# Patient Record
Sex: FEMALE | Race: WHITE | NOT HISPANIC OR LATINO | Employment: OTHER | URBAN - NONMETROPOLITAN AREA
[De-identification: names, ages, dates, MRNs, and addresses within clinical notes are randomized per-mention and may not be internally consistent; named-entity substitution may affect disease eponyms.]

---

## 2017-01-03 ENCOUNTER — HOSPITAL ENCOUNTER (OUTPATIENT)
Dept: BONE DENSITY | Facility: HOSPITAL | Age: 67
Discharge: HOME | End: 2017-01-03
Attending: INTERNAL MEDICINE
Payer: MEDICARE

## 2017-01-03 ENCOUNTER — HOSPITAL ENCOUNTER (OUTPATIENT)
Dept: MAMMOGRAPHY | Facility: HOSPITAL | Age: 67
Discharge: HOME | End: 2017-01-03
Attending: INTERNAL MEDICINE
Payer: MEDICARE

## 2017-01-03 DIAGNOSIS — Z13.820 ENCOUNTER FOR IMAGING TO ASSESS OSTEOPOROSIS: ICD-10-CM

## 2017-01-03 DIAGNOSIS — Z12.31 ENCOUNTER FOR SCREENING MAMMOGRAM FOR MALIGNANT NEOPLASM OF BREAST: ICD-10-CM

## 2017-01-03 PROCEDURE — 77080 DXA BONE DENSITY AXIAL: CPT

## 2017-01-03 PROCEDURE — G0202 SCR MAMMO BI INCL CAD: HCPCS

## 2017-01-03 PROCEDURE — 77063 BREAST TOMOSYNTHESIS BI: CPT

## 2017-01-06 ENCOUNTER — GENERIC CONVERSION - ENCOUNTER (OUTPATIENT)
Dept: OTHER | Facility: OTHER | Age: 67
End: 2017-01-06

## 2017-01-11 ENCOUNTER — ALLSCRIPTS OFFICE VISIT (OUTPATIENT)
Dept: OTHER | Facility: OTHER | Age: 67
End: 2017-01-11

## 2017-01-11 DIAGNOSIS — R10.9 ABDOMINAL PAIN: ICD-10-CM

## 2017-01-11 DIAGNOSIS — R53.83 OTHER FATIGUE: ICD-10-CM

## 2017-01-11 DIAGNOSIS — Z79.891 LONG TERM CURRENT USE OF OPIATE ANALGESIC: ICD-10-CM

## 2017-01-11 DIAGNOSIS — K21.9 GASTRO-ESOPHAGEAL REFLUX DISEASE WITHOUT ESOPHAGITIS: ICD-10-CM

## 2017-01-11 DIAGNOSIS — G89.4 CHRONIC PAIN SYNDROME: ICD-10-CM

## 2017-01-11 DIAGNOSIS — F11.20 UNCOMPLICATED OPIOID DEPENDENCE (HCC): ICD-10-CM

## 2017-01-19 ENCOUNTER — GENERIC CONVERSION - ENCOUNTER (OUTPATIENT)
Dept: OTHER | Facility: OTHER | Age: 67
End: 2017-01-19

## 2017-01-30 ENCOUNTER — ALLSCRIPTS OFFICE VISIT (OUTPATIENT)
Dept: OTHER | Facility: OTHER | Age: 67
End: 2017-01-30

## 2017-01-30 ENCOUNTER — TRANSCRIBE ORDERS (OUTPATIENT)
Dept: LAB | Facility: MEDICAL CENTER | Age: 67
End: 2017-01-30

## 2017-01-30 ENCOUNTER — APPOINTMENT (OUTPATIENT)
Dept: LAB | Facility: MEDICAL CENTER | Age: 67
End: 2017-01-30
Payer: MEDICARE

## 2017-01-30 DIAGNOSIS — K21.9 GASTRO-ESOPHAGEAL REFLUX DISEASE WITHOUT ESOPHAGITIS: ICD-10-CM

## 2017-01-30 DIAGNOSIS — R10.9 ABDOMINAL PAIN: ICD-10-CM

## 2017-01-30 DIAGNOSIS — R53.83 OTHER FATIGUE: ICD-10-CM

## 2017-01-30 LAB
BACTERIA UR QL AUTO: ABNORMAL /HPF
BILIRUB UR QL STRIP: NEGATIVE
CLARITY UR: CLEAR
COLOR UR: YELLOW
ERYTHROCYTE [DISTWIDTH] IN BLOOD BY AUTOMATED COUNT: 13.1 % (ref 11.6–15.1)
GLUCOSE UR STRIP-MCNC: NEGATIVE MG/DL
HCT VFR BLD AUTO: 44.7 % (ref 34.8–46.1)
HGB BLD-MCNC: 15.4 G/DL (ref 11.5–15.4)
HGB UR QL STRIP.AUTO: NEGATIVE
HYALINE CASTS #/AREA URNS LPF: ABNORMAL /LPF
IRON SERPL-MCNC: 73 UG/DL (ref 50–170)
KETONES UR STRIP-MCNC: NEGATIVE MG/DL
LEUKOCYTE ESTERASE UR QL STRIP: ABNORMAL
MCH RBC QN AUTO: 30.4 PG (ref 26.8–34.3)
MCHC RBC AUTO-ENTMCNC: 34.5 G/DL (ref 31.4–37.4)
MCV RBC AUTO: 88 FL (ref 82–98)
NITRITE UR QL STRIP: NEGATIVE
NON-SQ EPI CELLS URNS QL MICRO: ABNORMAL /HPF
PH UR STRIP.AUTO: 5.5 [PH] (ref 4.5–8)
PLATELET # BLD AUTO: 269 THOUSANDS/UL (ref 149–390)
PMV BLD AUTO: 9.7 FL (ref 8.9–12.7)
PROT UR STRIP-MCNC: NEGATIVE MG/DL
RBC # BLD AUTO: 5.06 MILLION/UL (ref 3.81–5.12)
RBC #/AREA URNS AUTO: ABNORMAL /HPF
SP GR UR STRIP.AUTO: 1.02 (ref 1–1.03)
TSH SERPL DL<=0.05 MIU/L-ACNC: 0.98 UIU/ML (ref 0.36–3.74)
UROBILINOGEN UR QL STRIP.AUTO: 0.2 E.U./DL
WBC # BLD AUTO: 12.49 THOUSAND/UL (ref 4.31–10.16)
WBC #/AREA URNS AUTO: ABNORMAL /HPF

## 2017-01-30 PROCEDURE — 83540 ASSAY OF IRON: CPT

## 2017-01-30 PROCEDURE — 36415 COLL VENOUS BLD VENIPUNCTURE: CPT

## 2017-01-30 PROCEDURE — 81001 URINALYSIS AUTO W/SCOPE: CPT

## 2017-01-30 PROCEDURE — 85027 COMPLETE CBC AUTOMATED: CPT

## 2017-01-30 PROCEDURE — 84443 ASSAY THYROID STIM HORMONE: CPT

## 2017-02-19 ENCOUNTER — HOSPITAL ENCOUNTER (EMERGENCY)
Facility: HOSPITAL | Age: 67
Discharge: HOME | End: 2017-02-19
Attending: EMERGENCY MEDICINE | Admitting: EMERGENCY MEDICINE
Payer: MEDICARE

## 2017-02-19 ENCOUNTER — APPOINTMENT (EMERGENCY)
Dept: RADIOLOGY | Facility: HOSPITAL | Age: 67
End: 2017-02-19
Payer: MEDICARE

## 2017-02-19 VITALS
HEART RATE: 85 BPM | SYSTOLIC BLOOD PRESSURE: 170 MMHG | RESPIRATION RATE: 18 BRPM | OXYGEN SATURATION: 96 % | DIASTOLIC BLOOD PRESSURE: 79 MMHG | WEIGHT: 200 LBS | TEMPERATURE: 97.9 F

## 2017-02-19 DIAGNOSIS — R42 VERTIGO: Primary | ICD-10-CM

## 2017-02-19 DIAGNOSIS — J06.9 UPPER RESPIRATORY TRACT INFECTION: ICD-10-CM

## 2017-02-19 DIAGNOSIS — M79.602 LEFT ARM PAIN: ICD-10-CM

## 2017-02-19 LAB
ALBUMIN SERPL BCP-MCNC: 3.7 G/DL (ref 3.5–5)
ALP SERPL-CCNC: 91 U/L (ref 46–116)
ALT SERPL W P-5'-P-CCNC: 51 U/L (ref 12–78)
ANION GAP SERPL CALCULATED.3IONS-SCNC: 8 MMOL/L (ref 4–13)
AST SERPL W P-5'-P-CCNC: 30 U/L (ref 5–45)
BASOPHILS # BLD AUTO: 0.09 THOUSANDS/ΜL (ref 0–0.1)
BASOPHILS NFR BLD AUTO: 1 % (ref 0–1)
BILIRUB SERPL-MCNC: 0.4 MG/DL (ref 0.2–1)
BUN SERPL-MCNC: 12 MG/DL (ref 5–25)
CALCIUM SERPL-MCNC: 9 MG/DL (ref 8.3–10.1)
CHLORIDE SERPL-SCNC: 106 MMOL/L (ref 100–108)
CO2 SERPL-SCNC: 28 MMOL/L (ref 21–32)
CREAT SERPL-MCNC: 0.7 MG/DL (ref 0.6–1.3)
EOSINOPHIL # BLD AUTO: 0.41 THOUSAND/ΜL (ref 0–0.61)
EOSINOPHIL NFR BLD AUTO: 4 % (ref 0–6)
ERYTHROCYTE [DISTWIDTH] IN BLOOD BY AUTOMATED COUNT: 13.2 % (ref 11.6–15.1)
GFR SERPL CREATININE-BSD FRML MDRD: >60 ML/MIN/1.73SQ M
GLUCOSE SERPL-MCNC: 106 MG/DL (ref 65–140)
HCT VFR BLD AUTO: 42.8 % (ref 34.8–46.1)
HGB BLD-MCNC: 14.5 G/DL (ref 11.5–15.4)
LACTATE SERPL-SCNC: 1 MMOL/L (ref 0.5–2)
LYMPHOCYTES # BLD AUTO: 1.87 THOUSANDS/ΜL (ref 0.6–4.47)
LYMPHOCYTES NFR BLD AUTO: 19 % (ref 14–44)
MCH RBC QN AUTO: 30 PG (ref 26.8–34.3)
MCHC RBC AUTO-ENTMCNC: 33.9 G/DL (ref 31.4–37.4)
MCV RBC AUTO: 88 FL (ref 82–98)
MONOCYTES # BLD AUTO: 0.78 THOUSAND/ΜL (ref 0.17–1.22)
MONOCYTES NFR BLD AUTO: 8 % (ref 4–12)
NEUTROPHILS # BLD AUTO: 6.78 THOUSANDS/ΜL (ref 1.85–7.62)
NEUTS SEG NFR BLD AUTO: 68 % (ref 43–75)
PLATELET # BLD AUTO: 196 THOUSANDS/UL (ref 149–390)
PMV BLD AUTO: 9.4 FL (ref 8.9–12.7)
POTASSIUM SERPL-SCNC: 4.2 MMOL/L (ref 3.5–5.3)
PROT SERPL-MCNC: 7.1 G/DL (ref 6.4–8.2)
RBC # BLD AUTO: 4.84 MILLION/UL (ref 3.81–5.12)
SODIUM SERPL-SCNC: 142 MMOL/L (ref 136–145)
WBC # BLD AUTO: 9.93 THOUSAND/UL (ref 4.31–10.16)

## 2017-02-19 PROCEDURE — 83605 ASSAY OF LACTIC ACID: CPT | Performed by: EMERGENCY MEDICINE

## 2017-02-19 PROCEDURE — 99284 EMERGENCY DEPT VISIT MOD MDM: CPT

## 2017-02-19 PROCEDURE — 93005 ELECTROCARDIOGRAM TRACING: CPT | Performed by: EMERGENCY MEDICINE

## 2017-02-19 PROCEDURE — 85025 COMPLETE CBC W/AUTO DIFF WBC: CPT | Performed by: EMERGENCY MEDICINE

## 2017-02-19 PROCEDURE — 36415 COLL VENOUS BLD VENIPUNCTURE: CPT | Performed by: EMERGENCY MEDICINE

## 2017-02-19 PROCEDURE — 80053 COMPREHEN METABOLIC PANEL: CPT | Performed by: EMERGENCY MEDICINE

## 2017-02-19 PROCEDURE — 71020 HB CHEST X-RAY 2VW FRONTAL&LATL: CPT

## 2017-02-19 RX ORDER — DIAZEPAM 2 MG/1
5 TABLET ORAL EVERY 8 HOURS PRN
Qty: 9 TABLET | Refills: 0 | Status: SHIPPED | OUTPATIENT
Start: 2017-02-19 | End: 2017-08-09 | Stop reason: ALTCHOICE

## 2017-02-19 RX ORDER — DIAZEPAM 2 MG/1
2 TABLET ORAL ONCE
Status: COMPLETED | OUTPATIENT
Start: 2017-02-19 | End: 2017-02-19

## 2017-02-19 RX ORDER — ACETAMINOPHEN 325 MG/1
650 TABLET ORAL EVERY 4 HOURS PRN
Status: DISCONTINUED | OUTPATIENT
Start: 2017-02-19 | End: 2017-02-19 | Stop reason: HOSPADM

## 2017-02-19 RX ADMIN — ACETAMINOPHEN 650 MG: 325 TABLET, FILM COATED ORAL at 19:05

## 2017-02-19 RX ADMIN — DIAZEPAM 2 MG: 2 TABLET ORAL at 17:40

## 2017-02-20 ENCOUNTER — GENERIC CONVERSION - ENCOUNTER (OUTPATIENT)
Dept: OTHER | Facility: OTHER | Age: 67
End: 2017-02-20

## 2017-02-22 ENCOUNTER — ALLSCRIPTS OFFICE VISIT (OUTPATIENT)
Dept: OTHER | Facility: OTHER | Age: 67
End: 2017-02-22

## 2017-02-22 LAB
ATRIAL RATE: 90 BPM
P AXIS: 54 DEGREES
PR INTERVAL: 138 MS
QRS AXIS: 10 DEGREES
QRSD INTERVAL: 86 MS
QT INTERVAL: 372 MS
QTC INTERVAL: 455 MS
T WAVE AXIS: 24 DEGREES
VENTRICULAR RATE: 90 BPM

## 2017-02-24 ENCOUNTER — ALLSCRIPTS OFFICE VISIT (OUTPATIENT)
Dept: OTHER | Facility: OTHER | Age: 67
End: 2017-02-24

## 2017-02-27 ENCOUNTER — GENERIC CONVERSION - ENCOUNTER (OUTPATIENT)
Dept: OTHER | Facility: OTHER | Age: 67
End: 2017-02-27

## 2017-03-21 ENCOUNTER — ALLSCRIPTS OFFICE VISIT (OUTPATIENT)
Dept: OTHER | Facility: OTHER | Age: 67
End: 2017-03-21

## 2017-03-24 ENCOUNTER — ALLSCRIPTS OFFICE VISIT (OUTPATIENT)
Dept: OTHER | Facility: OTHER | Age: 67
End: 2017-03-24

## 2017-04-27 ENCOUNTER — ALLSCRIPTS OFFICE VISIT (OUTPATIENT)
Dept: OTHER | Facility: OTHER | Age: 67
End: 2017-04-27

## 2017-04-28 ENCOUNTER — APPOINTMENT (OUTPATIENT)
Dept: LAB | Facility: HOSPITAL | Age: 67
End: 2017-04-28
Payer: MEDICARE

## 2017-04-28 ENCOUNTER — TRANSCRIBE ORDERS (OUTPATIENT)
Dept: ADMINISTRATIVE | Facility: HOSPITAL | Age: 67
End: 2017-04-28

## 2017-04-28 ENCOUNTER — HOSPITAL ENCOUNTER (OUTPATIENT)
Dept: NON INVASIVE DIAGNOSTICS | Facility: HOSPITAL | Age: 67
Discharge: HOME | End: 2017-04-28
Payer: MEDICARE

## 2017-04-28 ENCOUNTER — LAB REQUISITION (OUTPATIENT)
Dept: LAB | Facility: HOSPITAL | Age: 67
End: 2017-04-28
Payer: MEDICARE

## 2017-04-28 DIAGNOSIS — G89.4 CHRONIC PAIN SYNDROME: ICD-10-CM

## 2017-04-28 DIAGNOSIS — G89.4 CHRONIC PAIN SYNDROME: Primary | ICD-10-CM

## 2017-04-28 DIAGNOSIS — Z79.01 LONG TERM (CURRENT) USE OF ANTICOAGULANTS: ICD-10-CM

## 2017-04-28 DIAGNOSIS — Z01.812 PRE-OPERATIVE LABORATORY EXAMINATION: ICD-10-CM

## 2017-04-28 DIAGNOSIS — Z01.812 ENCOUNTER FOR PREPROCEDURAL LABORATORY EXAMINATION: ICD-10-CM

## 2017-04-28 DIAGNOSIS — Z79.01 LONG TERM CURRENT USE OF ANTICOAGULANT: ICD-10-CM

## 2017-04-28 LAB
ABO GROUP BLD: NORMAL
ANION GAP SERPL CALCULATED.3IONS-SCNC: 9 MMOL/L (ref 4–13)
APTT PPP: 29 SECONDS (ref 23–35)
ATRIAL RATE: 97 BPM
BACTERIA UR QL AUTO: ABNORMAL /HPF
BASOPHILS # BLD AUTO: 0.07 THOUSANDS/ΜL (ref 0–0.1)
BASOPHILS NFR BLD AUTO: 1 % (ref 0–1)
BILIRUB UR QL STRIP: NEGATIVE
BLD GP AB SCN SERPL QL: NEGATIVE
BUN SERPL-MCNC: 13 MG/DL (ref 5–25)
CALCIUM SERPL-MCNC: 9 MG/DL (ref 8.3–10.1)
CHLORIDE SERPL-SCNC: 106 MMOL/L (ref 100–108)
CLARITY UR: ABNORMAL
CO2 SERPL-SCNC: 27 MMOL/L (ref 21–32)
COLOR UR: YELLOW
CREAT SERPL-MCNC: 0.73 MG/DL (ref 0.6–1.3)
EOSINOPHIL # BLD AUTO: 0.25 THOUSAND/ΜL (ref 0–0.61)
EOSINOPHIL NFR BLD AUTO: 4 % (ref 0–6)
ERYTHROCYTE [DISTWIDTH] IN BLOOD BY AUTOMATED COUNT: 12.4 % (ref 11.6–15.1)
EST. AVERAGE GLUCOSE BLD GHB EST-MCNC: 114 MG/DL
GFR SERPL CREATININE-BSD FRML MDRD: >60 ML/MIN/1.73SQ M
GLUCOSE SERPL-MCNC: 108 MG/DL (ref 65–140)
GLUCOSE UR STRIP-MCNC: NEGATIVE MG/DL
HBA1C MFR BLD: 5.6 % (ref 4.2–6.3)
HCT VFR BLD AUTO: 43.7 % (ref 34.8–46.1)
HGB BLD-MCNC: 15 G/DL (ref 11.5–15.4)
HGB UR QL STRIP.AUTO: ABNORMAL
INR PPP: 0.98 (ref 0.86–1.16)
KETONES UR STRIP-MCNC: NEGATIVE MG/DL
LEUKOCYTE ESTERASE UR QL STRIP: ABNORMAL
LYMPHOCYTES # BLD AUTO: 2.16 THOUSANDS/ΜL (ref 0.6–4.47)
LYMPHOCYTES NFR BLD AUTO: 36 % (ref 14–44)
MCH RBC QN AUTO: 29.8 PG (ref 26.8–34.3)
MCHC RBC AUTO-ENTMCNC: 34.3 G/DL (ref 31.4–37.4)
MCV RBC AUTO: 87 FL (ref 82–98)
MONOCYTES # BLD AUTO: 0.39 THOUSAND/ΜL (ref 0.17–1.22)
MONOCYTES NFR BLD AUTO: 7 % (ref 4–12)
NEUTROPHILS # BLD AUTO: 3.07 THOUSANDS/ΜL (ref 1.85–7.62)
NEUTS SEG NFR BLD AUTO: 52 % (ref 43–75)
NITRITE UR QL STRIP: NEGATIVE
NON-SQ EPI CELLS URNS QL MICRO: ABNORMAL /HPF
P AXIS: 50 DEGREES
PH UR STRIP.AUTO: 5.5 [PH] (ref 4.5–8)
PLATELET # BLD AUTO: 198 THOUSANDS/UL (ref 149–390)
PMV BLD AUTO: 9.8 FL (ref 8.9–12.7)
POTASSIUM SERPL-SCNC: 4.1 MMOL/L (ref 3.5–5.3)
PR INTERVAL: 146 MS
PROT UR STRIP-MCNC: NEGATIVE MG/DL
PROTHROMBIN TIME: 12.9 SECONDS (ref 12.1–14.4)
QRS AXIS: 19 DEGREES
QRSD INTERVAL: 90 MS
QT INTERVAL: 364 MS
QTC INTERVAL: 462 MS
RBC # BLD AUTO: 5.04 MILLION/UL (ref 3.81–5.12)
RBC #/AREA URNS AUTO: ABNORMAL /HPF
RH BLD: NEGATIVE
SODIUM SERPL-SCNC: 142 MMOL/L (ref 136–145)
SP GR UR STRIP.AUTO: 1.02 (ref 1–1.03)
SPECIMEN EXPIRATION DATE: NORMAL
T WAVE AXIS: 19 DEGREES
UROBILINOGEN UR QL STRIP.AUTO: 0.2 E.U./DL
VENTRICULAR RATE: 97 BPM
WBC # BLD AUTO: 5.94 THOUSAND/UL (ref 4.31–10.16)
WBC #/AREA URNS AUTO: ABNORMAL /HPF

## 2017-04-28 PROCEDURE — 85730 THROMBOPLASTIN TIME PARTIAL: CPT

## 2017-04-28 PROCEDURE — 86850 RBC ANTIBODY SCREEN: CPT | Performed by: NEUROLOGICAL SURGERY

## 2017-04-28 PROCEDURE — 36415 COLL VENOUS BLD VENIPUNCTURE: CPT

## 2017-04-28 PROCEDURE — 83036 HEMOGLOBIN GLYCOSYLATED A1C: CPT

## 2017-04-28 PROCEDURE — 87086 URINE CULTURE/COLONY COUNT: CPT | Performed by: NEUROLOGICAL SURGERY

## 2017-04-28 PROCEDURE — 85610 PROTHROMBIN TIME: CPT

## 2017-04-28 PROCEDURE — 81001 URINALYSIS AUTO W/SCOPE: CPT | Performed by: NEUROLOGICAL SURGERY

## 2017-04-28 PROCEDURE — 86900 BLOOD TYPING SEROLOGIC ABO: CPT | Performed by: NEUROLOGICAL SURGERY

## 2017-04-28 PROCEDURE — 86901 BLOOD TYPING SEROLOGIC RH(D): CPT | Performed by: NEUROLOGICAL SURGERY

## 2017-04-28 PROCEDURE — 85025 COMPLETE CBC W/AUTO DIFF WBC: CPT

## 2017-04-28 PROCEDURE — 80048 BASIC METABOLIC PNL TOTAL CA: CPT

## 2017-04-28 PROCEDURE — 93005 ELECTROCARDIOGRAM TRACING: CPT

## 2017-04-29 LAB — BACTERIA UR CULT: NORMAL

## 2017-05-04 ENCOUNTER — ALLSCRIPTS OFFICE VISIT (OUTPATIENT)
Dept: OTHER | Facility: OTHER | Age: 67
End: 2017-05-04

## 2017-05-04 DIAGNOSIS — R82.71 BACTERIURIA: ICD-10-CM

## 2017-05-08 ENCOUNTER — TRANSCRIBE ORDERS (OUTPATIENT)
Dept: LAB | Facility: MEDICAL CENTER | Age: 67
End: 2017-05-08

## 2017-05-08 ENCOUNTER — APPOINTMENT (OUTPATIENT)
Dept: LAB | Facility: MEDICAL CENTER | Age: 67
End: 2017-05-08
Payer: MEDICARE

## 2017-05-08 DIAGNOSIS — R82.71 BACTERIURIA: ICD-10-CM

## 2017-05-08 LAB
BACTERIA UR QL AUTO: ABNORMAL /HPF
BILIRUB UR QL STRIP: NEGATIVE
CLARITY UR: ABNORMAL
COLOR UR: YELLOW
GLUCOSE UR STRIP-MCNC: NEGATIVE MG/DL
HGB UR QL STRIP.AUTO: NEGATIVE
HYALINE CASTS #/AREA URNS LPF: ABNORMAL /LPF
KETONES UR STRIP-MCNC: NEGATIVE MG/DL
LEUKOCYTE ESTERASE UR QL STRIP: ABNORMAL
NITRITE UR QL STRIP: NEGATIVE
NON-SQ EPI CELLS URNS QL MICRO: ABNORMAL /HPF
PH UR STRIP.AUTO: 7 [PH] (ref 4.5–8)
PROT UR STRIP-MCNC: ABNORMAL MG/DL
RBC #/AREA URNS AUTO: ABNORMAL /HPF
SP GR UR STRIP.AUTO: 1.02 (ref 1–1.03)
UROBILINOGEN UR QL STRIP.AUTO: 0.2 E.U./DL
WBC #/AREA URNS AUTO: ABNORMAL /HPF

## 2017-05-08 PROCEDURE — 81001 URINALYSIS AUTO W/SCOPE: CPT

## 2017-05-12 ENCOUNTER — GENERIC CONVERSION - ENCOUNTER (OUTPATIENT)
Dept: OTHER | Facility: OTHER | Age: 67
End: 2017-05-12

## 2017-05-18 ENCOUNTER — ANESTHESIA EVENT (OUTPATIENT)
Dept: PERIOP | Facility: HOSPITAL | Age: 67
End: 2017-05-18
Payer: MEDICARE

## 2017-05-18 ENCOUNTER — HOSPITAL ENCOUNTER (OUTPATIENT)
Facility: HOSPITAL | Age: 67
Setting detail: OUTPATIENT SURGERY
Discharge: HOME | End: 2017-05-18
Attending: NEUROLOGICAL SURGERY | Admitting: NEUROLOGICAL SURGERY
Payer: MEDICARE

## 2017-05-18 ENCOUNTER — HOSPITAL ENCOUNTER (OUTPATIENT)
Dept: RADIOLOGY | Facility: HOSPITAL | Age: 67
Setting detail: OUTPATIENT SURGERY
Discharge: HOME | End: 2017-05-18
Payer: MEDICARE

## 2017-05-18 ENCOUNTER — ANESTHESIA (OUTPATIENT)
Dept: PERIOP | Facility: HOSPITAL | Age: 67
End: 2017-05-18
Payer: MEDICARE

## 2017-05-18 VITALS
DIASTOLIC BLOOD PRESSURE: 57 MMHG | TEMPERATURE: 99.1 F | WEIGHT: 205 LBS | SYSTOLIC BLOOD PRESSURE: 150 MMHG | HEIGHT: 63 IN | HEART RATE: 88 BPM | BODY MASS INDEX: 36.32 KG/M2 | OXYGEN SATURATION: 94 % | RESPIRATION RATE: 16 BRPM

## 2017-05-18 DIAGNOSIS — R52 PAIN: ICD-10-CM

## 2017-05-18 LAB
ABO GROUP BLD: NORMAL
BLD GP AB SCN SERPL QL: NEGATIVE
GLUCOSE SERPL-MCNC: 124 MG/DL (ref 65–140)
RH BLD: NEGATIVE
SPECIMEN EXPIRATION DATE: NORMAL

## 2017-05-18 PROCEDURE — 86900 BLOOD TYPING SEROLOGIC ABO: CPT | Performed by: NEUROLOGICAL SURGERY

## 2017-05-18 PROCEDURE — C1820 GENERATOR NEURO RECHG BAT SY: HCPCS | Performed by: NEUROLOGICAL SURGERY

## 2017-05-18 PROCEDURE — 86901 BLOOD TYPING SEROLOGIC RH(D): CPT | Performed by: NEUROLOGICAL SURGERY

## 2017-05-18 PROCEDURE — 72070 X-RAY EXAM THORAC SPINE 2VWS: CPT

## 2017-05-18 PROCEDURE — C1778 LEAD, NEUROSTIMULATOR: HCPCS | Performed by: NEUROLOGICAL SURGERY

## 2017-05-18 PROCEDURE — 86850 RBC ANTIBODY SCREEN: CPT | Performed by: NEUROLOGICAL SURGERY

## 2017-05-18 PROCEDURE — 82948 REAGENT STRIP/BLOOD GLUCOSE: CPT

## 2017-05-18 DEVICE — LEAD NEUROSTIM PENTA 3MM X 60CM: Type: IMPLANTABLE DEVICE | Site: SPINE THORACIC | Status: FUNCTIONAL

## 2017-05-18 DEVICE — IPG PROCLAIM XR5: Type: IMPLANTABLE DEVICE | Site: BUTTOCKS | Status: FUNCTIONAL

## 2017-05-18 RX ORDER — MORPHINE SULFATE 2 MG/ML
2 INJECTION, SOLUTION INTRAMUSCULAR; INTRAVENOUS
Status: DISCONTINUED | OUTPATIENT
Start: 2017-05-18 | End: 2017-05-18 | Stop reason: HOSPADM

## 2017-05-18 RX ORDER — ONDANSETRON 2 MG/ML
4 INJECTION INTRAMUSCULAR; INTRAVENOUS EVERY 6 HOURS PRN
Status: DISCONTINUED | OUTPATIENT
Start: 2017-05-18 | End: 2017-05-18 | Stop reason: HOSPADM

## 2017-05-18 RX ORDER — CHLORHEXIDINE GLUCONATE 0.12 MG/ML
15 RINSE ORAL ONCE
Status: COMPLETED | OUTPATIENT
Start: 2017-05-18 | End: 2017-05-18

## 2017-05-18 RX ORDER — LIDOCAINE HYDROCHLORIDE AND EPINEPHRINE 10; 10 MG/ML; UG/ML
INJECTION, SOLUTION INFILTRATION; PERINEURAL AS NEEDED
Status: DISCONTINUED | OUTPATIENT
Start: 2017-05-18 | End: 2017-05-18 | Stop reason: HOSPADM

## 2017-05-18 RX ORDER — PROPOFOL 10 MG/ML
INJECTION, EMULSION INTRAVENOUS CONTINUOUS PRN
Status: DISCONTINUED | OUTPATIENT
Start: 2017-05-18 | End: 2017-05-18 | Stop reason: SURG

## 2017-05-18 RX ORDER — FENTANYL CITRATE/PF 50 MCG/ML
12.5 SYRINGE (ML) INJECTION
Status: DISCONTINUED | OUTPATIENT
Start: 2017-05-18 | End: 2017-05-18 | Stop reason: HOSPADM

## 2017-05-18 RX ORDER — ONDANSETRON 2 MG/ML
4 INJECTION INTRAMUSCULAR; INTRAVENOUS ONCE
Status: DISCONTINUED | OUTPATIENT
Start: 2017-05-18 | End: 2017-05-18 | Stop reason: HOSPADM

## 2017-05-18 RX ORDER — ONDANSETRON 2 MG/ML
INJECTION INTRAMUSCULAR; INTRAVENOUS AS NEEDED
Status: DISCONTINUED | OUTPATIENT
Start: 2017-05-18 | End: 2017-05-18 | Stop reason: SURG

## 2017-05-18 RX ORDER — OXYCODONE HYDROCHLORIDE AND ACETAMINOPHEN 5; 325 MG/1; MG/1
2 TABLET ORAL EVERY 4 HOURS PRN
Status: DISCONTINUED | OUTPATIENT
Start: 2017-05-18 | End: 2017-05-18 | Stop reason: HOSPADM

## 2017-05-18 RX ORDER — PREGABALIN 75 MG/1
150 CAPSULE ORAL ONCE
Status: COMPLETED | OUTPATIENT
Start: 2017-05-18 | End: 2017-05-18

## 2017-05-18 RX ORDER — FENTANYL CITRATE 50 UG/ML
INJECTION, SOLUTION INTRAMUSCULAR; INTRAVENOUS AS NEEDED
Status: DISCONTINUED | OUTPATIENT
Start: 2017-05-18 | End: 2017-05-18 | Stop reason: SURG

## 2017-05-18 RX ORDER — SODIUM CHLORIDE, SODIUM LACTATE, POTASSIUM CHLORIDE, CALCIUM CHLORIDE 600; 310; 30; 20 MG/100ML; MG/100ML; MG/100ML; MG/100ML
50 INJECTION, SOLUTION INTRAVENOUS CONTINUOUS
Status: DISCONTINUED | OUTPATIENT
Start: 2017-05-18 | End: 2017-05-18 | Stop reason: HOSPADM

## 2017-05-18 RX ORDER — BUPIVACAINE HYDROCHLORIDE AND EPINEPHRINE 5; 5 MG/ML; UG/ML
INJECTION, SOLUTION EPIDURAL; INTRACAUDAL; PERINEURAL AS NEEDED
Status: DISCONTINUED | OUTPATIENT
Start: 2017-05-18 | End: 2017-05-18 | Stop reason: HOSPADM

## 2017-05-18 RX ORDER — PROPOFOL 10 MG/ML
INJECTION, EMULSION INTRAVENOUS AS NEEDED
Status: DISCONTINUED | OUTPATIENT
Start: 2017-05-18 | End: 2017-05-18 | Stop reason: SURG

## 2017-05-18 RX ORDER — METOCLOPRAMIDE HYDROCHLORIDE 5 MG/ML
10 INJECTION INTRAMUSCULAR; INTRAVENOUS ONCE AS NEEDED
Status: DISCONTINUED | OUTPATIENT
Start: 2017-05-18 | End: 2017-05-18 | Stop reason: HOSPADM

## 2017-05-18 RX ORDER — MIDAZOLAM HYDROCHLORIDE 1 MG/ML
INJECTION INTRAMUSCULAR; INTRAVENOUS AS NEEDED
Status: DISCONTINUED | OUTPATIENT
Start: 2017-05-18 | End: 2017-05-18 | Stop reason: SURG

## 2017-05-18 RX ORDER — ACETAMINOPHEN 325 MG/1
TABLET ORAL
Status: DISCONTINUED
Start: 2017-05-18 | End: 2017-05-18 | Stop reason: HOSPADM

## 2017-05-18 RX ORDER — ACETAMINOPHEN 325 MG/1
975 TABLET ORAL ONCE
Status: COMPLETED | OUTPATIENT
Start: 2017-05-18 | End: 2017-05-18

## 2017-05-18 RX ORDER — EPHEDRINE SULFATE 50 MG/ML
INJECTION, SOLUTION INTRAVENOUS AS NEEDED
Status: DISCONTINUED | OUTPATIENT
Start: 2017-05-18 | End: 2017-05-18 | Stop reason: SURG

## 2017-05-18 RX ORDER — SCOLOPAMINE TRANSDERMAL SYSTEM 1 MG/1
PATCH, EXTENDED RELEASE TRANSDERMAL
Status: DISCONTINUED
Start: 2017-05-18 | End: 2017-05-18 | Stop reason: HOSPADM

## 2017-05-18 RX ORDER — VANCOMYCIN HYDROCHLORIDE 1 G/200ML
1000 INJECTION, SOLUTION INTRAVENOUS ONCE
Status: COMPLETED | OUTPATIENT
Start: 2017-05-18 | End: 2017-05-18

## 2017-05-18 RX ORDER — SUCCINYLCHOLINE CHLORIDE 20 MG/ML
INJECTION INTRAMUSCULAR; INTRAVENOUS AS NEEDED
Status: DISCONTINUED | OUTPATIENT
Start: 2017-05-18 | End: 2017-05-18 | Stop reason: SURG

## 2017-05-18 RX ADMIN — HYDROMORPHONE HYDROCHLORIDE 0.2 MG: 1 INJECTION, SOLUTION INTRAMUSCULAR; INTRAVENOUS; SUBCUTANEOUS at 14:22

## 2017-05-18 RX ADMIN — EPHEDRINE SULFATE 10 MG: 50 INJECTION, SOLUTION INTRAMUSCULAR; INTRAVENOUS; SUBCUTANEOUS at 12:23

## 2017-05-18 RX ADMIN — MIDAZOLAM HYDROCHLORIDE 2 MG: 1 INJECTION, SOLUTION INTRAMUSCULAR; INTRAVENOUS at 11:43

## 2017-05-18 RX ADMIN — FENTANYL CITRATE 12.5 MCG: 50 INJECTION INTRAMUSCULAR; INTRAVENOUS at 13:36

## 2017-05-18 RX ADMIN — PROPOFOL 200 MG: 10 INJECTION, EMULSION INTRAVENOUS at 11:49

## 2017-05-18 RX ADMIN — HYDROMORPHONE HYDROCHLORIDE 0.2 MG: 1 INJECTION, SOLUTION INTRAMUSCULAR; INTRAVENOUS; SUBCUTANEOUS at 14:06

## 2017-05-18 RX ADMIN — FENTANYL CITRATE 12.5 MCG: 50 INJECTION INTRAMUSCULAR; INTRAVENOUS at 13:56

## 2017-05-18 RX ADMIN — FENTANYL CITRATE 100 MCG: 50 INJECTION, SOLUTION INTRAMUSCULAR; INTRAVENOUS at 11:49

## 2017-05-18 RX ADMIN — CHLORHEXIDINE GLUCONATE 15 ML: 1.2 RINSE ORAL at 10:10

## 2017-05-18 RX ADMIN — HYDROMORPHONE HYDROCHLORIDE 0.2 MG: 1 INJECTION, SOLUTION INTRAMUSCULAR; INTRAVENOUS; SUBCUTANEOUS at 14:12

## 2017-05-18 RX ADMIN — REMIFENTANIL HYDROCHLORIDE 0.2 MCG/KG/MIN: 1 INJECTION, POWDER, LYOPHILIZED, FOR SOLUTION INTRAVENOUS at 12:07

## 2017-05-18 RX ADMIN — SODIUM CHLORIDE, SODIUM LACTATE, POTASSIUM CHLORIDE, AND CALCIUM CHLORIDE: .6; .31; .03; .02 INJECTION, SOLUTION INTRAVENOUS at 10:22

## 2017-05-18 RX ADMIN — FENTANYL CITRATE 12.5 MCG: 50 INJECTION INTRAMUSCULAR; INTRAVENOUS at 13:28

## 2017-05-18 RX ADMIN — PREGABALIN 150 MG: 75 CAPSULE ORAL at 11:40

## 2017-05-18 RX ADMIN — OXYCODONE HYDROCHLORIDE AND ACETAMINOPHEN 2 TABLET: 5; 325 TABLET ORAL at 15:35

## 2017-05-18 RX ADMIN — DEXAMETHASONE SODIUM PHOSPHATE 10 MG: 10 INJECTION INTRAMUSCULAR; INTRAVENOUS at 11:49

## 2017-05-18 RX ADMIN — EPHEDRINE SULFATE 10 MG: 50 INJECTION, SOLUTION INTRAMUSCULAR; INTRAVENOUS; SUBCUTANEOUS at 12:20

## 2017-05-18 RX ADMIN — ONDANSETRON 4 MG: 2 INJECTION INTRAMUSCULAR; INTRAVENOUS at 11:43

## 2017-05-18 RX ADMIN — ACETAMINOPHEN 975 MG: 325 TABLET ORAL at 11:40

## 2017-05-18 RX ADMIN — HYDROMORPHONE HYDROCHLORIDE 0.2 MG: 1 INJECTION, SOLUTION INTRAMUSCULAR; INTRAVENOUS; SUBCUTANEOUS at 14:01

## 2017-05-18 RX ADMIN — EPHEDRINE SULFATE 10 MG: 50 INJECTION, SOLUTION INTRAMUSCULAR; INTRAVENOUS; SUBCUTANEOUS at 12:17

## 2017-05-18 RX ADMIN — FENTANYL CITRATE 12.5 MCG: 50 INJECTION INTRAMUSCULAR; INTRAVENOUS at 13:40

## 2017-05-18 RX ADMIN — PROPOFOL 150 MCG/KG/MIN: 10 INJECTION, EMULSION INTRAVENOUS at 12:07

## 2017-05-18 RX ADMIN — SUCCINYLCHOLINE CHLORIDE 100 MG: 20 INJECTION, SOLUTION INTRAMUSCULAR; INTRAVENOUS at 11:49

## 2017-05-18 RX ADMIN — PHENYLEPHRINE HYDROCHLORIDE 50 MCG/MIN: 10 INJECTION INTRAVENOUS at 12:50

## 2017-05-18 RX ADMIN — HYDROMORPHONE HYDROCHLORIDE 0.2 MG: 1 INJECTION, SOLUTION INTRAMUSCULAR; INTRAVENOUS; SUBCUTANEOUS at 14:17

## 2017-05-18 RX ADMIN — FENTANYL CITRATE 12.5 MCG: 50 INJECTION INTRAMUSCULAR; INTRAVENOUS at 13:48

## 2017-05-18 RX ADMIN — SODIUM CHLORIDE, SODIUM LACTATE, POTASSIUM CHLORIDE, AND CALCIUM CHLORIDE 50 ML/HR: .6; .31; .03; .02 INJECTION, SOLUTION INTRAVENOUS at 10:23

## 2017-05-18 RX ADMIN — FENTANYL CITRATE 12.5 MCG: 50 INJECTION INTRAMUSCULAR; INTRAVENOUS at 13:32

## 2017-05-18 RX ADMIN — VANCOMYCIN HYDROCHLORIDE 1000 MG: 1 INJECTION, SOLUTION INTRAVENOUS at 10:24

## 2017-05-18 RX ADMIN — FENTANYL CITRATE 12.5 MCG: 50 INJECTION INTRAMUSCULAR; INTRAVENOUS at 13:52

## 2017-05-18 RX ADMIN — FENTANYL CITRATE 12.5 MCG: 50 INJECTION INTRAMUSCULAR; INTRAVENOUS at 13:44

## 2017-05-19 ENCOUNTER — GENERIC CONVERSION - ENCOUNTER (OUTPATIENT)
Dept: OTHER | Facility: OTHER | Age: 67
End: 2017-05-19

## 2017-05-22 ENCOUNTER — GENERIC CONVERSION - ENCOUNTER (OUTPATIENT)
Dept: OTHER | Facility: OTHER | Age: 67
End: 2017-05-22

## 2017-06-01 ENCOUNTER — ALLSCRIPTS OFFICE VISIT (OUTPATIENT)
Dept: OTHER | Facility: OTHER | Age: 67
End: 2017-06-01

## 2017-06-05 ENCOUNTER — GENERIC CONVERSION - ENCOUNTER (OUTPATIENT)
Dept: OTHER | Facility: OTHER | Age: 67
End: 2017-06-05

## 2017-06-07 ENCOUNTER — GENERIC CONVERSION - ENCOUNTER (OUTPATIENT)
Dept: OTHER | Facility: OTHER | Age: 67
End: 2017-06-07

## 2017-06-15 ENCOUNTER — ALLSCRIPTS OFFICE VISIT (OUTPATIENT)
Dept: OTHER | Facility: OTHER | Age: 67
End: 2017-06-15

## 2017-06-19 ENCOUNTER — GENERIC CONVERSION - ENCOUNTER (OUTPATIENT)
Dept: OTHER | Facility: OTHER | Age: 67
End: 2017-06-19

## 2017-07-03 ENCOUNTER — GENERIC CONVERSION - ENCOUNTER (OUTPATIENT)
Dept: OTHER | Facility: OTHER | Age: 67
End: 2017-07-03

## 2017-07-07 ENCOUNTER — ALLSCRIPTS OFFICE VISIT (OUTPATIENT)
Dept: OTHER | Facility: OTHER | Age: 67
End: 2017-07-07

## 2017-07-10 ENCOUNTER — ALLSCRIPTS OFFICE VISIT (OUTPATIENT)
Dept: OTHER | Facility: OTHER | Age: 67
End: 2017-07-10

## 2017-07-10 DIAGNOSIS — E78.5 HYPERLIPIDEMIA: ICD-10-CM

## 2017-07-10 DIAGNOSIS — E55.9 VITAMIN D DEFICIENCY: ICD-10-CM

## 2017-07-10 DIAGNOSIS — I10 ESSENTIAL (PRIMARY) HYPERTENSION: ICD-10-CM

## 2017-07-21 ENCOUNTER — ALLSCRIPTS OFFICE VISIT (OUTPATIENT)
Dept: OTHER | Facility: OTHER | Age: 67
End: 2017-07-21

## 2017-07-31 ENCOUNTER — ALLSCRIPTS OFFICE VISIT (OUTPATIENT)
Dept: RADIOLOGY | Facility: MEDICAL CENTER | Age: 67
End: 2017-07-31
Payer: MEDICARE

## 2017-08-04 ENCOUNTER — GENERIC CONVERSION - ENCOUNTER (OUTPATIENT)
Dept: OTHER | Facility: OTHER | Age: 67
End: 2017-08-04

## 2017-08-08 ENCOUNTER — GENERIC CONVERSION - ENCOUNTER (OUTPATIENT)
Dept: OTHER | Facility: OTHER | Age: 67
End: 2017-08-08

## 2017-08-09 ENCOUNTER — APPOINTMENT (EMERGENCY)
Dept: CT IMAGING | Facility: HOSPITAL | Age: 67
End: 2017-08-09
Payer: MEDICARE

## 2017-08-09 ENCOUNTER — HOSPITAL ENCOUNTER (OUTPATIENT)
Facility: HOSPITAL | Age: 67
Setting detail: OBSERVATION
Discharge: HOME | End: 2017-08-10
Attending: EMERGENCY MEDICINE | Admitting: FAMILY MEDICINE
Payer: MEDICARE

## 2017-08-09 ENCOUNTER — APPOINTMENT (EMERGENCY)
Dept: RADIOLOGY | Facility: HOSPITAL | Age: 67
End: 2017-08-09
Payer: MEDICARE

## 2017-08-09 DIAGNOSIS — I10 HYPERTENSION: ICD-10-CM

## 2017-08-09 DIAGNOSIS — R06.00 DYSPNEA: ICD-10-CM

## 2017-08-09 DIAGNOSIS — R07.9 CHEST PAIN IN ADULT: ICD-10-CM

## 2017-08-09 DIAGNOSIS — R07.9 CHEST PAIN: Primary | ICD-10-CM

## 2017-08-09 LAB
ALBUMIN SERPL BCP-MCNC: 3.8 G/DL (ref 3.5–5)
ALP SERPL-CCNC: 118 U/L (ref 46–116)
ALT SERPL W P-5'-P-CCNC: 79 U/L (ref 12–78)
ANION GAP SERPL CALCULATED.3IONS-SCNC: 8 MMOL/L (ref 4–13)
APTT PPP: 27 SECONDS (ref 23–35)
AST SERPL W P-5'-P-CCNC: 50 U/L (ref 5–45)
BASOPHILS # BLD AUTO: 0.08 THOUSANDS/ΜL (ref 0–0.1)
BASOPHILS NFR BLD AUTO: 1 % (ref 0–1)
BILIRUB SERPL-MCNC: 0.5 MG/DL (ref 0.2–1)
BUN SERPL-MCNC: 18 MG/DL (ref 5–25)
CALCIUM SERPL-MCNC: 8.9 MG/DL (ref 8.3–10.1)
CHLORIDE SERPL-SCNC: 103 MMOL/L (ref 100–108)
CO2 SERPL-SCNC: 29 MMOL/L (ref 21–32)
CREAT SERPL-MCNC: 0.72 MG/DL (ref 0.6–1.3)
DEPRECATED D DIMER PPP: 302 NG/ML (FEU) (ref 0–424)
EOSINOPHIL # BLD AUTO: 0.2 THOUSAND/ΜL (ref 0–0.61)
EOSINOPHIL NFR BLD AUTO: 2 % (ref 0–6)
ERYTHROCYTE [DISTWIDTH] IN BLOOD BY AUTOMATED COUNT: 13.6 % (ref 11.6–15.1)
GFR SERPL CREATININE-BSD FRML MDRD: 88 ML/MIN/1.73SQ M
GLUCOSE SERPL-MCNC: 94 MG/DL (ref 65–140)
HCT VFR BLD AUTO: 42.6 % (ref 34.8–46.1)
HGB BLD-MCNC: 14.7 G/DL (ref 11.5–15.4)
INR PPP: 0.97 (ref 0.86–1.16)
LYMPHOCYTES # BLD AUTO: 3.17 THOUSANDS/ΜL (ref 0.6–4.47)
LYMPHOCYTES NFR BLD AUTO: 25 % (ref 14–44)
MCH RBC QN AUTO: 29.4 PG (ref 26.8–34.3)
MCHC RBC AUTO-ENTMCNC: 34.5 G/DL (ref 31.4–37.4)
MCV RBC AUTO: 85 FL (ref 82–98)
MONOCYTES # BLD AUTO: 1.06 THOUSAND/ΜL (ref 0.17–1.22)
MONOCYTES NFR BLD AUTO: 8 % (ref 4–12)
NEUTROPHILS # BLD AUTO: 8.15 THOUSANDS/ΜL (ref 1.85–7.62)
NEUTS SEG NFR BLD AUTO: 64 % (ref 43–75)
PLATELET # BLD AUTO: 241 THOUSANDS/UL (ref 149–390)
PLATELET # BLD AUTO: 264 THOUSANDS/UL (ref 149–390)
PMV BLD AUTO: 10 FL (ref 8.9–12.7)
PMV BLD AUTO: 9.2 FL (ref 8.9–12.7)
POTASSIUM SERPL-SCNC: 4 MMOL/L (ref 3.5–5.3)
PROT SERPL-MCNC: 7.6 G/DL (ref 6.4–8.2)
PROTHROMBIN TIME: 12.8 SECONDS (ref 12.1–14.4)
RBC # BLD AUTO: 5 MILLION/UL (ref 3.81–5.12)
SODIUM SERPL-SCNC: 140 MMOL/L (ref 136–145)
TROPONIN I SERPL-MCNC: <0.02 NG/ML
WBC # BLD AUTO: 12.66 THOUSAND/UL (ref 4.31–10.16)

## 2017-08-09 PROCEDURE — 70450 CT HEAD/BRAIN W/O DYE: CPT

## 2017-08-09 PROCEDURE — 85610 PROTHROMBIN TIME: CPT | Performed by: EMERGENCY MEDICINE

## 2017-08-09 PROCEDURE — 96375 TX/PRO/DX INJ NEW DRUG ADDON: CPT

## 2017-08-09 PROCEDURE — 84484 ASSAY OF TROPONIN QUANT: CPT | Performed by: FAMILY MEDICINE

## 2017-08-09 PROCEDURE — 85379 FIBRIN DEGRADATION QUANT: CPT | Performed by: EMERGENCY MEDICINE

## 2017-08-09 PROCEDURE — 70490 CT SOFT TISSUE NECK W/O DYE: CPT

## 2017-08-09 PROCEDURE — 80074 ACUTE HEPATITIS PANEL: CPT | Performed by: FAMILY MEDICINE

## 2017-08-09 PROCEDURE — 99285 EMERGENCY DEPT VISIT HI MDM: CPT

## 2017-08-09 PROCEDURE — 85025 COMPLETE CBC W/AUTO DIFF WBC: CPT | Performed by: EMERGENCY MEDICINE

## 2017-08-09 PROCEDURE — 96361 HYDRATE IV INFUSION ADD-ON: CPT

## 2017-08-09 PROCEDURE — 85730 THROMBOPLASTIN TIME PARTIAL: CPT | Performed by: EMERGENCY MEDICINE

## 2017-08-09 PROCEDURE — 93005 ELECTROCARDIOGRAM TRACING: CPT | Performed by: EMERGENCY MEDICINE

## 2017-08-09 PROCEDURE — 36415 COLL VENOUS BLD VENIPUNCTURE: CPT | Performed by: EMERGENCY MEDICINE

## 2017-08-09 PROCEDURE — 85049 AUTOMATED PLATELET COUNT: CPT | Performed by: FAMILY MEDICINE

## 2017-08-09 PROCEDURE — 71020 HB CHEST X-RAY 2VW FRONTAL&LATL: CPT

## 2017-08-09 PROCEDURE — 96374 THER/PROPH/DIAG INJ IV PUSH: CPT

## 2017-08-09 PROCEDURE — 84484 ASSAY OF TROPONIN QUANT: CPT | Performed by: EMERGENCY MEDICINE

## 2017-08-09 PROCEDURE — 80053 COMPREHEN METABOLIC PANEL: CPT | Performed by: EMERGENCY MEDICINE

## 2017-08-09 RX ORDER — HYDROXYZINE HYDROCHLORIDE 10 MG/1
2 TABLET, FILM COATED ORAL
COMMUNITY
End: 2018-05-25 | Stop reason: SDUPTHER

## 2017-08-09 RX ORDER — HYDROXYZINE HYDROCHLORIDE 25 MG/1
25 TABLET, FILM COATED ORAL EVERY 6 HOURS PRN
Status: DISCONTINUED | OUTPATIENT
Start: 2017-08-09 | End: 2017-08-10 | Stop reason: HOSPADM

## 2017-08-09 RX ORDER — FENTANYL CITRATE 50 UG/ML
50 INJECTION, SOLUTION INTRAMUSCULAR; INTRAVENOUS ONCE
Status: COMPLETED | OUTPATIENT
Start: 2017-08-09 | End: 2017-08-09

## 2017-08-09 RX ORDER — BIOTIN 2500 MCG
1 CAPSULE ORAL 2 TIMES DAILY
COMMUNITY

## 2017-08-09 RX ORDER — GABAPENTIN 300 MG/1
300 CAPSULE ORAL 3 TIMES DAILY
Status: DISCONTINUED | OUTPATIENT
Start: 2017-08-09 | End: 2017-08-10 | Stop reason: HOSPADM

## 2017-08-09 RX ORDER — TIZANIDINE HYDROCHLORIDE 4 MG/1
4 CAPSULE, GELATIN COATED ORAL
Status: DISCONTINUED | OUTPATIENT
Start: 2017-08-09 | End: 2017-08-10 | Stop reason: HOSPADM

## 2017-08-09 RX ORDER — DICYCLOMINE HCL 20 MG
10 TABLET ORAL
Status: DISCONTINUED | OUTPATIENT
Start: 2017-08-10 | End: 2017-08-10 | Stop reason: HOSPADM

## 2017-08-09 RX ORDER — LEVOCETIRIZINE DIHYDROCHLORIDE 5 MG/1
5 TABLET, FILM COATED ORAL EVERY EVENING
COMMUNITY
End: 2018-01-26 | Stop reason: SDUPTHER

## 2017-08-09 RX ORDER — KETOROLAC TROMETHAMINE 30 MG/ML
15 INJECTION, SOLUTION INTRAMUSCULAR; INTRAVENOUS ONCE
Status: COMPLETED | OUTPATIENT
Start: 2017-08-09 | End: 2017-08-09

## 2017-08-09 RX ORDER — CHLORAL HYDRATE 500 MG
1000 CAPSULE ORAL 3 TIMES DAILY
COMMUNITY

## 2017-08-09 RX ORDER — LOSARTAN POTASSIUM 50 MG/1
50 TABLET ORAL DAILY
Status: DISCONTINUED | OUTPATIENT
Start: 2017-08-10 | End: 2017-08-10 | Stop reason: HOSPADM

## 2017-08-09 RX ORDER — GABAPENTIN 100 MG/1
100 CAPSULE ORAL 2 TIMES DAILY
Status: DISCONTINUED | OUTPATIENT
Start: 2017-08-10 | End: 2017-08-09

## 2017-08-09 RX ORDER — AMITRIPTYLINE HYDROCHLORIDE 50 MG/1
50 TABLET, FILM COATED ORAL
Status: DISCONTINUED | OUTPATIENT
Start: 2017-08-09 | End: 2017-08-10 | Stop reason: HOSPADM

## 2017-08-09 RX ORDER — DICYCLOMINE HYDROCHLORIDE 10 MG/1
10 CAPSULE ORAL EVERY 6 HOURS PRN
COMMUNITY
End: 2018-11-05 | Stop reason: SDUPTHER

## 2017-08-09 RX ORDER — OMEPRAZOLE 40 MG/1
40 CAPSULE, DELAYED RELEASE ORAL DAILY
COMMUNITY
End: 2018-01-26 | Stop reason: SDUPTHER

## 2017-08-09 RX ADMIN — GABAPENTIN 300 MG: 300 CAPSULE ORAL at 23:49

## 2017-08-09 RX ADMIN — KETOROLAC TROMETHAMINE 15 MG: 30 INJECTION, SOLUTION INTRAMUSCULAR; INTRAVENOUS at 20:55

## 2017-08-09 RX ADMIN — SODIUM CHLORIDE 1000 ML: 0.9 INJECTION, SOLUTION INTRAVENOUS at 19:45

## 2017-08-09 RX ADMIN — AMITRIPTYLINE HYDROCHLORIDE 50 MG: 50 TABLET, FILM COATED ORAL at 23:49

## 2017-08-09 RX ADMIN — FENTANYL CITRATE 50 MCG: 50 INJECTION INTRAMUSCULAR; INTRAVENOUS at 19:45

## 2017-08-10 ENCOUNTER — APPOINTMENT (OUTPATIENT)
Dept: PHYSICAL THERAPY | Facility: HOSPITAL | Age: 67
End: 2017-08-10
Payer: MEDICARE

## 2017-08-10 ENCOUNTER — APPOINTMENT (OUTPATIENT)
Dept: NON INVASIVE DIAGNOSTICS | Facility: HOSPITAL | Age: 67
End: 2017-08-10
Payer: MEDICARE

## 2017-08-10 ENCOUNTER — APPOINTMENT (OUTPATIENT)
Dept: OCCUPATIONAL THERAPY | Facility: HOSPITAL | Age: 67
End: 2017-08-10
Payer: MEDICARE

## 2017-08-10 VITALS
SYSTOLIC BLOOD PRESSURE: 160 MMHG | WEIGHT: 213.19 LBS | OXYGEN SATURATION: 97 % | HEART RATE: 63 BPM | BODY MASS INDEX: 37.77 KG/M2 | RESPIRATION RATE: 17 BRPM | DIASTOLIC BLOOD PRESSURE: 70 MMHG | HEIGHT: 63 IN | TEMPERATURE: 98.3 F

## 2017-08-10 PROBLEM — R07.9 CHEST PAIN IN ADULT: Status: RESOLVED | Noted: 2017-08-10 | Resolved: 2017-08-10

## 2017-08-10 PROBLEM — R07.9 CHEST PAIN IN ADULT: Status: ACTIVE | Noted: 2017-08-10

## 2017-08-10 PROBLEM — R79.89 ELEVATED LFTS: Status: ACTIVE | Noted: 2017-08-10

## 2017-08-10 PROBLEM — E78.5 HYPERLIPIDEMIA: Status: ACTIVE | Noted: 2017-08-10

## 2017-08-10 PROBLEM — I10 HYPERTENSION: Status: ACTIVE | Noted: 2017-08-10

## 2017-08-10 LAB
ALBUMIN SERPL BCP-MCNC: 3.2 G/DL (ref 3.5–5)
ALP SERPL-CCNC: 104 U/L (ref 46–116)
ALT SERPL W P-5'-P-CCNC: 69 U/L (ref 12–78)
ANION GAP SERPL CALCULATED.3IONS-SCNC: 8 MMOL/L (ref 4–13)
AST SERPL W P-5'-P-CCNC: 50 U/L (ref 5–45)
ATRIAL RATE: 90 BPM
BILIRUB SERPL-MCNC: 0.4 MG/DL (ref 0.2–1)
BUN SERPL-MCNC: 16 MG/DL (ref 5–25)
CALCIUM SERPL-MCNC: 8.4 MG/DL (ref 8.3–10.1)
CHLORIDE SERPL-SCNC: 105 MMOL/L (ref 100–108)
CHOLEST SERPL-MCNC: 248 MG/DL (ref 50–200)
CO2 SERPL-SCNC: 26 MMOL/L (ref 21–32)
CREAT SERPL-MCNC: 0.63 MG/DL (ref 0.6–1.3)
EST. AVERAGE GLUCOSE BLD GHB EST-MCNC: 126 MG/DL
GFR SERPL CREATININE-BSD FRML MDRD: 94 ML/MIN/1.73SQ M
GLUCOSE P FAST SERPL-MCNC: 105 MG/DL (ref 65–99)
GLUCOSE SERPL-MCNC: 105 MG/DL (ref 65–140)
HAV IGM SER QL: NORMAL
HBA1C MFR BLD: 6 % (ref 4.2–6.3)
HBV CORE IGM SER QL: NORMAL
HBV SURFACE AG SER QL: NORMAL
HCV AB SER QL: NORMAL
HDLC SERPL-MCNC: 73 MG/DL (ref 40–60)
LDLC SERPL CALC-MCNC: 162 MG/DL (ref 0–100)
P AXIS: 55 DEGREES
POTASSIUM SERPL-SCNC: 4.3 MMOL/L (ref 3.5–5.3)
PR INTERVAL: 144 MS
PROT SERPL-MCNC: 6.5 G/DL (ref 6.4–8.2)
QRS AXIS: 52 DEGREES
QRSD INTERVAL: 92 MS
QT INTERVAL: 368 MS
QTC INTERVAL: 450 MS
SODIUM SERPL-SCNC: 139 MMOL/L (ref 136–145)
T WAVE AXIS: 31 DEGREES
TRIGL SERPL-MCNC: 65 MG/DL
TROPONIN I SERPL-MCNC: <0.02 NG/ML
VENTRICULAR RATE: 90 BPM

## 2017-08-10 PROCEDURE — 97162 PT EVAL MOD COMPLEX 30 MIN: CPT | Performed by: PHYSICAL THERAPIST

## 2017-08-10 PROCEDURE — G8988 SELF CARE GOAL STATUS: HCPCS

## 2017-08-10 PROCEDURE — 84484 ASSAY OF TROPONIN QUANT: CPT | Performed by: FAMILY MEDICINE

## 2017-08-10 PROCEDURE — G8980 MOBILITY D/C STATUS: HCPCS | Performed by: PHYSICAL THERAPIST

## 2017-08-10 PROCEDURE — G8987 SELF CARE CURRENT STATUS: HCPCS

## 2017-08-10 PROCEDURE — G8978 MOBILITY CURRENT STATUS: HCPCS | Performed by: PHYSICAL THERAPIST

## 2017-08-10 PROCEDURE — 80061 LIPID PANEL: CPT | Performed by: FAMILY MEDICINE

## 2017-08-10 PROCEDURE — 80053 COMPREHEN METABOLIC PANEL: CPT | Performed by: FAMILY MEDICINE

## 2017-08-10 PROCEDURE — 83036 HEMOGLOBIN GLYCOSYLATED A1C: CPT | Performed by: FAMILY MEDICINE

## 2017-08-10 PROCEDURE — 93306 TTE W/DOPPLER COMPLETE: CPT

## 2017-08-10 PROCEDURE — G8989 SELF CARE D/C STATUS: HCPCS

## 2017-08-10 PROCEDURE — G8979 MOBILITY GOAL STATUS: HCPCS | Performed by: PHYSICAL THERAPIST

## 2017-08-10 PROCEDURE — 97167 OT EVAL HIGH COMPLEX 60 MIN: CPT

## 2017-08-10 RX ORDER — MORPHINE SULFATE 2 MG/ML
2 INJECTION, SOLUTION INTRAMUSCULAR; INTRAVENOUS EVERY 4 HOURS PRN
Status: DISCONTINUED | OUTPATIENT
Start: 2017-08-10 | End: 2017-08-10 | Stop reason: HOSPADM

## 2017-08-10 RX ADMIN — MORPHINE SULFATE 2 MG: 2 INJECTION, SOLUTION INTRAMUSCULAR; INTRAVENOUS at 00:46

## 2017-08-10 RX ADMIN — GABAPENTIN 300 MG: 300 CAPSULE ORAL at 08:58

## 2017-08-10 RX ADMIN — DICYCLOMINE HYDROCHLORIDE 10 MG: 20 TABLET ORAL at 07:01

## 2017-08-10 RX ADMIN — LOSARTAN POTASSIUM 50 MG: 50 TABLET, FILM COATED ORAL at 08:58

## 2017-08-11 ENCOUNTER — ALLSCRIPTS OFFICE VISIT (OUTPATIENT)
Dept: OTHER | Facility: OTHER | Age: 67
End: 2017-08-11

## 2017-08-11 DIAGNOSIS — M25.512 PAIN IN LEFT SHOULDER: ICD-10-CM

## 2017-08-17 ENCOUNTER — APPOINTMENT (OUTPATIENT)
Dept: PHYSICAL THERAPY | Facility: CLINIC | Age: 67
End: 2017-08-17
Payer: MEDICARE

## 2017-08-17 ENCOUNTER — GENERIC CONVERSION - ENCOUNTER (OUTPATIENT)
Dept: OTHER | Facility: OTHER | Age: 67
End: 2017-08-17

## 2017-08-17 PROCEDURE — G8990 OTHER PT/OT CURRENT STATUS: HCPCS

## 2017-08-17 PROCEDURE — 97010 HOT OR COLD PACKS THERAPY: CPT

## 2017-08-17 PROCEDURE — 97161 PT EVAL LOW COMPLEX 20 MIN: CPT

## 2017-08-17 PROCEDURE — 97110 THERAPEUTIC EXERCISES: CPT

## 2017-08-17 PROCEDURE — G8991 OTHER PT/OT GOAL STATUS: HCPCS

## 2017-08-17 PROCEDURE — 97035 APP MDLTY 1+ULTRASOUND EA 15: CPT

## 2017-08-22 ENCOUNTER — APPOINTMENT (OUTPATIENT)
Dept: PHYSICAL THERAPY | Facility: CLINIC | Age: 67
End: 2017-08-22
Payer: MEDICARE

## 2017-08-22 PROCEDURE — 97035 APP MDLTY 1+ULTRASOUND EA 15: CPT

## 2017-08-22 PROCEDURE — 97110 THERAPEUTIC EXERCISES: CPT

## 2017-08-22 PROCEDURE — 97010 HOT OR COLD PACKS THERAPY: CPT

## 2017-08-24 ENCOUNTER — APPOINTMENT (OUTPATIENT)
Dept: PHYSICAL THERAPY | Facility: CLINIC | Age: 67
End: 2017-08-24
Payer: MEDICARE

## 2017-08-24 PROCEDURE — 97140 MANUAL THERAPY 1/> REGIONS: CPT

## 2017-08-24 PROCEDURE — 97110 THERAPEUTIC EXERCISES: CPT

## 2017-08-24 PROCEDURE — 97035 APP MDLTY 1+ULTRASOUND EA 15: CPT

## 2017-08-24 PROCEDURE — 97010 HOT OR COLD PACKS THERAPY: CPT

## 2017-08-29 ENCOUNTER — APPOINTMENT (OUTPATIENT)
Dept: PHYSICAL THERAPY | Facility: CLINIC | Age: 67
End: 2017-08-29
Payer: MEDICARE

## 2017-08-29 PROCEDURE — 97035 APP MDLTY 1+ULTRASOUND EA 15: CPT

## 2017-08-29 PROCEDURE — 97010 HOT OR COLD PACKS THERAPY: CPT

## 2017-08-29 PROCEDURE — 97110 THERAPEUTIC EXERCISES: CPT

## 2017-08-29 PROCEDURE — 97140 MANUAL THERAPY 1/> REGIONS: CPT

## 2017-08-31 ENCOUNTER — APPOINTMENT (OUTPATIENT)
Dept: PHYSICAL THERAPY | Facility: CLINIC | Age: 67
End: 2017-08-31
Payer: MEDICARE

## 2017-08-31 PROCEDURE — 97035 APP MDLTY 1+ULTRASOUND EA 15: CPT

## 2017-08-31 PROCEDURE — 97140 MANUAL THERAPY 1/> REGIONS: CPT

## 2017-08-31 PROCEDURE — 97110 THERAPEUTIC EXERCISES: CPT

## 2017-08-31 PROCEDURE — 97010 HOT OR COLD PACKS THERAPY: CPT

## 2017-09-01 ENCOUNTER — ALLSCRIPTS OFFICE VISIT (OUTPATIENT)
Dept: OTHER | Facility: OTHER | Age: 67
End: 2017-09-01

## 2017-09-05 ENCOUNTER — APPOINTMENT (OUTPATIENT)
Dept: PHYSICAL THERAPY | Facility: CLINIC | Age: 67
End: 2017-09-05
Payer: MEDICARE

## 2017-09-05 PROCEDURE — 97140 MANUAL THERAPY 1/> REGIONS: CPT

## 2017-09-05 PROCEDURE — 97110 THERAPEUTIC EXERCISES: CPT

## 2017-09-05 PROCEDURE — 97010 HOT OR COLD PACKS THERAPY: CPT

## 2017-09-05 PROCEDURE — 97035 APP MDLTY 1+ULTRASOUND EA 15: CPT

## 2017-09-07 ENCOUNTER — APPOINTMENT (OUTPATIENT)
Dept: PHYSICAL THERAPY | Facility: CLINIC | Age: 67
End: 2017-09-07
Payer: MEDICARE

## 2017-09-07 PROCEDURE — 97140 MANUAL THERAPY 1/> REGIONS: CPT

## 2017-09-07 PROCEDURE — 97110 THERAPEUTIC EXERCISES: CPT

## 2017-09-07 PROCEDURE — 97010 HOT OR COLD PACKS THERAPY: CPT

## 2017-09-07 PROCEDURE — 97035 APP MDLTY 1+ULTRASOUND EA 15: CPT

## 2017-09-12 ENCOUNTER — APPOINTMENT (OUTPATIENT)
Dept: PHYSICAL THERAPY | Facility: CLINIC | Age: 67
End: 2017-09-12
Payer: MEDICARE

## 2017-09-12 PROCEDURE — 97110 THERAPEUTIC EXERCISES: CPT

## 2017-09-12 PROCEDURE — 97140 MANUAL THERAPY 1/> REGIONS: CPT

## 2017-09-12 PROCEDURE — 97010 HOT OR COLD PACKS THERAPY: CPT

## 2017-09-12 PROCEDURE — 97035 APP MDLTY 1+ULTRASOUND EA 15: CPT

## 2017-09-14 ENCOUNTER — APPOINTMENT (OUTPATIENT)
Dept: PHYSICAL THERAPY | Facility: CLINIC | Age: 67
End: 2017-09-14
Payer: MEDICARE

## 2017-09-14 ENCOUNTER — GENERIC CONVERSION - ENCOUNTER (OUTPATIENT)
Dept: OTHER | Facility: OTHER | Age: 67
End: 2017-09-14

## 2017-09-14 PROCEDURE — 97140 MANUAL THERAPY 1/> REGIONS: CPT

## 2017-09-14 PROCEDURE — G8991 OTHER PT/OT GOAL STATUS: HCPCS

## 2017-09-14 PROCEDURE — 97035 APP MDLTY 1+ULTRASOUND EA 15: CPT

## 2017-09-14 PROCEDURE — G8990 OTHER PT/OT CURRENT STATUS: HCPCS

## 2017-09-14 PROCEDURE — 97010 HOT OR COLD PACKS THERAPY: CPT

## 2017-09-14 PROCEDURE — 97110 THERAPEUTIC EXERCISES: CPT

## 2017-09-19 ENCOUNTER — APPOINTMENT (OUTPATIENT)
Dept: PHYSICAL THERAPY | Facility: CLINIC | Age: 67
End: 2017-09-19
Payer: MEDICARE

## 2017-09-19 PROCEDURE — 97140 MANUAL THERAPY 1/> REGIONS: CPT

## 2017-09-19 PROCEDURE — 97110 THERAPEUTIC EXERCISES: CPT

## 2017-09-19 PROCEDURE — 97010 HOT OR COLD PACKS THERAPY: CPT

## 2017-09-19 PROCEDURE — 97035 APP MDLTY 1+ULTRASOUND EA 15: CPT

## 2017-09-21 ENCOUNTER — APPOINTMENT (OUTPATIENT)
Dept: PHYSICAL THERAPY | Facility: CLINIC | Age: 67
End: 2017-09-21
Payer: MEDICARE

## 2017-09-21 PROCEDURE — 97140 MANUAL THERAPY 1/> REGIONS: CPT

## 2017-09-21 PROCEDURE — 97010 HOT OR COLD PACKS THERAPY: CPT

## 2017-09-21 PROCEDURE — 97110 THERAPEUTIC EXERCISES: CPT

## 2017-09-21 PROCEDURE — 97035 APP MDLTY 1+ULTRASOUND EA 15: CPT

## 2017-09-26 ENCOUNTER — APPOINTMENT (OUTPATIENT)
Dept: PHYSICAL THERAPY | Facility: CLINIC | Age: 67
End: 2017-09-26
Payer: MEDICARE

## 2017-09-26 PROCEDURE — 97140 MANUAL THERAPY 1/> REGIONS: CPT

## 2017-09-26 PROCEDURE — 97010 HOT OR COLD PACKS THERAPY: CPT

## 2017-09-26 PROCEDURE — 97035 APP MDLTY 1+ULTRASOUND EA 15: CPT

## 2017-09-26 PROCEDURE — 97110 THERAPEUTIC EXERCISES: CPT

## 2017-09-29 ENCOUNTER — APPOINTMENT (OUTPATIENT)
Dept: PHYSICAL THERAPY | Facility: CLINIC | Age: 67
End: 2017-09-29
Payer: MEDICARE

## 2017-09-29 PROCEDURE — 97110 THERAPEUTIC EXERCISES: CPT

## 2017-09-29 PROCEDURE — 97035 APP MDLTY 1+ULTRASOUND EA 15: CPT

## 2017-09-29 PROCEDURE — 97140 MANUAL THERAPY 1/> REGIONS: CPT

## 2017-09-29 PROCEDURE — 97010 HOT OR COLD PACKS THERAPY: CPT

## 2017-10-02 ENCOUNTER — APPOINTMENT (OUTPATIENT)
Dept: PHYSICAL THERAPY | Facility: CLINIC | Age: 67
End: 2017-10-02
Payer: MEDICARE

## 2017-10-02 ENCOUNTER — GENERIC CONVERSION - ENCOUNTER (OUTPATIENT)
Dept: OTHER | Facility: OTHER | Age: 67
End: 2017-10-02

## 2017-10-02 PROCEDURE — G8991 OTHER PT/OT GOAL STATUS: HCPCS

## 2017-10-02 PROCEDURE — 97110 THERAPEUTIC EXERCISES: CPT

## 2017-10-02 PROCEDURE — G8992 OTHER PT/OT  D/C STATUS: HCPCS

## 2017-10-04 ENCOUNTER — APPOINTMENT (OUTPATIENT)
Dept: PHYSICAL THERAPY | Facility: CLINIC | Age: 67
End: 2017-10-04
Payer: MEDICARE

## 2017-10-05 ENCOUNTER — ALLSCRIPTS OFFICE VISIT (OUTPATIENT)
Dept: OTHER | Facility: OTHER | Age: 67
End: 2017-10-05

## 2017-10-25 NOTE — PROGRESS NOTES
Assessment  1  Sacroiliitis (720 2) (M46 1)   2  Status post insertion of spinal cord stimulator (V45 89) (Z98 890)   3  Left shoulder pain (719 41) (M25 512)    Plan  Sacroiliitis    · Follow-up Visit in 4 Weeks Evaluation and Treatment  Follow-up  Status: Hold For -  Scheduling  Requested for: [id number]   Ordered; For: Sacroiliitis; Ordered By: Elo Freire Performed:  Due: 79FHL0432    Discussion/Summary    Plan:continue with physical therapyfollow-up in 4 weeks and consider trigger point injections at area of maximal tenderness over her posterior left shoulder  Chief Complaint  1  Back Pain  pain improved significantly from injectionleft posterior shoulder focal point of pain      History of Present Illness  Ms Flor Bingham returns in follow-up after completion of left sacroiliac joint injection  This has decreased from an significantly and she is not taking any pain medications currently  is complaining of some posterior left shoulder pain and has been working with physical therapy  She was evaluated in the emergency department to rule out cardiac etiology and this was negative per her report  is noticing some mild benefit from physical therapy  have personally reviewed and/or updated the patient's past medical history, past surgical history, family history, social history, current medications, allergies, and vital signs today  Germaine Lopez presents with complaints of gradual onset of intermittent episodes of mild bilateral lower back pain, described as burning, non-radiating  On a scale of 1 to 10, the patient rates the pain as 3  Symptoms are improving  Review of Systems    Musculoskeletal: joint stiffness-- and-- pain in extremity left side  Active Problems  1  Acid reflux disease (530 81) (K21 9)   2  Advance directive discussed with patient (V65 49) (Z71 89)   3  Aftercare following surgery (V58 89) (Z48 89)   4  Anxiety (300 00) (F41 9)   5  Back pain (724 5) (M54 9)   6   Bacteriuria (791  9) (R82 71)   7  BRBPR (bright red blood per rectum) (569 3) (K62 5)   8  Carpal tunnel syndrome, unspecified laterality (354 0) (G56 00)   9  Chronic low back pain (724 2,338 29) (M54 5,G89 29)   10  Chronic pain syndrome (338 4) (G89 4)   11  Degenerative joint disease of right lower extremity (715 98) (M19 90)   12  Encounter for long-term use of opiate analgesic (V58 69) (Z79 891)   13  Encounter for screening mammogram for malignant neoplasm of breast (V76 12)    (Z12 31)   14  Encounter for staple removal (V58 32) (Z48 02)   15  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)   16  Fatigue (780 79) (R53 83)   17  History of Fibrocystic breast disease, unspecified laterality (610 1) (N60 19)   18  Greater trochanteric bursitis of left hip (726 5) (M70 62)   19  Head trauma (959 01) (S09 90XA)   20  Hematoma of abdominal wall (922 2) (S30 1XXA)   21  Hemorrhoids (455 6) (K64 9)   22  History of recent fall (V15 88) (Z91 81)   23  Hyperlipidemia (272 4) (E78 5)   24  Hypertension (401 9) (I10)   25  Irritable bowel syndrome (564 1) (K58 9)   26  Left shoulder pain (719 41) (M25 512)   27  Lumbar degenerative disc disease (722 52) (M51 36)   28  Lumbar radiculopathy, chronic (724 4) (M54 16)   29  Lumbar spondylosis (721 3) (M47 816)   30  Neuralgia (729 2) (M79 2)   31  Pain of lower extremity, unspecified laterality (729 5) (M79 606)   32  Pain, joint, shoulder (719 41) (M25 519)   33  Paresthesias (782 0) (R20 2)   34  Peripheral neuropathy (356 9) (G62 9)   35  Post laminectomy syndrome (722 80) (M96 1)   36  Pre-procedural examination (V72 84) (Z01 818)   37  Pre-procedural laboratory examination (V72 63) (Z01 812)   38  Refused pneumococcal vaccination (V64 06) (Z28 21)   39  Sacroiliitis (720 2) (M46 1)   40  Status post insertion of spinal cord stimulator (V45 89) (Z98 890)   41  Status post surgery (V45 89) (Z98 890)   42  Thoracic back pain (724 1) (M54 6)   43   Thyroid dysfunction (246 9) (E07 9)   44  Uncomplicated opioid dependence (304 00) (F11 20)   45  Vitamin D deficiency (268 9) (E55 9)    Past Medical History  1  History of Abnormal findings on diagnostic imaging of breast (793 89) (R92 8)   2  History of Chest congestion (786 9) (R09 89)   3  History of Contusion of leg (924 5) (S80 10XA)   4  History of Encounter for routine gynecological examination (V72 31) (Z01 419)   5  History of Encounter for screening colonoscopy (V76 51) (Z12 11)   6  History of Encounter for screening colonoscopy (V76 51) (Z12 11)   7  History of Fibrocystic breast disease, unspecified laterality (610 1) (N60 19)   8  History of High cholesterol (272 0) (E78 00)   9  History of arthritis (V13 4) (Z87 39)   10  History of osteopenia (V13 59) (Z87 39)   11  History of Influenza (487 1) (J11 1)   12  History of Nausea (787 02) (R11 0)   13  History of Sinusitis (473 9) (J32 9)   14  History of Skin rash (782 1) (R21)   15  History of Sore throat (462) (J02 9)    Surgical History  1  History of Appendectomy   2  History of Cholecystectomy   3  History of Foot Surgery   4  History of Gynecologic Laparoscopy With Adhesiolysis   5  History of Hysteroscopy   6  History of Incisional Breast Biopsy   7  History of Knee Arthroscopy (Therapeutic)   8  History of Laparoscopy (Diagnostic)   9  History of Tonsillectomy With Adenoidectomy    Family History  Mother    1  Family history of Bone Cancer  Father    2  Family history of Brain Cancer (V16 8)   3  Family history of Stroke Syndrome (V17 1)  Paternal Grandmother    4  Family history of Diabetes Mellitus (V18 0)  Maternal Aunt    5  Family history of Breast Cancer (V16 3)  Other    6  Family history of Back disorder   7  Family history of cardiac disorder (V17 49) (Z82 49)   8  Family history of cerebrovascular accident (V17 1) (Z82 3)   9  Family history of diabetes mellitus (V18 0) (Z83 3)   10  Family history of hypertension (V17 49) (Z82 49)   11   Family history of malignant neoplasm (V16 9) (Z80 9)  Family History    12  Denied: Family history of Colon Cancer   13  Denied: Family history of Osteoporosis   14  Denied: Family history of Ovarian Cancer   15  Denied: Family history of Uterine Cancer    Social History   · Dental care, regularly   · Good dental hygiene   · Never a smoker   · No caffeine use   · No drug use   · Single   · Social alcohol use (Z78 9)   · Sun Protection Sunscreen   · Uses Safety Equipment - Seatbelts    Current Meds   1  Amitriptyline HCl - 50 MG Oral Tablet; TAKE 1 TABLET AT BEDTIME; Therapy: 06NWH3749 to (Evaluate:46Bol7432)  Requested for: 85Duo2136; Last   Rx:85Mcs7379 Ordered   2  Azelastine HCl - 0 1 % Nasal Solution; USE 1 SPRAY IN EACH NOSTRIL TWICE DAILY; Therapy: 83EBQ8835 to (Last Rx:22Feb2017)  Requested for: 97Nlh0818 Ordered   3  Cyclobenzaprine HCl - 5 MG Oral Tablet; TAKE 1 TABLET AT BEDTIME AS NEEDED; Therapy: 05SBA0285 to (Evaluate:62Zjo9405)  Requested for: 92RVO8160; Last   Rx:15Jun2017 Ordered   4  Diclofenac Sodium 1 % Transdermal Gel; apply BID; Therapy: 71Nyb0301 to (Last Rx:11Aug2017)  Requested for: 11Aug2017 Ordered   5  EpiPen 2-Michel 0 3 MG/0 3ML Injection Solution Auto-injector; use as directed; Therapy: 70XWG6882 to (Last UR:18QFV8279)  Requested for: 07VDK4022 Ordered   6  Gabapentin 300 MG Oral Capsule; TAKE 1 CAPSULE 3 times daily; Therapy: 13SQB3435 to (Evaluate:11Jan2018)  Requested for: 07ZBK7010; Last   Rx:15Jun2017 Ordered   7  HydrOXYzine HCl - 10 MG Oral Tablet; TAKE TWO TABLETS BY MOUTH AT BEDTIME; Therapy: 92KJF9227 to (Evaluate:11Qds5845)  Requested for: 09KLU5315; Last   Rx:15Jun2017 Ordered   8  Losartan Potassium 50 MG Oral Tablet; TAKE 1 TABLET DAILY; Therapy: 29DKI3726 to ([id number])  Requested for: 85CPJ4145; Last   WH:16IZW7566 Ordered   9  Omeprazole 40 MG Oral Capsule Delayed Release; Take 1 daily;    Therapy: 30QMY8867 to (Lane Navarrete)  Requested for: 95RRA9093; Last Rx:30Jan2017 Ordered   10  Simvastatin 10 MG Oral Tablet; take one tablet by mouth every other day; Therapy: 81PCQ6547 to (Evaluate:91Mzx8614)  Requested for: 11Aug2017; Last    Rx:11Aug2017 Ordered   11  Xyzal Allergy 24HR 5 MG Oral Tablet; Take 1 tablet daily; Therapy: 31EIH2030 to Recorded    Allergies  1  Codeine Derivatives   2  Iodinated Contrast Media   3  Latex Exam Gloves MISC   4  Penicillins  5  Adhesive Tape   6  Bee sting   7  Eggs    Vitals  Vital Signs    Recorded: 01Sep2017 08:15AM   Heart Rate 72   Respiration 14   Systolic 097   Diastolic 72   Height 5 ft 2 in   Weight 213 lb    BMI Calculated 38 96   BSA Calculated 1 96   Pain Scale 3     Physical Exam    Constitutional   General appearance: Well developed, well nourished, alert, in no distress, non-toxic and no overt pain behavior  Eyes   Sclera: anicteric   HEENT   Hearing grossly intact  Pulmonary   Respiratory effort: Even and unlabored  Psychiatric   Mood and affect: Mood and affect appropriate  Neurologic   Cranial nerves: Cranial nerves II-XII grossly intact  Musculoskeletal   Gait and station: Normal     Joint Exam: -- focal tenderness over the left posterior shoulder reproducing her pain complaint  Lumbar/Sacral Spine examination demonstrates Lumbosacral Spine:   Tenderness: the left sacroiliac joint        Results/Data  Procedure Flowsheet [id number] 08:20AM      Test Name Result Flag Reference   Oswestry Score 24         Future Appointments    Date/Time Provider Specialty Site   11/21/2017 08:00 AM Kasey Zhang DO Internal Medicine [address]   12/21/2017 01:00 PM Isabelle Goldmann, [facility] Neurology [address]     Signatures   Electronically signed by : Gianni Linton DO; Sep  1 2017  8:36AM EST                       (Author)

## 2017-11-09 ENCOUNTER — GENERIC CONVERSION - ENCOUNTER (OUTPATIENT)
Dept: OTHER | Facility: OTHER | Age: 67
End: 2017-11-09

## 2017-11-14 ENCOUNTER — TRANSCRIBE ORDERS (OUTPATIENT)
Dept: LAB | Facility: MEDICAL CENTER | Age: 67
End: 2017-11-14

## 2017-11-14 ENCOUNTER — GENERIC CONVERSION - ENCOUNTER (OUTPATIENT)
Dept: OTHER | Facility: OTHER | Age: 67
End: 2017-11-14

## 2017-11-14 ENCOUNTER — APPOINTMENT (OUTPATIENT)
Dept: LAB | Facility: MEDICAL CENTER | Age: 67
End: 2017-11-14
Payer: MEDICARE

## 2017-11-14 DIAGNOSIS — I10 ESSENTIAL (PRIMARY) HYPERTENSION: ICD-10-CM

## 2017-11-14 DIAGNOSIS — E55.9 VITAMIN D DEFICIENCY: ICD-10-CM

## 2017-11-14 DIAGNOSIS — E78.5 HYPERLIPIDEMIA: ICD-10-CM

## 2017-11-14 LAB
25(OH)D3 SERPL-MCNC: 33.2 NG/ML (ref 30–100)
ALBUMIN SERPL BCP-MCNC: 3.8 G/DL (ref 3.5–5)
ALP SERPL-CCNC: 90 U/L (ref 46–116)
ALT SERPL W P-5'-P-CCNC: 54 U/L (ref 12–78)
ANION GAP SERPL CALCULATED.3IONS-SCNC: 6 MMOL/L (ref 4–13)
AST SERPL W P-5'-P-CCNC: 42 U/L (ref 5–45)
BILIRUB SERPL-MCNC: 0.6 MG/DL (ref 0.2–1)
BUN SERPL-MCNC: 14 MG/DL (ref 5–25)
CALCIUM SERPL-MCNC: 8.9 MG/DL (ref 8.3–10.1)
CHLORIDE SERPL-SCNC: 106 MMOL/L (ref 100–108)
CHOLEST SERPL-MCNC: 268 MG/DL (ref 50–200)
CO2 SERPL-SCNC: 27 MMOL/L (ref 21–32)
CREAT SERPL-MCNC: 0.56 MG/DL (ref 0.6–1.3)
GFR SERPL CREATININE-BSD FRML MDRD: 97 ML/MIN/1.73SQ M
GLUCOSE P FAST SERPL-MCNC: 90 MG/DL (ref 65–99)
HDLC SERPL-MCNC: 65 MG/DL (ref 40–60)
LDLC SERPL CALC-MCNC: 181 MG/DL (ref 0–100)
POTASSIUM SERPL-SCNC: 4 MMOL/L (ref 3.5–5.3)
PROT SERPL-MCNC: 7.1 G/DL (ref 6.4–8.2)
SODIUM SERPL-SCNC: 139 MMOL/L (ref 136–145)
TRIGL SERPL-MCNC: 110 MG/DL

## 2017-11-14 PROCEDURE — 36415 COLL VENOUS BLD VENIPUNCTURE: CPT

## 2017-11-14 PROCEDURE — 80053 COMPREHEN METABOLIC PANEL: CPT

## 2017-11-14 PROCEDURE — 82306 VITAMIN D 25 HYDROXY: CPT

## 2017-11-14 PROCEDURE — 80061 LIPID PANEL: CPT

## 2017-11-21 ENCOUNTER — ALLSCRIPTS OFFICE VISIT (OUTPATIENT)
Dept: OTHER | Facility: OTHER | Age: 67
End: 2017-11-21

## 2017-12-21 ENCOUNTER — ALLSCRIPTS OFFICE VISIT (OUTPATIENT)
Dept: OTHER | Facility: OTHER | Age: 67
End: 2017-12-21

## 2017-12-30 NOTE — PROGRESS NOTES
Assessment   1  Neuralgia (729 2) (M79 2)   2  Peripheral neuropathy (356 9) (G62 9)   3  Post laminectomy syndrome (722 80) (M96 1)   4  Chronic headaches (784 0) (R51)    Plan   Neuralgia    · Amitriptyline HCl - 50 MG Oral Tablet; TAKE 1 TABLET AT BEDTIME   Rx By: Cuauhtemoc Baldwin; Dispense: 90 Days ; #:90 Tablet; Refill: 1;For: Neuralgia; RAJ = N; Verified Transmission to [facility]; Msg to Pharmacy: Pt requesting 90 day supply; Last Updated By: Systemamazingt[employer]; 12/21/2017 1:23:28 PM  Paresthesias, Peripheral neuropathy, Post laminectomy syndrome    · Follow-up visit in 6 months Evaluation and Treatment  Follow-up  Status: Complete     Done: 04SZR9746   Ordered; For: Paresthesias, Peripheral neuropathy, Post laminectomy syndrome; Ordered By: Cuauhtemoc Baldwin Performed:  Due: 70HRL7326; Last Updated By: Alysa Moss; 12/21/2017 1:35:54 PM    Discussion/Summary   Discussion Summary:    77 yo right handed female with history of anxiety, hypertension, hyperlipidemia, comes with complains of headaches, dizziness, paresthesias in arms and legs, she had EMG/NCV that showed carpal tunnel  MRI brain was unremarkable, no abscess, or demyelinating process  Paresthesia have improved  At this time LBP doing well      had updated MRI done with no evidence of cord lesion or compression, multilevel spondylosis noted, had prior surgery of Lumbar region  cannot take NSAIDs   pt s/p cord stim placement with improvement   following with pain management   can increase Neurontin to 1 tid if needed stable, continue with Elavil,  at length with the patient and answered all her questions  to increase Vit D to 2000iu daily      Counseling Documentation With Imm: The patient was counseled regarding diagnostic results,-- instructions for management,-- risk factor reductions,-- prognosis,-- impressions,-- risks and benefits of treatment options,-- importance of compliance with treatment   total time of encounter was 25 minutes-- and-- >50% minutes was spent counseling  Patient's Capacity to Self-Care: Patient is able to Self-Care  Medication SE Review and Pt Understands Tx: Possible side effects of new medications were reviewed with the patient/guardian today  The treatment plan was reviewed with the patient/guardian  The patient/guardian understands and agrees with the treatment plan             Patient Guardian understands agrees: The treatment plan was reviewed with the patient/guardian  The patient/guardian understands and agrees with the treatment plan             Medication Side Effects Reviewed: Possible side effects of new medications were reviewed with the patient/guardian today  History of Present Illness   HPI: 72yo right handed female with past medical history significant for hypertension, hyperlipidemia, anxiety, referred by her PCP for evaluation of neuropathy, patient reported the symptoms started 91 21 06 and for the past year getting progressively worse  She described a fire-like sensation which would involve her whole body and last for hours  testing to include lab work which demonstrated normal B12, folate, TSH, Lyme was negative, PEEP was a normal, hemoglobin A1c was 5 6  She also underwent MRI of the brain which was essentially normal  Fortunately her neuropathy has improved, She was put on a combination of Neurontin and Elavil  Her primary issues is with c/o LBP, She did subsequently undergo MRI thoracic spine which was essentially normal, MRI Lumbar There are mild to moderate bony and discogenic degenerative changes seen from L2-L3 through L4-L5, with mild spondylolisthesis of L3 on L4  There is no evidence of central spinal stenosis  No nerve root compression seen  Mild bony degenerative changes are also seen at T10-T11   She is seeing pain management, her Tizanidine was increased, she had nerve ablation with some help to the right side but she continues with left LE discomfort   She does have a prior history of surgical intervention in the lumbar region  was last seen in June 2017  Leonidas Betancourt At that time she was doing well    Today she states that she has not had any recent events of fire-like sensations  , She is on Neurontin 300mg bid, When these occur, she will take an extra does  Higher doses in te past provoked side effects  She continues on Elavil 50mg  Her low back pain at this point, is a well controlled with her spinal cord stimulator in place  however, in the interim, she did develop some mild left SI discomfort for which she was seen by pain management and underwent an injection with a benefit to  On thuy and Billy Shi, she was seen in the hospital with complaints of sudden onset of left shoulder pain with radiation into the upper extremity, and her scapula  S she was admitted and underwent workup for an atypical chest pain  According to the patient, she denies having had chest pain, she continued to complain of problems with her left SCM  She was given some of all tearing with mild benefit  She also underwent some PT  As she denies any left upper extremity weakness  At this point, she has mild occasional discomfort    She continues on a muscle relaxer, she is on gabapentin 2-3 tablets daily depending upon her discomfort  She is no longer on Nucynta She denies any issues with bowel or bladder  She has had some recent headaches due to sinus issues  She denies any problems with swallowing, her vision       FH, SH, Meds were reviewed  Review of Systems   Neurological ROS:      Constitutional: no fever, no chills, no recent weight gain, no recent weight loss, no complaints of feeling tired, no changes in appetite  HEENT:  no sinus problems, not feeling congested, no blurred vision, no dryness of the eyes, no eye pain, no hearing loss, no tinnitus, no mouth sores, no sore throat, no hoarseness, no dysphagia, no masses, no bleeding        Cardiovascular: as noted in HPI,-- no chest pain or pressure-- and-- no palpitations present--   no chest pain or pressure, no palpitations present, the heart rate was not rapid or irregular, no swelling in the arms or legs, no poor circulation  Respiratory:  no unusual or persistant cough, no shortness of breath with or without exertion  Gastrointestinal: nausea  Genitourinary:  no incontinence, no feelings of urinary urgency, no increase in frequency, no urinary hesitancy, no dysuria, no hematuria  Musculoskeletal: arthralgias-- and-- head/neck/back pain  Integumentary  no masses, no rash, no skin lesions, no livedo reticularis  Psychiatric:  no anxiety, no depression, no mood swings, no psychiatric hospitalizations, no sleep problems  Endocrine  no unusual weight loss or gain, no excessive urination, no excessive thirst, no hair loss or gain, no hot or cold intolerance, no menstrual period change or irregularity, no loss of sexual ability or drive, no erection difficulty, no nipple discharge  Hematologic/Lymphatic:  no unusual bleeding, no tendency for easy bruising, no clotting skin or lumps  Neurological General: as noted in HPI--   no headache, no nausea or vomiting, no lightheadedness, no convulsions, no blackouts, no syncope, no trauma, no photopsia, no increased sleepiness, no trouble falling asleep, no snoring, no awakening at night  Neurological Cranial Nerves:  no blurry or double vision, no loss of vision, no face drooping, no facial numbness or weakness, no taste or smell loss/changes, no hearing loss or ringing, no vertigo or dizziness, no dysphagia, no slurred speech  Neurological Motor findings include:  no tremor, no twitching, no cramping(pre/post exercise), no atrophy  Neurological Coordination:  no unsteadiness, no vertigo or dizziness, no clumsiness, no problems reaching for objects  Neurological Sensory: numbness-- and-- tingling  Neurological Gait:  no difficulty walking, not falling to one side, no sensation of being pushed, has not had falls  ROS Reviewed:    ROS reviewed  Active Problems   1  Acid reflux disease (530 81) (K21 9)   2  Advance directive discussed with patient (V65 49) (Z71 89)   3  Aftercare following surgery (V58 89) (Z48 89)   4  Anxiety (300 00) (F41 9)   5  Back pain (724 5) (M54 9)   6  Bacteriuria (791 9) (R82 71)   7  BRBPR (bright red blood per rectum) (569 3) (K62 5)   8  Carpal tunnel syndrome, unspecified laterality (354 0) (G56 00)   9  Chronic low back pain (724 2,338 29) (M54 5,G89 29)   10  Chronic pain syndrome (338 4) (G89 4)   11  Degenerative joint disease of right lower extremity (715 98) (M19 90)   12  Encounter for long-term use of opiate analgesic (V58 69) (Z79 891)   13  Encounter for screening mammogram for malignant neoplasm of breast (V76 12)      (Z12 31)   14  Encounter for staple removal (V58 32) (Z48 02)   15  Exercise counseling (V65 41) (Z71 82)   16  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)   17  Fatigue (780 79) (R53 83)   18  History of Fibrocystic breast disease, unspecified laterality (610 1) (N60 19)   19  Greater trochanteric bursitis of left hip (726 5) (M70 62)   20  Head trauma (959 01) (S09 90XA)   21  Hematoma of abdominal wall (922 2) (S30 1XXA)   22  Hyperlipidemia (272 4) (E78 5)   23  Hypertension (401 9) (I10)   24  Irritable bowel syndrome (564 1) (K58 9)   25  Left shoulder pain (719 41) (M25 512)   26  Lumbar degenerative disc disease (722 52) (M51 36)   27  Lumbar radiculopathy, chronic (724 4) (M54 16)   28  Lumbar spondylosis (721 3) (M47 816)   29  Neuralgia (729 2) (M79 2)   30  Paresthesias (782 0) (R20 2)   31  Peripheral neuropathy (356 9) (G62 9)   32  Post laminectomy syndrome (722 80) (M96 1)   33  Pre-procedural examination (V72 84) (Z01 818)   34  Pre-procedural laboratory examination (V72 63) (Z01 812)   35   Refused pneumococcal vaccination (V64 06) (Z28 21)   36  Sacroiliitis (720 2) (M46 1)   37  Screening for depression (V79 0) (Z13 89)   38  Status post insertion of spinal cord stimulator (V45 89) (Z98 890)   39  Status post surgery (V45 89) (Z98 890)   40  Thoracic back pain (724 1) (M54 6)   41  Thyroid dysfunction (246 9) (E07 9)   42  Uncomplicated opioid dependence (304 00) (F11 20)   43  Vitamin D deficiency (268 9) (E55 9)    Past Medical History   1  History of Abnormal findings on diagnostic imaging of breast (793 89) (R92 8)   2  History of Contusion of leg (924 5) (S80 10XA)   3  History of Encounter for screening colonoscopy (V76 51) (Z12 11)   4  History of Fibrocystic breast disease, unspecified laterality (610 1) (N60 19)   5  History of High cholesterol (272 0) (E78 00)   6  History of arthritis (V13 4) (Z87 39)   7  History of osteopenia (V13 59) (Z87 39)    Surgical History   1  History of Appendectomy   2  History of Cholecystectomy   3  History of Foot Surgery   4  History of Gynecologic Laparoscopy With Adhesiolysis   5  History of Hysteroscopy   6  History of Incisional Breast Biopsy   7  History of Knee Arthroscopy (Therapeutic)   8  History of Laparoscopy (Diagnostic)   9  History of Tonsillectomy With Adenoidectomy    Family History   Mother    1  Family history of Bone Cancer  Father    2  Family history of Brain Cancer (V16 8)   3  Family history of Stroke Syndrome (V17 1)  Paternal Grandmother    4  Family history of Diabetes Mellitus (V18 0)  Maternal Aunt    5  Family history of Breast Cancer (V16 3)  Other    6  Family history of Back disorder   7  Family history of cardiac disorder (V17 49) (Z82 49)   8  Family history of cerebrovascular accident (V17 1) (Z82 3)   9  Family history of diabetes mellitus (V18 0) (Z83 3)   10  Family history of hypertension (V17 49) (Z82 49)   11  Family history of malignant neoplasm (V16 9) (Z80 9)  Family History    12  Denied: Family history of Colon Cancer   13   Denied: Family history of Osteoporosis   14  Denied: Family history of Ovarian Cancer   15  Denied: Family history of Uterine Cancer    Social History    · Dental care, regularly   · Good dental hygiene   · Never a smoker   · No caffeine use   · No drug use   · Single   · Social alcohol use (Z78 9)   · Sun Protection Sunscreen   · Uses Safety Equipment - Seatbelts    Current Meds    1  Amitriptyline HCl - 50 MG Oral Tablet; TAKE 1 TABLET AT BEDTIME; Therapy: 46VJC8689 to (Evaluate:2018)  Requested for: 97Wuo6831; Last     Rx:81Crf5549 Ordered   2  Azelastine HCl - 0 1 % Nasal Solution; USE 1 SPRAY IN EACH NOSTRIL TWICE DAILY; Therapy: 30IPW3396 to (Last Rx:2017)  Requested for: 2017 Ordered   3  Cyclobenzaprine HCl - 5 MG Oral Tablet; TAKE 1 TABLET AT BEDTIME AS NEEDED; Therapy: 23TWQ9562 to (Evaluate:2017)  Requested for: 97LFY3697; Last     Rx:2017 Ordered   4  Diclofenac Sodium 1 % Transdermal Gel; apply BID; Therapy: 83LTT9800 to (Last Rx:2017)  Requested for: 2017 Ordered   5  EpiPen 2-Michel 0 3 MG/0 3ML Injection Solution Auto-injector; use as directed; Therapy: 17GRJ4571 to (Last L95XSA1177)  Requested for: 43WBE4922 Ordered   6  Gabapentin 300 MG Oral Capsule; TAKE 1 CAPSULE 3 times daily; Therapy: 16AUN6449 to (Evaluate:2018)  Requested for: 13PQY6631; Last     Rx:2017 Ordered   7  HydrOXYzine HCl - 10 MG Oral Tablet; TAKE TWO TABLETS BY MOUTH AT BEDTIME; Therapy: 14KVB0404 to (Cheo Steele)  Requested for: 2017; Last     Rx:2017 Ordered   8  Losartan Potassium 50 MG Oral Tablet; TAKE 1 TABLET DAILY; Therapy: 94MCR5564 to (Evaluate:2018)  Requested for: 64IJA2519; Last     Rx:06Yli0201 Ordered   9  Omeprazole 40 MG Oral Capsule Delayed Release; TAKE ONE (1) TABLET DAILY; Therapy: 78AEY6746 to (Last Rx:88Bgd2734)  Requested for: 25Imh1463 Ordered   10   Simvastatin 10 MG Oral Tablet; take one tablet by mouth every other day; Therapy: 98UTG3495 to (Evaluate:44Lyw5097)  Requested for: 11Aug2017; Last      Rx:11Aug2017 Ordered   11  Xyzal Allergy 24HR 5 MG Oral Tablet; Take 1 tablet daily; Therapy: 14VBT6952 to Recorded  Medication List Reviewed: The medication list was reviewed and updated today  Allergies   1  Codeine Derivatives   2  Iodinated Contrast Media   3  Latex Exam Gloves MISC   4  Penicillins  5  Adhesive Tape   6  Bee sting   7  Eggs    Vitals   Signs   Recorded: 41LEM7634 10:39AM   Heart Rate: 82  Respiration: 16  Systolic: 017  Diastolic: 74  Height: 5 ft 2 in  Weight: 207 lb 8 oz  BMI Calculated: 37 95  BSA Calculated: 1 94    Physical Exam        Constitutional      General appearance: Abnormal   uncomfortable  Eyes      Ophthalmoscopic examination: Vision is grossly normal  Gross visual field testing by confrontation shows no abnormalities  EOMI in both eyes  Conjunctivae clear  Eyelids normal palpebral fissures equal  Orbits exhibit normal position  No discharge from the eyes  PERRL  -- visual field full  Cardiovascular      Auscultation of heart: Rate is regular  Rhythm is regular-- heart regular rate and rhythm  Peripheral vascular exam: Normal pulses throughout, no tenderness, erythema or swelling  Musculoskeletal      Gait and station: Abnormal  -- mild antalgic, wide based  Muscle strength: Abnormal        Motor Strength:  minor left HF weakness due to LBP  Strength examination:      Muscle tone: No atrophy, abnormal movements, flaccidity, cogwheeling or spasticity  Involuntary movements: Abnormal involuntary movements were observed  -- mild tremor B/L outstretched arms  Neurologic      Mental Status: Mood is normal  Affect is normal  Memory is intact  (Concentration) No apparent agnosia present  Thought process appears clear and appropriate  Motor System: No pronator drift  Upper Extremities:Normal to inspection   No tenderness over the upper extremities bilaterally  No instability bilaterally  Strength: Motor strength is 5/5 bilaterally  Normal muscle tone bilaterally  Muscle bulk: Muscle bulk is normal bilaterally  Full ROM bilaterally  12th cranial nerve: Abnormal        Reflexes: DTR's are normal and symmetric bilaterally  Babinski's reflex is negative bilaterally  No pathologic ankle clonus       Coordination: Cerebellum function intact  No involuntary movement or psychomotor activity  Sensation: Abnormal  -- decreased temperature left LE  Cortical function: Normal        Judgment and insight: Normal        Cranial Nerve Exam      II: Normal with no deficit  pupils equal in size, round, reactive to light, with normal accommodation      III,IV, VI:Normal with no deficit  extraocular movements intact      V: Zenia with no deficitl  normal facial sensation  VII: Normal with no deficit  normal facial muscle strength  VIII: Normal with no deficit  Nystagmus no nystagmus seen  XI: Normal with no deficit  Constitutional      General appearance: No acute distress, well appearing and well nourished  Eyes      Conjunctiva and lids: No erythema, swelling or discharge  Recent and remote memory: Intact  Mood and affect: Normal        Results/Data   Diagnostic Studies Reviewed: I personally reviewed the films/images/results in the office today  My interpretation follows  (1) LIPID PANEL, FASTING 31XDT2357 08:00AM Radhika Curran Order Number: [id number]      Test Name Result Flag Reference   CHOLESTEROL 268 mg/dL H    HDL,DIRECT 65 mg/dL H 40-60   Specimen collection should occur prior to Metamizole administration due to the potential for falsley depressed results     LDL CHOLESTEROL CALCULATED 181 mg/dL H 0-100   Triglyceride:           Normal <150 mg/dl      Borderline High 150-199 mg/dl      High 200-499 mg/dl      Very High >499 mg/dl               Cholesterol:          Desirable <200 mg/dl       Borderline High 200-239 mg/dl       High >239 mg/dl               HDL Cholesterol:          High>59 mg/dL       Low <41 mg/dL               This screening LDL is a calculated result  It does not have the accuracy of the Direct Measured LDL in the monitoring of patients with hyperlipidemia and/or statin therapy  Direct Measure LDL (SCQ794) must be ordered separately in these patients  TRIGLYCERIDES 110 mg/dL  <=150   Specimen collection should occur prior to N-Acetylcysteine or Metamizole administration due to the potential for falsely depressed results  (1) COMPREHENSIVE METABOLIC PANEL 94AKD5794 83:09YM Celestina Watson    Order Number: [id number]      Test Name Result Flag Reference   SODIUM 139 mmol/L  136-145   POTASSIUM 4 0 mmol/L  3 5-5 3   CHLORIDE 106 mmol/L  100-108   CARBON DIOXIDE 27 mmol/L  21-32   ANION GAP (CALC) 6 mmol/L  4-13   BLOOD UREA NITROGEN 14 mg/dL  5-25   CREATININE 0 56 mg/dL L 0 60-1 30   Standardized to IDMS reference method   CALCIUM 8 9 mg/dL  8 3-10 1   BILI, TOTAL 0 60 mg/dL  0 20-1 00   ALK PHOSPHATAS 90 U/L     ALT (SGPT) 54 U/L  12-78   Specimen collection should occur prior to Sulfasalazine and/or Sulfapyridine administration due to the potential for falsely depressed results  AST(SGOT) 42 U/L  5-45   Specimen collection should occur prior to Sulfasalazine administration due to the potential for falsely depressed results  ALBUMIN 3 8 g/dL  3 5-5 0   TOTAL PROTEIN 7 1 g/dL  6 4-8 2   eGFR 97 ml/min/1 73sq m     National Kidney Disease Education Program recommendations are as follows:     GFR calculation is accurate only with a steady state creatinine     Chronic Kidney disease less than 60 ml/min/1 73 sq  meters     Kidney failure less than 15 ml/min/1 73 sq  meters  GLUCOSE FASTING 90 mg/dL  65-99   Specimen collection should occur prior to Sulfasalazine administration due to the potential for falsely depressed results  Specimen collection should occur prior to Sulfapyridine administration due to the potential for falsely elevated results  (1) VITAMIN D 25-HYDROXY 93DKP9476 08:00AM Mya Orr    Order Number: [id number]      Test Name Result Flag Reference   VIT D 25-HYDROX 33 2 ng/mL  30 0-100 0   This assay is a certified procedure of the CDC Vitamin D Standardization Certification Program (VDSCP)           Deficiency <20ng/ml      Insufficiency 20-30ng/ml      Sufficient  ng/ml           *Patients undergoing fluorescein dye angiography may retain small amounts of fluorescein in the body for 48-72 hours post procedure  Samples containing fluorescein can produce falsely elevated Vitamin D values  If the patient had this procedure, a specimen should be resubmitted post fluorescein clearance  Attending Note   Collaborating Physician Note: Collaborating Note: I agree with the Advanced Practitioner note        Future Appointments      Date/Time Provider Specialty Site   05/25/2018 07:30 AM Mya Orr DO Internal Medicine [facility] [address]     Signatures    Electronically signed by : Sheba Kenny; Dec 26 2017 10:49AM EST                       (Author)     Electronically signed by : ABDIEL Frazier ; Dec 29 2017  6:42AM EST                       (Co-author)

## 2018-01-02 ENCOUNTER — GENERIC CONVERSION - ENCOUNTER (OUTPATIENT)
Dept: INTERNAL MEDICINE CLINIC | Facility: CLINIC | Age: 68
End: 2018-01-02

## 2018-01-04 DIAGNOSIS — Z12.31 ENCOUNTER FOR SCREENING MAMMOGRAM FOR MALIGNANT NEOPLASM OF BREAST: ICD-10-CM

## 2018-01-10 NOTE — RESULT NOTES
Message   Recorded as Task   Date: 08/04/2017 01:48 PM, Created By: Shantal Hoff   Task Name: Follow Up   Assigned To: [id number] [address] end procedure,Team   Regarding Patient: Buck Vegas, Status: Active   CommentFoye Cleopatrater - 04 Aug 2017 1:48 PM     TASK CREATED  Pt  is S/P LT SIJ INJ on 7/31 by Rao Moser  F/U is on 9/01   Shantal Hoff - 08 Aug 2017 9:45 AM     TASK EDITED  Pt  reports 50-60% relief post inj  Still has pain when area is manipulated  F/U is on 9/01     Shantal Hoff - 08 Aug 2017 9:45 AM     TASK EDITED   Maria Luisa Mathis - 08 Aug 2017 10:43 AM     TASK REPLIED TO: Previously Assigned To Maria Luisa Mathis                      [place]        Signatures   Electronically signed by : Meena Mckeon, ; Aug  8 2017 11:39AM EST                       (Author)

## 2018-01-12 ENCOUNTER — HOSPITAL ENCOUNTER (OUTPATIENT)
Dept: MAMMOGRAPHY | Facility: HOSPITAL | Age: 68
Discharge: HOME | End: 2018-01-12
Attending: INTERNAL MEDICINE
Payer: MEDICARE

## 2018-01-12 VITALS
TEMPERATURE: 97.9 F | HEART RATE: 68 BPM | RESPIRATION RATE: 16 BRPM | BODY MASS INDEX: 39.56 KG/M2 | HEIGHT: 62 IN | DIASTOLIC BLOOD PRESSURE: 80 MMHG | WEIGHT: 215 LBS | SYSTOLIC BLOOD PRESSURE: 156 MMHG

## 2018-01-12 VITALS
HEIGHT: 62 IN | RESPIRATION RATE: 14 BRPM | HEART RATE: 76 BPM | WEIGHT: 214 LBS | DIASTOLIC BLOOD PRESSURE: 68 MMHG | SYSTOLIC BLOOD PRESSURE: 114 MMHG | BODY MASS INDEX: 39.38 KG/M2

## 2018-01-12 DIAGNOSIS — Z12.31 ENCOUNTER FOR SCREENING MAMMOGRAM FOR MALIGNANT NEOPLASM OF BREAST: ICD-10-CM

## 2018-01-12 PROCEDURE — 77067 SCR MAMMO BI INCL CAD: CPT

## 2018-01-12 PROCEDURE — 77063 BREAST TOMOSYNTHESIS BI: CPT

## 2018-01-12 NOTE — MISCELLANEOUS
Message   Recorded as Task   Date: 05/19/2017 09:15 AM, Created By: MARKIE LOMBARDO   Task Name: Call Back   Assigned To: Morales Garcia   Regarding Patient: Roya Flores, Status: Active   Comment:    Taylor Boyce - 19 May 2017 9:15 AM     TASK CREATED  Patient called, she had surgery for a plcmt thoracic scs yest  The tramadol is not working for her  Please call home number  [phone]  Thanks! Morales Garcia - 19 May 2017 10:04 AM     TASK EDITED  Spoke with patient  Checked PDMP and she has been compliant, has only filled  tramadol recently  Patient requesting something stronger for pain, however, I explained that she or someone would need to come to the office to  script  She said that it is an hour away and was asking if her PCP would be able to prescribe this  Left message for nurse at Dr Tre eMeks office  Awaiting call back  Meanwhile, spoke with pain management doctor's office who said that it should not be a problem if her PCP prescribes the pain medication since she is in her post-op period  Morales Garcia - 19 May 2017 10:27 AM     TASK EDITED  Lizet Carroll with Teo Okeefe from PCP office  She will s/w Physician and give me a c/b  Awaiting call  Morales Garcia - 19 May 2017 11:11 AM     TASK EDITED  Patient's PCP office is not comfortable prescribing any pain medication for patient per Teo Okeefe who spoke with Dr RUDD [facility] patient back and she said that she doesn't understand why they won't do it and that she wants to call and speak with them personally  I advised her to call me back and let me know what they say  Gave her my direct #  Awaiting c/b  Morales Garcia - 19 May 2017 12:42 PM     TASK EDITED  Patient called me back informing me that her PCP office will be providing patient with a script for oxycodone  I asked her if she knew the mg and she did not  She said she will call me back with the information after receiving script    Can also look it up in PDMP once it is stepan Amador - 19 May 2017 12:43 PM     TASK EDITED  She also said that when she moves her leg in certain positions that she can feel some thigh pain that comes and goes and is positional   Advised her to s/w [facility] rep and see if it is something that they can work with her on  I also explained that if she has any further questions, if they cannot help her, or if the pain persists/worsens to call our office  She verbalized understanding          Signatures   Electronically signed by : Roseann Libman, RN; May 19 2017 12:44PM EST                       (Author)

## 2018-01-12 NOTE — PROGRESS NOTES
Assessment    1  Chronic pain syndrome (338 4) (G89 4)    Plan  Chronic pain syndrome    · Follow-up PRN Evaluation and Treatment  Follow-up  Status: Complete  Done:  [id number]   Ordered; For: Chronic pain syndrome; Ordered By: Katie Cruz Performed:  Due: 36DQZ6980    Discussion/Summary    This is a 72 yo female with chronic pain in her lower back and left leg  Mild right leg pain  History of previous lumbar discectomy  Underwent successful SCS trial with Dr Liu Adler  She is 6 weeks from SCS placement  Doing very well  Programmed to increase right sided coverage  BurstDR  F/U as needed  Chief Complaint  Patient presents to office for consult of SCS placement after trial       Post-Op  HPI: She is 6 weeks from SCS placement  Doing very well  Reports 90% pain relief  Mild thoracic incision discomfort at the end of the day  No fevers or chills  Improved activity  In review, this is a very pleasant 72 yo female with chronic pain involving her lower back and left leg  She describes a sharp pain in her lower back that is worse on the left side  She also reports sharp hip and posterior left leg pain to her foot  She has mild right leg pain, but the left is her primary concern  She has a history of a discectomy 40 years ago for left leg pain  She presents after undergoing a successful SCS trial with Dr Liu Adler obtaining 75% pain relief and wishes to proceed with permanent implantation  She has failed medications, PT, TENS, and injections  Review of Systems    Constitutional: No fever, no chills, feels well, no tiredness, no recent weight gain or weight loss  Eyes: No complaints of eye pain, no red eyes, no eyesight problems, no discharge, no dry eyes, no itching of eyes  ENT: no complaints of earache, no loss of hearing, no nose bleeds, no nasal discharge, no sore throat, no hoarseness     Cardiovascular: No complaints of slow heart rate, no fast heart rate, no chest pain, no palpitations, no leg claudication, no lower extremity edema  Respiratory: No complaints of shortness of breath, no wheezing, no cough, no SOB on exertion, no orthopnea, no PND  Gastrointestinal: No complaints of abdominal pain, no constipation, no nausea or vomiting, no diarrhea, no bloody stools  Genitourinary: No complaints of dysuria, no incontinence, no pelvic pain, no dysmenorrhea, no vaginal discharge or bleeding  Musculoskeletal: limb pain and bilateral leg pain more so on left, but no arthralgias, no myalgias, no joint stiffness and no limb swelling  Integumentary: No complaints of skin rash or lesions, no itching, no skin wounds, no breast pain or lump  Neurological: tingling and difficulty walking, but no headache, no numbness, no confusion, no dizziness, no limb weakness, no convulsions and no fainting  Psychiatric: anxiety, but not suicidal, no personality change, no sleep disturbances, no depression and no emotional problems  Endocrine: No complaints of proptosis, no hot flashes, no muscle weakness, no deepening of the voice, no feelings of weakness  Hematologic/Lymphatic: No complaints of swollen glands, no swollen glands in the neck, does not bleed easily, does not bruise easily  Active Problems    1  Abdominal pain (789 00) (R10 9)   2  Acid reflux disease (530 81) (K21 9)   3  Advance directive discussed with patient (V65 49) (Z71 89)   4  Aftercare following surgery (V58 89) (Z48 89)   5  Anxiety (300 00) (F41 9)   6  Back pain (724 5) (M54 9)   7  Bacteriuria (791 9) (R82 71)   8  BRBPR (bright red blood per rectum) (569 3) (K62 5)   9  Carpal tunnel syndrome, unspecified laterality (354 0) (G56 00)   10  Chronic coughing (786 2) (R05)   11  Chronic low back pain (724 2,338 29) (M54 5,G89 29)   12  Chronic pain syndrome (338 4) (G89 4)   13  Degenerative joint disease of right lower extremity (715 98) (M19 90)   14  Encounter for long-term use of opiate analgesic (V58 69) (Z79 891)   15  Encounter for screening mammogram for malignant neoplasm of breast (V76 12)    (Z12 31)   16  Encounter for staple removal (V58 32) (Z48 02)   17  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)   18  Fatigue (780 79) (R53 83)   19  History of Fibrocystic breast disease, unspecified laterality (610 1) (N60 19)   20  Greater trochanteric bursitis of left hip (726 5) (M70 62)   21  Head trauma (959 01) (S09 90XA)   22  Hematoma of abdominal wall (922 2) (S30 1XXA)   23  Hemorrhoids (455 6) (K64 9)   24  History of recent fall (V15 88) (Z91 81)   25  Hyperlipidemia (272 4) (E78 5)   26  Hypertension (401 9) (I10)   27  Irritable bowel syndrome (564 1) (K58 9)   28  Lumbar degenerative disc disease (722 52) (M51 36)   29  Lumbar radiculopathy, chronic (724 4) (M54 16)   30  Lumbar spondylosis (721 3) (M47 816)   31  Neuralgia (729 2) (M79 2)   32  Pain of lower extremity, unspecified laterality (729 5) (M79 606)   33  Pain, joint, shoulder (719 41) (M25 519)   34  Paresthesias (782 0) (R20 2)   35  Peripheral neuropathy (356 9) (G62 9)   36  Post laminectomy syndrome (722 80) (M96 1)   37  Pre-procedural examination (V72 84) (Z01 818)   38  Pre-procedural laboratory examination (V72 63) (Z01 812)   39  Refused pneumococcal vaccination (V64 06) (Z28 21)   40  Sacroiliitis (720 2) (M46 1)   41  Status post insertion of spinal cord stimulator (V45 89) (Z98 890)   42  Status post surgery (V45 89) (Z98 890)   43  Thoracic back pain (724 1) (M54 6)   44  Thyroid dysfunction (246 9) (E07 9)   45  Uncomplicated opioid dependence (304 00) (F11 20)   46  Vitamin D deficiency (268 9) (E55 9)    Social History    · Dental care, regularly   · Good dental hygiene   · Never a smoker   · No caffeine use   · No drug use   · Single   · Social alcohol use (Z78 9)   · Sun Protection Sunscreen   · Uses Safety Equipment - Seatbelts  The social history was reviewed and updated today  Current Meds   1   Amitriptyline HCl - 50 MG Oral Tablet; TAKE 1 TABLET AT BEDTIME; Therapy: 60SDP0606 to (Evaluate:00Rbw9453)  Requested for: 42YHH9835; Last   Rx:15Jun2017 Ordered   2  Azelastine HCl - 0 1 % Nasal Solution; USE 1 SPRAY IN EACH NOSTRIL TWICE DAILY; Therapy: 77OSL0222 to (Last Rx:73Abv2922)  Requested for: 50Icp3635 Ordered   3  Cyclobenzaprine HCl - 5 MG Oral Tablet; TAKE 1 TABLET AT BEDTIME AS NEEDED; Therapy: 65HEB6183 to (Evaluate:13Srg1273)  Requested for: 33YUW1829; Last   Rx:15Jun2017 Ordered   4  Diclofenac Sodium 1 % Transdermal Gel; APPLY QID; Therapy: 32PSG5267 to (Evaluate:12Say5571)  Requested for: 38ECN4309; Last   Rx:18Nov2016 Ordered   5  EpiPen 2-Michel 0 3 MG/0 3ML Injection Solution Auto-injector; use as directed; Therapy: 30SOH4797 to (Last AB:08BJN9575)  Requested for: 62ZXZ0393 Ordered   6  Gabapentin 300 MG Oral Capsule; TAKE 1 CAPSULE 3 times daily; Therapy: 60ZPY0861 to (Evaluate:11Jan2018)  Requested for: 69SYW8579; Last   Rx:15Jun2017 Ordered   7  HydrOXYzine HCl - 10 MG Oral Tablet; TAKE TWO TABLETS BY MOUTH AT BEDTIME; Therapy: 84HZA2343 to (Evaluate:42Eaa1428)  Requested for: 10OBU8461; Last   Rx:15Jun2017 Ordered   8  Losartan Potassium 50 MG Oral Tablet; TAKE 1 TABLET DAILY; Therapy: 56RZU5943 to (Rakan Jung)  Requested for: 50RXZ7911; Last   CT:60QGX1633 Ordered   9  Nitrofurantoin Macrocrystal 100 MG Oral Capsule; TAKE 1 CAPSULE EVERY 12 HOURS   DAILY; Therapy: 15DEQ9453 to (Evaluate:27Jun2017)  Requested for: 20Jun2017; Last   Rx:20Jun2017 Ordered   10  Omeprazole 40 MG Oral Capsule Delayed Release; Take 1 daily; Therapy: 65IWG5249 to (Shaheed Sanchez)  Requested for: 68QHK5214; Last    Rx:30Jan2017 Ordered   11  Simvastatin 10 MG Oral Tablet; take one tablet by mouth every other day; Therapy: 77ITM5540 to (Evaluate:18Jun2017)  Requested for: 89MGK2389; Last    LE:96XXZ6390 Ordered   12  Xyzal Allergy 24HR 5 MG Oral Tablet; Take 1 tablet daily;     Therapy: 11VAH7427 to Recorded    The medication list was reviewed and updated today  Allergies    1  Codeine Derivatives   2  Iodinated Contrast Media   3  Latex Exam Gloves MISC   4  Penicillins    5  Adhesive Tape   6  Eggs    Vitals   Recorded: 39RZX4992 11:12AM   Temperature 97 2 F   Heart Rate 101, L Brachial Artery   Pulse Quality Normal, L Brachial Artery   Respiration Quality Normal   Respiration 16   Systolic 361, LUE, Sitting   Diastolic 87, LUE, Sitting   Height 5 ft 2 in   Weight 212 lb    BMI Calculated 38 78   BSA Calculated 1 96     Physical Exam     Constitutional Patient appears healthy and well developed  Head and Face Normal on inspection  Eyes Fundoscopic exam is benign  Neck No JVD  Carotid pulses: 2+ and equal bilaterally, without bruits  Respiratory Auscultation of lungs: Clear to auscultation  No rales, rhonchi, wheezes appreciated over the lungs bilaterally  Cardiovascular Auscultation of heart: Rhythm is regular, no gallop or rubs  No heart murmur appreciated  Peripheral vascular exam: Pedal Pulses: 2+ and equal bilaterally  Radial pulses: 2+ and equal bilaterally  Extremities: Peripheral circulation is normal    Abdomen Soft, nontender, and nondistended without guarding, rigidity or rebound tenderness  Musculo: Spine Contour is normal  No tenderness of the spine billaterally  Skin warm and dry  Incisions well healed  Neurologic - Mental Status: Alert and Oriented x3  Mood and affect: Affect is normal   Memory is grossly intact  Attention is WNL  Sravan Deloit Speech: Language is fluent  Cranial Nerve Exam:  2nd cranial nerve: Intact  3rd, 4th, and 6th cranial nerves: Intact  5th cranial nerve: Intact  7th cranial nerve: Intact  8th cranial nerve: Intact  9th and 10th cranial nerves: Intact  11th cranial nerve: Intact  12th cranial nerve: Intact  Motor System General Motor Strength: No pronator drift and no parietal drift  Motor System - Upper Extremities: Muscle strength: 5/5 bilaterally  Muscle tone: Normal bilaterally  Motor System - Lower Extremities: Muscle strength: 5/5 bilaterally  Muscle tone: Normal bilaterally  Reflexes: DTR's are brisk, symmetric and 2+ bilaterally  Babinski's reflex is down going bilaterally symmetrical bilaterally  Coordination: Normal    Sensory: Sensation grossly intact to light touch  Sensation grossly intact to pinprick  Gait and Station: [place] with a normal gait  Results/Data    I personally reviewed the MRI thoracic spine and lumabr spine, SCS trial in detail with the patient  X-ray Review Reviewed images and report of SCS trial with Dr Lisa Valdez  Single midline [facility] 8 contact perc lead placed to bottom T8 using top contacts  MRI Review Reviewed images, but report not available of MRI thoracic spine 5/2015  Mild DDD  Reviewed images, but report not available of MRI lumbar spine 5/2015  Mild DDD  Post operative changes  No stenosis  Future Appointments    Date/Time Provider Specialty Site   07/10/2017 08:00 AM Yaneth Sheldon DO Internal Medicine [address]   12/21/2017 01:00 PM Divine Hunter, [address]  Neurology [facility] NEUROLOGY [facility]   07/21/2017 08:45 AM Abdoulaye Pearl DO [facility]     Past Medical History    The active problems and past medical history were reviewed and updated today  Surgical History    The surgical history was reviewed and updated today  Family History    The family history was reviewed and updated today         Signatures   Electronically signed by : ABDIEL Gallegos ; Jul 7 2017 11:37AM EST                       (Author)

## 2018-01-12 NOTE — MISCELLANEOUS
Message   Recorded as Task   Date: 05/19/2017 10:31 AM, Created By: Jenn Machuca   Task Name: Follow Up   Assigned To: [id number] [address] clinical,Team   Regarding Patient: Roya Flores, Status: Active   CommentChrisjesica Ochoak - 19 May 2017 10:31 AM     TASK CREATED  Caller: Verónica Rush, Nurse Navigator; General Medical Question  Frankie Benites was prescribed tramadol 50 q6hrs prn following her recent surgery  She contacted  neuro to inform them that the tramadol was not helping and she is still in severe pain, and they had offered to ask their office PA, but Frankie Benites requested that they call her pcp since she is closer to your office to  a new script if allowed  David Nelson reports that they also called Ingrid's pain mgmt doctor to check that this was ok, and they ok'd it    Allergies: codeine, contrast, latex, penicillins   Leslie Bermudez - 19 May 2017 10:37 AM     TASK EDITED  are they asking for tramadol rx? otherwise we would need to speak with the surgeon office and/or pain mgmt regarding scripts   Judy Boyce - 19 May 2017 11:06 AM     TASK IN PROGRESS   Judy Boyce - 19 May 2017 11:07 AM     TASK REPLIED TO: Previously Assigned To SLIM TAMAQUA,Team  called and informed Saul Silva - 01 May 2017 11:17 AM     TASK COMPLETED   Judy Boyce - 19 May 2017 11:54 AM     TASK REACTIVATED   Judy Boyce - 19 May 2017 11:59 AM     TASK REPLIED TO: Previously Assigned To [facility] called, states that she wanted to ask you for a different rx because the surgeon is located an hour away which is far to go to  an rx paper  Stated that she is unable to request anything from Pain mgmt because Dr Naheed Cardoso is not in today  Said that tramadol doesn't seem to work for her, even when adjusted, and she is in a lot of pain  Is hoping that there is something that can be done because the weekend is approaching and no on will be in   Katie Edenin - 19 May 2017 12:18 PM     TASK EDITED  will need to notify her pain mgmt - I do not think it is todd    can give oxycodone 5mg # 30 one every 8 hours for severe pain but she should be evaluated if pain is to that degree by surgeon Margo Collins - 82 May 2017 12:23 PM     TASK IN [address] [place] [address] - 19 May 2017 12:36 PM     TASK REASSIGNED: Previously Assigned To SLIM TAMAQUA,Team  Pt [facility]  requested a different pain medication post back op because tramadol was not helping her  #30 tabs of Oxycodone 5mg q8hrs for severe pain per Dr Dwayne Harper - 22 May 2017 10:17 AM     TASK EDITED   [facility] - 22 May 2017 10:57 AM     TASK EDITED  also see task from 5/19/17 10:03   Imer Kaplan - 22 May 2017 11:22 AM     TASK REPLIED TO: Previously Assigned To Jaclyn Cruz                      aware, please make sure she has follow up with me within the next 4 weeks   Shari Graham - 22 May 2017 11:35 AM     TASK EDITED  pt has an appt on 6/15/17 at 08:30        Active Problems    1  Abdominal pain (789 00) (R10 9)   2  Abrasion of finger (915 0) (S60 419A)   3  Acid reflux disease (530 81) (K21 9)   4  Advance directive discussed with patient (V65 49) (Z71 89)   5  Anxiety (300 00) (F41 9)   6  Back pain (724 5) (M54 9)   7  Bacteriuria (791 9) (R82 71)   8  BRBPR (bright red blood per rectum) (569 3) (K62 5)   9  Carpal tunnel syndrome, unspecified laterality (354 0) (G56 00)   10  Cellulitis, leg (682 6) (L03 119)   11  Chronic coughing (786 2) (R05)   12  Chronic low back pain (724 2,338 29) (M54 5,G89 29)   13  Chronic pain syndrome (338 4) (G89 4)   14  Degenerative joint disease of right lower extremity (715 98) (M19 90)   15  Encounter for long-term use of opiate analgesic (V58 69) (Z79 891)   16  Encounter for screening mammogram for malignant neoplasm of breast (V76 12)    (Z12 31)   17  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)   18  Fatigue (780 79) (R53 83)   19   History of Fibrocystic breast disease, unspecified laterality (610 1) (N60 19)   20  Greater trochanteric bursitis of left hip (726 5) (M70 62)   21  Head trauma (959 01) (S09 90XA)   22  Hematoma of abdominal wall (922 2) (S30 1XXA)   23  Hemorrhoids (455 6) (K64 9)   24  History of recent fall (V15 88) (Z91 81)   25  Hyperlipidemia (272 4) (E78 5)   26  Hypertension (401 9) (I10)   27  Irritable bowel syndrome (564 1) (K58 9)   28  Lumbar degenerative disc disease (722 52) (M51 36)   29  Lumbar radiculopathy, chronic (724 4) (M54 16)   30  Lumbar spondylosis (721 3) (M47 816)   31  Neuralgia (729 2) (M79 2)   32  Pain of lower extremity, unspecified laterality (729 5) (M79 606)   33  Pain, joint, shoulder (719 41) (M25 519)   34  Paresthesias (782 0) (R20 2)   35  Peripheral neuropathy (356 9) (G62 9)   36  Post laminectomy syndrome (722 80) (M96 1)   37  Pre-procedural examination (V72 84) (Z01 818)   38  Pre-procedural laboratory examination (V72 63) (Z01 812)   39  Refused pneumococcal vaccination (V64 06) (Z28 21)   40  Sacroiliitis (720 2) (M46 1)   41  Sinusitis (473 9) (J32 9)   42  Status post insertion of spinal cord stimulator (V45 89) (Z98 890)   43  Status post surgery (V45 89) (Z98 890)   44  Thoracic back pain (724 1) (M54 6)   45  Thyroid dysfunction (246 9) (E07 9)   46  Uncomplicated opioid dependence (304 00) (F11 20)   47  UTI (urinary tract infection) (599 0) (N39 0)   48  Vitamin D deficiency (268 9) (E55 9)    Current Meds   1  Allegra Allergy 180 MG Oral Tablet (Fexofenadine HCl); take 1 tablet daily as needed   Recorded   2  Amitriptyline HCl - 50 MG Oral Tablet; TAKE 1 TABLET AT BEDTIME; Therapy: 69DEN5721 to (Evaluate:01Ezl8923)  Requested for: 24Apr2017; Last   Rx:24Apr2017 Ordered   3  Azelastine HCl - 0 1 % Nasal Solution; USE 1 SPRAY IN EACH NOSTRIL TWICE DAILY; Therapy: 48RUV7115 to (Last Rx:22Feb2017)  Requested for: 22Feb2017 Ordered   4   Clindamycin HCl - 300 MG Oral Capsule; TAKE 2 CAPSULE Every 8 hours Please start   after you arrive home from surgery; Therapy: 15VEB1562 to (Reddy Siddiqui)  Requested for: 47LID5818; Last   Rx:2017; Status: ACTIVE - Retrospective By Protocol Authorization Ordered   5  Diclofenac Sodium 1 % Transdermal Gel (Voltaren); APPLY QID; Therapy: 84AXM5751 to (Evaluate:2017)  Requested for: 28FMI7783; Last   Rx:2016 Ordered   6  EpiPen 2-Michel 0 3 MG/0 3ML Injection Solution Auto-injector; use as directed; Therapy: 55EGX9486 to (Last LE:47BMW4395)  Requested for: 79IQO6772 Ordered   7  Gabapentin 300 MG Oral Capsule; TAKE 1 CAPSULE 3 times daily; Therapy: 43RJY5721 to (Evaluate:2017)  Requested for: 52Ajv0685; Last   Rx:78Abo1822 Ordered   8  HydrOXYzine HCl - 10 MG Oral Tablet; TAKE 2 TABLETS AT BEDTIME; Therapy: 97CAK5030 to (Evaluate:99Cqx5294)  Requested for: 2017; Last   Rx:2017 Ordered   9  Losartan Potassium 50 MG Oral Tablet; TAKE 1 TABLET DAILY; Therapy: 70UEW2528 to (Marlen Ngo)  Requested for: 70CTF4397; Last   OD:31FMD3637 Ordered   10  Omeprazole 40 MG Oral Capsule Delayed Release; Take 1 daily; Therapy: 36QYS9319 to ( )  Requested for: 44KKG5927; Last    Rx:2017 Ordered   11  OxyCODONE HCl - 5 MG Oral Tablet; Take 1 tablet every 8 hours for severe pain; Therapy: 66COV0576 to (Last Rx:2017) Ordered   12  Simvastatin 10 MG Oral Tablet; take one tablet by mouth every other day; Therapy: 78XWJ4498 to (Evaluate:2017)  Requested for: 66NGA1847; Last    FX:96PGL3085 Ordered   13  Sulfamethoxazole-Trimethoprim 800-160 MG Oral Tablet (Bactrim DS); TAKE 1 TABLET    TWICE DAILY; Therapy: 80NPB2897 to (Evaluate:2017)  Requested for: 15MJY8076; Last    P29YHG4126 Ordered   14  TiZANidine HCl - 4 MG Oral Tablet; TAKE 1/2 TO 1 TABLET 3 TIMES DAILY AS NEEDED; Therapy: 25URZ7801 to (UCZGICWK:26SOQ8242)  Requested for: 2017; Last    Rx:2017 Ordered   15  TraMADol HCl - 50 MG Oral Tablet; TAKE 1 TABLET EVERY 6 HOURS AS NEEDED FOR    PAIN;    Therapy: 92BHQ7945 to (Evaluate:59Kci0956); Last Rx:30Ysz6173; Status: ACTIVE -    Retrospective By Protocol Authorization Ordered   16  TraMADol HCl - 50 MG Oral Tablet; TAKE 1 TABLET Every 8 hours PRN; Therapy: 82UTY5563 to (Evaluate:00Hbv4980); Last Rx:67Gxg9561 Ordered    Allergies    1  Codeine Derivatives   2  Iodinated Contrast Media   3  Latex Exam Gloves MISC   4  Penicillins    5  Adhesive Tape   6   Eggs    Signatures   Electronically signed by : Camille Maguire, ; May 22 2017 11:35AM EST                       (Author)

## 2018-01-12 NOTE — MISCELLANEOUS
Message   Recorded as Task   Date: 02/20/2017 11:07 AM, Created By: Rebecca Momin   Task Name: Follow Up   Assigned To: [id number] [address] clinical,Team   Regarding Patient: Debbie Scherer, Status: In Progress   Comment:    Ya Kramer - 20 Feb 2017 11:07 AM     TASK CREATED  Caller: Self; General Medical Question; [phone] (Home); [phone] (Work)  Received call from pt  on [facility] triage line  Pt  states that she was prescribed Nucynta 50 mg ER, 1 tab Q12H and it was working well for her back pain  However, pt  states that her insurance company will no longer cover the Nucynta, and pt  states that an exception was done, but the medication is still costing $143 per month to fill  Pt  states that she cannot afford the Nucynta anymore, and has now been off of it for about 1 week, and is already feeling an increase in her pain symptoms  Pt  states that she is wondering if Dr Neal Abbott has any other suggestions for a medication that could be prescribed in place of the Nucynta ER  Pt  was advised will relay information to Dr Neal Abbott and c/b w/ his recommendations  Pt  appreciative and requesting c/b at [phone]  Please advise  Thank you  Pedro Osler - 20 Feb 2017 11:32 AM     TASK REPLIED TO: Previously Assigned To [id number] clinical,Team                      needs office visit to review medication adjustment options   Ya Kramer - 20 Feb 2017 1:36 PM     TASK EDITED  S/w pt  and advised of above  Pt  verbalized understanding  Pt  was scheduled for OV w/ JE on 2/23/17, arrival 830 am  Pt  was advised to bring any unfilled Nucynta ER scripts if she has any  Pt  verbalized understanding and was appreciative  Ya Kramer - 20 Feb 2017 1:36 PM     TASK IN PROGRESS        Active Problems    1  Abdominal pain (789 00) (R10 9)   2  Abrasion of finger (915 0) (S60 419A)   3  Acid reflux disease (530 81) (K21 9)   4  Advance directive discussed with patient (V60 49) (Z92 89)   5  Anxiety (300 00) (F41 9)   6  Back pain (724 5) (M54 9)   7  BRBPR (bright red blood per rectum) (569 3) (K62 5)   8  Carpal tunnel syndrome, unspecified laterality (354 0) (G56 00)   9  Cellulitis, leg (682 6) (L03 119)   10  Chronic coughing (786 2) (R05)   11  Chronic low back pain (724 2,338 29) (M54 5,G89 29)   12  Chronic pain syndrome (338 4) (G89 4)   13  Degenerative joint disease of right lower extremity (715 98) (M19 90)   14  Encounter for long-term use of opiate analgesic (V58 69) (Z79 891)   15  Encounter for screening mammogram for malignant neoplasm of breast (V76 12)    (Z12 31)   16  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)   17  Fatigue (780 79) (R53 83)   18  History of Fibrocystic breast disease, unspecified laterality (610 1) (N60 19)   19  Greater trochanteric bursitis of left hip (726 5) (M70 62)   20  Hematoma of abdominal wall (922 2) (S30 1XXA)   21  Hemorrhoids (455 6) (K64 9)   22  History of recent fall (V15 88) (Z91 81)   23  Hyperlipidemia (272 4) (E78 5)   24  Hypertension (401 9) (I10)   25  Irritable bowel syndrome (564 1) (K58 9)   26  Lumbar degenerative disc disease (722 52) (M51 36)   27  Lumbar radiculopathy, chronic (724 4) (M54 16)   28  Lumbar spondylosis (721 3) (M47 816)   29  Neuralgia (729 2) (M79 2)   30  Pain of lower extremity, unspecified laterality (729 5) (M79 606)   31  Pain, joint, shoulder (719 41) (M25 519)   32  Paresthesias (782 0) (R20 2)   33  Peripheral neuropathy (356 9) (G62 9)   34  Post laminectomy syndrome (722 80) (M96 1)   35  Pre-procedural examination (V72 84) (Z01 818)   36  Pre-procedural laboratory examination (V72 63) (Z01 812)   37  Sacroiliitis (720 2) (M46 1)   38  Status post surgery (V45 89) (Z98 890)   39  Thoracic back pain (724 1) (M54 6)   40  Thyroid dysfunction (246 9) (E07 9)   41  Uncomplicated opioid dependence (304 00) (F11 20)   42  UTI (urinary tract infection) (599 0) (N39 0)   43   Vitamin D deficiency (268 9) (E55 9)    Current Meds   1  Allegra Allergy 180 MG Oral Tablet (Fexofenadine HCl); take 1 tablet daily as needed   Recorded   2  Amitriptyline HCl - 50 MG Oral Tablet; TAKE 1 TABLET AT BEDTIME; Therapy: 70TEO6578 to (Evaluate:13Apr2017)  Requested for: 23Dgj5525; Last   Rx:15Sep2016 Ordered   3  Diclofenac Sodium 1 % Transdermal Gel (Voltaren); APPLY QID; Therapy: 66IEF1090 to (Evaluate:17May2017)  Requested for: 99TED1149; Last   Rx:18Nov2016 Ordered   4  Dicyclomine HCl - 10 MG Oral Capsule; TAKE 1 CAPSULE EVERY 6 HOURS AS   NEEDED; Therapy: 70CZU3143 to (Last Rx:30Jan2017)  Requested for: 30Jan2017 Ordered   5  EpiPen 2-Michel 0 3 MG/0 3ML Injection Solution Auto-injector; use as directed; Therapy: 53YAR6581 to (Last Rx:15Jun2016)  Requested for: 37IEH9671 Ordered   6  Gabapentin 300 MG Oral Capsule; TAKE 1 CAPSULE 3 times daily; Therapy: 66MGA8876 to (Evaluate:13Apr2017)  Requested for: 70Ypa0312; Last   Rx:15Sep2016 Ordered   7  HydrOXYzine HCl - 10 MG Oral Tablet; TAKE 2 TABLETS AT BEDTIME; Therapy: 66FMC8478 to (Evaluate:17May2017)  Requested for: 23NNU0459; Last   Rx:18Nov2016 Ordered   8  Losartan Potassium 50 MG Oral Tablet; TAKE 1 TABLET DAILY; Therapy: 91XXR1225 to (Evaluate:17May2017)  Requested for: 34IOQ5758; Last   Rx:18Nov2016 Ordered   9  Nucynta ER 50 MG Oral Tablet Extended Release 12 Hour; take 1 tablet every twelve   hours; Therapy: 95THJ2124 to (Evaluate:11Mar2017); Last Rx:11Jan2017 Ordered   10  Omeprazole 40 MG Oral Capsule Delayed Release; Take 1 daily; Therapy: 69EBF2463 to (Russell Cardinal)  Requested for: 04GEQ3712; Last    Rx:30Jan2017 Ordered   11  Simvastatin 10 MG Oral Tablet; take one tablet by mouth every other day; Therapy: 67YWU0480 to (Evaluate:18Jun2017)  Requested for: 77TJW7369; Last    TZ:25GIK9949 Ordered   12  TiZANidine HCl - 4 MG Oral Tablet; TAKE 1/2 TO 1 TABLET 3 TIMES DAILY AS NEEDED;     Therapy: 05AWH5041 to (Evaluate:12Mar2017)  Requested for: 13YPG5665; Last    Rx:11Jan2017 Ordered    Allergies    1  Codeine Derivatives   2  Iodinated Contrast Media   3  Latex Exam Gloves MISC   4  Penicillins    5  Adhesive Tape   6   Eggs    Signatures   Electronically signed by : Flor Stevens RN; Feb 20 2017  1:36PM EST                       (Author)

## 2018-01-13 VITALS
HEIGHT: 62 IN | WEIGHT: 206 LBS | HEART RATE: 72 BPM | DIASTOLIC BLOOD PRESSURE: 66 MMHG | BODY MASS INDEX: 37.91 KG/M2 | RESPIRATION RATE: 14 BRPM | SYSTOLIC BLOOD PRESSURE: 144 MMHG

## 2018-01-13 VITALS
BODY MASS INDEX: 38.83 KG/M2 | RESPIRATION RATE: 14 BRPM | SYSTOLIC BLOOD PRESSURE: 128 MMHG | HEART RATE: 72 BPM | HEIGHT: 62 IN | DIASTOLIC BLOOD PRESSURE: 64 MMHG | WEIGHT: 211 LBS

## 2018-01-13 VITALS — DIASTOLIC BLOOD PRESSURE: 72 MMHG | SYSTOLIC BLOOD PRESSURE: 130 MMHG

## 2018-01-13 VITALS
DIASTOLIC BLOOD PRESSURE: 76 MMHG | TEMPERATURE: 97.2 F | SYSTOLIC BLOOD PRESSURE: 126 MMHG | HEIGHT: 62 IN | OXYGEN SATURATION: 96 % | WEIGHT: 206.38 LBS | BODY MASS INDEX: 37.98 KG/M2 | HEART RATE: 98 BPM

## 2018-01-13 VITALS
BODY MASS INDEX: 38.16 KG/M2 | SYSTOLIC BLOOD PRESSURE: 128 MMHG | TEMPERATURE: 96.5 F | HEIGHT: 62 IN | DIASTOLIC BLOOD PRESSURE: 72 MMHG | WEIGHT: 207.38 LBS

## 2018-01-13 VITALS
SYSTOLIC BLOOD PRESSURE: 116 MMHG | HEART RATE: 66 BPM | HEIGHT: 62 IN | WEIGHT: 212 LBS | BODY MASS INDEX: 39.01 KG/M2 | RESPIRATION RATE: 16 BRPM | DIASTOLIC BLOOD PRESSURE: 60 MMHG

## 2018-01-13 VITALS
OXYGEN SATURATION: 97 % | TEMPERATURE: 94.1 F | WEIGHT: 209.13 LBS | BODY MASS INDEX: 38.48 KG/M2 | HEIGHT: 62 IN | DIASTOLIC BLOOD PRESSURE: 72 MMHG | SYSTOLIC BLOOD PRESSURE: 126 MMHG | HEART RATE: 86 BPM

## 2018-01-13 VITALS
OXYGEN SATURATION: 97 % | HEIGHT: 62 IN | HEART RATE: 98 BPM | SYSTOLIC BLOOD PRESSURE: 124 MMHG | DIASTOLIC BLOOD PRESSURE: 72 MMHG | BODY MASS INDEX: 39.06 KG/M2 | TEMPERATURE: 97.6 F | WEIGHT: 212.25 LBS

## 2018-01-13 NOTE — MISCELLANEOUS
Message   Recorded as Task   Date: 05/19/2017 10:03 AM, Created By: Marlon Phillips   Task Name: Miscellaneous   Assigned To: [id number] [address] clinical,Team   Regarding Patient: Isauro Thomas, Status: Active   Comment:    Zoe Coelho - 19 May 2017 10:03 AM     TASK CREATED  Zulay from [facility] was calling to inform us that pt will be recieving meds from her pcp and not them because they only prescribe for 4 weeks after surgery  any question you can call Roya Sánchez at [phone]  Matti Arenas - 19 May 2017 10:28 AM     TASK EDITED  Note: Zulay's phone # [phone]    S/w Roya Sánchez, states [facility] usually covers post op pain meds x 4 weeks  Dr Gemma Cool prescribed tramadol on 5/18/2017 post perm scs  Per pt, no relief w/ tramadol, requesting something else  Pt is aware, she will need to pu a paper rx from Dr Meg Crane office  Expressed concern, ***Dr Meg Crane office is 1 hr away  Pt / Jw Spicer are questioning - is it ok to get pain medication from her pcp for post op pain? Stefany Tenorio, will d/w Dr Uzma Pierce and cb if there is any issue  Roya Sánchez verbalized understanding and appreciation  Caro Sanchez - 30 May 2017 12:07 PM     TASK REPLIED TO: Previously Assigned To Caro Sanchez                      I can help with pain meds but she needs an office visit with me, ok to double book her   MedoraLola rosado - 19 May 2017 2:06 PM     TASK EDITED  *****Gerardo Barnes*****    S/w pt regarding above  Per pt her PCP did write for a different medication to help get her trhough the surgical pain  Per pt her sister is on the way to pick it up and she believes it will be oxycodone  Pt unable to tell what mg or how often or how much was prescribed  Per pt she will not be able to schedule an appt  with Brainz Gamese for next week because she is unable to drive for the next two weeks and she has to rely on her sisters for a ride      Per pt she has an appt with Dr Gemma Cool on 6/1 to get the sutures removed, and she did let Roya Feathers from Dr Hermilo Ruano office know the same about the meds from her PCP  Per pt Zulay from [facility] will f/u with her on Monday  Pt stated that she has nothing but wonderful things to say about [facility] and the [address] office as well as the surgeons office and the hospital   Pt very appreciative of call  Wisam Ivan - 56 May 2017 3:16 PM     TASK REPLIED TO: Previously Assigned To Wisamjose Love                      ok, please let her know I am available to help in any way possible but can't provide narcotic medications without an office visit  Shari Graham - 22 May 2017 8:54 AM     TASK EDITED  S/W pt  Advised pt of the same  Pt stated "Yea  I realize that "  Pt stated her PCP gave her oxycodone 5mg which is "somewhat" helping  Pt stated she is calling Dr [facility] b/c she is having cramps in her feet  Wisamjose Loev - 22 May 2017 9:07 AM     TASK REPLIED TO: Previously Assigned To Imer Kaplan                      aware        Active Problems    1  Abdominal pain (789 00) (R10 9)   2  Abrasion of finger (915 0) (S60 419A)   3  Acid reflux disease (530 81) (K21 9)   4  Advance directive discussed with patient (V65 49) (Z71 89)   5  Anxiety (300 00) (F41 9)   6  Back pain (724 5) (M54 9)   7  Bacteriuria (791 9) (R82 71)   8  BRBPR (bright red blood per rectum) (569 3) (K62 5)   9  Carpal tunnel syndrome, unspecified laterality (354 0) (G56 00)   10  Cellulitis, leg (682 6) (L03 119)   11  Chronic coughing (786 2) (R05)   12  Chronic low back pain (724 2,338 29) (M54 5,G89 29)   13  Chronic pain syndrome (338 4) (G89 4)   14  Degenerative joint disease of right lower extremity (715 98) (M19 90)   15  Encounter for long-term use of opiate analgesic (V58 69) (Z79 891)   16  Encounter for screening mammogram for malignant neoplasm of breast (V76 12)    (Z12 31)   17  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)   18  Fatigue (780 79) (R53 83)   19   History of Fibrocystic breast disease, unspecified laterality (610 1) (N60 19)   20  Greater trochanteric bursitis of left hip (726 5) (M70 62)   21  Head trauma (959 01) (S09 90XA)   22  Hematoma of abdominal wall (922 2) (S30 1XXA)   23  Hemorrhoids (455 6) (K64 9)   24  History of recent fall (V15 88) (Z91 81)   25  Hyperlipidemia (272 4) (E78 5)   26  Hypertension (401 9) (I10)   27  Irritable bowel syndrome (564 1) (K58 9)   28  Lumbar degenerative disc disease (722 52) (M51 36)   29  Lumbar radiculopathy, chronic (724 4) (M54 16)   30  Lumbar spondylosis (721 3) (M47 816)   31  Neuralgia (729 2) (M79 2)   32  Pain of lower extremity, unspecified laterality (729 5) (M79 606)   33  Pain, joint, shoulder (719 41) (M25 519)   34  Paresthesias (782 0) (R20 2)   35  Peripheral neuropathy (356 9) (G62 9)   36  Post laminectomy syndrome (722 80) (M96 1)   37  Pre-procedural examination (V72 84) (Z01 818)   38  Pre-procedural laboratory examination (V72 63) (Z01 812)   39  Refused pneumococcal vaccination (V64 06) (Z28 21)   40  Sacroiliitis (720 2) (M46 1)   41  Sinusitis (473 9) (J32 9)   42  Status post insertion of spinal cord stimulator (V45 89) (Z98 890)   43  Status post surgery (V45 89) (Z98 890)   44  Thoracic back pain (724 1) (M54 6)   45  Thyroid dysfunction (246 9) (E07 9)   46  Uncomplicated opioid dependence (304 00) (F11 20)   47  UTI (urinary tract infection) (599 0) (N39 0)   48  Vitamin D deficiency (268 9) (E55 9)    Current Meds   1  Allegra Allergy 180 MG Oral Tablet (Fexofenadine HCl); take 1 tablet daily as needed   Recorded   2  Amitriptyline HCl - 50 MG Oral Tablet; TAKE 1 TABLET AT BEDTIME; Therapy: 11AVK0350 to (Evaluate:52Qxt4049)  Requested for: 24Apr2017; Last   Rx:24Apr2017 Ordered   3  Azelastine HCl - 0 1 % Nasal Solution; USE 1 SPRAY IN EACH NOSTRIL TWICE DAILY; Therapy: 42DLF1583 to (Last Rx:22Feb2017)  Requested for: 22Feb2017 Ordered   4   Clindamycin HCl - 300 MG Oral Capsule; TAKE 2 CAPSULE Every 8 hours Please start   after you arrive home from surgery; Therapy: 95AFH2431 to ([employer])  Requested for: 84JNA3436; Last   Rx:17May2017; Status: ACTIVE - Retrospective By Protocol Authorization Ordered   5  Diclofenac Sodium 1 % Transdermal Gel (Voltaren); APPLY QID; Therapy: 83CCL7199 to (Evaluate:17May2017)  Requested for: 39BIY2267; Last   Rx:18Nov2016 Ordered   6  EpiPen 2-Michel 0 3 MG/0 3ML Injection Solution Auto-injector; use as directed; Therapy: 18NFY4094 to (Last FA:70PFO2654)  Requested for: 86UIK3349 Ordered   7  Gabapentin 300 MG Oral Capsule; TAKE 1 CAPSULE 3 times daily; Therapy: 73GEN4861 to (Evaluate:13Apr2017)  Requested for: 13Xcs0615; Last   Rx:33Urd2741 Ordered   8  HydrOXYzine HCl - 10 MG Oral Tablet; TAKE 2 TABLETS AT BEDTIME; Therapy: 94HXQ0602 to (Evaluate:17Sep2017)  Requested for: 21Mar2017; Last   Rx:21Mar2017 Ordered   9  Losartan Potassium 50 MG Oral Tablet; TAKE 1 TABLET DAILY; Therapy: 17GPX4797 to (Echo Lowry)  Requested for: 11KVN0486; Last   SR:43JTI4184 Ordered   10  Omeprazole 40 MG Oral Capsule Delayed Release; Take 1 daily; Therapy: 68HOD8301 to (Henry Penny)  Requested for: 58OWK0615; Last    Rx:30Jan2017 Ordered   11  OxyCODONE HCl - 5 MG Oral Tablet; Take 1 tablet every 8 hours for severe pain; Therapy: 39ARO8251 to (Last Rx:19May2017) Ordered   12  Simvastatin 10 MG Oral Tablet; take one tablet by mouth every other day; Therapy: 15QZS2194 to (Evaluate:18Jun2017)  Requested for: 64QNN6670; Last    MT:58JOF0242 Ordered   13  Sulfamethoxazole-Trimethoprim 800-160 MG Oral Tablet (Bactrim DS); TAKE 1 TABLET    TWICE DAILY; Therapy: 43HAT5872 to (Evaluate:15May2017)  Requested for: 14DOO6884; Last    WZ:10DTZ0093 Ordered   14  TiZANidine HCl - 4 MG Oral Tablet; TAKE 1/2 TO 1 TABLET 3 TIMES DAILY AS NEEDED; Therapy: 79TGY4893 to (INYXJLMV:00SVT2861)  Requested for: 24Apr2017; Last    Rx:24Apr2017 Ordered   15   TraMADol HCl - 50 MG Oral Tablet; TAKE 1 TABLET EVERY 6 HOURS AS NEEDED FOR    PAIN;    Therapy: 61CZS7312 to (Evaluate:01Lsc8008); Last Rx:69Hlt0535; Status: ACTIVE -    Retrospective By Protocol Authorization Ordered   16  TraMADol HCl - 50 MG Oral Tablet; TAKE 1 TABLET Every 8 hours PRN; Therapy: 93XJS6970 to (Evaluate:42Kpg6282); Last Rx:93Pdu1554 Ordered    Allergies    1  Codeine Derivatives   2  Iodinated Contrast Media   3  Latex Exam Gloves MISC   4  Penicillins    5  Adhesive Tape   6   Eggs    Signatures   Electronically signed by : Brianna Callahan, ; May 22 2017  9:11AM EST                       (Author)

## 2018-01-14 VITALS
BODY MASS INDEX: 39.01 KG/M2 | TEMPERATURE: 97.2 F | HEART RATE: 101 BPM | SYSTOLIC BLOOD PRESSURE: 146 MMHG | RESPIRATION RATE: 16 BRPM | DIASTOLIC BLOOD PRESSURE: 87 MMHG | WEIGHT: 212 LBS | HEIGHT: 62 IN

## 2018-01-14 VITALS
TEMPERATURE: 95.9 F | HEART RATE: 109 BPM | OXYGEN SATURATION: 96 % | WEIGHT: 212.13 LBS | SYSTOLIC BLOOD PRESSURE: 128 MMHG | DIASTOLIC BLOOD PRESSURE: 72 MMHG | BODY MASS INDEX: 39.04 KG/M2 | HEIGHT: 62 IN

## 2018-01-14 VITALS
HEART RATE: 68 BPM | BODY MASS INDEX: 36.8 KG/M2 | RESPIRATION RATE: 14 BRPM | HEIGHT: 62 IN | SYSTOLIC BLOOD PRESSURE: 138 MMHG | WEIGHT: 200 LBS | DIASTOLIC BLOOD PRESSURE: 66 MMHG

## 2018-01-14 VITALS
WEIGHT: 214 LBS | RESPIRATION RATE: 14 BRPM | DIASTOLIC BLOOD PRESSURE: 80 MMHG | HEIGHT: 62 IN | BODY MASS INDEX: 39.38 KG/M2 | SYSTOLIC BLOOD PRESSURE: 152 MMHG | HEART RATE: 84 BPM

## 2018-01-14 VITALS
DIASTOLIC BLOOD PRESSURE: 70 MMHG | HEIGHT: 62 IN | OXYGEN SATURATION: 94 % | WEIGHT: 212.13 LBS | SYSTOLIC BLOOD PRESSURE: 130 MMHG | TEMPERATURE: 96.4 F | BODY MASS INDEX: 39.04 KG/M2 | HEART RATE: 108 BPM

## 2018-01-14 NOTE — MISCELLANEOUS
Message   Recorded as Task   Date: 08/01/2016 09:40 AM, Created By: Meka Elizondo   Task Name: Follow Up   Assigned To: [id number] [address] clinical,Team   Regarding Patient: Frida Silva, Status: In Progress   MiniBeverly Pate - 01 Aug 2016 9:40 AM     TASK CREATED  Caller: Self; General Medical Question; [phone] (Home); [phone] (Work)  Received VM from pt  on [facility] triage line from 843 am  Pt  states that she had to reschedule due to a conflict, and her appt  is now on 9/9  Pt  states that she is wondering if she can get her Tizanidine refilled to [facility]  Pt  requesting c/b at [phone]  Ya Kramer - 01 Aug 2016 2:38 PM     TASK EDITED  S/w pt  who states that she had to change her appt  because she got confused and already had an appt  scheduled 8/2  Pt  states that she takes Tizanidine 4 mg 1 5 tabs HS, and it helps "somewhat," no s/e's  Pt  requesting transmission to [facility]  Pt  states that she still has a few days of medication left, but she is not sure exactly how many  Pt  was advised that I will s/w Dr Latrell Lamb and see if he can refill the medication  Will only c/b if there is an issue  Pt  verbalized understanding and was appreciative  Please advise  Thank you  Ana Laura Huerta - 01 Aug 2016 4:29 PM     TASK REPLIED TO: Previously Assigned To Ana Laura Huerta                      will send eRx for her thanks   Ya Kramer - 01 Aug 2016 4:30 PM     TASK IN PROGRESS        Active Problems    1  Anxiety (300 00) (F41 9)   2  Back pain (724 5) (M54 9)   3  BRBPR (bright red blood per rectum) (569 3) (K62 5)   4  Carpal tunnel syndrome, unspecified laterality (354 0) (G56 00)   5  Cellulitis, leg (682 6) (L03 119)   6  Chronic low back pain (724 2,338 29) (M54 5,G89 29)   7  Chronic pain syndrome (338 4) (G89 4)   8  Contusion of leg (924 5) (S80 10XA)   9  Degenerative joint disease of right lower extremity (715 98) (M19 90)   10   Esophageal reflux (530 81) (K21 9)   11  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)   12  History of Fibrocystic breast disease, unspecified laterality (610 1) (N60 19)   13  Foot pain (729 5) (M79 673)   14  Greater trochanteric bursitis of left hip (726 5) (M70 62)   15  Hemorrhoids (455 6) (K64 9)   16  History of recent fall (V15 88) (Z91 81)   17  Hyperlipidemia (272 4) (E78 5)   18  Hypertension (401 9) (I10)   19  Irritable bowel syndrome (564 1) (K58 9)   20  Lumbar degenerative disc disease (722 52) (M51 36)   21  Lumbar radiculopathy, chronic (724 4) (M54 16)   22  Lumbar spondylosis (721 3) (M47 816)   23  Neuralgia (729 2) (M79 2)   24  Pain of lower extremity, unspecified laterality (729 5) (M79 606)   25  Pain, joint, shoulder (719 41) (M25 519)   26  Paresthesias (782 0) (R20 2)   27  Peripheral neuropathy (356 9) (G62 9)   28  Post laminectomy syndrome (722 80) (M96 1)   29  Pre-procedural examination (V72 84) (Z01 818)   30  Pre-procedural laboratory examination (V72 63) (Z01 812)   31  Sacroiliitis (720 2) (M46 1)   32  Status post surgery (V45 89) (Z98 89)   33  Thoracic back pain (724 1) (M54 6)   34  Thyroid dysfunction (246 9) (E07 9)   35  Vitamin D deficiency (268 9) (E55 9)    Current Meds   1  Allegra Allergy 180 MG Oral Tablet (Fexofenadine HCl); take 1 tablet daily as needed   Recorded   2  Amitriptyline HCl - 50 MG Oral Tablet; TAKE 1 TABLET AT BEDTIME; Therapy: 06XEI0808 to (Evaluate:17Gju1555)  Requested for: 62PTQ4756; Last   Rx:23Mar2016 Ordered   3  EpiPen 2-Michel 0 3 MG/0 3ML Injection Solution Auto-injector; use as directed; Therapy: 57KTP4081 to (Last Rx:15Jun2016)  Requested for: 49YWG8421 Ordered   4  Gabapentin 300 MG Oral Capsule; TAKE 1 CAPSULE 3 times daily; Therapy: 93MAN0155 to (Evaluate:13Wzq1764)  Requested for: 71FAF5637; Last   Rx:23Xcy5171 Ordered   5  HydrOXYzine HCl - 10 MG Oral Tablet; TAKE 2 TABLETS AT BEDTIME;    Therapy: 44EAD7666 to (Luis Da Silva) Requested for: 04Apr2016; Last   Rx:04Apr2016 Ordered   6  LORazepam 0 5 MG Oral Tablet; TAKE ONE TABLET BY MOUTH AT BEDTIME AS   NEEDED FOR SLEEP; Therapy: 80OIR9862 to (Evaluate:16Nov2016)  Requested for: 64BNN5818; Last   Rx:13Ogk5197 Ordered   7  Losartan Potassium 50 MG Oral Tablet; TAKE 1 TABLET DAILY; Therapy: 45LQE3484 to (Evaluate:16Nov2016)  Requested for: 39XSL0513; Last   Rx:20May2016 Ordered   8  Montelukast Sodium 10 MG Oral Tablet (Singulair); TAKE 1 TABLET DAILY AS   DIRECTED; Therapy: 94KBZ4373 to (Evaluate:16Nov2016)  Requested for: 45QRP0270; Last   Rx:28Ozj5005 Ordered   9  Simvastatin 10 MG Oral Tablet; take one tablet by mouth every other day; Therapy: 89ONY0206 to (Evaluate:18Jun2017)  Requested for: 59XGF4254; Last   QK:17GWM3920 Ordered   10  TiZANidine HCl - 4 MG Oral Tablet; Take 1 TAB PO Q HS; Therapy: 42PZB7743 to (Zackery Arguello)  Requested for: 18Ttw8042; Last    Rx:85Vkb2602; Status: ACTIVE - Transmit to Pharmacy - Awaiting Verification    Ordered    Allergies    1  Codeine Derivatives   2  Iodinated Contrast Media   3  Latex Exam Gloves MISC   4  Penicillins    5  Adhesive Tape   6   Eggs    Signatures   Electronically signed by : Bart Montgomery RN; Aug  1 2016  4:30PM EST                       (Author)

## 2018-01-15 VITALS
HEIGHT: 62 IN | BODY MASS INDEX: 39.2 KG/M2 | SYSTOLIC BLOOD PRESSURE: 140 MMHG | WEIGHT: 213 LBS | HEART RATE: 72 BPM | DIASTOLIC BLOOD PRESSURE: 72 MMHG | RESPIRATION RATE: 14 BRPM

## 2018-01-15 NOTE — MISCELLANEOUS
Message   Recorded as Task   Date: 03/09/2016 12:57 PM, Created By: Dinora Hoang   Task Name: Med Renewal Request   Assigned To: [id number] [address] clinical,Team   Regarding Patient: Roya Flores, Status: In Progress   Comment:    Matti Arenas - 09 Mar 2016 12:57 PM     TASK CREATED  Caller: Self; Renew Medication; [phone] (Home)  Pt calling for refill of tizanidine 4 mg qhs  Advised pt, rx w/ 1 refill sent on 2/10 to [facility]  Refill should be available for pu  Pt will check when she gets home  Advised pt to c/b w/ any questions  DeepaBryantsarah - 09 Mar 2016 1:34 PM     TASK IN PROGRESS   Lizeth Lezama - 10 Mar 2016 9:55 AM     TASK EDITED    I spoke with pt, states she was able to get refil  ********        Active Problems    1  Anxiety (300 00) (F41 9)   2  Back pain (724 5) (M54 9)   3  BRBPR (bright red blood per rectum) (569 3) (K62 5)   4  Carpal tunnel syndrome, unspecified laterality (354 0) (G56 00)   5  Cellulitis, leg (682 6) (L03 119)   6  Chronic low back pain (724 2,338 29) (M54 5,G89 29)   7  Chronic pain syndrome (338 4) (G89 4)   8  Contusion of leg (924 5) (S80 10XA)   9  Degenerative joint disease of right lower extremity (715 98) (M19 90)   10  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)   11  History of Fibrocystic breast disease, unspecified laterality (610 1) (N60 19)   12  Foot pain (729 5) (M79 673)   13  Greater trochanteric bursitis of left hip (726 5) (M70 62)   14  Hemorrhoids (455 6) (K64 9)   15  Hyperlipidemia (272 4) (E78 5)   16  Hypertension (401 9) (I10)   17  Irritable bowel syndrome (564 1) (K58 9)   18  Lumbar spondylosis (721 3) (M47 816)   19  Neuralgia (729 2) (M79 2)   20  Pain of lower extremity, unspecified laterality (729 5) (M79 606)   21  Pain, joint, shoulder (719 41) (M25 519)   22  Paresthesias (782 0) (R20 2)   23  Peripheral neuropathy (356 9) (G62 9)   24  Pre-procedural examination (V72 84) (Z01 818)   25  Sacroiliitis (720 2) (M46 1)   26  Thoracic back pain (724 1) (M54 6)   27  Vitamin D deficiency (268 9) (E55 9)    Current Meds   1  Allegra Allergy 180 MG Oral Tablet (Fexofenadine HCl); take 1 tablet daily as needed   Recorded   2  Amitriptyline HCl - 25 MG Oral Tablet; TAKE 2 TABLET AT BEDTIME; Therapy: 19KNS0074 to (Evaluate:14Wqo6533)  Requested for: 03TQS7556; Last   Rx:78Sno5403 Ordered   3  Calcium + D TABS Recorded   4  Diazepam 5 MG Oral Tablet (Valium); can take up to two tablets 1 hour before   procedure; Therapy: 43IPV2667 to (Last Rx:71Hxu3784) Ordered   5  Donnatal Extentabs TBCR; take 1 q 6 hours prn for pain; Therapy: 86Ohk2660 to (Brittny Wilder)  Requested for: 80Dnq2481; Last   Rx:84Yvp2527 Ordered   6  EpiPen 2-Michel 0 3 MG/0 3ML Injection Solution Auto-injector; use as directed; Therapy: 52WIO4341 to (Last OX:65JWI2626)  Requested for: 88UEP9940 Ordered   7  Flector 1 3 % Transdermal Patch; APPLY PATCH TO AFFECTED AREA TWICE DAILY; Therapy: 81Seh7291 to (Evaluate:61Daq7947)  Requested for: 29Zzk0595; Last   Rx:46Xkn6466 Ordered   8  Gabapentin 300 MG Oral Capsule; TAKE 1 CAPSULE 3 times daily; Therapy: 57RFA8424 to (Evaluate:60Vfp3955)  Requested for: 89TWM5346; Last   Rx:97Tmh9536 Ordered   9  Hydrocortisone Acetate 25 MG Rectal Suppository; INSERT 1 SUPPOSITORY   RECTALLY 2 TIMES DAILY; Therapy: 08IJM9006 to (Evaluate:31Jlj6736)  Requested for: 48BYN7608; Last   Rx:69Qyy5102 Ordered   10  HydrOXYzine HCl - 10 MG Oral Tablet; TAKE 2 TABLETS AT BEDTIME; Therapy: 80NAV7530 to (Evaluate:06Jan2016)  Requested for: 82HCD8104; Last    Rx:10Spg3557 Ordered   11  LORazepam 0 5 MG Oral Tablet; TAKE ONE TABLET BY MOUTH AT BEDTIME AS    NEEDED FOR SLEEP; Therapy: 45FIP2992 to (Evaluate:33Xak2147)  Requested for: 14CYH1628; Last    Rx:17Nov2015 Ordered   12  Losartan Potassium 50 MG Oral Tablet; TAKE 1 TABLET DAILY; Therapy: 10QTF9973 to (Evaluate:15May2016)  Requested for: 23CFP2705;  Last    Rx:17Nov2015 Ordered   13  Montelukast Sodium 10 MG Oral Tablet (Singulair); TAKE 1 TABLET DAILY AS    DIRECTED; Therapy: 79PLI6569 to (Evaluate:52Srn5932)  Requested for: 32MFB6395; Last    Rx:17Nov2015 Ordered   14  Pennsaid 2 % Transdermal Solution; 2 pumps 2 times a day to the area affected; Therapy: 66ODE8609 to (Last Rx:64Vej7817)  Requested for: 28OFZ0776 Ordered   15  Simvastatin 10 MG Oral Tablet; Take 1 every other day; Therapy: 37MWK9898 to (Evaluate:03Tus7909)  Requested for: 55NRF2988; Last    Rx:10Jun2015 Ordered   16  TiZANidine HCl - 4 MG Oral Tablet; Take 1 TAB PO Q HS; Therapy: 67SIS5660 to (Evaluate:57Fxq8865)  Requested for: 24RUT6887; Last    Rx:61Biz4546 Ordered   17  Voltaren 1 % Transdermal Gel; apply BID; Therapy: 54DZK9054 to (Melani Bocanegra)  Requested for: 76Mbu7966; Last    Rx:19Irs2316 Ordered    Allergies    1  Codeine Derivatives   2  Iodinated Contrast Media   3  Latex Exam Gloves MISC   4  Penicillins    5  Eggs    Signatures   Electronically signed by :  Kerri Wilkerson, ; Mar 10 2016  9:55AM EST                       (Author)

## 2018-01-15 NOTE — MISCELLANEOUS
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 until re-evaluated at her follow up with Dr Fernanda Damian on 7/7/17  I called patient at home phone# [phone] and left message on voice mail requesting call back so this information could be relayed to patient  Yvette Leroy - 05 Jun 2017 9:10 AM     TASK EDITED  pt returned your call she can be reached at home number in chart  Yvette Leroy - 05 Jun 2017 11:55 AM     TASK EDITED  PT CALLED AGAIN RETURNING YOUR CALL  Barb Dockery - 05 Jun 2017 4:09 PM     TASK EDITED  I returned patient call at home phone# and left message on voice mail requesting call back  (cell phone# not in service)  Barb Dockery - 05 Jun 2017 4:30 PM     TASK EDITED  Patient returned phone call and I spoke with her  Patient advised to refrain from bending / twisting back and 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

## 2018-01-16 NOTE — MISCELLANEOUS
Message   Recorded as Task   Date: 06/19/2017 10:20 AM, Created By: Re Huizar   Task Name: Care Coordination   Assigned To: Re Huizar   Regarding Patient: Delia Caceres, Status: Active   Comment:    Una Owen - 19 Jun 2017 10:20 AM     TASK CREATED  Pt reports that in the general area of her surgery and to the left, she feels discomfort, not pain  She is approx 4 weeks post SCS implant  She has recently seen her pain specialist as well who notes this c/o discomfort  Pt denies any s/s of infection, and states she is probably just being impatient  Discussed that she is still in the healing process and matthew since her pain specialist is aware, she probably just continues to be healing  She was in agreement          Signatures   Electronically signed by : Jessica Murrieta, ; Jun 19 2017 10:21AM EST                       (Author)

## 2018-01-16 NOTE — RESULT NOTES
Message   Recorded as Task   Date: 06/02/2016 02:00 PM, Created By: Nelida Mccabe   Task Name: Follow Up   Assigned To: [id number] [place] [address] clinical,Team   Regarding Patient: Betzaida Ochoa, Status: In Progress   Comment:    Lizeth Lezama - 02 Jun 2016 2:00 PM     TASK CREATED    Pt S/P SCS trial~6/1/16~Dr Ebenezer Hilario  lead removal 6/6/16 at 1330  I spoke with pt, states she is doing really well  States last night she had a very bad cramp in her left leg  States she had taken muscle relaxer, was up stretching, using heat  States she spoke with Darrell and he had her change programs  Pt asking if there is something else she can do if that would happen tonight? Advised that I will discuss with Dr Ebenezer Hilario and CB  Pt states she was doing well at work today, states her sister is going to walk with her tonight  States she was only able to do 3 blocks prior to trial  States she is going to try to go 6 blocks  States she doesn't want to overdo it  No S/S infection  States the red rash, is gone  Feels that it was most likely from nerves or states she sweats a lot and feels it might have been due to that     *************   Valerie Jones - 02 Jun 2016 2:07 PM     TASK REPLIED TO: Previously Assigned To [address] [address] clinical,Team  aware, agree with recs    she did the right things for the cramp, muscle relaxer, stretch, could consider heat or ice as well if it happens again, not related to stimulator   Lizeth Lezama - 02 Jun 2016 3:31 PM     TASK EDITED    I spoke with pt, advised above  Pt verbalizes understanding  **********        Signatures   Electronically signed by :  Elizabeth Nelson, ; Jun 2 2016  3:31PM EST                       (Author)

## 2018-01-16 NOTE — MISCELLANEOUS
Message   Recorded as Task   Date: 02/13/2016 11:08 AM, Created By: Valencia King   Task Name: Care Coordination   Assigned To: [facility] stim,Team   Regarding Patient: Frida Silva, Status: In Progress   Comment:    Susan Borges - 13 Feb 2016 11:08 AM     TASK CREATED  Pt to be a [facility] trial with Dr Latrell Lamb  Pt signed release of info  Pt received [facility] brochure, script for pscyh eval along with list of providers  Pt scheduled for education on 2/26/16  Clinical info exluding psych eval faxed to Vini Champion at [facility] to begin auth process  Pt received thoracic MRI script   Susan Borges - 13 Feb 2016 11:08 AM     TASK IN PROGRESS   Susan Borges - 23 Feb 2016 3:11 PM     TASK EDITED  Received request from Dr Lolis Correa at [facility] to fax info on pt to have psych eval   Requested info has been faxed  Susan Borges - 14 Apr 2016 2:35 PM     TASK EDITED  Phone call to [employer] to inquire status of psych eval  Pt did eval but it is not ready to be sent to us, as soon as it's ready they will fax it to us  Susan Borges - 15 Apr 2016 2:04 PM     TASK EDITED  Received psych eval from [facility]  Dr Latrell Lamb reviewed and signed off  Pscyh eval has been faxed to Vini Champion at [facility] to continue Nolan process  Pt aware of progress  Susan Borges - 19 Apr 2016 10:21 AM     TASK EDITED  Received benefit confirmation from [facility], pt ready to be scheduled  Spoke with pt and she is scheduled as follows:  5/18/16 @ 1330 with Isabell Griffin to sign consents  6/1/16 arriving @ 1400 for 1445 trial  6/6/16 @ 1330 for lead removal  Email sent to [facility] to inform them of trial dates  Pt has an allergy to PCN  Pt takes fish oil daily   Susan Borges - 19 Apr 2016 10:41 AM     TASK EDITED  Pre procedure paperwork for 5/18/16 povs [facility] inter [facility] mail [facility]  Active Problems    1  Anxiety (300 00) (F41 9)   2  Back pain (724 5) (M54 9)   3  BRBPR (bright red blood per rectum) (569 3) (K62 5)   4   Carpal tunnel syndrome, unspecified laterality (354 0) (G56 00)   5  Cellulitis, leg (682 6) (L03 119)   6  Chronic low back pain (724 2,338 29) (M54 5,G89 29)   7  Chronic pain syndrome (338 4) (G89 4)   8  Contusion of leg (924 5) (S80 10XA)   9  Degenerative joint disease of right lower extremity (715 98) (M19 90)   10  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)   11  History of Fibrocystic breast disease, unspecified laterality (610 1) (N60 19)   12  Foot pain (729 5) (M79 673)   13  Greater trochanteric bursitis of left hip (726 5) (M70 62)   14  Hemorrhoids (455 6) (K64 9)   15  History of recent fall (V15 88) (Z91 81)   16  Hyperlipidemia (272 4) (E78 5)   17  Hypertension (401 9) (I10)   18  Irritable bowel syndrome (564 1) (K58 9)   19  Lumbar spondylosis (721 3) (M47 816)   20  Neuralgia (729 2) (M79 2)   21  Pain of lower extremity, unspecified laterality (729 5) (M79 606)   22  Pain, joint, shoulder (719 41) (M25 519)   23  Paresthesias (782 0) (R20 2)   24  Peripheral neuropathy (356 9) (G62 9)   25  Pre-procedural examination (V72 84) (Z01 818)   26  Pre-procedural laboratory examination (V72 63) (Z01 812)   27  Sacroiliitis (720 2) (M46 1)   28  Status post surgery (V45 89) (Z98 89)   29  Thoracic back pain (724 1) (M54 6)   30  Vitamin D deficiency (268 9) (E55 9)    Current Meds   1  Allegra Allergy 180 MG Oral Tablet (Fexofenadine HCl); take 1 tablet daily as needed   Recorded   2  Amitriptyline HCl - 50 MG Oral Tablet; TAKE 1 TABLET AT BEDTIME; Therapy: 36DFI6935 to (Evaluate:50Jzj7204)  Requested for: 90TZR9590; Last   Rx:23Mar2016 Ordered   3  Calcium + D TABS Recorded   4  Diazepam 5 MG Oral Tablet (Valium); can take up to two tablets 1 hour before   procedure; Therapy: 31DSO8051 to (Last Rx:03Rgq7133) Ordered   5  Donnatal Extentabs TBCR; take 1 q 6 hours prn for pain; Therapy: 46Myk0225 to ((25) 950-300)  Requested for: 23Taj4916; Last   Rx:19Rog5403 Ordered   6   EpiPen 2-Michel 0 3 MG/0 3ML Injection Solution Auto-injector; use as directed; Therapy: 16KHK9014 to (Last L61HFZ5723)  Requested for: 05SFX9493 Ordered   7  Flector 1 3 % Transdermal Patch; APPLY PATCH TO AFFECTED AREA TWICE DAILY; Therapy: 95Jfs3498 to (Evaluate:46Rlf8049)  Requested for: 12Nzn7030; Last   Rx:43Sed7666 Ordered   8  Gabapentin 300 MG Oral Capsule; TAKE 1 CAPSULE 3 times daily; Therapy: 76JBS0953 to (Evaluate:00Ngn7162)  Requested for: 61TAB1590; Last   Rx:64Qts3339 Ordered   9  Hydrocortisone Acetate 25 MG Rectal Suppository; INSERT 1 SUPPOSITORY   RECTALLY 2 TIMES DAILY; Therapy: 21QFV6878 to (Evaluate:59Rof0415)  Requested for: 49QXF8820; Last   Rx:58Xub0452 Ordered   10  HydrOXYzine HCl - 10 MG Oral Tablet; TAKE 2 TABLETS AT BEDTIME; Therapy: 76BLQ0823 to (Magda Gamble)  Requested for: 29Yga3032; Last    Rx:08Yqp2502 Ordered   11  LORazepam 0 5 MG Oral Tablet; TAKE ONE TABLET BY MOUTH AT BEDTIME AS    NEEDED FOR SLEEP; Therapy: 21MUE8929 to (Evaluate:40Wba5087)  Requested for: 12LSV6266; Last    Rx:2015 Ordered   12  Losartan Potassium 50 MG Oral Tablet; TAKE 1 TABLET DAILY; Therapy: 88TKC0361 to (Evaluate:54Dht6389)  Requested for: 20DOM4806; Last    Rx:2015 Ordered   13  Montelukast Sodium 10 MG Oral Tablet (Singulair); TAKE 1 TABLET DAILY AS    DIRECTED; Therapy: 17VHD3814 to (Evaluate:40Xle4134)  Requested for: 74LRU2199; Last    Rx:2015 Ordered   14  Pennsaid 2 % Transdermal Solution; 2 pumps 2 times a day to the area affected; Therapy: 08KXC8835 to (Last Rx:2016)  Requested for: 08WWK0242 Ordered   15  Simvastatin 10 MG Oral Tablet; Take 1 every other day; Therapy: 21MYN2949 to (Evaluate:46Bty5740)  Requested for: 82VZN3937; Last    Rx:2015 Ordered   16  TiZANidine HCl - 4 MG Oral Tablet; Take 1 TAB PO Q HS; Therapy: 24PKG5126 to (Evaluate:2016)  Requested for: 2016; Last    Rx:2016 Ordered   17   TraMADol HCl - 50 MG Oral Tablet; take 1 BID; Therapy: 88FHK6302 to (Evaluate:47Fwk7692); Last Rx:14Mar2016 Ordered   18  Voltaren 1 % Transdermal Gel (Diclofenac Sodium); apply BID; Therapy: 52EYU1909 to (Max Hanley)  Requested for: 90Ckw9663; Last    Rx:75Ncg2420 Ordered    Allergies    1  Codeine Derivatives   2  Iodinated Contrast Media   3  Latex Exam Gloves MISC   4  Penicillins    5  Eggs    Plan  Pre-procedural laboratory examination    · (1) APTT; Status:Active; Requested for:19Apr2016;    · (1) CBC/PLT/DIFF; Status:Active; Requested for:19Apr2016;    · (1) COMPREHENSIVE METABOLIC PANEL; Status:Active; Requested for:19Apr2016;    · (1) PT WITH INR; Status:Active; Requested for:19Apr2016;   Status post surgery    · Ciprofloxacin HCl - 500 MG Oral Tablet; 1 (one) tablet BID x 7 days   · XR SPINE THORACIC 2 VIEW; Status:Active;  Requested for:19Apr2016;     Signatures   Electronically signed by : Vanessa Frye, ; Apr 19 2016 10:46AM EST                       (Author)

## 2018-01-16 NOTE — MISCELLANEOUS
Message   Recorded as Task   Date: 05/17/2016 09:52 AM, Created By: Kuldeep Dalton   Task Name: Miscellaneous   Assigned To: Sydney Chavarria   Regarding Patient: Isauro Thomas, Status: Active   CommentLustephanie Stokes - 17 May 2016 9:52 AM     TASK CREATED  Patient was scheduled with Nola Yelitza tomorrow to sign [facility] trial consents  Please advise on where to schedule her  No spots available for [facility] or you  Torito Poot - 17 May 2016 9:55 AM     TASK REPLIED TO: Previously Assigned To Torito Poot  double book with me as last patient on Thursday   Light Sha - 17 May 2016 10:32 AM     TASK REPLIED TO: Previously Assigned To Light Sha  Patient scheduled for 3:30 on Thursday   Torito Poot - 17 May 2016 10:44 AM     TASK REPLIED TO: Previously Assigned To Torito Poot  thanks        Active Problems    1  Anxiety (300 00) (F41 9)   2  Back pain (724 5) (M54 9)   3  BRBPR (bright red blood per rectum) (569 3) (K62 5)   4  Carpal tunnel syndrome, unspecified laterality (354 0) (G56 00)   5  Cellulitis, leg (682 6) (L03 119)   6  Chronic low back pain (724 2,338 29) (M54 5,G89 29)   7  Chronic pain syndrome (338 4) (G89 4)   8  Contusion of leg (924 5) (S80 10XA)   9  Degenerative joint disease of right lower extremity (715 98) (M19 90)   10  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)   11  History of Fibrocystic breast disease, unspecified laterality (610 1) (N60 19)   12  Foot pain (729 5) (M79 673)   13  Greater trochanteric bursitis of left hip (726 5) (M70 62)   14  Hemorrhoids (455 6) (K64 9)   15  History of recent fall (V15 88) (Z91 81)   16  Hyperlipidemia (272 4) (E78 5)   17  Hypertension (401 9) (I10)   18  Irritable bowel syndrome (564 1) (K58 9)   19  Lumbar spondylosis (721 3) (M47 816)   20  Neuralgia (729 2) (M79 2)   21  Pain of lower extremity, unspecified laterality (729 5) (M79 606)   22  Pain, joint, shoulder (719 41) (M25 519)   23   Paresthesias (782 0) (R20 2)   24  Peripheral neuropathy (356 9) (G62 9)   25  Pre-procedural examination (V72 84) (Z01 818)   26  Pre-procedural laboratory examination (V72 63) (Z01 812)   27  Sacroiliitis (720 2) (M46 1)   28  Status post surgery (V45 89) (Z98 89)   29  Thoracic back pain (724 1) (M54 6)   30  Thyroid dysfunction (246 9) (E07 9)   31  Vitamin D deficiency (268 9) (E55 9)    Current Meds   1  Allegra Allergy 180 MG Oral Tablet (Fexofenadine HCl); take 1 tablet daily as needed   Recorded   2  Amitriptyline HCl - 50 MG Oral Tablet; TAKE 1 TABLET AT BEDTIME; Therapy: 28KDB7029 to (Evaluate:34Dxe7691)  Requested for: 40JDF1013; Last   Rx:23Mar2016 Ordered   3  Calcium + D TABS Recorded   4  Ciprofloxacin HCl - 500 MG Oral Tablet; 1 (one) tablet BID x 7 days; Therapy: 96Kan0488 to (Last Rx:90Ddm5325) Ordered   5  Diazepam 5 MG Oral Tablet (Valium); can take up to two tablets 1 hour before   procedure; Therapy: 95PHG5741 to (Last Rx:70Fkf5089) Ordered   6  Donnatal Extentabs TBCR; take 1 q 6 hours prn for pain; Therapy: 66Rot4084 to (Lesly Oshea)  Requested for: 42Mmx5800; Last   Rx:56Unw0604 Ordered   7  EpiPen 2-Michel 0 3 MG/0 3ML Injection Solution Auto-injector; use as directed; Therapy: 47FEH7622 to (Last UZ:92DEJ4182)  Requested for: 39AOE0335 Ordered   8  Flector 1 3 % Transdermal Patch; APPLY PATCH TO AFFECTED AREA TWICE DAILY; Therapy: 48Wyw4423 to (Evaluate:29Gjw6157)  Requested for: 55Ywa5317; Last   Rx:38Fbl5545 Ordered   9  Gabapentin 300 MG Oral Capsule; TAKE 1 CAPSULE 3 times daily; Therapy: 38YGA5366 to (Evaluate:62Wsq9423)  Requested for: 68AAC7004; Last   Rx:77Zqj0175 Ordered   10  Hydrocortisone Acetate 25 MG Rectal Suppository; INSERT 1 SUPPOSITORY    RECTALLY 2 TIMES DAILY; Therapy: 40TRH0285 to (Evaluate:97Xxs9473)  Requested for: 71GQL2671; Last    Rx:13Lok6356 Ordered   11  HydrOXYzine HCl - 10 MG Oral Tablet; TAKE 2 TABLETS AT BEDTIME;     Therapy: 72UXH6871 to (Apollo Shields)  Requested for: 30Bob0200; Last    Rx:35Khk7802 Ordered   12  LORazepam 0 5 MG Oral Tablet; TAKE ONE TABLET BY MOUTH AT BEDTIME AS    NEEDED FOR SLEEP; Therapy: 50LFM6969 to (Evaluate:51Ygj5019)  Requested for: 50LVE0477; Last    Rx:17Nov2015 Ordered   13  Losartan Potassium 50 MG Oral Tablet; TAKE 1 TABLET DAILY; Therapy: 25KXS6315 to (Evaluate:30Wsy6925)  Requested for: 52VYO3234; Last    Rx:17Nov2015 Ordered   14  Montelukast Sodium 10 MG Oral Tablet (Singulair); TAKE 1 TABLET DAILY AS    DIRECTED; Therapy: 67CRO7412 to (Evaluate:53Hjs7500)  Requested for: 95LXX0249; Last    Rx:17Nov2015 Ordered   15  Pennsaid 2 % Transdermal Solution; 2 pumps 2 times a day to the area affected; Therapy: 96PZG4997 to (Last Rx:26Phe6370)  Requested for: 14UKU8773 Ordered   16  Simvastatin 10 MG Oral Tablet; Take 1 every other day; Therapy: 64RRQ4911 to (Evaluate:94Kax1836)  Requested for: 07LWA3986; Last    Rx:57Iha6024 Ordered   17  TiZANidine HCl - 4 MG Oral Tablet; Take 1 TAB PO Q HS; Therapy: 15KCT8146 to (Evaluate:39Bob3981)  Requested for: 59Rxp2944; Last    Rx:11Oge2240 Ordered   18  TraMADol HCl - 50 MG Oral Tablet; take 1 BID; Therapy: 11MDX7742 to (Evaluate:15Bcs5698); Last Rx:95Zkv9076 Ordered   19  Voltaren 1 % Transdermal Gel (Diclofenac Sodium); apply BID; Therapy: 41ZBP0574 to (Berdie Meckel)  Requested for: 09Nsc1200; Last    Rx:97Wcx9481 Ordered    Allergies    1  Codeine Derivatives   2  Iodinated Contrast Media   3  Latex Exam Gloves MISC   4  Penicillins    5  Eggs    Signatures   Electronically signed by :  My Johnson, ; May 17 2016 11:47AM EST                       (Author)

## 2018-01-16 NOTE — MISCELLANEOUS
Message   Recorded as Task   Date: 04/13/2016 03:31 PM, Created By: Zack Boss   Task Name: Med Renewal Request   Assigned To: [facility] stim,Team   Regarding Patient: Mary Rene, Status: Active   Comment:    Lizeth Lezama - 13 Apr 2016 3:31 PM     TASK CREATED  Caller: Self; Renew Medication; [phone] (Home)    Pt LM on [facility] triage line today at 1400, states she has not heard anything since she had her MRI and psych eval   Also asking for refill on tizanidine to [facility]  Chioma Jones, will you refill or will pt need OV? Last visit 2/10/16  ***********   Kim Mahan - 13 Apr 2016 3:38 PM     TASK REPLIED TO: Previously Assigned To [id number] [place] [address] clinical,Team  I will refill no problem TY   Lizeth Lezama - 13 Apr 2016 4:03 PM     TASK EDITED    Attempted to contact pt, LMOM for pt that script was called in and that she should CB to discuss stim tomorrow after 8 am, phone # provided  **********   Lizeth Lezama - 14 Apr 2016 8:06 AM     TASK EDITED    Pt returned my call  I advised that tizanidine was sent to the pharmacy  Advised re: SCS trial, that I will forward her question to Avimor  Advised that most likely the hold up is insurance auth  Pt thought that might be what the hold up is  Advised I will send to Avimor and have her advise further  Pt thankful  ***********   Susan Borges - 14 Apr 2016 2:34 PM     TASK EDITED  Attempt to reach pt to discuss  Left detailed message informing pt that we have not received psych eval as of yet  I called [facility] and they said she had it done but that it has not been dictated as of yet and that as soon as it's ready they will fax it over  Active Problems    1  Anxiety (300 00) (F41 9)   2  Back pain (724 5) (M54 9)   3  BRBPR (bright red blood per rectum) (569 3) (K62 5)   4  Carpal tunnel syndrome, unspecified laterality (354 0) (G56 00)   5  Cellulitis, leg (682 6) (L03 119)   6  Chronic low back pain (724 2,338 29) (M54 5,I23 29)   7   Chronic pain syndrome (338 4) (G89 4)   8  Contusion of leg (924 5) (S80 10XA)   9  Degenerative joint disease of right lower extremity (715 98) (M19 90)   10  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)   11  History of Fibrocystic breast disease, unspecified laterality (610 1) (N60 19)   12  Foot pain (729 5) (M79 673)   13  Greater trochanteric bursitis of left hip (726 5) (M70 62)   14  Hemorrhoids (455 6) (K64 9)   15  History of recent fall (V15 88) (Z91 81)   16  Hyperlipidemia (272 4) (E78 5)   17  Hypertension (401 9) (I10)   18  Irritable bowel syndrome (564 1) (K58 9)   19  Lumbar spondylosis (721 3) (M47 816)   20  Neuralgia (729 2) (M79 2)   21  Pain of lower extremity, unspecified laterality (729 5) (M79 606)   22  Pain, joint, shoulder (719 41) (M25 519)   23  Paresthesias (782 0) (R20 2)   24  Peripheral neuropathy (356 9) (G62 9)   25  Pre-procedural examination (V72 84) (Z01 818)   26  Sacroiliitis (720 2) (M46 1)   27  Thoracic back pain (724 1) (M54 6)   28  Vitamin D deficiency (268 9) (E55 9)    Current Meds   1  Allegra Allergy 180 MG Oral Tablet (Fexofenadine HCl); take 1 tablet daily as needed   Recorded   2  Amitriptyline HCl - 50 MG Oral Tablet; TAKE 1 TABLET AT BEDTIME; Therapy: 39QSS5654 to (Evaluate:37Cwc7806)  Requested for: 09AWO8219; Last   Rx:23Mar2016 Ordered   3  Calcium + D TABS Recorded   4  Diazepam 5 MG Oral Tablet (Valium); can take up to two tablets 1 hour before   procedure; Therapy: 07UQI1897 to (Last Rx:98Yun9551) Ordered   5  Donnatal Extentabs TBCR; take 1 q 6 hours prn for pain; Therapy: 12Ykb4882 to (77 873 135)  Requested for: 44Wiu6942; Last   Rx:97Ckr5373 Ordered   6  EpiPen 2-Michel 0 3 MG/0 3ML Injection Solution Auto-injector; use as directed; Therapy: 30DRA9037 to (Last UQ:74TBS1717)  Requested for: 27SKM4550 Ordered   7  Flector 1 3 % Transdermal Patch; APPLY PATCH TO AFFECTED AREA TWICE DAILY;    Therapy: 76Rsf3763 to (Evaluate:70Lls1750)  Requested for: 65Jwz7608; Last   Rx:18Mwp1315 Ordered   8  Gabapentin 300 MG Oral Capsule; TAKE 1 CAPSULE 3 times daily; Therapy: 17ENG5244 to (Evaluate:85Eka1543)  Requested for: 00ISJ5153; Last   Rx:56Ifm2095 Ordered   9  Hydrocortisone Acetate 25 MG Rectal Suppository; INSERT 1 SUPPOSITORY   RECTALLY 2 TIMES DAILY; Therapy: 96RRF7652 to (Evaluate:94Hjl5486)  Requested for: 10UUV0918; Last   Rx:16Bpq0058 Ordered   10  HydrOXYzine HCl - 10 MG Oral Tablet; TAKE 2 TABLETS AT BEDTIME; Therapy: 90HSV5909 to (Noel Knight)  Requested for: 66Zdb8915; Last    Rx:04Apr2016 Ordered   11  LORazepam 0 5 MG Oral Tablet; TAKE ONE TABLET BY MOUTH AT BEDTIME AS    NEEDED FOR SLEEP; Therapy: 61HNQ2304 to (Evaluate:30Tfy6276)  Requested for: 73PRK5301; Last    Rx:17Nov2015 Ordered   12  Losartan Potassium 50 MG Oral Tablet; TAKE 1 TABLET DAILY; Therapy: 92JVT5451 to (Evaluate:19Law9828)  Requested for: 11FHR9569; Last    Rx:17Nov2015 Ordered   13  Montelukast Sodium 10 MG Oral Tablet (Singulair); TAKE 1 TABLET DAILY AS    DIRECTED; Therapy: 32HOB8876 to (Evaluate:76Czg0999)  Requested for: 56ESK7606; Last    Rx:17Nov2015 Ordered   14  Pennsaid 2 % Transdermal Solution; 2 pumps 2 times a day to the area affected; Therapy: 97WCN3417 to (Last Rx:45Dyp8822)  Requested for: 26HCZ4416 Ordered   15  Simvastatin 10 MG Oral Tablet; Take 1 every other day; Therapy: 84ZOL6970 to (Evaluate:43Bmq0439)  Requested for: 36CBP5958; Last    Rx:10Jun2015 Ordered   16  TiZANidine HCl - 4 MG Oral Tablet; Take 1 TAB PO Q HS; Therapy: 56KIL7935 to (Evaluate:12Jun2016)  Requested for: 83Avb3722; Last    Rx:13Apr2016 Ordered   17  TraMADol HCl - 50 MG Oral Tablet; take 1 BID; Therapy: 57KHK4786 to (Evaluate:73Gxx7777); Last Rx:11Vcc9667 Ordered   18  Voltaren 1 % Transdermal Gel (Diclofenac Sodium); apply BID; Therapy: 07OYY2309 to (Verta Colander)  Requested for: 46Rtg8377; Last    Rx:52Axd2249 Ordered    Allergies    1  Codeine Derivatives   2  Iodinated Contrast Media   3  Latex Exam Gloves MISC   4   Penicillins    5  Eggs    Signatures   Electronically signed by : Eric Oliveros, ; Apr 14 2016  2:34PM EST                       (Author)

## 2018-01-17 NOTE — PROGRESS NOTES
Assessment    1  Chronic pain syndrome (338 4) (G89 4)   2  Lumbar radiculopathy, chronic (724 4) (M54 16)   3  Lumbar degenerative disc disease (722 52) (M51 36)   4  Post laminectomy syndrome (722 80) (M96 1)   5  Pre-procedural examination (V72 84) (A70 437)    Discussion/Summary    78-year-old female, presents for preoperative evaluation, she has planned "placement of a thoracic SCS with left IPG", scheduled for 05/18/17  The procedure is scheduled with Dr Gale West, and the patient reports Dr Gale West has explained in a very detailed fashion the proposed surgical procedure, risks and possible complications, as well as the benefits of the procedure  She expresses understanding of this information/explanation, and is in agreement with the proposed procedure and wishes to proceed  As noted:  She is a life time nonsmoker  She has a prior history of surgery performed under general anesthesia which was tolerated without complication    She denies SOB or CP with activity  She denies bleeding disorder or history of same  She denies chronic pulmonary conditions  She is under the care of a primary care provider and is followed regularly for hypertension, dyslipidemia, and GERD  She reports medication allergy to penicillin (rash), intolerance of Iodine preparations with skin rash, and headache and nausea with codeine  She understands to aspirin, NSAIDs or antiplatelet agent 7 days prior to surgery  She understands she is to take her usual medications with a small sip of water in early AM day of surgery  Postoperative activities were briefly reviewed, she understands she is to lift no greater than 10 pounds until follow-up in approximately 6 weeks, she also understands to avoid immersion in water for approximately 3 weeks, and additionally understands she may ambulate as tolerated and is encouraged to do so      Pre operative skin preparation was discussed with the patient and they understand to wash/ shower with Hibiclens (chlorhexidine) and utilize Ra cloths as directed  These findings, impressions and recommendations are reviewed in great detail with the patient, she expressed understanding and agreement, her questions were answered completely and to her satisfaction  Follow up has been scheduled  The patient was counseled regarding instructions for management, importance of compliance with treatment  The patient has the current Goals: Improvement of chronic low back pain  Patient is able to Self-Care  Chief Complaint  Preoperative H&P, and dorsal column stimulator placement area      History of Present Illness  Very pleasant 52-year-old female, presents as noted above  She has planned "placement of a thoracic SCS with left IPG" , this procedure is scheduled for 5/18/17, to be performed by Dr [facility]  She has a very long history of chronic low back pain and left leg pain and some mild right leg pain  She has a history of spinal cord stimulator trial by Dr Chloe Drummond which was quite successful she reports  She is a lifetime nonsmoker  She has a past surgical history under general anesthesia which includes cholecystectomy, TOYA and BSO, appendectomy, and lumbar surgery  As noted these were performed under general anesthesia which she tolerated without complication although she does report some of her earlier surgeries she was a little nauseous post surgery  She denies shortness of breath or chest pain with activity including climbing 2 flights of steps  She denies asthma, chronic bronchitis or recurrent pulmonary infections  She reports no bleeding difficulties  She denies use of aspirin, NSAIDs or other antiplatelet agents  She follows with her primary care provider (Dr Leilani Quick) on a regular basis for hypertension, dyslipidemia, GERD as well as general wellness      She currently takes losartan for hypertension, omeprazole for GERD, and simvastatin for dyslipidemia  She also has tramadol, tizanidine and gabapentin for chronic back pain, and amitriptyline and hydroxyzine for sleep  She reports medication allergy to penicillin, a rash, and codeine causes headache and nausea, additionally topically applied Iodine preparations causes skin rash  She otherwise denies diabetes mellitus, heart disease, lung disease, kidney or liver disorder, seizure, cancer, ulcer, rheumatic fever or tuberculosis  She has preoperative clearance by her primary care provider Dr Dolores Velazquez scheduled for next week  She understands the need for preoperative laboratory testing and has this planned for tomorrow  Review of Systems    Constitutional: No fever, no chills, feels well, no tiredness, no recent weight gain or weight loss  Eyes: No complaints of eye pain, no red eyes, no eyesight problems, no discharge, no dry eyes, no itching of eyes  ENT: no complaints of earache, no loss of hearing, no nose bleeds, no nasal discharge, no sore throat, no hoarseness  Cardiovascular: No complaints of slow heart rate, no fast heart rate, no chest pain, no palpitations, no leg claudication, no lower extremity edema  Respiratory: No complaints of shortness of breath, no wheezing, no cough, no SOB on exertion, no orthopnea, no PND  Genitourinary: No complaints of dysuria, no incontinence, no pelvic pain, no dysmenorrhea, no vaginal discharge or bleeding  Musculoskeletal: No complaints of arthralgias, no myalgias, no joint swelling or stiffness, no limb pain or swelling  Integumentary: No complaints of skin rash or lesions, no itching, no skin wounds, no breast pain or lump  Neurological: numbness, tingling and on her back  Psychiatric: Not suicidal, no sleep disturbance, no anxiety or depression, no change in personality, no emotional problems     Endocrine: No complaints of proptosis, no hot flashes, no muscle weakness, no deepening of the voice, no feelings of weakness  Hematologic/Lymphatic: No complaints of swollen glands, no swollen glands in the neck, does not bleed easily, does not bruise easily  ROS reviewed  Active Problems    1  Abdominal pain (789 00) (R10 9)   2  Abrasion of finger (915 0) (S60 419A)   3  Acid reflux disease (530 81) (K21 9)   4  Advance directive discussed with patient (V65 49) (Z71 89)   5  Anxiety (300 00) (F41 9)   6  Back pain (724 5) (M54 9)   7  BRBPR (bright red blood per rectum) (569 3) (K62 5)   8  Carpal tunnel syndrome, unspecified laterality (354 0) (G56 00)   9  Cellulitis, leg (682 6) (L03 119)   10  Chronic coughing (786 2) (R05)   11  Chronic low back pain (724 2,338 29) (M54 5,G89 29)   12  Chronic pain syndrome (338 4) (G89 4)   13  Degenerative joint disease of right lower extremity (715 98) (M19 90)   14  Encounter for long-term use of opiate analgesic (V58 69) (Z79 891)   15  Encounter for screening mammogram for malignant neoplasm of breast (V76 12)    (Z12 31)   16  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)   17  Fatigue (780 79) (R53 83)   18  History of Fibrocystic breast disease, unspecified laterality (610 1) (N60 19)   19  Greater trochanteric bursitis of left hip (726 5) (M70 62)   20  Head trauma (959 01) (S09 90XA)   21  Hematoma of abdominal wall (922 2) (S30 1XXA)   22  Hemorrhoids (455 6) (K64 9)   23  History of recent fall (V15 88) (Z91 81)   24  Hyperlipidemia (272 4) (E78 5)   25  Hypertension (401 9) (I10)   26  Irritable bowel syndrome (564 1) (K58 9)   27  Lumbar degenerative disc disease (722 52) (M51 36)   28  Lumbar radiculopathy, chronic (724 4) (M54 16)   29  Lumbar spondylosis (721 3) (M47 816)   30  Neuralgia (729 2) (M79 2)   31  Pain of lower extremity, unspecified laterality (729 5) (M79 606)   32  Pain, joint, shoulder (719 41) (M25 519)   33  Paresthesias (782 0) (R20 2)   34  Peripheral neuropathy (356 9) (G62 9)   35  Post laminectomy syndrome (722 80) (M96 1)   36  Pre-procedural examination (V72 84) (Z01 818)   37  Pre-procedural laboratory examination (V72 63) (Z01 812)   38  Refused pneumococcal vaccination (V64 06) (Z28 21)   39  Sacroiliitis (720 2) (M46 1)   40  Sinusitis (473 9) (J32 9)   41  Status post surgery (V45 89) (Z98 890)   42  Thoracic back pain (724 1) (M54 6)   43  Thyroid dysfunction (246 9) (E07 9)   44  Uncomplicated opioid dependence (304 00) (F11 20)   45  Vitamin D deficiency (268 9) (E55 9)    Past Medical History    1  History of Abnormal findings on diagnostic imaging of breast (793 89) (R92 8)   2  History of Acute upper respiratory infection (465 9) (J06 9)   3  History of Allergic rhinitis (477 9) (J30 9)   4  History of Asthma (493 90) (J45 909)   5  History of Chest congestion (786 9) (R09 89)   6  History of Contusion of leg (924 5) (S80 10XA)   7  History of Encounter for routine gynecological examination (V72 31) (Z01 419)   8  History of Encounter for screening colonoscopy (V76 51) (Z12 11)   9  History of Encounter for screening colonoscopy (V76 51) (Z12 11)   10  History of Fibrocystic breast disease, unspecified laterality (610 1) (N60 19)   11  History of Foot pain (729 5) (M79 673)   12  History of GERD (gastroesophageal reflux disease) (530 81) (K21 9)   13  History of High cholesterol (272 0) (E78 00)   14  History of arthritis (V13 4) (Z87 39)   15  History of osteopenia (V13 59) (Z87 39)   16  History of Influenza (487 1) (J11 1)   17  History of Nausea (787 02) (R11 0)   18  History of Sinusitis (473 9) (J32 9)   19  History of Sinusitis (473 9) (J32 9)   20  History of Skin rash (782 1) (R21)   21  History of Sore throat (462) (J02 9)   22  History of UTI (urinary tract infection) (599 0) (N39 0)   23  History of UTI (urinary tract infection) (599 0) (N39 0)    The active problems and past medical history were reviewed and updated today  Surgical History    1  History of Appendectomy   2  History of Cholecystectomy   3  History of Foot Surgery   4  History of Gynecologic Laparoscopy With Adhesiolysis   5  History of Hysteroscopy   6  History of Incisional Breast Biopsy   7  History of Knee Arthroscopy (Therapeutic)   8  History of Laparoscopy (Diagnostic)   9  History of Tonsillectomy With Adenoidectomy    The surgical history was reviewed and updated today  Family History  Mother    1  Family history of Bone Cancer  Father    2  Family history of Brain Cancer (V16 8)   3  Family history of Stroke Syndrome (V17 1)  Paternal Grandmother    4  Family history of Diabetes Mellitus (V18 0)  Maternal Aunt    5  Family history of Breast Cancer (V16 3)  Other    6  Family history of Back disorder   7  Family history of cardiac disorder (V17 49) (Z82 49)   8  Family history of cerebrovascular accident (V17 1) (Z82 3)   9  Family history of diabetes mellitus (V18 0) (Z83 3)   10  Family history of hypertension (V17 49) (Z82 49)   11  Family history of malignant neoplasm (V16 9) (Z80 9)  Family History    12  Denied: Family history of Colon Cancer   13  Denied: Family history of Osteoporosis   14  Denied: Family history of Ovarian Cancer   15  Denied: Family history of Uterine Cancer    The family history was reviewed and updated today  Social History    · Dental care, regularly   · Good dental hygiene   · Never a smoker   · No caffeine use   · No drug use   · Single   · Social alcohol use (Z78 9)   · Sun Protection Sunscreen   · Uses Safety Equipment - Seatbelts  The social history was reviewed and updated today  Current Meds   1  Allegra Allergy 180 MG Oral Tablet; take 1 tablet daily as needed Recorded   2  Amitriptyline HCl - 50 MG Oral Tablet; TAKE 1 TABLET AT BEDTIME; Therapy: 57DPZ1006 to (Evaluate:38Jvz7314)  Requested for: 24Apr2017; Last   Rx:24Apr2017 Ordered   3  Azelastine HCl - 0 1 % Nasal Solution; USE 1 SPRAY IN EACH NOSTRIL TWICE DAILY;    Therapy: 63BYT5132 to (Last Rema Barker)  Requested for: 85Wum8148 Ordered   4  Diclofenac Sodium 1 % Transdermal Gel; APPLY QID; Therapy: 72RKG7961 to (Evaluate:17May2017)  Requested for: 72KKO0808; Last   Rx:18Nov2016 Ordered   5  EpiPen 2-Michel 0 3 MG/0 3ML Injection Solution Auto-injector; use as directed; Therapy: 49FOB0700 to (Last Rx:15Jun2016)  Requested for: 80EGX3079 Ordered   6  Gabapentin 300 MG Oral Capsule; TAKE 1 CAPSULE 3 times daily; Therapy: 23AEV8476 to (Evaluate:13Apr2017)  Requested for: 49Ugu2339; Last   Rx:15Sep2016 Ordered   7  HydrOXYzine HCl - 10 MG Oral Tablet; TAKE 2 TABLETS AT BEDTIME; Therapy: 46ZUB3104 to (Evaluate:17Sep2017)  Requested for: 21Mar2017; Last   Rx:21Mar2017 Ordered   8  Losartan Potassium 50 MG Oral Tablet; TAKE 1 TABLET DAILY; Therapy: 32NFD2325 to (Evaluate:17May2017)  Requested for: 86HHM6485; Last   Rx:18Nov2016 Ordered   9  Omeprazole 40 MG Oral Capsule Delayed Release; Take 1 daily; Therapy: 71SWG0262 to (Trung Garzon)  Requested for: 68KEP2539; Last   Rx:30Jan2017 Ordered   10  Simvastatin 10 MG Oral Tablet; take one tablet by mouth every other day; Therapy: 80MMB4033 to (Evaluate:18Jun2017)  Requested for: 01SWK0755; Last    :24IJN5825 Ordered   11  TiZANidine HCl - 4 MG Oral Tablet; TAKE 1/2 TO 1 TABLET 3 TIMES DAILY AS NEEDED; Therapy: 90QNB7355 to (Evaluate:23Jun2017)  Requested for: 24Apr2017; Last    Rx:24Apr2017 Ordered   12  TraMADol HCl - 50 MG Oral Tablet; TAKE 1 TABLET Every 8 hours PRN; Therapy: 34TRR8593 to (Evaluate:24May2017); Last Rx:24Apr2017 Ordered    The medication list was reviewed and updated today  Allergies    1  Codeine Derivatives   2  Iodinated Contrast Media   3  Latex Exam Gloves MISC   4  Penicillins    5  Adhesive Tape   6   Eggs    Vitals  Vital Signs    Recorded: 36SAW0809 08:25AM   Temperature 97 F   Heart Rate 90   Respiration 14   Systolic 257   Diastolic 75   Height 5 ft 2 in   Weight 211 lb    BMI Calculated 38 59   BSA Calculated 1 96     Physical Exam Constitutional Patient appears healthy and well developed  No signs of acute distress present  comfortable, obese and appearance reflects stated age  Respiratory Respiratory effort: Normal   Auscultation of lungs: Clear to auscultation bilaterally  Cardiovascular Auscultation of heart: Rate is regular  Rhythm is regular  No heart murmur appreciated  Abdomen The abdomen is flat  No abdominal scars  Abdomen is soft, nontender, and nondistended  The abdomen was obese  The abdomen was soft and nontender  Neurologic - Mental Status: Alert and Oriented x3  Mood and affect: Affect is normal   Grossly nonfocal    Motor System General Motor Strength: No pronator drift and no parietal drift  Gait and Station: Day with a normal gait  Health Management  Health Maintenance   Medicare Annual Wellness Visit; every 1 year; Last 22NNI6344; Next Due: 49OOZ0579; Active    Future Appointments    Date/Time Provider Specialty Site   05/04/2017 08:00 AM Ami Michael DO Internal Medicine [facility] Remy Sosa   06/15/2017 01:00 PM Adelene Burkitt, [address] [place] [address] Neurology [facility] NEUROLOGY [address]   05/19/2017 09:00 AM Fe Penny DO Pain Management [facility] SPINE   05/18/2017 11:30 AM ABDIEL Wang  [address] [place] [address],[address] OR   07/07/2017 10:45 AM ABDIEL Wang  Neurosurgery [facility] NEUROSURGICAL [facility]   06/01/2017 02:30 PM NeuroSX, [facility]ur[facility] Schedule  [facility]     Signatures   Electronically signed by : Esperanza Sprague PAC;  Apr 27 2017  9:48AM EST                       (Author)    Electronically signed by : ABDIEL Meehan ; Apr 29 2017 11:37AM EST                       (Author)    Electronically signed by : ABDIEL Meehan ; Apr 29 2017 11:37AM EST                       (Author)

## 2018-01-17 NOTE — MISCELLANEOUS
Message   Recorded as Task   Date: 02/17/2016 04:18 PM, Created By: Karena Evans   Task Name: Follow Up   Assigned To: [id number] [address] clinical,Team   Regarding Patient: Sharee Neal, Status: Active   Comment:    Matti Arenas - 17 Feb 2016 4:18 PM     TASK CREATED  Caller: Self; General Medical Question; [phone] (Home)  m 2/17/2016 @ 1550  Per pt, Laura Munoz gave samples of pennsaid 2% w/ + relief  Currently w/ increased pain in back, "hard to function "   Lizeth Lezama - 17 Feb 2016 4:40 PM     TASK EDITED    attempted to contact pt, LMOM for pt to [facility]  *****************   Ya Kramer - 18 Feb 2016 8:37 AM     TASK EDITED  Received VM from pt  on [facility] triage line from 807 am  Pt  states that she is returning [facility] call  Pt  states that she had called about Pennsaid rx  Pt  states that she does believe it is helping her back pain a little bit at night  However, pt  states that the last week has been really bad w/ her back pain  Pt  requesting c/b at [phone]  Lizeth Lezama - 18 Feb 2016 9:33 AM     TASK EDITED    I spoke with pt, states over the last week her back pain has increased  States when she bends over she feels as if something is pulling in her back  States the [facility] does help but not for a long time  States she does take Tizanidine 4 mg at HS, and feels it does help a little  States that the pain gets worse as the day goes on  Advised I will discuss with Dr Hal Morrison and CB with any recommendations  Pt would like PENNSAID to go to Momo Freire - 18 Feb 2016 4:40 PM     TASK REPLIED TO: Previously Assigned To Elo Freire  aware, will send in Rx for Pennsaid, looks like she discussed stim trial with Cedrick Jon last time and that is probably going to be the best option for her at this point  Matti Arenas - 19 Feb 2016 10:29 AM     TASK EDITED  s/w pt, advised of above   Pt verbalized understanding, states she is coming in next week for the scs education class and is awaiting calls back re: psych eval and MRI  Advised pt to c/b prn  Active Problems    1  Anxiety (300 00) (F41 9)   2  Back pain (724 5) (M54 9)   3  BRBPR (bright red blood per rectum) (569 3) (K62 5)   4  Carpal tunnel syndrome, unspecified laterality (354 0) (G56 00)   5  Cellulitis, leg (682 6) (L03 119)   6  Chronic low back pain (724 2,338 29) (M54 5,G89 29)   7  Chronic pain syndrome (338 4) (G89 4)   8  Contusion of leg (924 5) (S80 10XA)   9  Degenerative joint disease of right lower extremity (715 98) (M19 90)   10  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)   11  History of Fibrocystic breast disease, unspecified laterality (610 1) (N60 19)   12  Foot pain (729 5) (M79 673)   13  Greater trochanteric bursitis of left hip (726 5) (M70 62)   14  Hemorrhoids (455 6) (K64 9)   15  Hyperlipidemia (272 4) (E78 5)   16  Hypertension (401 9) (I10)   17  Irritable bowel syndrome (564 1) (K58 9)   18  Lumbar spondylosis (721 3) (M47 816)   19  Neuralgia (729 2) (M79 2)   20  Pain of lower extremity, unspecified laterality (729 5) (M79 606)   21  Pain, joint, shoulder (719 41) (M25 519)   22  Paresthesias (782 0) (R20 2)   23  Peripheral neuropathy (356 9) (G62 9)   24  Pre-procedural examination (V72 84) (Z01 818)   25  Sacroiliitis (720 2) (M46 1)   26  Thoracic back pain (724 1) (M54 6)   27  Vitamin D deficiency (268 9) (E55 9)    Current Meds   1  Allegra Allergy 180 MG Oral Tablet (Fexofenadine HCl); take 1 tablet daily as needed   Recorded   2  Amitriptyline HCl - 25 MG Oral Tablet; TAKE 2 TABLET AT BEDTIME; Therapy: 47XNE8718 to (Evaluate:69Azo9743)  Requested for: 51NWY0143; Last   Rx:39Qiw2843 Ordered   3  Calcium + D TABS Recorded   4  Diazepam 5 MG Oral Tablet (Valium); can take up to two tablets 1 hour before   procedure; Therapy: 94DWT0762 to (Last Rx:52Zlq3987) Ordered   5   Donnatal Extentabs Oral Tablet Extended Release; take 1 q 6 hours prn for pain; Therapy: 32Hei2282 to [phone])  Requested for: 08Cgj6833; Last   Rx:86Mcx9603 Ordered   6  EpiPen 2-Michel 0 3 MG/0 3ML Injection Solution Auto-injector; use as directed; Therapy: 01HSQ5884 to (Last ED:44WVU7816)  Requested for: 72HXN6442 Ordered   7  Flector 1 3 % Transdermal Patch; APPLY PATCH TO AFFECTED AREA TWICE DAILY; Therapy: 28Zxv5935 to (Evaluate:47Aue4835)  Requested for: 04Ywg2477; Last   Rx:55Hsy5849 Ordered   8  Gabapentin 300 MG Oral Capsule; TAKE 1 CAPSULE 3 times daily; Therapy: 56XTB0677 to (Evaluate:34Usq7117)  Requested for: 50UMO2420; Last   Rx:26Kqp0105 Ordered   9  Hydrocortisone Acetate 25 MG Rectal Suppository; INSERT 1 SUPPOSITORY   RECTALLY 2 TIMES DAILY; Therapy: 91LWM7628 to (Evaluate:85Dqn0723)  Requested for: 71FJL3423; Last   Rx:96Xmy1349 Ordered   10  HydrOXYzine HCl - 10 MG Oral Tablet; TAKE 2 TABLETS AT BEDTIME; Therapy: 72TQC7113 to (Evaluate:06Jan2016)  Requested for: 52GCK8393; Last    Rx:27Cok6556 Ordered   11  LORazepam 0 5 MG Oral Tablet; TAKE ONE TABLET BY MOUTH AT BEDTIME AS    NEEDED FOR SLEEP; Therapy: 02ZLV1138 to (Evaluate:55Eyc3138)  Requested for: 59QEO9064; Last    Rx:17Nov2015 Ordered   12  Losartan Potassium 50 MG Oral Tablet; TAKE 1 TABLET DAILY; Therapy: 86AOY1781 to (Evaluate:75Eda6628)  Requested for: 28GHG0884; Last    Rx:17Nov2015 Ordered   13  Montelukast Sodium 10 MG Oral Tablet (Singulair); TAKE 1 TABLET DAILY AS    DIRECTED; Therapy: 80ELA5785 to (Evaluate:64Xwm3674)  Requested for: 70LNH2131; Last    Rx:17Nov2015 Ordered   14  Pennsaid 2 % Transdermal Solution; 2 pumps 2 times a day to the area affected; Therapy: 09CTT0049 to (Last Rx:18Feb2016)  Requested for: 76NRN6805 Ordered   15  Simvastatin 10 MG Oral Tablet; Take 1 every other day; Therapy: 12VRS2562 to (Evaluate:98Lcy1082)  Requested for: 48UVT1863; Last    Rx:10Jun2015 Ordered   16  TiZANidine HCl - 4 MG Oral Tablet; Take 1 TAB PO Q HS;     Therapy: 59RWW5173 to (Evaluate:30Jxz8119)  Requested for: 30FPF8038; Last    Rx:20Vah7125 Ordered   17  Voltaren 1 % Transdermal Gel; apply BID; Therapy: 33LYL6695 to ([facility])  Requested for: 03Iyi7466; Last    Rx:66Oyz5219 Ordered    Allergies    1  Codeine Derivatives   2  Iodinated Contrast Media   3  Latex Exam Gloves MISC   4   Penicillins    5  Eggs    Signatures   Electronically signed by : Theodora Dandy, ; Feb 19 2016 10:29AM EST                       (Author)

## 2018-01-17 NOTE — MISCELLANEOUS
Message   Recorded as Task   Date: 02/24/2017 11:24 AM, Created By: Benjamin Cid   Task Name: Call Back   Assigned To: [id number] [address] clinical,Team   Regarding Patient: Cortez López, Status: In Progress   Comment:    Lola Canales - 24 Feb 2017 11:24 AM     TASK CREATED  Caller: Self; General Medical Question; [phone] (Home); [phone] (Work)  VMMLOM on [address] triage line at 10:57 AM     Per pt she saw Meche Cristina this AM and he prescribed a butrans patch for her 5mg  Per pt her insurance does not cover this and she stated that Meche Cristina told her to call back if this was the case so that a script for tramadol could be called into her pharmacy [facility])  Pt requesting a c/b  **Butrans not covered by insurance, requesting tramadol***    ***Please advise thank you****   Guillermo Fabian - 24 Feb 2017 11:32 AM     TASK REPLIED TO: Previously Assigned To [id number] [address] clinical,Team                      yes please call in Tramadol 50 mg 1 tab q8 hours prn pain    thank you! Jennifer Cary - 24 Feb 2017 3:49 PM     TASK EDITED  #90 ok? Guillermo Fabian - 24 Feb 2017 4:03 PM     TASK REPLIED TO: Previously Assigned To Guillermo Fabian                      yes thank you   Jennifer Cary - 24 Feb 2017 4:08 PM     TASK EDITED  Called into [address] per Meche Cristina  , S/w pt  and she is aware  Jennifer Cary - 24 Feb 2017 4:10 PM     TASK EDITED  Tried to document Tramadol into [employer]scripts  It is coming up with a drug allergy  Can you please document? Thanks   Guillermo Fabian - 24 Feb 2017 4:14 PM     TASK REPLIED TO: Previously Assigned To Guillermo Fabian                      sure thing, thank you for calling it in! Ya Kramer - 27 Feb 2017 8:15 AM     TASK IN PROGRESS        Active Problems    1  Abdominal pain (789 00) (R10 9)   2  Abrasion of finger (915 0) (S60 419A)   3  Acid reflux disease (530 81) (K21 9)   4  Advance directive discussed with patient (V65 49) (Z71 89)   5  Anxiety (300 00) (F41 9)   6   Back pain (724 5) (M54 9)   7  BRBPR (bright red blood per rectum) (569 3) (K62 5)   8  Carpal tunnel syndrome, unspecified laterality (354 0) (G56 00)   9  Cellulitis, leg (682 6) (L03 119)   10  Chronic coughing (786 2) (R05)   11  Chronic low back pain (724 2,338 29) (M54 5,G89 29)   12  Chronic pain syndrome (338 4) (G89 4)   13  Degenerative joint disease of right lower extremity (715 98) (M19 90)   14  Encounter for long-term use of opiate analgesic (V58 69) (Z79 891)   15  Encounter for screening mammogram for malignant neoplasm of breast (V76 12)    (Z12 31)   16  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)   17  Fatigue (780 79) (R53 83)   18  History of Fibrocystic breast disease, unspecified laterality (610 1) (N60 19)   19  Greater trochanteric bursitis of left hip (726 5) (M70 62)   20  Hematoma of abdominal wall (922 2) (S30 1XXA)   21  Hemorrhoids (455 6) (K64 9)   22  History of recent fall (V15 88) (Z91 81)   23  Hyperlipidemia (272 4) (E78 5)   24  Hypertension (401 9) (I10)   25  Irritable bowel syndrome (564 1) (K58 9)   26  Lumbar degenerative disc disease (722 52) (M51 36)   27  Lumbar radiculopathy, chronic (724 4) (M54 16)   28  Lumbar spondylosis (721 3) (M47 816)   29  Neuralgia (729 2) (M79 2)   30  Pain of lower extremity, unspecified laterality (729 5) (M79 606)   31  Pain, joint, shoulder (719 41) (M25 519)   32  Paresthesias (782 0) (R20 2)   33  Peripheral neuropathy (356 9) (G62 9)   34  Post laminectomy syndrome (722 80) (M96 1)   35  Pre-procedural examination (V72 84) (Z01 818)   36  Pre-procedural laboratory examination (V72 63) (Z01 812)   37  Sacroiliitis (720 2) (M46 1)   38  Sinusitis (473 9) (J32 9)   39  Status post surgery (V45 89) (Z98 890)   40  Thoracic back pain (724 1) (M54 6)   41  Thyroid dysfunction (246 9) (E07 9)   42  Uncomplicated opioid dependence (304 00) (F11 20)   43  Vitamin D deficiency (268 9) (E55 9)    Current Meds   1   Allegra Allergy 180 MG Oral Tablet (Fexofenadine HCl); take 1 tablet daily as needed   Recorded   2  Amitriptyline HCl - 50 MG Oral Tablet; TAKE 1 TABLET AT BEDTIME; Therapy: 80UDP7696 to (Evaluate:13Apr2017)  Requested for: 65Fft6032; Last   Rx:15Sep2016 Ordered   3  Azelastine HCl - 0 1 % Nasal Solution; USE 1 SPRAY IN EACH NOSTRIL TWICE DAILY; Therapy: 34EYL2260 to (Last Rx:22Feb2017)  Requested for: 57Pef5683 Ordered   4  Diclofenac Sodium 1 % Transdermal Gel (Voltaren); APPLY QID; Therapy: 94GUZ5482 to (Evaluate:17May2017)  Requested for: 98QRC0452; Last   Rx:18Nov2016 Ordered   5  Dicyclomine HCl - 10 MG Oral Capsule; TAKE 1 CAPSULE EVERY 6 HOURS AS   NEEDED; Therapy: 04DEG3725 to (Last Rx:30Jan2017)  Requested for: 30Jan2017 Ordered   6  Doxycycline Hyclate 100 MG Oral Capsule; take 1 BID; Therapy: 69JFR3776 to (Evaluate:01Mar2017)  Requested for: 29Nmd4614; Last   Rx:22Feb2017 Ordered   7  EpiPen 2-Michel 0 3 MG/0 3ML Injection Solution Auto-injector; use as directed; Therapy: 09MNW4618 to (Last Rx:15Jun2016)  Requested for: 44ZZM9520 Ordered   8  Gabapentin 300 MG Oral Capsule; TAKE 1 CAPSULE 3 times daily; Therapy: 00PCR0440 to (Evaluate:13Apr2017)  Requested for: 46Tyr1997; Last   Rx:14Thm1680 Ordered   9  HydrOXYzine HCl - 10 MG Oral Tablet; TAKE 2 TABLETS AT BEDTIME; Therapy: 12UUR4487 to (Evaluate:17May2017)  Requested for: 72AKD0019; Last   Rx:18Nov2016 Ordered   10  Losartan Potassium 50 MG Oral Tablet; TAKE 1 TABLET DAILY; Therapy: 69IBQ8679 to (Evaluate:17May2017)  Requested for: 30ROP6630; Last    Rx:18Nov2016 Ordered   11  Omeprazole 40 MG Oral Capsule Delayed Release; Take 1 daily; Therapy: 03BJL5263 to ([facility])  Requested for: 08OOI8391; Last    Rx:30Jan2017 Ordered   12  Simvastatin 10 MG Oral Tablet; take one tablet by mouth every other day; Therapy: 05LTO3011 to (Evaluate:18Jun2017)  Requested for: 30IVP9422; Last    IL:48LDY3927 Ordered   13   TiZANidine HCl - 4 MG Oral Tablet; TAKE 1/2 TO 1 TABLET 3 TIMES DAILY AS NEEDED; Therapy: 41QBI4252 to (Evaluate:12Mar2017)  Requested for: 39WCM4126; Last    Rx:11Jan2017 Ordered   14  TraMADol HCl - 50 MG Oral Tablet; TAKE 1 TABLET EVERY 8 HOURS AS NEEDED; Therapy: 72BSR5051 to (Evaluate:26Mar2017); Last Rx:75Orn9191 Ordered    Allergies    1  Codeine Derivatives   2  Iodinated Contrast Media   3  Latex Exam Gloves MISC   4  Penicillins    5  Adhesive Tape   6   Eggs    Signatures   Electronically signed by : Megan Hilario RN; Feb 27 2017  8:15AM EST                       (Author)

## 2018-01-18 NOTE — MISCELLANEOUS
Message   Recorded as Task   Date: 01/18/2017 01:13 PM, Created By: Jose Guadalupe Constantino   Task Name: Review Document   Assigned To: [address] [place] [address] clinical,Team   Regarding Patient: Terrell Grace, Status: In Progress   Comment:    Jose Guadalupe Constantino - 18 Jan 2017 1:13 PM     TASK CREATED  Please find out if patient has taken any cough syrup or cold medications as her UDS form 1-11-17 is positive   Ya Kramer - 18 Jan 2017 2:28 PM     TASK REPLIED TO: Previously Assigned To [address] [place] [address] clinical,Team  Attempted to reach pt  on home/cell phone  LMOM for pt  to c/b  Ya Kramer - 18 Jan 2017 2:28 PM     TASK IN PROGRESS   Ya Kramer - 18 Jan 2017 3:31 PM     TASK REPLIED TO: Previously Assigned To [address] [place] [address],Team  Received VM from pt  on [facility] triage line from 233 pm  Pt  states that she is returning our call  Pt  requesting c/b  Ya Kramer - 18 Jan 2017 3:41 PM     TASK REPLIED TO: Previously Assigned To [address] [place] [address] clinical,Team  ****FYI****    S/w pt  and advised of above  Pt  states that she actually has been taking "Delsym" for a cough for approximately one month now  Per pt  she was sick w/ a sinus infection/cold, it went away for a little while, then came back and she has had a persistent cough since  Per pt  she was even trialed on steroids to get rid of the cough and nothing has helped  Pt  was advised that I will relay this information to AO  Pt  meagan Saul - 18 Jan 2017 4:17 PM     TASK REPLIED TO: Previously Assigned To Kristi Mustfaa         Active Problems    1  Abrasion of finger (915 0) (S60 419A)   2  Advance directive discussed with patient (V65 49) (Z71 89)   3  Anxiety (300 00) (F41 9)   4  Back pain (724 5) (M54 9)   5  BRBPR (bright red blood per rectum) (569 3) (K62 5)   6  Carpal tunnel syndrome, unspecified laterality (354 0) (G56 00)   7  Cellulitis, leg (682 6) (L03 119)   8  Chronic coughing (786 2) (R05)   9   Chronic low back pain (724 2,338 29) (M54 5,G89 29)   10  Chronic pain syndrome (338 4) (G89 4)   11  Degenerative joint disease of right lower extremity (715 98) (M19 90)   12  Encounter for long-term use of opiate analgesic (V58 69) (Z79 891)   13  Encounter for screening mammogram for malignant neoplasm of breast (V76 12)    (Z12 31)   14  Esophageal reflux (530 81) (K21 9)   15  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)   16  History of Fibrocystic breast disease, unspecified laterality (610 1) (N60 19)   17  Greater trochanteric bursitis of left hip (726 5) (M70 62)   18  Hematoma of abdominal wall (922 2) (S30 1XXA)   19  Hemorrhoids (455 6) (K64 9)   20  History of recent fall (V15 88) (Z91 81)   21  Hyperlipidemia (272 4) (E78 5)   22  Hypertension (401 9) (I10)   23  Irritable bowel syndrome (564 1) (K58 9)   24  Lumbar degenerative disc disease (722 52) (M51 36)   25  Lumbar radiculopathy, chronic (724 4) (M54 16)   26  Lumbar spondylosis (721 3) (M47 816)   27  Neuralgia (729 2) (M79 2)   28  Pain of lower extremity, unspecified laterality (729 5) (M79 606)   29  Pain, joint, shoulder (719 41) (M25 519)   30  Paresthesias (782 0) (R20 2)   31  Peripheral neuropathy (356 9) (G62 9)   32  Post laminectomy syndrome (722 80) (M96 1)   33  Pre-procedural examination (V72 84) (Z01 818)   34  Pre-procedural laboratory examination (V72 63) (Z01 812)   35  Sacroiliitis (720 2) (M46 1)   36  Sinusitis (473 9) (J32 9)   37  Status post surgery (V45 89) (Z98 890)   38  Thoracic back pain (724 1) (M54 6)   39  Thyroid dysfunction (246 9) (E07 9)   40  Uncomplicated opioid dependence (304 00) (F11 20)   41  Vitamin D deficiency (268 9) (E55 9)    Current Meds   1  Allegra Allergy 180 MG Oral Tablet (Fexofenadine HCl); take 1 tablet daily as needed   Recorded   2  Amitriptyline HCl - 50 MG Oral Tablet; TAKE 1 TABLET AT BEDTIME;    Therapy: 81ZUC8023 to (Evaluate:13Apr2017)  Requested for: 15Sep2016; Last   Rx:15Sep2016 Ordered   3  Diclofenac Sodium 1 % Transdermal Gel (Voltaren); APPLY QID; Therapy: 54VMF5828 to (Evaluate:17May2017)  Requested for: 07IAS7444; Last   Rx:18Nov2016 Ordered   4  EpiPen 2-Michel 0 3 MG/0 3ML Injection Solution Auto-injector; use as directed; Therapy: 48DRN8591 to (Last Rx:15Jun2016)  Requested for: 24MEC1079 Ordered   5  Gabapentin 300 MG Oral Capsule; TAKE 1 CAPSULE 3 times daily; Therapy: 60PIR8337 to (Evaluate:13Apr2017)  Requested for: 88Aaq6668; Last   Rx:86Xvf8344 Ordered   6  HydrOXYzine HCl - 10 MG Oral Tablet; TAKE 2 TABLETS AT BEDTIME; Therapy: 70YMI2488 to (Evaluate:17May2017)  Requested for: 26BXB8965; Last   Rx:18Nov2016 Ordered   7  Losartan Potassium 50 MG Oral Tablet; TAKE 1 TABLET DAILY; Therapy: 15XBC7058 to (Evaluate:17May2017)  Requested for: 77UHM8950; Last   Rx:18Nov2016 Ordered   8  Nucynta ER 50 MG Oral Tablet Extended Release 12 Hour; take 1 tablet every twelve   hours; Therapy: 23WJU3253 to (Evaluate:11Mar2017); Last Rx:11Jan2017 Ordered   9  Simvastatin 10 MG Oral Tablet; take one tablet by mouth every other day; Therapy: 10YXF9775 to (Evaluate:18Jun2017)  Requested for: 83KNY0765; Last   NR:71HFZ8923 Ordered   10  TiZANidine HCl - 4 MG Oral Tablet; TAKE 1/2 TO 1 TABLET 3 TIMES DAILY AS NEEDED; Therapy: 68VKQ3686 to (Evaluate:12Mar2017)  Requested for: 27DRA0023; Last    Rx:11Jan2017 Ordered    Allergies    1  Codeine Derivatives   2  Iodinated Contrast Media   3  Latex Exam Gloves MISC   4  Penicillins    5  Adhesive Tape   6   Eggs    Signatures   Electronically signed by : Cynthia De La Paz RN; Jan 19 2017 12:23PM EST                       (Author)

## 2018-01-18 NOTE — RESULT NOTES
Message   Recorded as Task   Date: 06/27/2017 08:34 AM, Created By: Coreen Sharma   Task Name: Miscellaneous   Assigned To: [facility],Team   Regarding Patient: Chari Mejia, Status: In Progress   Sharyn Estrada - 27 Jun 2017 8:34 AM     TASK CREATED  [facility] from [employer] called regarding fax they received for Nucynta  Please call [phone]  It needs coverage determination  Lola Canales - 27 Jun 2017 8:43 AM     TASK REASSIGNED: Previously Assigned To [id number] [address] - 03 Jul 2017 11:32 AM     TASK EDITED   Joseluis Dine - 03 Jul 2017 11:32 AM     TASK EDITED   Joseluis Dine - 03 Jul 2017 2:29 PM     TASK EDITED  Pt  is no longer on Nucynta ER 50 it was dc'ed after last script with do not fill before date of 4/21  this was a 15 day script          Signatures   Electronically signed by : Tristen Rob, ; Jul  3 2017  2:29PM EST                       (Author)

## 2018-01-18 NOTE — MISCELLANEOUS
Message   Recorded as Task   Date: 06/10/2016 03:15 PM, Created By: Lalitha Elkins   Task Name: Follow Up   Assigned To: [id number] [address] clinical,Team   Regarding Patient: Mathew Day, Status: In Progress   Comment:    Carol Zuleta - 10 Ramírez 2016 3:15 PM     TASK CREATED  Caller: Self; Other; [phone] (Home); [phone] (Mobile Phone)  Pt left vm today at 2:54 on the [address] triage line that she wanted to leave a message for Dr Crawford Money that since she had her SCS taken out on Monday the terrible leg spasms went away  She stated she told him she had bad leg spasms during the trial and he said he had never heard that, so she wanted him to know they went away  Kim Kemp - 10 Ramírez 2016 4:43 PM     TASK REPLIED TO: Previously Assigned To [id number] [address] clinical,Team  please thank her for the information  Also follow up and let her know that the permanent stimulator will have more programming options so that hopefully she doesn't experience that with it  if she has any other questions I'm happy to follow up with her further  Lizeth Lezama - 13 Jun 2016 8:33 AM     TASK EDITED    I spoke with pt, advised above  Pt very thankful  *************        Active Problems    1  Anxiety (300 00) (F41 9)   2  Back pain (724 5) (M54 9)   3  BRBPR (bright red blood per rectum) (569 3) (K62 5)   4  Carpal tunnel syndrome, unspecified laterality (354 0) (G56 00)   5  Cellulitis, leg (682 6) (L03 119)   6  Chronic low back pain (724 2,338 29) (M54 5,G89 29)   7  Chronic pain syndrome (338 4) (G89 4)   8  Contusion of leg (924 5) (S80 10XA)   9  Degenerative joint disease of right lower extremity (715 98) (M19 90)   10  Esophageal reflux (530 81) (K21 9)   11  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)   12  History of Fibrocystic breast disease, unspecified laterality (610 1) (N60 19)   13  Foot pain (729 5) (M79 673)   14  Greater trochanteric bursitis of left hip (726 5) (A38 34)   15   Hemorrhoids (455  6) (K64 9)   16  History of recent fall (V15 88) (Z91 81)   17  Hyperlipidemia (272 4) (E78 5)   18  Hypertension (401 9) (I10)   19  Irritable bowel syndrome (564 1) (K58 9)   20  Lumbar spondylosis (721 3) (M47 816)   21  Neuralgia (729 2) (M79 2)   22  Pain of lower extremity, unspecified laterality (729 5) (M79 606)   23  Pain, joint, shoulder (719 41) (M25 519)   24  Paresthesias (782 0) (R20 2)   25  Peripheral neuropathy (356 9) (G62 9)   26  Pre-procedural examination (V72 84) (Z01 818)   27  Pre-procedural laboratory examination (V72 63) (Z01 812)   28  Sacroiliitis (720 2) (M46 1)   29  Status post surgery (V45 89) (Z98 89)   30  Thoracic back pain (724 1) (M54 6)   31  Thyroid dysfunction (246 9) (E07 9)   32  Vitamin D deficiency (268 9) (E55 9)    Current Meds   1  Allegra Allergy 180 MG Oral Tablet (Fexofenadine HCl); take 1 tablet daily as needed   Recorded   2  Amitriptyline HCl - 50 MG Oral Tablet; TAKE 1 TABLET AT BEDTIME; Therapy: 75PYU6482 to (Evaluate:19Sep2016)  Requested for: 85DSV1849; Last   Rx:23Mar2016 Ordered   3  Calcium + D TABS Recorded   4  Clindamycin HCl - 150 MG Oral Capsule; TAKE 1 CAPSULE Every 6 hours for 5 days; Therapy: 18RQB6787 to (Evaluate:06Jun2016)  Requested for: 79WAT1418; Last   Rx:01Jun2016 Ordered   5  Diazepam 5 MG Oral Tablet (Valium); can take up to two tablets 1 hour before   procedure; Therapy: 45QJC4450 to (Last Rx:32Spf8487) Ordered   6  Donnatal Extentabs TBCR; take 1 q 6 hours prn for pain; Therapy: 68Jfl3925 to [phone]  Requested for: 85Eaf9785; Last   Rx:62Mxe5019 Ordered   7  EpiPen 2-Michel 0 3 MG/0 3ML Injection Solution Auto-injector; use as directed; Therapy: 42HFV6663 to (Last VB:24UDB8720)  Requested for: 05HPD7640 Ordered   8  Gabapentin 300 MG Oral Capsule; TAKE 1 CAPSULE 3 times daily; Therapy: 64LIG1200 to (Evaluate:65Wkv6502)  Requested for: 82JMK4788; Last   Rx:34Jul5512 Ordered   9   Hydrocortisone Acetate 25 MG Rectal Suppository; INSERT 1 SUPPOSITORY   RECTALLY 2 TIMES DAILY; Therapy: 72ESD4654 to (Evaluate:38Lii8759)  Requested for: 79SMI2519; Last   Rx:06Hpu0271 Ordered   10  HydrOXYzine HCl - 10 MG Oral Tablet; TAKE 2 TABLETS AT BEDTIME; Therapy: 88QDL0530 to (Chong Wall)  Requested for: 50Lbu9616; Last    Rx:58Gln2016 Ordered   11  LORazepam 0 5 MG Oral Tablet; TAKE ONE TABLET BY MOUTH AT BEDTIME AS    NEEDED FOR SLEEP; Therapy: 06TLM1259 to (Evaluate:16Nov2016)  Requested for: 15DDT1581; Last    Rx:50Gmr9532 Ordered   12  Losartan Potassium 50 MG Oral Tablet; TAKE 1 TABLET DAILY; Therapy: 96UHZ4507 to (Evaluate:16Nov2016)  Requested for: 27MBA1144; Last    Rx:30Dlh8507 Ordered   13  Montelukast Sodium 10 MG Oral Tablet (Singulair); TAKE 1 TABLET DAILY AS    DIRECTED; Therapy: 64KBZ4883 to (Evaluate:16Nov2016)  Requested for: 30MCB9471; Last    Rx:11Yxp0885 Ordered   14  Pennsaid 2 % Transdermal Solution; 2 pumps 2 times a day to the area affected; Therapy: 65LEN7869 to (Last Rx:18Feb2016)  Requested for: 30SOH1587 Ordered   15  Simvastatin 10 MG Oral Tablet; Take 1 every other day; Therapy: 78NAO9112 to (Evaluate:94Zxd1975)  Requested for: 41SLK6092; Last    Rx:10Jun2015 Ordered   16  TiZANidine HCl - 4 MG Oral Tablet; Take 1 TAB PO Q HS; Therapy: 62BDH7103 to (Evaluate:58Uhk5159)  Requested for: 78OVI5887; Last    Rx:06Jun2016 Ordered    Allergies    1  Codeine Derivatives   2  Iodinated Contrast Media   3  Latex Exam Gloves MISC   4  Penicillins    5  Adhesive Tape   6  Eggs    Signatures   Electronically signed by :  Saurabh Siddiqui, ; Jun 13 2016  8:33AM EST                       (Author)

## 2018-01-22 VITALS
WEIGHT: 207.5 LBS | BODY MASS INDEX: 38.18 KG/M2 | DIASTOLIC BLOOD PRESSURE: 74 MMHG | SYSTOLIC BLOOD PRESSURE: 132 MMHG | HEIGHT: 62 IN | RESPIRATION RATE: 16 BRPM | HEART RATE: 82 BPM

## 2018-01-22 VITALS
SYSTOLIC BLOOD PRESSURE: 158 MMHG | BODY MASS INDEX: 38.83 KG/M2 | WEIGHT: 211 LBS | RESPIRATION RATE: 14 BRPM | DIASTOLIC BLOOD PRESSURE: 75 MMHG | HEART RATE: 90 BPM | HEIGHT: 62 IN | TEMPERATURE: 97 F

## 2018-01-23 NOTE — PROGRESS NOTES
Assessment   1  Never a smoker  2  Screening for depression (V79 0) (Z13 89)  3  Exercise counseling (V65 41) (Z71 82)  4  Encounter for preventive health examination (V70 0) (Z00 00)1      1 Amended By: Galileo Martinez; 2017 8:09 PM EST    Plan  Anxiety    · Renew: HydrOXYzine HCl - 10 MG Oral Tablet; TAKE TWO TABLETS BY MOUTH AT  BEDTIME  Exercise counseling    · Keep a diary of when and what you eat ; Status:Complete - Retrospective By Protocol  Authorization;   Done: 72BAL3818   · Some eating tips that can help you lose weight ; Status:Complete - Retrospective By  Protocol Authorization;   Done: 44PHG5785   · There are many exercise options for seniors ; Status:Complete - Retrospective By  Protocol Authorization;   Done: 23VYO7478  Health Maintenance    · Medicare Annual Wellness Visit ; every 1 year; Last 56VKF9588; Next 84FCO8653;  Status:Active  Left shoulder pain    · Renew: Diclofenac Sodium 1 % Transdermal Gel; apply BID  Lumbar radiculopathy, chronic, Lumbar spondylosis    · Follow-up visit in 6 months Evaluation and Treatment  Follow-up  Status: Hold For -  Scheduling  Requested for: 2017  Screening for depression    · *VB-Depression Screening; Status:Complete - Retrospective By Protocol Authorization;    Done: 23PVS8917 08:00AM    Discussion/Summary    Labs reviewed  Home stretching exercises  increase water1      Impression: Subsequent Annual Wellness Visit, with preventive exam as well as age and risk appropriate counseling completed1   Cardiovascular screening and counselin  screening is current1  and counseling was given on maintaining a healthy diet1   Diabetes screening and counselin  screening is current1  and counseling was given on maintaining a healthy diet1   Colorectal cancer screening and counselin  screening is current1  and counseling was given on ways to eat a high fiber diet1   Breast cancer screening and counselin  screening is current1      Cervical cancer screening and counselin  screening is current1   Osteoporosis screening and counselin  screening is current1   Abdominal aortic aneurysm screening and counselin  screening not indicated1   Glaucoma screening and counselin  screening is current1   HIV screening and counselin  screening not indicated1   Immunizations:1  The patient declines the influenza vaccination1 , the lifetime pneumococcal vaccine has been completed1 , hepatitis B vaccination series is not indicated at this time due to the patient's low risk of sadaf the disease1 , the patient declines the Zostavax vaccine1 , the patient declines the Td vaccine1  and Tdap vaccination up to date1   Advance Directive Plannin  not complete1 , paperwork and instructions were given to the patient1   Advice and education were given regarding1  increasing physical activity1   Patient Discussion:1  plan discussed with the patient1 , follow-up visit needed in 6 month1   Self Referrals: No       Possible side effects of new medications were reviewed with the patient/guardian today  The treatment plan was reviewed with the patient/guardian  The patient/guardian understands and agrees with the treatment plan       1 Amended By: Dashawn Oakes; 2017 8:11 PM EST    Chief Complaint  Pt presents for Well Exam  Pt feels well today  Appetite is normal per patient  No falls  Refills done as requested  Advance Directives  Advance Directive [facility]:   The patient is in agreement to receive the Advance Care Planning service    NO - Patient does not have an advance health care directive  Capacity/Competence: This patient has full decision making capacity for discussion of advance care planning and This patient has full decision making competency for discussion of advance care planning  History of Present Illness  HPI: Pt feels much better and is off pain meds since stimulator in place   has some sinus congestion but doing better overall1    Welcome to [employer] and Wellness Visits: The patient is being seen for the subsequent annual wellness visit  Medicare Screening and Risk Factors1    Hospitalizations:1  she has been previously hospitalizied1   Once per lifetime medicare screening tests:1  ECG1  and AAA screening US has not yet been done1   Medicare Screening Tests Risk Questions   Abdominal aortic aneurysm risk assessment:1  none indicated1   Osteoporosis risk assessment:1  caucasian1 , female gender1  and over 48years of age2   HIV risk assessment:1  none indicated1   Drug and Alcohol Use: The patient has never smoked cigarettes  The patient reports rare alcohol use  She has never used illicit drugs  Diet and Physical Activity: Current diet includes well balanced meals  She exercises daily  Exercise: walking  Mood Disorder and Cognitive Impairment Screenin    Depression screening1   Negative for symptoms1   She denies feeling down, depressed, or hopeless over the past two weeks1   She denies feeling little interest or pleasure in doing things over the past two weeks1   Cognitive impairment screenin  denies difficulty learning/retaining new information1 , denies difficulty handling complex tasks1 , denies difficulty with reasoning1 , denies difficulty with spatial ability and orientation1 , denies difficulty with language1  and denies difficulty with behavior1   Functional Ability/Level of Safety:1  Hearing is1  normal bilaterally1   She denies hearing difficulties1  1  She does not use a hearing aid  1   The patient is currently1  able to do activities of daily living without limitations1 , able to do instrumental activities of daily living without limitations1 , able to participate in social activities without limitations1  and able to drive without limitations1    Activities of daily living details:1  does not need help using the phone1 , no transportation help needed1 , does not need help shopping1 , no meal preparation help needed1 , does not need help doing housework1 , does not need help doing laundry1 , does not need help managing medications1  and does not need help managing money1   Fall risk factors: 1  The patient fell 0 times in the past 12 months  1   Home safety risk factors: 1  no unfamiliar surroundings1 , no loose rugs1 , no poor household lighting1 , no uneven floors1 , no household clutter1 , grab bars in the bathroom1  and handrails on the stairs1   Advance Directives: Advance directives: no living will and no advance directives  Co-Managers and Medical Equipment/Suppliers: See Patient Care Team   Reviewed Updated Levi Dopp:   Last Medicare Wellness Visit Information was reviewed, patient interviewed, no change since last AW  Preventive Quality Program 65 and Older: Falls Risk: The patient fell 0 times in the past 12 months  The patient is currently asymptomatic Symptoms Include:  Associated symptoms:  No associated symptoms are reported  The patient currently has no urinary incontinence symptoms  1 Amended By: Reinaldo Benitez; Nov 21 2017 8:09 PM EST    Patient Care Team    Care Team Member Role Specialty Office Number   Jesse ACEVEDO  Specialist Orthopedic Surgery [phone] Specialist Dermatology [phone]   [facility] Cinthia DO Specialist Neurology [phone]   [facility] DO Specialist Pain Management [phone]   Eron Arnett MD  Obstetrics/Gynecology [phone]   [facility], [address] [place] [address] Specialist Pain Management [phone]   [facility]  Internal Medicine [phone]   Louis ACEVEDO  Specialist Neurosurgery [phone]     Active Problems   1  Acid reflux disease (530 81) (K21 9)  2  Advance directive discussed with patient (V65 49) (Z71 89)  3  Aftercare following surgery (V58 89) (Z48 89)  4  Anxiety (300 00) (F41 9)  5  Back pain (724 5) (M54 9)  6  Bacteriuria (791 9) (R82 71)  7  BRBPR (bright red blood per rectum) (569 3) (K62 5)  8  Carpal tunnel syndrome, unspecified laterality (354 0) (G56 00)  9  Chronic low back pain (724 2,338 29) (M54 5,G89 29)  10  Chronic pain syndrome (338 4) (G89 4)  11  Degenerative joint disease of right lower extremity (715 98) (M19 90)  12  Encounter for long-term use of opiate analgesic (V58 69) (Z79 891)  13  Encounter for screening mammogram for malignant neoplasm of breast (V76 12)    (Z12 31)  14  Encounter for staple removal (V58 32) (Z48 02)  15  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)  16  Fatigue (780 79) (R53 83)  17  History of Fibrocystic breast disease, unspecified laterality (610 1) (N60 19)  18  Greater trochanteric bursitis of left hip (726 5) (M70 62)  19  Head trauma (959 01) (S09 90XA)  20  Hematoma of abdominal wall (922 2) (S30 1XXA)  21  Hyperlipidemia (272 4) (E78 5)  22  Hypertension (401 9) (I10)  23  Irritable bowel syndrome (564 1) (K58 9)  24  Left shoulder pain (719 41) (M25 512)  25  Lumbar degenerative disc disease (722 52) (M51 36)  26  Lumbar radiculopathy, chronic (724 4) (M54 16)  27  Lumbar spondylosis (721 3) (M47 816)  28  Neuralgia (729 2) (M79 2)  29  Paresthesias (782 0) (R20 2)  30  Peripheral neuropathy (356 9) (G62 9)  31  Post laminectomy syndrome (722 80) (M96 1)  32  Pre-procedural examination (V72 84) (Z01 818)  33  Pre-procedural laboratory examination (V72 63) (Z01 812)  34  Refused pneumococcal vaccination (V64 06) (Z28 21)  35  Sacroiliitis (720 2) (M46 1)  36  Status post insertion of spinal cord stimulator (V45 89) (Z98 890)  37  Status post surgery (V45 89) (Z98 890)  38  Thoracic back pain (724 1) (M54 6)  39  Thyroid dysfunction (246 9) (E07 9)  40  Uncomplicated opioid dependence (304 00) (F11 20)  41   Vitamin D deficiency (268 9) (E55 9)    Past Medical History    · History of Abnormal findings on diagnostic imaging of breast (793 89) (R92 8)   · History of Contusion of leg (924 5) (S80 10XA)   · History of Encounter for screening colonoscopy (V76 51) (Z12 11)   · History of Fibrocystic breast disease, unspecified laterality (610 1) (N60 19)   · History of High cholesterol (272 0) (E78 00)   · History of arthritis (V13 4) (Z87 39)   · History of osteopenia (V13 59) (Z87 39)    The active problems and past medical history were reviewed and updated today  Surgical History    · History of Appendectomy   · History of Cholecystectomy   · History of Foot Surgery   · History of Gynecologic Laparoscopy With Adhesiolysis   · History of Hysteroscopy   · History of Incisional Breast Biopsy   · History of Knee Arthroscopy (Therapeutic)   · History of Laparoscopy (Diagnostic)   · History of Tonsillectomy With Adenoidectomy    The surgical history was reviewed and updated today  Family History  Mother    · Family history of Bone Cancer  Father    · Family history of Brain Cancer (V16 8)   · Family history of Stroke Syndrome (V17 1)  Paternal Grandmother    · Family history of Diabetes Mellitus (V18 0)  Maternal Aunt    · Family history of Breast Cancer (V16 3)  Other    · Family history of Back disorder   · Family history of cardiac disorder (V17 49) (Z82 49)   · Family history of cerebrovascular accident (V17 1) (Z82 3)   · Family history of diabetes mellitus (V18 0) (Z83 3)   · Family history of hypertension (V17 49) (Z82 49)   · Family history of malignant neoplasm (V16 9) (Z80 9)  Family History    · Denied: Family history of Colon Cancer   · Denied: Family history of Osteoporosis   · Denied: Family history of Ovarian Cancer   · Denied: Family history of Uterine Cancer    The family history was reviewed and updated today         Social History    · Dental care, regularly   · Good dental hygiene   · Never a smoker   · No caffeine use   · No drug use   · Single   · Social alcohol use (Z78 9)   · Sun Protection Sunscreen   · Uses Safety Equipment - Seatbelts  The social history was reviewed and updated today  The social history was reviewed and is unchanged  Current Meds  1  Amitriptyline HCl - 50 MG Oral Tablet; TAKE 1 TABLET AT BEDTIME; Therapy: 62LUQ4205 to (Evaluate:2018)  Requested for: 06Zom5597; Last   Rx:89Ync2542 Ordered  2  Azelastine HCl - 0 1 % Nasal Solution; USE 1 SPRAY IN EACH NOSTRIL TWICE DAILY; Therapy: 90LWG6139 to (Last Rx:2017)  Requested for: 40Dlk4952 Ordered  3  Cyclobenzaprine HCl - 5 MG Oral Tablet; TAKE 1 TABLET AT BEDTIME AS NEEDED; Therapy: 31WCB3766 to (Evaluate:2017)  Requested for: 97GYZ4540; Last   Rx:2017 Ordered  4  Diclofenac Sodium 1 % Transdermal Gel; apply BID; Therapy: 2017 to (Last Rx:2017)  Requested for: 2017 Ordered  5  EpiPen 2-Michel 0 3 MG/0 3ML Injection Solution Auto-injector; use as directed; Therapy: 01ZSH7558 to (Last ON:)  Requested for: 12IIW6033 Ordered  6  Gabapentin 300 MG Oral Capsule; TAKE 1 CAPSULE 3 times daily; Therapy: 02KTH0714 to (Evaluate:2018)  Requested for: 89TYA2443; Last   Rx:2017 Ordered  7  HydrOXYzine HCl - 10 MG Oral Tablet; TAKE TWO TABLETS BY MOUTH AT BEDTIME; Therapy: 47UZA0212 to (Evaluate:87Grx2469)  Requested for: 49SYI0117; Last   Rx:2017 Ordered  8  Losartan Potassium 50 MG Oral Tablet; TAKE 1 TABLET DAILY; Therapy: 39ZFZ2120 to ([place])  Requested for: 59MVR2472; Last   B44QHK5823 Ordered  9  Omeprazole 40 MG Oral Capsule Delayed Release; TAKE ONE (1) TABLET DAILY; Therapy: 75YBO5051 to (Last Rx:40Oik3740)  Requested for: 17Raj3687 Ordered  10  Simvastatin 10 MG Oral Tablet; take one tablet by mouth every other day; Therapy: 82XHS5877 to (Evaluate:83Fmf2586)  Requested for: 2017; Last    Rx:2017 Ordered  11  Xyzal Allergy 24HR 5 MG Oral Tablet; Take 1 tablet daily; Therapy: 75XJG7702 to Recorded    The medication list was reviewed and updated today  Allergies   1  Codeine Derivatives  2   Iodinated Contrast Media  3  Latex Exam Gloves MISC  4  Penicillins   5  Adhesive Tape  6  Bee sting  7  Eggs    Immunizations   1    PPSV  Temporarily Deferred: Pt refuses, As of: 21Mar2017, Defer for 2 Years    Tdap  09-Dec-2016     Vitals  Signs    Systolic: 0003   Diastolic: 351    Temperature: 94 1 F, Tympanic  Heart Rate: 86  Height: 5 ft 2 in  Weight: 209 lb 2 oz  BMI Calculated: 38 25  BSA Calculated: 1 95  O2 Saturation: 97, RA     1 Amended By: Annmarie Godinez; Nov 21 2017 8:09 PM EST    Physical Exam    Constitutional1    General appearance: No acute distress, well appearing and well nourished  1  Looks well1   Psychiatric1    Judgment and insight: Normal 1    Orientation to person, place, and time: Normal 1    Recent and remote memory: Intact  1    Mood and affect: Normal 1  Brighter,more relaxed1         1 Amended By: Annmarie Godinez; Nov 21 2017 8:09 PM EST    Health Management  Health Maintenance   Medicare Annual Wellness Visit; every 1 year; Last 70FVQ0305; Next Due: 21Nov2018;   Active    Future Appointments    Date/Time Provider Specialty Site   12/21/2017 01:00 PM Eric Luther, [address] Neurology [address] [place] [address]     Signatures   Electronically signed by : Bharathi Barnes DO; Nov 21 2017  8:12PM EST                       (Author)

## 2018-01-24 NOTE — PROGRESS NOTES
Assessment   1  Advance directive discussed with patient (V65 49) (Z71 89)  2  Never a smoker  3  Encounter for preventive health examination (V70 0) (Z00 00)    Plan   Back pain, Chronic low back pain, Hyperlipidemia, Hypertension    · Follow-up visit in 6 months Evaluation and Treatment  Follow-up  Status: Hold For -  Scheduling  Requested for: [id number]  Chronic pain syndrome, Lumbar spondylosis    · Start: Diclofenac Sodium 1 % Transdermal Gel (Voltaren); APPLY QID1   Degenerative joint disease of right lower extremity    · * DXA BONE DENSITY SPINE HIP AND PELVIS; Status:Active; Requested  for:2016;   Encounter for screening mammogram for malignant neoplasm of breast    · MAMMO SCREENING BILATERAL W 3D & CAD; Status:Active; Requested for:2016; 1     MAMMO SCREENING BILATERAL W 3D & CAD; Status:Hold For - Scheduling,Retrospective By Protocol Authorization; Requested for:2016;   Perform:[facility] Radiology; NSZ:[id number]; Last Updated By:Karmen Da Silva; 2016 8:00:41 AM;Ordered;    For:Encounter for screening mammogram for malignant neoplasm of breast; Ordered By:Marla Bermudez;       1 Amended By: Adalgisa Carr; 2016 8:08 AM EST    Discussion/Summary    Pt gas stimulator procedure upcoming  She will be seeing ortho as well for knee followup and may need sx  Rx Dexa  Deferred flu shot1      Impression:1  Initial Annual Wellness Visit1 , with preventive exam as well as age and risk appropriate counseling completed1   Cardiovascular screening and counselin  screening is current1   Diabetes screening and counselin  screening is current1   Colorectal cancer screening and counselin  screening is current1   Breast cancer screening and counselin  screening is current1   Cervical cancer screening and counselin  screening is current1   Osteoporosis screening and counselin  due for DXA axial skeleton1      Abdominal aortic aneurysm screening and counselin screening not indicated1   HIV screening and counselin  screening not indicated1   Immunizations:1  The patient declines the influenza vaccination1 , the patient declines the pneumococcal vaccination1 , hepatitis B vaccination series is not indicated at this time due to the patient's low risk of sadaf the disease1 , the patient declines the Zostavax vaccine1 , the patient declines the Td vaccine1  and the patient declines the Tdap vaccine1   Advance Directive Plannin  not complete1 , paperwork and instructions were given to the patient1   Advice and education were given regarding1  increasing physical activity1   Patient Discussion:1  plan discussed with the patient1 , follow-up visit needed in 6 months1   Self Referrals: No       1 Amended By: Jacob Terrell; 2016 6:50 PM EST    Chief Complaint  Pt presents for Well Exam  Pt feels well and without complaints  No falls  Appetite is good  Refills done as requested  Advance Directives  Advance Directive [facility]:   The patient is in agreement to receive the Advance Care Planning service    NO - Patient does not have an advance health care directive  Patient is going to a  to discuss soon  Capacity/Competence: This patient has full decision making capacity for discussion of advance care planning and This patient has full decision making competency for discussion of advance care planning  History of Present Illness    Welcome to Medicare and Wellness Visits: The patient is being seen for the  initial annual wellness visit1  1   Medicare Screening and Risk Factors1    Hospitalizations:1  she has been previously hospitalizied1  and she has been hospitalized orthopedic procedure times1   Once per lifetime medicare screening tests:1  ECG1  and AAA screening US has not yet been done1   Medicare Screening Tests Risk Questions   Abdominal aortic aneurysm risk assessment:1  none indicated1      Osteoporosis risk assessment:1  caucasian1 , female gender1  and over 48years of age2   HIV risk assessment:1  none indicated1   Drug and Alcohol Use:1  The patient  has never smoked cigarettes1  1   The patient reports  never drinking alcohol1  1   Alcohol concern: 1   The patient has no concerns about alcohol abuse1   She  has never used illicit drugs1  1   Diet and Physical Activity:1  Current diet includes  low fat food choices1  1   She  exercises infrequently1  and  limited due to back pain1  1   The patient does not exercise1    Mood Disorder and Cognitive Impairment Screenin    Depression screening1   Negative for symptoms1   She denies feeling down, depressed, or hopeless over the past two weeks1   She denies feeling little interest or pleasure in doing things over the past two weeks1   Cognitive impairment screenin  denies difficulty learning/retaining new information1 , denies difficulty handling complex tasks1 , denies difficulty with reasoning1 , denies difficulty with spatial ability and orientation1 , denies difficulty with language1  and denies difficulty with behavior1   Functional Ability/Level of Safety:1  Hearing is1  normal bilaterally1  and a hearing aid is not used1   She denies hearing difficulties1  The patient is currently1  able to do activities of daily living without limitations1 , able to do instrumental activities of daily living without limitations1 , able to participate in social activities without limitations1  and able to drive without limitations1   Activities of daily living details:1  does not need help using the phone1 , no transportation help needed1 , does not need help shopping1 , no meal preparation help needed1 , does not need help doing housework1 , does not need help doing laundry1 , does not need help managing medications1  and does not need help managing money1   Fall risk factors: 1  The patient fell 0 times in the past 12 months  1    Home safety risk factors: 1  no unfamiliar surroundings1 , no loose rugs1 , no poor household lighting1 , no uneven floors1 , no household clutter1 , grab bars in the bathroom1  and handrails on the stairs1   Advance Directives:1  Advance directives: 1  no living will1 , no durable power of  for health care directives1  and no advance directives1   End of life decisions were reviewed with the patient1  and I agree with the patient's decisions1   Co-Managers and Medical Equipment/Suppliers: See Patient Care Team       1 Amended By: Velia Luis; Nov 20 2016 6:46 PM EST    Patient Care Team    Care Team Member Role Specialty Office Number   Sanjana ACEVEDO  Specialist Orthopedic Surgery [phone] Specialist Dermatology [phone]   Kayy Sweet DO Specialist Neurology [phone]   Cammy Blakely DO Referring Pain Management [phone]   Dre Iqbal MD  Obstetrics/Gynecology [phone]   Gerardo Diaz, [address] [address]  Pain Management [phone]   Nela Casillas, [address] [facility]  Internal Medicine [phone]   Isaak ACEVEDO  Specialist Neurosurgery [phone]     Review of Systems  Over the past 2 weeks, how often have you been bothered by the following problems? 1 ) Little interest or pleasure in doing things? 1  Not at all1     2 ) Feeling down, depressed or hopeless? 1  Not at all1     3 ) Trouble falling asleep or sleeping too much? 1  Not at all1     4 ) Feeling tired or having little energy? 1  Not at all1     5 ) Poor appetite or overeating? 1  Not at all1     6 ) Feeling bad about yourself, or that you are a failure, or have let yourself or your family down? 1  Not at all1     7 ) Trouble concentrating on things, such as reading a newspaper or watching television? 1  Not at all1     8 ) Moving or speaking so slowly that other people could have noticed, or the opposite, moving or speaking faster than usual?1  Not at all1      How difficult have these problems made it for you to do your work, take care of things at home, or get along with people? 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)  11  History of Fibrocystic breast disease, unspecified laterality (610 1) (N60 19)  12  Greater trochanteric bursitis of left hip (726 5) (M70 62)  13  Hemorrhoids (455 6) (K64 9)  14  History of recent fall (V15 88) (Z91 81)  15  Hyperlipidemia (272 4) (E78 5)  16  Hypertension (401 9) (I10)  17  Irritable bowel syndrome (564 1) (K58 9)  18  Lumbar degenerative disc disease (722 52) (M51 36)  19  Lumbar radiculopathy, chronic (724 4) (M54 16)  20  Lumbar spondylosis (721 3) (M47 816)  21  Neuralgia (729 2) (M79 2)  22  Pain of lower extremity, unspecified laterality (729 5) (M79 606)  23  Pain, joint, shoulder (719 41) (M25 519)  24  Paresthesias (782 0) (R20 2)  25  Peripheral neuropathy (356 9) (G62 9)  26  Post laminectomy syndrome (722 80) (M96 1)  27  Pre-procedural examination (V72 84) (Z01 818)  28  Pre-procedural laboratory examination (V72 63) (Z01 812)  29  Sacroiliitis (720 2) (M46 1)  30  Sinusitis (473 9) (J32 9)  31  Status post surgery (V45 89) (Z98 890)  32  Thoracic back pain (724 1) (M54 6)  33  Thyroid dysfunction (246 9) (E07 9)  34   Vitamin D deficiency (268 9) (E55 9)    Past Medical History    · History of Abnormal findings on diagnostic imaging of breast (793 89) (R92 8)   · History of Acute upper respiratory infection (465 9) (J06 9) · History of Allergic rhinitis (477 9) (J30 9)   · History of Asthma (493 90) (J45 909)   · History of Chest congestion (786 9) (R09 89)   · History of Encounter for routine gynecological examination (V72 31) (Z01 419)   · History of Encounter for screening colonoscopy (V76 51) (Z12 11)   · History of Encounter for screening colonoscopy (V76 51) (Z12 11)   · History of Fibrocystic breast disease, unspecified laterality (610 1) (N60 19)   · History of Foot pain (729 5) (M79 673)   · History of GERD (gastroesophageal reflux disease) (530 81) (K21 9)   · History of High cholesterol (272 0) (E78 00)   · History of arthritis (V13 4) (Z87 39)   · History of osteopenia (V13 59) (Z87 39)   · History of Influenza (487 1) (J11 1)   · History of Nausea (787 02) (R11 0)   · History of Sinusitis (473 9) (J32 9)   · History of Skin rash (782 1) (R21)   · History of Sore throat (462) (J02 9)   · History of UTI (urinary tract infection) (599 0) (N39 0)    The active problems and past medical history were reviewed and updated today  Surgical History    · History of Appendectomy   · History of Cholecystectomy   · History of Foot Surgery   · History of Gynecologic Laparoscopy With Adhesiolysis   · History of Hysteroscopy   · History of Incisional Breast Biopsy   · History of Knee Arthroscopy   · History of Laparoscopy (Diagnostic)   · History of Tonsillectomy With Adenoidectomy    The surgical history was reviewed and updated today         Family History  Mother    · Family history of Bone Cancer  Father    · Family history of Brain Cancer (V16 8)   · Family history of Stroke Syndrome (V17 1)  Paternal Grandmother    · Family history of Diabetes Mellitus (V18 0)  Maternal Aunt    · Family history of Breast Cancer (V16 3)  Other    · Family history of Back disorder   · Family history of cardiac disorder (V17 49) (Z82 49)   · Family history of cerebrovascular accident (V17 1) (Z82 3)   · Family history of diabetes mellitus (V18 0) (Z83 3)   · Family history of hypertension (V17 49) (Z82 49)   · Family history of malignant neoplasm (V16 9) (Z80 9)  Family History    · Denied: Family history of Colon Cancer   · Denied: Family history of Osteoporosis   · Denied: Family history of Ovarian Cancer   · Denied: Family history of Uterine Cancer    The family history was reviewed and updated today  Social History    · Dental care, regularly   · Good dental hygiene   · Never a smoker   · No caffeine use   · No drug use   · Single   · Social alcohol use (Z78 9)   · Sun Protection Sunscreen   · Uses Safety Equipment - Seatbelts  The social history was reviewed and updated today  The social history was reviewed and is unchanged  Current Meds  1  Allegra Allergy 180 MG Oral Tablet; take 1 tablet daily as needed Recorded  2  Amitriptyline HCl - 50 MG Oral Tablet; TAKE 1 TABLET AT BEDTIME; Therapy: 17BVA7291 to (Evaluate:13Apr2017)  Requested for: 17Yfi8475; Last   Rx:37Qxu2121 Ordered  3  EpiPen 2-Michel 0 3 MG/0 3ML Injection Solution Auto-injector; use as directed; Therapy: 86FXK7109 to (Last Rx:15Jun2016)  Requested for: 83GWS9712 Ordered  4  Gabapentin 300 MG Oral Capsule; TAKE 1 CAPSULE 3 times daily; Therapy: 85QHF3499 to (Evaluate:13Apr2017)  Requested for: 42Fdv1736; Last   Rx:34Wah5784 Ordered  5  HydrOXYzine HCl - 10 MG Oral Tablet; TAKE 2 TABLETS AT BEDTIME; Therapy: 49AEC5539 to (Bret Jones)  Requested for: 05Ltf2252; Last   Rx:04Apr2016 Ordered  6  Losartan Potassium 50 MG Oral Tablet; TAKE 1 TABLET DAILY; Therapy: 49ALR0705 to (Evaluate:16Nov2016)  Requested for: 55GTF8319; Last   Rx:54Dlv4053 Ordered  7  Montelukast Sodium 10 MG Oral Tablet; TAKE 1 TABLET DAILY AS DIRECTED; Therapy: 41MFT6223 to (Evaluate:16Nov2016)  Requested for: 06TDI4983; Last   Rx:11Iks1147 Ordered  8  Nucynta ER 50 MG Oral Tablet Extended Release 12 Hour; take 1 tablet every twelve   hours; Therapy: 82URF1793 to (Evaluate:09Bbg9658);  Last Rx: 75MRU5229 Ordered  9  Simvastatin 10 MG Oral Tablet; take one tablet by mouth every other day; Therapy: 39GSC4185 to (Evaluate:18Jun2017)  Requested for: 26QPG1483; Last   MB:18ZLF3985 Ordered  10  TiZANidine HCl - 4 MG Oral Tablet; Take 1 TAB PO Q HS; Therapy: 77LIK4337 to ([phone])  Requested for: 08PMO5400; Last    Rx:46Rbl9119 Ordered    The medication list was reviewed and updated today  Allergies   1  Codeine Derivatives  2  Iodinated Contrast Media  3  Latex Exam Gloves MISC  4  Penicillins   5  Adhesive Tape  6  Eggs    Vitals  Signs    Systolic: 907  Diastolic: 72   Temperature: 97 2 F  Height: 5 ft 2 in  Weight: 201 lb 2 08 oz  BMI Calculated: 36 79  BSA Calculated: 1 92    Physical Exam    Constitutional1    General appearance: No acute distress, well appearing and well nourished  1    Psychiatric1    Judgment and insight: Normal 1    Orientation to person, place, and time: Normal 1    Recent and remote memory: Intact  1    Mood and affect: Normal 1        1 Amended By: Israel Urbano; Nov 20 2016 6:48 PM EST    Health Management  Encounter for screening mammogram for malignant neoplasm of breast   DIGTL SCRN MAMMO W/CAD; every 1 year; Last 17FGX1211; Next Due: 81HFU2917; Overdue    Future Appointments    Date/Time Provider Specialty Site   06/15/2017 01:00 PM [facility]  Neurology [facility] NEUROLOGY [facility]   07/07/2017 10:45 AM ABDIEL Dillon   Neurosurgery [facility]   07/07/2017 10:30 AM Taisha Suárez, [facility] Neurosurgery [facility] NEUROSURGICAL [facility]   04/27/2017 02:30 PM Manisha Tripp [facility] Neurosurgery [facility] NEUROSURGICAL [facility]   06/01/2017 10:30 AM NeuroSX, Nursetwo Schedule  [facility]   12/21/2016 08:15 AM EBONI Franco Pain Management [facility] SPINE     Signatures   Electronically signed by : Shae Hendrix DO; Nov 20 2016  6:51PM EST                       (Author)

## 2018-01-26 ENCOUNTER — TELEPHONE (OUTPATIENT)
Dept: INTERNAL MEDICINE CLINIC | Facility: CLINIC | Age: 68
End: 2018-01-26

## 2018-01-26 ENCOUNTER — OFFICE VISIT (OUTPATIENT)
Dept: INTERNAL MEDICINE CLINIC | Facility: CLINIC | Age: 68
End: 2018-01-26
Payer: MEDICARE

## 2018-01-26 VITALS
BODY MASS INDEX: 36.18 KG/M2 | WEIGHT: 211.9 LBS | TEMPERATURE: 97.2 F | OXYGEN SATURATION: 97 % | SYSTOLIC BLOOD PRESSURE: 126 MMHG | HEIGHT: 64 IN | HEART RATE: 99 BPM | DIASTOLIC BLOOD PRESSURE: 70 MMHG

## 2018-01-26 DIAGNOSIS — J30.89 CHRONIC NON-SEASONAL ALLERGIC RHINITIS, UNSPECIFIED TRIGGER: ICD-10-CM

## 2018-01-26 DIAGNOSIS — I10 ESSENTIAL HYPERTENSION: ICD-10-CM

## 2018-01-26 DIAGNOSIS — R05.9 COUGH: Primary | ICD-10-CM

## 2018-01-26 DIAGNOSIS — M54.16 LUMBAR RADICULOPATHY, CHRONIC: ICD-10-CM

## 2018-01-26 PROBLEM — G89.29 CHRONIC HEADACHES: Status: ACTIVE | Noted: 2017-12-26

## 2018-01-26 PROBLEM — R51.9 CHRONIC HEADACHES: Status: ACTIVE | Noted: 2017-12-26

## 2018-01-26 PROCEDURE — 99214 OFFICE O/P EST MOD 30 MIN: CPT | Performed by: INTERNAL MEDICINE

## 2018-01-26 RX ORDER — MONTELUKAST SODIUM 10 MG/1
10 TABLET ORAL
Qty: 30 TABLET | Refills: 5 | Status: SHIPPED | OUTPATIENT
Start: 2018-01-26 | End: 2018-05-25 | Stop reason: SDUPTHER

## 2018-01-26 RX ORDER — AMITRIPTYLINE HYDROCHLORIDE 50 MG/1
1 TABLET, FILM COATED ORAL DAILY
COMMUNITY
Start: 2015-01-12 | End: 2018-06-21 | Stop reason: SDUPTHER

## 2018-01-26 RX ORDER — CYCLOBENZAPRINE HCL 5 MG
1 TABLET ORAL
COMMUNITY
Start: 2017-06-15 | End: 2018-03-31

## 2018-01-26 RX ORDER — LEVOCETIRIZINE DIHYDROCHLORIDE 5 MG/1
1 TABLET, FILM COATED ORAL DAILY
COMMUNITY
Start: 2017-06-01 | End: 2018-04-03 | Stop reason: ALTCHOICE

## 2018-01-26 RX ORDER — GABAPENTIN 300 MG/1
1 CAPSULE ORAL DAILY
COMMUNITY
Start: 2018-01-15 | End: 2018-01-26 | Stop reason: SDUPTHER

## 2018-01-26 RX ORDER — SIMVASTATIN 10 MG
1 TABLET ORAL EVERY OTHER DAY
COMMUNITY
Start: 2013-03-18 | End: 2018-05-25 | Stop reason: SDUPTHER

## 2018-01-26 RX ORDER — EPINEPHRINE 0.3 MG/.3ML
1 INJECTION SUBCUTANEOUS AS NEEDED
COMMUNITY
Start: 2014-06-23 | End: 2019-12-10 | Stop reason: SDUPTHER

## 2018-01-26 RX ORDER — LOSARTAN POTASSIUM 50 MG/1
1 TABLET ORAL DAILY
COMMUNITY
Start: 2015-06-11 | End: 2018-05-25 | Stop reason: SDUPTHER

## 2018-01-26 RX ORDER — HYDROXYZINE HYDROCHLORIDE 10 MG/1
2 TABLET, FILM COATED ORAL
COMMUNITY
Start: 2014-11-21 | End: 2018-03-31

## 2018-01-26 RX ORDER — GABAPENTIN 300 MG/1
1 CAPSULE ORAL 3 TIMES DAILY
COMMUNITY
Start: 2014-11-21 | End: 2018-03-31

## 2018-01-26 RX ORDER — BENZONATATE 100 MG/1
100 CAPSULE ORAL 3 TIMES DAILY PRN
Qty: 20 CAPSULE | Refills: 0 | Status: SHIPPED | OUTPATIENT
Start: 2018-01-26 | End: 2018-02-02 | Stop reason: SDUPTHER

## 2018-01-26 RX ORDER — OMEPRAZOLE 40 MG/1
1 CAPSULE, DELAYED RELEASE ORAL DAILY
COMMUNITY
Start: 2017-01-30 | End: 2018-05-25 | Stop reason: SDUPTHER

## 2018-01-26 RX ORDER — AZELASTINE 1 MG/ML
1 SPRAY, METERED NASAL 2 TIMES DAILY
COMMUNITY
Start: 2017-02-22 | End: 2018-03-31

## 2018-01-26 NOTE — PROGRESS NOTES
Assessment/Plan:  Patient will add singulair which has helped in the past and work environment is being addressed for possible mold  Tessalon perles pr  Increase fluids, rest   Continue allegra daily  She continues to see neuro for her ongoing back pain but having the stimulator placed has helped  Problem List Items Addressed This Visit     Hypertension    Relevant Medications    EPINEPHrine (EPIPEN 2-AREN) 0 3 mg/0 3 mL SOAJ    losartan (COZAAR) 50 mg tablet    Lumbar radiculopathy, chronic      Other Visit Diagnoses     Cough    -  Primary    Relevant Medications    montelukast (SINGULAIR) 10 mg tablet    benzonatate (TESSALON PERLES) 100 mg capsule    Chronic non-seasonal allergic rhinitis, unspecified trigger                Subjective:      Patient ID: Leroy Brunson is a 80 y o  female  HPI   Patient has dry cough,congestion and pnd  Some nausea and sxs are off and on  ?mold in present office  Anupama Chapman is coming in next week to fumagate  No fever or chills  Occasional chills  No sob or wheeze  Has not been using her astelin for awhile  Is taking allegra  The following portions of the patient's history were reviewed and updated as appropriate: allergies, current medications, past family history, past medical history, past social history, past surgical history and problem list   Social History     Social History    Marital status: Single     Spouse name: N/A    Number of children: N/A    Years of education: N/A     Occupational History    Not on file       Social History Main Topics    Smoking status: Never Smoker    Smokeless tobacco: Never Used    Alcohol use Yes      Comment: social less than a drink per week    Drug use: No    Sexual activity: Not on file     Other Topics Concern    Not on file     Social History Narrative    No narrative on file     Past Medical History:   Diagnosis Date    Anxiety     Arthritis     GERD (gastroesophageal reflux disease)     Hyperlipidemia  Hypertension      Family History   Problem Relation Age of Onset    Cancer Mother     Hypertension Father      Allergies   Allergen Reactions    Eggs Or Egg-Derived Products Hives    Codeine Other (See Comments)     headaches    Iodinated Diagnostic Agents Hives    Other      Adhesive tape    Penicillins Hives    Latex Rash           Review of Systems   Constitutional: Negative for chills, fatigue and fever  HENT: Positive for congestion, postnasal drip, rhinorrhea, sinus pressure and voice change  Negative for ear discharge, ear pain, facial swelling and hearing loss  Eyes: Negative  Respiratory: Positive for cough  Cardiovascular: Negative  Gastrointestinal: Positive for nausea  Endocrine: Negative  Genitourinary: Negative  Musculoskeletal: Positive for arthralgias, back pain and myalgias  Allergic/Immunologic: Positive for environmental allergies  Neurological: Positive for light-headedness  Hematological: Negative  Psychiatric/Behavioral: Negative  Objective:     Physical Exam   Constitutional: She is oriented to person, place, and time  She appears well-developed and well-nourished  No distress  HENT:   Head: Normocephalic and atraumatic  Right Ear: External ear normal    Left Ear: External ear normal    Nose: Mucosal edema and rhinorrhea present  Mouth/Throat: Oropharyngeal exudate and posterior oropharyngeal erythema present  Eyes: Conjunctivae and EOM are normal  Pupils are equal, round, and reactive to light  Neck: Normal range of motion  Neck supple  Cardiovascular: Normal rate, regular rhythm and normal heart sounds  Pulmonary/Chest: Effort normal and breath sounds normal    Abdominal: Soft  Bowel sounds are normal    Musculoskeletal: Normal range of motion  She exhibits tenderness  She exhibits no edema or deformity  Neurological: She is alert and oriented to person, place, and time  She has normal reflexes   She displays normal reflexes  No cranial nerve deficit  She exhibits normal muscle tone  Coordination normal    Skin: Skin is warm and dry  She is not diaphoretic  Psychiatric: She has a normal mood and affect   Her behavior is normal  Judgment and thought content normal

## 2018-01-29 DIAGNOSIS — J06.9 VIRAL UPPER RESPIRATORY TRACT INFECTION: Primary | ICD-10-CM

## 2018-01-29 RX ORDER — OSELTAMIVIR PHOSPHATE 75 MG/1
75 CAPSULE ORAL EVERY 12 HOURS SCHEDULED
Qty: 10 CAPSULE | Refills: 0 | Status: SHIPPED | OUTPATIENT
Start: 2018-01-29 | End: 2018-02-03

## 2018-01-29 RX ORDER — AZITHROMYCIN 250 MG/1
TABLET, FILM COATED ORAL
Qty: 6 TABLET | Refills: 0 | Status: SHIPPED | OUTPATIENT
Start: 2018-01-29 | End: 2018-02-04

## 2018-02-02 DIAGNOSIS — R05.9 COUGH: ICD-10-CM

## 2018-02-02 RX ORDER — BENZONATATE 100 MG/1
100 CAPSULE ORAL 3 TIMES DAILY PRN
Qty: 30 CAPSULE | Refills: 0 | Status: SHIPPED | OUTPATIENT
Start: 2018-02-02 | End: 2018-02-09

## 2018-03-08 ENCOUNTER — TELEPHONE (OUTPATIENT)
Dept: INTERNAL MEDICINE CLINIC | Facility: CLINIC | Age: 68
End: 2018-03-08

## 2018-03-08 ENCOUNTER — OFFICE VISIT (OUTPATIENT)
Dept: INTERNAL MEDICINE CLINIC | Facility: CLINIC | Age: 68
End: 2018-03-08
Payer: MEDICARE

## 2018-03-08 VITALS
BODY MASS INDEX: 35.51 KG/M2 | OXYGEN SATURATION: 97 % | HEIGHT: 64 IN | HEART RATE: 81 BPM | TEMPERATURE: 95.3 F | WEIGHT: 208 LBS

## 2018-03-08 DIAGNOSIS — M54.6 ACUTE BILATERAL THORACIC BACK PAIN: Primary | ICD-10-CM

## 2018-03-08 PROCEDURE — 99213 OFFICE O/P EST LOW 20 MIN: CPT | Performed by: INTERNAL MEDICINE

## 2018-03-08 RX ORDER — FEXOFENADINE HCL 180 MG/1
180 TABLET ORAL DAILY
COMMUNITY

## 2018-03-08 NOTE — PROGRESS NOTES
Assessment/Plan:         Acute bilateral thoracic back pain  Apply heat and use voltaren gel up to tid  Avoid lifting  Increase water intake,rest     Other orders  -     fexofenadine (ALLEGRA) 180 MG tablet; Take 180 mg by mouth daily         Patient ID: Yue Pace is a 80 y o  female  HPI   Mid back pain that is bilateral and worse on right side  Was helping to move a table Thursday and cleaning off snow on Friday  Did use heat last pm and that helped a bit  No numbness  No flank pain  No urinary sxs    Review of Systems   Constitutional: Negative  HENT: Negative  Eyes: Negative  Respiratory: Negative  Cardiovascular: Negative  Gastrointestinal: Negative  Endocrine: Negative  Genitourinary: Negative  Musculoskeletal: Positive for myalgias  Skin: Negative  Neurological: Negative  Hematological: Negative  Psychiatric/Behavioral: Negative  Physical Exam   Constitutional: She is oriented to person, place, and time  She appears well-developed and well-nourished  No distress  HENT:   Head: Normocephalic and atraumatic  Right Ear: External ear normal    Left Ear: External ear normal    Nose: Nose normal    Mouth/Throat: Oropharynx is clear and moist  No oropharyngeal exudate  Eyes: Conjunctivae and EOM are normal  Pupils are equal, round, and reactive to light  No scleral icterus  Neck: Normal range of motion  Neck supple  Cardiovascular: Normal rate, regular rhythm, normal heart sounds and intact distal pulses  Pulmonary/Chest: Effort normal and breath sounds normal    Abdominal: Soft  Bowel sounds are normal    Musculoskeletal: She exhibits deformity  She exhibits no edema  Muscle tenderness and visible raised area right thoracic discomfort Shifted to right but mild tenderness left mid thoracic spine   Neurological: She is alert and oriented to person, place, and time  She has normal reflexes  No cranial nerve deficit  She exhibits normal muscle tone  Skin: Skin is warm and dry  Psychiatric: She has a normal mood and affect  Her behavior is normal  Judgment and thought content normal    Nursing note and vitals reviewed

## 2018-03-08 NOTE — PATIENT INSTRUCTIONS
Thoracic Pain   AMBULATORY CARE:   Thoracic pain  is discomfort in any area between your neck and your abdomen  Thoracic pain may be caused by health conditions that affect your gastrointestinal system, lungs, bones, or muscles  It can also be caused by trauma, panic attacks, or anxiety related to stress  Seek care immediately if:   · You have a period of thoracic pain that lasts longer than 5 minutes  · Your thoracic pain gets worse  · You have a history of angina (pressure or squeezing chest pain) and your usual medicine does not work  · You have thoracic pain with shortness of breath, sweating, dizziness, vomiting, or nausea  · You have thoracic pain that spreads to your arm, neck, back, jaw, or stomach  Contact your healthcare provider or specialist if:   · Your thoracic pain is not relieved by resting, heat, or medicines  · You have questions or concerns about your condition or care  Treatment for thoracic pain  may include any of the following:  · Acetaminophen  decreases pain  It is available without a prescription  Ask how much to take and how often to take it  Follow directions  Acetaminophen can cause liver damage if not taken correctly  · NSAIDs , such as ibuprofen, help decrease swelling, pain, and fever  This medicine is available with or without a doctor's order  NSAIDs can cause stomach bleeding or kidney problems in certain people  If you take blood thinner medicine, always ask if NSAIDs are safe for you  Always read the medicine label and follow directions  Do not give these medicines to children under 10months of age without direction from your child's healthcare provider  Manage your symptoms:   · Apply heat to the area  Heat helps decrease pain and muscle spasms  Apply heat on the area for 20 to 30 minutes every 2 hours for as many days as directed  · Limit physical activity that causes pain  Rest as needed   Ask your healthcare provider how long you should limit activity  Follow up with your healthcare provider as directed:  Write down your questions so you remember to ask them during your visits  © 2017 [address] Information is for End User's use only and may not be sold, redistributed or otherwise used for commercial purposes  All illustrations and images included in CareNotes® are the copyrighted property of [employer] M [employer]  or [employer]  The above information is an  only  It is not intended as medical advice for individual conditions or treatments  Talk to your doctor, nurse or pharmacist before following any medical regimen to see if it is safe and effective for you

## 2018-03-12 ENCOUNTER — APPOINTMENT (OUTPATIENT)
Dept: LAB | Facility: HOSPITAL | Age: 68
End: 2018-03-12
Attending: INTERNAL MEDICINE
Payer: MEDICARE

## 2018-03-12 ENCOUNTER — HOSPITAL ENCOUNTER (OUTPATIENT)
Dept: RADIOLOGY | Facility: HOSPITAL | Age: 68
Discharge: HOME | End: 2018-03-12
Attending: INTERNAL MEDICINE
Payer: MEDICARE

## 2018-03-12 ENCOUNTER — TELEPHONE (OUTPATIENT)
Dept: INTERNAL MEDICINE CLINIC | Facility: CLINIC | Age: 68
End: 2018-03-12

## 2018-03-12 ENCOUNTER — TRANSCRIBE ORDERS (OUTPATIENT)
Dept: ADMINISTRATIVE | Facility: HOSPITAL | Age: 68
End: 2018-03-12

## 2018-03-12 DIAGNOSIS — N30.00 ACUTE CYSTITIS WITHOUT HEMATURIA: Primary | ICD-10-CM

## 2018-03-12 DIAGNOSIS — R07.81 RIB PAIN: Primary | ICD-10-CM

## 2018-03-12 DIAGNOSIS — R07.81 RIB PAIN: ICD-10-CM

## 2018-03-12 DIAGNOSIS — M54.9 BACK PAIN, UNSPECIFIED BACK LOCATION, UNSPECIFIED BACK PAIN LATERALITY, UNSPECIFIED CHRONICITY: ICD-10-CM

## 2018-03-12 LAB
BACTERIA UR QL AUTO: ABNORMAL /HPF
BILIRUB UR QL STRIP: NEGATIVE
CLARITY UR: CLEAR
COLOR UR: YELLOW
GLUCOSE UR STRIP-MCNC: NEGATIVE MG/DL
HGB UR QL STRIP.AUTO: NEGATIVE
KETONES UR STRIP-MCNC: NEGATIVE MG/DL
LEUKOCYTE ESTERASE UR QL STRIP: ABNORMAL
NITRITE UR QL STRIP: NEGATIVE
NON-SQ EPI CELLS URNS QL MICRO: ABNORMAL /HPF
PH UR STRIP.AUTO: 7 [PH] (ref 4.5–8)
PROT UR STRIP-MCNC: NEGATIVE MG/DL
RBC #/AREA URNS AUTO: ABNORMAL /HPF
SP GR UR STRIP.AUTO: 1.01 (ref 1–1.03)
UROBILINOGEN UR QL STRIP.AUTO: 0.2 E.U./DL
WBC #/AREA URNS AUTO: ABNORMAL /HPF

## 2018-03-12 PROCEDURE — 81001 URINALYSIS AUTO W/SCOPE: CPT

## 2018-03-12 PROCEDURE — 72072 X-RAY EXAM THORAC SPINE 3VWS: CPT

## 2018-03-12 PROCEDURE — 72110 X-RAY EXAM L-2 SPINE 4/>VWS: CPT

## 2018-03-12 PROCEDURE — 71111 X-RAY EXAM RIBS/CHEST4/> VWS: CPT

## 2018-03-15 ENCOUNTER — TELEPHONE (OUTPATIENT)
Dept: INTERNAL MEDICINE CLINIC | Facility: CLINIC | Age: 68
End: 2018-03-15

## 2018-03-15 NOTE — TELEPHONE ENCOUNTER
Not sure if that was sent for culture as there is a small amount of bacteria - it is overall better than last urine that was done  Check with lab if it went for culture as it may not have met the criteria to do that

## 2018-03-31 ENCOUNTER — HOSPITAL ENCOUNTER (EMERGENCY)
Facility: HOSPITAL | Age: 68
Discharge: HOME | End: 2018-03-31
Attending: EMERGENCY MEDICINE | Admitting: EMERGENCY MEDICINE
Payer: MEDICARE

## 2018-03-31 ENCOUNTER — APPOINTMENT (EMERGENCY)
Dept: RADIOLOGY | Facility: HOSPITAL | Age: 68
End: 2018-03-31
Payer: MEDICARE

## 2018-03-31 VITALS
OXYGEN SATURATION: 100 % | HEIGHT: 64 IN | SYSTOLIC BLOOD PRESSURE: 124 MMHG | WEIGHT: 211.42 LBS | BODY MASS INDEX: 36.09 KG/M2 | HEART RATE: 54 BPM | DIASTOLIC BLOOD PRESSURE: 56 MMHG | RESPIRATION RATE: 18 BRPM | TEMPERATURE: 98 F

## 2018-03-31 DIAGNOSIS — M25.512 ACUTE PAIN OF LEFT SHOULDER: Primary | ICD-10-CM

## 2018-03-31 LAB
ANION GAP SERPL CALCULATED.3IONS-SCNC: 9 MMOL/L (ref 4–13)
BASOPHILS # BLD AUTO: 0.05 THOUSANDS/ΜL (ref 0–0.1)
BASOPHILS NFR BLD AUTO: 1 % (ref 0–1)
BUN SERPL-MCNC: 15 MG/DL (ref 5–25)
CALCIUM SERPL-MCNC: 9.1 MG/DL (ref 8.3–10.1)
CHLORIDE SERPL-SCNC: 104 MMOL/L (ref 100–108)
CO2 SERPL-SCNC: 29 MMOL/L (ref 21–32)
CREAT SERPL-MCNC: 0.68 MG/DL (ref 0.6–1.3)
EOSINOPHIL # BLD AUTO: 0.27 THOUSAND/ΜL (ref 0–0.61)
EOSINOPHIL NFR BLD AUTO: 3 % (ref 0–6)
ERYTHROCYTE [DISTWIDTH] IN BLOOD BY AUTOMATED COUNT: 12.9 % (ref 11.6–15.1)
GFR SERPL CREATININE-BSD FRML MDRD: 91 ML/MIN/1.73SQ M
GLUCOSE SERPL-MCNC: 108 MG/DL (ref 65–140)
HCT VFR BLD AUTO: 40.2 % (ref 34.8–46.1)
HGB BLD-MCNC: 14.2 G/DL (ref 11.5–15.4)
LYMPHOCYTES # BLD AUTO: 2.75 THOUSANDS/ΜL (ref 0.6–4.47)
LYMPHOCYTES NFR BLD AUTO: 35 % (ref 14–44)
MCH RBC QN AUTO: 30.5 PG (ref 26.8–34.3)
MCHC RBC AUTO-ENTMCNC: 35.3 G/DL (ref 31.4–37.4)
MCV RBC AUTO: 86 FL (ref 82–98)
MONOCYTES # BLD AUTO: 0.62 THOUSAND/ΜL (ref 0.17–1.22)
MONOCYTES NFR BLD AUTO: 8 % (ref 4–12)
NEUTROPHILS # BLD AUTO: 4.17 THOUSANDS/ΜL (ref 1.85–7.62)
NEUTS SEG NFR BLD AUTO: 53 % (ref 43–75)
PLATELET # BLD AUTO: 223 THOUSANDS/UL (ref 149–390)
PMV BLD AUTO: 9.5 FL (ref 8.9–12.7)
POTASSIUM SERPL-SCNC: 4.1 MMOL/L (ref 3.5–5.3)
RBC # BLD AUTO: 4.66 MILLION/UL (ref 3.81–5.12)
SODIUM SERPL-SCNC: 142 MMOL/L (ref 136–145)
TROPONIN I SERPL-MCNC: <0.02 NG/ML
WBC # BLD AUTO: 7.86 THOUSAND/UL (ref 4.31–10.16)

## 2018-03-31 PROCEDURE — 85025 COMPLETE CBC W/AUTO DIFF WBC: CPT | Performed by: EMERGENCY MEDICINE

## 2018-03-31 PROCEDURE — 80048 BASIC METABOLIC PNL TOTAL CA: CPT | Performed by: EMERGENCY MEDICINE

## 2018-03-31 PROCEDURE — 84484 ASSAY OF TROPONIN QUANT: CPT | Performed by: EMERGENCY MEDICINE

## 2018-03-31 PROCEDURE — 93005 ELECTROCARDIOGRAM TRACING: CPT

## 2018-03-31 PROCEDURE — 99285 EMERGENCY DEPT VISIT HI MDM: CPT

## 2018-03-31 PROCEDURE — 36415 COLL VENOUS BLD VENIPUNCTURE: CPT | Performed by: EMERGENCY MEDICINE

## 2018-03-31 PROCEDURE — 71046 X-RAY EXAM CHEST 2 VIEWS: CPT

## 2018-03-31 RX ORDER — TRAMADOL HYDROCHLORIDE 50 MG/1
50 TABLET ORAL EVERY 6 HOURS PRN
Qty: 20 TABLET | Refills: 0 | Status: SHIPPED | OUTPATIENT
Start: 2018-03-31 | End: 2018-11-05 | Stop reason: ALTCHOICE

## 2018-03-31 RX ORDER — 0.9 % SODIUM CHLORIDE 0.9 %
3 VIAL (ML) INJECTION AS NEEDED
Status: DISCONTINUED | OUTPATIENT
Start: 2018-03-31 | End: 2018-03-31 | Stop reason: HOSPADM

## 2018-03-31 RX ORDER — ASPIRIN 81 MG/1
324 TABLET, CHEWABLE ORAL ONCE
Status: COMPLETED | OUTPATIENT
Start: 2018-03-31 | End: 2018-03-31

## 2018-03-31 RX ADMIN — ASPIRIN 81 MG 324 MG: 81 TABLET ORAL at 08:30

## 2018-03-31 NOTE — DISCHARGE INSTRUCTIONS
Shoulder Pain, Ambulatory Care   GENERAL INFORMATION:   Shoulder pain  is a common problem and can affect your daily activities  Pain can be caused by a problem within your shoulder  Shoulder pain may also be caused by pain that spreads to your shoulder from another part of your body  Seek immediate care for the following symptoms:   · Severe pain    · Inability to move your arm or shoulder    · Numbness or tingling in your shoulder or arm  Treatment for shoulder pain  may include any of the following:  · Acetaminophen  decreases pain and fever  It is available without a doctor's order  Ask how much to take and how often to take it  Follow directions  Acetaminophen can cause liver damage if not taken correctly  · NSAIDs  help decrease swelling and pain or fever  This medicine is available with or without a doctor's order  NSAIDs can cause stomach bleeding or kidney problems in certain people  If you take blood thinner medicine, always ask your healthcare provider if NSAIDs are safe for you  Always read the medicine label and follow directions  · A steroid injection  may help decrease pain and swelling  · Surgery  may be needed for long-term pain and loss of function  Manage your symptoms:   · Apply ice  on your shoulder for 20 to 30 minutes every 2 hours or as directed  Use an ice pack, or put crushed ice in a plastic bag  Cover it with a towel  Ice helps prevent tissue damage and decreases swelling and pain  · Apply heat if ice does not help your symptoms  Apply heat on your shoulder for 20 to 30 minutes every 2 hours for as many days as directed  Heat helps decrease pain and muscle spasms  · Go to physical or occupational therapy as directed  A physical therapist teaches you exercises to help improve movement and strength, and to decrease pain  An occupational therapist teaches you skills to help with your daily activities  Prevent shoulder pain:   · Stretch and strengthen your shoulder  Use proper technique during exercises and sports  · Limit activities as directed  Try to avoid repeated overhead movements  Follow up with your healthcare provider or orthopedist as directed:  Write down your questions so you remember to ask them during your visits  CARE AGREEMENT:   You have the right to help plan your care  Learn about your health condition and how it may be treated  Discuss treatment options with your caregivers to decide what care you want to receive  You always have the right to refuse treatment  The above information is an  only  It is not intended as medical advice for individual conditions or treatments  Talk to your doctor, nurse or pharmacist before following any medical regimen to see if it is safe and effective for you  © 2014 [address] is for End User's use only and may not be sold, redistributed or otherwise used for commercial purposes  All illustrations and images included in CareNotes® are the copyrighted property of A [employer] M [employer]  or [employer]

## 2018-03-31 NOTE — ED PROVIDER NOTES
History  Chief Complaint   Patient presents with    Chest Pain     Pt reports she was woke up yesterday morning with chest pain that radiated to her left arm  Left arm/shoulder tender to touch  Pt reports went away and then came back this morning with a burning pain  Patient complains of sharp and burning left upper chest/shoulder pain that awoke her from sleep yesterday morning and recurred yesterday evening, lasting continuously through the night  Patient took Tylenol and applied heat which helped  She initially applied ice which did not help  She was seen in the ED and admitted overnight in September 2017 for similar left upper chest and shoulder pain  She underwent serial troponins and cardiac echo which were normal  She did not have a stress test, did not follow up with Cardiology and has never had catheterization  After that visit she underwent physical therapy for the left shoulder which alleviated the pain  She denies associated cardiac symptoms since the pain started yesterday  She states she has several orthopedic problems including bilateral knee replacements and a lumbar back stimulator  She states she know she is not supposed to be lifting heavy things but was doing so during the day prior to symptom onset  The pain is easily reproducible with palpation over the humeral head  No recent travel or sick contacts  Denies f/c, HA, LH/dizziness, diaphoresis, SOB, abdominal pain, n/v/d  12 system ROS o/w negative               History provided by:  Patient and medical records  Chest Pain   Pain location:  L chest  Pain quality: burning and sharp    Pain radiates to:  L shoulder  Pain radiates to the back: no    Pain severity:  Moderate  Onset quality:  Sudden  Duration:  12 hours  Date/time of last known well:  3/29/2018 10:00 PM  Timing:  Constant  Progression:  Unchanged  Chronicity:  Recurrent  Context: lifting and movement    Context: not breathing, no drug use, not eating, not raising an arm, not at rest, no stress and no trauma    Relieved by: Tylenol and warm compresses  Worsened by:  Certain positions (Palpation)  Ineffective treatments: Ice  Associated symptoms: back pain (Chronic, unchanged per patient)    Associated symptoms: no abdominal pain, no altered mental status, no anorexia, no anxiety, no claudication, no cough, no diaphoresis, no dizziness, no dysphagia, no fatigue, no fever, no headache, no heartburn, no lower extremity edema, no nausea, no near-syncope, no numbness, no orthopnea, no palpitations, no PND, no shortness of breath, no syncope, not vomiting and no weakness    Risk factors: hypertension and obesity    Risk factors: no aortic disease, no coronary artery disease, no diabetes mellitus, no immobilization, not male, not pregnant, no prior DVT/PE and no smoking        Prior to Admission Medications   Prescriptions Last Dose Informant Patient Reported? Taking?    Biotin 2500 MCG CAPS   Yes Yes   Sig: Take 2 capsules by mouth daily   Cholecalciferol (VITAMIN D-3 PO)   Yes Yes   Sig: Take 1 tablet by mouth daily   Cinnamon 500 MG TABS   Yes Yes   Sig: Take 2 tablets by mouth daily   Cyanocobalamin (VITAMIN B-12 CR PO)   Yes Yes   Sig: Take 1 tablet by mouth daily   EPINEPHrine (EPIPEN 2-AREN) 0 3 mg/0 3 mL SOAJ   Yes Yes   Sig: Inject 1 Units as directed as needed   Omega-3 Fatty Acids (FISH OIL) 1,000 mg   Yes Yes   Sig: Take 1,000 mg by mouth 3 (three) times a day   amitriptyline (ELAVIL) 50 mg tablet   Yes Yes   Sig: Take 1 tablet by mouth daily   diclofenac sodium (VOLTAREN) 1 %   Yes Yes   Sig: Place 1 g on the skin 2 (two) times a day   dicyclomine (BENTYL) 10 mg capsule   Yes Yes   Sig: Take 10 mg by mouth every 6 (six) hours as needed   fexofenadine (ALLEGRA) 180 MG tablet  Self Yes Yes   Sig: Take 180 mg by mouth daily   gabapentin (NEURONTIN) 100 mg capsule   Yes Yes   Sig: Take 300 mg by mouth 3 (three) times a day     hydrOXYzine HCL (ATARAX) 10 mg tablet   Yes Yes Sig: Take 2 tablets by mouth daily at bedtime as needed     levocetirizine (XYZAL ALLERGY 24HR) 5 MG tablet   Yes Yes   Sig: Take 1 tablet by mouth daily   losartan (COZAAR) 50 mg tablet   Yes Yes   Sig: Take 1 tablet by mouth daily   montelukast (SINGULAIR) 10 mg tablet   No Yes   Sig: Take 1 tablet (10 mg total) by mouth daily at bedtime   omeprazole (PriLOSEC) 40 MG capsule   Yes Yes   Sig: Take 1 tablet by mouth daily   simvastatin (ZOCOR) 10 mg tablet   Yes Yes   Sig: Take 1 tablet by mouth every other day      Facility-Administered Medications: None       Past Medical History:   Diagnosis Date    Anxiety     Arthritis     GERD (gastroesophageal reflux disease)     Hyperlipidemia     Hypertension        Past Surgical History:   Procedure Laterality Date    APPENDECTOMY      BACK SURGERY      BREAST LUMPECTOMY Left     CARPAL BOSS EXCISION      CHOLECYSTECTOMY      FOOT SURGERY      HYSTERECTOMY      KIDNEY SURGERY Left     KNEE ARTHROSCOPY Bilateral     TN SURG IMPLNT NEUROELECT,EPIDURAL N/A 5/18/2017    Procedure: PLACEMENT OF THORACIC SPINAL CORD STIMULATOR WITH LEFT BUTTOCK IMPLANTABLE PULSE GENERATOR (IMPULSE MONITORING-MOTORS (TCEMEP), EMG, SSEP); Surgeon: Bhavik Cormier MD;  Location: [facility];  Service: Neurosurgery    SPINAL STIMULATOR PLACEMENT  05/2017       Family History   Problem Relation Age of Onset    Cancer Mother     Hypertension Father      I have reviewed and agree with the history as documented  Social History   Substance Use Topics    Smoking status: Never Smoker    Smokeless tobacco: Never Used    Alcohol use Yes      Comment: social less than a drink per week        Review of Systems   Constitutional: Negative for activity change, appetite change, chills, diaphoresis, fatigue and fever  HENT: Negative for rhinorrhea, sore throat and trouble swallowing  Respiratory: Negative for cough, shortness of breath and wheezing      Cardiovascular: Positive for chest pain  Negative for palpitations, orthopnea, claudication, leg swelling, syncope, PND and near-syncope  Gastrointestinal: Negative for abdominal distention, abdominal pain, anorexia, constipation, diarrhea, heartburn, nausea and vomiting  Genitourinary: Negative for difficulty urinating, dysuria, flank pain, frequency, hematuria and urgency  Musculoskeletal: Positive for back pain (Chronic, unchanged per patient)  Negative for neck pain  Left shoulder pain   Skin: Negative for pallor and rash  Neurological: Negative for dizziness, syncope, weakness, light-headedness, numbness and headaches  Hematological: Negative for adenopathy  Psychiatric/Behavioral: Negative for confusion  All other systems reviewed and are negative  Physical Exam  ED Triage Vitals [03/31/18 0801]   Temperature Pulse Respirations Blood Pressure SpO2   98 °F (36 7 °C) 82 17 148/71 100 %      Temp Source Heart Rate Source Patient Position - Orthostatic VS BP Location FiO2 (%)   Temporal Monitor Sitting Left arm --      Pain Score       8           Orthostatic Vital Signs  Vitals:    03/31/18 0801 03/31/18 0831 03/31/18 0915 03/31/18 1000   BP: 148/71 142/70 129/58 124/56   Pulse: 82 73 58 (!) 54   Patient Position - Orthostatic VS: Sitting Sitting Sitting Sitting       Physical Exam   Constitutional: She is oriented to person, place, and time  She appears well-developed and well-nourished  No distress  HENT:   Head: Normocephalic  Mouth/Throat: Oropharynx is clear and moist    Eyes: Conjunctivae and EOM are normal  Pupils are equal, round, and reactive to light  Neck: Normal range of motion  Neck supple  Cardiovascular: Normal rate and regular rhythm  No murmur heard  Pulmonary/Chest: Effort normal and breath sounds normal    Abdominal: Soft  Bowel sounds are normal  She exhibits no distension  There is no tenderness  Obese   Musculoskeletal: Normal range of motion  She exhibits no edema or tenderness  Lymphadenopathy:     She has no cervical adenopathy  Neurological: She is alert and oriented to person, place, and time  She has normal reflexes  No cranial nerve deficit  She exhibits normal muscle tone  Skin: Skin is warm and dry  No rash noted  She is not diaphoretic  No erythema  No pallor  Psychiatric: She has a normal mood and affect  Her behavior is normal  Thought content normal    Vitals reviewed  ED Medications  Medications   aspirin chewable tablet 324 mg (324 mg Oral Given 3/31/18 0830)       Diagnostic Studies  Results Reviewed     Procedure Component Value Units Date/Time    Troponin I [[id number]]  (Normal) Collected:  03/31/18 0810    Lab Status:  Final result Specimen:  Blood from Arm, Right Updated:  03/31/18 0849     Troponin I <0 02 ng/mL     Narrative:         [employer] Chemistry analyzer 99% cutoff is > 0 04 ng/mL in network labs    o cTnI 99% cutoff is useful only when applied to patients in the clinical setting of myocardial ischemia  o cTnI 99% cutoff should be interpreted in the context of clinical history, ECG findings and possibly cardiac imaging to establish correct diagnosis  o cTnI 99% cutoff may be suggestive but clearly not indicative of a coronary event without the clinical setting of myocardial ischemia  Basic metabolic panel [[id number]] Collected:  03/31/18 0810    Lab Status:  Final result Specimen:  Blood from Arm, Right Updated:  03/31/18 0849     Sodium 142 mmol/L      Potassium 4 1 mmol/L      Chloride 104 mmol/L      CO2 29 mmol/L      Anion Gap 9 mmol/L      BUN 15 mg/dL      Creatinine 0 68 mg/dL      Glucose 108 mg/dL      Calcium 9 1 mg/dL      eGFR 91 ml/min/1 73sq m     Narrative:         National Kidney Disease Education Program recommendations are as follows:  GFR calculation is accurate only with a steady state creatinine  Chronic Kidney disease less than 60 ml/min/1 73 sq  meters  Kidney failure less than 15 ml/min/1 73 sq  meters      CBC and differential [[id number]]  (Normal) Collected:  03/31/18 0810    Lab Status:  Final result Specimen:  Blood from Arm, Right Updated:  03/31/18 0830     WBC 7 86 Thousand/uL      RBC 4 66 Million/uL      Hemoglobin 14 2 g/dL      Hematocrit 40 2 %      MCV 86 fL      MCH 30 5 pg      MCHC 35 3 g/dL      RDW 12 9 %      MPV 9 5 fL      Platelets 194 Thousands/uL      Neutrophils Relative 53 %      Lymphocytes Relative 35 %      Monocytes Relative 8 %      Eosinophils Relative 3 %      Basophils Relative 1 %      Neutrophils Absolute 4 17 Thousands/µL      Lymphocytes Absolute 2 75 Thousands/µL      Monocytes Absolute 0 62 Thousand/µL      Eosinophils Absolute 0 27 Thousand/µL      Basophils Absolute 0 05 Thousands/µL                  X-ray chest 2 views   Final Result by WILTON Martinez MD (03/31 6922)      No acute cardiopulmonary disease  Spinal cord stimulator present  Workstation performed: [id number]                    Procedures  Procedures       Phone Contacts  ED Phone Contact    ED Course  ED Course as of Mar 31 1535   Sat Mar 31, 2018   3025 Results reviewed with patient  Feels well  All questions answered to the patient's satisfaction  Recommend continued supportive treatment at home and follow up with PCP            HEART Risk Score    Flowsheet Row Most Recent Value   History  0 Filed at: 03/31/2018 0813   ECG  0 Filed at: 03/31/2018 0813   Age  2 Filed at: 03/31/2018 0813   Risk Factors  1 Filed at: 03/31/2018 0813   Troponin  0 Filed at: 03/31/2018 0813   Heart Score Risk Calculator   History  0 Filed at: 03/31/2018 0813   ECG  0 Filed at: 03/31/2018 0813   Age  2 Filed at: 03/31/2018 0813   Risk Factors  1 Filed at: 03/31/2018 0813   Troponin  0 Filed at: 03/31/2018 0813   HEART Score  3 Filed at: 03/31/2018 0813   HEART Score  3 Filed at: 03/31/2018 2319                            MDM  Number of Diagnoses or Management Options  Diagnosis management comments: DDx: Chest/shoulder pain - musculoskeletal sprain/strain, osteoarthritic pain, less likely ACS/MI, GERD, costochondritis, doubt pneumonia, pneumothorax or PE  A/P: Will check cardiac w/u, treat symptoms, reevaluate for disposition  Amount and/or Complexity of Data Reviewed  Clinical lab tests: reviewed and ordered  Tests in the radiology section of CPT®: reviewed and ordered  Review and summarize past medical records: yes      CritCare Time    Disposition  Final diagnoses:   Acute pain of left shoulder     Time reflects when diagnosis was documented in both MDM as applicable and the Disposition within this note     Time User Action Codes Description Comment    3/31/2018  9:47 AM Eleanore Skiff Add [M25 512] Acute pain of left shoulder       ED Disposition     ED Disposition Condition Comment    Discharge  Maurepas Kenn Wheatley discharge to home/self care      Condition at discharge: Good        Follow-up Information     Follow up With Specialties Details Why [address] [facility], DO Internal Medicine Schedule an appointment as soon as possible for a visit If symptoms worsen [address] [facility]  [phone]          Discharge Medication List as of 3/31/2018  9:50 AM      START taking these medications    Details   traMADol (ULTRAM) 50 mg tablet Take 1 tablet (50 mg total) by mouth every 6 (six) hours as needed for moderate pain for up to 20 doses, Starting Sat 3/31/2018, Normal         CONTINUE these medications which have NOT CHANGED    Details   amitriptyline (ELAVIL) 50 mg tablet Take 1 tablet by mouth daily, Starting Mon 1/12/2015, Historical Med      Biotin 2500 MCG CAPS Take 2 capsules by mouth daily, Historical Med      Cholecalciferol (VITAMIN D-3 PO) Take 1 tablet by mouth daily, Until Discontinued, Historical Med      Cinnamon 500 MG TABS Take 2 tablets by mouth daily, Historical Med      Cyanocobalamin (VITAMIN B-12 CR PO) Take 1 tablet by mouth daily, Until Discontinued, Historical Med diclofenac sodium (VOLTAREN) 1 % Place 1 g on the skin 2 (two) times a day, Starting Fri 8/11/2017, Historical Med      dicyclomine (BENTYL) 10 mg capsule Take 10 mg by mouth every 6 (six) hours as needed, Historical Med      EPINEPHrine (EPIPEN 2-AREN) 0 3 mg/0 3 mL SOAJ Inject 1 Units as directed as needed, Starting Mon 6/23/2014, Historical Med      fexofenadine (ALLEGRA) 180 MG tablet Take 180 mg by mouth daily, Historical Med      gabapentin (NEURONTIN) 100 mg capsule Take 300 mg by mouth 3 (three) times a day  , Historical Med      hydrOXYzine HCL (ATARAX) 10 mg tablet Take 2 tablets by mouth daily at bedtime as needed  , Historical Med      levocetirizine (XYZAL ALLERGY 24HR) 5 MG tablet Take 1 tablet by mouth daily, Starting Thu 6/1/2017, Historical Med      losartan (COZAAR) 50 mg tablet Take 1 tablet by mouth daily, Starting Thu 6/11/2015, Historical Med      montelukast (SINGULAIR) 10 mg tablet Take 1 tablet (10 mg total) by mouth daily at bedtime, Starting Fri 1/26/2018, Normal      Omega-3 Fatty Acids (FISH OIL) 1,000 mg Take 1,000 mg by mouth 3 (three) times a day, Historical Med      omeprazole (PriLOSEC) 40 MG capsule Take 1 tablet by mouth daily, Starting Mon 1/30/2017, Historical Med      simvastatin (ZOCOR) 10 mg tablet Take 1 tablet by mouth every other day, Starting Mon 3/18/2013, Historical Med           No discharge procedures on file      ED Provider  Electronically Signed by           Magaly Pollard,   03/31/18 2804

## 2018-03-31 NOTE — ED PROCEDURE NOTE
PROCEDURE  ECG 12 Lead Documentation  Date/Time: 3/31/2018 8:12 AM  Performed by: Dalton Wilkerson by: BARI Lopez     Indications / Diagnosis:  CP  ECG reviewed by me, the ED Provider: yes    Patient location:  ED  Interpretation:     Interpretation: normal    Rate:     ECG rate:  74    ECG rate assessment: normal    Rhythm:     Rhythm: sinus rhythm    QRS:     QRS axis:  Normal  Conduction:     Conduction: normal    ST segments:     ST segments:  Normal  T waves:     T waves: normal           Taiwo Mota DO  03/31/18 0813

## 2018-04-02 ENCOUNTER — TELEPHONE (OUTPATIENT)
Dept: INTERNAL MEDICINE CLINIC | Facility: CLINIC | Age: 68
End: 2018-04-02

## 2018-04-02 LAB
ATRIAL RATE: 0 BPM
ATRIAL RATE: 74 BPM
P AXIS: 51 DEGREES
PR INTERVAL: 146 MS
QRS AXIS: 0 DEGREES
QRS AXIS: 16 DEGREES
QRSD INTERVAL: 0 MS
QRSD INTERVAL: 92 MS
QT INTERVAL: 0 MS
QT INTERVAL: 398 MS
QTC INTERVAL: 0 MS
QTC INTERVAL: 441 MS
T WAVE AXIS: 0 DEGREES
T WAVE AXIS: 22 DEGREES
VENTRICULAR RATE: 0 BPM
VENTRICULAR RATE: 74 BPM

## 2018-04-02 PROCEDURE — 93010 ELECTROCARDIOGRAM REPORT: CPT | Performed by: INTERNAL MEDICINE

## 2018-04-03 ENCOUNTER — OFFICE VISIT (OUTPATIENT)
Dept: INTERNAL MEDICINE CLINIC | Facility: CLINIC | Age: 68
End: 2018-04-03
Payer: MEDICARE

## 2018-04-03 ENCOUNTER — TELEPHONE (OUTPATIENT)
Dept: NEUROLOGY | Facility: CLINIC | Age: 68
End: 2018-04-03

## 2018-04-03 VITALS
TEMPERATURE: 95.4 F | DIASTOLIC BLOOD PRESSURE: 70 MMHG | WEIGHT: 214.5 LBS | SYSTOLIC BLOOD PRESSURE: 124 MMHG | BODY MASS INDEX: 36.62 KG/M2 | HEART RATE: 108 BPM | HEIGHT: 64 IN | OXYGEN SATURATION: 97 %

## 2018-04-03 DIAGNOSIS — M25.512 ACUTE PAIN OF LEFT SHOULDER: Primary | ICD-10-CM

## 2018-04-03 DIAGNOSIS — M25.512 CHRONIC LEFT SHOULDER PAIN: ICD-10-CM

## 2018-04-03 DIAGNOSIS — G89.29 CHRONIC LEFT SHOULDER PAIN: ICD-10-CM

## 2018-04-03 PROCEDURE — 99214 OFFICE O/P EST MOD 30 MIN: CPT | Performed by: INTERNAL MEDICINE

## 2018-04-03 NOTE — PROGRESS NOTES
Assessment/Plan:      Diagnoses and all orders for this visit:    Acute pain of left shoulder  -     Ambulatory referral to Orthopedic Surgery; Future  Referral to [facility] for evaluation since already completed PT  Use Tramadol as needed for severe pain and Tylenol during the day    Chronic left shoulder pain  -     Ambulatory referral to Orthopedic Surgery; Future         Patient ID: Beth Quijano is a 80 y o  female  HPI   Pt seen in ER for acute left shoulder pain which is acute on chronic  She had PT in the fall and some relief  Given Tramadol in ER and cardiac workup normal  Using moist heat  Not able to lift with left arm has pulling to elbow area  Some tingling and numbness  Review of Systems   HENT: Negative  Eyes: Negative  Respiratory: Negative  Cardiovascular: Negative  Gastrointestinal: Negative  Endocrine: Negative  Genitourinary: Negative  Musculoskeletal: Positive for arthralgias and joint swelling  Skin: Negative  Allergic/Immunologic: Negative  Neurological: Positive for weakness and numbness  Hematological: Negative  Psychiatric/Behavioral: Negative  Vitals:    04/03/18 1416 04/03/18 1441   BP:  124/70   Pulse: (!) 108    Temp: (!) 95 4 °F (35 2 °C)    TempSrc: Tympanic    SpO2: 97%    Weight: 97 3 kg (214 lb 8 oz)    Height: 5' 4" (1 626 m)      Allergies   Allergen Reactions    Bee Venom     Codeine Other (See Comments)     headaches    Egg Yolk     Iodinated Diagnostic Agents Hives    Other      Adhesive tape    Penicillins Hives    Latex Rash     Social History     Social History    Marital status: Single     Spouse name: N/A    Number of children: N/A    Years of education: N/A     Occupational History    Not on file       Social History Main Topics    Smoking status: Never Smoker    Smokeless tobacco: Never Used    Alcohol use Yes      Comment: social less than a drink per week    Drug use: No    Sexual activity: Not on file Other Topics Concern    Not on file     Social History Narrative    No narrative on file     Family History   Problem Relation Age of Onset    Cancer Mother     Hypertension Father      Past Medical History:   Diagnosis Date    Anxiety     Arthritis     GERD (gastroesophageal reflux disease)     Hyperlipidemia     Hypertension      Past Surgical History:   Procedure Laterality Date    APPENDECTOMY      BACK SURGERY      BREAST LUMPECTOMY Left     CARPAL BOSS EXCISION      CHOLECYSTECTOMY      FOOT SURGERY      HYSTERECTOMY      KIDNEY SURGERY Left     KNEE ARTHROSCOPY Bilateral     LA SURG IMPLNT NEUROELECT,EPIDURAL N/A 5/18/2017    Procedure: PLACEMENT OF THORACIC SPINAL CORD STIMULATOR WITH LEFT BUTTOCK IMPLANTABLE PULSE GENERATOR (IMPULSE MONITORING-MOTORS (TCEMEP), EMG, SSEP); Surgeon: Ly Reza MD;  Location: [facility];  Service: Neurosurgery    SPINAL STIMULATOR PLACEMENT  05/2017      Physical Exam   Constitutional: She is oriented to person, place, and time  She appears well-developed and well-nourished  No distress  HENT:   Head: Normocephalic and atraumatic  Right Ear: External ear normal    Left Ear: External ear normal    Nose: Nose normal    Mouth/Throat: Oropharynx is clear and moist  No oropharyngeal exudate  Eyes: Conjunctivae and EOM are normal  Pupils are equal, round, and reactive to light  No scleral icterus  Neck: Normal range of motion  Neck supple  No JVD present  Cardiovascular: Normal rate, regular rhythm, normal heart sounds and intact distal pulses  Pulmonary/Chest: Effort normal and breath sounds normal    Abdominal: Soft  Bowel sounds are normal  She exhibits no distension  There is no tenderness  There is no rebound and no guarding  Musculoskeletal: She exhibits tenderness and deformity  Lymphadenopathy:     She has no cervical adenopathy  Neurological: She is alert and oriented to person, place, and time  She displays abnormal reflex  No cranial nerve deficit  She exhibits abnormal muscle tone  Coordination abnormal    Skin: Skin is warm and dry  She is not diaphoretic  Psychiatric: She has a normal mood and affect  Her behavior is normal  Judgment and thought content normal    Nursing note and vitals reviewed

## 2018-04-03 NOTE — TELEPHONE ENCOUNTER
1st attempt- LM for patient - appt on 6/21 with Vivienne Meneess in [place] will need to be rescheduled

## 2018-05-25 ENCOUNTER — OFFICE VISIT (OUTPATIENT)
Dept: INTERNAL MEDICINE CLINIC | Facility: CLINIC | Age: 68
End: 2018-05-25
Payer: MEDICARE

## 2018-05-25 VITALS
BODY MASS INDEX: 34.62 KG/M2 | HEIGHT: 64 IN | HEART RATE: 77 BPM | WEIGHT: 202.8 LBS | OXYGEN SATURATION: 97 % | TEMPERATURE: 96.1 F

## 2018-05-25 DIAGNOSIS — I10 HYPERTENSION, UNSPECIFIED TYPE: ICD-10-CM

## 2018-05-25 DIAGNOSIS — E78.5 HYPERLIPIDEMIA, UNSPECIFIED HYPERLIPIDEMIA TYPE: ICD-10-CM

## 2018-05-25 DIAGNOSIS — L29.9 ITCHING: ICD-10-CM

## 2018-05-25 DIAGNOSIS — K21.9 GASTROESOPHAGEAL REFLUX DISEASE, ESOPHAGITIS PRESENCE NOT SPECIFIED: ICD-10-CM

## 2018-05-25 DIAGNOSIS — R05.9 COUGH: Primary | ICD-10-CM

## 2018-05-25 PROCEDURE — 99214 OFFICE O/P EST MOD 30 MIN: CPT | Performed by: INTERNAL MEDICINE

## 2018-05-25 RX ORDER — LOSARTAN POTASSIUM 50 MG/1
50 TABLET ORAL DAILY
Qty: 90 TABLET | Refills: 3 | Status: SHIPPED | OUTPATIENT
Start: 2018-05-25 | End: 2019-12-10 | Stop reason: SDUPTHER

## 2018-05-25 RX ORDER — SIMVASTATIN 10 MG
10 TABLET ORAL EVERY OTHER DAY
Qty: 90 TABLET | Refills: 3 | Status: SHIPPED | OUTPATIENT
Start: 2018-05-25 | End: 2019-07-24 | Stop reason: SDUPTHER

## 2018-05-25 RX ORDER — MONTELUKAST SODIUM 10 MG/1
10 TABLET ORAL
Qty: 90 TABLET | Refills: 3 | Status: SHIPPED | OUTPATIENT
Start: 2018-05-25 | End: 2019-07-24 | Stop reason: SDUPTHER

## 2018-05-25 RX ORDER — OMEPRAZOLE 40 MG/1
40 CAPSULE, DELAYED RELEASE ORAL DAILY
Qty: 90 CAPSULE | Refills: 3 | Status: SHIPPED | OUTPATIENT
Start: 2018-05-25 | End: 2019-05-06 | Stop reason: SDUPTHER

## 2018-05-25 RX ORDER — HYDROXYZINE HYDROCHLORIDE 10 MG/1
20 TABLET, FILM COATED ORAL
Qty: 30 TABLET | Refills: 5 | Status: SHIPPED | OUTPATIENT
Start: 2018-05-25 | End: 2018-06-07 | Stop reason: SDUPTHER

## 2018-05-25 NOTE — PROGRESS NOTES
Assessment/Plan:      Diagnoses and all orders for this visit:    Gastroesophageal reflux disease, esophagitis presence not specified  -     omeprazole (PriLOSEC) 40 MG capsule; Take 1 capsule (40 mg total) by mouth daily  GERD diet  Increase water intake    Cough  -     montelukast (SINGULAIR) 10 mg tablet; Take 1 tablet (10 mg total) by mouth daily at bedtime    Itching  -     hydrOXYzine HCL (ATARAX) 10 mg tablet; Take 2 tablets (20 mg total) by mouth daily at bedtime as needed for itching    Hypertension, unspecified type  -     losartan (COZAAR) 50 mg tablet; Take 1 tablet (50 mg total) by mouth daily    Hyperlipidemia, unspecified hyperlipidemia type  -     simvastatin (ZOCOR) 10 mg tablet; Take 1 tablet (10 mg total) by mouth every other day    Rto 6 months  Refill Rx sent to pharmacy       Patient ID: Beth Quijano is a 80 y o  female  HPI   Pt has been feeling ok  Has been trying to exercise at home  No chest pain or sob  No chest pain  Has dry cough from allergies    Review of Systems   Constitutional: Negative  HENT: Negative  Eyes: Negative  Respiratory: Negative  Cardiovascular: Negative  Gastrointestinal: Negative  Endocrine: Negative  Genitourinary: Negative  Musculoskeletal: Positive for arthralgias  Skin: Negative  Allergic/Immunologic: Negative  Neurological: Negative  Hematological: Negative  Psychiatric/Behavioral: Negative  Vitals:    05/25/18 0722   Pulse: 77   Temp: (!) 96 1 °F (35 6 °C)   TempSrc: Tympanic   SpO2: 97%   Weight: 92 kg (202 lb 12 8 oz)   Height: 5' 4" (1 626 m)   /64       Physical Exam   Constitutional: She is oriented to person, place, and time  She appears well-developed and well-nourished  HENT:   Head: Normocephalic and atraumatic  Right Ear: External ear normal    Left Ear: External ear normal    Nose: Nose normal    Mouth/Throat: Oropharynx is clear and moist  No oropharyngeal exudate     Eyes: Conjunctivae and EOM are normal  Pupils are equal, round, and reactive to light  No scleral icterus  Neck: Normal range of motion  Neck supple  No JVD present  Cardiovascular: Normal rate, regular rhythm, normal heart sounds and intact distal pulses  Pulmonary/Chest: Effort normal and breath sounds normal  No respiratory distress  She has no wheezes  She has no rales  She exhibits no tenderness  Abdominal: Soft  Bowel sounds are normal  She exhibits no distension and no mass  There is no tenderness  There is no rebound and no guarding  Musculoskeletal: Normal range of motion  She exhibits deformity  She exhibits no tenderness  Lymphadenopathy:     She has no cervical adenopathy  Neurological: She is alert and oriented to person, place, and time  She has normal reflexes  She displays normal reflexes  No cranial nerve deficit  She exhibits normal muscle tone  Coordination normal    Skin: Skin is warm and dry  Psychiatric: She has a normal mood and affect  Her behavior is normal  Judgment and thought content normal    Nursing note and vitals reviewed

## 2018-06-07 ENCOUNTER — TELEPHONE (OUTPATIENT)
Dept: INTERNAL MEDICINE CLINIC | Facility: CLINIC | Age: 68
End: 2018-06-07

## 2018-06-07 ENCOUNTER — LAB (OUTPATIENT)
Dept: LAB | Facility: MEDICAL CENTER | Age: 68
End: 2018-06-07
Payer: MEDICARE

## 2018-06-07 ENCOUNTER — TRANSCRIBE ORDERS (OUTPATIENT)
Dept: LAB | Facility: MEDICAL CENTER | Age: 68
End: 2018-06-07

## 2018-06-07 DIAGNOSIS — L29.9 ITCHING: ICD-10-CM

## 2018-06-07 DIAGNOSIS — R30.9 URINARY PAIN: Primary | ICD-10-CM

## 2018-06-07 DIAGNOSIS — R30.9 URINARY PAIN: ICD-10-CM

## 2018-06-07 LAB
BACTERIA UR QL AUTO: ABNORMAL /HPF
BILIRUB UR QL STRIP: NEGATIVE
CLARITY UR: ABNORMAL
COLOR UR: YELLOW
GLUCOSE UR STRIP-MCNC: NEGATIVE MG/DL
HGB UR QL STRIP.AUTO: ABNORMAL
HYALINE CASTS #/AREA URNS LPF: ABNORMAL /LPF
KETONES UR STRIP-MCNC: NEGATIVE MG/DL
LEUKOCYTE ESTERASE UR QL STRIP: ABNORMAL
NITRITE UR QL STRIP: NEGATIVE
NON-SQ EPI CELLS URNS QL MICRO: ABNORMAL /HPF
PH UR STRIP.AUTO: 7 [PH] (ref 4.5–8)
PROT UR STRIP-MCNC: NEGATIVE MG/DL
RBC #/AREA URNS AUTO: ABNORMAL /HPF
SP GR UR STRIP.AUTO: 1.01 (ref 1–1.03)
UROBILINOGEN UR QL STRIP.AUTO: 0.2 E.U./DL
WBC #/AREA URNS AUTO: ABNORMAL /HPF

## 2018-06-07 PROCEDURE — 81001 URINALYSIS AUTO W/SCOPE: CPT

## 2018-06-07 RX ORDER — CIPROFLOXACIN 500 MG/1
500 TABLET, FILM COATED ORAL EVERY 12 HOURS SCHEDULED
Qty: 14 TABLET | Refills: 0 | Status: SHIPPED | OUTPATIENT
Start: 2018-06-07 | End: 2018-06-14

## 2018-06-07 RX ORDER — HYDROXYZINE HYDROCHLORIDE 10 MG/1
10 TABLET, FILM COATED ORAL
Qty: 30 TABLET | Refills: 5 | Status: SHIPPED | OUTPATIENT
Start: 2018-06-07 | End: 2018-07-19 | Stop reason: SDUPTHER

## 2018-06-08 DIAGNOSIS — R82.71 BACTERIA IN URINE: Primary | ICD-10-CM

## 2018-06-13 ENCOUNTER — HOSPITAL ENCOUNTER (OUTPATIENT)
Dept: ULTRASOUND IMAGING | Facility: HOSPITAL | Age: 68
Discharge: HOME | End: 2018-06-13
Attending: INTERNAL MEDICINE
Payer: MEDICARE

## 2018-06-13 DIAGNOSIS — R82.71 BACTERIA IN URINE: ICD-10-CM

## 2018-06-13 PROCEDURE — 76857 US EXAM PELVIC LIMITED: CPT

## 2018-06-15 ENCOUNTER — TELEPHONE (OUTPATIENT)
Dept: INTERNAL MEDICINE CLINIC | Facility: CLINIC | Age: 68
End: 2018-06-15

## 2018-06-15 ENCOUNTER — TRANSCRIBE ORDERS (OUTPATIENT)
Dept: INTERNAL MEDICINE CLINIC | Facility: CLINIC | Age: 68
End: 2018-06-15

## 2018-06-15 DIAGNOSIS — R93.89 ABNORMAL ULTRASOUND: Primary | ICD-10-CM

## 2018-06-21 ENCOUNTER — OFFICE VISIT (OUTPATIENT)
Dept: NEUROLOGY | Facility: CLINIC | Age: 68
End: 2018-06-21
Payer: MEDICARE

## 2018-06-21 VITALS
WEIGHT: 204 LBS | SYSTOLIC BLOOD PRESSURE: 118 MMHG | DIASTOLIC BLOOD PRESSURE: 66 MMHG | RESPIRATION RATE: 18 BRPM | BODY MASS INDEX: 35.02 KG/M2 | HEART RATE: 68 BPM

## 2018-06-21 DIAGNOSIS — G44.229 CHRONIC TENSION-TYPE HEADACHE, NOT INTRACTABLE: ICD-10-CM

## 2018-06-21 DIAGNOSIS — G60.9 IDIOPATHIC PERIPHERAL NEUROPATHY: Primary | ICD-10-CM

## 2018-06-21 PROCEDURE — 99214 OFFICE O/P EST MOD 30 MIN: CPT | Performed by: NURSE PRACTITIONER

## 2018-06-21 RX ORDER — AMITRIPTYLINE HYDROCHLORIDE 50 MG/1
50 TABLET, FILM COATED ORAL DAILY
Qty: 90 TABLET | Refills: 2 | Status: SHIPPED | OUTPATIENT
Start: 2018-06-21 | End: 2018-08-17 | Stop reason: SDUPTHER

## 2018-06-21 RX ORDER — GABAPENTIN 300 MG/1
300 CAPSULE ORAL 3 TIMES DAILY
Qty: 270 CAPSULE | Refills: 1 | Status: SHIPPED | OUTPATIENT
Start: 2018-06-21 | End: 2019-03-08 | Stop reason: SDUPTHER

## 2018-06-21 NOTE — PROGRESS NOTES
Patient ID: Calvin Mitchell is a 80 y o  female  Assessment/Plan:    No problem-specific Assessment & Plan notes found for this encounter  81 yo right handed female with history of anxiety, hypertension, hyperlipidemia, comes with complains of headaches, dizziness, paresthesias in arms and legs, she had EMG/NCV that showed carpal tunnel  MRI brain was unremarkable, no abscess, or demyelinating process  Paresthesia have improved  At this time LBP doing well      had updated MRI done with no evidence of cord lesion or compression, multilevel spondylosis noted, had prior surgery of Lumbar region  cannot take NSAIDs   pt s/p cord stim placement with improvement   following with pain management   can increase Neurontin to 1 tid if needed stable, continue with Elavil,  at length with the patient and answered all her questions  to increase Vit D to 2000iu daily      Diagnoses and all orders for this visit:    Idiopathic peripheral neuropathy  -     amitriptyline (ELAVIL) 50 mg tablet; Take 1 tablet (50 mg total) by mouth daily  -     gabapentin (NEURONTIN) 300 mg capsule; Take 1 capsule (300 mg total) by mouth 3 (three) times a day    Chronic tension-type headache, not intractable  -     amitriptyline (ELAVIL) 50 mg tablet; Take 1 tablet (50 mg total) by mouth daily           Subjective:    HPI   71yo right handed female with past medical history significant for hypertension, hyperlipidemia, anxiety, referred by her PCP for evaluation of neuropathy, patient reported the symptoms started 91 21 06 and for the past year getting progressively worse  She described a fire-like sensation which would involve her whole body and last for hours  testing to include lab work which demonstrated normal B12, folate, TSH, Lyme was negative, PEEP was a normal, hemoglobin A1c was 5 6   She also underwent MRI of the brain which was essentially normal  Fortunately her neuropathy has improved, She was put on a combination of Neurontin and Elavil  She was having  issues  with c/o LBP,  MRI thoracic spine  was essentially normal, MRI Lumbar mild to moderate bony and discogenic degenerative changes seen from L2-L3 through L4-L5, with mild spondylolisthesis of L3 on L4  There is no evidence of central spinal stenosis  No nerve root compression seen  Mild bony degenerative changes are also seen at T10-T11  She is seeing pain management, her Tizanidine was increased, she had nerve ablation with some help to the right side but she continued with left LE discomfort   She does have a prior history of surgical intervention in the lumbar region  She subsequently under went SCS placement  was last seen in December 2017  Sherre Soulier At that time she was doing well    Today she states that she has not had any recent events of fire-like sensations  , She is on Neurontin 300mg bid, When these occur, she will take an extra does  She continues on Elavil 50mg  Her low back pain at this point, is a well controlled with her spinal cord stimulator in place  In the interim She was seen in the ED in March for c/o chest pain, she had been doing some heavy lifting the day before, Work up was negative,  felt to be musculoskeletal, primarily of her left SCM, she was seen by [facility] and had injection to that region with benefit  At this point, she has mild occasional discomfort left SCM  Sherre Soulier She denies any issues with bowel or bladder  She has had some recent headaches  in te occipital region which she attributes to her SCM  She denies any problems with swallowing, her vision  She was having some issues with her bladder, US noted possible cystitis and is due to see urology  She was seen at [facility] for c/o B/L knee pain, she uderwent injections            The following portions of the patient's history were reviewed and updated as appropriate: allergies, current medications, past family history, past medical history, past social history, past surgical history and problem list  Objective:    Blood pressure 118/66, pulse 68, resp  rate 18, weight 92 5 kg (204 lb)  Physical Exam   Constitutional: She appears well-developed  HENT:   Head: Normocephalic  Eyes: Pupils are equal, round, and reactive to light  Neck: Normal range of motion  Cardiovascular: Normal rate and regular rhythm  Pulmonary/Chest: Effort normal    Musculoskeletal: She exhibits no edema  Mild decreased ROM left SCM   Neurological: She has normal strength and normal reflexes  Psychiatric: She has a normal mood and affect  Her speech is normal and behavior is normal  Judgment and thought content normal        Neurological Exam    Mental Status  The patient is alert and oriented to person, place, time, and situation  Her recent and remote memory are normal  Her speech is normal  Her language is fluent with no aphasia  She has normal attention span and concentration  She follows multi-step commands  She has a normal fund of knowledge  Cranial Nerves  CN I: The patient has not tested  CN II: The patient's visual acuity and visual fields are normal   CN III, IV, VI: The patient's pupils are equally round and reactive to light  CN V: The patient has normal facial sensation  CN VII:  The patient has symmetric facial movement  CN XI:  Her left sternocleidomastoid strength is weak  CN XII: The patient's tongue is midline without atrophy or fasciculations  Motor  The patient has normal muscle bulk throughout  Her overall muscle tone is normal throughout  Her strength is 5/5 throughout all four extremities  Mild physiological tremor outstretched arms      Sensory  The patient's sensation is normal except as noted  Decreased temperature left LE      Reflexes  Deep tendon reflexes are 2+ and symmetric in all four extremities with downgoing toes bilaterally  Gait and Coordination   She has a wide stance  She has normal right finger to nose and normal left finger to nose coordination            ROS:    Review of Systems   Constitutional: Negative  HENT: Positive for congestion and sinus pressure  Eyes: Negative  Respiratory: Negative  Cardiovascular: Negative  Gastrointestinal: Negative  Endocrine: Negative  Genitourinary: Positive for difficulty urinating  Musculoskeletal: Positive for arthralgias  Skin: Negative  Allergic/Immunologic: Negative  Neurological: Positive for headaches  Tingling   Hematological: Negative  Psychiatric/Behavioral: Positive for sleep disturbance  X ray lumbar spine Facet predominant degenerative changes of lumbar spine  No acute findings    Thoracic x-ray Moderate thoracic spine degenerative changes at T8-9 and T9-10    No acute osseous findings

## 2018-06-26 ENCOUNTER — OFFICE VISIT (OUTPATIENT)
Dept: UROLOGY | Facility: MEDICAL CENTER | Age: 68
End: 2018-06-26
Payer: MEDICARE

## 2018-06-26 VITALS
HEIGHT: 64 IN | BODY MASS INDEX: 34.49 KG/M2 | WEIGHT: 202 LBS | DIASTOLIC BLOOD PRESSURE: 84 MMHG | SYSTOLIC BLOOD PRESSURE: 138 MMHG

## 2018-06-26 DIAGNOSIS — R39.89 BLADDER PAIN: ICD-10-CM

## 2018-06-26 DIAGNOSIS — R35.0 URINARY FREQUENCY: Primary | ICD-10-CM

## 2018-06-26 DIAGNOSIS — R10.32 LEFT LOWER QUADRANT PAIN: ICD-10-CM

## 2018-06-26 DIAGNOSIS — R31.29 OTHER MICROSCOPIC HEMATURIA: ICD-10-CM

## 2018-06-26 LAB
SL AMB  POCT GLUCOSE, UA: NEGATIVE
SL AMB LEUKOCYTE ESTERASE,UA: ABNORMAL
SL AMB POCT BILIRUBIN,UA: NEGATIVE
SL AMB POCT BLOOD,UA: ABNORMAL
SL AMB POCT CLARITY,UA: CLEAR
SL AMB POCT COLOR,UA: YELLOW
SL AMB POCT KETONES,UA: NEGATIVE
SL AMB POCT NITRITE,UA: NEGATIVE
SL AMB POCT PH,UA: 7
SL AMB POCT SPECIFIC GRAVITY,UA: 1.02
SL AMB POCT URINE PROTEIN: NEGATIVE
SL AMB POCT UROBILINOGEN: 0.2

## 2018-06-26 PROCEDURE — 81003 URINALYSIS AUTO W/O SCOPE: CPT | Performed by: UROLOGY

## 2018-06-26 PROCEDURE — 99214 OFFICE O/P EST MOD 30 MIN: CPT | Performed by: UROLOGY

## 2018-06-26 RX ORDER — METHENAMINE, SODIUM PHOSPHATE, MONOBASIC, MONOHYDRATE, PHENYL SALICYLATE, METHYLENE BLUE, AND HYOSCYAMINE SULFATE 120; 40.8; 36; 10; .12 MG/1; MG/1; MG/1; MG/1; MG/1
1 CAPSULE ORAL EVERY 6 HOURS PRN
Qty: 10 CAPSULE | Refills: 11 | Status: SHIPPED | OUTPATIENT
Start: 2018-06-26 | End: 2019-07-11 | Stop reason: ALTCHOICE

## 2018-06-26 NOTE — LETTER
2018     Mitchell Stable, DO  [address] [address] [address] [place] [id number]    Patient: Siobhan Pool   YOB: 1950   Date of Visit: 2018       Dear Dr Nicholas Garcia: Thank you for referring Elizabeth Son to me for evaluation  Below are my notes for this consultation  If you have questions, please do not hesitate to call me  I look forward to following your patient along with you  Sincerely,        Mirtha Land MD        CC: MD Mirtha Torres MD  2018 10:00 AM  Sign at close encounter  [address] [facility]  [address], [address] [place] [id number]  www  [facility]  org      NAME: Kade Wheatley  AGE: 80 y o  SEX: female  : 1950   MRN: [id number]    DATE: 2018  TIME: 9:32 AM    Assessment and Plan:  Bladder pain and left lower quadrant pain, along with microscopic hematuria and pyuria  Also history of probable left UPJ obstruction  Given the persistence of her symptoms, I think that she needs evaluation with CT scan of the abdomen and pelvis to evaluate her left lower quadrant specially and she needs cystoscopy performed  We will set her up to have this done in the [place] office  Will prescribe Uribel to take PRN  I cannot say she has IC at this time  Chief Complaint   No chief complaint on file  History of Present Illness   New patient office visit:  Referred by Dr Nicholas Garcia for possible interstitial cystitis  Symptoms started about 3 weeks ago  Bladder ultrasound 2018 shows basically a normal bladder with bilateral ureteral jets and she empties completely  Urinalysis shows trace blood and small leukocyte esterase, and recent microscopy showed 2-4 RBC, 30-50 WBC, with occasional bacteria      past history - sounds like she had left Pyeloplasty 1971    She is currently having a lot of pain in her left lower quadrant-she could feel pressure when they pushed down with the ultrasound probe when she was having a bladder ultrasound in the left lower quadrant  Symptoms are not irritated by acidic things like tomatoes  Her last course of antibiotics was 2 weeks ago  She does think it helped her after about 3-4 days  However she still has some of the dysuria and the pain  She voids about every 1 hour to hour and a half  Past medical history is significant for spinal stenosis, hypertension, IBS, stomach problems, and all that is listed below  The following portions of the patient's history were reviewed and updated as appropriate: allergies, current medications, past family history, past medical history, past social history, past surgical history and problem list     Review of Systems   Review of Systems   Genitourinary: Positive for dysuria and urgency  Active Problem List     Patient Active Problem List   Diagnosis    Hypertension    Elevated LFTs    Hyperlipidemia    Acid reflux disease    Anxiety    Chronic headaches    Chronic low back pain    Degenerative joint disease of right lower extremity    Irritable bowel syndrome    Lumbar radiculopathy, chronic    Neuralgia    Peripheral neuropathy    Acute bilateral thoracic back pain    Vitamin D deficiency    Chronic left shoulder pain       Objective   There were no vitals taken for this visit  Physical Exam   Constitutional: She is oriented to person, place, and time  She appears well-developed and well-nourished  HENT:   Head: Normocephalic and atraumatic  Eyes: EOM are normal    Neck: Normal range of motion  Pulmonary/Chest: Effort normal    Musculoskeletal: Normal range of motion  Neurological: She is alert and oriented to person, place, and time  Skin: Skin is warm and dry  Psychiatric: She has a normal mood and affect   Her behavior is normal  Judgment and thought content normal            Current Medications     Current Outpatient Prescriptions:     amitriptyline (ELAVIL) 50 mg tablet, Take 1 tablet (50 mg total) by mouth daily, Disp: 90 tablet, Rfl: 2    Biotin 2500 MCG CAPS, Take 2 capsules by mouth daily, Disp: , Rfl:     Cholecalciferol (VITAMIN D-3 PO), Take 1 tablet by mouth daily, Disp: , Rfl:     Cyanocobalamin (VITAMIN B-12 CR PO), Take 1 tablet by mouth daily, Disp: , Rfl:     diclofenac sodium (VOLTAREN) 1 %, Place 1 g on the skin 2 (two) times a day, Disp: , Rfl:     dicyclomine (BENTYL) 10 mg capsule, Take 10 mg by mouth every 6 (six) hours as needed, Disp: , Rfl:     EPINEPHrine (EPIPEN 2-AREN) 0 3 mg/0 3 mL SOAJ, Inject 1 Units as directed as needed, Disp: , Rfl:     fexofenadine (ALLEGRA) 180 MG tablet, Take 180 mg by mouth daily, Disp: , Rfl:     gabapentin (NEURONTIN) 300 mg capsule, Take 1 capsule (300 mg total) by mouth 3 (three) times a day, Disp: 270 capsule, Rfl: 1    hydrOXYzine HCL (ATARAX) 10 mg tablet, Take 1 tablet (10 mg total) by mouth daily at bedtime as needed for itching, Disp: 30 tablet, Rfl: 5    losartan (COZAAR) 50 mg tablet, Take 1 tablet (50 mg total) by mouth daily, Disp: 90 tablet, Rfl: 3    montelukast (SINGULAIR) 10 mg tablet, Take 1 tablet (10 mg total) by mouth daily at bedtime, Disp: 90 tablet, Rfl: 3    Omega-3 Fatty Acids (FISH OIL) 1,000 mg, Take 1,000 mg by mouth 3 (three) times a day, Disp: , Rfl:     omeprazole (PriLOSEC) 40 MG capsule, Take 1 capsule (40 mg total) by mouth daily, Disp: 90 capsule, Rfl: 3    simvastatin (ZOCOR) 10 mg tablet, Take 1 tablet (10 mg total) by mouth every other day, Disp: 90 tablet, Rfl: 3    traMADol (ULTRAM) 50 mg tablet, Take 1 tablet (50 mg total) by mouth every 6 (six) hours as needed for moderate pain for up to 20 doses, Disp: 20 tablet, Rfl: 0        Hunter Grier MD

## 2018-06-26 NOTE — PROGRESS NOTES
[address] [facility]  [facility]  [address] [facility]  [place], [address] [place] [address]  [phone]  www  [email or web address]  [email or web address]      NAME: Sedrick Wheatley  AGE: 80 y o  SEX: female  : 1950   MRN: [id number]    DATE: 2018  TIME: 9:32 AM    Assessment and Plan:  Bladder pain and left lower quadrant pain, along with microscopic hematuria and pyuria  Also history of probable left UPJ obstruction  Given the persistence of her symptoms, I think that she needs evaluation with CT scan of the abdomen and pelvis to evaluate her left lower quadrant specially and she needs cystoscopy performed  We will set her up to have this done in the [place] office  Will prescribe Uribel to take PRN  I cannot say she has IC at this time  Chief Complaint   No chief complaint on file  History of Present Illness   New patient office visit:  Referred by Dr Garrett Rooney for possible interstitial cystitis  Symptoms started about 3 weeks ago  Bladder ultrasound 2018 shows basically a normal bladder with bilateral ureteral jets and she empties completely  Urinalysis shows trace blood and small leukocyte esterase, and recent microscopy showed 2-4 RBC, 30-50 WBC, with occasional bacteria      past history - sounds like she had left Pyeloplasty 1971  She is currently having a lot of pain in her left lower quadrant-she could feel pressure when they pushed down with the ultrasound probe when she was having a bladder ultrasound in the left lower quadrant  Symptoms are not irritated by acidic things like tomatoes  Her last course of antibiotics was 2 weeks ago  She does think it helped her after about 3-4 days  However she still has some of the dysuria and the pain  She voids about every 1 hour to hour and a half  Past medical history is significant for spinal stenosis, hypertension, IBS, stomach problems, and all that is listed below          The following portions of the patient's history were reviewed and updated as appropriate: allergies, current medications, past family history, past medical history, past social history, past surgical history and problem list     Review of Systems   Review of Systems   Genitourinary: Positive for dysuria and urgency  Active Problem List     Patient Active Problem List   Diagnosis    Hypertension    Elevated LFTs    Hyperlipidemia    Acid reflux disease    Anxiety    Chronic headaches    Chronic low back pain    Degenerative joint disease of right lower extremity    Irritable bowel syndrome    Lumbar radiculopathy, chronic    Neuralgia    Peripheral neuropathy    Acute bilateral thoracic back pain    Vitamin D deficiency    Chronic left shoulder pain       Objective   There were no vitals taken for this visit  Physical Exam   Constitutional: She is oriented to person, place, and time  She appears well-developed and well-nourished  HENT:   Head: Normocephalic and atraumatic  Eyes: EOM are normal    Neck: Normal range of motion  Pulmonary/Chest: Effort normal    Musculoskeletal: Normal range of motion  Neurological: She is alert and oriented to person, place, and time  Skin: Skin is warm and dry  Psychiatric: She has a normal mood and affect   Her behavior is normal  Judgment and thought content normal            Current Medications     Current Outpatient Prescriptions:     amitriptyline (ELAVIL) 50 mg tablet, Take 1 tablet (50 mg total) by mouth daily, Disp: 90 tablet, Rfl: 2    Biotin 2500 MCG CAPS, Take 2 capsules by mouth daily, Disp: , Rfl:     Cholecalciferol (VITAMIN D-3 PO), Take 1 tablet by mouth daily, Disp: , Rfl:     Cyanocobalamin (VITAMIN B-12 CR PO), Take 1 tablet by mouth daily, Disp: , Rfl:     diclofenac sodium (VOLTAREN) 1 %, Place 1 g on the skin 2 (two) times a day, Disp: , Rfl:     dicyclomine (BENTYL) 10 mg capsule, Take 10 mg by mouth every 6 (six) hours as needed, Disp: , Rfl:    EPINEPHrine (EPIPEN 2-AREN) 0 3 mg/0 3 mL SOAJ, Inject 1 Units as directed as needed, Disp: , Rfl:     fexofenadine (ALLEGRA) 180 MG tablet, Take 180 mg by mouth daily, Disp: , Rfl:     gabapentin (NEURONTIN) 300 mg capsule, Take 1 capsule (300 mg total) by mouth 3 (three) times a day, Disp: 270 capsule, Rfl: 1    hydrOXYzine HCL (ATARAX) 10 mg tablet, Take 1 tablet (10 mg total) by mouth daily at bedtime as needed for itching, Disp: 30 tablet, Rfl: 5    losartan (COZAAR) 50 mg tablet, Take 1 tablet (50 mg total) by mouth daily, Disp: 90 tablet, Rfl: 3    montelukast (SINGULAIR) 10 mg tablet, Take 1 tablet (10 mg total) by mouth daily at bedtime, Disp: 90 tablet, Rfl: 3    Omega-3 Fatty Acids (FISH OIL) 1,000 mg, Take 1,000 mg by mouth 3 (three) times a day, Disp: , Rfl:     omeprazole (PriLOSEC) 40 MG capsule, Take 1 capsule (40 mg total) by mouth daily, Disp: 90 capsule, Rfl: 3    simvastatin (ZOCOR) 10 mg tablet, Take 1 tablet (10 mg total) by mouth every other day, Disp: 90 tablet, Rfl: 3    traMADol (ULTRAM) 50 mg tablet, Take 1 tablet (50 mg total) by mouth every 6 (six) hours as needed for moderate pain for up to 20 doses, Disp: 20 tablet, Rfl: 0        Huber Higuera MD

## 2018-06-29 ENCOUNTER — TELEPHONE (OUTPATIENT)
Dept: UROLOGY | Facility: MEDICAL CENTER | Age: 68
End: 2018-06-29

## 2018-07-12 ENCOUNTER — HOSPITAL ENCOUNTER (OUTPATIENT)
Dept: CT IMAGING | Facility: HOSPITAL | Age: 68
Discharge: HOME | End: 2018-07-12
Attending: UROLOGY
Payer: MEDICARE

## 2018-07-12 ENCOUNTER — TELEPHONE (OUTPATIENT)
Dept: UROLOGY | Facility: MEDICAL CENTER | Age: 68
End: 2018-07-12

## 2018-07-12 DIAGNOSIS — R10.32 LEFT LOWER QUADRANT PAIN: ICD-10-CM

## 2018-07-12 PROCEDURE — 74176 CT ABD & PELVIS W/O CONTRAST: CPT

## 2018-07-19 DIAGNOSIS — L29.9 ITCHING: ICD-10-CM

## 2018-07-19 RX ORDER — HYDROXYZINE HYDROCHLORIDE 10 MG/1
TABLET, FILM COATED ORAL
Qty: 180 TABLET | Refills: 3 | Status: SHIPPED | OUTPATIENT
Start: 2018-07-19 | End: 2020-06-15 | Stop reason: SDUPTHER

## 2018-07-27 NOTE — TELEPHONE ENCOUNTER
Pharmacy called again about prior authorization  I conveyed DENIAL as non-formulary, non-covered  Patient would need to us GoodRx or some other pharmacy discount card

## 2018-08-07 ENCOUNTER — PROCEDURE VISIT (OUTPATIENT)
Dept: UROLOGY | Facility: CLINIC | Age: 68
End: 2018-08-07
Payer: MEDICARE

## 2018-08-07 VITALS
HEIGHT: 64 IN | DIASTOLIC BLOOD PRESSURE: 80 MMHG | HEART RATE: 85 BPM | WEIGHT: 202 LBS | BODY MASS INDEX: 34.49 KG/M2 | SYSTOLIC BLOOD PRESSURE: 124 MMHG

## 2018-08-07 DIAGNOSIS — N30.11 INTERSTITIAL CYSTITIS (CHRONIC) WITH HEMATURIA: Primary | ICD-10-CM

## 2018-08-07 PROCEDURE — 87086 URINE CULTURE/COLONY COUNT: CPT | Performed by: UROLOGY

## 2018-08-07 PROCEDURE — 87186 SC STD MICRODIL/AGAR DIL: CPT | Performed by: UROLOGY

## 2018-08-07 PROCEDURE — 52000 CYSTOURETHROSCOPY: CPT | Performed by: UROLOGY

## 2018-08-07 PROCEDURE — 87077 CULTURE AEROBIC IDENTIFY: CPT | Performed by: UROLOGY

## 2018-08-07 NOTE — PATIENT INSTRUCTIONS
Interstitial Cystitis   WHAT YOU NEED TO KNOW:   Interstitial cystitis (IC) is also called painful bladder syndrome  IC is a condition that causes pain in your bladder and pelvic area  You may also have ulcers in your bladder  The cause of IC is unknown  DISCHARGE INSTRUCTIONS:   Contact your healthcare provider if:   · Your symptoms get worse, or you develop new symptoms  · You have questions or concerns about your condition or care  Medicines:   · Medicines  may be given to decrease symptoms such as pain, urinary urgency, and frequency  · Take your medicine as directed  Contact your healthcare provider if you think your medicine is not helping or if you have side effects  Tell him of her if you are allergic to any medicine  Keep a list of the medicines, vitamins, and herbs you take  Include the amounts, and when and why you take them  Bring the list or the pill bottles to follow-up visits  Carry your medicine list with you in case of an emergency  Manage your symptoms:   · Do Kegel exercises as directed  Kegel exercises will help strengthen the muscles that control bowel movements and urination  Ask your healthcare provider for more information on Kegel exercises  Tighten your pelvic muscles slowly  It may feel like you are trying to hold back urine or gas  Hold these muscles and count to 3  Relax, tighten them quickly, and release  Repeat the cycle 10 times  Do 10 sets of Kegel exercises, 5 times a day  Do not hold your breath when you do Kegel exercises  Keep your stomach, back, and leg muscles relaxed  · Do not smoke  Smoking may worsen your symptoms  Nicotine and other chemicals in cigarettes and cigars can also cause lung damage  Ask your healthcare provider for information if you currently smoke and need help to quit  E-cigarettes or smokeless tobacco still contain nicotine  Talk to your healthcare provider before you use these products  · Train your bladder to urinate less often  This can be done by going to the bathroom at scheduled times  Try to hold your urine when you feel the urge to go  For example, hold your urine for 5 minutes when you feel the urge to go  As that becomes easier, hold your urine for 10 minutes  · Manage stress  Stress may worsen symptoms  Your healthcare provider may also recommend that you find ways to manage stress, such as relaxation techniques  Follow up with your healthcare provider as directed:  Write down your questions so you remember to ask them during your visits  © 2017 [facility] Information is for End User's use only and may not be sold, redistributed or otherwise used for commercial purposes  All illustrations and images included in CareNotes® are the copyrighted property of A D A M , Inc  or [employer]  The above information is an  only  It is not intended as medical advice for individual conditions or treatments  Talk to your doctor, nurse or pharmacist before following any medical regimen to see if it is safe and effective for you

## 2018-08-07 NOTE — PROGRESS NOTES
UROLOGY ROUTINE FOLLOW UP NOTE     CHIEF COMPLAINT   Beth Quijano is a 80 y o  female with a complaint of   Chief Complaint   Patient presents with    Cystoscopy       History of Present Illness:     80 y o  female with a history of possible LEFT pyeloplasty in 1971  Patient referred to Dr Maikol Fortune for possible interstitial cystitis given acute onset of bladder pain  She presented with normal renal/bladder ultrasound and urinalysis with abnormality  Pain does not appear to be exacerbated by spicy or acidic foods  She does think that this symptom complex may have been exacerbated by alcohol and wine intake which was increased while she was recently on vacation  She was prescribed Uribel at the last visit  This has somewhat improved  Patient has a history of IBS and spinal stenosis  Her back issues Have dramatically improved with the spinal stimulator implant  Her GI issues are under control with fiber and medication intake  She presents as a transfer of care for cystoscopy to evaluate bladder lining      Past Medical History:     Past Medical History:   Diagnosis Date    Abnormal findings on diagnostic imaging of breast     Anxiety     Arthritis     Fibrocystic breast disease     GERD (gastroesophageal reflux disease)     Hyperlipidemia     Hypertension     Osteopenia        PAST SURGICAL HISTORY:     Past Surgical History:   Procedure Laterality Date    APPENDECTOMY      BACK SURGERY      BREAST LUMPECTOMY Left     CARPAL BOSS EXCISION      CHOLECYSTECTOMY      DIAGNOSTIC LAPAROSCOPY      FOOT SURGERY      HYSTERECTOMY      HYSTEROSCOPY      INCISIONAL BREAST BIOPSY      KIDNEY SURGERY Left     KNEE ARTHROSCOPY Bilateral     LAPAROSCOPY      hynecologic with adhesions    IL SURG IMPLNT NEUROELECT,EPIDURAL N/A 5/18/2017    Procedure: PLACEMENT OF THORACIC SPINAL CORD STIMULATOR WITH LEFT BUTTOCK IMPLANTABLE PULSE GENERATOR (IMPULSE MONITORING-MOTORS (TCEMEP), EMG, SSEP); Surgeon: Adrian Doyle MD;  Location: [facility];  Service: Neurosurgery    SPINAL STIMULATOR PLACEMENT  05/2017    TONSILLECTOMY AND ADENOIDECTOMY      onset: unknown       CURRENT MEDICATIONS:     Current Outpatient Prescriptions   Medication Sig Dispense Refill    amitriptyline (ELAVIL) 50 mg tablet Take 1 tablet (50 mg total) by mouth daily 90 tablet 2    Biotin 2500 MCG CAPS Take 2 capsules by mouth daily      Cholecalciferol (VITAMIN D-3 PO) Take 1 tablet by mouth daily      Cyanocobalamin (VITAMIN B-12 CR PO) Take 1 tablet by mouth daily      diclofenac sodium (VOLTAREN) 1 % Place 1 g on the skin 2 (two) times a day      dicyclomine (BENTYL) 10 mg capsule Take 10 mg by mouth every 6 (six) hours as needed      EPINEPHrine (EPIPEN 2-AREN) 0 3 mg/0 3 mL SOAJ Inject 1 Units as directed as needed      fexofenadine (ALLEGRA) 180 MG tablet Take 180 mg by mouth daily      gabapentin (NEURONTIN) 300 mg capsule Take 1 capsule (300 mg total) by mouth 3 (three) times a day 270 capsule 1    hydrOXYzine HCL (ATARAX) 10 mg tablet Take 2 tablets at bedtime 180 tablet 3    losartan (COZAAR) 50 mg tablet Take 1 tablet (50 mg total) by mouth daily 90 tablet 3    Meth-Hyo-M Bl-Na Phos-Ph Sal (URIBEL) 118 MG CAPS Take 1 capsule (118 mg total) by mouth every 6 (six) hours as needed (As directed) 10 capsule 11    montelukast (SINGULAIR) 10 mg tablet Take 1 tablet (10 mg total) by mouth daily at bedtime 90 tablet 3    Omega-3 Fatty Acids (FISH OIL) 1,000 mg Take 1,000 mg by mouth 3 (three) times a day      omeprazole (PriLOSEC) 40 MG capsule Take 1 capsule (40 mg total) by mouth daily 90 capsule 3    simvastatin (ZOCOR) 10 mg tablet Take 1 tablet (10 mg total) by mouth every other day 90 tablet 3    pentosan polysulfate (ELMIRON) 100 mg capsule Take 1 capsule (100 mg total) by mouth 3 (three) times a day before meals 90 capsule 6    traMADol (ULTRAM) 50 mg tablet Take 1 tablet (50 mg total) by mouth every 6 (six) hours as needed for moderate pain for up to 20 doses 20 tablet 0     No current facility-administered medications for this visit  ALLERGIES:     Allergies   Allergen Reactions    Bee Venom     Codeine Other (See Comments)     headaches    Egg Yolk     Iodinated Diagnostic Agents Hives    Other      Adhesive tape    Penicillins Hives    Latex Rash       SOCIAL HISTORY:     Social History     Social History    Marital status: Single     Spouse name: N/A    Number of children: N/A    Years of education: N/A     Social History Main Topics    Smoking status: Never Smoker    Smokeless tobacco: Never Used    Alcohol use Yes      Comment: social less than a drink per week    Drug use: No    Sexual activity: Not on file     Other Topics Concern    Not on file     Social History Narrative    No caffeine use    Always uses sunscreen    Always uses a seatbelt           SOCIAL HISTORY:     Family History   Problem Relation Age of Onset    Cancer Mother     Bone cancer Mother     Hypertension Father     Brain cancer Father     Stroke Father         stroke sydrome    Diabetes Paternal Grandmother     Other Family         back disorder    Heart disease Family         cardiac disorder    Stroke Family     Diabetes Family     Hypertension Family     Cancer Family     Breast cancer Maternal Aunt        REVIEW OF SYSTEMS:     Review of Systems   Constitutional: Negative  Respiratory: Negative  Cardiovascular: Negative  Gastrointestinal: Positive for constipation and diarrhea  Genitourinary: Positive for difficulty urinating, dysuria, pelvic pain and urgency  Musculoskeletal: Positive for back pain  Skin: Negative  Neurological: Negative  Psychiatric/Behavioral: Negative  PHYSICAL EXAM:     /80   Pulse 85   Ht 5' 4" (1 626 m)   Wt 91 6 kg (202 lb)   BMI 34 67 kg/m²     General:  Healthy appearing female in no acute distress  They have a normal affect  There is not appear to be any gross neurologic defects or abnormalities  HEENT:  Normocephalic, atraumatic  Neck is supple without any palpable lymphadenopathy  Cardiovascular:  Patient has normal palpable distal radial pulses  There is no significant peripheral edema  No JVD is noted  Respiratory:  Patient has unlabored respirations  There is no audible wheeze or rhonchi  Abdomen:  Abdomen with healed LEFT flank surgical scars  Abdomen is soft and nontender  There is no tympany  Inguinal and umbilical hernia are not appreciated  Genitourinary:  No visible abnormality of the urethra, normal external female genitalia    Musculoskeletal:  Patient does not have significant CVA tenderness in the  flank with palpation or percussion  They full range of motion in all 4 extremities  Strength in all 4 extremities appears congruent  Patient is able to ambulate without assistance or difficulty  Dermatologic:  Patient has no skin abnormalities or rashes  LABS:     CBC:   Lab Results   Component Value Date    WBC 7 86 2018    HGB 14 2 2018    HCT 40 2 2018    MCV 86 2018     2018       BMP:   Lab Results   Component Value Date    GLUCOSE 108 2018    CALCIUM 9 1 2018     2018    K 4 1 2018    CO2 29 2018     2018    BUN 15 2018    CREATININE 0 68 2018     Ref Range & Units 18  1:36 PM Flag    RBC, UA None Seen, 0-5 /hpf 2-4   A    WBC, UA None Seen, 0-5, 5-55, 5-65 /hpf 30-50   A    Epithelial Cells None Seen, Occasional /hpf None Seen     Bacteria, UA None Seen, Occasional /hpf Occasional     Hyaline Casts, UA None Seen /lpf None Seen        Specimen Collected: 18  1:36 PM        IMAGIN/12/18  CT ABDOMEN AND PELVIS WITHOUT IV CONTRAST     INDICATION:   R10 32:  Left lower quadrant pain      COMPARISON: 2016     TECHNIQUE:  CT examination of the abdomen and pelvis was performed without intravenous contrast   Axial, sagittal, and coronal 2D reformatted images were created from the source data and submitted for interpretation       Radiation dose length product (DLP) for this visit:  621  85 mGy-cm   This examination, like all CT scans performed in the [place], was performed utilizing techniques to minimize radiation dose exposure, including the use of iterative   reconstruction and automated exposure control       Enteric contrast was not administered       FINDINGS:     ABDOMEN     LOWER CHEST:  Epidural stimulator is seen over T8 and T9  This is new since previous      LIVER/BILIARY TREE:  Unremarkable      GALLBLADDER:  Gallbladder is surgically absent      SPLEEN:  Unremarkable      PANCREAS:  Unremarkable      ADRENAL GLANDS:  Unremarkable      KIDNEYS/URETERS:  Unremarkable  No hydronephrosis      STOMACH AND BOWEL:  Unremarkable      APPENDIX:  No findings to suggest appendicitis      ABDOMINOPELVIC CAVITY:  No ascites or free intraperitoneal air  No lymphadenopathy      VESSELS:  Unremarkable for patient's age      PELVIS     REPRODUCTIVE ORGANS:  Patient is status post hysterectomy      URINARY BLADDER:  Unremarkable      ABDOMINAL WALL/INGUINAL REGIONS:  Unremarkable      OSSEOUS STRUCTURES:  No acute fracture or destructive osseous lesion      IMPRESSION:     Negative study    PROCEDURE:     See note    ASSESSMENT:     80 y o  female with glomerulations on cystoscopy and symptom complex indicating interstitial cystitis    PLAN:     I reviewed for the patient the findings of the cystoscopy and the likelihood of interstitial cystitis diagnosis  I described for her the disease process and some of the early treatment algorithm which includes stress reduction techniques, referral to pelvic floor physical therapy and the use of medication  Patient already takes amitriptyline and hydroxyzine for her back issues  I have recommended the addition of Elmeron 100 mg t i d  We did discuss that this is sometimes a medication that is difficult to receive pharmacy coverage for  The patient will let me know if she has issues  I will plan to see her back in 3 months when she has been able to obtain and start the medication and seek out the opinion of the pelvic floor physical therapist   We did discuss that there escalating treatments for patients with worsening symptoms and that the disease process can sometimes wax and wane  She will continue to avoid food and drink triggers

## 2018-08-07 NOTE — PROGRESS NOTES
Cystoscopy  Date/Time: 8/7/2018 9:26 AM  Performed by: Isabel Mcmillan  Authorized by: Isabel Mcmillan     Procedure details: cystoscopy    Patient tolerance: Patient tolerated the procedure well with no immediate complications    Additional Procedure Details:   Cystoscopy Procedure note    Risk and benefits of flexible cystoscopy were discussed  Informed consent was obtained  A urine dip is adequate for cystoscopy  The patient was placed into the modified supine position  Her genitalia was prepped with Betadine and draped in a sterile fashion  Viscous 2% lidocaine was instilled into the urethra and allowed dwell time for local anesthesia  Flexible cystoscopy was then performed with a 16F scope  Urethra was inspected on entrance and exit of the scope  The bladder was thoroughly inspected in a 360 degree fashion  Both ureteral orifices were visualized in their orthotopic location with clear efflux of urine  The bladder mucosa was thoroughly inspected  There was no evidence of mucosal abnormalities, stones, or lesions  With continued bladder filling, we noted glomerulations and pinpoint areas of hypervascularity and bleeding in the right lateral surface and left anterior dome of the bladder  Retroflexion for evaluation of the bladder neck was normal       Overall this was a cystoscopy positive for glomerulations, potentially a sign of interstitial cystitis disease  A female office staff member was present throughout the entire procedure

## 2018-08-07 NOTE — LETTER
August 7, 2018     MD Willie Zheng 89    Patient: Calvin Mitchell   YOB: 1950   Date of Visit: 8/7/2018       Dear Dr Kaylynn Harper: Thank you for referring Landy Johns to me for evaluation  Below are my notes for this consultation  If you have questions, please do not hesitate to call me  I look forward to following your patient along with you  Sincerely,        Ariana Barraza MD        CC: DO Ariana Woody MD  8/7/2018  9:27 AM  Sign at close encounter  Cystoscopy  Date/Time: 8/7/2018 9:26 AM  Performed by: Rosa Rehman  Authorized by: Rosa Rehman     Procedure details: cystoscopy    Patient tolerance: Patient tolerated the procedure well with no immediate complications    Additional Procedure Details:   Cystoscopy Procedure note    Risk and benefits of flexible cystoscopy were discussed  Informed consent was obtained  A urine dip is adequate for cystoscopy  The patient was placed into the modified supine position  Her genitalia was prepped with Betadine and draped in a sterile fashion  Viscous 2% lidocaine was instilled into the urethra and allowed dwell time for local anesthesia  Flexible cystoscopy was then performed with a 16F scope  Urethra was inspected on entrance and exit of the scope  The bladder was thoroughly inspected in a 360 degree fashion  Both ureteral orifices were visualized in their orthotopic location with clear efflux of urine  The bladder mucosa was thoroughly inspected  There was no evidence of mucosal abnormalities, stones, or lesions  With continued bladder filling, we noted glomerulations and pinpoint areas of hypervascularity and bleeding in the right lateral surface and left anterior dome of the bladder   Retroflexion for evaluation of the bladder neck was normal       Overall this was a cystoscopy positive for glomerulations, potentially a sign of interstitial cystitis disease  A female office staff member was present throughout the entire procedure  Davin Kamara MD  8/7/2018  9:26 AM  Sign at close encounter    [address] is a 80 y o  female with a complaint of   Chief Complaint   Patient presents with    Cystoscopy       History of Present Illness:     80 y o  female with a history of possible LEFT pyeloplasty in 1971  Patient referred to Dr Heather Jade for possible interstitial cystitis given acute onset of bladder pain  She presented with normal renal/bladder ultrasound and urinalysis with abnormality  Pain does not appear to be exacerbated by spicy or acidic foods  She does think that this symptom complex may have been exacerbated by alcohol and wine intake which was increased while she was recently on vacation  She was prescribed Uribel at the last visit  This has somewhat improved  Patient has a history of IBS and spinal stenosis  Her back issues Have dramatically improved with the spinal stimulator implant  Her GI issues are under control with fiber and medication intake  She presents as a transfer of care for cystoscopy to evaluate bladder lining      Past Medical History:     Past Medical History:   Diagnosis Date    Abnormal findings on diagnostic imaging of breast     Anxiety     Arthritis     Fibrocystic breast disease     GERD (gastroesophageal reflux disease)     Hyperlipidemia     Hypertension     Osteopenia        PAST SURGICAL HISTORY:     Past Surgical History:   Procedure Laterality Date    APPENDECTOMY      BACK SURGERY      BREAST LUMPECTOMY Left     CARPAL BOSS EXCISION      CHOLECYSTECTOMY      DIAGNOSTIC LAPAROSCOPY      FOOT SURGERY      HYSTERECTOMY      HYSTEROSCOPY      INCISIONAL BREAST BIOPSY      KIDNEY SURGERY Left     KNEE ARTHROSCOPY Bilateral     LAPAROSCOPY      hynecologic with adhesions    DE SURG IMPLNT NEUROELECT,EPIDURAL N/A 5/18/2017    Procedure: PLACEMENT OF THORACIC SPINAL CORD STIMULATOR WITH LEFT BUTTOCK IMPLANTABLE PULSE GENERATOR (IMPULSE MONITORING-MOTORS (TCEMEP), EMG, SSEP);   Surgeon: Brielle Soria MD;  Location: [facility];  Service: Neurosurgery    SPINAL STIMULATOR PLACEMENT  05/2017    TONSILLECTOMY AND ADENOIDECTOMY      onset: unknown       CURRENT MEDICATIONS:     Current Outpatient Prescriptions   Medication Sig Dispense Refill    amitriptyline (ELAVIL) 50 mg tablet Take 1 tablet (50 mg total) by mouth daily 90 tablet 2    Biotin 2500 MCG CAPS Take 2 capsules by mouth daily      Cholecalciferol (VITAMIN D-3 PO) Take 1 tablet by mouth daily      Cyanocobalamin (VITAMIN B-12 CR PO) Take 1 tablet by mouth daily      diclofenac sodium (VOLTAREN) 1 % Place 1 g on the skin 2 (two) times a day      dicyclomine (BENTYL) 10 mg capsule Take 10 mg by mouth every 6 (six) hours as needed      EPINEPHrine (EPIPEN 2-AREN) 0 3 mg/0 3 mL SOAJ Inject 1 Units as directed as needed      fexofenadine (ALLEGRA) 180 MG tablet Take 180 mg by mouth daily      gabapentin (NEURONTIN) 300 mg capsule Take 1 capsule (300 mg total) by mouth 3 (three) times a day 270 capsule 1    hydrOXYzine HCL (ATARAX) 10 mg tablet Take 2 tablets at bedtime 180 tablet 3    losartan (COZAAR) 50 mg tablet Take 1 tablet (50 mg total) by mouth daily 90 tablet 3    Meth-Hyo-M Bl-Na Phos-Ph Sal (URIBEL) 118 MG CAPS Take 1 capsule (118 mg total) by mouth every 6 (six) hours as needed (As directed) 10 capsule 11    montelukast (SINGULAIR) 10 mg tablet Take 1 tablet (10 mg total) by mouth daily at bedtime 90 tablet 3    Omega-3 Fatty Acids (FISH OIL) 1,000 mg Take 1,000 mg by mouth 3 (three) times a day      omeprazole (PriLOSEC) 40 MG capsule Take 1 capsule (40 mg total) by mouth daily 90 capsule 3    simvastatin (ZOCOR) 10 mg tablet Take 1 tablet (10 mg total) by mouth every other day 90 tablet 3    pentosan polysulfate (ELMIRON) 100 mg capsule Take 1 capsule (100 mg total) by mouth 3 (three) times a day before meals 90 capsule 6    traMADol (ULTRAM) 50 mg tablet Take 1 tablet (50 mg total) by mouth every 6 (six) hours as needed for moderate pain for up to 20 doses 20 tablet 0     No current facility-administered medications for this visit  ALLERGIES:     Allergies   Allergen Reactions    Bee Venom     Codeine Other (See Comments)     headaches    Egg Yolk     Iodinated Diagnostic Agents Hives    Other      Adhesive tape    Penicillins Hives    Latex Rash       SOCIAL HISTORY:     Social History     Social History    Marital status: Single     Spouse name: N/A    Number of children: N/A    Years of education: N/A     Social History Main Topics    Smoking status: Never Smoker    Smokeless tobacco: Never Used    Alcohol use Yes      Comment: social less than a drink per week    Drug use: No    Sexual activity: Not on file     Other Topics Concern    Not on file     Social History Narrative    No caffeine use    Always uses sunscreen    Always uses a seatbelt           SOCIAL HISTORY:     Family History   Problem Relation Age of Onset    Cancer Mother     Bone cancer Mother     Hypertension Father     Brain cancer Father     Stroke Father         stroke sydrome    Diabetes Paternal Grandmother     Other Family         back disorder    Heart disease Family         cardiac disorder    Stroke Family     Diabetes Family     Hypertension Family     Cancer Family     Breast cancer Maternal Aunt        REVIEW OF SYSTEMS:     Review of Systems   Constitutional: Negative  Respiratory: Negative  Cardiovascular: Negative  Gastrointestinal: Positive for constipation and diarrhea  Genitourinary: Positive for difficulty urinating, dysuria, pelvic pain and urgency  Musculoskeletal: Positive for back pain  Skin: Negative  Neurological: Negative  Psychiatric/Behavioral: Negative  PHYSICAL EXAM:     /80   Pulse 85   Ht 5' 4" (1 626 m)   Wt 91 6 kg (202 lb)   BMI 34 67 kg/m²      General:  Healthy appearing female in no acute distress  They have a normal affect  There is not appear to be any gross neurologic defects or abnormalities  HEENT:  Normocephalic, atraumatic  Neck is supple without any palpable lymphadenopathy  Cardiovascular:  Patient has normal palpable distal radial pulses  There is no significant peripheral edema  No JVD is noted  Respiratory:  Patient has unlabored respirations  There is no audible wheeze or rhonchi  Abdomen:  Abdomen with healed LEFT flank surgical scars  Abdomen is soft and nontender  There is no tympany  Inguinal and umbilical hernia are not appreciated  Genitourinary:  No visible abnormality of the urethra, normal external female genitalia    Musculoskeletal:  Patient does not have significant CVA tenderness in the  flank with palpation or percussion  They full range of motion in all 4 extremities  Strength in all 4 extremities appears congruent  Patient is able to ambulate without assistance or difficulty  Dermatologic:  Patient has no skin abnormalities or rashes        LABS:     CBC:   Lab Results   Component Value Date    WBC 7 86 03/31/2018    HGB 14 2 03/31/2018    HCT 40 2 03/31/2018    MCV 86 03/31/2018     03/31/2018       BMP:   Lab Results   Component Value Date    GLUCOSE 108 03/31/2018    CALCIUM 9 1 03/31/2018     03/31/2018    K 4 1 03/31/2018    CO2 29 03/31/2018     03/31/2018    BUN 15 03/31/2018    CREATININE 0 68 03/31/2018     Ref Range & Units 6/7/18  1:36 PM Flag    RBC, UA None Seen, 0-5 /hpf 2-4   A    WBC, UA None Seen, 0-5, 5-55, 5-65 /hpf 30-50   A    Epithelial Cells None Seen, Occasional /hpf None Seen     Bacteria, UA None Seen, Occasional /hpf Occasional     Hyaline Casts, UA None Seen /lpf None Seen        Specimen Collected: 06/07/18  1:36 PM        IMAGING: 7/12/18  CT ABDOMEN AND PELVIS WITHOUT IV CONTRAST     INDICATION:   R10 32: Left lower quadrant pain      COMPARISON: December 8, 2016     TECHNIQUE:  CT examination of the abdomen and pelvis was performed without intravenous contrast   Axial, sagittal, and coronal 2D reformatted images were created from the source data and submitted for interpretation       Radiation dose length product (DLP) for this visit:  621  85 mGy-cm   This examination, like all CT scans performed in the [facility], was performed utilizing techniques to minimize radiation dose exposure, including the use of iterative   reconstruction and automated exposure control       Enteric contrast was not administered       FINDINGS:     ABDOMEN     LOWER CHEST:  Epidural stimulator is seen over T8 and T9  This is new since previous      LIVER/BILIARY TREE:  Unremarkable      GALLBLADDER:  Gallbladder is surgically absent      SPLEEN:  Unremarkable      PANCREAS:  Unremarkable      ADRENAL GLANDS:  Unremarkable      KIDNEYS/URETERS:  Unremarkable  No hydronephrosis      STOMACH AND BOWEL:  Unremarkable      APPENDIX:  No findings to suggest appendicitis      ABDOMINOPELVIC CAVITY:  No ascites or free intraperitoneal air  No lymphadenopathy      VESSELS:  Unremarkable for patient's age      PELVIS     REPRODUCTIVE ORGANS:  Patient is status post hysterectomy      URINARY BLADDER:  Unremarkable      ABDOMINAL WALL/INGUINAL REGIONS:  Unremarkable      OSSEOUS STRUCTURES:  No acute fracture or destructive osseous lesion      IMPRESSION:     Negative study    PROCEDURE:     See note    ASSESSMENT:     80 y o  female with glomerulations on cystoscopy and symptom complex indicating interstitial cystitis    PLAN:     I reviewed for the patient the findings of the cystoscopy and the likelihood of interstitial cystitis diagnosis    I described for her the disease process and some of the early treatment algorithm which includes stress reduction techniques, referral to pelvic floor physical therapy and the use of medication  Patient already takes amitriptyline and hydroxyzine for her back issues  I have recommended the addition of Elmeron 100 mg t i d   We did discuss that this is sometimes a medication that is difficult to receive pharmacy coverage for  The patient will let me know if she has issues  I will plan to see her back in 3 months when she has been able to obtain and start the medication and seek out the opinion of the pelvic floor physical therapist   We did discuss that there escalating treatments for patients with worsening symptoms and that the disease process can sometimes wax and wane  She will continue to avoid food and drink triggers

## 2018-08-11 LAB — BACTERIA UR CULT: ABNORMAL

## 2018-08-13 ENCOUNTER — TELEPHONE (OUTPATIENT)
Dept: UROLOGY | Facility: CLINIC | Age: 68
End: 2018-08-13

## 2018-08-13 RX ORDER — SULFAMETHOXAZOLE AND TRIMETHOPRIM 800; 160 MG/1; MG/1
1 TABLET ORAL EVERY 12 HOURS SCHEDULED
Qty: 20 TABLET | Refills: 0 | Status: SHIPPED | OUTPATIENT
Start: 2018-08-13 | End: 2018-08-23

## 2018-08-15 ENCOUNTER — EVALUATION (OUTPATIENT)
Dept: PHYSICAL THERAPY | Facility: CLINIC | Age: 68
End: 2018-08-15
Payer: MEDICARE

## 2018-08-15 ENCOUNTER — OFFICE VISIT (OUTPATIENT)
Dept: INTERNAL MEDICINE CLINIC | Facility: CLINIC | Age: 68
End: 2018-08-15
Payer: MEDICARE

## 2018-08-15 VITALS
WEIGHT: 201 LBS | TEMPERATURE: 97.1 F | BODY MASS INDEX: 34.31 KG/M2 | OXYGEN SATURATION: 95 % | HEART RATE: 107 BPM | HEIGHT: 64 IN

## 2018-08-15 DIAGNOSIS — T50.905A ADVERSE EFFECT OF DRUG, INITIAL ENCOUNTER: Primary | ICD-10-CM

## 2018-08-15 DIAGNOSIS — N30.10 INTERSTITIAL CYSTITIS: Primary | ICD-10-CM

## 2018-08-15 PROCEDURE — 97163 PT EVAL HIGH COMPLEX 45 MIN: CPT | Performed by: PHYSICAL THERAPIST

## 2018-08-15 PROCEDURE — 99213 OFFICE O/P EST LOW 20 MIN: CPT | Performed by: INTERNAL MEDICINE

## 2018-08-15 PROCEDURE — 97530 THERAPEUTIC ACTIVITIES: CPT | Performed by: PHYSICAL THERAPIST

## 2018-08-15 PROCEDURE — G8991 OTHER PT/OT GOAL STATUS: HCPCS | Performed by: PHYSICAL THERAPIST

## 2018-08-15 PROCEDURE — G8990 OTHER PT/OT CURRENT STATUS: HCPCS | Performed by: PHYSICAL THERAPIST

## 2018-08-15 RX ORDER — TRIAMCINOLONE ACETONIDE 1 MG/G
CREAM TOPICAL 2 TIMES DAILY
Qty: 30 G | Refills: 0 | Status: SHIPPED | OUTPATIENT
Start: 2018-08-15 | End: 2018-08-20 | Stop reason: SDUPTHER

## 2018-08-15 NOTE — PROGRESS NOTES
PT Evaluation     Today's date: 2018  Patient name: Yobany Brooke  : 1950  MRN: [id number]  Referring provider: Bala Delacruz, *  Dx:   Encounter Diagnosis     ICD-10-CM    1  Interstitial cystitis N30 10 PT plan of care cert/re-cert          Assessment  Impairments: abnormal muscle firing    Assessment details: Pt is a 79 yo female who who presents for PT evaluation with recent diagnosis of interstitial cystitis  She reports improvement in her urgency/frequency and urinary leakage since diagnosis  She had noted improvement with avoidance of bladder irritants and with medication however she is no longer taking the medication due to an allergic reaction  She was educated today on urge suppression techniques for bladder training  She may benefit from internal PFM assessment if no improvement noted with urge/suppression and external PFM techniques  Pt agreeable with POC  Goals  Patient will report 50% improvement in FOTO assessment for improved QOL  Patient will report compliance with HEP, behavioral and dietary modifications  Patient will report compliance with voiding intervals (if necessary)    Plan  Planned therapy interventions: patient education, strengthening, therapeutic activities, therapeutic exercise and therapeutic training  Frequency: 1x week  Duration in visits: 8  Duration in weeks: 8        Subjective Evaluation    History of Present Illness  Mechanism of injury: Patient presents for PT evaluation for interstitial cystits  She reports that she usually avoids caffeine, spicy foods and most of the IC diet avoidance's anyway since diagnosis  She reports the medication she started taking last week was working however she has since broken out in a rash and was advised to stop the medication  She will f/u with her urologist      Urinary symptoms: "I feel like I have a lot of pressure and pain with urination"  It was diagnosed ~1 month ago  Her symptoms started in 2018   She initially thought she had a UTI and also had an ultrasound which diagnosed cystitis  She reports that she had never had urinary leakage before until this started  Biggest complaint: Pressure, urgency, initially reported significant urinary leakage however it has improved  Pain  Do you have pain with  Caroline-No  Pelvic exam-No at last gynacologist exam in January  Do you have back, leg, groin, abdominal pain  Surgical history; Hysterectomy at 29years of age 2/2 endometriosis  Improved pain since then  Pregnancy's: None  Other procedures: Has a neurostimulator secondary to back pain  Had surgery at 25years of age for sciatica  Patient reports that she is doing much better since the neurostimulater  Test results  Bladder Ultrasound: Mild cystitis  Abdominal/Bladder CT: Negative    Bladder Symptoms  Do you lose urine when you:   Cough: No   Sneeze: No   Laugh: No   On the way to the bathroom: Yes, since having cystitis  Hear running water: No  Do you have leakage during sleep:No  Have burning/pain with urination: Yes at the bladder  Difficulty starting a stream of urine: Yes initially but it has gotten better  Strain to empty your bladder: No  Feel unable to empty bladder fully: No  Have a falling out feeling: No  Have pain with a full bladder: No  Have an urgency of urination (a strong urge to urinate): Yes since start of cystitis  Urinate more than 7 times/day: 5x  Lift/exercise/dance/jump: No    Bowel Symptoms *Pt takes medication for IBS  "I haven't had any problems since"  Do you    Strain to have a bowel movement: Not lately   Include fiber in your diet: Yes   Take laxatives/enema regularly: No   Have pain with bowel movement: Sometimes, at anal sphincter  Improved since using fiber and prune juice daily      Have a very strong urge to move your bowels: No   Leak/stain feces: No   Have diarrhea often: No   Leak gas by accident: No  How often do you move your bowels: 2-3 per day  Most common stool consistency:  firm     Fluid intake: 72oz water, decaf ice tea, lemonade  Pain  Current pain rating: 3  At best pain ratin  At worst pain ratin (Symptoms worsened after having wine on vacation this week  )  Quality: burning and dull ache    Social Support    Employment status: working (Retired  for PT at [employer]  Currently works part time  at [employer])  Exercise history: Walking, uses stationary bike at home  Objective     Pelvic alignment: Left anterior  Hip PROM: Grossly WNL  Palpation/external LA: Non-tender  TAC contraction: Decreased strength: 3-/5  Flowsheet Rows      Most Recent Value   PT/OT G-Codes   Current Score  0   Projected Score  0   Assessment Type  Evaluation   G code set  Other PT/OT Primary   Other PT Primary Current Status ()  CK   Other PT Primary Goal Status ()  CI          Precautions: Standard    Daily Treatment Diary     Manual                                                                                   Exercise Diary              Diaphragmatic breathing             PFM coordination exercise                                                                                                                                                                                                                                                           Modalities                                                       *HEP: Diaphragmatic breathing, urge suppression/control techniques  Update: 18: Pt called to cancel today's visit due to schedule conflict  She also reports that her symptoms have completely resolved and she would like to know if she still needs PT  Therapist ok'd d/c a this time and for pt to continue with home exercises and behavioral modifications and to return to PT if her symptoms recur

## 2018-08-15 NOTE — PROGRESS NOTES
Assessment/Plan:      Diagnoses and all orders for this visit:    Adverse effect of drug, initial encounter  Stop elmiron  Use antihistamine  Triamcinolone cream as needed to lesions    Patient will call Urology regarding plan above     Patient ID: Ashia Joseph is a 80 y o  female  HPI   Pt started Elmiron per urology last Thursday and developed itchy rash  She has blotches under arms and flank and abdomen  The med has been helpful per patient    Review of Systems   Constitutional: Negative  HENT: Negative  Eyes: Negative  Respiratory: Negative  Negative for wheezing  Cardiovascular: Negative  Gastrointestinal: Negative  Endocrine: Negative  Genitourinary: Negative  Musculoskeletal: Negative  Skin: Positive for rash  Allergic/Immunologic: Negative  Neurological: Negative  Hematological: Negative  Psychiatric/Behavioral: Negative  Past Medical History:   Diagnosis Date    Abnormal findings on diagnostic imaging of breast     Anxiety     Arthritis     Fibrocystic breast disease     GERD (gastroesophageal reflux disease)     Hyperlipidemia     Hypertension     Osteopenia      Past Surgical History:   Procedure Laterality Date    APPENDECTOMY      BACK SURGERY      BREAST LUMPECTOMY Left     CARPAL BOSS EXCISION      CHOLECYSTECTOMY      DIAGNOSTIC LAPAROSCOPY      FOOT SURGERY      HYSTERECTOMY      HYSTEROSCOPY      INCISIONAL BREAST BIOPSY      KIDNEY SURGERY Left     KNEE ARTHROSCOPY Bilateral     LAPAROSCOPY      hynecologic with adhesions    AR SURG IMPLNT NEUROELECT,EPIDURAL N/A 5/18/2017    Procedure: PLACEMENT OF THORACIC SPINAL CORD STIMULATOR WITH LEFT BUTTOCK IMPLANTABLE PULSE GENERATOR (IMPULSE MONITORING-MOTORS (TCEMEP), EMG, SSEP);   Surgeon: Adrian Doyle MD;  Location: [facility];  Service: Neurosurgery    SPINAL STIMULATOR PLACEMENT  05/2017    TONSILLECTOMY AND ADENOIDECTOMY      onset: unknown     Social History     Social History    Marital status: Single     Spouse name: N/A    Number of children: N/A    Years of education: N/A     Occupational History    Not on file  Social History Main Topics    Smoking status: Never Smoker    Smokeless tobacco: Never Used    Alcohol use Yes      Comment: social less than a drink per week    Drug use: No    Sexual activity: Not on file     Other Topics Concern    Not on file     Social History Narrative    No caffeine use    Always uses sunscreen    Always uses a seatbelt         Allergies   Allergen Reactions    Bee Venom     Codeine Other (See Comments)     headaches    Egg Yolk     Iodinated Diagnostic Agents Hives    Other      Adhesive tape    Penicillins Hives    Latex Rash     Vitals:    08/15/18 1004   Pulse: (!) 107   Temp: (!) 97 1 °F (36 2 °C)   TempSrc: Tympanic   SpO2: 95%   Weight: 91 2 kg (201 lb)   Height: 5' 4" (1 626 m)   /70       Physical Exam   Constitutional: She is oriented to person, place, and time  She appears well-developed and well-nourished  No distress  HENT:   Head: Normocephalic and atraumatic  Right Ear: External ear normal    Left Ear: External ear normal    Nose: Nose normal    Mouth/Throat: Oropharynx is clear and moist  No oropharyngeal exudate  Eyes: Conjunctivae and EOM are normal  Pupils are equal, round, and reactive to light  No scleral icterus  Neck: Normal range of motion  Neck supple  Cardiovascular: Normal rate and regular rhythm  Pulmonary/Chest: Effort normal and breath sounds normal    Abdominal: Soft  Bowel sounds are normal    Musculoskeletal: Normal range of motion  She exhibits no edema, tenderness or deformity  Neurological: She is alert and oriented to person, place, and time  She has normal reflexes  Skin: Rash noted  She is not diaphoretic  Blotchy hive like rash on underarms,flank b/l and abdomen   Psychiatric: She has a normal mood and affect   Her behavior is normal  Judgment and thought content normal    Nursing note and vitals reviewed

## 2018-08-15 NOTE — PATIENT INSTRUCTIONS
Urge Control and Suppression    The following are some recommendations that may help to decrease the frequency of your urge to urinate  Understanding Urge: The first urge to urinate usually occurs when the bladder is half full  If your bladder is over-sensitive, it may feel as if it is full, but it really is not  Your brain is getting false information  A history of leaking may make you want to go to the bathroom very quickly to avoid a leak, making the urge seem even stronger  "Ride the Wave": The urge to urinate is often described as a "wave"  When the urge first appears, sit quietly  Know that it will get stronger and peak  Attempt the following urge suppression tips  Some techniques work better for others! Find what works best for you  Recommendations:  1  Fluid restrictions:   Eliminate caffeine (found in tea, coffee, green tea, chocolate, cola, energy drinks)  Avoid alcohol, fizzy drinks and citrus fruit drinks  2  Drink water, and/or alternatives like decaffeinated tea or coffee, herbal tea, milk  Aim to drink approximately 6-8 cups of fluid each day  3  When you get the urge to urinate DO NOT run to the bathroom  A) Sit on hard surface or stand still  B) Perform 10 pelvic floor contractions (kegel's)  C) Cross your legs   D) Put pressure on your pelvic floor in the genital region  E) Distract your mind by thinking of tasks, e g counting or the alphabet  backwards  F) Practice deep breathing   G) Once the urge subsides, then calmly walk to the bathroom  Night time urination  If you get up in the middle of the night to empty your bladder, try to avoid drinking 2 hours before you go to bed (small amounts of fluid to take medications is acceptable)  It is normal to get up 0-1 times in the night if you ar eunder 79years old, and 1-2 if you are over 79  When you are retraining your bladder to hold more fluid, you may feel discomfort in your lower abdomen   This is normal and will stop once your bladder is used to holding more fluid  However, if you experience stabbing pain or burning or stinging when you are passing urine, stop these techniques and discuss this with your therapist      Frequency: Scheduled or "timed" voids  If you find yourself going to the  Bathroom more than 7-8 times per day, scheduling your bathroom trips can help increase the time between each visit  For example, you have determined you go to the bathroom every hour  Start to slowly increase that time frame by small intervals  Use the techniques above to stretch the time between your next visits to 1 hour 15 minutes  Once this becomes easy to achieve space it out another 15 minutes until you are going every 2-4 hours

## 2018-08-16 ENCOUNTER — TELEPHONE (OUTPATIENT)
Dept: UROLOGY | Facility: CLINIC | Age: 68
End: 2018-08-16

## 2018-08-16 DIAGNOSIS — G60.9 IDIOPATHIC PERIPHERAL NEUROPATHY: ICD-10-CM

## 2018-08-16 DIAGNOSIS — G44.229 CHRONIC TENSION-TYPE HEADACHE, NOT INTRACTABLE: ICD-10-CM

## 2018-08-16 NOTE — TELEPHONE ENCOUNTER
Pt managed by Dr Millie Baum at the [place] office   Pt seen for IC  Pt prescribed Elmiron for IC  Pt called reporting she developed "red blotches" under her axilla, abdomen and buttocks over the weekend and patches remain  Pt saw PCP yesterday and PCP diagnosed pt with allergic reaction to medicine and instructed pt to stop Elmiron  Discussed situation with Dr Fernie Gardiner  Pt may try Elavil 10mg one at bedtime  Discussed with pt and she is in agreement to start medicine    Alessandra  Please escribe Elavil  Thank

## 2018-08-17 ENCOUNTER — TELEPHONE (OUTPATIENT)
Dept: UROLOGY | Facility: CLINIC | Age: 68
End: 2018-08-17

## 2018-08-17 DIAGNOSIS — F41.9 ANXIETY: Primary | ICD-10-CM

## 2018-08-17 RX ORDER — AMITRIPTYLINE HYDROCHLORIDE 10 MG/1
10 TABLET, FILM COATED ORAL DAILY
Qty: 30 TABLET | Refills: 2 | Status: SHIPPED | OUTPATIENT
Start: 2018-08-17 | End: 2018-08-17 | Stop reason: DRUGHIGH

## 2018-08-17 RX ORDER — AMITRIPTYLINE HYDROCHLORIDE 50 MG/1
50 TABLET, FILM COATED ORAL DAILY
Qty: 90 TABLET | Refills: 1 | Status: SHIPPED | OUTPATIENT
Start: 2018-08-17 | End: 2019-03-08 | Stop reason: SDUPTHER

## 2018-08-17 NOTE — TELEPHONE ENCOUNTER
Pt notified re: Rx for Elavil  She will call [facility] to see if it is there

## 2018-08-20 ENCOUNTER — TELEPHONE (OUTPATIENT)
Dept: INTERNAL MEDICINE CLINIC | Facility: CLINIC | Age: 68
End: 2018-08-20

## 2018-08-20 DIAGNOSIS — T50.905A ADVERSE EFFECT OF DRUG, INITIAL ENCOUNTER: ICD-10-CM

## 2018-08-20 RX ORDER — TRIAMCINOLONE ACETONIDE 1 MG/G
CREAM TOPICAL 2 TIMES DAILY
Qty: 45 G | Refills: 2 | Status: SHIPPED | OUTPATIENT
Start: 2018-08-20 | End: 2019-07-11 | Stop reason: ALTCHOICE

## 2018-08-27 ENCOUNTER — APPOINTMENT (OUTPATIENT)
Dept: PHYSICAL THERAPY | Facility: CLINIC | Age: 68
End: 2018-08-27
Payer: MEDICARE

## 2018-10-01 ENCOUNTER — TELEPHONE (OUTPATIENT)
Dept: INTERNAL MEDICINE CLINIC | Facility: CLINIC | Age: 68
End: 2018-10-01

## 2018-10-01 ENCOUNTER — APPOINTMENT (OUTPATIENT)
Dept: LAB | Facility: MEDICAL CENTER | Age: 68
End: 2018-10-01
Payer: MEDICARE

## 2018-10-01 DIAGNOSIS — R35.0 URINARY FREQUENCY: Primary | ICD-10-CM

## 2018-10-01 DIAGNOSIS — R35.0 URINE FREQUENCY: Primary | ICD-10-CM

## 2018-10-01 LAB
BACTERIA UR QL AUTO: ABNORMAL /HPF
BILIRUB UR QL STRIP: NEGATIVE
CLARITY UR: ABNORMAL
COLOR UR: YELLOW
GLUCOSE UR STRIP-MCNC: NEGATIVE MG/DL
HGB UR QL STRIP.AUTO: ABNORMAL
KETONES UR STRIP-MCNC: NEGATIVE MG/DL
LEUKOCYTE ESTERASE UR QL STRIP: ABNORMAL
NITRITE UR QL STRIP: NEGATIVE
NON-SQ EPI CELLS URNS QL MICRO: ABNORMAL /HPF
PH UR STRIP.AUTO: 6.5 [PH] (ref 4.5–8)
PROT UR STRIP-MCNC: NEGATIVE MG/DL
RBC #/AREA URNS AUTO: ABNORMAL /HPF
SP GR UR STRIP.AUTO: 1.02 (ref 1–1.03)
UROBILINOGEN UR QL STRIP.AUTO: 0.2 E.U./DL
WBC #/AREA URNS AUTO: ABNORMAL /HPF

## 2018-10-01 PROCEDURE — 87086 URINE CULTURE/COLONY COUNT: CPT

## 2018-10-01 PROCEDURE — 81001 URINALYSIS AUTO W/SCOPE: CPT

## 2018-10-02 DIAGNOSIS — N39.0 URINARY TRACT INFECTION WITHOUT HEMATURIA, SITE UNSPECIFIED: Primary | ICD-10-CM

## 2018-10-02 LAB — BACTERIA UR CULT: NORMAL

## 2018-10-02 RX ORDER — CIPROFLOXACIN 500 MG/1
500 TABLET, FILM COATED ORAL EVERY 12 HOURS SCHEDULED
Qty: 6 TABLET | Refills: 0 | Status: SHIPPED | OUTPATIENT
Start: 2018-10-02 | End: 2018-10-05

## 2018-10-02 NOTE — TELEPHONE ENCOUNTER
----- Message from Al Carolina DO sent at 10/2/2018  7:15 AM EDT -----  Ua not definitive for infection - some leukocytes   No culture sent so would rx cipro 500mg bid for 3 days but if sxs persist more likely interstitial cystitis related sxs  Make sure drinking lot of water as well

## 2018-10-03 ENCOUNTER — TELEPHONE (OUTPATIENT)
Dept: INTERNAL MEDICINE CLINIC | Facility: CLINIC | Age: 68
End: 2018-10-03

## 2018-11-05 ENCOUNTER — OFFICE VISIT (OUTPATIENT)
Dept: INTERNAL MEDICINE CLINIC | Facility: CLINIC | Age: 68
End: 2018-11-05
Payer: MEDICARE

## 2018-11-05 VITALS
WEIGHT: 197.25 LBS | TEMPERATURE: 97.4 F | HEART RATE: 82 BPM | HEIGHT: 64 IN | OXYGEN SATURATION: 95 % | DIASTOLIC BLOOD PRESSURE: 70 MMHG | SYSTOLIC BLOOD PRESSURE: 122 MMHG | BODY MASS INDEX: 33.67 KG/M2

## 2018-11-05 DIAGNOSIS — K58.9 IRRITABLE BOWEL SYNDROME WITHOUT DIARRHEA: ICD-10-CM

## 2018-11-05 DIAGNOSIS — J01.00 SUBACUTE MAXILLARY SINUSITIS: Primary | ICD-10-CM

## 2018-11-05 PROCEDURE — 99213 OFFICE O/P EST LOW 20 MIN: CPT | Performed by: INTERNAL MEDICINE

## 2018-11-05 RX ORDER — DOXYCYCLINE HYCLATE 100 MG/1
100 CAPSULE ORAL EVERY 12 HOURS SCHEDULED
Qty: 14 CAPSULE | Refills: 0 | Status: SHIPPED | OUTPATIENT
Start: 2018-11-05 | End: 2018-11-12

## 2018-11-05 RX ORDER — DICYCLOMINE HYDROCHLORIDE 10 MG/1
10 CAPSULE ORAL 2 TIMES DAILY
Qty: 180 CAPSULE | Refills: 0 | Status: SHIPPED | OUTPATIENT
Start: 2018-11-05 | End: 2019-02-07 | Stop reason: SDUPTHER

## 2018-11-08 ENCOUNTER — APPOINTMENT (OUTPATIENT)
Dept: LAB | Facility: MEDICAL CENTER | Age: 68
End: 2018-11-08
Payer: MEDICARE

## 2018-11-08 DIAGNOSIS — E78.5 HYPERLIPIDEMIA, UNSPECIFIED HYPERLIPIDEMIA TYPE: ICD-10-CM

## 2018-11-08 DIAGNOSIS — R05.9 COUGH: ICD-10-CM

## 2018-11-08 DIAGNOSIS — K21.9 GASTROESOPHAGEAL REFLUX DISEASE, ESOPHAGITIS PRESENCE NOT SPECIFIED: ICD-10-CM

## 2018-11-08 DIAGNOSIS — I10 HYPERTENSION, UNSPECIFIED TYPE: ICD-10-CM

## 2018-11-08 LAB
ALBUMIN SERPL BCP-MCNC: 3.9 G/DL (ref 3.5–5)
ALP SERPL-CCNC: 92 U/L (ref 46–116)
ALT SERPL W P-5'-P-CCNC: 55 U/L (ref 12–78)
ANION GAP SERPL CALCULATED.3IONS-SCNC: 8 MMOL/L (ref 4–13)
AST SERPL W P-5'-P-CCNC: 37 U/L (ref 5–45)
BASOPHILS # BLD AUTO: 0.07 THOUSANDS/ΜL (ref 0–0.1)
BASOPHILS NFR BLD AUTO: 1 % (ref 0–1)
BILIRUB SERPL-MCNC: 0.44 MG/DL (ref 0.2–1)
BUN SERPL-MCNC: 16 MG/DL (ref 5–25)
CALCIUM SERPL-MCNC: 9.2 MG/DL (ref 8.3–10.1)
CHLORIDE SERPL-SCNC: 105 MMOL/L (ref 100–108)
CHOLEST SERPL-MCNC: 296 MG/DL (ref 50–200)
CO2 SERPL-SCNC: 25 MMOL/L (ref 21–32)
CREAT SERPL-MCNC: 0.64 MG/DL (ref 0.6–1.3)
EOSINOPHIL # BLD AUTO: 0.29 THOUSAND/ΜL (ref 0–0.61)
EOSINOPHIL NFR BLD AUTO: 3 % (ref 0–6)
ERYTHROCYTE [DISTWIDTH] IN BLOOD BY AUTOMATED COUNT: 12.8 % (ref 11.6–15.1)
GFR SERPL CREATININE-BSD FRML MDRD: 93 ML/MIN/1.73SQ M
GLUCOSE P FAST SERPL-MCNC: 102 MG/DL (ref 65–99)
HCT VFR BLD AUTO: 43 % (ref 34.8–46.1)
HDLC SERPL-MCNC: 50 MG/DL (ref 40–60)
HGB BLD-MCNC: 14.3 G/DL (ref 11.5–15.4)
IMM GRANULOCYTES # BLD AUTO: 0.03 THOUSAND/UL (ref 0–0.2)
IMM GRANULOCYTES NFR BLD AUTO: 0 % (ref 0–2)
LDLC SERPL CALC-MCNC: 216 MG/DL (ref 0–100)
LYMPHOCYTES # BLD AUTO: 3.1 THOUSANDS/ΜL (ref 0.6–4.47)
LYMPHOCYTES NFR BLD AUTO: 33 % (ref 14–44)
MCH RBC QN AUTO: 29.4 PG (ref 26.8–34.3)
MCHC RBC AUTO-ENTMCNC: 33.3 G/DL (ref 31.4–37.4)
MCV RBC AUTO: 88 FL (ref 82–98)
MONOCYTES # BLD AUTO: 0.64 THOUSAND/ΜL (ref 0.17–1.22)
MONOCYTES NFR BLD AUTO: 7 % (ref 4–12)
NEUTROPHILS # BLD AUTO: 5.2 THOUSANDS/ΜL (ref 1.85–7.62)
NEUTS SEG NFR BLD AUTO: 56 % (ref 43–75)
NONHDLC SERPL-MCNC: 246 MG/DL
NRBC BLD AUTO-RTO: 0 /100 WBCS
PLATELET # BLD AUTO: 263 THOUSANDS/UL (ref 149–390)
PMV BLD AUTO: 9.3 FL (ref 8.9–12.7)
POTASSIUM SERPL-SCNC: 4.1 MMOL/L (ref 3.5–5.3)
PROT SERPL-MCNC: 7.2 G/DL (ref 6.4–8.2)
RBC # BLD AUTO: 4.87 MILLION/UL (ref 3.81–5.12)
SODIUM SERPL-SCNC: 138 MMOL/L (ref 136–145)
TRIGL SERPL-MCNC: 152 MG/DL
WBC # BLD AUTO: 9.33 THOUSAND/UL (ref 4.31–10.16)

## 2018-11-08 PROCEDURE — 36415 COLL VENOUS BLD VENIPUNCTURE: CPT

## 2018-11-08 PROCEDURE — 85025 COMPLETE CBC W/AUTO DIFF WBC: CPT

## 2018-11-08 PROCEDURE — 80053 COMPREHEN METABOLIC PANEL: CPT

## 2018-11-08 PROCEDURE — 80061 LIPID PANEL: CPT

## 2018-11-08 NOTE — PROGRESS NOTES
11/12/2018      Chief Complaint   Patient presents with    Cystitis       Assessment and Plan    80 y o  female managed by Dr Jarrett Gentile    1  Interstitial cystitis  - continue Elavil 10 mg at bedtime  - continue pelvic floor PT exercises  - dicussed OAB medication versus proceeding to tertiary treatment options of PTNS versus botox, both discussed and PTNS pamphlet provided to patient   - will trial oxybutynin 10mg ER and FU 3 months, side effects and dosing reviewed  - patient aware to notify us with any constipation as this will worsen her IC      History of Present Illness  Nat Gill is a 80 y o  female here for follow up evaluation of cystitis  She underwent cystoscopy 8/7/2018 which revealed no ulcerations or other concerns aside for glomerulations  Because of this she was diagnosed with interstitial cystitis and started on Elmiron  Unfortunately, this caused the patient to break out in hives  She was therefore asked to take Elavil 10 mg at bedtime, the patient was previously is on this for back pain  She does have a spinal stimulator in place  She presents today with complaints of pain and frequency  She states that this started about 2 weeks ago  She did not notice any predisposing foods, etc   She is not constipated  She did complete pelvic floor PT and continues with her at home exercises  Review of Systems   Constitutional: Negative for activity change, chills and fever  Gastrointestinal: Negative for abdominal distention and abdominal pain  Musculoskeletal: Negative for back pain and gait problem  Psychiatric/Behavioral: Negative for behavioral problems and confusion  Urinary Incontinence Screening      Most Recent Value   Urinary Incontinence   Urinary Incontinence? No   Incomplete emptying? No   Urinary frequency? Yes   Urinary urgency? No   Urinary hesitancy? No   Dysuria (painful difficult urination)? No   Nocturia (waking up to use the bathroom)?   No Straining (having to push to go)? No   Weak stream?  No   Intermittent stream?  No   Post void dribbling? No          Past Medical History  Past Medical History:   Diagnosis Date    Abnormal findings on diagnostic imaging of breast     Anxiety     Arthritis     Fibrocystic breast disease     GERD (gastroesophageal reflux disease)     Hyperlipidemia     Hypertension     Osteopenia        Past Social History  Past Surgical History:   Procedure Laterality Date    APPENDECTOMY      BACK SURGERY      BREAST LUMPECTOMY Left     CARPAL BOSS EXCISION      CHOLECYSTECTOMY      DIAGNOSTIC LAPAROSCOPY      FOOT SURGERY      HYSTERECTOMY      HYSTEROSCOPY      INCISIONAL BREAST BIOPSY      KIDNEY SURGERY Left     KNEE ARTHROSCOPY Bilateral     LAPAROSCOPY      hynecologic with adhesions    NM SURG IMPLNT NEUROELECT,EPIDURAL N/A 5/18/2017    Procedure: PLACEMENT OF THORACIC SPINAL CORD STIMULATOR WITH LEFT BUTTOCK IMPLANTABLE PULSE GENERATOR (IMPULSE MONITORING-MOTORS (TCEMEP), EMG, SSEP);   Surgeon: Racheal Bustos MD;  Location: [facility];  Service: Neurosurgery    SPINAL STIMULATOR PLACEMENT  05/2017    TONSILLECTOMY AND ADENOIDECTOMY      onset: unknown     History   Smoking Status    Never Smoker   Smokeless Tobacco    Never Used       Past Family History  Family History   Problem Relation Age of Onset    Cancer Mother     Bone cancer Mother     Hypertension Father     Brain cancer Father     Stroke Father         stroke sydrome    Diabetes Paternal Grandmother     Other Family         back disorder    Heart disease Family         cardiac disorder    Stroke Family     Diabetes Family     Hypertension Family     Cancer Family     Breast cancer Maternal Aunt        Past Social history  Social History     Social History    Marital status: Single     Spouse name: N/A    Number of children: N/A    Years of education: N/A     Occupational History          Social History Main Topics    Smoking status: Never Smoker    Smokeless tobacco: Never Used    Alcohol use Yes      Comment: social less than a drink per week    Drug use: No    Sexual activity: Not on file     Other Topics Concern    Not on file     Social History Narrative    No caffeine use    Always uses sunscreen    Always uses a seatbelt           Current Medications  Current Outpatient Prescriptions   Medication Sig Dispense Refill    amitriptyline (ELAVIL) 50 mg tablet Take 1 tablet (50 mg total) by mouth daily 90 tablet 1    Biotin 2500 MCG CAPS Take 2 capsules by mouth daily      Cholecalciferol (VITAMIN D-3 PO) Take 1 tablet by mouth daily      Cyanocobalamin (VITAMIN B-12 CR PO) Take 1 tablet by mouth daily      diclofenac sodium (VOLTAREN) 1 % Place 1 g on the skin 2 (two) times a day      dicyclomine (BENTYL) 10 mg capsule Take 1 capsule (10 mg total) by mouth 2 (two) times a day for 90 days 180 capsule 0    EPINEPHrine (EPIPEN 2-AREN) 0 3 mg/0 3 mL SOAJ Inject 1 Units as directed as needed      fexofenadine (ALLEGRA) 180 MG tablet Take 180 mg by mouth daily      gabapentin (NEURONTIN) 300 mg capsule Take 1 capsule (300 mg total) by mouth 3 (three) times a day 270 capsule 1    hydrOXYzine HCL (ATARAX) 10 mg tablet Take 2 tablets at bedtime 180 tablet 3    losartan (COZAAR) 50 mg tablet Take 1 tablet (50 mg total) by mouth daily 90 tablet 3    montelukast (SINGULAIR) 10 mg tablet Take 1 tablet (10 mg total) by mouth daily at bedtime 90 tablet 3    Omega-3 Fatty Acids (FISH OIL) 1,000 mg Take 1,000 mg by mouth 3 (three) times a day      omeprazole (PriLOSEC) 40 MG capsule Take 1 capsule (40 mg total) by mouth daily 90 capsule 3    simvastatin (ZOCOR) 10 mg tablet Take 1 tablet (10 mg total) by mouth every other day 90 tablet 3    doxycycline hyclate (VIBRAMYCIN) 100 mg capsule Take 1 capsule (100 mg total) by mouth every 12 (twelve) hours for 7 days (Patient not taking: Reported on 11/12/2018 ) 14 capsule 0    Meth-Hyo-M Bl-Na Phos-Ph Sal (URIBEL) 118 MG CAPS Take 1 capsule (118 mg total) by mouth every 6 (six) hours as needed (As directed) (Patient not taking: Reported on 11/5/2018 ) 10 capsule 11    oxybutynin (DITROPAN-XL) 10 MG 24 hr tablet Take 1 tablet (10 mg total) by mouth daily at bedtime 30 tablet 5    triamcinolone (KENALOG) 0 1 % cream Apply topically 2 (two) times a day (Patient not taking: Reported on 11/5/2018 ) 45 g 2     No current facility-administered medications for this visit  Allergies  Allergies   Allergen Reactions    Bee Venom     Codeine Other (See Comments)     headaches    Egg Yolk     Iodinated Diagnostic Agents Hives    Other      Adhesive tape    Penicillins Hives    Elmiron [Pentosan Polysulfate] Rash    Latex Rash         The following portions of the patient's history were reviewed and updated as appropriate: allergies, current medications, past medical history, past social history, past surgical history and problem list       Vitals  Vitals:    11/12/18 0859   BP: 150/90   Pulse: 84   Weight: 89 8 kg (198 lb)         Physical Exam  Constitutional   General appearance: Patient is seated and in no acute distress, well appearing and well nourished  Head and Face   Head and face: Normal     Eyes   Conjunctiva and lids: No erythema, swelling or discharge  Ears, Nose, Mouth, and Throat   Hearing: Normal     Pulmonary   Respiratory effort: No increased work of breathing or signs of respiratory distress  Cardiovascular   Examination of extremities for edema and/or varicosities: Normal     Abdomen   Abdomen: Non-tender, no masses  Musculoskeletal   Gait and station: Normal     Skin   Skin and subcutaneous tissue: Warm, dry, and intact  No visible lesions or rashes    Psychiatric   Judgment and insight: Normal  Recent and remote memory:  Normal  Mood and affect: Normal      Results  No results found for this or any previous visit (from the past 1 hour(s))  ]  No results found for: PSA  Lab Results   Component Value Date    GLUCOSE 118 12/08/2016    CALCIUM 9 2 11/08/2018     11/09/2015    K 4 1 11/08/2018    CO2 25 11/08/2018     11/08/2018    BUN 16 11/08/2018    CREATININE 0 64 11/08/2018     Lab Results   Component Value Date    WBC 9 33 11/08/2018    HGB 14 3 11/08/2018    HCT 43 0 11/08/2018    MCV 88 11/08/2018     11/08/2018       Orders  No orders of the defined types were placed in this encounter

## 2018-11-12 ENCOUNTER — OFFICE VISIT (OUTPATIENT)
Dept: UROLOGY | Facility: CLINIC | Age: 68
End: 2018-11-12
Payer: MEDICARE

## 2018-11-12 VITALS
HEART RATE: 84 BPM | WEIGHT: 198 LBS | BODY MASS INDEX: 33.99 KG/M2 | DIASTOLIC BLOOD PRESSURE: 90 MMHG | SYSTOLIC BLOOD PRESSURE: 150 MMHG

## 2018-11-12 DIAGNOSIS — N30.11 INTERSTITIAL CYSTITIS (CHRONIC) WITH HEMATURIA: Primary | ICD-10-CM

## 2018-11-12 PROCEDURE — 99213 OFFICE O/P EST LOW 20 MIN: CPT | Performed by: PHYSICIAN ASSISTANT

## 2018-11-12 RX ORDER — OXYBUTYNIN CHLORIDE 10 MG/1
10 TABLET, EXTENDED RELEASE ORAL
Qty: 30 TABLET | Refills: 5 | Status: SHIPPED | OUTPATIENT
Start: 2018-11-12 | End: 2019-03-05

## 2018-11-26 ENCOUNTER — EVALUATION (OUTPATIENT)
Dept: PHYSICAL THERAPY | Facility: CLINIC | Age: 68
End: 2018-11-26
Payer: MEDICARE

## 2018-11-26 ENCOUNTER — TRANSCRIBE ORDERS (OUTPATIENT)
Dept: PHYSICAL THERAPY | Facility: CLINIC | Age: 68
End: 2018-11-26

## 2018-11-26 DIAGNOSIS — M25.512 LEFT SHOULDER PAIN, UNSPECIFIED CHRONICITY: Primary | ICD-10-CM

## 2018-11-26 PROCEDURE — 97162 PT EVAL MOD COMPLEX 30 MIN: CPT | Performed by: PHYSICAL THERAPIST

## 2018-11-26 PROCEDURE — G8991 OTHER PT/OT GOAL STATUS: HCPCS | Performed by: PHYSICAL THERAPIST

## 2018-11-26 PROCEDURE — 97535 SELF CARE MNGMENT TRAINING: CPT | Performed by: PHYSICAL THERAPIST

## 2018-11-26 PROCEDURE — G8990 OTHER PT/OT CURRENT STATUS: HCPCS | Performed by: PHYSICAL THERAPIST

## 2018-11-26 NOTE — PROGRESS NOTES
PT Evaluation     Today's date: 2018  Patient name: Zina Hinson  : 1950  MRN: [id number]  Referring provider: Charla Garcia DO  Dx:   Encounter Diagnosis     ICD-10-CM    1  Left shoulder pain, unspecified chronicity M25 [id number]        Start Time: 1545  Stop Time: 1645  Total time in clinic (min): 60 minutes    Assessment  Assessment details: Zina Hinson was seen for an initial PT evaluation today  Patient is a 80 y o  female with diagnosis of left shoulder pain and past medical history significant for spinal cord stimulator, CA with lumpectomy, anxiet, allergies, foot surgery, kidney sugery,  Moderate complexity evaluation  due to number of participation restrictions, functional outcome measure of 46% limitation, and changing clinical presentation  Findings today show poor posture with muscular tightness, pain and weakness during abduction with decreased range of motion as well as decreased mobility of left scapula and GH jt  Skilled PT indicated to treat at this time working on proper posture and body mechanics to decrease rounded shoulders, limit tension through upper trap, levator scap, and pec musculature as well as general strengthening to improve scapulothoracic rhythm to decrease impingement  Impairments: abnormal muscle firing, abnormal muscle tone, abnormal or restricted ROM, abnormal movement, activity intolerance, lacks appropriate home exercise program, pain with function and poor posture     Goals  STG (4 weeks)  1  Patient's left shoulder abduction AROM will increase to 150 with 4/10 pain for increased ability to perform overhead ADLs  2  Patient will have 0/10 pain in left shoulder at rest   3  Patient will display good upright posture with 25% verbal cueing for 2 consecutive sessions  LTG (8 weeks)  1  Patient's UE AROM equal bilaterally for ability to complete hair hygiene, overhead, and behind the back ADLs     2  Patient's left shoulder abduction strength will increase to 4/5 for increased ability to lift  3  Patient will be independent with home exercise program for continued maintenance post PT discharge  Plan  Patient would benefit from: skilled physical therapy  Planned modality interventions: unattended electrical stimulation, thermotherapy: hydrocollator packs and cryotherapy  Planned therapy interventions: manual therapy, therapeutic exercise, therapeutic activities, neuromuscular re-education and home exercise program  Frequency: 2-3x/week  Duration in weeks: 8  Plan of Care beginning date: 2018  Plan of Care expiration date: 2019  Treatment plan discussed with: PTA and patient        Subjective Evaluation    History of Present Illness  Date of onset: 2015  Mechanism of injury: Patient presents to outpatient Physical Therapy with c/o left shoulder pain  Pain has been ongoing for years beginning with a rotator cuff injury  Recently had increase in pain and was referred to ortho who administered an ultrasound guided injection which seemed to help, returned recently for another injection not US guided which did not help as much  Was Dx with bursitis Rx muscle relaxer and was given script for PT  Has been to PT in past which helped with symptoms     Pain  Current pain ratin  At best pain rating: 3  At worst pain rating: 10  Location: general left shoulder radiating posteriorly and distally down UE  Quality: burning (stabbing)  Relieving factors: heat and relaxation  Aggravating factors: lifting and overhead activity  Progression: no change    Social Support    Employment status: working (part-time office work and cleaning)  Hand dominance: right      Diagnostic Tests  X-ray: abnormal  Treatments  Previous treatment: physical therapy, medication and injection treatment  Current treatment: injection treatment, medication and speech therapy  Patient Goals  Patient goals for therapy: decreased pain and increased motion          Objective Observations     Additional Observation Details  Upper crosses posture with elevated left shoulder  Palpation   Left   No palpable tenderness to the biceps and triceps  Hypertonic in the levator scapulae, pectoralis major, pectoralis minor and upper trapezius  Tenderness of the anterior deltoid, infraspinatus, levator scapulae, lower trapezius, middle trapezius, pectoralis major, pectoralis minor, rhomboids, serratus anterior, supraspinatus, thoracic paraspinals and upper trapezius  Left Shoulder Comments  Left anterior deltoid: proximal      Tenderness     Left Shoulder   Tenderness in the acromion, lateral scapula, medial scapula, scapular spine and supraspinatus tendon  No tenderness in the biceps tendon (proximal) and bicipital groove  Neurological Testing     Sensation     Shoulder   Left Shoulder   Intact: light touch    Right Shoulder   Intact: light touch    Active Range of Motion   Left Shoulder   Flexion: 166 (pain when bringing arm down from flexion) degrees with pain  Abduction: 140 degrees with pain  External rotation BTH: C4 with pain  Internal rotation BTB: T6     Right Shoulder   Flexion: 156 degrees   Abduction: 158 degrees   External rotation BTH: C3   Internal rotation BTB: T10     Strength/Myotome Testing     Left Shoulder   Normal muscle strength    Planes of Motion   Flexion: 4   Abduction: 3+ (pain)   External rotation at 0°: 4   Internal rotation at 0°: 4+     Isolated Muscles   Biceps: 5   Triceps: 5     Right Shoulder     Planes of Motion   Flexion: 5   Abduction: 4+   External rotation at 0°: 4+   Internal rotation at 0°: 5     Isolated Muscles   Biceps: 5   Triceps: 5     Left Wrist/Hand      (2nd hand position)     Trial 1: 38    Trial 2: 30    Trial 3: 35    Right Wrist/Hand      (2nd hand position)     Trial 1: 40    Trial 2: 38    Trial 3: 42    Tests     Left Shoulder   Positive Alma Delia's         Flowsheet Rows      Most Recent Value   PT/OT G-Codes   Current Score  54   Projected Score  63   FOTO information reviewed  Yes   Assessment Type  Evaluation   G code set  Other PT/OT Primary   Other PT Primary Current Status ()  CK   Other PT Primary Goal Status ()  CJ          Precautions: Tape and latex allergy    Re-evaluation: 12/26    Shoulder Specialty Daily Treatment Diary     Manual  11/26       PROM        UT and LS stretch        GH jt mobs        Pec stretch        Scapular mobs            Exercise Diary  11/26       UBE        Pulleys        Shoulder isometrics abd        TB sh  IR, ER        Table slides abd        Wall walks scaption        Scap retraction 20x       Shoulder rolls 20x       Chin tucks 10x       Upper trap stretch 3x30"       Levator scap stretch 3x30"       TB rows (latex allergy)        YTI's        Prone rows        Full can        PNF (D1&D2)                                                                            Modalities 11/26       P Anette Campos was given a handout and verbal instruction of customized home exercise program  Patient accepted program and was able to demonstrate exercises

## 2018-11-26 NOTE — LETTER
2018    Justin Neal DO  [id number]  [address] [place], [place] [id number] [facility] [address]     Patient: Claire Velazquez   YOB: 1950   Date of Visit: 2018     Encounter Diagnosis     ICD-10-CM    1  Left shoulder pain, unspecified chronicity M25 512        Dear Dr Nick Pickard:    Please review the attached Plan of Care from [facility] recent visit  Please verify that you agree therapy should continue by signing the attached document and sending it back to our office  If you have any questions or concerns, please don't hesitate to call  Sincerely,    Claudia Lozano, PT      Referring Provider:      I certify that I have read the below Plan of Care and certify the need for these services furnished under this plan of treatment while under my care  Justin Neal DO  [id number]  [address] [place] [address]  96 : [phone]          PT Evaluation     Today's date: 2018  Patient name: Claire Velazquez  : 1950  MRN: [id number]  Referring provider: Candelaria Lee DO  Dx:   Encounter Diagnosis     ICD-10-CM    1  Left shoulder pain, unspecified chronicity M25 512        Start Time: 1545  Stop Time: 1645  Total time in clinic (min): 60 minutes    Assessment  Assessment details: Claire Velazquez was seen for an initial PT evaluation today  Patient is a 80 y o  female with diagnosis of left shoulder pain and past medical history significant for spinal cord stimulator, CA with lumpectomy, anxiet, allergies, foot surgery, kidney sugery,  Moderate complexity evaluation  due to number of participation restrictions, functional outcome measure of 46% limitation, and changing clinical presentation   Findings today show poor posture with muscular tightness, pain and weakness during abduction with decreased range of motion as well as decreased mobility of left scapula and [facility] jt  Skilled PT indicated to treat at this time working on proper posture and body mechanics to decrease rounded shoulders, limit tension through upper trap, levator scap, and pec musculature as well as general strengthening to improve scapulothoracic rhythm to decrease impingement  Impairments: abnormal muscle firing, abnormal muscle tone, abnormal or restricted ROM, abnormal movement, activity intolerance, lacks appropriate home exercise program, pain with function and poor posture     Goals  STG (4 weeks)  1  Patient's left shoulder abduction AROM will increase to 150 with 4/10 pain for increased ability to perform overhead ADLs  2  Patient will have 0/10 pain in left shoulder at rest   3  Patient will display good upright posture with 25% verbal cueing for 2 consecutive sessions  LTG (8 weeks)  1  Patient's UE AROM equal bilaterally for ability to complete hair hygiene, overhead, and behind the back ADLs  2  Patient's left shoulder abduction strength will increase to 4/5 for increased ability to lift  3  Patient will be independent with home exercise program for continued maintenance post PT discharge  Plan  Patient would benefit from: skilled physical therapy  Planned modality interventions: unattended electrical stimulation, thermotherapy: hydrocollator packs and cryotherapy  Planned therapy interventions: manual therapy, therapeutic exercise, therapeutic activities, neuromuscular re-education and home exercise program  Frequency: 2-3x/week  Duration in weeks: 8  Plan of Care beginning date: 11/26/2018  Plan of Care expiration date: 1/26/2019  Treatment plan discussed with: PTA and patient        Subjective Evaluation    History of Present Illness  Date of onset: 11/26/2015  Mechanism of injury: Patient presents to outpatient Physical Therapy with c/o left shoulder pain  Pain has been ongoing for years beginning with a rotator cuff injury   Recently had increase in pain and was referred to ortho who administered an ultrasound guided injection which seemed to help, returned recently for another injection not US guided which did not help as much  Was Dx with bursitis Rx muscle relaxer and was given script for PT  Has been to PT in past which helped with symptoms  Pain  Current pain ratin  At best pain rating: 3  At worst pain rating: 10  Location: general left shoulder radiating posteriorly and distally down UE  Quality: burning (stabbing)  Relieving factors: heat and relaxation  Aggravating factors: lifting and overhead activity  Progression: no change    Social Support    Employment status: working (part-time office work and cleaning)  Hand dominance: right      Diagnostic Tests  X-ray: abnormal  Treatments  Previous treatment: physical therapy, medication and injection treatment  Current treatment: injection treatment, medication and speech therapy  Patient Goals  Patient goals for therapy: decreased pain and increased motion          Objective     Observations     Additional Observation Details  Upper crosses posture with elevated left shoulder  Palpation   Left   No palpable tenderness to the biceps and triceps  Hypertonic in the levator scapulae, pectoralis major, pectoralis minor and upper trapezius  Tenderness of the anterior deltoid, infraspinatus, levator scapulae, lower trapezius, middle trapezius, pectoralis major, pectoralis minor, rhomboids, serratus anterior, supraspinatus, thoracic paraspinals and upper trapezius  Left Shoulder Comments  Left anterior deltoid: proximal      Tenderness     Left Shoulder   Tenderness in the acromion, lateral scapula, medial scapula, scapular spine and supraspinatus tendon  No tenderness in the biceps tendon (proximal) and bicipital groove       Neurological Testing     Sensation     Shoulder   Left Shoulder   Intact: light touch    Right Shoulder   Intact: light touch    Active Range of Motion   Left Shoulder   Flexion: 166 (pain when bringing arm down from flexion) degrees with pain  Abduction: 140 degrees with pain  External rotation BTH: C4 with pain  Internal rotation BTB: T6     Right Shoulder   Flexion: 156 degrees   Abduction: 158 degrees   External rotation BTH: C3   Internal rotation BTB: T10     Strength/Myotome Testing     Left Shoulder   Normal muscle strength    Planes of Motion   Flexion: 4   Abduction: 3+ (pain)   External rotation at 0°:  4   Internal rotation at 0°:  4+     Isolated Muscles   Biceps: 5   Triceps: 5     Right Shoulder     Planes of Motion   Flexion: 5   Abduction: 4+   External rotation at 0°:  4+   Internal rotation at 0°:  5     Isolated Muscles   Biceps: 5   Triceps: 5     Left Wrist/Hand      (2nd hand position)     Trial 1: 38    Trial 2: 30    Trial 3: 35    Right Wrist/Hand      (2nd hand position)     Trial 1: 40    Trial 2: 38    Trial 3: 42    Tests     Left Shoulder   Positive Hawkin's         Flowsheet Rows      Most Recent Value   PT/OT G-Codes   Current Score  54   Projected Score  63   FOTO information reviewed  Yes   Assessment Type  Evaluation   G code set  Other PT/OT Primary   Other PT Primary Current Status ()  CK   Other PT Primary Goal Status ()  CJ          Precautions: Tape and latex allergy    Re-evaluation: 12/26    Shoulder Specialty Daily Treatment Diary     Manual  11/26       PROM        UT and LS stretch        GH jt mobs        Pec stretch        Scapular mobs            Exercise Diary  11/26       UBE        Pulleys        Shoulder isometrics abd        TB sh  IR, ER        Table slides abd        Wall walks scaption        Scap retraction 20x       Shoulder rolls 20x       Chin tucks 10x       Upper trap stretch 3x30"       Levator scap stretch 3x30"       TB rows (latex allergy)        YTI's        Prone rows        Full can        PNF (D1&D2)                                                                            Modalities 11/26       P 15'                         Fernanda Eckert was given a handout and verbal instruction of customized home exercise program  Patient accepted program and was able to demonstrate exercises

## 2018-11-27 ENCOUNTER — OFFICE VISIT (OUTPATIENT)
Dept: PHYSICAL THERAPY | Facility: CLINIC | Age: 68
End: 2018-11-27
Payer: MEDICARE

## 2018-11-27 DIAGNOSIS — M25.512 LEFT SHOULDER PAIN, UNSPECIFIED CHRONICITY: Primary | ICD-10-CM

## 2018-11-27 PROCEDURE — 97010 HOT OR COLD PACKS THERAPY: CPT | Performed by: PHYSICAL THERAPIST

## 2018-11-27 PROCEDURE — 97110 THERAPEUTIC EXERCISES: CPT | Performed by: PHYSICAL THERAPIST

## 2018-11-27 NOTE — PROGRESS NOTES
Daily Note     Today's date: 2018  Patient name: Nery Mejia  : 1950  MRN: [id number]  Referring provider: Edwin Vee DO  Dx:   Encounter Diagnosis     ICD-10-CM    1  Left shoulder pain, unspecified chronicity M25 512                   Subjective: Patient feels that her pain is worse after the last session  Objective: AROM is WNL in all planes, with pain during ER  L S' Strength:F=29 lbs, ABD=31 lbs, ER=16 lbs, IR=23 lbs  Assessment: Some soreness was noted after exercise  Plan: Progress treatment as tolerated        Precautions: None    Daily Treatment Diary     Manual          PROM                  Exercise Diary          Pendulums         Pulleys         Wand         Table Slides         Wall Slides         UBE: S 2 1'x4        T-Band High Row         T-Band Mid Row L 3 2/10        T-Band Low Row         T-Band Biceps L 2 2/10        T-Band Triceps L 2 2/10        T-Band SA Pulldowns         T-Band IR L 1 2/10        T-Band ER L 1 2/10        Forward/Lateral Raises         Hoist Row: S          Lat Pulldown: S         XO Biceps         XO Triceps         XO Upright Row         XO IR         XO ER         XO SA Pulldown         Blackburns         Ball Stabilization         Bodyblade                  Modalities          HP before ex 15'        CP after ex 15'

## 2018-12-04 ENCOUNTER — OFFICE VISIT (OUTPATIENT)
Dept: PHYSICAL THERAPY | Facility: CLINIC | Age: 68
End: 2018-12-04
Payer: MEDICARE

## 2018-12-04 DIAGNOSIS — M25.512 LEFT SHOULDER PAIN, UNSPECIFIED CHRONICITY: Primary | ICD-10-CM

## 2018-12-04 PROCEDURE — 97010 HOT OR COLD PACKS THERAPY: CPT

## 2018-12-04 PROCEDURE — 97110 THERAPEUTIC EXERCISES: CPT

## 2018-12-04 NOTE — PROGRESS NOTES
Daily Note     Today's date: 2018  Patient name: Mallory Kinney  : 1950  MRN: [id number]  Referring provider: Oscar Andrade DO  Dx:   Encounter Diagnosis     ICD-10-CM    1  Left shoulder pain, unspecified chronicity M25 512                   Subjective: Patient reports her shoulder was sore after accidentally twisting it over the weekend  It is feeling better now  Objective: See treatment diary below      Assessment: Tolerated treatment well  Patient exhibited good technique with therapeutic exercises      Plan: Continue per plan of care  Precautions: None     Daily Treatment Diary      Manual             PROM                               Exercise Diary                Pendulums               Pulleys               Wand               Table Slides               Wall Slides               UBE: S 2 1'x4  1'X4           T-Band High Row               T-Band Mid Row L 3 2/10  L3 2/10           T-Band Low Row    L2 2/10           T-Band Biceps L 2 2/10  L2 2/10           T-Band Triceps L 2 2/10  L2 2/10           T-Band SA Pulldowns               T-Band IR L 1 2/10  L1 2/10           T-Band ER L 1 2/10  L1 2/10           Forward/Lateral Raises               Hoist Row: S                Lat Pulldown:  S               XO Biceps               XO Triceps               XO Upright Row               XO IR               XO ER               XO SA Pulldown               Blackburns               Ball Stabilization    10X           Bodyblade                               Modalities                HP before ex '  15'           CP after ex 15  15'

## 2018-12-06 ENCOUNTER — OFFICE VISIT (OUTPATIENT)
Dept: INTERNAL MEDICINE CLINIC | Facility: CLINIC | Age: 68
End: 2018-12-06
Payer: MEDICARE

## 2018-12-06 ENCOUNTER — APPOINTMENT (OUTPATIENT)
Dept: PHYSICAL THERAPY | Facility: CLINIC | Age: 68
End: 2018-12-06
Payer: MEDICARE

## 2018-12-06 VITALS
BODY MASS INDEX: 32.97 KG/M2 | HEIGHT: 64 IN | HEART RATE: 90 BPM | TEMPERATURE: 94 F | WEIGHT: 193.13 LBS | OXYGEN SATURATION: 97 %

## 2018-12-06 DIAGNOSIS — Z00.00 MEDICARE ANNUAL WELLNESS VISIT, SUBSEQUENT: Primary | ICD-10-CM

## 2018-12-06 DIAGNOSIS — E78.2 MIXED HYPERLIPIDEMIA: ICD-10-CM

## 2018-12-06 PROCEDURE — G0439 PPPS, SUBSEQ VISIT: HCPCS | Performed by: INTERNAL MEDICINE

## 2018-12-06 RX ORDER — CYCLOBENZAPRINE HCL 10 MG
10 TABLET ORAL
COMMUNITY
End: 2019-07-11 | Stop reason: ALTCHOICE

## 2018-12-06 NOTE — PATIENT INSTRUCTIONS

## 2018-12-06 NOTE — PROGRESS NOTES
Assessment and Plan:    Problem List Items Addressed This Visit     Hyperlipidemia    Relevant Orders    Lipid panel    Medicare annual wellness visit, subsequent - Primary        Health Maintenance Due   Topic Date Due    Medicare Annual Wellness Visit (AWV)  1950    Pneumococcal PPSV23/PCV13 65+ Years / Low and Medium Risk (1 of 2 - PCV13) 01/21/2016    DXA SCAN  01/03/2019    MAMMOGRAM  01/12/2019   Deferred flu shot and pneumonia shots  Reviewed recent labs recheck lipid panel in 6 months  Exercise encouraged   She is planning to meet with  for advanced care planning  Rto 6 months            HPI:  Mallory Kniney is a 80 y o  female here for her Subsequent Wellness Visit  Patient doing ok overall    Was tried on ditropan by urology and could not tolerate - sxs have been ok without it    Patient Active Problem List   Diagnosis    Hypertension    Elevated LFTs    Hyperlipidemia    Acid reflux disease    Anxiety    Chronic headaches    Chronic low back pain    Degenerative joint disease of right lower extremity    Irritable bowel syndrome    Lumbar radiculopathy, chronic    Neuralgia    Peripheral neuropathy    Acute bilateral thoracic back pain    Vitamin D deficiency    Chronic left shoulder pain    Interstitial cystitis (chronic) with hematuria    Drug reaction    Subacute maxillary sinusitis    Medicare annual wellness visit, subsequent     Past Medical History:   Diagnosis Date    Abnormal findings on diagnostic imaging of breast     Anxiety     Arthritis     Fibrocystic breast disease     GERD (gastroesophageal reflux disease)     Hyperlipidemia     Hypertension     Osteopenia      Past Surgical History:   Procedure Laterality Date    APPENDECTOMY      BACK SURGERY      BREAST LUMPECTOMY Left     CARPAL BOSS EXCISION      CHOLECYSTECTOMY      DIAGNOSTIC LAPAROSCOPY      FOOT SURGERY      HYSTERECTOMY      HYSTEROSCOPY      INCISIONAL BREAST BIOPSY      KIDNEY SURGERY Left     KNEE ARTHROSCOPY Bilateral     LAPAROSCOPY      hynecologic with adhesions    IL SURG IMPLNT NEUROELECT,EPIDURAL N/A 5/18/2017    Procedure: PLACEMENT OF THORACIC SPINAL CORD STIMULATOR WITH LEFT BUTTOCK IMPLANTABLE PULSE GENERATOR (IMPULSE MONITORING-MOTORS (TCEMEP), EMG, SSEP);   Surgeon: Elizabeth Guzman MD;  Location: [facility];  Service: Neurosurgery    SPINAL STIMULATOR PLACEMENT  05/2017    TONSILLECTOMY AND ADENOIDECTOMY      onset: unknown     Family History   Problem Relation Age of Onset    Cancer Mother     Bone cancer Mother     Hypertension Father     Brain cancer Father     Stroke Father         stroke sydrome    Diabetes Paternal Grandmother     Other Family         back disorder    Heart disease Family         cardiac disorder    Stroke Family     Diabetes Family     Hypertension Family     Cancer Family     Breast cancer Maternal Aunt      History   Smoking Status    Never Smoker   Smokeless Tobacco    Never Used     History   Alcohol Use    Yes     Comment: social less than a drink per week      History   Drug Use No       Current Outpatient Prescriptions   Medication Sig Dispense Refill    amitriptyline (ELAVIL) 50 mg tablet Take 1 tablet (50 mg total) by mouth daily 90 tablet 1    Biotin 2500 MCG CAPS Take 2 capsules by mouth daily      Cholecalciferol (VITAMIN D-3 PO) Take 1 tablet by mouth daily      Cyanocobalamin (VITAMIN B-12 CR PO) Take 1 tablet by mouth daily      cyclobenzaprine (FLEXERIL) 10 mg tablet Take 10 mg by mouth daily at bedtime      diclofenac sodium (VOLTAREN) 1 % Place 1 g on the skin 2 (two) times a day      dicyclomine (BENTYL) 10 mg capsule Take 1 capsule (10 mg total) by mouth 2 (two) times a day for 90 days 180 capsule 0    EPINEPHrine (EPIPEN 2-AREN) 0 3 mg/0 3 mL SOAJ Inject 1 Units as directed as needed      fexofenadine (ALLEGRA) 180 MG tablet Take 180 mg by mouth daily      gabapentin (NEURONTIN) 300 mg capsule Take 1 capsule (300 mg total) by mouth 3 (three) times a day 270 capsule 1    hydrOXYzine HCL (ATARAX) 10 mg tablet Take 2 tablets at bedtime 180 tablet 3    losartan (COZAAR) 50 mg tablet Take 1 tablet (50 mg total) by mouth daily 90 tablet 3    montelukast (SINGULAIR) 10 mg tablet Take 1 tablet (10 mg total) by mouth daily at bedtime 90 tablet 3    Omega-3 Fatty Acids (FISH OIL) 1,000 mg Take 1,000 mg by mouth 3 (three) times a day      omeprazole (PriLOSEC) 40 MG capsule Take 1 capsule (40 mg total) by mouth daily 90 capsule 3    simvastatin (ZOCOR) 10 mg tablet Take 1 tablet (10 mg total) by mouth every other day 90 tablet 3    Meth-Hyo-M Bl-Na Phos-Ph Sal (URIBEL) 118 MG CAPS Take 1 capsule (118 mg total) by mouth every 6 (six) hours as needed (As directed) (Patient not taking: Reported on 11/5/2018 ) 10 capsule 11    oxybutynin (DITROPAN-XL) 10 MG 24 hr tablet Take 1 tablet (10 mg total) by mouth daily at bedtime (Patient not taking: Reported on 12/6/2018 ) 30 tablet 5    triamcinolone (KENALOG) 0 1 % cream Apply topically 2 (two) times a day (Patient not taking: Reported on 11/5/2018 ) 45 g 2     No current facility-administered medications for this visit  Allergies   Allergen Reactions    Bee Venom     Codeine Other (See Comments)     headaches    Egg Yolk     Iodinated Diagnostic Agents Hives    Other      Adhesive tape    Penicillins Hives    Elmiron [Pentosan Polysulfate] Rash    Latex Rash     Immunization History   Administered Date(s) Administered    Tdap 12/09/2016, 12/09/2016       Patient Care Team:  Jacob Villatoro DO as PCP - General  Mendez Cai, DO Kenney Edwards MD Staci [facility]ed[facility], MD Jacob Clifford DO    Medicare Screening Tests and Risk Assessments:  Aarti Aldridge is here for her Subsequent Wellness visit  Health Risk Assessment:  Patient rates overall health as good   Patient feels that their physical health rating is Same  Eyesight was rated as Same  Hearing was rated as Same  Patient feels that their emotional and mental health rating is Same  Pain experienced by patient in the last 7 days has been None  Patient states that she has experienced no weight loss or gain in last 6 months  Emotional/Mental Health:  Patient has been feeling nervous/anxious  PHQ-9 Depression Screening:    Frequency of the following problems over the past two weeks:      1  Little interest or pleasure in doing things: 0 - not at all      2  Feeling down, depressed, or hopeless: 0 - not at all  PHQ-2 Score: 0          Broken Bones/Falls: Fall Risk Assessment:    In the past year, patient has experienced: No history of falling in past year          Bladder/Bowel:  Patient has leaked urine accidently in the last six months  Patient reports no loss of bowel control  Immunizations:  Patient has not had a flu vaccination within the last year  Patient has not received a pneumonia shot  Patient has not received a shingles shot  Patient has not received tetanus/diphtheria shot  Home Safety:  Patient does not have trouble with stairs inside or outside of their home  Patient currently reports that there are no safety hazards present in home, working smoke alarms, working carbon monoxide detectors  Preventative Screenings:   Breast cancer screening performed, colon cancer screen completed, cholesterol screen completed, glaucoma eye exam completed,     Nutrition:  Current diet: Regular with servings of the following:  (Additional Comments: Cystitis diet)    Medications:  Patient is currently taking over-the-counter supplements  List of OTC medications includes: Biotin, Vitamin d, Vitamin B12, Fish Oil   Patient is able to manage medications  Lifestyle Choices:  Patient reports no tobacco use  Patient has not smoked or used tobacco in the past   Patient reports no alcohol use  Patient drives a vehicle  Patient wears seat belt  Current level of exercise of physical activity described by patient as: Works daily, [employer] daily, Daily household activities  Activities of Daily Living:  Can get out of bed by his or her self, able to dress self, able to make own meals, able to do own shopping, able to bathe self, can do own laundry/housekeeping, can manage own money, pay bills and track expenses    Previous Hospitalizations:  Hospitalization or ED visit in past 12 months  Additional Comments: ER visit    Advanced Directives:  Patient has not decided on power of   Patient has not completed advanced directive  Preventative Screening/Counseling:      Cardiovascular:      General: Screening Current      Counseling: Healthy Weight, Healthy Diet and Improve Exercise Tolerance          Diabetes:      General: Screening Current      Counseling: Healthy Diet and Healthy Weight          Colorectal Cancer:      General: Screening Current      Counseling: high fiber diet          Breast Cancer:      General: Screening Current          Cervical Cancer:      General: Screening Current          Osteoporosis:      Counseling: Calcium and Vitamin D Intake and Regular Weightbearing Exercise          AAA:      General: Screening Not Indicated          Glaucoma:      General: Screening Current          HIV:      General: Screening Not Indicated          Hepatitis C:      General: Risks and Benefits Discussed      Counseling: has received general HCV counseling        Advanced Directives:   Patient has no living will for healthcare, does not have durable POA for healthcare, patient does not have an advanced directive  Information on ACP and/or AD provided  5 wishes given  End of life assessment reviewed with patient  Provider agrees with end of life decisions    Additional Comments: Pt plans to meet with     Immunizations:      Influenza: Patient Declines      Pneumococcal: Patient Declines      Shingrix: Risks & Benefits Discussed      Hepatitis B (Low risk patients): Series Not Indicated      Zostavax: Patient Declines      TD: Vaccine Status Unknown      TDAP: Tdap Vaccine UTD      Other Preventative Counseling (Non-Medicare):   Increase physical activity

## 2018-12-10 ENCOUNTER — OFFICE VISIT (OUTPATIENT)
Dept: PHYSICAL THERAPY | Facility: CLINIC | Age: 68
End: 2018-12-10
Payer: MEDICARE

## 2018-12-10 DIAGNOSIS — M25.512 LEFT SHOULDER PAIN, UNSPECIFIED CHRONICITY: Primary | ICD-10-CM

## 2018-12-10 PROCEDURE — 97110 THERAPEUTIC EXERCISES: CPT

## 2018-12-10 PROCEDURE — 97010 HOT OR COLD PACKS THERAPY: CPT

## 2018-12-10 PROCEDURE — 97140 MANUAL THERAPY 1/> REGIONS: CPT

## 2018-12-10 NOTE — PROGRESS NOTES
Daily Note     Today's date: 12/10/2018  Patient name: Antoni Mata  : 1950  MRN: [id number]  Referring provider: eCdric Pollard DO  Dx:   Encounter Diagnosis     ICD-10-CM    1  Left shoulder pain, unspecified chronicity M25 512                   Subjective: Patient c/o soreness/tightness L UT area  Shoulder pain "not bad"      Objective: See treatment diary below      Assessment: Tolerated treatment well  Patient exhibited good technique with therapeutic exercises      Plan: Continue per plan of care  Precautions: None     Daily Treatment Diary      Manual  11/27  12/4  12/10         PROM                STM      RK         Exercise Diary                Pendulums               Pulleys               Wand               Table Slides               Wall Slides               UBE: S 2 1'x4  1'X4  1' X4         T-Band High Row               T-Band Mid Row L 3 2/10  L3 2/10  L3 2/10         T-Band Low Row    L2 2/10  L2 2/10         T-Band Biceps L 2 2/10  L2 2/10  L2 2/10         T-Band Triceps L 2 2/10  L2 2/10  L2 2/10         T-Band SA Pulldowns               T-Band IR L 1 2/10  L1 2/10  L1 2/10         T-Band ER L 1 2/10  L1 2/10  L1 2/10         Forward/Lateral Raises               SL ER/ABD   2/10      Hoist Row: S                Lat Pulldown:  S               XO Biceps               XO Triceps               XO Upright Row               XO IR               XO ER               XO SA Pulldown               Blackburns               Ball Stabilization    10X  10X         Bodyblade                               Modalities                HP before ex 13'  15'           CP after ex 15'  15'

## 2018-12-12 ENCOUNTER — OFFICE VISIT (OUTPATIENT)
Dept: PHYSICAL THERAPY | Facility: CLINIC | Age: 68
End: 2018-12-12
Payer: MEDICARE

## 2018-12-12 DIAGNOSIS — M25.512 LEFT SHOULDER PAIN, UNSPECIFIED CHRONICITY: Primary | ICD-10-CM

## 2018-12-12 PROCEDURE — 97110 THERAPEUTIC EXERCISES: CPT | Performed by: PHYSICAL THERAPIST

## 2018-12-12 PROCEDURE — 97140 MANUAL THERAPY 1/> REGIONS: CPT | Performed by: PHYSICAL THERAPIST

## 2018-12-12 PROCEDURE — 97010 HOT OR COLD PACKS THERAPY: CPT | Performed by: PHYSICAL THERAPIST

## 2018-12-12 NOTE — PROGRESS NOTES
Daily Note     Today's date: 2018  Patient name: Francisco Pink  : 1950  MRN: [id number]  Referring provider: Lo Sheets DO  Dx:   Encounter Diagnosis     ICD-10-CM    1  Left shoulder pain, unspecified chronicity M25 512                   Subjective: Pain has decreased, she can now lie on that side to sleep  Objective: See treatment diary below      Assessment: Progressed reps and resistance of several exercises with good tolerance  Plan: Progress treatment as tolerated        Precautions: None     Daily Treatment Diary      Manual  11/27  12/4  12/10  12/12       PROM                STM      RK  RK       Exercise Diary                UBE: S 2 1'x4  1'X4  1' X4  1'x4       T-Band Mid Row L 3 2/10  L3 2/10  L3 2/10  L 3 3/10       T-Band Low Row    L2 2/10  L2 2/10  L 2 3/10       T-Band Biceps L 2 2/10  L2 2/10  L2 2/10  L 2 3/10       T-Band Triceps L 2 2/10  L2 2/10  L2 2/10  L 2 3/10       T-Band SA Pulldowns               T-Band IR L 1 2/10  L1 2/10  L1 2/10  L 2 3/10       T-Band ER L 1 2/10  L1 2/10  L1 2/10  L 1 3/10       Forward/Lateral Raises               SL ER/ABD     2/10  1# 3/10       Blackburns               Ball Stabilization    10X  10X  30x                       Modalities                HP before ex 13'  15'    15'       CP after ex 15'  15'    15'

## 2018-12-17 ENCOUNTER — EVALUATION (OUTPATIENT)
Dept: PHYSICAL THERAPY | Facility: CLINIC | Age: 68
End: 2018-12-17
Payer: MEDICARE

## 2018-12-17 DIAGNOSIS — M25.512 LEFT SHOULDER PAIN, UNSPECIFIED CHRONICITY: Primary | ICD-10-CM

## 2018-12-17 PROCEDURE — 97110 THERAPEUTIC EXERCISES: CPT | Performed by: PHYSICAL THERAPIST

## 2018-12-17 PROCEDURE — G8991 OTHER PT/OT GOAL STATUS: HCPCS | Performed by: PHYSICAL THERAPIST

## 2018-12-17 PROCEDURE — 97140 MANUAL THERAPY 1/> REGIONS: CPT | Performed by: PHYSICAL THERAPIST

## 2018-12-17 PROCEDURE — G8990 OTHER PT/OT CURRENT STATUS: HCPCS | Performed by: PHYSICAL THERAPIST

## 2018-12-17 PROCEDURE — 97010 HOT OR COLD PACKS THERAPY: CPT | Performed by: PHYSICAL THERAPIST

## 2018-12-17 NOTE — LETTER
2018    Lenard Meigs, DO  [address] [address] [facility] [address]     Patient: Jennifer Tanner   YOB: 1950   Date of Visit: 2018     Encounter Diagnosis     ICD-10-CM    1  Left shoulder pain, unspecified chronicity M25 512        Dear Dr Sandy Steward:    Please review the attached Plan of Care from [facility] recent visit  Please verify that you agree therapy should continue by signing the attached document and sending it back to our office  If you have any questions or concerns, please don't hesitate to call  Sincerely,    Beth Gutierrez, PT      Referring Provider:      I certify that I have read the below Plan of Care and certify the need for these services furnished under this plan of treatment while under my care  Lenard Meigs, DO  [address] [id number] [facility]  96 : [phone]          PT Evaluation     Today's date: 2018  Patient name: Jennifer Tanner  : 1950  MRN: [id number]  Referring provider: Sarthak Pineda DO  Dx:   Encounter Diagnosis     ICD-10-CM    1  Left shoulder pain, unspecified chronicity M25 512                   Assessment  Assessment details:   CURRENT FUNCTIONAL STATUS    Sleep tolerance: Good on affected side  Dressing tolerance: Good   Bathing/washing hair tolerance: Good    Able to reach overhead with mild difficulty  Able to lift light weight above shoulder level  Ability to tolerate all housework:     SHORT TERM GOALS (2 WEEKS)    Increase shoulder strength by 2-3 lbs in all weak areas  Decrease pain to 0-2/10  Able to reach overhead with minimal difficulty  Able to lift 5 lbs above shoulder level  Ability to tolerate all housework:     LONG TERM GOALS (DISCHARGE)    Shoulder AROM: WNL in all planes  Shoulder Strength: F=33 lbs, ABD=26 lbs, ER=24 lbs, IR=27 lbs  Decrease pain to 0-2/10  Able to reach overhead without difficulty      Able to lift 5 lbs overhead  Ability to tolerate all housework:     Understanding of Dx/Px/POC: good   Prognosis: good    Goals  See assessment details above  Plan  Plan details: The patient has shown improvement in PT demonstrating decreased pain, increased range of motion, increased strength, and increased tolerance to activity  The patient continues to present with pain, decreased strength, and decreased tolerance to activity  The patient would benefit from continued skilled PT services to address these issues and to maximize function  She will see her physician for a reevaluation tomorrow  Patient would benefit from: skilled physical therapy  Planned modality interventions: unattended electrical stimulation, thermotherapy: hydrocollator packs and cryotherapy  Planned therapy interventions: manual therapy, therapeutic exercise, therapeutic activities, neuromuscular re-education and home exercise program  Frequency: 2x week  Duration in weeks: 4        Subjective Evaluation    History of Present Illness  Mechanism of injury: Subjective: The patient's left shoulder pain is still constant, but it has decreased in intensity  She has an improved ability to reach and lift with the left shoulder  She can also now lie on her left side without pain  Pain  Current pain ratin  At best pain ratin  At worst pain rating: 3    Patient Goals  Patient goals for therapy: decreased pain, increased motion and increased strength          Objective     General Comments     Shoulder Comments   CURRENT OBJECTIVE MEASUREMENTS    Shoulder AROM: WNL in all planes  Shoulder Strength: F=33 lbs, ABD=23 lbs, ER=10 lbs, IR=27 lbs                Precautions: None     Daily Treatment Diary      Manual  11/27  12/4  12/10  12/12  12/17     PROM                STM      RK  RK  RK     Exercise Diary                UBE: S 2 1'x4  1'X4  1' X4  1'x4  1'x4     T-Band Mid Row L 3 2/10  L3 2/10  L3 2/10  L 3 3/10  L 4 3/10     T-Band Low Row    L2 2/10  L2 2/10  L 2 3/10  L 2 3/10     T-Band Biceps L 2 2/10  L2 2/10  L2 2/10  L 2 3/10  L 2 3/10     T-Band Triceps L 2 2/10  L2 2/10  L2 2/10  L 2 3/10  L 2 3/10     T-Band SA Pulldowns               T-Band IR L 1 2/10  L1 2/10  L1 2/10  L 2 3/10  L 2 3/10     T-Band ER L 1 2/10  L1 2/10  L1 2/10  L 1 3/10  L 1 3/10     Forward/Lateral Raises               SL ER/ABD     2/10  1# 3/10  1# 3/10     Blackburns               Ball Stabilization    10X  10X  30x  30x                     Modalities                HP before ex 13'  15'    15'  15'     CP after ex 15'  15'    15'  15'

## 2018-12-17 NOTE — PROGRESS NOTES
PT Re-Evaluation     Today's date: 2018  Patient name: Kevan Castillo  : 1950  MRN: [id number]  Referring provider: Danny Nogueira DO  Dx:   Encounter Diagnosis     ICD-10-CM    1  Left shoulder pain, unspecified chronicity M25 512                   Assessment  Assessment details:   CURRENT FUNCTIONAL STATUS    Sleep tolerance: Good on affected side  Dressing tolerance: Good   Bathing/washing hair tolerance: Good    Able to reach overhead with mild difficulty  Able to lift light weight above shoulder level  Ability to tolerate all housework:     SHORT TERM GOALS (2 WEEKS)    Increase shoulder strength by 2-3 lbs in all weak areas  Decrease pain to 0-2/10  Able to reach overhead with minimal difficulty  Able to lift 5 lbs above shoulder level  Ability to tolerate all housework:     LONG TERM GOALS (DISCHARGE)    Shoulder AROM: WNL in all planes  Shoulder Strength: F=33 lbs, ABD=26 lbs, ER=24 lbs, IR=27 lbs  Decrease pain to 0-2/10  Able to reach overhead without difficulty  Able to lift 5 lbs overhead  Ability to tolerate all housework:     Understanding of Dx/Px/POC: good   Prognosis: good    Goals  See assessment details above  Plan  Plan details: The patient has shown improvement in PT demonstrating decreased pain, increased range of motion, increased strength, and increased tolerance to activity  The patient continues to present with pain, decreased strength, and decreased tolerance to activity  The patient would benefit from continued skilled PT services to address these issues and to maximize function  She will see her physician for a reevaluation tomorrow        Patient would benefit from: skilled physical therapy  Planned modality interventions: unattended electrical stimulation, thermotherapy: hydrocollator packs and cryotherapy  Planned therapy interventions: manual therapy, therapeutic exercise, therapeutic activities, neuromuscular re-education and home exercise program  Frequency: 2x week  Duration in weeks: 4        Subjective Evaluation    History of Present Illness  Mechanism of injury: Subjective: The patient's left shoulder pain is still constant, but it has decreased in intensity  She has an improved ability to reach and lift with the left shoulder  She can also now lie on her left side without pain  Pain  Current pain ratin  At best pain ratin  At worst pain rating: 3    Patient Goals  Patient goals for therapy: decreased pain, increased motion and increased strength          Objective     General Comments     Shoulder Comments   CURRENT OBJECTIVE MEASUREMENTS    Shoulder AROM: WNL in all planes  Shoulder Strength: F=33 lbs, ABD=23 lbs, ER=10 lbs, IR=27 lbs                Precautions: None     Daily Treatment Diary      Manual  11/27  12/4  12/10  12/12  12/17     PROM                STM      RK  RK  RK     Exercise Diary                UBE: S 2 1'x4  1'X4  1' X4  1'x4  1'x4     T-Band Mid Row L 3 2/10  L3 2/10  L3 2/10  L 3 3/10  L 4 3/10     T-Band Low Row    L2 2/10  L2 2/10  L 2 3/10  L 2 3/10     T-Band Biceps L 2 2/10  L2 2/10  L2 2/10  L 2 3/10  L 2 3/10     T-Band Triceps L 2 2/10  L2 2/10  L2 2/10  L 2 3/10  L 2 3/10     T-Band SA Pulldowns               T-Band IR L 1 2/10  L1 2/10  L1 2/10  L 2 3/10  L 2 3/10     T-Band ER L 1 2/10  L1 2/10  L1 2/10  L 1 3/10  L 1 3/10     Forward/Lateral Raises               SL ER/ABD     2/10  1# 3/10  1# 3/10     Blackburns               Ball Stabilization    10X  10X  30x  30x                     Modalities                HP before ex '  15'    15'  15'     CP after ex 15'  15'    15  15'

## 2018-12-18 ENCOUNTER — OFFICE VISIT (OUTPATIENT)
Dept: PHYSICAL THERAPY | Facility: CLINIC | Age: 68
End: 2018-12-18
Payer: MEDICARE

## 2018-12-18 ENCOUNTER — TRANSCRIBE ORDERS (OUTPATIENT)
Dept: PHYSICAL THERAPY | Facility: CLINIC | Age: 68
End: 2018-12-18

## 2018-12-18 DIAGNOSIS — M25.512 LEFT SHOULDER PAIN, UNSPECIFIED CHRONICITY: Primary | ICD-10-CM

## 2018-12-18 PROCEDURE — 97110 THERAPEUTIC EXERCISES: CPT | Performed by: PHYSICAL THERAPIST

## 2018-12-18 PROCEDURE — 97010 HOT OR COLD PACKS THERAPY: CPT | Performed by: PHYSICAL THERAPIST

## 2018-12-18 PROCEDURE — 97140 MANUAL THERAPY 1/> REGIONS: CPT | Performed by: PHYSICAL THERAPIST

## 2018-12-18 NOTE — PROGRESS NOTES
Daily Note     Today's date: 2018  Patient name: Kira Ward  : 1950  MRN: [id number]  Referring provider: Nicolás Eldridge DO  Dx:   Encounter Diagnosis     ICD-10-CM    1  Left shoulder pain, unspecified chronicity M25 512                    Subjective: Patient states that she forgot her doctor's appointment, and has had it rescheduled to 2018  Objective: See treatment diary below      Assessment: Tolerated treatment well  Patient exhibited good technique with therapeutic exercises  Progressed pre's with good tolerance  Plan: Progress treatment as tolerated          Precautions: None     Daily Treatment Diary      Manual  11/27  12/4  12/10  12/12  12/17  12/18   PROM                STM      RK  RK  RK  RK   Exercise Diary                UBE: S 2 1'x4  1'X4  1' X4  1'x4  1'x4  1'x4   T-Band Mid Row L 3 2/10  L3 2/10  L3 2/10  L 3 3/10  L 4 3/10  L 4 3 /10   T-Band Low Row    L2 2/10  L2 2/10  L 2 3/10  L 2 3/10  L 2 3/10   T-Band Biceps L 2 2/10  L2 2/10  L2 2/10  L 2 3/10  L 2 3/10  L 2 3/10   T-Band Triceps L 2 2/10  L2 2/10  L2 2/10  L 2 3/10  L 2 3/10  L 2 3/10   T-Band SA Pulldowns               T-Band IR L 1 2/10  L1 2/10  L1 2/10  L 2 3/10  L 2 3/10  L 2 3/10   T-Band ER L 1 2/10  L1 2/10  L1 2/10  L 1 3/10  L 1 3/10  L 1 3/10   Forward/Lateral Raises               SL ER/ABD     2/10  1# 3/10  1# 3/10  1# 3/10   Blackburns               Ball Stabilization    10X  10X  30x  30x  30x                   Modalities                HP before ex 15'  15'    15'  15'  15'   CP after ex '  15'    15'  15'  15'

## 2018-12-26 ENCOUNTER — OFFICE VISIT (OUTPATIENT)
Dept: PHYSICAL THERAPY | Facility: CLINIC | Age: 68
End: 2018-12-26
Payer: MEDICARE

## 2018-12-26 DIAGNOSIS — M25.512 LEFT SHOULDER PAIN, UNSPECIFIED CHRONICITY: Primary | ICD-10-CM

## 2018-12-26 PROCEDURE — 97010 HOT OR COLD PACKS THERAPY: CPT | Performed by: PHYSICAL THERAPIST

## 2018-12-26 PROCEDURE — 97140 MANUAL THERAPY 1/> REGIONS: CPT | Performed by: PHYSICAL THERAPIST

## 2018-12-26 PROCEDURE — 97110 THERAPEUTIC EXERCISES: CPT | Performed by: PHYSICAL THERAPIST

## 2018-12-26 NOTE — PROGRESS NOTES
Daily Note     Today's date: 2018  Patient name: Haily House  : 1950  MRN: [id number]  Referring provider: Blake Martinez DO  Dx:   Encounter Diagnosis     ICD-10-CM    1  Left shoulder pain, unspecified chronicity M25 512                   Subjective: Patient notes some upper arm soreness when reaching up behind her back  She did use her arm more over the weekend  Objective: Left upper trap tenderness is very mild  Assessment: Tolerated treatment well  Patient exhibited good technique with therapeutic exercises      Plan: Progress treatment as tolerated        Precautions: None     Daily Treatment Diary      Manual  12/26  12/4  12/10  12/12  12/17  12/18   PROM                STM  RK    RK  RK  RK  RK   Exercise Diary                UBE: S 2 1'x4  1'X4  1' X4  1'x4  1'x4  1'x4   T-Band Mid Row L 4 3/10  L3 2/10  L3 2/10  L 3 3/10  L 4 3/10  L 4 3 /10   T-Band Low Row  L 2 3/10  L2 2/10  L2 2/10  L 2 3/10  L 2 3/10  L 2 3/10   T-Band Biceps L 4 3/10  L2 2/10  L2 2/10  L 2 3/10  L 2 3/10  L 2 3/10   T-Band Triceps L 4 3/10  L2 2/10  L2 2/10  L 2 3/10  L 2 3/10  L 2 3/10   T-Band SA Pulldowns               T-Band IR L 5 3/10  L1 2/10  L1 2/10  L 2 3/10  L 2 3/10  L 2 3/10   T-Band ER L 3 3/10  L1 2/10  L1 2/10  L 1 3/10  L 1 3/10  L 1 3/10   Forward/Lateral Raises               SL ER/ABD  2# 3/10   2/10  1# 3/10  1# 3/10  1# 3/10   Blackburns               Ball Stabilization  30x  10X  10X  30x  30x  30x                   Modalities                HP before ex 15'  15'    15'  15'  15'   CP after ex 13'  15'    15'  15'  15'

## 2018-12-27 ENCOUNTER — OFFICE VISIT (OUTPATIENT)
Dept: PHYSICAL THERAPY | Facility: CLINIC | Age: 68
End: 2018-12-27
Payer: MEDICARE

## 2018-12-27 DIAGNOSIS — M25.512 LEFT SHOULDER PAIN, UNSPECIFIED CHRONICITY: Primary | ICD-10-CM

## 2018-12-27 PROCEDURE — 97010 HOT OR COLD PACKS THERAPY: CPT | Performed by: PHYSICAL THERAPIST

## 2018-12-27 PROCEDURE — 97110 THERAPEUTIC EXERCISES: CPT | Performed by: PHYSICAL THERAPIST

## 2018-12-27 PROCEDURE — 97140 MANUAL THERAPY 1/> REGIONS: CPT | Performed by: PHYSICAL THERAPIST

## 2018-12-27 NOTE — PROGRESS NOTES
Daily Note     Today's date: 2018  Patient name: Toñito Veloz  : 1950  MRN: [id number]  Referring provider: Jalen Villatoro DO  Dx:   Encounter Diagnosis     ICD-10-CM    1  Left shoulder pain, unspecified chronicity M25 512                   Subjective: Not having as much pain reaching up behind her back today  Objective: See treatment diary below      Assessment: Tolerated treatment well  Patient demonstrated fatigue post treatment      Plan: Progress treatment as tolerated          Precautions: None     Daily Treatment Diary      Manual  12/26  12/27  12/10  12/12  12/17  12/18   [facility] [address] [facility]  RK   Exercise Diary                UBE: S 2 1'x4  1'X4  1' X4  1'x4  1'x4  1'x4   T-Band Mid Row L 4 3/10  L5 3/10  L3 2/10  L 3 3/10  L 4 3/10  L 4 3 /10   T-Band Low Row  L 2 3/10  L3 3/10  L2 2/10  L 2 3/10  L 2 3/10  L 2 3/10   T-Band Biceps L 4 3/10  L4 3/10  L2 2/10  L 2 3/10  L 2 3/10  L 2 3/10   T-Band Triceps L 4 3/10  L4 3/10  L2 2/10  L 2 3/10  L 2 3/10  L 2 3/10   T-Band SA Pulldowns               T-Band IR L 5 3/10  L5 3/10  L1 2/10  L 2 3/10  L 2 3/10  L 2 3/10   T-Band ER L 3 3/10  L3 3/10  L1 2/10  L 1 3/10  L 1 3/10  L 1 3/10   Forward/Lateral Raises               SL ER/ABD  2# 3/10  2# 3/10 2/10  1# 3/10  1# 3/10  1# 3/10   Blackburns               Ball Stabilization  30x  30X  10X  30x  30x  30x                   Modalities                HP before ex 13'  15'    15'  15'  15'   CP after ex 13'  15'    15'  15'  15'

## 2018-12-31 ENCOUNTER — OFFICE VISIT (OUTPATIENT)
Dept: PHYSICAL THERAPY | Facility: CLINIC | Age: 68
End: 2018-12-31
Payer: MEDICARE

## 2018-12-31 DIAGNOSIS — M25.512 LEFT SHOULDER PAIN, UNSPECIFIED CHRONICITY: Primary | ICD-10-CM

## 2018-12-31 PROCEDURE — 97140 MANUAL THERAPY 1/> REGIONS: CPT | Performed by: PHYSICAL THERAPIST

## 2018-12-31 PROCEDURE — 97010 HOT OR COLD PACKS THERAPY: CPT | Performed by: PHYSICAL THERAPIST

## 2018-12-31 PROCEDURE — 97110 THERAPEUTIC EXERCISES: CPT | Performed by: PHYSICAL THERAPIST

## 2018-12-31 NOTE — PROGRESS NOTES
Daily Note     Today's date: 2018  Patient name: Nat Gill  : 1950  MRN: [id number]  Referring provider: Giana Bolaños DO  Dx:   Encounter Diagnosis     ICD-10-CM    1  Left shoulder pain, unspecified chronicity M25 512                   Subjective: Patient did not have any shoulder pain over the weekend, but she is having some across the top of the shoulder blade  Objective: Moderate suprascapular tenderness  Assessment: Tolerated treatment well  Patient would benefit from continued PT      Plan: Progress treatment as tolerated        Precautions: None     Daily Treatment Diary      Manual     PROM                STM [address]  RK  RK  RK  RK  RK   Exercise Diary                UBE: S 2 1'x4  1'X4  1' X4  1'x4  1'x4  1'x4   T-Band Mid Row L 4 3/10  L5 3/10  L5 3/10  L 3 3/10  L 4 3/10  L 4 3 /10   T-Band Low Row  L 2 3/10  L3 3/10  L3 3/10  L 2 3/10  L 2 3/10  L 2 3/10   T-Band Biceps L 4 3/10  L4 3/10  L4 3/10  L 2 3/10  L 2 3/10  L 2 3/10   T-Band Triceps L 4 3/10  L4 3/10  L4 3/10  L 2 3/10  L 2 3/10  L 2 3/10   T-Band SA Pulldowns               T-Band IR L 5 3/10  L5 3/10  L5 3/10  L 2 3/10  L 2 3/10  L 2 3/10   T-Band ER L 3 3/10  L3 3/10  L3 3/10  L 1 3/10  L 1 3/10  L 1 3/10   Forward/Lateral Raises               SL ER/ABD  2# 3/10  2# 3/10 2# 2/10  1# 3/10  1# 3/10  1# 3/10   Blackburns               Ball Stabilization  30x  30X  30X  30x  30x  30x                   Modalities                HP before ex 15'  15'  15'  15'  15'  15'   CP after ex 15'  15'  15'  15'  15'  15'

## 2019-01-02 ENCOUNTER — OFFICE VISIT (OUTPATIENT)
Dept: PHYSICAL THERAPY | Facility: CLINIC | Age: 69
End: 2019-01-02
Payer: MEDICARE

## 2019-01-02 DIAGNOSIS — M25.512 LEFT SHOULDER PAIN, UNSPECIFIED CHRONICITY: Primary | ICD-10-CM

## 2019-01-02 PROCEDURE — 97140 MANUAL THERAPY 1/> REGIONS: CPT | Performed by: PHYSICAL THERAPIST

## 2019-01-02 PROCEDURE — 97110 THERAPEUTIC EXERCISES: CPT | Performed by: PHYSICAL THERAPIST

## 2019-01-02 PROCEDURE — 97010 HOT OR COLD PACKS THERAPY: CPT | Performed by: PHYSICAL THERAPIST

## 2019-01-02 NOTE — PROGRESS NOTES
Daily Note     Today's date: 2019  Patient name: Nat Gill  : 1950  MRN: [id number]  Referring provider: Giana Bolaños DO  Dx:   Encounter Diagnosis     ICD-10-CM    1  Left shoulder pain, unspecified chronicity M25 512                   Subjective: Patient had a lot of pain across the top off her left shoulder blade and into her upper arm over the weekend  It was harder to reach overhead and across her body  Sitting at her computer was also uncomfortable  Objective: Retraction and horizontal adduction are limited by pain  Assessment: After sitting posture correction and repeated retraction, pain and ROM were improved in the shoulder  Plan: Progress treatment as tolerated  HEP: Retraction for 10 reps TID      Precautions: None     Daily Treatment Diary      Manual     PROM                [facility] [address] [facility],[address]  RK   Exercise Diary                UBE: S 2 1'x4  1'X4  1' X4  1'x4  1'x4  1'x4   T-Band Mid Row L 4 3/10  L5 3/10  L5 3/10  L 5 3/10  L 4 3/10  L 4 3 /10   T-Band Low Row  L 2 3/10  L3 3/10  L3 3/10  L 3 3/10  L 2 3/10  L 2 3/10   T-Band Biceps L 4 3/10  L4 3/10  L4 3/10  L 4 3/10  L 2 3/10  L 2 3/10   T-Band Triceps L 4 3/10  L4 3/10  L4 3/10  L 4 3/10  L 2 3/10  L 2 3/10   T-Band SA Pulldowns               T-Band IR L 5 3/10  L5 3/10  L5 3/10  L 5 3/10  L 2 3/10  L 2 3/10   T-Band ER L 3 3/10  L3 3/10  L3 3/10  L 3 3/10  L 1 3/10  L 1 3/10   Forward/Lateral Raises               SL ER/ABD  2# 3/10  2# 3/10 2# 2/10  2# 3/10  1# 3/10  1# 3/10   Blackburns               Ball Stabilization  30x  30X  30X  30x  30x  30x                   Modalities                HP before ex 15'  15'  15'  15'  15'  15'   CP after ex 15'  15'  15'  15'  15'  15'

## 2019-01-07 ENCOUNTER — OFFICE VISIT (OUTPATIENT)
Dept: PHYSICAL THERAPY | Facility: CLINIC | Age: 69
End: 2019-01-07
Payer: MEDICARE

## 2019-01-07 DIAGNOSIS — M25.512 LEFT SHOULDER PAIN, UNSPECIFIED CHRONICITY: Primary | ICD-10-CM

## 2019-01-07 PROCEDURE — 97010 HOT OR COLD PACKS THERAPY: CPT | Performed by: PHYSICAL THERAPIST

## 2019-01-07 PROCEDURE — 97110 THERAPEUTIC EXERCISES: CPT | Performed by: PHYSICAL THERAPIST

## 2019-01-07 PROCEDURE — 97140 MANUAL THERAPY 1/> REGIONS: CPT | Performed by: PHYSICAL THERAPIST

## 2019-01-07 NOTE — PROGRESS NOTES
Daily Note     Today's date: 2019  Patient name: Carlotta March  : 1950  MRN: [id number]  Referring provider: Dale Tony DO  Dx:   Encounter Diagnosis     ICD-10-CM    1  Left shoulder pain, unspecified chronicity M25 512                   Subjective: Pain is decreased since the last session, but it will still increase at times for no reason  Her retraction exercise helps, but she has difficulty doing it correctly  She will see her surgeon tomorrow for follow up  Objective: Patient has a tendency to extend during retraction exercise  Assessment: Retraction exercise was performed correctly after verbal and tactile cueing  Plan: Await results of physician reevaluation tomorrow      Precautions: None     Daily Treatment Diary      Manual     PROM                [facility] [address] [facility]  RK   Exercise Diary                UBE: S 2 1'x4  1'X4  1' X4  1'x4  1'x4  1'x4   T-Band Mid Row L 4 3/10  L5 3/10  L5 3/10  L 5 3/10  L 5 3/10  L 4 3 /10   T-Band Low Row  L 2 3/10  L3 3/10  L3 3/10  L 3 3/10  L 3 3/10  L 2 3/10   T-Band Biceps L 4 3/10  L4 3/10  L4 3/10  L 4 3/10  L 4 3/10  L 2 3/10   T-Band Triceps L 4 3/10  L4 3/10  L4 3/10  L 4 3/10  L 4 3/10  L 2 3/10   T-Band SA Pulldowns               T-Band IR L 5 3/10  L5 3/10  L5 3/10  L 5 3/10  L 5 3/10  L 2 3/10   T-Band ER L 3 3/10  L3 3/10  L3 3/10  L 3 3/10  L 3 3/10  L 1 3/10   Forward/Lateral Raises               SL ER/ABD  2# 3/10  2# 3/10 2# 2/10  2# 3/10  2# 3/10  1# 3/10   Blackburns               Ball Stabilization  30x  30X  30X  30x  30x  30x                   Modalities                HP before ex 15'  15'  15'  15'  15'  15'   CP after ex 15'  15'  15'  15'  15'  15'

## 2019-01-09 ENCOUNTER — OFFICE VISIT (OUTPATIENT)
Dept: PHYSICAL THERAPY | Facility: CLINIC | Age: 69
End: 2019-01-09
Payer: MEDICARE

## 2019-01-09 DIAGNOSIS — M25.512 LEFT SHOULDER PAIN, UNSPECIFIED CHRONICITY: Primary | ICD-10-CM

## 2019-01-09 PROCEDURE — 97110 THERAPEUTIC EXERCISES: CPT

## 2019-01-09 PROCEDURE — 97010 HOT OR COLD PACKS THERAPY: CPT

## 2019-01-09 PROCEDURE — 97140 MANUAL THERAPY 1/> REGIONS: CPT

## 2019-01-09 NOTE — PROGRESS NOTES
Daily Note     Today's date: 2019  Patient name: Jennifer Tanner  : 1950  MRN: [id number]  Referring provider: Sarthak Pineda DO  Dx:   Encounter Diagnosis     ICD-10-CM    1  Left shoulder pain, unspecified chronicity M25 512                   Subjective: Patient was to MD for F/U  She will have injection in 2 weeks  Objective: See treatment diary below      Assessment: Tolerated treatment well  Patient exhibited good technique with therapeutic exercises      Plan: Continue per plan of care       Precautions: None     Daily Treatment Diary      Manual    1   PROM                STM  RK  RK  RK  RK  RK  LF   Exercise Diary                UBE: S 2 1'x4  1'X4  1' X4  1'x4  1'x4  1'x4   T-Band Mid Row L 4 3/10  L5 3/10  L5 3/10  L 5 3/10  L 5 3/10  L 5 3 /10   T-Band Low Row  L 2 3/10  L3 3/10  L3 3/10  L 3 3/10  L 3 3/10  L 3 3/10   T-Band Biceps L 4 3/10  L4 3/10  L4 3/10  L 4 3/10  L 4 3/10  L 4 3/10   T-Band Triceps L 4 3/10  L4 3/10  L4 3/10  L 4 3/10  L 4 3/10  L 4 3/10   T-Band SA Pulldowns               T-Band IR L 5 3/10  L5 3/10  L5 3/10  L 5 3/10  L 5 3/10  L 5 3/10   T-Band ER L 3 3/10  L3 3/10  L3 3/10  L 3 3/10  L 3 3/10  L 3 3/10   Forward/Lateral Raises               SL ER/ABD  2# 3/10  2# 3/10 2# 2/10  2# 3/10  2# 3/10  2# 3/10   Blackburns               Ball Stabilization  30x  30X  30X  30x  30x  30x                   Modalities                HP before ex 15'  15'  15'  15'  15'  15'   CP after ex 15'  15'  15'  15'  15'  15'

## 2019-01-14 ENCOUNTER — TRANSCRIBE ORDERS (OUTPATIENT)
Dept: PHYSICAL THERAPY | Facility: CLINIC | Age: 69
End: 2019-01-14

## 2019-01-14 ENCOUNTER — EVALUATION (OUTPATIENT)
Dept: PHYSICAL THERAPY | Facility: CLINIC | Age: 69
End: 2019-01-14
Payer: MEDICARE

## 2019-01-14 DIAGNOSIS — M25.512 LEFT SHOULDER PAIN, UNSPECIFIED CHRONICITY: Primary | ICD-10-CM

## 2019-01-14 PROCEDURE — 97010 HOT OR COLD PACKS THERAPY: CPT | Performed by: PHYSICAL THERAPIST

## 2019-01-14 PROCEDURE — G8991 OTHER PT/OT GOAL STATUS: HCPCS | Performed by: PHYSICAL THERAPIST

## 2019-01-14 PROCEDURE — 97110 THERAPEUTIC EXERCISES: CPT | Performed by: PHYSICAL THERAPIST

## 2019-01-14 PROCEDURE — 97140 MANUAL THERAPY 1/> REGIONS: CPT | Performed by: PHYSICAL THERAPIST

## 2019-01-14 PROCEDURE — G8990 OTHER PT/OT CURRENT STATUS: HCPCS | Performed by: PHYSICAL THERAPIST

## 2019-01-14 NOTE — LETTER
2019    Krista Bai DO  [address] [address] [facility] [address]    Patient: Mireille Campos   YOB: 1950   Date of Visit: 2019     Encounter Diagnosis     ICD-10-CM    1  Left shoulder pain, unspecified chronicity M25 512        Dear Dr Ave Montiel:    Please review the attached Plan of Care from [facility] recent visit  Please verify that you agree therapy should continue by signing the attached document and sending it back to our office  If you have any questions or concerns, please don't hesitate to call  Sincerely,    Simran Cast, PT      Referring Provider:      I certify that I have read the below Plan of Care and certify the need for these services furnished under this plan of treatment while under my care  Krista Bai DO  [address] [address] [facility]  [address] : [phone]          PT Re-Evaluation     Today's date: 2019  Patient name: Mireille Campos  : 1950  MRN: [id number]  Referring provider: Tye Sandifer, DO  Dx:   Encounter Diagnosis     ICD-10-CM    1  Left shoulder pain, unspecified chronicity M25 512                   Assessment  Assessment details:   CURRENT FUNCTIONAL STATUS    Sleep tolerance: Good on affected side  Dressing tolerance: Good   Bathing/washing hair tolerance: Good    Able to reach overhead without difficulty  Able to lift 3 lbs above shoulder level  Ability to tolerate all housework:     SHORT TERM GOALS (2 WEEKS)    Increase shoulder strength by 2-3 lbs in all weak areas  Decrease pain to 0-2/10  Able to reach overhead without difficulty  Able to lift 5 lbs above shoulder level  Ability to tolerate all housework:     LONG TERM GOALS (DISCHARGE)    Shoulder AROM: WNL in all planes  Shoulder Strength: F=33 lbs, ABD=26 lbs, ER=24 lbs, IR=27 lbs  Decrease pain to 0-2/10  Able to reach overhead without difficulty      Able to lift 5 lbs overhead  Ability to tolerate all housework:     Understanding of Dx/Px/POC: good   Prognosis: good    Goals  See assessment details above  Plan  Plan details: The patient has shown improvement in PT demonstrating decreased pain, increased strength, and increased tolerance to activity  The patient continues to present with pain, decreased strength, and decreased tolerance to activity  The patient would benefit from continued skilled PT services to address these issues and to maximize function  She will see her physician for a reevaluation tomorrow  Patient would benefit from: skilled physical therapy  Planned modality interventions: unattended electrical stimulation, thermotherapy: hydrocollator packs and cryotherapy  Planned therapy interventions: manual therapy, therapeutic exercise, therapeutic activities, neuromuscular re-education and home exercise program  Frequency: 2x week  Duration in weeks: 4        Subjective Evaluation    History of Present Illness  Mechanism of injury: Subjective: The patient's left shoulder pain is now intermittent, and it occurs less often  She has no difficulty reaching overhead, but there is some discomfort when reaching out to the side  She can lift  15 lbs with both arms  She is able to lie on her left side without pain  Pain  Current pain ratin  At best pain ratin  At worst pain rating: 3    Patient Goals  Patient goals for therapy: decreased pain, increased motion and increased strength          Objective     General Comments     Shoulder Comments   CURRENT OBJECTIVE MEASUREMENTS    Shoulder AROM: WNL in all planes  Shoulder Strength: F=33 lbs, ABD=31 lbs, ER=18 lbs, IR=28 lbs                Precautions: None     Daily Treatment Diary      Manual     PROM                [facility] [address],[place] [address]  LF   Exercise Diary                UBE: S 2 1'x4  1'X4  1' X4  1'x4  1'x4  1'x4   T-Band Mid Row L 5 3/10  L5 3/10  L5 3/10  L 5 3/10  L 5 3/10  L 5 3 /10   T-Band Low Row  L 3 3/10  L3 3/10  L3 3/10  L 3 3/10  L 3 3/10  L 3 3/10   T-Band Biceps L 5 3/10  L4 3/10  L4 3/10  L 4 3/10  L 4 3/10  L 4 3/10   T-Band Triceps L 4 3/10  L4 3/10  L4 3/10  L 4 3/10  L 4 3/10  L 4 3/10   T-Band SA Pulldowns               T-Band IR L 5 3/10  L5 3/10  L5 3/10  L 5 3/10  L 5 3/10  L 5 3/10   T-Band ER L 3 3/10  L3 3/10  L3 3/10  L 3 3/10  L 3 3/10  L 3 3/10   Forward/Lateral Raises               SL ER/ABD  2# 3/10  2# 3/10 2# 2/10  2# 3/10  2# 3/10  2# 3/10   [place]               Ball Stabilization  30x  30X  30X  30x  30x  30x                   Modalities                HP before ex 15'  15'  15'  15'  15'  15'   CP after ex 15'  15'  15'  15'  15'  15'

## 2019-01-14 NOTE — PROGRESS NOTES
PT Re-Evaluation     Today's date: 2019  Patient name: Mayo Irwin  : 1950  MRN: [id number]  Referring provider: Iban Iqbal DO  Dx:   Encounter Diagnosis     ICD-10-CM    1  Left shoulder pain, unspecified chronicity M25 512                   Assessment  Assessment details:   CURRENT FUNCTIONAL STATUS    Sleep tolerance: Good on affected side  Dressing tolerance: Good   Bathing/washing hair tolerance: Good    Able to reach overhead without difficulty  Able to lift 3 lbs above shoulder level  Ability to tolerate all housework:     SHORT TERM GOALS (2 WEEKS)    Increase shoulder strength by 2-3 lbs in all weak areas  Decrease pain to 0-2/10  Able to reach overhead without difficulty  Able to lift 5 lbs above shoulder level  Ability to tolerate all housework:     LONG TERM GOALS (DISCHARGE)    Shoulder AROM: WNL in all planes  Shoulder Strength: F=33 lbs, ABD=26 lbs, ER=24 lbs, IR=27 lbs  Decrease pain to 0-2/10  Able to reach overhead without difficulty  Able to lift 5 lbs overhead  Ability to tolerate all housework:     Understanding of Dx/Px/POC: good   Prognosis: good    Goals  See assessment details above  Plan  Plan details: The patient has shown improvement in PT demonstrating decreased pain, increased strength, and increased tolerance to activity  The patient continues to present with pain, decreased strength, and decreased tolerance to activity  The patient would benefit from continued skilled PT services to address these issues and to maximize function  She will see her physician for a reevaluation tomorrow        Patient would benefit from: skilled physical therapy  Planned modality interventions: unattended electrical stimulation, thermotherapy: hydrocollator packs and cryotherapy  Planned therapy interventions: manual therapy, therapeutic exercise, therapeutic activities, neuromuscular re-education and home exercise program  Frequency: 2x week  Duration in weeks: 4        Subjective Evaluation    History of Present Illness  Mechanism of injury: Subjective: The patient's left shoulder pain is now intermittent, and it occurs less often  She has no difficulty reaching overhead, but there is some discomfort when reaching out to the side  She can lift  15 lbs with both arms  She is able to lie on her left side without pain  Pain  Current pain ratin  At best pain ratin  At worst pain rating: 3    Patient Goals  Patient goals for therapy: decreased pain, increased motion and increased strength          Objective     General Comments     Shoulder Comments   CURRENT OBJECTIVE MEASUREMENTS    Shoulder AROM: WNL in all planes  Shoulder Strength: F=33 lbs, ABD=31 lbs, ER=18 lbs, IR=28 lbs                Precautions: None     Daily Treatment Diary      Manual     PROM                STM  RK  RK  RK  RK  RK  LF   Exercise Diary                UBE: S 2 1'x4  1'X4  1' X4  1'x4  1'x4  1'x4   T-Band Mid Row L 5 3/10  L5 3/10  L5 3/10  L 5 3/10  L 5 3/10  L 5 3 /10   T-Band Low Row  L 3 3/10  L3 3/10  L3 3/10  L 3 3/10  L 3 3/10  L 3 3/10   T-Band Biceps L 5 3/10  L4 3/10  L4 3/10  L 4 3/10  L 4 3/10  L 4 3/10   T-Band Triceps L 4 3/10  L4 3/10  L4 3/10  L 4 3/10  L 4 3/10  L 4 3/10   T-Band SA Pulldowns               T-Band IR L 5 3/10  L5 3/10  L5 3/10  L 5 3/10  L 5 3/10  L 5 3/10   T-Band ER L 3 3/10  L3 3/10  L3 3/10  L 3 3/10  L 3 3/10  L 3 3/10   Forward/Lateral Raises               SL ER/ABD  2# 3/10  2# 3/10 2# 2/10  2# 3/10  2# 3/10  2# 3/10   Blackburns               Ball Stabilization  30x  30X  30X  30x  30x  30x                   Modalities                HP before ex 15'  15'  15'  15'  15'  15'   CP after ex 15'  15'  15'  15'  15'  15'

## 2019-01-15 ENCOUNTER — OFFICE VISIT (OUTPATIENT)
Dept: PHYSICAL THERAPY | Facility: CLINIC | Age: 69
End: 2019-01-15
Payer: MEDICARE

## 2019-01-15 ENCOUNTER — TELEPHONE (OUTPATIENT)
Dept: NEUROLOGY | Facility: CLINIC | Age: 69
End: 2019-01-15

## 2019-01-15 DIAGNOSIS — M25.512 LEFT SHOULDER PAIN, UNSPECIFIED CHRONICITY: Primary | ICD-10-CM

## 2019-01-15 PROCEDURE — 97110 THERAPEUTIC EXERCISES: CPT

## 2019-01-15 PROCEDURE — 97140 MANUAL THERAPY 1/> REGIONS: CPT

## 2019-01-15 PROCEDURE — 97010 HOT OR COLD PACKS THERAPY: CPT

## 2019-01-15 NOTE — PROGRESS NOTES
Daily Note     Today's date: 1/15/2019  Patient name: Nery Mejia  : 1950  MRN: [id number]  Referring provider: Edwin Vee DO  Dx:   Encounter Diagnosis     ICD-10-CM    1  Left shoulder pain, unspecified chronicity M25 512                   Subjective: Patient reports her L shoulder/UT area has been feeling better lately  Objective: See treatment diary below      Assessment: Tolerated treatment well   Patient exhibited good technique with therapeutic exercises      Plan: Continue per plan of care         Precautions: None     Daily Treatment Diary      Manual  1/14  1/15  12/31  1/2  1/7  1/9   PROM                STM  RK  LF  RK  RK  RK  LF   Exercise Diary                UBE: S 2 1'x4  2/2  1' X4  1'x4  1'x4  1'x4   T-Band Mid Row L 5 3/10  L5 3/10  L5 3/10  L 5 3/10  L 5 3/10  L 5 3 /10   T-Band Low Row  L 3 3/10  L3 3/10  L3 3/10  L 3 3/10  L 3 3/10  L 3 3/10   T-Band Biceps L 5 3/10  L5 3/10  L4 3/10  L 4 3/10  L 4 3/10  L 4 3/10   T-Band Triceps L 4 3/10  L4 3/10  L4 3/10  L 4 3/10  L 4 3/10  L 4 3/10   T-Band SA Pulldowns               T-Band IR L 5 3/10  L5 3/10  L5 3/10  L 5 3/10  L 5 3/10  L 5 3/10   T-Band ER L 3 3/10  L3 3/10  L3 3/10  L 3 3/10  L 3 3/10  L 3 3/10   Forward/Lateral Raises               SL ER/ABD  2# 3/10  3# 3/10 2# 2/10  2# 3/10  2# 3/10  2# 3/10   Blackburns               Ball Stabilization  30x  30X  30X  30x  30x  30x                   Modalities                HP before ex 15'  15'  15'  15'  15'  15'   CP after ex 15'  15'  15'  15'  15'  15'

## 2019-01-21 ENCOUNTER — OFFICE VISIT (OUTPATIENT)
Dept: PHYSICAL THERAPY | Facility: CLINIC | Age: 69
End: 2019-01-21
Payer: MEDICARE

## 2019-01-21 DIAGNOSIS — M25.512 LEFT SHOULDER PAIN, UNSPECIFIED CHRONICITY: Primary | ICD-10-CM

## 2019-01-21 PROCEDURE — 97010 HOT OR COLD PACKS THERAPY: CPT | Performed by: PHYSICAL THERAPIST

## 2019-01-21 PROCEDURE — 97110 THERAPEUTIC EXERCISES: CPT | Performed by: PHYSICAL THERAPIST

## 2019-01-21 PROCEDURE — 97140 MANUAL THERAPY 1/> REGIONS: CPT | Performed by: PHYSICAL THERAPIST

## 2019-01-21 NOTE — PROGRESS NOTES
Daily Note     Today's date: 2019  Patient name: Khoi Walters  : 1950  MRN: [id number]  Referring provider: Otis Figueroa DO  Dx:   Encounter Diagnosis     ICD-10-CM    1  Left shoulder pain, unspecified chronicity M25 512                   Subjective: Patient was doing well the past few days, but she awoke with pain again in the same area  She will be getting another injection in 2 days  Objective: Left shoulder horizontal adduction and HBB are mildly painful  Assessment: Pain was decreased after treatment  Plan: Continue per plan of care          Precautions: None     Daily Treatment Diary      Manual  1/14  1/15  1/21  1/2  1/7  1/9   PROM                STM  RK  LF  RK  RK  RK  LF   Exercise Diary                UBE: S 2 1'x4  2/2  1' X4  1'x4  1'x4  1'x4   T-Band Mid Row L 5 3/10  L5 3/10  L5 3/10  L 5 3/10  L 5 3/10  L 5 3 /10   T-Band Low Row  L 3 3/10  L3 3/10  L3 3/10  L 3 3/10  L 3 3/10  L 3 3/10   T-Band Biceps L 5 3/10  L5 3/10  L5 3/10  L 4 3/10  L 4 3/10  L 4 3/10   T-Band Triceps L 4 3/10  L4 3/10  L4 3/10  L 4 3/10  L 4 3/10  L 4 3/10   T-Band SA Pulldowns               T-Band IR L 5 3/10  L5 3/10  L5 3/10  L 5 3/10  L 5 3/10  L 5 3/10   T-Band ER L 3 3/10  L3 3/10  L3 3/10  L 3 3/10  L 3 3/10  L 3 3/10   Forward/Lateral Raises               SL ER/ABD  2# 3/10  3# 3/10 3# 2/10  2# 3/10  2# 3/10  2# 3/10   Blackburns               Ball Stabilization  30x  30X  30X  30x  30x  30x                   Modalities                HP before ex 15'  15'  15'  15'  15'  15'   CP after ex 15'  15'  15'  15'  15'  15'

## 2019-01-22 ENCOUNTER — OFFICE VISIT (OUTPATIENT)
Dept: PHYSICAL THERAPY | Facility: CLINIC | Age: 69
End: 2019-01-22
Payer: MEDICARE

## 2019-01-22 DIAGNOSIS — M25.512 LEFT SHOULDER PAIN, UNSPECIFIED CHRONICITY: Primary | ICD-10-CM

## 2019-01-22 PROCEDURE — 97010 HOT OR COLD PACKS THERAPY: CPT | Performed by: PHYSICAL THERAPIST

## 2019-01-22 PROCEDURE — 97110 THERAPEUTIC EXERCISES: CPT | Performed by: PHYSICAL THERAPIST

## 2019-01-22 PROCEDURE — 97140 MANUAL THERAPY 1/> REGIONS: CPT | Performed by: PHYSICAL THERAPIST

## 2019-01-22 NOTE — PROGRESS NOTES
Daily and Discharge Note     Today's date: 2019  Patient name: Nery Mejia  : 1950  MRN: [id number]  Referring provider: Edwin Vee DO  Dx:   Encounter Diagnosis     ICD-10-CM    1  Left shoulder pain, unspecified chronicity M25 512      Addendum 2019: Yissel Cloud called our office today stating that she had an injection earlier today  She was instructed by her doctor to stop therapy at this time  No updated objective measures are available for this report due to self discharge  Subjective: Pain is decreased since the last visit  She will be getting her injection tomorrow  Objective: Left shoulder strength is normal     Assessment: Tolerated treatment well  Patient exhibited good technique with therapeutic exercises  Plan: On hold pending injection      Precautions: None     Daily Treatment Diary      Manual  1/14  1/15  1/21  1/22  1/7  1/9   PROM                STM  RK  LF  RK  RK  RK  LF   Exercise Diary                UBE: S 2 1'x4  2/2  1' X4  1'x4  1'x4  1'x4   T-Band Mid Row L 5 3/10  L5 3/10  L5 3/10  L 5 3/10  L 5 3/10  L 5 3 /10   T-Band Low Row  L 3 3/10  L3 3/10  L3 3/10  L 3 3/10  L 3 3/10  L 3 3/10   T-Band Biceps L 5 3/10  L5 3/10  L5 3/10  L 4 3/10  L 4 3/10  L 4 3/10   T-Band Triceps L 4 3/10  L4 3/10  L4 3/10  L 4 3/10  L 4 3/10  L 4 3/10   T-Band SA Pulldowns               T-Band IR L 5 3/10  L5 3/10  L5 3/10  L 5 3/10  L 5 3/10  L 5 3/10   T-Band ER L 3 3/10  L3 3/10  L3 3/10  L 3 3/10  L 3 3/10  L 3 3/10   Forward/Lateral Raises               SL ER/ABD  2# 3/10  3# 3/10 3# 2/10  3# 3/10  2# 3/10  2# 3/10   Blackburns               Ball Stabilization  30x  30X  30X  30x  30x  30x                   Modalities                HP before ex 13'  15'  15'  15'  15'  15'   CP after ex 15'  15'  15'  15'  15'  15'

## 2019-01-23 ENCOUNTER — APPOINTMENT (OUTPATIENT)
Dept: PHYSICAL THERAPY | Facility: CLINIC | Age: 69
End: 2019-01-23
Payer: MEDICARE

## 2019-02-07 DIAGNOSIS — K58.9 IRRITABLE BOWEL SYNDROME WITHOUT DIARRHEA: ICD-10-CM

## 2019-02-07 RX ORDER — DICYCLOMINE HYDROCHLORIDE 10 MG/1
CAPSULE ORAL
Qty: 180 CAPSULE | Refills: 0 | Status: SHIPPED | OUTPATIENT
Start: 2019-02-07 | End: 2019-05-06 | Stop reason: SDUPTHER

## 2019-02-14 ENCOUNTER — TRANSCRIBE ORDERS (OUTPATIENT)
Dept: ADMINISTRATIVE | Facility: HOSPITAL | Age: 69
End: 2019-02-14

## 2019-02-14 DIAGNOSIS — Z12.39 SCREENING BREAST EXAMINATION: Primary | ICD-10-CM

## 2019-02-26 ENCOUNTER — HOSPITAL ENCOUNTER (OUTPATIENT)
Dept: MAMMOGRAPHY | Facility: HOSPITAL | Age: 69
Discharge: HOME | End: 2019-02-26
Payer: MEDICARE

## 2019-02-26 VITALS — BODY MASS INDEX: 32.95 KG/M2 | HEIGHT: 64 IN | WEIGHT: 193 LBS

## 2019-02-26 DIAGNOSIS — Z12.39 SCREENING BREAST EXAMINATION: ICD-10-CM

## 2019-02-26 PROCEDURE — 77063 BREAST TOMOSYNTHESIS BI: CPT

## 2019-02-26 PROCEDURE — 77067 SCR MAMMO BI INCL CAD: CPT

## 2019-03-04 NOTE — PROGRESS NOTES
3/5/2019      Chief Complaint   Patient presents with    Interstitial Cystitis       Assessment and Plan    77 y o  female managed by Dr Lucinda Mane    1  Interstitial cystitis  - urine dip with nitrates and leukocytes, will send for mirco and culture and treat as needed  - aside for symptoms with current UTI, patient has had no flares with Elavil and behavioral modification  - continue Elavil 10 mg at bedtime  - continue pelvic floor PT exercises     FU 1 year       History of Present Illness  Yue Pace is a 77 y o  female here for follow up evaluation of  cystitis  She underwent cystoscopy 8/7/2018 which revealed no ulcerations or other concerns aside for glomerulations  Because of this she was diagnosed with interstitial cystitis and started on Elmiron  Unfortunately, this caused the patient to break out in hives  She was therefore asked to take Elavil 10 mg at bedtime, the patient was previously is on this for back pain  She does have a spinal stimulator in place  She presents today with complaints of pain and frequency that start Thursday  Urine dip reveals nitrites and leukocytes  Otherwise, the patient is doing well and denies any recent flares  Was previously on oxybutynin but this gave her stomach cramps and was discontinued  She did complete pelvic floor PT and continues with her at home exercises  Review of Systems   Constitutional: Negative for activity change, chills and fever  Gastrointestinal: Negative for abdominal distention and abdominal pain  Musculoskeletal: Negative for back pain and gait problem  Psychiatric/Behavioral: Negative for behavioral problems and confusion  Urinary Incontinence Screening      Most Recent Value   Urinary Incontinence   Urinary Incontinence? No   Incomplete emptying? Yes   Urinary frequency? Yes   Urinary urgency? Yes   Urinary hesitancy? No   Dysuria (painful difficult urination)? Yes   Nocturia (waking up to use the bathroom)?   No Straining (having to push to go)? No   Weak stream?  No   Intermittent stream?  No   Post void dribbling? No          Past Medical History  Past Medical History:   Diagnosis Date    Abnormal findings on diagnostic imaging of breast     Anxiety     Arthritis     Fibrocystic breast disease     GERD (gastroesophageal reflux disease)     Hyperlipidemia     Hypertension     Interstitial cystitis     Osteopenia        Past Social History  Past Surgical History:   Procedure Laterality Date    APPENDECTOMY      BACK SURGERY      BREAST EXCISIONAL BIOPSY Left 1996    benign    CARPAL BOSS EXCISION      CHOLECYSTECTOMY      DIAGNOSTIC LAPAROSCOPY      FOOT SURGERY      HYSTERECTOMY      age 33    HYSTEROSCOPY      KIDNEY SURGERY Left     KNEE ARTHROSCOPY Bilateral     LAPAROSCOPY      hynecologic with adhesions    OOPHORECTOMY Bilateral     age 33    VT SURG IMPLNT [address] N/A 5/18/2017    Procedure: PLACEMENT OF THORACIC SPINAL CORD STIMULATOR WITH LEFT BUTTOCK IMPLANTABLE PULSE GENERATOR (IMPULSE MONITORING-MOTORS (TCEMEP), EMG, SSEP);   Surgeon: Yahir Wild MD;  Location: [facility];  Service: Neurosurgery    SPINAL STIMULATOR PLACEMENT  05/2017    TONSILLECTOMY AND ADENOIDECTOMY      onset: unknown     Social History     Tobacco Use   Smoking Status Never Smoker   Smokeless Tobacco Never Used       Past Family History  Family History   Problem Relation Age of Onset    Cancer Mother     Bone cancer Mother     Hypertension Father     Brain cancer Father     Stroke Father         stroke sydrome    Diabetes Paternal Grandmother     Other Family         back disorder    Heart disease Family         cardiac disorder    Stroke Family     Diabetes Family     Hypertension Family     Cancer Family     Breast cancer Maternal Aunt        Past Social history  Social History     Socioeconomic History    Marital status: Single     Spouse name: Not on file    Number of children: Not on file    Years of education: Not on file    Highest education level: Not on file   Occupational History    Occupation:    Social Needs    Financial resource strain: Not on file    Food insecurity:     Worry: Not on file     Inability: Not on file    Transportation needs:     Medical: Not on file     Non-medical: Not on file   Tobacco Use    Smoking status: Never Smoker    Smokeless tobacco: Never Used   Substance and Sexual Activity    Alcohol use: Yes     Comment: social less than a drink per week    Drug use: No    Sexual activity: Not on file   Lifestyle    Physical activity:     Days per week: Not on file     Minutes per session: Not on file    Stress: Not on file   Relationships    Social connections:     Talks on phone: Not on file     Gets together: Not on file     Attends [employer] service: Not on file     Active member of club or organization: Not on file     Attends meetings of clubs or organizations: Not on file     Relationship status: Not on file    Intimate partner violence:     Fear of current or ex partner: Not on file     Emotionally abused: Not on file     Physically abused: Not on file     Forced sexual activity: Not on file   Other Topics Concern    Not on file   Social History Narrative    No caffeine use    Always uses sunscreen    Always uses a seatbelt       Current Medications  Current Outpatient Medications   Medication Sig Dispense Refill    amitriptyline (ELAVIL) 50 mg tablet Take 1 tablet (50 mg total) by mouth daily 90 tablet 1    Biotin 2500 MCG CAPS Take 2 capsules by mouth daily      Cholecalciferol (VITAMIN D-3 PO) Take 1 tablet by mouth daily      Cyanocobalamin (VITAMIN B-12 CR PO) Take 1 tablet by mouth daily      cyclobenzaprine (FLEXERIL) 10 mg tablet Take 10 mg by mouth daily at bedtime      diclofenac sodium (VOLTAREN) 1 % Place 1 g on the skin 2 (two) times a day      dicyclomine (BENTYL) 10 mg capsule Take 1 capsule (10 mg total) by mouth 2 (two) times a day 180 capsule 0    EPINEPHrine (EPIPEN 2-AREN) 0 3 mg/0 3 mL SOAJ Inject 1 Units as directed as needed      fexofenadine (ALLEGRA) 180 MG tablet Take 180 mg by mouth daily      gabapentin (NEURONTIN) 300 mg capsule Take 1 capsule (300 mg total) by mouth 3 (three) times a day 270 capsule 1    hydrOXYzine HCL (ATARAX) 10 mg tablet Take 2 tablets at bedtime 180 tablet 3    losartan (COZAAR) 50 mg tablet Take 1 tablet (50 mg total) by mouth daily 90 tablet 3    Meth-Hyo-M Bl-Na Phos-Ph Sal (URIBEL) 118 MG CAPS Take 1 capsule (118 mg total) by mouth every 6 (six) hours as needed (As directed) 10 capsule 11    montelukast (SINGULAIR) 10 mg tablet Take 1 tablet (10 mg total) by mouth daily at bedtime 90 tablet 3    Omega-3 Fatty Acids (FISH OIL) 1,000 mg Take 1,000 mg by mouth 3 (three) times a day      omeprazole (PriLOSEC) 40 MG capsule Take 1 capsule (40 mg total) by mouth daily 90 capsule 3    simvastatin (ZOCOR) 10 mg tablet Take 1 tablet (10 mg total) by mouth every other day 90 tablet 3    triamcinolone (KENALOG) 0 1 % cream Apply topically 2 (two) times a day 45 g 2     No current facility-administered medications for this visit  Allergies  Allergies   Allergen Reactions    Bee Venom     Codeine Other (See Comments)     headaches    Egg Yolk     Iodinated Diagnostic Agents Hives    Other      Adhesive tape    Penicillins Hives    Elmiron [Pentosan Polysulfate] Rash    Latex Rash         The following portions of the patient's history were reviewed and updated as appropriate: allergies, current medications, past medical history, past social history, past surgical history and problem list       Vitals  Vitals:    03/05/19 0839   BP: 140/90   Pulse: 84   Resp: 20   Weight: 89 4 kg (197 lb)   Height: 5' 4" (1 626 m)           Physical Exam  Constitutional   General appearance: Patient is seated and in no acute distress, well appearing and well nourished     Head and Face Head and face: Normal     Eyes   Conjunctiva and lids: No erythema, swelling or discharge  Ears, Nose, Mouth, and Throat   Hearing: Normal     Pulmonary   Respiratory effort: No increased work of breathing or signs of respiratory distress  Cardiovascular   Examination of extremities for edema and/or varicosities: Normal     Abdomen   Abdomen: Non-tender, no masses  Musculoskeletal   Gait and station: Normal     Skin   Skin and subcutaneous tissue: Warm, dry, and intact  No visible lesions or rashes    Psychiatric   Judgment and insight: Normal  Recent and remote memory:  Normal  Mood and affect: Normal      Results  Recent Results (from the past 1 hour(s))   POCT urine dip    Collection Time: 03/05/19  8:50 AM   Result Value Ref Range    LEUKOCYTE ESTERASE,UA moderate     NITRITE,UA positive     SL AMB POCT UROBILINOGEN neg     POCT URINE PROTEIN neg      PH,UA 5     BLOOD,UA neg     SPECIFIC GRAVITY,UA 1 020     KETONES,UA neg     BILIRUBIN,UA neg     GLUCOSE, UA neg      COLOR,UA yellow     CLARITY,UA cloudy    ]  No results found for: PSA  Lab Results   Component Value Date    GLUCOSE 118 12/08/2016    CALCIUM 9 2 11/08/2018     11/09/2015    K 4 1 11/08/2018    CO2 25 11/08/2018     11/08/2018    BUN 16 11/08/2018    CREATININE 0 64 11/08/2018     Lab Results   Component Value Date    WBC 9 33 11/08/2018    HGB 14 3 11/08/2018    HCT 43 0 11/08/2018    MCV 88 11/08/2018     11/08/2018       Orders  Orders Placed This Encounter   Procedures    POCT urine dip

## 2019-03-05 ENCOUNTER — TELEPHONE (OUTPATIENT)
Dept: UROLOGY | Facility: CLINIC | Age: 69
End: 2019-03-05

## 2019-03-05 ENCOUNTER — OFFICE VISIT (OUTPATIENT)
Dept: UROLOGY | Facility: CLINIC | Age: 69
End: 2019-03-05
Payer: MEDICARE

## 2019-03-05 VITALS
RESPIRATION RATE: 20 BRPM | HEART RATE: 84 BPM | BODY MASS INDEX: 33.63 KG/M2 | DIASTOLIC BLOOD PRESSURE: 90 MMHG | SYSTOLIC BLOOD PRESSURE: 140 MMHG | WEIGHT: 197 LBS | HEIGHT: 64 IN

## 2019-03-05 DIAGNOSIS — N30.11 INTERSTITIAL CYSTITIS (CHRONIC) WITH HEMATURIA: Primary | ICD-10-CM

## 2019-03-05 LAB
BACTERIA UR QL AUTO: ABNORMAL /HPF
BILIRUB UR QL STRIP: NEGATIVE
CLARITY UR: ABNORMAL
COLOR UR: YELLOW
GLUCOSE UR STRIP-MCNC: NEGATIVE MG/DL
HGB UR QL STRIP.AUTO: ABNORMAL
KETONES UR STRIP-MCNC: NEGATIVE MG/DL
LEUKOCYTE ESTERASE UR QL STRIP: ABNORMAL
NITRITE UR QL STRIP: POSITIVE
NON-SQ EPI CELLS URNS QL MICRO: ABNORMAL /HPF
OTHER STN SPEC: ABNORMAL
PH UR STRIP.AUTO: 6.5 [PH]
PROT UR STRIP-MCNC: NEGATIVE MG/DL
RBC #/AREA URNS AUTO: ABNORMAL /HPF
SL AMB  POCT GLUCOSE, UA: ABNORMAL
SL AMB LEUKOCYTE ESTERASE,UA: ABNORMAL
SL AMB POCT BILIRUBIN,UA: ABNORMAL
SL AMB POCT BLOOD,UA: ABNORMAL
SL AMB POCT CLARITY,UA: ABNORMAL
SL AMB POCT COLOR,UA: YELLOW
SL AMB POCT KETONES,UA: ABNORMAL
SL AMB POCT NITRITE,UA: POSITIVE
SL AMB POCT PH,UA: 5
SL AMB POCT SPECIFIC GRAVITY,UA: 1.02
SL AMB POCT URINE PROTEIN: ABNORMAL
SL AMB POCT UROBILINOGEN: ABNORMAL
SP GR UR STRIP.AUTO: 1.02 (ref 1–1.03)
UROBILINOGEN UR QL STRIP.AUTO: 0.2 E.U./DL
WBC #/AREA URNS AUTO: ABNORMAL /HPF

## 2019-03-05 PROCEDURE — 87086 URINE CULTURE/COLONY COUNT: CPT | Performed by: PHYSICIAN ASSISTANT

## 2019-03-05 PROCEDURE — 81002 URINALYSIS NONAUTO W/O SCOPE: CPT | Performed by: PHYSICIAN ASSISTANT

## 2019-03-05 PROCEDURE — 87077 CULTURE AEROBIC IDENTIFY: CPT | Performed by: PHYSICIAN ASSISTANT

## 2019-03-05 PROCEDURE — 81001 URINALYSIS AUTO W/SCOPE: CPT | Performed by: PHYSICIAN ASSISTANT

## 2019-03-05 PROCEDURE — 99213 OFFICE O/P EST LOW 20 MIN: CPT | Performed by: PHYSICIAN ASSISTANT

## 2019-03-05 PROCEDURE — 87186 SC STD MICRODIL/AGAR DIL: CPT | Performed by: PHYSICIAN ASSISTANT

## 2019-03-07 ENCOUNTER — TELEPHONE (OUTPATIENT)
Dept: UROLOGY | Facility: CLINIC | Age: 69
End: 2019-03-07

## 2019-03-07 DIAGNOSIS — N39.0 UTI (URINARY TRACT INFECTION) DUE TO ENTEROCOCCUS: Primary | ICD-10-CM

## 2019-03-07 DIAGNOSIS — B95.2 UTI (URINARY TRACT INFECTION) DUE TO ENTEROCOCCUS: Primary | ICD-10-CM

## 2019-03-07 LAB — BACTERIA UR CULT: ABNORMAL

## 2019-03-07 RX ORDER — SULFAMETHOXAZOLE AND TRIMETHOPRIM 800; 160 MG/1; MG/1
1 TABLET ORAL EVERY 12 HOURS SCHEDULED
Qty: 10 TABLET | Refills: 0 | Status: SHIPPED | OUTPATIENT
Start: 2019-03-07 | End: 2019-03-12

## 2019-03-08 ENCOUNTER — OFFICE VISIT (OUTPATIENT)
Dept: NEUROLOGY | Facility: CLINIC | Age: 69
End: 2019-03-08
Payer: MEDICARE

## 2019-03-08 ENCOUNTER — TELEPHONE (OUTPATIENT)
Dept: UROLOGY | Facility: MEDICAL CENTER | Age: 69
End: 2019-03-08

## 2019-03-08 VITALS
HEIGHT: 64 IN | DIASTOLIC BLOOD PRESSURE: 62 MMHG | BODY MASS INDEX: 33.8 KG/M2 | HEART RATE: 104 BPM | WEIGHT: 198 LBS | SYSTOLIC BLOOD PRESSURE: 134 MMHG

## 2019-03-08 DIAGNOSIS — F41.9 ANXIETY: ICD-10-CM

## 2019-03-08 DIAGNOSIS — N39.0 URINARY TRACT INFECTION WITHOUT HEMATURIA, SITE UNSPECIFIED: Primary | ICD-10-CM

## 2019-03-08 DIAGNOSIS — M54.16 LUMBAR RADICULOPATHY, CHRONIC: ICD-10-CM

## 2019-03-08 DIAGNOSIS — G60.9 IDIOPATHIC PERIPHERAL NEUROPATHY: Primary | ICD-10-CM

## 2019-03-08 PROCEDURE — 99214 OFFICE O/P EST MOD 30 MIN: CPT | Performed by: PHYSICIAN ASSISTANT

## 2019-03-08 RX ORDER — NITROFURANTOIN 25; 75 MG/1; MG/1
100 CAPSULE ORAL 2 TIMES DAILY
Qty: 10 CAPSULE | Refills: 0 | Status: SHIPPED | OUTPATIENT
Start: 2019-03-08 | End: 2019-03-21 | Stop reason: SDUPTHER

## 2019-03-08 RX ORDER — AMITRIPTYLINE HYDROCHLORIDE 50 MG/1
50 TABLET, FILM COATED ORAL DAILY
Qty: 90 TABLET | Refills: 1 | Status: SHIPPED | OUTPATIENT
Start: 2019-03-08 | End: 2019-11-06 | Stop reason: SDUPTHER

## 2019-03-08 RX ORDER — GABAPENTIN 300 MG/1
300 CAPSULE ORAL 3 TIMES DAILY
Qty: 270 CAPSULE | Refills: 1 | Status: SHIPPED | OUTPATIENT
Start: 2019-03-08 | End: 2019-12-05 | Stop reason: SDUPTHER

## 2019-03-08 NOTE — TELEPHONE ENCOUNTER
Spoke with patient about allergic reaction to Bactrim  Informed patient to stop Bactrim and begin Macrobid 2x/day for 5 days  Patient does not have any itching  Advised patient to wait until rash resolves and then begin Macrobid

## 2019-03-08 NOTE — TELEPHONE ENCOUNTER
Patient believes she is experiencing an allergic reaction to the Bactrim in the form of blotchiness  No other symptoms  She can be reached at [phone]

## 2019-03-08 NOTE — TELEPHONE ENCOUNTER
Contacted patient to discuss allergic reaction from Bactrim  Patient prescribed Bactrim yesterday for UTI  Patient took one tablet yesterday and woke up this morning with blotches/rash on abdomen, buttocks and thighs  Patient concerned may be allergic reaction   Will discuss with Yoanna Baldwin PA-C

## 2019-03-08 NOTE — TELEPHONE ENCOUNTER
Patient should stop Bactrim  Addend to allergy list  Recommend Benedryl for rash  As patient is allergic to penicillins, will not prescribe Keflex given cross reactivity  Resistance seen with fluoroquinolones  Therefore, the last oral medication available for her is Macrobid  Will start this, 100 mg tablets twice a day for 5 more days sent to [facility]

## 2019-03-08 NOTE — ASSESSMENT & PLAN NOTE
Symptoms very well controlled on gabapentin 300mg TID and Elavil 50mg HS  These were refilled today  No change in symptoms and no change on her exam   Will cont same course of treatment

## 2019-03-08 NOTE — PATIENT INSTRUCTIONS
Would continue gabapentin 300mg three times a day  Can continue Amitriptyline 50mg at bedtime   Follow up with [facility] in 9 months or sooner if needed    Call for any new or worsening symptoms

## 2019-03-08 NOTE — PROGRESS NOTES
Patient ID: Claire Velazquez is a 77 y o  female  Assessment/Plan:    Peripheral neuropathy  Symptoms very well controlled on gabapentin 300mg TID and Elavil 50mg HS  These were refilled today  No change in symptoms and no change on her exam   Will cont same course of treatment  Lumbar radiculopathy, chronic  Denies any increase in pain, has spinal cord stimulator that has done very well for her  Patient can follow up in 9 months or sooner if needed  She was advised to call for any new or worsening symptoms     Diagnoses and all orders for this visit:    Idiopathic peripheral neuropathy  -     gabapentin (NEURONTIN) 300 mg capsule; Take 1 capsule (300 mg total) by mouth 3 (three) times a day    Lumbar radiculopathy, chronic  -     amitriptyline (ELAVIL) 50 mg tablet; Take 1 tablet (50 mg total) by mouth daily            Subjective:    HPI    Patient is a 77year old female who presents for follow up, last seen by Maria De Jesus Mahajan in June 2018  Patient followed for neuropathy and lumbar radiculopathy  Patient initially presented in 2015 with c/o diffuse paresthesias  Workup included EMG which showed carpal tunnel, lab work which was unrevealing  She also underwent MRI of the brain which was essentially normal  Fortunately her symptoms have been controlled with combo of gabapentin and Elavil  She was having issues with LBP, MRI thoracic spine was essentially normal, MRI Lumbar demonstrated mild to moderate bony and discogenic degenerative changes seen from L2-L3 through L4-L5, with mild spondylolisthesis of L3 on L4  No evidence of central spinal stenosis  No nerve root compression seen  Mild bony degenerative changes are also seen at T10-T11  Patient was seeing pain management and spinal cord stimulator placed in May 2017  This was very helpful for her  Today, patient reports she is doing well  Denies any new symptoms at this time  Neuropathy symptoms well controlled  No increase in LBP    Denies any weakness, falls  No bowel or bladder changes except she currently has a UTI  She follows with urology for interstitial cystitis  She remains on gabapentin 300mg TID and Elavil 50mg HS  The following portions of the patient's history were reviewed and updated as appropriate: past family history, past medical history, past social history, past surgical history and problem list          Objective:    Blood pressure 134/62, pulse 104, height 5' 4" (1 626 m), weight 89 8 kg (198 lb)  Physical Exam   Constitutional: She appears well-developed and well-nourished  HENT:   Head: Normocephalic and atraumatic  Eyes: Pupils are equal, round, and reactive to light  EOM are normal    Cardiovascular: Intact distal pulses  Neurological: She has normal strength and normal reflexes  Coordination normal    Skin: Skin is warm and dry  Psychiatric: She has a normal mood and affect  Her speech is normal        Neurological Exam  Mental Status   Oriented to person, place, time and situation  Recent and remote memory are intact  Speech is normal  Language is fluent with no aphasia  Attention and concentration are normal     Cranial Nerves  CN II: Visual fields full to confrontation  CN III, IV, VI: Extraocular movements intact bilaterally  Pupils equal round and reactive to light bilaterally  CN V: Facial sensation is normal   CN VII: Full and symmetric facial movement  CN VIII: Hearing is normal   CN IX, X: Palate elevates symmetrically  Normal gag reflex  CN XI: Shoulder shrug strength is normal   CN XII: Tongue midline without atrophy or fasciculations  Motor   Normal muscle tone  Strength is 5/5 throughout all four extremities  Sensory  Light touch is normal in upper and lower extremities  Vibration abnormality: Very slightly reduced vibratory sensation in the LEs bilaterally       Reflexes  Deep tendon reflexes are 2+ and symmetric in all four extremities with downgoing toes bilaterally  Coordination  Finger-to-nose, rapid alternating movements and heel-to-shin normal bilaterally without dysmetria  Gait  Casual gait is normal including stance, stride, and arm swing  ROS:    Review of Systems   Constitutional: Negative  HENT: Negative  Eyes: Negative  Respiratory: Negative  Cardiovascular: Negative  Gastrointestinal: Negative  Endocrine: Negative  Genitourinary: Negative  Musculoskeletal:        Joint Pain,    Skin: Negative  Allergic/Immunologic: Negative  Neurological: Positive for headaches  Tingling,    Hematological: Negative  Psychiatric/Behavioral: Negative          I personally reviewed and updated the ROS as appropriate

## 2019-03-12 NOTE — TELEPHONE ENCOUNTER
Contacted patient regarding starting of antibiotic  Patient started Kole Causey 103 today most of the rash resolved yesterday  Patient will contact office if she develops any issues with the Kole Pearl

## 2019-03-21 RX ORDER — NITROFURANTOIN 25; 75 MG/1; MG/1
100 CAPSULE ORAL 2 TIMES DAILY
Qty: 10 CAPSULE | Refills: 0 | Status: SHIPPED | OUTPATIENT
Start: 2019-03-21 | End: 2019-03-26

## 2019-03-21 NOTE — TELEPHONE ENCOUNTER
Pt called she had finished course Macrobid she's feeling much better urine still a little cloudy she's asking if she can get refill as she is going out of town and would like to have on hand

## 2019-04-22 ENCOUNTER — APPOINTMENT (OUTPATIENT)
Dept: RADIOLOGY | Facility: MEDICAL CENTER | Age: 69
End: 2019-04-22
Payer: MEDICARE

## 2019-04-22 ENCOUNTER — OFFICE VISIT (OUTPATIENT)
Dept: INTERNAL MEDICINE CLINIC | Facility: CLINIC | Age: 69
End: 2019-04-22
Payer: MEDICARE

## 2019-04-22 VITALS
BODY MASS INDEX: 33.03 KG/M2 | WEIGHT: 193.5 LBS | TEMPERATURE: 96.2 F | HEART RATE: 95 BPM | DIASTOLIC BLOOD PRESSURE: 72 MMHG | SYSTOLIC BLOOD PRESSURE: 124 MMHG | HEIGHT: 64 IN | OXYGEN SATURATION: 98 %

## 2019-04-22 DIAGNOSIS — R05.9 COUGH: ICD-10-CM

## 2019-04-22 DIAGNOSIS — R05.9 COUGH: Primary | ICD-10-CM

## 2019-04-22 DIAGNOSIS — J01.00 SUBACUTE MAXILLARY SINUSITIS: ICD-10-CM

## 2019-04-22 PROBLEM — T50.905A DRUG REACTION: Status: RESOLVED | Noted: 2018-08-15 | Resolved: 2019-04-22

## 2019-04-22 PROCEDURE — 99213 OFFICE O/P EST LOW 20 MIN: CPT | Performed by: INTERNAL MEDICINE

## 2019-04-22 PROCEDURE — 70220 X-RAY EXAM OF SINUSES: CPT

## 2019-04-22 RX ORDER — CIPROFLOXACIN 500 MG/1
500 TABLET, FILM COATED ORAL EVERY 12 HOURS SCHEDULED
Qty: 14 TABLET | Refills: 0 | Status: SHIPPED | OUTPATIENT
Start: 2019-04-22 | End: 2019-04-29

## 2019-04-24 DIAGNOSIS — R05.9 COUGH: Primary | ICD-10-CM

## 2019-04-24 RX ORDER — BENZONATATE 100 MG/1
100 CAPSULE ORAL 3 TIMES DAILY PRN
Qty: 30 CAPSULE | Refills: 0 | Status: SHIPPED | OUTPATIENT
Start: 2019-04-24 | End: 2019-05-04

## 2019-04-26 ENCOUNTER — TELEPHONE (OUTPATIENT)
Dept: INTERNAL MEDICINE CLINIC | Facility: CLINIC | Age: 69
End: 2019-04-26

## 2019-05-06 DIAGNOSIS — K21.9 GASTROESOPHAGEAL REFLUX DISEASE, ESOPHAGITIS PRESENCE NOT SPECIFIED: ICD-10-CM

## 2019-05-06 DIAGNOSIS — R05.9 COUGH: Primary | ICD-10-CM

## 2019-05-06 DIAGNOSIS — K58.9 IRRITABLE BOWEL SYNDROME WITHOUT DIARRHEA: ICD-10-CM

## 2019-05-06 RX ORDER — OMEPRAZOLE 40 MG/1
40 CAPSULE, DELAYED RELEASE ORAL DAILY
Qty: 90 CAPSULE | Refills: 3 | Status: SHIPPED | OUTPATIENT
Start: 2019-05-06 | End: 2019-11-06 | Stop reason: SDUPTHER

## 2019-05-06 RX ORDER — BENZONATATE 100 MG/1
100 CAPSULE ORAL 3 TIMES DAILY PRN
Qty: 30 CAPSULE | Refills: 1 | Status: SHIPPED | OUTPATIENT
Start: 2019-05-06 | End: 2019-05-16

## 2019-05-06 RX ORDER — DICYCLOMINE HYDROCHLORIDE 10 MG/1
10 CAPSULE ORAL 2 TIMES DAILY
Qty: 180 CAPSULE | Refills: 0 | Status: SHIPPED | OUTPATIENT
Start: 2019-05-06 | End: 2019-08-06 | Stop reason: SDUPTHER

## 2019-06-19 ENCOUNTER — TRANSCRIBE ORDERS (OUTPATIENT)
Dept: PHYSICAL THERAPY | Facility: CLINIC | Age: 69
End: 2019-06-19

## 2019-06-19 ENCOUNTER — EVALUATION (OUTPATIENT)
Dept: PHYSICAL THERAPY | Facility: CLINIC | Age: 69
End: 2019-06-19
Payer: MEDICARE

## 2019-06-19 DIAGNOSIS — Z96.651 PRESENCE OF RIGHT ARTIFICIAL KNEE JOINT: Primary | ICD-10-CM

## 2019-06-19 DIAGNOSIS — Z96.651 S/P TKR (TOTAL KNEE REPLACEMENT), RIGHT: Primary | ICD-10-CM

## 2019-06-19 PROCEDURE — 97010 HOT OR COLD PACKS THERAPY: CPT | Performed by: PHYSICAL THERAPIST

## 2019-06-19 PROCEDURE — 97140 MANUAL THERAPY 1/> REGIONS: CPT | Performed by: PHYSICAL THERAPIST

## 2019-06-19 PROCEDURE — 97110 THERAPEUTIC EXERCISES: CPT | Performed by: PHYSICAL THERAPIST

## 2019-06-19 PROCEDURE — 97162 PT EVAL MOD COMPLEX 30 MIN: CPT | Performed by: PHYSICAL THERAPIST

## 2019-06-21 ENCOUNTER — OFFICE VISIT (OUTPATIENT)
Dept: PHYSICAL THERAPY | Facility: CLINIC | Age: 69
End: 2019-06-21
Payer: MEDICARE

## 2019-06-21 DIAGNOSIS — Z96.651 S/P TKR (TOTAL KNEE REPLACEMENT), RIGHT: Primary | ICD-10-CM

## 2019-06-21 PROCEDURE — 97014 ELECTRIC STIMULATION THERAPY: CPT | Performed by: PHYSICAL THERAPIST

## 2019-06-21 PROCEDURE — 97110 THERAPEUTIC EXERCISES: CPT | Performed by: PHYSICAL THERAPIST

## 2019-06-21 PROCEDURE — 97140 MANUAL THERAPY 1/> REGIONS: CPT | Performed by: PHYSICAL THERAPIST

## 2019-06-24 ENCOUNTER — APPOINTMENT (OUTPATIENT)
Dept: PHYSICAL THERAPY | Facility: CLINIC | Age: 69
End: 2019-06-24
Payer: MEDICARE

## 2019-06-25 ENCOUNTER — OFFICE VISIT (OUTPATIENT)
Dept: PHYSICAL THERAPY | Facility: CLINIC | Age: 69
End: 2019-06-25
Payer: MEDICARE

## 2019-06-25 DIAGNOSIS — Z96.651 S/P TKR (TOTAL KNEE REPLACEMENT), RIGHT: Primary | ICD-10-CM

## 2019-06-25 PROCEDURE — 97140 MANUAL THERAPY 1/> REGIONS: CPT

## 2019-06-25 PROCEDURE — 97014 ELECTRIC STIMULATION THERAPY: CPT

## 2019-06-25 PROCEDURE — 97110 THERAPEUTIC EXERCISES: CPT

## 2019-06-26 ENCOUNTER — OFFICE VISIT (OUTPATIENT)
Dept: PHYSICAL THERAPY | Facility: CLINIC | Age: 69
End: 2019-06-26
Payer: MEDICARE

## 2019-06-26 DIAGNOSIS — Z96.651 S/P TKR (TOTAL KNEE REPLACEMENT), RIGHT: Primary | ICD-10-CM

## 2019-06-26 PROCEDURE — 97110 THERAPEUTIC EXERCISES: CPT | Performed by: PHYSICAL THERAPIST

## 2019-06-26 PROCEDURE — 97140 MANUAL THERAPY 1/> REGIONS: CPT | Performed by: PHYSICAL THERAPIST

## 2019-06-26 PROCEDURE — 97014 ELECTRIC STIMULATION THERAPY: CPT | Performed by: PHYSICAL THERAPIST

## 2019-06-28 ENCOUNTER — OFFICE VISIT (OUTPATIENT)
Dept: PHYSICAL THERAPY | Facility: CLINIC | Age: 69
End: 2019-06-28
Payer: MEDICARE

## 2019-06-28 DIAGNOSIS — Z96.651 S/P TKR (TOTAL KNEE REPLACEMENT), RIGHT: Primary | ICD-10-CM

## 2019-06-28 PROCEDURE — 97110 THERAPEUTIC EXERCISES: CPT | Performed by: PHYSICAL THERAPIST

## 2019-06-28 PROCEDURE — 97014 ELECTRIC STIMULATION THERAPY: CPT | Performed by: PHYSICAL THERAPIST

## 2019-06-28 PROCEDURE — 97140 MANUAL THERAPY 1/> REGIONS: CPT | Performed by: PHYSICAL THERAPIST

## 2019-07-01 ENCOUNTER — OFFICE VISIT (OUTPATIENT)
Dept: PHYSICAL THERAPY | Facility: CLINIC | Age: 69
End: 2019-07-01
Payer: MEDICARE

## 2019-07-01 DIAGNOSIS — Z96.651 S/P TKR (TOTAL KNEE REPLACEMENT), RIGHT: Primary | ICD-10-CM

## 2019-07-01 PROCEDURE — 97110 THERAPEUTIC EXERCISES: CPT

## 2019-07-01 PROCEDURE — 97014 ELECTRIC STIMULATION THERAPY: CPT

## 2019-07-01 PROCEDURE — 97140 MANUAL THERAPY 1/> REGIONS: CPT

## 2019-07-01 NOTE — PROGRESS NOTES
Daily Note     Today's date: 2019  Patient name: Isac Last  : 1950  MRN: [id number]  Referring provider: Christiana Roberts MD  Dx:   Encounter Diagnosis     ICD-10-CM    1  S/P TKR (total knee replacement), right Z96 651                   Subjective: Patient reports her knee is painful if sitting,standing, or walking too long  Objective: See treatment diary below  Patient instructed in step stretch for flexion to add to HEP  Assessment: Tolerated treatment well  Patient exhibited good technique with therapeutic exercises      Plan: Continue per plan of care            Precautions: None     Daily Treatment Diary      Manual     PROM RK  RK  LF  RK  RK  LF   Hamstring Stretch               Calf Stretch                               Exercise Diary                QS    10x  15X  2/10  2/10  2/10   SLR    10x  13X  2/10  2/10  2/10   SAQ    10x  10X  2/10  2/10  2/10   Heel slides with strap    10x  10X  2/10  2/10  2/10   T-Band Press    L 1 10x  L1 10X    L 1 10x  L1 2/10   T-Band Hams     L1 10x  L1 10X    L 1 10x  L1 2/10   T-Band TKE      L1 2/10    L 1 2/10  L1 2/10   LAQ    10x  12X  12x  22x  22X   Mini Squats               Step Stretch Flexion      10" X5   Steps Forward               Steps Lateral               PYR HAM:   S , P , C               PYR QUAD:   S , P , C               Leg Press: S               XO TKE               Hack Squat               SLS               NuStep:   S 10, A 11 L 1 7'  L 18'  L2 9'  L 2 10'  L 2 10'     Bike: S               Treadmill                               Modalities                CP 13'              CP/IFC    15'  15'  15'  15'  15'

## 2019-07-03 ENCOUNTER — OFFICE VISIT (OUTPATIENT)
Dept: PHYSICAL THERAPY | Facility: CLINIC | Age: 69
End: 2019-07-03
Payer: MEDICARE

## 2019-07-03 DIAGNOSIS — Z96.651 S/P TKR (TOTAL KNEE REPLACEMENT), RIGHT: Primary | ICD-10-CM

## 2019-07-03 PROCEDURE — 97014 ELECTRIC STIMULATION THERAPY: CPT

## 2019-07-03 PROCEDURE — 97110 THERAPEUTIC EXERCISES: CPT

## 2019-07-03 PROCEDURE — 97140 MANUAL THERAPY 1/> REGIONS: CPT

## 2019-07-03 NOTE — PROGRESS NOTES
Daily Note     Today's date: 7/3/2019  Patient name: Simeon Etienne  : 1950  MRN: [id number]  Referring provider: Radha Esquivel MD  Dx:   Encounter Diagnosis     ICD-10-CM    1  S/P TKR (total knee replacement), right Z96 651                   Subjective: Patient reports compliance with HEP, including the flexion stretch on step  She continues to c/o R anterior foot pain  Objective: See treatment diary below  R knee flexion measured during step stretch @ 73 degrees  Assessment: Tolerated treatment well  Patient exhibited good technique with therapeutic exercises      Plan: Continue per plan of care              Precautions: None     Daily Treatment Diary      Manual  7/3  6/21  6/25  6/26  6/28  7/1   PROM LF  RK  LF  RK  RK  LF   Hamstring Stretch               Calf Stretch                               Exercise Diary                QS  2/10  10x  15X  2/10  2/10  2/10   SLR  2/10  10x  13X  2/10  2/10  2/10   SAQ  2/10  10x  10X  2/10  2/10  2/10   Heel slides with strap  2/10  10x  10X  2/10  2/10  2/10   T-Band Press  2/10  L 1 10x  L1 10X    L 1 10x  L1 2/10   T-Band Hams   L1 2/10  L1 10x  L1 10X    L 1 10x  L1 2/10   T-Band TKE  L1 2/10    L1 2/10    L 1 2/10  L1 2/10   LAQ  2/10  10x  12X  12x  22x  22X   Mini Squats               Step Stretch Flexion  8" 10" X10         10" X5   Steps Forward  6" 15X             Steps Lateral  6" 15X             PYR HAM:   S , P , C               PYR QUAD:   S , P , C               Leg Press: S               XO TKE               Hack Squat               SLS               NuStep:   S 10, A 11 L2 8'  L 18'  L2 9'  L 2 10'  L 2 10'     Bike: S               Treadmill                               Modalities                CP               CP/IFC  15'  15'  15'  15'  15'  15'

## 2019-07-05 ENCOUNTER — EVALUATION (OUTPATIENT)
Dept: PHYSICAL THERAPY | Facility: CLINIC | Age: 69
End: 2019-07-05
Payer: MEDICARE

## 2019-07-05 DIAGNOSIS — Z96.651 S/P TKR (TOTAL KNEE REPLACEMENT), RIGHT: Primary | ICD-10-CM

## 2019-07-05 PROCEDURE — 97140 MANUAL THERAPY 1/> REGIONS: CPT | Performed by: PHYSICAL THERAPIST

## 2019-07-05 PROCEDURE — 97014 ELECTRIC STIMULATION THERAPY: CPT | Performed by: PHYSICAL THERAPIST

## 2019-07-05 PROCEDURE — 97110 THERAPEUTIC EXERCISES: CPT | Performed by: PHYSICAL THERAPIST

## 2019-07-05 NOTE — PROGRESS NOTES
PT Re-Evaluation     Today's date: 2019  Patient name: Arleth Valladares  : 1950  MRN: [id number]  Referring provider: Merline Sans, MD  Dx:   Encounter Diagnosis     ICD-10-CM    1  S/P TKR (total knee replacement), right Z96 651                   Assessment  Assessment details:   CURRENT FUNCTIONAL STATUS    Standing/ADL tolerance 30 minutes  Walking tolerance 2-3 blocks without an AD   Ascends stairs step by step/descends stairs step by step  Difficulty arising from sitting: Moderate use of arms  Unable to squat  Ability to dress lower extremities: Assist of one donning a sneaker  Unable to drive  Work Status: Part time     SHORT TERM GOALS (2 WEEKS)    Increase knee AROM and PROM 20-30 degrees  Increase knee strength 3-5 lbs in all weak areas  Girth MP: 47 5 cm  Decrease pain to 3-5/10  Standing/ADL tolerance 45-60 minutes  Walking tolerance 3-4 blocks without an AD  Ascends stairs reciprocally/descends stairs step by step  Difficulty arising from sitting: Mild use of arms  Able to squat 30 degrees  Ability to dress lower extremities: Independent donning a sneaker  Able to drive  Work Status: Part time    LONG TERM GOALS (DISCHARGE)    Knee AROM: -5-115 deg  Knee PROM: 0-120 deg  Knee Strength: E=35 lbs, F=25 lbs  Girth MP: 47 cm  Decrease pain to 0-3/10  Standing/ADL tolerance 60 minutes  Walking tolerance 1/2 mile without an AD  Ascends stairs reciprocally/descends stairs step by step  Difficulty arising from sitting: Minimal/no use of arms  Able to squat 45 degrees  Ability to dress lower extremities: Independent donning a sneaker  Able to drive  Work Status: Full Duty  Understanding of Dx/Px/POC: good   Prognosis: good    Goals  See assessment details above  Plan  Plan details: The patient has shown improvement in PT demonstrating decreased pain, increased range of motion, increased strength, and increased tolerance to activity    The patient continues to present with pain, decreased ROM, decreased strength, and decreased tolerance to activity  The patient would benefit from continued skilled PT services to address these issues and to maximize function  The patient will also continue performing their HEP  Patient would benefit from: skilled physical therapy  Planned modality interventions: unattended electrical stimulation, thermotherapy: hydrocollator packs and cryotherapy  Planned therapy interventions: manual therapy, therapeutic exercise, therapeutic activities and gait training  Frequency: 2-3x per week  Duration in weeks: 4        Subjective Evaluation    History of Present Illness  Mechanism of injury: Subjective: The patient is experiencing constant, moderate to severe right knee pain, along with pain in her right foot  The discomfort limites her tolerance for exercise and activity  She has stopped using her SPC recently  Patient Goals  Patient goals for therapy: decreased pain, decreased edema, increased motion, increased strength and independence with ADLs/IADLs          Objective     Observations     Additional Observation Details  Patient ambulates without an AD, with minimal knee ROM present while walking  Incision is healing well without redness or discharge  Moderate knee and leg swelling is noted  General Comments:      Knee Comments  CURRENT OBJECTIVE MEASUREMENTS    Knee AROM: -30-65 deg  Knee PROM: -15-65 deg  Knee Strength: E=16 lbs, F=17 lbs  Girth MP: 47 5 cm           Precautions: None     Daily Treatment Diary      Manual  7/3  7/5  6/25  6/26  6/28  7/1   PROM LF  RK  LF  RK  RK  LF   Hamstring Stretch               Calf Stretch                               Exercise Diary                QS  2/10  3/10  15X  2/10  2/10  2/10   SLR  2/10  3/10  13X  2/10  2/10  2/10   SAQ  2/10  3/10  10X  2/10  2/10  2/10   Heel slides with strap  2/10  3/10  10X  2/10  2/10  2/10   T-Band Press  2/10  L 1 30x  L1 10X    L 1 10x  L1 2/10   T-Band Hams   L1 2/10  L1 30x  L1 10X    L 1 10x  L1 2/10   T-Band TKE  L1 2/10  L 1 30x  L1 2/10    L 1 2/10  L1 2/10   LAQ  2/10  30x  12X  12x  22x  22X   Mini Squats               Step Stretch Flexion  8" 10" X10  8" 10" 10x       10" X5   Steps Forward  6" 15X  8" 30x           Steps Lateral  6" 15X  6" 30x           PYR HAM:   S , P , C               PYR QUAD:   S , P , C               Leg Press: S               XO TKE               Hack Squat               SLS               NuStep:   S 10, A 11 L2 8'  L2 10'  L2 9'  L 2 10'  L 2 10'     Bike: S               Treadmill                               Modalities                CP                CP/IFC  15'  15'  15'  15'  15'  15'

## 2019-07-05 NOTE — LETTER
2019    Missy Tejada, [address]    Patient: Eduardo Beyer   YOB: 1950   Date of Visit: 2019     Encounter Diagnosis     ICD-10-CM    1  S/P TKR (total knee replacement), right Z96 651        Dear Dr Mei Lam:    Please review the attached Plan of Care from [facility] recent visit  Please verify that you agree therapy should continue by signing the attached document and sending it back to our office  If you have any questions or concerns, please don't hesitate to call  Sincerely,    Daron Canseco, PT      Referring Provider:      I certify that I have read the below Plan of Care and certify the need for these services furnished under this plan of treatment while under my care  Missy Tejada MD  [place] [id number] [address]: [phone]          PT Re-Evaluation     Today's date: 2019  Patient name: Eduardo Beyer  : 1950  MRN: [id number]  Referring provider: Ian Conde MD  Dx:   Encounter Diagnosis     ICD-10-CM    1  S/P TKR (total knee replacement), right Z96 651                   Assessment  Assessment details:   CURRENT FUNCTIONAL STATUS    Standing/ADL tolerance 30 minutes  Walking tolerance 2-3 blocks without an AD   Ascends stairs step by step/descends stairs step by step  Difficulty arising from sitting: Moderate use of arms  Unable to squat  Ability to dress lower extremities: Assist of one donning a sneaker  Unable to drive  Work Status: Part time     SHORT TERM GOALS (2 WEEKS)    Increase knee AROM and PROM 20-30 degrees  Increase knee strength 3-5 lbs in all weak areas  Girth MP: 47 5 cm  Decrease pain to 3-5/10  Standing/ADL tolerance 45-60 minutes  Walking tolerance 3-4 blocks without an AD  Ascends stairs reciprocally/descends stairs step by step     Difficulty arising from sitting: Mild use of arms    Able to squat 30 degrees  Ability to dress lower extremities: Independent donning a sneaker  Able to drive  Work Status: Part time    LONG TERM GOALS (DISCHARGE)    Knee AROM: -5-115 deg  Knee PROM: 0-120 deg  Knee Strength: E=35 lbs, F=25 lbs  Girth MP: 47 cm  Decrease pain to 0-3/10  Standing/ADL tolerance 60 minutes  Walking tolerance 1/2 mile without an AD  Ascends stairs reciprocally/descends stairs step by step  Difficulty arising from sitting: Minimal/no use of arms  Able to squat 45 degrees  Ability to dress lower extremities: Independent donning a sneaker  Able to drive  Work Status: Full Duty  Understanding of Dx/Px/POC: good   Prognosis: good    Goals  See assessment details above  Plan  Plan details: The patient has shown improvement in PT demonstrating decreased pain, increased range of motion, increased strength, and increased tolerance to activity  The patient continues to present with pain, decreased ROM, decreased strength, and decreased tolerance to activity  The patient would benefit from continued skilled PT services to address these issues and to maximize function  The patient will also continue performing their HEP  Patient would benefit from: skilled physical therapy  Planned modality interventions: unattended electrical stimulation, thermotherapy: hydrocollator packs and cryotherapy  Planned therapy interventions: manual therapy, therapeutic exercise, therapeutic activities and gait training  Frequency: 2-3x per week  Duration in weeks: 4        Subjective Evaluation    History of Present Illness  Mechanism of injury: Subjective: The patient is experiencing constant, moderate to severe right knee pain, along with pain in her right foot  The discomfort limites her tolerance for exercise and activity  She has stopped using her SPC recently    Patient Goals  Patient goals for therapy: decreased pain, decreased edema, increased motion, increased strength and independence with ADLs/IADLs          Objective     Observations     Additional Observation Details  Patient ambulates without an AD, with minimal knee ROM present while walking  Incision is healing well without redness or discharge  Moderate knee and leg swelling is noted  General Comments:      Knee Comments  CURRENT OBJECTIVE MEASUREMENTS    Knee AROM: -30-65 deg  Knee PROM: -15-65 deg  Knee Strength: E=16 lbs, F=17 lbs  Girth MP: 47 5 cm           Precautions: None     Daily Treatment Diary      Manual  7/3  7/5  6/25  6/26  6/28  7/1   PROM LF  RK  LF  RK  RK  LF   Hamstring Stretch               Calf Stretch                               Exercise Diary                QS  2/10  3/10  15X  2/10  2/10  2/10   SLR  2/10  3/10  13X  2/10  2/10  2/10   SAQ  2/10  3/10  10X  2/10  2/10  2/10   Heel slides with strap  2/10  3/10  10X  2/10  2/10  2/10   T-Band Press  2/10  L 1 30x  L1 10X    L 1 10x  L1 2/10   T-Band Hams   L1 2/10  L1 30x  L1 10X    L 1 10x  L1 2/10   T-Band TKE  L1 2/10  L 1 30x  L1 2/10    L 1 2/10  L1 2/10   LAQ  2/10  30x  12X  12x  22x  22X   Mini Squats               Step Stretch Flexion  8" 10" X10  8" 10" 10x       10" X5   Steps Forward  6" 15X  8" 30x           Steps Lateral  6" 15X  6" 30x           PYR HAM:   S , P , C               PYR QUAD:   S , P , C               Leg Press: S               XO TKE               Hack Squat               SLS               NuStep:   S 10, A 11 L2 10'  L2 10'  L2 9'  L 2 10'  L 2 10'     Bike: S               Treadmill                               Modalities                CP                CP/IFC  15'  15'  15'  15'  15'  15'

## 2019-07-08 ENCOUNTER — OFFICE VISIT (OUTPATIENT)
Dept: PHYSICAL THERAPY | Facility: CLINIC | Age: 69
End: 2019-07-08
Payer: MEDICARE

## 2019-07-08 DIAGNOSIS — Z96.651 S/P TKR (TOTAL KNEE REPLACEMENT), RIGHT: Primary | ICD-10-CM

## 2019-07-08 PROCEDURE — 97110 THERAPEUTIC EXERCISES: CPT

## 2019-07-08 PROCEDURE — 97140 MANUAL THERAPY 1/> REGIONS: CPT

## 2019-07-08 PROCEDURE — 97014 ELECTRIC STIMULATION THERAPY: CPT

## 2019-07-08 NOTE — PROGRESS NOTES
Daily Note     Today's date: 2019  Patient name: Angela Gonzalez  : 1950  MRN: [id number]  Referring provider: Holly Mirza MD  Dx:   Encounter Diagnosis     ICD-10-CM    1  S/P TKR (total knee replacement), right Z96 651                   Subjective: Patient reports having F/U with MD  She states she is not to overdo it with working on knee flexion  Shr reports the foot is feeling somewhat better  Objective: See treatment diary below  Knee flexion measured on step stretch @ 71 degrees  Assessment: Tolerated treatment well  Patient would benefit from continued PT      Plan: Continue per plan of care            Precautions: None     Daily Treatment Diary      Manual  7/3  7/5  7/8  6/26  6/28  7/1   PROM LF  RK  RK  RK  RK  LF   Hamstring Stretch               Calf Stretch                               Exercise Diary                QS  2/10  3/10  30X  2/10  2/10  2/10   SLR  /10  3/10  30X  2/10  2/10  2/10   SAQ  /10  3/10  30X  2/10  2/10  2/10   Heel slides with strap  2/10  3/10  30X  2/10  2/10  2/10   T-Band Press  2/10  L 1 30x  L2 30X    L 1 10x  L1 2/10   T-Band Hams   L1 2/10  L1 30x  L2 30X    L 1 10x  L1 2/10   T-Band TKE  L1 2/10  L 1 30x  L2 3/10    L 1 2/10  L1 2/10   LAQ  2/10  30x  30X  12x  22x  22X   Mini Squats               Step Stretch Flexion  8" 10" X10  8" 10" 10x  8" 10" X10     10" X5   Steps Forward  6" 15X  8" 30x  8" 30X         Steps Lateral  6" 15X  6" 30x  6" 30X         PYR HAM:   S , P , C               PYR QUAD:   S , P , C               Leg Press: S               XO TKE               Hack Squat               SLS               NuStep:   S 10, A 11 L2 10'  L2 10'  L2 12'  L 2 10'  L 2 10'     Bike: S               Treadmill                               Modalities                CP                CP/IFC  15'  15'  15'  15'  15'  15'

## 2019-07-10 ENCOUNTER — APPOINTMENT (EMERGENCY)
Dept: RADIOLOGY | Facility: HOSPITAL | Age: 69
End: 2019-07-10
Payer: MEDICARE

## 2019-07-10 ENCOUNTER — OFFICE VISIT (OUTPATIENT)
Dept: PHYSICAL THERAPY | Facility: CLINIC | Age: 69
End: 2019-07-10
Payer: MEDICARE

## 2019-07-10 ENCOUNTER — HOSPITAL ENCOUNTER (EMERGENCY)
Facility: HOSPITAL | Age: 69
Discharge: HOME | End: 2019-07-10
Attending: EMERGENCY MEDICINE | Admitting: EMERGENCY MEDICINE
Payer: MEDICARE

## 2019-07-10 ENCOUNTER — APPOINTMENT (EMERGENCY)
Dept: CT IMAGING | Facility: HOSPITAL | Age: 69
End: 2019-07-10
Payer: MEDICARE

## 2019-07-10 VITALS
BODY MASS INDEX: 33.66 KG/M2 | OXYGEN SATURATION: 98 % | RESPIRATION RATE: 20 BRPM | SYSTOLIC BLOOD PRESSURE: 133 MMHG | TEMPERATURE: 98.2 F | WEIGHT: 190 LBS | HEIGHT: 63 IN | HEART RATE: 86 BPM | DIASTOLIC BLOOD PRESSURE: 55 MMHG

## 2019-07-10 DIAGNOSIS — S40.011A CONTUSION OF MULTIPLE SITES OF RIGHT SHOULDER, INITIAL ENCOUNTER: ICD-10-CM

## 2019-07-10 DIAGNOSIS — Z96.651 S/P TKR (TOTAL KNEE REPLACEMENT), RIGHT: Primary | ICD-10-CM
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
## 2019-07-10 DIAGNOSIS — M25.561 RIGHT KNEE PAIN: ICD-10-CM

## 2019-07-10 PROCEDURE — 73030 X-RAY EXAM OF SHOULDER: CPT

## 2019-07-10 PROCEDURE — 70450 CT HEAD/BRAIN W/O DYE: CPT

## 2019-07-10 PROCEDURE — 72125 CT NECK SPINE W/O DYE: CPT

## 2019-07-10 PROCEDURE — 90471 IMMUNIZATION ADMIN: CPT

## 2019-07-10 PROCEDURE — 99284 EMERGENCY DEPT VISIT MOD MDM: CPT | Performed by: EMERGENCY MEDICINE

## 2019-07-10 PROCEDURE — 97110 THERAPEUTIC EXERCISES: CPT

## 2019-07-10 PROCEDURE — 90715 TDAP VACCINE 7 YRS/> IM: CPT | Performed by: EMERGENCY MEDICINE

## 2019-07-10 PROCEDURE — 73560 X-RAY EXAM OF KNEE 1 OR 2: CPT

## 2019-07-10 PROCEDURE — 97140 MANUAL THERAPY 1/> REGIONS: CPT

## 2019-07-10 PROCEDURE — 97014 ELECTRIC STIMULATION THERAPY: CPT

## 2019-07-10 PROCEDURE — 73590 X-RAY EXAM OF LOWER LEG: CPT

## 2019-07-10 PROCEDURE — 99284 EMERGENCY DEPT VISIT MOD MDM: CPT

## 2019-07-10 PROCEDURE — 73630 X-RAY EXAM OF FOOT: CPT

## 2019-07-10 RX ORDER — NAPROXEN 500 MG/1
500 TABLET ORAL 2 TIMES DAILY WITH MEALS
Qty: 10 TABLET | Refills: 0 | Status: SHIPPED | OUTPATIENT
Start: 2019-07-10 | End: 2019-12-05 | Stop reason: ALTCHOICE

## 2019-07-10 RX ORDER — CELECOXIB 50 MG/1
50 CAPSULE ORAL DAILY
COMMUNITY
End: 2019-07-24 | Stop reason: SINTOL

## 2019-07-10 RX ORDER — ACETAMINOPHEN 325 MG/1
975 TABLET ORAL ONCE
Status: COMPLETED | OUTPATIENT
Start: 2019-07-10 | End: 2019-07-10

## 2019-07-10 RX ORDER — NAPROXEN 500 MG/1
500 TABLET ORAL 2 TIMES DAILY WITH MEALS
Qty: 10 TABLET | Refills: 0 | Status: SHIPPED | OUTPATIENT
Start: 2019-07-10 | End: 2019-07-10 | Stop reason: SDUPTHER

## 2019-07-10 RX ADMIN — TETANUS TOXOID, REDUCED DIPHTHERIA TOXOID AND ACELLULAR PERTUSSIS VACCINE, ADSORBED 0.5 ML: 5; 2.5; 8; 8; 2.5 SUSPENSION INTRAMUSCULAR at 20:02

## 2019-07-10 RX ADMIN — ACETAMINOPHEN 975 MG: 325 TABLET, FILM COATED ORAL at 20:01

## 2019-07-10 NOTE — PROGRESS NOTES
Daily Note     Today's date: 7/10/2019  Patient name: Ahsan Tenorio  : 1950  MRN: [id number]  Referring provider: Milton Chaudhry MD  Dx:   Encounter Diagnosis     ICD-10-CM    1  S/P TKR (total knee replacement), right Z96 651                   Subjective: Patient reports her knee feels "tight" and is painful medial > lateral       Objective: See treatment diary below  Flexion ROM measured during step stretch @ 77 degrees  Assessment: Tolerated treatment well  Patient exhibited good technique with therapeutic exercises      Plan: Continue per plan of care              Precautions: None     Daily Treatment Diary      Manual  7/3  7/5  7/8  7/10  6/28  7/1   PROM LF  RK  RK  LF  RK  LF   Hamstring Stretch               Calf Stretch                               Exercise Diary                QS  2/10  3/10  30X  3/10  2/10  2/10   SLR  2/10  3/10  30X  2/10  2/10  2/10   SAQ  /10  3/10  30X  2/10  2/10  2/10   Heel slides with strap  /10  3/10  30X  3/10  2/10  2/10   T-Band Press  2/10  L 1 30x  L2 30X  L2 3/10  L 1 10x  L1 2/10   T-Band Hams   L1 2/10  L1 30x  L2 30X  L2  3/10  L 1 10x  L1 2/10   T-Band TKE  L1 2/10  L 1 30x  L2 3/10  L2 3/10  L 1 2/10  L1 2/10   LAQ  2/10  30x  30X  30X  22x  22X   Mini Squats               Step Stretch Flexion  8" 10" X10  8" 10" 10x  8" 10" X10  8" 10" X10   10" X5   Steps Forward  6" 15X  8" 30x  8" 30X  8" 30X       Steps Lateral  6" 15X  6" 30x  6" 30X  6" 30X       PYR HAM:   S , P , C               PYR QUAD:   S , P , C               Leg Press: S               XO TKE               Hack Squat               SLS               NuStep:   S 10, A 11 L2 8'  L2 10'  L2 12'  L 2 13'  L 2 10'     Bike: S               Treadmill                               Modalities                CP                CP/IFC  15'  15'  15'  15'  15'  15'

## 2019-07-11 ENCOUNTER — OFFICE VISIT (OUTPATIENT)
Dept: INTERNAL MEDICINE CLINIC | Facility: CLINIC | Age: 69
End: 2019-07-11
Payer: MEDICARE

## 2019-07-11 VITALS
DIASTOLIC BLOOD PRESSURE: 78 MMHG | HEIGHT: 63 IN | HEART RATE: 94 BPM | TEMPERATURE: 96.5 F | BODY MASS INDEX: 34.04 KG/M2 | OXYGEN SATURATION: 97 % | SYSTOLIC BLOOD PRESSURE: 126 MMHG | WEIGHT: 192.13 LBS

## 2019-07-11 DIAGNOSIS — Z78.0 ASYMPTOMATIC POSTMENOPAUSAL STATUS (AGE-RELATED) (NATURAL): ICD-10-CM

## 2019-07-11 DIAGNOSIS — M79.604 RIGHT LEG PAIN: ICD-10-CM
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
## 2019-07-11 DIAGNOSIS — Z98.890 S/P RIGHT KNEE SURGERY: ICD-10-CM

## 2019-07-11 DIAGNOSIS — M79.671 RIGHT FOOT PAIN: ICD-10-CM

## 2019-07-11 PROCEDURE — 99214 OFFICE O/P EST MOD 30 MIN: CPT | Performed by: INTERNAL MEDICINE

## 2019-07-11 NOTE — ED PROVIDER NOTES
H&P Exam - Trauma   Bonnie Ag 77 y o  female MRN: [id number]  Unit/Bed#: RM07/RM07 Encounter: [id number]    Assessment/Plan   Trauma Alert: Trauma Acuity: Trauma Evaluation  Model of Arrival: Trauma Mode of Arrival: Other (Comment)(by car) via    Trauma Team: Current Providers  Attending Provider: Quang Mckeon DO  ED Technician: Dominique Zuleta  Registered Nurse: Jamshid Ken RN  Consultants: None    Trauma Active Problems:   Fall down steps  Right knee pain  Status post right knee arthroplasty    Trauma Plan:   CT head  CT cervical spine  X-rays  Chest x-ray  Trial of ambulation  Ace bandage for swelling    Chief Complaint:   Chief Complaint   Patient presents with    Fall     Patient fell down 15 stairs and hurt her right knee and left leg  Patient denies an LOC  History of Present Illness   HPI:  Bonnie Ag is a 77 y o  female who presents with after she fell down 15 steps  Patient was fishing physical therapy  Patient is post 5 weeks from right knee arthroplasty  Patient does not know she struck her head  Patient complaining of right knee pain and left tibia pain  Patient states she was able to walk after the accident but found it painful  Patient does not take anticoagulation  Patient's past medical history includes anxiety arthritis GERD hypertension hyperlipidemia interstitial cystitis  Patient states this was a fall because her right ankle gave out it has been weak since she had her knee replaced  Patient denies symptoms before the fall  Patient is thinks her ankle gave out  Mechanism:Details of Incident: fell down 15 steps, denies hitting head, C/O R knee pain and swelling Injury Date: 07/10/19   Injury Occurence Location - [address] [place] [address] Way: [facility]    HPI  Review of Systems   Constitutional: Negative for activity change, appetite change, chills, diaphoresis, fatigue, fever and unexpected weight change     HENT: Negative for congestion, ear discharge, ear pain, facial swelling, hearing loss, nosebleeds, postnasal drip, rhinorrhea, sinus pressure, sneezing, sore throat and tinnitus  Eyes: Negative for photophobia, pain, redness, itching and visual disturbance  Respiratory: Negative for cough, chest tightness, shortness of breath, wheezing and stridor  Cardiovascular: Negative for chest pain, palpitations and leg swelling  Gastrointestinal: Negative for abdominal distention, abdominal pain, anal bleeding, blood in stool, constipation, diarrhea, nausea and vomiting  Endocrine: Negative for polydipsia, polyphagia and polyuria  Genitourinary: Negative for decreased urine volume, difficulty urinating, dysuria, enuresis, flank pain, frequency, hematuria, menstrual problem, urgency, vaginal bleeding, vaginal discharge and vaginal pain  Musculoskeletal: Positive for arthralgias  Negative for back pain, gait problem, joint swelling, myalgias, neck pain and neck stiffness  Skin: Negative for rash and wound  Allergic/Immunologic: Negative for environmental allergies, food allergies and immunocompromised state  Neurological: Negative for dizziness, tremors, seizures, syncope, facial asymmetry, speech difficulty, weakness, light-headedness, numbness and headaches  Hematological: Negative for adenopathy  Psychiatric/Behavioral: Negative for agitation, behavioral problems, confusion, dysphoric mood, hallucinations, self-injury, sleep disturbance and suicidal ideas  The patient is nervous/anxious  The patient is not hyperactive          Historical Information     Immunizations:   Immunization History   Administered Date(s) Administered    Tdap 12/09/2016, 12/09/2016, 07/10/2019       Past Medical History:   Diagnosis Date    Abnormal findings on diagnostic imaging of breast     Anxiety     Arthritis     Fibrocystic breast disease     GERD (gastroesophageal reflux disease)     Hyperlipidemia     Hypertension     Interstitial cystitis     Osteopenia        Family History   Problem Relation Age of Onset    Cancer Mother     Bone cancer Mother     Hypertension Father     Brain cancer Father     Stroke Father         stroke sydrome    Diabetes Paternal Grandmother     Other Family         back disorder    Heart disease Family         cardiac disorder    Stroke Family     Diabetes Family     Hypertension Family     Cancer Family     Breast cancer Maternal Aunt      Past Surgical History:   Procedure Laterality Date    APPENDECTOMY      BACK SURGERY      BREAST EXCISIONAL BIOPSY Left 1996    benign    CARPAL BOSS EXCISION      CHOLECYSTECTOMY      DIAGNOSTIC LAPAROSCOPY      FOOT SURGERY      HYSTERECTOMY      age 33    HYSTEROSCOPY      KIDNEY SURGERY Left     KNEE ARTHROSCOPY Bilateral     LAPAROSCOPY      hynecologic with adhesions    OOPHORECTOMY Bilateral     age 33    DE SURG IMPLNT [address] N/A 5/18/2017    Procedure: PLACEMENT OF THORACIC SPINAL CORD STIMULATOR WITH LEFT BUTTOCK IMPLANTABLE PULSE GENERATOR (IMPULSE MONITORING-MOTORS (TCEMEP), EMG, SSEP);   Surgeon: Alysa Mello MD;  Location: [facility];  Service: Neurosurgery    SPINAL STIMULATOR PLACEMENT  05/2017    TONSILLECTOMY AND ADENOIDECTOMY      onset: unknown       Social History     Socioeconomic History    Marital status: Single     Spouse name: None    Number of children: None    Years of education: None    Highest education level: None   Occupational History    Occupation:    Social Needs    Financial resource strain: None    Food insecurity:     Worry: None     Inability: None    Transportation needs:     Medical: None     Non-medical: None   Tobacco Use    Smoking status: Never Smoker    Smokeless tobacco: Never Used   Substance and Sexual Activity    Alcohol use: Yes     Comment: social less than a drink per week    Drug use: No    Sexual activity: None   Lifestyle    Physical activity:     Days per week: None     Minutes per session: None  Stress: None   Relationships    Social connections:     Talks on phone: None     Gets together: None     Attends Scientologist service: None     Active member of club or organization: None     Attends meetings of clubs or organizations: None     Relationship status: None    Intimate partner violence:     Fear of current or ex partner: None     Emotionally abused: None     Physically abused: None     Forced sexual activity: None   Other Topics Concern    None   Social History Narrative    No caffeine use    Always uses sunscreen    Always uses a seatbelt       Family History: non-contributory    Meds/Allergies   Prior to Admission Medications   Prescriptions Last Dose Informant Patient Reported? Taking?    Biotin 2500 MCG CAPS  Self Yes Yes   Sig: Take 2 capsules by mouth daily   Cholecalciferol (VITAMIN D-3 PO)  Self Yes Yes   Sig: Take 1 tablet by mouth daily   Cyanocobalamin (VITAMIN B-12 CR PO)  Self Yes Yes   Sig: Take 1 tablet by mouth daily   EPINEPHrine (EPIPEN 2-AREN) 0 3 mg/0 3 mL SOAJ Unknown at Unknown time Self Yes No   Sig: Inject 1 Units as directed as needed   Meth-Hyo-M Bl-Na Phos-Ph Sal (URIBEL) 118 MG CAPS Not Taking at Unknown time Self No No   Sig: Take 1 capsule (118 mg total) by mouth every 6 (six) hours as needed (As directed)   Patient not taking: Reported on 3/8/2019   Omega-3 Fatty Acids (FISH OIL) 1,000 mg  Self Yes Yes   Sig: Take 1,000 mg by mouth 3 (three) times a day   amitriptyline (ELAVIL) 50 mg tablet   No Yes   Sig: Take 1 tablet (50 mg total) by mouth daily   celecoxib (CeleBREX) 50 MG capsule   Yes Yes   Sig: Take 50 mg by mouth 2 (two) times a day   cyclobenzaprine (FLEXERIL) 10 mg tablet Not Taking at Unknown time Self Yes No   Sig: Take 10 mg by mouth daily at bedtime   diclofenac sodium (VOLTAREN) 1 %  Self Yes Yes   Sig: Place 1 g on the skin 2 (two) times a day   dicyclomine (BENTYL) 10 mg capsule   No Yes   Sig: Take 1 capsule (10 mg total) by mouth 2 (two) times a day fexofenadine (ALLEGRA) 180 MG tablet  Self Yes Yes   Sig: Take 180 mg by mouth daily   fluticasone-salmeterol (ADVAIR DISKUS) 250-50 mcg/dose inhaler Not Taking at Unknown time  No No   Sig: Inhale 1 puff daily Rinse mouth after use     Patient not taking: Reported on 7/10/2019   gabapentin (NEURONTIN) 300 mg capsule   No Yes   Sig: Take 1 capsule (300 mg total) by mouth 3 (three) times a day   hydrOXYzine HCL (ATARAX) 10 mg tablet Unknown at Unknown time Self No No   Sig: Take 2 tablets at bedtime   Patient not taking: Reported on 4/22/2019   losartan (COZAAR) 50 mg tablet  Self No No   Sig: Take 1 tablet (50 mg total) by mouth daily   montelukast (SINGULAIR) 10 mg tablet  Self No Yes   Sig: Take 1 tablet (10 mg total) by mouth daily at bedtime   omeprazole (PriLOSEC) 40 MG capsule   No Yes   Sig: Take 1 capsule (40 mg total) by mouth daily   simvastatin (ZOCOR) 10 mg tablet  Self No Yes   Sig: Take 1 tablet (10 mg total) by mouth every other day   triamcinolone (KENALOG) 0 1 % cream Not Taking at Unknown time Self No No   Sig: Apply topically 2 (two) times a day   Patient not taking: Reported on 4/22/2019      Facility-Administered Medications: None       Allergies   Allergen Reactions    Bee Venom     Codeine Other (See Comments)     headaches    Egg Yolk     Iodinated Diagnostic Agents Hives    Other      Adhesive tape    Penicillins Hives    Bactrim [Sulfamethoxazole-Trimethoprim] Rash    Elmiron [Pentosan Polysulfate] Rash    Latex Rash    Oxybutynin Rash       PHYSICAL EXAM    PE limited by: none    Objective   Vitals:   First set: Temperature: 98 2 °F (36 8 °C) (07/10/19 1901)  Pulse: 99 (07/10/19 1901)  Respirations: 18 (07/10/19 1901)  Blood Pressure: 128/78 (07/10/19 1901)  SpO2: 98 % (07/10/19 1901)    Primary Survey:   (A) Airway: intact  (B) Breathing: b/l breath sounds  (C) Circulation: Pulses:   carotid  2/4, pedal  2/4, radial  2/4 and femoral  2/4  (D) Disabliity:  GCS Total:  15, Eye Opening:   Spontaneous = 4, Motor Response: Obeys commands = 6 and Verbal Response:  Oriented = 5  (E) Expose:  Completed    Secondary Survey: (Click on Physical Exam tab above)  Physical Exam   Constitutional: She is oriented to person, place, and time  She appears well-developed and well-nourished  No distress  HENT:   Head: Normocephalic and atraumatic  Right Ear: External ear normal    Left Ear: External ear normal    Nose: Nose normal    Mouth/Throat: Oropharynx is clear and moist  No oropharyngeal exudate  Eyes: Pupils are equal, round, and reactive to light  Conjunctivae and EOM are normal  Right eye exhibits no discharge  Left eye exhibits no discharge  No scleral icterus  Neck: Normal range of motion  Neck supple  No JVD present  No tracheal deviation present  No thyromegaly present  Cardiovascular: Normal rate, regular rhythm, normal heart sounds and intact distal pulses  No murmur heard  Pulmonary/Chest: Effort normal and breath sounds normal  No stridor  No respiratory distress  She has no wheezes  Abdominal: Soft  Bowel sounds are normal  She exhibits no distension and no mass  There is no tenderness  There is no rebound and no guarding  No hernia  Musculoskeletal: Normal range of motion  She exhibits no edema, tenderness or deformity  Right hip: She exhibits normal range of motion, normal strength and no tenderness  Left hip: She exhibits normal range of motion, normal strength and no tenderness  Cervical back: She exhibits normal range of motion, no tenderness and no bony tenderness  Thoracic back: She exhibits normal range of motion, no tenderness and no bony tenderness  Lumbar back: She exhibits normal range of motion, no tenderness and no bony tenderness  Legs:  Lymphadenopathy:     She has no cervical adenopathy  Neurological: She is alert and oriented to person, place, and time  She displays normal reflexes  No cranial nerve deficit  She exhibits normal muscle tone  GCS eye subscore is 4  GCS verbal subscore is 5  GCS motor subscore is 6  Reflex Scores:       Tricep reflexes are 2+ on the right side and 2+ on the left side  Bicep reflexes are 2+ on the right side and 2+ on the left side  Patellar reflexes are 2+ on the right side and 2+ on the left side  Achilles reflexes are 2+ on the right side and 2+ on the left side  5/5 strength in upper lower extremities, sensation intact throughout, cerebellar testing including finger-to-nose heel-to-shin rapid alternating movements are intact, cranial nerves 2-12 intact  Skin: Skin is warm and dry  No rash noted  She is not diaphoretic  No erythema  Psychiatric: She has a normal mood and affect  Her behavior is normal  Judgment and thought content normal    Nursing note and vitals reviewed  Invasive Devices     None                 Lab Results:   Results Reviewed     None                 Imaging Studies:   Direct to CT: No  XR foot 3+ vw right   ED Interpretation by Alberto Butler DO (07/10 2009)   No acute fracture seen      XR shoulder 2+ views RIGHT   ED Interpretation by Alberto Butler DO (07/10 2009)   No acute fracture seen      XR knee 1 or 2 views right   ED Interpretation by Alberto Butler DO (07/10 2009)   No acute fracture seen      XR tibia fibula 2 views LEFT   ED Interpretation by Alberto Butler DO (07/10 2009)   No acute fracture seen      CT head without contrast   Final Result by Irwin Farah MD (07/10 1942)      No acute intracranial abnormality  Workstation performed: [id number]         CT spine cervical without contrast   Final Result by Irwin Farah MD (07/10 1946)      No cervical spine fracture or traumatic malalignment                     Workstation performed: [id number]             Other Studies: n/a    Code Status: Prior  Advance Directive and Living Will:      Power of :    POLST:      Procedures  Splint application  Date/Time: 7/10/2019 8:06 PM  Performed by: Padma Coronado DO  Authorized by: Padma Coronado DO     Patient location:  Bedside  Procedure performed by emergency physician: Yes    Other Assisting Provider: No    Consent:     Consent obtained:  Verbal    Consent given by:  Patient    Risks discussed:  Discoloration, pain, numbness and swelling    Alternatives discussed:  No treatment  Universal protocol:     Procedure explained and questions answered to patient or proxy's satisfaction: yes      Relevant documents present and verified: yes      Test results available and properly labeled: yes      Radiology Images displayed and confirmed  If images not available, report reviewed: yes      Required blood products, implants, devices, and special equipment available: yes      Site/side marked: yes      Immediately prior to procedure a time out was called: yes      Patient identity confirmed:  Verbally with patient and arm band  Indication:     Indications: fracture, sprain/strain and other medical problem (comment)      Indications comment:  Swelling  Procedure details:     Laterality:  Right    Location:  Ankle    Supplies:  Elastic bandage  Post-procedure details:     Pain:  Improved    Sensation:  Normal    Neurovascular Exam: skin pink, capillary refill <2 sec, normal pulses and skin intact, warm, and dry      Patient tolerance of procedure: Tolerated well, no immediate complications  Comments: Ace wraps were applied to right ankle, left pretibial area             ED Course         MDM  Number of Diagnoses or Management Options  Contusion of multiple sites of right shoulder, initial encounter:   Fall down steps, initial encounter:   Fall, initial encounter:   Right knee pain:   Diagnosis management comments: 45-year-old female presenting after fall down 15 steps  Trauma evaluation  CT head CT cervical spine negative  X-rays no acute fracture seen  Patient ambulated without difficulty  Patient tolerated p  O  Compressive Ace dressings to pretibial hematoma and ankle for stability  Patient feels better  Patient tolerated p o   Naproxen for home patient will discontinue other NSAIDs  Orthopedic follow-up ED return precautions discussed she demonstrates understanding  Disposition  [facility] Transfer: No  Final diagnoses:   Fall, initial encounter   Right knee pain   Contusion of multiple sites of right shoulder, initial encounter   Fall down steps, initial encounter     Time reflects when diagnosis was documented in both MDM as applicable and the Disposition within this note     Time User Action Codes Description Comment    7/10/2019  8:11 PM Lynn Karel Add [H75  SWVS] Fall, initial encounter     7/10/2019  8:11 PM Lynn Karel Add [M25 561] Right knee pain     7/10/2019  8:12 PM Trena Gaming Add [S40 011A] Contusion of multiple sites of right shoulder, initial encounter     7/10/2019 10:05 PM Trena Gaming Add [W10 8XXA] Fall down steps, initial encounter       ED Disposition     ED Disposition Condition Date/Time Comment    Discharge Stable Wed Jul 10, 2019  8:11 PM Ainsley Wheatley discharge to home/self care  Return precautions were discussed with patient  Patient understands when to return to  Emergency department  Patient agrees to discharge plan and follow up care               Follow-up Information     Follow up With Specialties Details Why Contact Info Additional Casey, DO Internal Medicine Go in 2 days  [phone] Emergency Department Emergency Medicine Go to  As needed, If symptoms worsen [address] [phone]  [phone] [place] ED, [address], [address]        Discharge Medication List as of 7/10/2019  8:13 PM      CONTINUE these medications which have CHANGED    Details   naproxen (NAPROSYN) 500 mg tablet Take 1 tablet (500 mg total) by mouth 2 (two) times a day with meals for 5 days, Starting Wed 7/10/2019, Until Mon 7/15/2019, Print         CONTINUE these medications which have NOT CHANGED    Details   amitriptyline (ELAVIL) 50 mg tablet Take 1 tablet (50 mg total) by mouth daily, Starting Fri 3/8/2019, Normal      Biotin 2500 MCG CAPS Take 2 capsules by mouth daily, Historical Med      celecoxib (CeleBREX) 50 MG capsule Take 50 mg by mouth 2 (two) times a day, Historical Med      Cholecalciferol (VITAMIN D-3 PO) Take 1 tablet by mouth daily, Historical Med      Cyanocobalamin (VITAMIN B-12 CR PO) Take 1 tablet by mouth daily, Historical Med      diclofenac sodium (VOLTAREN) 1 % Place 1 g on the skin 2 (two) times a day, Starting Fri 8/11/2017, Historical Med      dicyclomine (BENTYL) 10 mg capsule Take 1 capsule (10 mg total) by mouth 2 (two) times a day, Starting Mon 5/6/2019, Normal      fexofenadine (ALLEGRA) 180 MG tablet Take 180 mg by mouth daily, Historical Med      gabapentin (NEURONTIN) 300 mg capsule Take 1 capsule (300 mg total) by mouth 3 (three) times a day, Starting Fri 3/8/2019, Normal      montelukast (SINGULAIR) 10 mg tablet Take 1 tablet (10 mg total) by mouth daily at bedtime, Starting Fri 5/25/2018, Normal      Omega-3 Fatty Acids (FISH OIL) 1,000 mg Take 1,000 mg by mouth 3 (three) times a day, Historical Med      omeprazole (PriLOSEC) 40 MG capsule Take 1 capsule (40 mg total) by mouth daily, Starting Mon 5/6/2019, Normal      simvastatin (ZOCOR) 10 mg tablet Take 1 tablet (10 mg total) by mouth every other day, Starting Fri 5/25/2018, Normal      cyclobenzaprine (FLEXERIL) 10 mg tablet Take 10 mg by mouth daily at bedtime, Historical Med      EPINEPHrine (EPIPEN 2-AREN) 0 3 mg/0 3 mL SOAJ Inject 1 Units as directed as needed, Starting Mon 6/23/2014, Historical Med      fluticasone-salmeterol (ADVAIR DISKUS) 250-50 mcg/dose inhaler Inhale 1 puff daily Rinse mouth after use , Starting Mon 4/22/2019, Normal      hydrOXYzine HCL (ATARAX) 10 mg tablet Take 2 tablets at bedtime, Normal      losartan (COZAAR) 50 mg tablet Take 1 tablet (50 mg total) by mouth daily, Starting Fri 5/25/2018, Normal      Meth-Hyo-M Bl-Na Phos-Ph Sal (URIBEL) 118 MG CAPS Take 1 capsule (118 mg total) by mouth every 6 (six) hours as needed (As directed), Starting Tue 6/26/2018, Normal      triamcinolone (KENALOG) 0 1 % cream Apply topically 2 (two) times a day, Starting Mon 8/20/2018, Normal           No discharge procedures on file        ED Provider  Electronically Signed by         Evita Watkins [address] [place] [address],   07/10/19 [id number]

## 2019-07-11 NOTE — PROGRESS NOTES
Assessment/Plan:         Diagnoses and all orders for this visit:    Fall (on) (from) other stairs and steps, sequela  Pt has Tramadol and encouraged to use that BID along with scheduled Tylenol up to tid  Elevate left lower leg at rest   She is to call Ortho today to inform them of fall - xray was negative in ER and will check about PT tomorrow as well  Ice to knee and foot area encouraged  Use cane for support especially on stairs - she sis limiting the stairs    Asymptomatic postmenopausal status (age-related) (natural)  -     DXA bone density spine hip and pelvis; Future  Pt aware and will setup once recovered from recent knee surgery    Right leg pain  Patient complains most of right foot pain which was present prior to fall but now is more pronounced on top of foot area  CT of foot ordered ? Foot support to stabilize needed/would be beneficial    S/P right knee surgery  Pt is calling ortho today to inform them of her fall She had seen them last week for followup visit one month postop      Pt will call once she is more settled to setup wellness visit here which was cancelled due to knee surgery     Patient ID: Beth Quijano is a 77 y o  female  HPI   Pt fell down her apartment stairs - 15 stairs - yesterday after getting home from the store  She was going down and felt her leg give way and she fell down the flight of stairs No LOC She did have her phone and was able to call a friend to take her to the ER  She has pain and abrasion near right shoulder, right thigh,knee and foot Her right foot has been bothersome since surgery and ortho was aware per patient She has tenderness to light touch the top of her right foot and some swelling Her left leg was more swelling since fall - she did have a shot in it last week and said it had been swollen at PT since she is relying on the left side more postop    She took Tramadol last pm and did sleep a bit 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 She had been going to PT and next appt is tomorrow so she plans to call and check if ok to continue  No dizziness, headache Mild neck discomfort more in the area of the abrasion on the right scapular area        Review of Systems   Constitutional: Negative  HENT: Negative  Respiratory: Negative  Cardiovascular: Negative  Gastrointestinal: Negative  Genitourinary: Negative  Musculoskeletal: Positive for arthralgias, gait problem and myalgias  Skin: Positive for color change and wound  Neurological: Positive for weakness  Hematological: Negative  Past Medical History:   Diagnosis Date    Abnormal findings on diagnostic imaging of breast     Anxiety     Arthritis     Fibrocystic breast disease     GERD (gastroesophageal reflux disease)     Hyperlipidemia     Hypertension     Interstitial cystitis     Osteopenia      Past Surgical History:   Procedure Laterality Date    APPENDECTOMY      BACK SURGERY      BREAST EXCISIONAL BIOPSY Left 1996    benign    CARPAL BOSS EXCISION      CHOLECYSTECTOMY      DIAGNOSTIC LAPAROSCOPY      FOOT SURGERY      HYSTERECTOMY      age 33    HYSTEROSCOPY      KIDNEY SURGERY Left     KNEE ARTHROSCOPY Bilateral     LAPAROSCOPY      hynecologic with adhesions    OOPHORECTOMY Bilateral     age 33    MI SURG IMPLNT Rudd Prim N/A 5/18/2017    Procedure: PLACEMENT OF THORACIC SPINAL CORD STIMULATOR WITH LEFT BUTTOCK IMPLANTABLE PULSE GENERATOR (IMPULSE MONITORING-MOTORS (TCEMEP), EMG, SSEP);   Surgeon: Bhavik Cormier MD;  Location: [facility];  Service: Neurosurgery    SPINAL STIMULATOR PLACEMENT  05/2017    TONSILLECTOMY AND ADENOIDECTOMY      onset: unknown     Social History     Socioeconomic History    Marital status: Single     Spouse name: Not on file    Number of children: Not on file    Years of education: Not on file    Highest education level: Not on file   Occupational History    Occupation:    Social Needs    Financial resource strain: Not on file    Food insecurity:     Worry: Not on file     Inability: Not on file    Transportation needs:     Medical: Not on file     Non-medical: Not on file   Tobacco Use    Smoking status: Never Smoker    Smokeless tobacco: Never Used   Substance and Sexual Activity    Alcohol use: Yes     Comment: social less than a drink per week    Drug use: No    Sexual activity: Not on file   Lifestyle    Physical activity:     Days per week: Not on file     Minutes per session: Not on file    Stress: Not on file   Relationships    Social connections:     Talks on phone: Not on file     Gets together: Not on file     Attends Judaism service: Not on file     Active member of club or organization: Not on file     Attends meetings of clubs or organizations: Not on file     Relationship status: Not on file    Intimate partner violence:     Fear of current or ex partner: Not on file     Emotionally abused: Not on file     Physically abused: Not on file     Forced sexual activity: Not on file   Other Topics Concern    Not on file   Social History Narrative    No caffeine use    Always uses sunscreen    Always uses a seatbelt     Allergies   Allergen Reactions    Bee Venom     Codeine Other (See Comments)     headaches    Egg Yolk     Iodinated Diagnostic Agents Hives    Other      Adhesive tape    Penicillins Hives    Bactrim [Sulfamethoxazole-Trimethoprim] Rash    Elmiron [Pentosan Polysulfate] Rash    Latex Rash    Oxybutynin Rash               /78   Pulse 94   Temp (!) 96 5 °F (35 8 °C) (Tympanic)   Ht 5' 3" (1 6 m)   Wt 87 1 kg (192 lb 2 oz)   SpO2 97%   BMI 34 03 kg/m²          Physical Exam   Constitutional: She is oriented to person, place, and time  She appears well-developed and well-nourished  HENT:   Head: Normocephalic and atraumatic     Right Ear: External ear normal    Left Ear: External ear normal    Nose: Nose normal    Mouth/Throat: Oropharynx is clear and moist    Eyes: Pupils are equal, round, and reactive to light  Conjunctivae and EOM are normal    Neck: Normal range of motion  Neck supple  Cardiovascular: Normal rate and regular rhythm  Pulmonary/Chest: Effort normal and breath sounds normal    Abdominal: Soft  Bowel sounds are normal    Musculoskeletal: She exhibits edema, tenderness and deformity  Neurological: She is alert and oriented to person, place, and time  She displays abnormal reflex  A sensory deficit is present  No cranial nerve deficit  She exhibits abnormal muscle tone  Coordination abnormal    Skin: Skin is warm and dry  Capillary refill takes less than 2 seconds  There is erythema  Psychiatric: She has a normal mood and affect  Her behavior is normal  Judgment and thought content normal    Nursing note and vitals reviewed    abrasion right scapula to right shoulder area No drainage Dry skin  One plus edema right foot ttp to fine palpation pulses intact b/l pedal  Adequate rom and wound intact right knee no redness,warmth no calf tenderness b/l  Ambulating with some leaning to the left  One plus edema left calf nontender no redness

## 2019-07-12 ENCOUNTER — OFFICE VISIT (OUTPATIENT)
Dept: PHYSICAL THERAPY | Facility: CLINIC | Age: 69
End: 2019-07-12
Payer: MEDICARE

## 2019-07-12 DIAGNOSIS — Z96.651 S/P TKR (TOTAL KNEE REPLACEMENT), RIGHT: Primary | ICD-10-CM

## 2019-07-12 PROCEDURE — 97110 THERAPEUTIC EXERCISES: CPT | Performed by: PHYSICAL THERAPIST

## 2019-07-12 PROCEDURE — 97014 ELECTRIC STIMULATION THERAPY: CPT | Performed by: PHYSICAL THERAPIST

## 2019-07-12 PROCEDURE — 97140 MANUAL THERAPY 1/> REGIONS: CPT | Performed by: PHYSICAL THERAPIST

## 2019-07-12 PROCEDURE — 97164 PT RE-EVAL EST PLAN CARE: CPT | Performed by: PHYSICAL THERAPIST

## 2019-07-12 NOTE — PROGRESS NOTES
Daily Note     Today's date: 2019  Patient name: Ahsan Tenorio  : 1950  MRN: [id number]  Referring provider: Milton Chaudhry MD  Dx:   Encounter Diagnosis     ICD-10-CM    1  S/P TKR (total knee replacement), right Z96 651                   Subjective: Patient states that she fell down her steps 2 days ago  She went to the ER, and saw her PCP yesterday  There were no fractures  She rates her knee pain at /10  Objective: Patient ambulates without her cane  AROM: -30-70 degrees  Girth MP=48 5 cm  Strength: E=10 lbs, F=11 lbs  Assessment: Exercise tolerance was limited by knee pain  Plan: Progress treatment as tolerated         Precautions: None     Daily Treatment Diary      Manual  7/3  7/5  7/8  7/10  7/12  7/1   PROM LF  RK  RK  LF  RK  LF   Hamstring Stretch               Calf Stretch                               Exercise Diary                QS  2/10  3/10  30X  3/10  4/10  2/10   SLR  2/10  3/10  30X  2/10    2/10   SAQ  2/10  3/10  30X  2/10  4/10  2/10   Heel slides with strap  2/10  3/10  30X  3/10  1/15  2/10   T-Band Press  2/10  L 1 30x  L2 30X  L2 3/10   L1 2/10   T-Band Hams   L1 2/10  L1 30x  L2 30X  L2  3/10   L1 2/10   T-Band TKE  L1 2/10  L 1 30x  L2 3/10  L2 3/10   L1 2/10   LAQ  2/10  30x  30X  30X   22X   Mini Squats               Step Stretch Flexion  8" 10" X10  8" 10" 10x  8" 10" X10  8" 10" X10   10" X5   Steps Forward  6" 15X  8" 30x  8" 30X  8" 30X       Steps Lateral  6" 15X  6" 30x  6" 30X  6" 30X       PYR HAM:   S , P , C               PYR QUAD:   S , P , C               Leg Press: S               XO TKE               Hack Squat               SLS               NuStep:   S 10, A 11 L2 10'  L2 10'  L2 12'  L 2 13'       Bike: S               Treadmill                               Modalities                CP                CP/IFC  15'  15'  15'  15'  15'  15'

## 2019-07-15 ENCOUNTER — OFFICE VISIT (OUTPATIENT)
Dept: PHYSICAL THERAPY | Facility: CLINIC | Age: 69
End: 2019-07-15
Payer: MEDICARE

## 2019-07-15 ENCOUNTER — TELEPHONE (OUTPATIENT)
Dept: INTERNAL MEDICINE CLINIC | Facility: CLINIC | Age: 69
End: 2019-07-15

## 2019-07-15 DIAGNOSIS — M79.671 RIGHT FOOT PAIN: Primary | ICD-10-CM

## 2019-07-15 DIAGNOSIS — Z96.651 S/P TKR (TOTAL KNEE REPLACEMENT), RIGHT: Primary | ICD-10-CM

## 2019-07-15 PROCEDURE — 97140 MANUAL THERAPY 1/> REGIONS: CPT | Performed by: PHYSICAL THERAPIST

## 2019-07-15 PROCEDURE — 97014 ELECTRIC STIMULATION THERAPY: CPT | Performed by: PHYSICAL THERAPIST

## 2019-07-15 PROCEDURE — 97110 THERAPEUTIC EXERCISES: CPT | Performed by: PHYSICAL THERAPIST

## 2019-07-15 NOTE — PROGRESS NOTES
Daily Note     Today's date: 7/15/2019  Patient name: Julienne Kelly  : 1950  MRN: [id number]  Referring provider: Katarina Zavala MD  Dx:   Encounter Diagnosis     ICD-10-CM    1  S/P TKR (total knee replacement), right Z96 651                   Subjective: Knee pain has decreased, but the foot pain hasn't  She will be getting a CT scan on 2019  Objective: See treatment diary below      Assessment: Tolerated treatment well, including increased resistance for several exercises  Patient exhibited good technique with therapeutic exercises      Plan: Progress treatment as tolerated         Precautions: None     Daily Treatment Diary      Manual  7/3  7/5  7/8  7/10  7/12  7/15   PROM LF  RK  RK  LF  RK  RK   Hamstring Stretch               Calf Stretch                               Exercise Diary                QS  2/10  3/10  30X  3/10  4/10  4/10   SLR  2/10  3/10  30X  2/10    1# 15x   SAQ  2/10  3/10  30X  2/10  4/10  1# 3/10   Heel slides with strap  2/10  3/10  30X  3/10  /15  1# 3/10   T-Band Press  2/10  L 1 30x  L2 30X  L2 3/10   L2 3/10   T-Band Hams   L1 2/10  L1 30x  L2 30X  L2  3/10   L2 3/10   T-Band TKE  L1 2/10  L 1 30x  L2 3/10  L2 3/10   L2 3/10   LAQ  2/10  30x  30X  30X   1# 30x   Mini Squats               Step Stretch Flexion  8" 10" X10  8" 10" 10x  8" 10" X10  8" 10" X10   10" X20   Steps Forward  6" 15X  8" 30x  8" 30X  8" 30X    8" 30x   Steps Lateral  6" 15X  6" 30x  6" 30X  6" 30X    6" 30x   PYR HAM:   S , P , C               PYR QUAD:   S , P , C               Leg Press: S               XO TKE               Hack Squat               SLS               NuStep:   S 10, A 11 L2 8'  L2 10'  L2 12'  L 2 13'    L 2 13'   Bike: S               Treadmill                               Modalities                CP                CP/IFC  15'  15'  15'  15'  15'  15'

## 2019-07-16 ENCOUNTER — OFFICE VISIT (OUTPATIENT)
Dept: PHYSICAL THERAPY | Facility: CLINIC | Age: 69
End: 2019-07-16
Payer: MEDICARE

## 2019-07-16 DIAGNOSIS — Z96.651 S/P TKR (TOTAL KNEE REPLACEMENT), RIGHT: Primary | ICD-10-CM

## 2019-07-16 PROCEDURE — 97140 MANUAL THERAPY 1/> REGIONS: CPT

## 2019-07-16 PROCEDURE — 97110 THERAPEUTIC EXERCISES: CPT

## 2019-07-16 PROCEDURE — 97014 ELECTRIC STIMULATION THERAPY: CPT

## 2019-07-16 NOTE — PROGRESS NOTES
Daily Note     Today's date: 2019  Patient name: Rufus Alan  : 1950  MRN: [id number]  Referring provider: Matthew Stewart MD  Dx:   Encounter Diagnosis     ICD-10-CM    1  S/P TKR (total knee replacement), right Z96 651                   Subjective: Patient reports her foot pain continues to limit activities  She states knee pain is mild  Objective: See treatment diary below      Assessment: Tolerated treatment well  Patient exhibited good technique with therapeutic exercises      Plan: Continue per plan of care        Precautions: None     Daily Treatment Diary      [facility]   7  7/  7/10  7/12  7/15   PROM RK  RK  RK  LF  RK  RK   Hamstring Stretch               Calf Stretch                               Exercise Diary                QS  4/10  3/10  30X  3/10  4/10  4/10   SLR  4/10  3/10  30X  2/10    1# 15x   SAQ  1# 4/10  3/10  30X  2/10  4/10  1# 3/10   Heel slides with strap  1# 35X  3/10  30X  3/10  /15  1# 3/10   T-Band Press  1# L3 35X  L 1 30x  L2 30X  L2 3/10   L2 3/10   T-Band Hams   L3 3/10  L1 30x  L2 30X  L2  3/10   L2 3/10   T-Band TKE  L3 3/10  L 1 30x  L2 3/10  L2 3/10   L2 3/10   LAQ  1# 30X  30x  30X  30X   1# 30x   Mini Squats               Step Stretch Flexion  8" 10" X20  8" 10" 10x  8" 10" X10  8" 10" X10   10" X20   Steps Forward  8" 30X  8" 30x  8" 30X  8" 30X    8" 30x   Steps Lateral  6" 30X  6" 30x  6" 30X  6" 30X    6" 30x   PYR HAM:   S , P , C               PYR QUAD:   S , P , C               Leg Press: S               XO TKE               Hack Squat               SLS               NuStep:   S 10, A 11 L2 8'  L2 10'  L2 12'  L 2 13'    L 2 13'   Bike: S               Treadmill                               Modalities                CP                CP/IFC  15'  15'  15'  15'  15'  15'

## 2019-07-17 ENCOUNTER — HOSPITAL ENCOUNTER (OUTPATIENT)
Dept: CT IMAGING | Facility: HOSPITAL | Age: 69
Discharge: HOME | End: 2019-07-17
Attending: INTERNAL MEDICINE
Payer: MEDICARE

## 2019-07-17 DIAGNOSIS — M79.671 RIGHT FOOT PAIN: ICD-10-CM

## 2019-07-17 PROCEDURE — 73700 CT LOWER EXTREMITY W/O DYE: CPT

## 2019-07-18 ENCOUNTER — OFFICE VISIT (OUTPATIENT)
Dept: PHYSICAL THERAPY | Facility: CLINIC | Age: 69
End: 2019-07-18
Payer: MEDICARE

## 2019-07-18 DIAGNOSIS — Z96.651 S/P TKR (TOTAL KNEE REPLACEMENT), RIGHT: Primary | ICD-10-CM

## 2019-07-18 PROCEDURE — 97014 ELECTRIC STIMULATION THERAPY: CPT | Performed by: PHYSICAL THERAPIST

## 2019-07-18 PROCEDURE — 97110 THERAPEUTIC EXERCISES: CPT | Performed by: PHYSICAL THERAPIST

## 2019-07-18 PROCEDURE — 97140 MANUAL THERAPY 1/> REGIONS: CPT | Performed by: PHYSICAL THERAPIST

## 2019-07-18 NOTE — PROGRESS NOTES
Daily Note     Today's date: 2019  Patient name: Fede Cortez  : 1950  MRN: [id number]  Referring provider: Angeline Chung MD  Dx:   Encounter Diagnosis     ICD-10-CM    1  S/P TKR (total knee replacement), right Z96 651                   Subjective: Right knee pain is 3/10, right foot pain is 8/10  She had her foot CT scan yesterday, but she is still awaiting the results  Objective: Girth MP remains at 48 5 cm  Assessment: Tolerated treatment well  Patient demonstrated fatigue post treatment  She was able to progress to 40 reps for all exercises  Plan: Progress treatment as tolerated         Precautions: None     Daily Treatment Diary      [facility] 7/16  7/18  7/8  7/10  7/12  7/15   PROM RK  RK  RK  LF  RK  RK   Hamstring Stretch               Calf Stretch                               Exercise Diary                QS  4/10  4/10  30X  3/10  4/10  4/10   SLR  4/10  4/10  30X  2/10    1# 15x   SAQ  1# 4/10  1# 4/10  30X  2/10  4/10  1# 3/10   Heel slides with strap  1# 35X  1# 4/10  30X  3/10  /15  1# 3/10   T-Band Press  1# L3 35X  L 3 1# 40x  L2 30X  L2 3/10   L2 3/10   T-Band Hams   L3 3/10  L3 40x  L2 30X  L2  3/10   L2 3/10   T-Band TKE  L3 3/10  L3 40x  L2 3/10  L2 3/10   L2 3/10   LAQ  1# 30X  1# 40x  30X  30X   1# 30x   Mini Squats               Step Stretch Flexion  8" 10" X20  8" 10" 20x  8" 10" X10  8" 10" X10   10" X20   Steps Forward  8" 30X  8" 40x  8" 30X  8" 30X    8" 30x   Steps Lateral  6" 30X  6" 40x  6" 30X  6" 30X    6" 30x   PYR HAM:   S , P , C               PYR QUAD:   S , P , C               Leg Press: S               XO TKE               Hack Squat               SLS               NuStep:   S 10, A 11 L2 8'  L2 10'  L2 12'  L 2 13'    L 2 13'   Bike: S               Treadmill                               Modalities                CP                CP/IFC  15'  15'  15'  15'  15'  15'

## 2019-07-19 DIAGNOSIS — M77.31 HEEL SPUR, RIGHT: Primary | ICD-10-CM

## 2019-07-22 ENCOUNTER — OFFICE VISIT (OUTPATIENT)
Dept: PHYSICAL THERAPY | Facility: CLINIC | Age: 69
End: 2019-07-22
Payer: MEDICARE

## 2019-07-22 DIAGNOSIS — Z96.651 S/P TKR (TOTAL KNEE REPLACEMENT), RIGHT: Primary | ICD-10-CM

## 2019-07-22 PROCEDURE — 97140 MANUAL THERAPY 1/> REGIONS: CPT

## 2019-07-22 PROCEDURE — 97110 THERAPEUTIC EXERCISES: CPT

## 2019-07-22 PROCEDURE — 97014 ELECTRIC STIMULATION THERAPY: CPT

## 2019-07-22 NOTE — PROGRESS NOTES
Daily Note     Today's date: 2019  Patient name: Spenser Maciel  : 1950  MRN: [id number]  Referring provider: Kodak Weir MD  Dx:   Encounter Diagnosis     ICD-10-CM    1  S/P TKR (total knee replacement), right Z96 651                   Subjective: Patient reports he knee still has some discomfort on the medial aspect  She reports compliance with HEP      Objective: See treatment diary below      Assessment: Tolerated treatment well  Patient exhibited good technique with therapeutic exercises      Plan: Continue per plan of care        Precautions: None     Daily Treatment Diary      [facility] 7/16  7/18  7/8  7/22  7/12  7/15   PROM RK [facility]  RK  LF  RK  RK   Hamstring Stretch               Calf Stretch                               Exercise Diary                QS  4/10  4/10  30X  4/10  4/10  4/10   SLR  4/10  4/10  30X  2/10    1# 15x   SAQ  1# 4/10  1# 4/10  30X  1# 4/10  4/10  1# 3/10   Heel slides with strap  1# 35X  1# 4/10  30X  1# 4/10  1# 4/10  1# 3/10   T-Band Press  1# L3 35X  L 3 1# 40x  L2 30X  1# L3 4/10 1# 4/10  L2 3/10   T-Band Hams   L3 3/10  L3 40x  L2 30X  L3  3/10   L2 3/10   T-Band TKE  L3 3/10  L3 40x  L2 3/10  L3 3/10   L2 3/10   LAQ  1# 30X  1# 40x  30X  1# 40X   1# 30x   Mini Squats               Step Stretch Flexion  8" 10" X20  8" 10" 20x  8" 10" X10  8" 10" X10   10" X20   Steps Forward  8" 30X  8" 40x  8" 30X  8" 30X    8" 30x   Steps Lateral  6" 30X  6" 40x  6" 30X  8" 30X    6" 30x   PYR HAM:   S , P , C               PYR QUAD:   S , P , C               Leg Press: S               XO TKE               Hack Squat               SLS               NuStep:   S 10, A 11 L2 8'  L2 10'  L2 12'  L 2 15'    L 2 13'   Bike: S               Treadmill                               Modalities                CP                CP/IFC  15'  15'  15'  15'  15'  15'

## 2019-07-23 ENCOUNTER — OFFICE VISIT (OUTPATIENT)
Dept: PHYSICAL THERAPY | Facility: CLINIC | Age: 69
End: 2019-07-23
Payer: MEDICARE

## 2019-07-23 DIAGNOSIS — Z96.651 S/P TKR (TOTAL KNEE REPLACEMENT), RIGHT: Primary | ICD-10-CM

## 2019-07-23 PROCEDURE — 97110 THERAPEUTIC EXERCISES: CPT

## 2019-07-23 PROCEDURE — 97140 MANUAL THERAPY 1/> REGIONS: CPT

## 2019-07-23 PROCEDURE — 97014 ELECTRIC STIMULATION THERAPY: CPT

## 2019-07-23 NOTE — PROGRESS NOTES
Daily Note     Today's date: 2019  Patient name: Ame Thakkar  : 1950  MRN: [id number]  Referring provider: Christiano Carter MD  Dx:   Encounter Diagnosis     ICD-10-CM    1  S/P TKR (total knee replacement), right Z96 651                   Subjective: Patient reports having increased soreness in the knee and foot post last session  Objective: See treatment diary below      Assessment: Tolerated treatment well  Patient exhibited good technique with therapeutic exercises  Patient was able to ride the recumbant bike for 5 min, taking knee to end of tolerable flexion ROM, instead of heel slides on board which increase patient's foot pain  Plan: Continue per plan of care        Precautions: None     Daily Treatment Diary      [facility] 7/16  7/18  7/8  7/22  7/23  7/15   PROM RK  RK  RK  LF  LF  RK   Hamstring Stretch               Calf Stretch                               Exercise Diary                QS  4/10  4/10  30X  4/10  4/10  4/10   SLR  4/10  4/10  30X  2/10  2/10  1# 15x   SAQ  1# 4/10  1# 4/10  30X  1# 4/10  1# 4/10  1# 3/10   Heel slides with strap  1# 35X  1# 4/10  30X  1# 4/10    1# 3/10   T-Band Press  1# L3 35X  L 3 1# 40x  L2 30X  1# L3 4/10   L2 3/10   T-Band Hams   L3 3/10  L3 40x  L2 30X  L3  3/10 L3 3/10  L2 3/10   T-Band TKE  L3 3/10  L3 40x  L2 3/10  L3 3/10 L3 3/10  L2 3/10   LAQ  1# 30X  1# 40x  30X  1# 40X 1# 3/10  1# 30x   Mini Squats               Step Stretch Flexion  8" 10" X20  8" 10" 20x  8" 10" X10  8" 10" X10  8" 10" X10 10" X20   Steps Forward  8" 30X  8" 40x  8" 30X  8" 30X  8" 3/10  8" 30x   Steps Lateral  6" 30X  6" 40x  6" 30X  8" 30X  8" 3/10  6" 30x   PYR HAM:   S , P , C               PYR QUAD:   S , P , C               Leg Press: S               XO TKE               Hack Squat               SLS               NuStep:   S 10, A 11 L2 10'  L2 10'  L2 12'  L 2 15'  L5 15'  L 2 13'   Bike: S               Treadmill                               Modalities                CP                CP/IFC  15'  15'  15'  15'  15'  15'

## 2019-07-24 ENCOUNTER — CONSULT (OUTPATIENT)
Dept: OBGYN CLINIC | Facility: CLINIC | Age: 69
End: 2019-07-24
Payer: MEDICARE

## 2019-07-24 VITALS
HEART RATE: 97 BPM | SYSTOLIC BLOOD PRESSURE: 127 MMHG | WEIGHT: 189 LBS | BODY MASS INDEX: 34.78 KG/M2 | HEIGHT: 62 IN | DIASTOLIC BLOOD PRESSURE: 80 MMHG

## 2019-07-24 DIAGNOSIS — M79.671 RIGHT FOOT PAIN: Primary | ICD-10-CM

## 2019-07-24 DIAGNOSIS — R05.9 COUGH: ICD-10-CM

## 2019-07-24 DIAGNOSIS — M19.91 PRIMARY OSTEOARTHRITIS, UNSPECIFIED SITE: Primary | ICD-10-CM

## 2019-07-24 DIAGNOSIS — M77.31 HEEL SPUR, RIGHT: ICD-10-CM

## 2019-07-24 DIAGNOSIS — E78.5 HYPERLIPIDEMIA, UNSPECIFIED HYPERLIPIDEMIA TYPE: ICD-10-CM

## 2019-07-24 PROCEDURE — 99213 OFFICE O/P EST LOW 20 MIN: CPT | Performed by: ORTHOPAEDIC SURGERY

## 2019-07-24 RX ORDER — MONTELUKAST SODIUM 10 MG/1
10 TABLET ORAL
Qty: 90 TABLET | Refills: 0 | Status: SHIPPED | OUTPATIENT
Start: 2019-07-24 | End: 2019-12-10 | Stop reason: SDUPTHER

## 2019-07-24 RX ORDER — SIMVASTATIN 10 MG
10 TABLET ORAL EVERY OTHER DAY
Qty: 90 TABLET | Refills: 3 | Status: SHIPPED | OUTPATIENT
Start: 2019-07-24 | End: 2019-12-10 | Stop reason: ALTCHOICE

## 2019-07-24 NOTE — PROGRESS NOTES
ASSESSMENT/PLAN:    Diagnoses and all orders for this visit:    Right foot pain    Heel spur, right    Plan:  I offered to have her evaluated by the foot and ankle surgeons through [facility]  However, she states that she knows a couple podiatrists in the [place] area and would prefer to remain in the local area  At this time, I did not see any primary source for her foot pain  I have encouraged her to contact the office if she would like evaluation by the foot and ankle specialists, if she is unhappy with her evaluation by Podiatry  _____________________________________________________  CHIEF COMPLAINT:  Chief Complaint   Patient presents with    Right Foot - Pain    Foot Pain     pt states pain to R foot x 6 wks  pt had CT done and appear to notice the problem  SUBJECTIVE:  Fernanda Montes is a 77y o  year old female who presents for evaluation of right foot pain  She underwent knee replacement arthroplasty, right lower extremity, 6 weeks ago and began developing right foot pain the day after surgery  Pain is persisted over the last 6 weeks and, she believes, is only worsening  She complains of pain across the dorsum of the foot, localized primarily to the area of the tarsal metatarsal joint, medially  Pain will radiate proximally to the ankle and then rotates around to the posterior calf and radiates as far proximally as the proximal 1/3 of her calf  Pain is present both with and without activity  She does believe activity may aggravate her pain  She has found driving is very difficult  The orthopedic surgeon who performed her knee replacement thought it might be some tendinitis  However, symptoms have not improved and he has not offered any options for treatment  She was seen by her primary care physician and referred for orthopedic consultation and treatment  She works part-time in a secretarial position and has been able to do so, but finds it is difficult to work    She has difficulty sleeping because of her foot pain  She has a long history of back pain and has undergone neurostimulator implant  However, since that implant, she states she really has not had much back pain  She denies any symptoms radiating into the right lower extremity from her back  She did have a fall 2 weeks ago but denies any aggravation of the foot pain with the fall  PAST MEDICAL HISTORY:  Past Medical History:   Diagnosis Date    Abnormal findings on diagnostic imaging of breast     Anxiety     Arthritis     Fibrocystic breast disease     Foot pain     GERD (gastroesophageal reflux disease)     Hyperlipidemia     Hypertension     Interstitial cystitis     Osteopenia        PAST SURGICAL HISTORY:  Past Surgical History:   Procedure Laterality Date    APPENDECTOMY      BACK SURGERY      BREAST EXCISIONAL BIOPSY Left 1996    benign    CARPAL BOSS EXCISION      CHOLECYSTECTOMY      DIAGNOSTIC LAPAROSCOPY      FOOT SURGERY      HYSTERECTOMY      age 33    HYSTEROSCOPY      KIDNEY SURGERY Left     KNEE ARTHROSCOPY Bilateral     LAPAROSCOPY      hynecologic with adhesions    OOPHORECTOMY Bilateral     age 33    MA SURG IMPLNT [address] N/A 5/18/2017    Procedure: PLACEMENT OF THORACIC SPINAL CORD STIMULATOR WITH LEFT BUTTOCK IMPLANTABLE PULSE GENERATOR (IMPULSE MONITORING-MOTORS (TCEMEP), EMG, SSEP);   Surgeon: Alysa Mello MD;  Location: [facility];  Service: Neurosurgery    SPINAL STIMULATOR PLACEMENT  05/2017    TONSILLECTOMY AND ADENOIDECTOMY      onset: unknown    TOTAL KNEE ARTHROPLASTY Right        FAMILY HISTORY:  Family History   Problem Relation Age of Onset    Cancer Mother     Bone cancer Mother     Hypertension Father     Brain cancer Father     Stroke Father         stroke sydrome    Diabetes Paternal Grandmother     Other Family         back disorder    Heart disease Family         cardiac disorder    Stroke Family     Diabetes Family     Hypertension Family     Cancer Family     Breast cancer Maternal Aunt        SOCIAL HISTORY:  Social History     Tobacco Use    Smoking status: Never Smoker    Smokeless tobacco: Never Used   Substance Use Topics    Alcohol use:  Yes     Alcohol/week: 1 0 standard drinks     Types: 1 Glasses of wine per week     Comment: social less than a drink per week    Drug use: No       MEDICATIONS:    Current Outpatient Medications:     amitriptyline (ELAVIL) 50 mg tablet, Take 1 tablet (50 mg total) by mouth daily, Disp: 90 tablet, Rfl: 1    Biotin 2500 MCG CAPS, Take 2 capsules by mouth daily, Disp: , Rfl:     Cholecalciferol (VITAMIN D-3 PO), Take 1 tablet by mouth daily, Disp: , Rfl:     Cyanocobalamin (VITAMIN B-12 CR PO), Take 1 tablet by mouth daily, Disp: , Rfl:     diclofenac sodium (VOLTAREN) 1 %, APPLY TO THE AFFECTED AREA(S) TWICE DAILY AS DIRECTED , Disp: 100 g, Rfl: 0    dicyclomine (BENTYL) 10 mg capsule, Take 1 capsule (10 mg total) by mouth 2 (two) times a day, Disp: 180 capsule, Rfl: 0    EPINEPHrine (EPIPEN 2-AREN) 0 3 mg/0 3 mL SOAJ, Inject 1 Units as directed as needed, Disp: , Rfl:     fexofenadine (ALLEGRA) 180 MG tablet, Take 180 mg by mouth daily, Disp: , Rfl:     gabapentin (NEURONTIN) 300 mg capsule, Take 1 capsule (300 mg total) by mouth 3 (three) times a day, Disp: 270 capsule, Rfl: 1    hydrOXYzine HCL (ATARAX) 10 mg tablet, Take 2 tablets at bedtime, Disp: 180 tablet, Rfl: 3    losartan (COZAAR) 50 mg tablet, Take 1 tablet (50 mg total) by mouth daily, Disp: 90 tablet, Rfl: 3    montelukast (SINGULAIR) 10 mg tablet, Take 1 tablet (10 mg total) by mouth daily at bedtime, Disp: 90 tablet, Rfl: 0    naproxen (NAPROSYN) 500 mg tablet, Take 1 tablet (500 mg total) by mouth 2 (two) times a day with meals for 5 days, Disp: 10 tablet, Rfl: 0    Omega-3 Fatty Acids (FISH OIL) 1,000 mg, Take 1,000 mg by mouth 3 (three) times a day, Disp: , Rfl:     omeprazole (PriLOSEC) 40 MG capsule, Take 1 capsule (40 mg total) by mouth daily, Disp: 90 capsule, Rfl: 3    simvastatin (ZOCOR) 10 mg tablet, Take 1 tablet (10 mg total) by mouth every other day, Disp: 90 tablet, Rfl: 3    ALLERGIES:  Allergies   Allergen Reactions    Bee Venom     Codeine Other (See Comments)     headaches    Egg Yolk     Iodinated Diagnostic Agents Hives    Other      Adhesive tape    Penicillins Hives    Bactrim [Sulfamethoxazole-Trimethoprim] Rash    Elmiron [Pentosan Polysulfate] Rash    Latex Rash    Oxybutynin Rash       Review of systems:   Constitutional: Negative for fatigue, fever or loss of apetite  HENT: Negative  Respiratory: Negative for shortness of breath, dyspnea  Cardiovascular: Negative for chest pain/tightness  Gastrointestinal: Negative for abdominal pain, N/V  Endocrine: Negative for cold/heat intolerance, unexplained weight loss/gain  Genitourinary: Negative for flank pain, dysuria, hematuria  Musculoskeletal:  Positive as in the HPI   Skin: Negative for rash  Neurological:  Negative  Psychiatric/Behavioral: Negative for agitation  _____________________________________________________  PHYSICAL EXAMINATION:    Blood pressure 127/80, pulse 97, height 5' 2" (1 575 m), weight 85 7 kg (189 lb)  General: well developed and well nourished, alert, oriented times 3 and appears comfortable  HEENT: Benign, normocephalic, atraumatic  Cardiovascular:  Regular    Pulmonary: No wheezing or stridor  Abdomen: Soft, Nontender  Skin:  No lacerations or abrasions are noted  Cicatrix right knee consistent with her past surgical history with good progression healing  Neurovascular: Motor and sensory exam is grossly intact    MUSCULOSKELETAL EXAMINATION:  Extremities: The right foot exam demonstrates no obvious swelling, ecchymosis, deformity or skin changes    She denies tenderness to palpation of the foot although she does state that the area of the medial tarsal metatarsal joint is the location of her pain  Again, however, palpation to this location elicited no change in her baseline pain  Likewise, the remaining soft tissue and bony structures throughout the foot and ankle triggered no change in her baseline pain  The Achilles tendon, retrocalcaneal bursa and the Achilles tendon insertion at the calcaneus are nontender  Likewise, the plantar fascia origin at the calcaneus is nontender  She denies pain during passive ankle motion, subtalar motion or forefoot motion  She does complain of some pain during active dorsiflexion of the ankle  She denies pain with active dorsiflexion of the toes or plantar flexion of the toes  The left foot and ankle exam is grossly benign although some mild swelling and resolving ecchymosis is noted, reportedly from her fall according to the patient       _____________________________________________________  STUDIES REVIEWED:  X-rays of her foot were obtained on 07/10/2019 and a CT scan was obtained on 07/17/2019  The x-rays demonstrate some mild degenerative changes, primarily in the medial tarsometatarsal region  There is a heel spur noted  The CT scan demonstrates no evidence of fracture, stress injury or significant degenerative process  Again, heel spurs noted  The reports were reviewed          Dwight Herr

## 2019-07-25 ENCOUNTER — OFFICE VISIT (OUTPATIENT)
Dept: PHYSICAL THERAPY | Facility: CLINIC | Age: 69
End: 2019-07-25
Payer: MEDICARE

## 2019-07-25 DIAGNOSIS — Z96.651 S/P TKR (TOTAL KNEE REPLACEMENT), RIGHT: Primary | ICD-10-CM

## 2019-07-25 PROCEDURE — 97110 THERAPEUTIC EXERCISES: CPT

## 2019-07-25 PROCEDURE — 97014 ELECTRIC STIMULATION THERAPY: CPT

## 2019-07-25 NOTE — PROGRESS NOTES
Daily Note     Today's date: 2019  Patient name: Ame Thakkar  : 1950  MRN: [id number]  Referring provider: Christiano Carter MD  Dx:   Encounter Diagnosis     ICD-10-CM    1  S/P TKR (total knee replacement), right Z96 651                   Subjective: Patient reports her knee had soreness post last session, attributed to riding the bike  She continues to c/o R foot pain  Objective: See treatment diary below      Assessment: Tolerated treatment well  Patient exhibited good technique with therapeutic exercises      Plan: Continue per plan of care        Precautions: None     Daily Treatment Diary      [facility] 7/16  7/18  7/25  7/22  7/23  7/15   PROM RK  RK    LF  LF  RK   Hamstring Stretch               Calf Stretch                               Exercise Diary                QS  4/10  4/10  30X  4/10  4/10  4/10   SLR  4/10  4/10  30X  2/10  2/10  1# 15x   SAQ  1# 4/10  1# 4/10  1# 30X  1# 4/10  1# 4/10  1# 3/10   Heel slides with strap  1# 35X  1# 4/10    1# 4/10    1# 3/10   T-Band Press  1# L3 35X  L 3 1# 30x  L2 30X  1# L3 4/10   L2 3/10   T-Band Hams   L3 3/10  L3 30x  L2 30X  L3  3/10 L3 3/10  L2 3/10   T-Band TKE  L3 3/10  L3 40x  L2 3/10  L3 3/10 L3 3/10  L2 3/10   LAQ  1# 30X  1# 40x  30X  1# 40X 1# 3/10  1# 30x   Mini Squats               Step Stretch Flexion  8" 10" X20  8" 10" 20x  8" 10" X10  8" 10" X10  8" 10" X10 10" X20   Steps Forward  8" 30X  8" 30x  8" 30X  8" 30X  8" 3/10  8" 30x   Steps Lateral  6" 30X  8" 30x  6" 30X  8" 30X  8" 3/10  6" 30x   PYR HAM:   S , P , C               PYR QUAD:   S , P , C               Leg Press: S               XO TKE               Hack Squat               SLS               NuStep:   S 10, A 11 L2 8'  L5 10'  L2 12'  L 2 15'  L5 15'  L 2 13'   Bike: S               Treadmill                               Modalities                CP                CP/IFC  15'  15'  15'  15'  15'  15'

## 2019-07-29 ENCOUNTER — TRANSCRIBE ORDERS (OUTPATIENT)
Dept: PHYSICAL THERAPY | Facility: CLINIC | Age: 69
End: 2019-07-29

## 2019-07-29 ENCOUNTER — EVALUATION (OUTPATIENT)
Dept: PHYSICAL THERAPY | Facility: CLINIC | Age: 69
End: 2019-07-29
Payer: MEDICARE

## 2019-07-29 DIAGNOSIS — Z96.651 STATUS POST TOTAL KNEE REPLACEMENT, RIGHT: Primary | ICD-10-CM

## 2019-07-29 DIAGNOSIS — Z96.651 S/P TKR (TOTAL KNEE REPLACEMENT), RIGHT: Primary | ICD-10-CM

## 2019-07-29 PROCEDURE — 97110 THERAPEUTIC EXERCISES: CPT | Performed by: PHYSICAL THERAPIST

## 2019-07-29 PROCEDURE — 97014 ELECTRIC STIMULATION THERAPY: CPT | Performed by: PHYSICAL THERAPIST

## 2019-07-29 PROCEDURE — 97140 MANUAL THERAPY 1/> REGIONS: CPT | Performed by: PHYSICAL THERAPIST

## 2019-07-29 NOTE — PROGRESS NOTES
PT Re-Evaluation     Today's date: 2019  Patient name: Bonnie Ag  : 1950  MRN: [id number]  Referring provider: Radha Michael MD  Dx:   Encounter Diagnosis     ICD-10-CM    1  S/P TKR (total knee replacement), right Z96 651                   Assessment  Assessment details:   CURRENT FUNCTIONAL STATUS    Standing/ADL tolerance 60 minutes  Walking tolerance 5-6 blocks without an AD   Ascends stairs reciprocally/descends stairs step by step  Difficulty arising from sitting: Mild use of arms  Avoiding squatting due to foot pain  Ability to dress lower extremities: Independent  Able to drive  Work Status: Part time     SHORT TERM GOALS (2 WEEKS)    Increase knee AROM and PROM 10-20 degrees  Increase knee strength 3-5 lbs in all weak areas  Girth MP: 47 5 cm  Decrease pain to 0-2/10  Standing/ADL tolerance 60-90 minutes  Walking tolerance 6-8 blocks without an AD  Ascends/descends stairs reciprocally  Difficulty arising from sitting: Minimal use of arms  Able to squat 30 degrees  Ability to dress lower extremities: Independent donning a sneaker  Able to drive  Work Status: Part time    LONG TERM GOALS (DISCHARGE)    Knee AROM: -5-115 deg  Knee PROM: 0-120 deg  Knee Strength: E=35 lbs, F=25 lbs  Girth MP: 47 cm  Decrease pain to 0-3/10  Standing/ADL tolerance 60 minutes  Walking tolerance 1/2 mile without an AD  Ascends stairs reciprocally/descends stairs step by step  Difficulty arising from sitting: Minimal/no use of arms  Able to squat 45 degrees  Ability to dress lower extremities: Independent donning a sneaker  Able to drive  Work Status: Full Duty  Understanding of Dx/Px/POC: good   Prognosis: good    Goals  See assessment details above  Plan  Plan details: The patient has shown improvement in PT demonstrating decreased pain, increased range of motion, increased strength, and increased tolerance to activity    The patient continues to present with pain, decreased ROM, decreased strength, and decreased tolerance to activity  The patient would benefit from continued skilled PT services to address these issues and to maximize function  The patient will also continue performing their HEP  Patient would benefit from: skilled physical therapy  Planned modality interventions: unattended electrical stimulation, thermotherapy: hydrocollator packs and cryotherapy  Planned therapy interventions: manual therapy, therapeutic exercise, therapeutic activities and gait training  Frequency: 2-3x per week  Duration in weeks: 4        Subjective Evaluation    History of Present Illness  Mechanism of injury: Subjective: The patient's right knee pain, swelling, and ROM are gradually improving  Functionally she has been more limited by right foot pain than by right knee pain  She will use a SPC for long distances, and when ambulating on stairs  She has returned to driving and working part time  Pain  Current pain ratin  At best pain ratin  At worst pain rating: 3    Patient Goals  Patient goals for therapy: decreased pain, decreased edema, increased motion, increased strength and independence with ADLs/IADLs          Objective     Observations     Additional Observation Details  Patient ambulates without an AD, with limited terminal knee extension ROM present while walking  Moderate knee and leg swelling is noted  General Comments:      Knee Comments  CURRENT OBJECTIVE MEASUREMENTS    Knee AROM: -25-95 deg  Knee PROM: - deg  Knee Strength: E=25 lbs, F=26 lbs  Girth MP: 48 cm           Precautions: None     Daily Treatment Diary      [facility]    PROM RK  RK    LF  LF  RK   Hamstring Stretch               Calf Stretch                               Exercise Diary                QS  4/10  4/10  30X  4/10  4/10  4/10   SLR  4/10  4/10  30X  2/10  2/10  2/10   SAQ  1# 4/10  1# 4/10  1# 30X  1# 4/10  1# 4/10  1# 4/10 Heel slides with strap  1# 35X  1# 4/10    1# 4/10    1# 3/10   T-Band Press  1# L3 35X  L 3 1# 30x  L2 30X  1# L3 4/10       T-Band Hams   L3 3/10  L3 30x  L2 30X  L3  3/10 L3 3/10  L3 3/10   T-Band TKE  L3 3/10  L3 40x  L2 3/10  L3 3/10 L3 3/10  L3 3/10   LAQ  1# 30X  1# 40x  30X  1# 40X 1# 3/10  1# 30x   Mini Squats               Step Stretch Flexion  8" 10" X20  8" 10" 20x  8" 10" X10  8" 10" X10  8" 10" X10 8" 10"x10   Steps Forward  8" 30X  8" 30x  8" 30X  8" 30X  8" 3/10  8" 30x   Steps Lateral  6" 30X  8" 30x  6" 30X  8" 30X  8" 3/10  8" 30x   PYR HAM:   S , P , C               PYR QUAD:   S , P , C               Leg Press: S               XO TKE               Hack Squat               SLS               NuStep:   S 10, A 11 L2 8'  L5 10'  L2 12'  L 2 15'  L5 15'  L 5 15''   Bike: S               Treadmill                               Modalities                CP                CP/IFC  15'  15'  15'  15'  15'  15'

## 2019-07-29 NOTE — LETTER
2019    Madison Hanna, [address] [place] [address] [employer]  [facility] [employer]    Patient: Spenser Maciel   YOB: 1950   Date of Visit: 2019     Encounter Diagnosis     ICD-10-CM    1  S/P TKR (total knee replacement), right A78 891        Dear Dr Bains Pen: Thank you for your recent referral of Spenser Maciel  Please review the attached evaluation summary from Ingrid's recent visit  Please verify that you agree with the plan of care by signing the attached order  If you have any questions or concerns, please do not hesitate to call  I sincerely appreciate the opportunity to share in the care of one of your patients and hope to have another opportunity to work with you in the near future  Sincerely,    Dillon Chen, PT      Referring Provider:      I certify that I have read the below Plan of Care and certify the need for these services furnished under this plan of treatment while under my care  Madison Hanna MD  [address]  [employer]  [address] [place] [address] Avenue: [phone]          PT Re-Evaluation     Today's date: 2019  Patient name: Spenser Maciel  : 1950  MRN: [id number]  Referring provider: Kodak Weir MD  Dx:   Encounter Diagnosis     ICD-10-CM    1  S/P TKR (total knee replacement), right Z96 651                   Assessment  Assessment details:   CURRENT FUNCTIONAL STATUS    Standing/ADL tolerance 60 minutes  Walking tolerance 5-6 blocks without an AD   Ascends stairs reciprocally/descends stairs step by step  Difficulty arising from sitting: Mild use of arms  Avoiding squatting due to foot pain  Ability to dress lower extremities: Independent  Able to drive  Work Status: Part time     SHORT TERM GOALS (2 WEEKS)    Increase knee AROM and PROM 10-20 degrees  Increase knee strength 3-5 lbs in all weak areas  Girth MP: 47 5 cm    Decrease pain to 0-2/10  Standing/ADL tolerance 60-90 minutes  Walking tolerance 6-8 blocks without an AD  Ascends/descends stairs reciprocally  Difficulty arising from sitting: Minimal use of arms  Able to squat 30 degrees  Ability to dress lower extremities: Independent donning a sneaker  Able to drive  Work Status: Part time    LONG TERM GOALS (DISCHARGE)    Knee AROM: -5-115 deg  Knee PROM: 0-120 deg  Knee Strength: E=35 lbs, F=25 lbs  Girth MP: 47 cm  Decrease pain to 0-3/10  Standing/ADL tolerance 60 minutes  Walking tolerance 1/2 mile without an AD  Ascends stairs reciprocally/descends stairs step by step  Difficulty arising from sitting: Minimal/no use of arms  Able to squat 45 degrees  Ability to dress lower extremities: Independent donning a sneaker  Able to drive  Work Status: Full Duty  Understanding of Dx/Px/POC: good   Prognosis: good    Goals  See assessment details above  Plan  Plan details: The patient has shown improvement in PT demonstrating decreased pain, increased range of motion, increased strength, and increased tolerance to activity  The patient continues to present with pain, decreased ROM, decreased strength, and decreased tolerance to activity  The patient would benefit from continued skilled PT services to address these issues and to maximize function  The patient will also continue performing their HEP  Patient would benefit from: skilled physical therapy  Planned modality interventions: unattended electrical stimulation, thermotherapy: hydrocollator packs and cryotherapy  Planned therapy interventions: manual therapy, therapeutic exercise, therapeutic activities and gait training  Frequency: 2-3x per week  Duration in weeks: 4        Subjective Evaluation    History of Present Illness  Mechanism of injury: Subjective: The patient's right knee pain, swelling, and ROM are gradually improving   Functionally she has been more limited by right foot pain than by right knee pain  She will use a SPC for long distances, and when ambulating on stairs  She has returned to driving and working part time  Pain  Current pain ratin  At best pain ratin  At worst pain rating: 3    Patient Goals  Patient goals for therapy: decreased pain, decreased edema, increased motion, increased strength and independence with ADLs/IADLs          Objective     Observations     Additional Observation Details  Patient ambulates without an AD, with limited terminal knee extension ROM present while walking  Moderate knee and leg swelling is noted  General Comments:      Knee Comments  CURRENT OBJECTIVE MEASUREMENTS    Knee AROM: -25-95 deg  Knee PROM: - deg  Knee Strength: E=25 lbs, F=26 lbs  Girth MP: 48 cm           Precautions: None     Daily Treatment Diary      [facility]    PROM RK  RK    LF  LF  RK   Hamstring Stretch               Calf Stretch                               Exercise Diary                QS  4/10  4/10  30X  4/10  4/10  4/10   SLR  4/10  4/10  30X  2/10  2/10  2/10   SAQ  1# 4/10  1# 4/10  1# 30X  1# 4/10  1# 4/10  1# 4/10   Heel slides with strap  1# 35X  1# 4/10    1# 4/10    1# 3/10   T-Band Press  1# L3 35X  L 3 1# 30x  L2 30X  1# L3 4/10       T-Band Hams   L3 3/10  L3 30x  L2 30X  L3  3/10 L3 3/10  L3 3/10   T-Band TKE  L3 3/10  L3 40x  L2 3/10  L3 3/10 L3 3/10  L3 3/10   LAQ  1# 30X  1# 40x  30X  1# 40X 1# 3/10  1# 30x   Mini Squats               Step Stretch Flexion  8" 10" X20  8" 10" 20x  8" 10" X10  8" 10" X10  8" 10" X10 8" 10"x10   Steps Forward  8" 30X  8" 30x  8" 30X  8" 30X  8" 3/10  8" 30x   Steps Lateral  6" 30X  8" 30x  6" 30X  8" 30X  8" 3/10  8" 30x   PYR HAM:   S , P , C               PYR QUAD:   S , P , C               Leg Press: S               XO TKE               Hack Squat               SLS               NuStep:   S 10, A 11 L2 8'  L5 10'  L2 12'  L 2 15'  L5 15'  L 5 15''   Bike: S             Treadmill                               Modalities                CP                CP/IFC  15'  15'  15'  15'  15'  15'

## 2019-07-30 ENCOUNTER — OFFICE VISIT (OUTPATIENT)
Dept: PHYSICAL THERAPY | Facility: CLINIC | Age: 69
End: 2019-07-30
Payer: MEDICARE

## 2019-07-30 DIAGNOSIS — Z96.651 S/P TKR (TOTAL KNEE REPLACEMENT), RIGHT: Primary | ICD-10-CM

## 2019-07-30 PROCEDURE — 97110 THERAPEUTIC EXERCISES: CPT

## 2019-07-30 PROCEDURE — 97014 ELECTRIC STIMULATION THERAPY: CPT

## 2019-07-30 NOTE — PROGRESS NOTES
Daily Note     Today's date: 2019  Patient name: Julienne Kelly  : 1950  MRN: [id number]  Referring provider: Katarina Zavala MD  Dx:   Encounter Diagnosis     ICD-10-CM    1  S/P TKR (total knee replacement), right Z96 651                   Subjective: Patient reports having increased knee, foot soreness post session  She is to see podiatrist on   Objective: See treatment diary below      Assessment: Tolerated treatment well  Patient exhibited good technique with therapeutic exercises      Plan: Continue per plan of care        Precautions: None     Daily Treatment Diary      [facility] 7/16  7/18  7/25  7/30  7/23  7/15   PROM RK  RK      LF  RK   Hamstring Stretch               Calf Stretch                               Exercise Diary                QS  4/10  4/10  30X  4/10  4/10  4/10   SLR  4/10  4/10  30X    2/10  1# 15x   SAQ  1# 4/10  1# 4/10  1# 30X  1# 4/10  1# 4/10  1# 3/10   Heel slides with strap  1# 35X  1# 4/10    1# 4/10    1# 3/10   T-Band Press  1# L3 35X  L 3 1# 30x  L2 30X  1# L3 4/10   L2 3/10   T-Band Hams   L3 3/10  L3 30x  L2 30X  L3  3/10 L3 3/10  L2 3/10   T-Band TKE  L3 3/10  L3 40x  L2 3/10  L3 3/10 L3 3/10  L2 3/10   LAQ  1# 30X  1# 40x  30X  1# 40X 1# 3/10  1# 30x   Mini Squats               Step Stretch Flexion  8" 10" X20  8" 10" 20x  8" 10" X10  8" 10" X10  8" 10" X10 10" X20   Steps Forward  8" 30X  8" 30x  8" 30X  8" 30X  8" 3/10  8" 30x   Steps Lateral  6" 30X  8" 30x  6" 30X  8" 30X  8" 3/10  6" 30x   PYR HAM:   S , P , C               PYR QUAD:   S , P , C               Leg Press: S               XO TKE               Hack Squat               SLS               NuStep:   S 10, A 11 L2 8'  L5 10'  L2 12'  L 2 15'  L5 15'  L 2 13'   Bike: S               Treadmill                               Modalities                CP                CP/IFC  15'  15'  15'  15'  15'  15'

## 2019-08-01 ENCOUNTER — OFFICE VISIT (OUTPATIENT)
Dept: PHYSICAL THERAPY | Facility: CLINIC | Age: 69
End: 2019-08-01
Payer: MEDICARE

## 2019-08-01 DIAGNOSIS — Z96.651 S/P TKR (TOTAL KNEE REPLACEMENT), RIGHT: Primary | ICD-10-CM

## 2019-08-01 PROCEDURE — 97014 ELECTRIC STIMULATION THERAPY: CPT | Performed by: PHYSICAL THERAPIST

## 2019-08-01 PROCEDURE — 97140 MANUAL THERAPY 1/> REGIONS: CPT | Performed by: PHYSICAL THERAPIST

## 2019-08-01 PROCEDURE — 97110 THERAPEUTIC EXERCISES: CPT | Performed by: PHYSICAL THERAPIST

## 2019-08-01 NOTE — PROGRESS NOTES
Daily Note     Today's date: 2019  Patient name: Emilie King  : 1950  MRN: [id number]  Referring provider: Morgan Mascorro MD  Dx:   Encounter Diagnosis     ICD-10-CM    1  S/P TKR (total knee replacement), right Z96 651                   Subjective: Patient feels that she can bend her knee with less difficulty  She will see her surgeon tomorrow  Objective: See treatment diary below      Assessment: Knee flexion PROM was 103 deg after treatment  Plan: Progress treatment as tolerated         Precautions: None     Daily Treatment Diary      [facility] 7/16  7/18  7/25  7/30  8/1  7/15   PROM RK  RK      RK  RK   Hamstring Stretch               Calf Stretch                               Exercise Diary                QS  4/10  4/10  30X  4/10  4/10  4/10   SLR  4/10  4/10  30X    4/10  1# 15x   SAQ  1# 4/10  1# 4/10  1# 30X  1# 4/10  1# 4/10  1# 3/10   Heel slides with strap  1# 35X  1# 4/10    1# 4/10  1# 4/10  1# 3/10   T-Band Press  1# L3 35X  L 3 1# 30x  L2 30X  1# L3 4/10 1# L3 4/10  L2 3/10   T-Band Hams   L3 3/10  L3 30x  L2 30X  L3  3/10 L3 3/10  L2 3/10   T-Band TKE  L3 3/10  L3 40x  L2 3/10  L3 3/10 L3 3/10  L2 3/10   LAQ  1# 30X  1# 40x  30X  1# 40X 1# 4/10  1# 30x   Mini Squats               Step Stretch Flexion  8" 10" X20  8" 10" 20x  8" 10" X10  8" 10" X10  8" 10" X10 10" X20   Steps Forward  8" 30X  8" 30x  8" 30X  8" 30X  8" 3/10  8" 30x   Steps Lateral  6" 30X  8" 30x  6" 30X  8" 30X  8" 3/10  6" 30x   PYR HAM:   S , P , C               PYR QUAD:   S , P , C               Leg Press: S               XO TKE               Hack Squat               SLS               NuStep:   S 10, A 11 L2 8'  L5 10'  L2 12'  L 2 15'  L5 15'  L 2 13'   Bike: S               Treadmill                               Modalities                CP                CP/IFC  15'  15'  15'  15'  15'  15'

## 2019-08-05 ENCOUNTER — OFFICE VISIT (OUTPATIENT)
Dept: PHYSICAL THERAPY | Facility: CLINIC | Age: 69
End: 2019-08-05
Payer: MEDICARE

## 2019-08-05 DIAGNOSIS — Z96.651 S/P TKR (TOTAL KNEE REPLACEMENT), RIGHT: Primary | ICD-10-CM

## 2019-08-05 PROCEDURE — 97110 THERAPEUTIC EXERCISES: CPT | Performed by: PHYSICAL THERAPIST

## 2019-08-05 PROCEDURE — 97140 MANUAL THERAPY 1/> REGIONS: CPT | Performed by: PHYSICAL THERAPIST

## 2019-08-05 PROCEDURE — 97014 ELECTRIC STIMULATION THERAPY: CPT | Performed by: PHYSICAL THERAPIST

## 2019-08-05 NOTE — PROGRESS NOTES
Daily Note     Today's date: 2019  Patient name: Areli Lopez  : 1950  MRN: [id number]  Referring provider: Nadir Mcmanus MD  Dx:   Encounter Diagnosis     ICD-10-CM    1  S/P TKR (total knee replacement), right Z96 651                   Subjective: Patient saw her surgeon last week  He told her to continue treatment for 2 more weeks  Objective: See treatment diary below      Assessment: Tolerated treatment well  Patient exhibited good technique with therapeutic exercises      Plan: Progress treatment as tolerated         Precautions: None     Daily Treatment Diary      [facility]    PROM RK  RK      RK  RK   Hamstring Stretch               Calf Stretch                               Exercise Diary                QS  4/10  4/10  30X  4/10  4/10  4/10   SLR  4/10  4/10  30X    4/10  40x   SAQ  1# 4/10  1# 4/10  1# 30X  1# 4/10  1# 4/10  1# 4/10   Heel slides with strap  1# 35X  1# 4/10    1# 4/10  1# 4/10  1# 4/10   T-Band Press  1# L3 35X  L 3 1# 30x  L2 30X  1# L3 4/10 1# L3 4/10  1# L3 4/10   T-Band Hams   L3 3/10  L3 30x  L2 30X  L3  3/10 L3 3/10  L3 4/10   T-Band TKE  L3 3/10  L3 40x  L2 3/10  L3 3/10 L3 3/10  L3 4/10   LAQ  1# 30X  1# 40x  30X  1# 40X 1# 4/10  1# 4/10   Mini Squats               Step Stretch Flexion  8" 10" X20  8" 10" 20x  8" 10" X10  8" 10" X10  8" 10" X10 8" X10   Steps Forward  8" 30X  8" 30x  8" 30X  8" 30X  8" 3/10  8" 30x   Steps Lateral  6" 30X  8" 30x  6" 30X  8" 30X  8" 3/10  8" 30x   PYR HAM:   S , P , C               PYR QUAD:   S , P , C               Leg Press: S               XO TKE               Hack Squat               SLS               NuStep:   S 10, A 11 L2 8'  L5 10'  L2 12'  L 2 15'  L5 15'  L 2 13'   Bike: S               Treadmill                               Modalities                CP                CP/IFC  15'  15'  15'  15'  15'  15'

## 2019-08-06 DIAGNOSIS — K58.9 IRRITABLE BOWEL SYNDROME WITHOUT DIARRHEA: ICD-10-CM

## 2019-08-06 RX ORDER — DICYCLOMINE HYDROCHLORIDE 10 MG/1
10 CAPSULE ORAL 2 TIMES DAILY
Qty: 180 CAPSULE | Refills: 0 | Status: SHIPPED | OUTPATIENT
Start: 2019-08-06 | End: 2019-11-06 | Stop reason: SDUPTHER

## 2019-08-07 ENCOUNTER — OFFICE VISIT (OUTPATIENT)
Dept: PHYSICAL THERAPY | Facility: CLINIC | Age: 69
End: 2019-08-07
Payer: MEDICARE

## 2019-08-07 DIAGNOSIS — Z96.651 S/P TKR (TOTAL KNEE REPLACEMENT), RIGHT: Primary | ICD-10-CM

## 2019-08-07 PROCEDURE — 97014 ELECTRIC STIMULATION THERAPY: CPT

## 2019-08-07 PROCEDURE — 97110 THERAPEUTIC EXERCISES: CPT

## 2019-08-07 PROCEDURE — 97140 MANUAL THERAPY 1/> REGIONS: CPT

## 2019-08-07 NOTE — PROGRESS NOTES
Daily Note     Today's date: 2019  Patient name: Adolfo Carolina  : 1950  MRN: [id number]  Referring provider: Yong Garvin MD  Dx:   Encounter Diagnosis     ICD-10-CM    1  S/P TKR (total knee replacement), right Z96 651                   Subjective: Patient reports her knee and R foot are feeling better  She states the foot feels weak at times  Objective: See treatment diary below      Assessment: Tolerated treatment well  Patient exhibited good technique with therapeutic exercises      Plan: Continue per plan of care        Precautions: None     Daily Treatment Diary      [facility]    PROM LF  RK      RK  RK   Hamstring Stretch               Calf Stretch                               Exercise Diary                QS  4/10  4/10  30X  4/10  4/10  4/10   SLR  4/10  4/10  30X    4/10  40x   SAQ  2# 4/10  1# 4/10  1# 30X  1# 4/10  1# 4/10  1# 4/10   Heel slides with strap  2# 35X  1# 4/10    1# 4/10  1# 4/10  1# 4/10   T-Band Press  2# L3 35X  L 3 1# 30x  L2 30X  1# L3 4/10 1# L3 4/10  1# L3 4/10   T-Band Hams   L3 3/10  L3 30x  L2 30X  L3  3/10 L3 3/10  L3 4/10   T-Band TKE  L3 3/10  L3 40x  L2 3/10  L3 3/10 L3 3/10  L3 4/10   LAQ  2# 30X  1# 40x  30X  1# 40X 1# 4/10  1# 4/10   Mini Squats               Step Stretch Flexion  8" 10" X20  8" 10" 20x  8" 10" X10  8" 10" X10  8" 10" X10 8" X10   Steps Forward  8" 30X  8" 30x  8" 30X  8" 30X  8" 3/10  8" 30x   Steps Lateral  8" 30X  8" 30x  6" 30X  8" 30X  8" 3/10  8" 30x   PYR HAM:   S , P , C               PYR QUAD:   S , P , C               Leg Press: S               XO TKE               Hack Squat               SLS               NuStep:   S 10, A 11 L3 13'  L5 10'  L2 12'  L 2 15'  L5 15'  L 2 13'   Bike: S               Treadmill                               Modalities                CP                CP/IFC  15'  15'  15'  15'  15'  15'

## 2019-08-08 ENCOUNTER — APPOINTMENT (OUTPATIENT)
Dept: PHYSICAL THERAPY | Facility: CLINIC | Age: 69
End: 2019-08-08
Payer: MEDICARE

## 2019-08-12 ENCOUNTER — OFFICE VISIT (OUTPATIENT)
Dept: PHYSICAL THERAPY | Facility: CLINIC | Age: 69
End: 2019-08-12
Payer: MEDICARE

## 2019-08-12 DIAGNOSIS — Z96.651 S/P TKR (TOTAL KNEE REPLACEMENT), RIGHT: Primary | ICD-10-CM

## 2019-08-12 PROCEDURE — 97140 MANUAL THERAPY 1/> REGIONS: CPT | Performed by: PHYSICAL THERAPIST

## 2019-08-12 PROCEDURE — 97110 THERAPEUTIC EXERCISES: CPT | Performed by: PHYSICAL THERAPIST

## 2019-08-12 PROCEDURE — 97014 ELECTRIC STIMULATION THERAPY: CPT | Performed by: PHYSICAL THERAPIST

## 2019-08-12 PROCEDURE — 97010 HOT OR COLD PACKS THERAPY: CPT | Performed by: PHYSICAL THERAPIST

## 2019-08-12 NOTE — PROGRESS NOTES
Daily Note     Today's date: 2019  Patient name: Rufus Alan  : 1950  MRN: [id number]  Referring provider: Matthew Stewart MD  Dx:   Encounter Diagnosis     ICD-10-CM    1  S/P TKR (total knee replacement), right Z96 651                   Subjective: Patient notes less foot and knee pain  She started doing more house cleaning without flare ups of pain  Objective: See treatment diary below      Assessment: Tolerated treatment well  Patient demonstrated fatigue post treatment      Plan: Progress treatment as tolerated         Precautions: None     Daily Treatment Diary      [facility]    PROM LF  LF      RK  RK   Hamstring Stretch               Calf Stretch                               Exercise Diary                QS  4/10  4/10  30X  4/10  4/10  4/10   SLR  4/10  4/10  30X    4/10  40x   SAQ  2# 4/10  2# 4/10  1# 30X  1# 4/10  1# 4/10  1# 4/10   Heel slides with strap  2# 35X  2# 4/10    1# 4/10  1# 4/10  1# 4/10   T-Band Press  2# L3 35X  L 3 2# 35x  L2 30X  1# L3 4/10 1# L3 4/10  1# L3 4/10   T-Band Hams   L3 3/10  L3 30x  L2 30X  L3  3/10 L3 3/10  L3 4/10   T-Band TKE  L3 3/10  L3 30x  L2 3/10  L3 3/10 L3 3/10  L3 4/10   LAQ  2# 30X  2# 30x  30X  1# 40X 1# 4/10  1# 4/10   Mini Squats               Step Stretch Flexion  8" 10" X20  8" 10" 20x  8" 10" X10  8" 10" X10  8" 10" X10 8" X10   Steps Forward  8" 30X  8" 30x  8" 30X  8" 30X  8" 3/10  8" 30x   Steps Lateral  8" 30X  8" 30x  6" 30X  8" 30X  8" 3/10  8" 30x   PYR HAM:   S , P , C               PYR QUAD:   S , P , C               Leg Press: S               XO TKE               Hack Squat               SLS               NuStep:   [id number], A 11 L3 13'  L3 15'  L2 12'  L 2 15'  L5 15'  L 2 13'   Bike: S               Treadmill                               Modalities                CP                CP/IFC  15'  15'  15'  15'  15'  15'

## 2019-08-13 ENCOUNTER — OFFICE VISIT (OUTPATIENT)
Dept: PHYSICAL THERAPY | Facility: CLINIC | Age: 69
End: 2019-08-13
Payer: MEDICARE

## 2019-08-13 DIAGNOSIS — Z96.651 S/P TKR (TOTAL KNEE REPLACEMENT), RIGHT: Primary | ICD-10-CM

## 2019-08-13 PROCEDURE — 97110 THERAPEUTIC EXERCISES: CPT

## 2019-08-13 PROCEDURE — 97014 ELECTRIC STIMULATION THERAPY: CPT

## 2019-08-13 PROCEDURE — 97140 MANUAL THERAPY 1/> REGIONS: CPT

## 2019-08-13 NOTE — PROGRESS NOTES
Daily Note     Today's date: 2019  Patient name: Emilie King  : 1950  MRN: [id number]  Referring provider: Morgan Mascorro MD  Dx:   Encounter Diagnosis     ICD-10-CM    1  S/P TKR (total knee replacement), right Z96 651                   Subjective: Patient reports her BLE are tired today from increased activity, soreness is mild  Objective: See treatment diary below      Assessment: Tolerated treatment well  Patient exhibited good technique with therapeutic exercises  Patient continues to show improvement in flexion ROM      Plan: Continue per plan of care        Precautions: None     Daily Treatment Diary      [facility]    PROM LF  LF  LF    RK  RK   Hamstring Stretch               Calf Stretch                               Exercise Diary                QS  4/10  4/10  40X  4/10  4/10  4/10   SLR  4/10  4/10  40X    4/10  40x   SAQ  2# 4/10  2# 4/10  2# 30X  1# 4/10  1# 4/10  1# 4/10   Heel slides with strap  2# 35X  2# 4/10  2# 4/10  1# 4/10  1# 4/10  1# 4/10   T-Band Press  2# L3 35X  L 3 2# 35x  2# L4 30X  1# L3 4/10 1# L3 4/10  1# L3 4/10   T-Band Hams   L3 3/10  L3 30x  L4 30X  L3  3/10 L3 3/10  L3 4/10   T-Band TKE  L3 3/10  L3 30x  L4 3/10  L3 3/10 L3 3/10  L3 4/10   LAQ  2# 30X  2# 30x  30X  1# 40X 1# 4/10  1# 4/10   Mini Squats               Step Stretch Flexion  8" 10" X20  8" 10" 20x  8" 10" X10  8" 10" X10  8" 10" X10 8" X10   Steps Forward  8" 30X  8" 30x  8" 30X  8" 30X  8" 3/10  8" 30x   Steps Lateral  8" 30X  8" 30x  8" 30X  8" 30X  8" 3/10  8" 30x   PYR HAM:   S , P , C               PYR QUAD:   S , P , C               Leg Press: S               XO TKE               Hack Squat               SLS               NuStep:   S 10, A 11 L3 13'  L3 15'  L3 15'  L 2 15'  L5 15'  L 2 13'   Bike: S               Treadmill                               Modalities                CP                CP/IFC  15'  15'  15'  15'  15'  15'

## 2019-08-19 ENCOUNTER — EVALUATION (OUTPATIENT)
Dept: PHYSICAL THERAPY | Facility: CLINIC | Age: 69
End: 2019-08-19
Payer: MEDICARE

## 2019-08-19 DIAGNOSIS — Z96.651 S/P TKR (TOTAL KNEE REPLACEMENT), RIGHT: Primary | ICD-10-CM

## 2019-08-19 PROCEDURE — 97014 ELECTRIC STIMULATION THERAPY: CPT | Performed by: PHYSICAL THERAPIST

## 2019-08-19 PROCEDURE — 97110 THERAPEUTIC EXERCISES: CPT | Performed by: PHYSICAL THERAPIST

## 2019-08-19 PROCEDURE — 97010 HOT OR COLD PACKS THERAPY: CPT | Performed by: PHYSICAL THERAPIST

## 2019-08-19 PROCEDURE — 97140 MANUAL THERAPY 1/> REGIONS: CPT | Performed by: PHYSICAL THERAPIST

## 2019-08-19 NOTE — PROGRESS NOTES
PT Discharge    Today's date: 2019  Patient name: Janine Fisher  : 1950  MRN: [id number]  Referring provider: Facundo Stanley MD  Dx:   Encounter Diagnosis     ICD-10-CM    1  S/P TKR (total knee replacement), right Z96 651                   Assessment  Assessment details:   CURRENT FUNCTIONAL STATUS    Standing/ADL tolerance 1-2 hours  Walking tolerance 1 mile   Ascends/descends stairs reciprocally  Difficulty arising from sitting: Minimal use of arms  Able to squat 60 degrees  Ability to dress lower extremities: Independent  Able to drive  Work Status: Full Duty     LONG TERM GOALS (DISCHARGE)    Knee AROM: -5-115 deg -partially met  Knee PROM: 0-120 deg -met  Knee Strength: E=35 lbs, F=25 lbs  -met  Girth MP: 47 cm -not met  Decrease pain to 0-3/10 -met  Standing/ADL tolerance 60 minutes  -met   Walking tolerance 1/2 mile without an AD -met   Ascends stairs reciprocally/descends stairs step by step -met   Difficulty arising from sitting: Minimal/no use of arms  -met   Able to squat 45 degrees  -met    Ability to dress lower extremities: Independent donning a sneaker  -met  Able to drive -met  Work Status: Full Duty-met  Understanding of Dx/Px/POC: good   Prognosis: good    Goals  See assessment details above  Plan  Plan details: The patient has shown good improvement in PT demonstrating decreased pain, increased range of motion, increased strength, and increased tolerance to activity  Goals have been met, the patient is satisfied with her current status, and she has been discharged from our care  Subjective Evaluation    History of Present Illness  Mechanism of injury: Subjective: The patient has made excellent progress the past month  She is doing well with all work and daily activities, and has been discharged from our care    Pain  Current pain ratin  At best pain ratin  At worst pain rating: 3    Patient Goals  Patient goals for therapy: decreased pain, decreased edema, increased motion, increased strength and independence with ADLs/IADLs          Objective     Observations     Additional Observation Details  Patient ambulates without an AD, with a mild limitation of terminal knee extension  Mild knee and leg swelling is noted  General Comments:      Knee Comments  CURRENT OBJECTIVE MEASUREMENTS    Knee AROM: - deg  Knee PROM: -5-120 deg  Knee Strength: E=34 lbs, F=35 lbs  Girth MP: 48 5 cm           Precautions: None     Daily Treatment Diary      [facility] 8/7 8/12 8/13 8/19 8/1 8/5   PROM LF  LF  LF  RK  RK  RK   Hamstring Stretch               Calf Stretch                               Exercise Diary                QS  4/10  4/10  40X  4/10  4/10  4/10   SLR  4/10  4/10  40X    4/10  40x   SAQ  2# 4/10  2# 4/10  2# 30X  1# 4/10  1# 4/10  1# 4/10   Heel slides with strap  2# 35X  2# 4/10  2# 4/10  1# 4/10  1# 4/10  1# 4/10   T-Band Press  2# L3 35X  L 3 2# 35x  2# L4 30X  1# L3 4/10 1# L3 4/10  1# L3 4/10   T-Band Hams   L3 3/10  L3 30x  L4 30X  L3  3/10 L3 3/10  L3 4/10   T-Band TKE  L3 3/10  L3 30x  L4 3/10  L3 3/10 L3 3/10  L3 4/10   LAQ  2# 30X  2# 30x  30X  1# 40X 1# 4/10  1# 4/10   Mini Squats               Step Stretch Flexion  8" 10" X20  8" 10" 20x  8" 10" X10  8" 10" X10  8" 10" X10 8" X10   Steps Forward  8" 30X  8" 30x  8" 30X  8" 30X  8" 3/10  8" 30x   Steps Lateral  8" 30X  8" 30x  8" 30X  8" 30X  8" 3/10  8" 30x   PYR HAM:   S , P , C               PYR QUAD:   S , P , C               Leg Press: S               XO TKE               Hack Squat               SLS               NuStep:   [id number], A 11 L3 13'  L3 15'  L3 15'  L 2 15'  L5 15'  L 2 13'   Bike: S               Treadmill                               Modalities                CP                CP/IFC  15'  15'  15'  15'  15'  15'

## 2019-08-21 ENCOUNTER — APPOINTMENT (OUTPATIENT)
Dept: PHYSICAL THERAPY | Facility: CLINIC | Age: 69
End: 2019-08-21
Payer: MEDICARE

## 2019-11-06 DIAGNOSIS — F41.9 ANXIETY: ICD-10-CM

## 2019-11-06 DIAGNOSIS — K21.9 GASTROESOPHAGEAL REFLUX DISEASE, ESOPHAGITIS PRESENCE NOT SPECIFIED: ICD-10-CM

## 2019-11-06 DIAGNOSIS — K58.9 IRRITABLE BOWEL SYNDROME WITHOUT DIARRHEA: ICD-10-CM

## 2019-11-06 RX ORDER — AMITRIPTYLINE HYDROCHLORIDE 50 MG/1
50 TABLET, FILM COATED ORAL DAILY
Qty: 90 TABLET | Refills: 1 | Status: SHIPPED | OUTPATIENT
Start: 2019-11-06 | End: 2020-04-27 | Stop reason: SDUPTHER

## 2019-11-06 RX ORDER — DICYCLOMINE HYDROCHLORIDE 10 MG/1
10 CAPSULE ORAL 2 TIMES DAILY
Qty: 180 CAPSULE | Refills: 1 | Status: SHIPPED | OUTPATIENT
Start: 2019-11-06 | End: 2020-05-26

## 2019-11-06 RX ORDER — OMEPRAZOLE 40 MG/1
40 CAPSULE, DELAYED RELEASE ORAL DAILY
Qty: 90 CAPSULE | Refills: 3 | Status: SHIPPED | OUTPATIENT
Start: 2019-11-06 | End: 2020-09-23 | Stop reason: SDUPTHER

## 2019-12-03 NOTE — PROGRESS NOTES
Patient ID: Austyn Schneider is a 77 y o  female  Assessment/Plan:   peripheral neuropathy  -patient's symptoms well controlled, no new change in her neurologic exam  --continues gabapentin 300 mg 2-3 tabs/day  --continue Elavil 50 mg at bedtime  --encouraged good foot hygiene    Chronic lumbar radiculopathy   denies any increased pain  --patient has spinal cord stimulator in place          No problem-specific Assessment & Plan notes found for this encounter  Diagnoses and all orders for this visit:    Lumbar radiculopathy, chronic    Idiopathic peripheral neuropathy  -     gabapentin (NEURONTIN) 300 mg capsule; Take 1 capsule (300 mg total) by mouth 3 (three) times a day           Subjective:    AAMIR Thurman is a 72-year-old female who presents today for neurologic follow-up for neuropathy and lumbar radiculopathy  Patient was last seen in March by Errol Kamara    Patient Astrid Gomez presented in 2015 with complaints of diffuse paresthesias, EMG showed carpal tunnel, lab work was unrevealing  MRI of the brain was essentially normal   Overall, her symptoms have been controlled with combination of gabapentin Elavil  She does have issues with chronic low back pain  MRI of thoracic spine with mild degenerative changes seen at T10-T11, lumbar MRI demonstrated mild-to-moderate bone and discogenic degenerative changes from L2-L5 with mild spondylolisthesis of L3 on L4  No central spinal stenosis, no nerve root compression  Patient seen by pain management, spinal cord stimulator placed in May 2017    Today, patient continues to do well  No new signs or symptoms at this time  Neuropathy well controlled with her current medication  She continues on gabapentin 2 tablets a day  On occasion she will note some increased at tingling of her left foot which will radiate up her shin  It does not last long  She will take an extra gabapentin if needed  No increase in low back pain  No issues with bowel or bladder  Denies any interim falls other than following her recent knee replacement  She denies any similar complaints occurring in her hands  She is due to follow-up with her PCP for updated labs  In The interim, since her last visit, she underwent a right TKA back in June  Of note, she experienced a fall down 15 steps when  finishing physical therapy  Dose following her knee replacement experience severe ankle pain  In speaking with her, they failed to give her a nerve block for her procedures secondary to her spinal cord stimulator  She states her right ankle gave out, no reported loss of consciousness  She was seen in the ER , she had full workup to include CTH, CT cervical spine, x-ray right foot, shoulder and knee  No intracranial abnormality, no evidence of fractures  Patient was able to ambulate, she was given Ace dressing for stability, and naproxen  She has issues with chronic bilateral knee pain, she was seen by Ortho recently for her left knee and underwent a cortisone injection  Overall at this point, she ambulates independently and has some minimal pain of her knee  The following portions of the patient's history were reviewed and updated as appropriate: allergies, current medications, past family history, past medical history, past social history, past surgical history and problem list          Objective:    Blood pressure 124/66, pulse 89, height 5' 2" (1 575 m), weight 87 5 kg (193 lb)  Physical Exam   Constitutional: She appears well-developed  HENT:   Head: Normocephalic  Neck: Normal range of motion  Cardiovascular: Normal rate and regular rhythm  Pulmonary/Chest: Effort normal    Musculoskeletal: Normal range of motion  Neurological: She has normal reflexes  Coordination normal    Skin: Skin is warm  Psychiatric: She has a normal mood and affect   Her speech is normal and behavior is normal  Thought content normal        Neurological Exam  Mental Status  Awake, alert and oriented to person, place and time  Speech is normal  Language is fluent with no aphasia  Motor  Normal muscle bulk throughout  Normal muscle tone  No abnormal involuntary movements  Strength is 5/5 in all four extremities except as noted  Right                     Left  Knee extension                      5-                          5  Dorsiflexion                            5-                          5    Sensory  Temperature abnormality: Mild distal temperature loss bilateral lower extremities to mid calf on the left, above ankle on the right  Decreased temperature left lower extremity versus right  Vibration abnormality: Maximal vibratory strike 7 seconds bilateral malleolus  Reflexes  Deep tendon reflexes are 2+ and symmetric in all four extremities with downgoing toes bilaterally  Coordination  Finger-to-nose, rapid alternating movements and heel-to-shin normal bilaterally without dysmetria  Gait  Casual gait: Wide stance  Normal stride length  Ambulated independently  ROS:  ROS reviewed and personally updated with patient at today's visit    Review of Systems  Constitutional: Negative  Negative for appetite change and fever  HENT: Negative  Negative for hearing loss, tinnitus, trouble swallowing and voice change  Eyes: Negative  Negative for photophobia and pain  Respiratory: Negative  Negative for shortness of breath  Cardiovascular: Negative  Negative for palpitations  Gastrointestinal: Negative  Negative for nausea and vomiting  Endocrine: Negative  Negative for cold intolerance and heat intolerance  Genitourinary: Negative  Negative for dysuria, frequency and urgency  Musculoskeletal: Positive for myalgias  Negative for neck pain  Skin: Negative  Negative for rash  Allergic/Immunologic: Negative  Neurological: Positive for numbness   Negative for dizziness, tremors, seizures, syncope, facial asymmetry, speech difficulty, weakness, light-headedness and headaches  Hematological: Negative  Does not bruise/bleed easily  Psychiatric/Behavioral: Negative  Negative for confusion, hallucinations and sleep disturbance

## 2019-12-05 ENCOUNTER — OFFICE VISIT (OUTPATIENT)
Dept: NEUROLOGY | Facility: CLINIC | Age: 69
End: 2019-12-05
Payer: MEDICARE

## 2019-12-05 VITALS
WEIGHT: 193 LBS | SYSTOLIC BLOOD PRESSURE: 124 MMHG | BODY MASS INDEX: 35.51 KG/M2 | HEART RATE: 89 BPM | DIASTOLIC BLOOD PRESSURE: 66 MMHG | HEIGHT: 62 IN

## 2019-12-05 DIAGNOSIS — G60.9 IDIOPATHIC PERIPHERAL NEUROPATHY: ICD-10-CM

## 2019-12-05 DIAGNOSIS — M54.16 LUMBAR RADICULOPATHY, CHRONIC: Primary | ICD-10-CM

## 2019-12-05 PROCEDURE — 99214 OFFICE O/P EST MOD 30 MIN: CPT | Performed by: NURSE PRACTITIONER

## 2019-12-05 RX ORDER — GABAPENTIN 300 MG/1
300 CAPSULE ORAL 3 TIMES DAILY
Qty: 270 CAPSULE | Refills: 1 | Status: SHIPPED | OUTPATIENT
Start: 2019-12-05 | End: 2020-06-15 | Stop reason: SDUPTHER

## 2019-12-05 NOTE — PROGRESS NOTES
Review of Systems   Constitutional: Negative  Negative for appetite change and fever  HENT: Negative  Negative for hearing loss, tinnitus, trouble swallowing and voice change  Eyes: Negative  Negative for photophobia and pain  Respiratory: Negative  Negative for shortness of breath  Cardiovascular: Negative  Negative for palpitations  Gastrointestinal: Negative  Negative for nausea and vomiting  Endocrine: Negative  Negative for cold intolerance and heat intolerance  Genitourinary: Negative  Negative for dysuria, frequency and urgency  Musculoskeletal: Positive for myalgias  Negative for neck pain  Skin: Negative  Negative for rash  Allergic/Immunologic: Negative  Neurological: Positive for numbness  Negative for dizziness, tremors, seizures, syncope, facial asymmetry, speech difficulty, weakness, light-headedness and headaches  Hematological: Negative  Does not bruise/bleed easily  Psychiatric/Behavioral: Negative  Negative for confusion, hallucinations and sleep disturbance

## 2019-12-05 NOTE — PATIENT INSTRUCTIONS
Patient clinically stable  Continue on gabapentin 2-3 tablets a day  Continue on amitriptyline  Following with ortho  Following for her cord stimulator

## 2019-12-09 ENCOUNTER — APPOINTMENT (OUTPATIENT)
Dept: LAB | Facility: MEDICAL CENTER | Age: 69
End: 2019-12-09
Payer: MEDICARE

## 2019-12-09 DIAGNOSIS — E78.5 HYPERLIPIDEMIA, UNSPECIFIED HYPERLIPIDEMIA TYPE: Primary | ICD-10-CM

## 2019-12-09 DIAGNOSIS — E78.5 HYPERLIPIDEMIA, UNSPECIFIED HYPERLIPIDEMIA TYPE: ICD-10-CM

## 2019-12-09 LAB
CHOLEST SERPL-MCNC: 297 MG/DL (ref 50–200)
HDLC SERPL-MCNC: 58 MG/DL
LDLC SERPL CALC-MCNC: 215 MG/DL (ref 0–100)
TRIGL SERPL-MCNC: 121 MG/DL

## 2019-12-09 PROCEDURE — 36415 COLL VENOUS BLD VENIPUNCTURE: CPT

## 2019-12-09 PROCEDURE — 80061 LIPID PANEL: CPT

## 2019-12-10 ENCOUNTER — OFFICE VISIT (OUTPATIENT)
Dept: INTERNAL MEDICINE CLINIC | Facility: CLINIC | Age: 69
End: 2019-12-10
Payer: MEDICARE

## 2019-12-10 VITALS
BODY MASS INDEX: 35.03 KG/M2 | OXYGEN SATURATION: 97 % | TEMPERATURE: 97.6 F | HEART RATE: 99 BPM | WEIGHT: 190.38 LBS | HEIGHT: 62 IN

## 2019-12-10 DIAGNOSIS — I10 HYPERTENSION, UNSPECIFIED TYPE: ICD-10-CM

## 2019-12-10 DIAGNOSIS — Z91.030 BEE STING ALLERGY: Primary | ICD-10-CM

## 2019-12-10 DIAGNOSIS — E78.2 MIXED HYPERLIPIDEMIA: ICD-10-CM

## 2019-12-10 DIAGNOSIS — Z78.0 POSTMENOPAUSAL: ICD-10-CM

## 2019-12-10 DIAGNOSIS — R05.9 COUGH: ICD-10-CM

## 2019-12-10 DIAGNOSIS — Z00.00 MEDICARE ANNUAL WELLNESS VISIT, SUBSEQUENT: ICD-10-CM

## 2019-12-10 PROBLEM — R51.9 CHRONIC HEADACHES: Status: RESOLVED | Noted: 2017-12-26 | Resolved: 2019-12-10

## 2019-12-10 PROBLEM — M79.671 RIGHT FOOT PAIN: Status: RESOLVED | Noted: 2019-07-24 | Resolved: 2019-12-10

## 2019-12-10 PROBLEM — J01.00 SUBACUTE MAXILLARY SINUSITIS: Status: RESOLVED | Noted: 2018-11-05 | Resolved: 2019-12-10

## 2019-12-10 PROBLEM — G89.29 CHRONIC HEADACHES: Status: RESOLVED | Noted: 2017-12-26 | Resolved: 2019-12-10

## 2019-12-10 PROCEDURE — G0439 PPPS, SUBSEQ VISIT: HCPCS | Performed by: INTERNAL MEDICINE

## 2019-12-10 RX ORDER — LOSARTAN POTASSIUM 50 MG/1
50 TABLET ORAL DAILY
Qty: 90 TABLET | Refills: 3 | Status: SHIPPED | OUTPATIENT
Start: 2019-12-10 | End: 2020-08-24 | Stop reason: SDUPTHER

## 2019-12-10 RX ORDER — MONTELUKAST SODIUM 10 MG/1
10 TABLET ORAL
Qty: 90 TABLET | Refills: 0 | Status: SHIPPED | OUTPATIENT
Start: 2019-12-10 | End: 2020-04-27 | Stop reason: SDUPTHER

## 2019-12-10 RX ORDER — ROSUVASTATIN CALCIUM 10 MG/1
10 TABLET, COATED ORAL DAILY
Qty: 30 TABLET | Refills: 5 | Status: SHIPPED | OUTPATIENT
Start: 2019-12-10 | End: 2020-01-13 | Stop reason: CLARIF

## 2019-12-10 RX ORDER — EPINEPHRINE 0.3 MG/.3ML
0.3 INJECTION SUBCUTANEOUS AS NEEDED
Qty: 2 EACH | Refills: 5 | Status: SHIPPED | OUTPATIENT
Start: 2019-12-10 | End: 2021-02-03 | Stop reason: SDUPTHER

## 2019-12-10 NOTE — PROGRESS NOTES
Assessment and Plan:     Problem List Items Addressed This Visit        Cardiovascular and Mediastinum    Hypertension    Relevant Medications    EPINEPHrine (EPIPEN 2-AREN) 0 3 mg/0 3 mL SOAJ    losartan (COZAAR) 50 mg tablet       Other    Medicare annual wellness visit, subsequent      Other Visit Diagnoses     Bee sting allergy    -  Primary    Relevant Medications    EPINEPHrine (EPIPEN 2-AREN) 0 3 mg/0 3 mL SOAJ    Cough        Relevant Medications    montelukast (SINGULAIR) 10 mg tablet      Reviewed lipid panel Discussed injectable cholesterol agent and she will consider  Low fat diet ,exercise   Eye exam utd  Change to crestor Rx  Increase water intake  Rto 6 months    Preventive health issues were discussed with patient, and age appropriate screening tests were ordered as noted in patient's After Visit Summary  Personalized health advice and appropriate referrals for health education or preventive services given if needed, as noted in patient's After Visit Summary       History of Present Illness:     Patient presents for Medicare Annual Wellness visit    Patient Care Team:  Matias Rizzo DO as PCP - DO Sebastian Chong CRNP [place], DO Emogene Lew, MD Fernando Parr, MD Jeffrey Skill, MD Cullen Night, DO     Problem List:     Patient Active Problem List   Diagnosis    Hypertension    Elevated LFTs    Hyperlipidemia    Acid reflux disease    Chronic low back pain    Degenerative joint disease of right lower extremity    Irritable bowel syndrome    Lumbar radiculopathy, chronic    Neuralgia    Peripheral neuropathy    Acute bilateral thoracic back pain    Chronic left shoulder pain    Interstitial cystitis (chronic) with hematuria    Medicare annual wellness visit, subsequent    Fall (on) (from) other stairs and steps, sequela    Right leg pain    S/P right knee surgery    Heel spur, right      Past Medical and Surgical History:     Past Medical History:   Diagnosis Date    Abnormal findings on diagnostic imaging of breast     Anxiety     Arthritis     Fibrocystic breast disease     Foot pain     GERD (gastroesophageal reflux disease)     Hyperlipidemia     Hypertension     Interstitial cystitis     Osteopenia      Past Surgical History:   Procedure Laterality Date    APPENDECTOMY      BACK SURGERY      BREAST EXCISIONAL BIOPSY Left 1996    benign    CARPAL BOSS EXCISION      CHOLECYSTECTOMY      DIAGNOSTIC LAPAROSCOPY      FOOT SURGERY      HYSTERECTOMY      age 33    HYSTEROSCOPY      KIDNEY SURGERY Left     KNEE ARTHROSCOPY Bilateral     LAPAROSCOPY      hynecologic with adhesions    OOPHORECTOMY Bilateral     age 33    SC SURG IMPLNT Flor Hannah N/A 5/18/2017    Procedure: PLACEMENT OF THORACIC SPINAL CORD STIMULATOR WITH LEFT BUTTOCK IMPLANTABLE PULSE GENERATOR (IMPULSE MONITORING-MOTORS (TCEMEP), EMG, SSEP);   Surgeon: Janell Melara MD;  [facility];  Service: Neurosurgery    SPINAL STIMULATOR PLACEMENT  05/2017    TONSILLECTOMY AND ADENOIDECTOMY      onset: unknown    TOTAL KNEE ARTHROPLASTY Right       Family History:     Family History   Problem Relation Age of Onset    Cancer Mother     Bone cancer Mother     Hypertension Father     Brain cancer Father     Stroke Father         stroke sydrome    Diabetes Paternal Grandmother     Other Family         back disorder    Heart disease Family         cardiac disorder    Stroke Family     Diabetes Family     Hypertension Family     Cancer Family     Breast cancer Maternal Aunt       Social History:     Social History     Socioeconomic History    Marital status: Single     Spouse name: None    Number of children: None    Years of education: None    Highest education level: None   Occupational History    Occupation:    Social Needs    Financial resource strain: None    Food insecurity:     Worry: None     Inability: None    Transportation needs:     Medical: None     Non-medical: None   Tobacco Use    Smoking status: Never Smoker    Smokeless tobacco: Never Used   Substance and Sexual Activity    Alcohol use: Yes     Alcohol/week: 1 0 standard drinks     Types: 1 Glasses of wine per week     Comment: social less than a drink per week    Drug use: No    Sexual activity: None   Lifestyle    Physical activity:     Days per week: None     Minutes per session: None    Stress: None   Relationships    Social connections:     Talks on phone: None     Gets together: None     Attends Yazidi service: None     Active member of club or organization: None     Attends meetings of clubs or organizations: None     Relationship status: None    Intimate partner violence:     Fear of current or ex partner: None     Emotionally abused: None     Physically abused: None     Forced sexual activity: None   Other Topics Concern    None   Social History Narrative    No caffeine use    Always uses sunscreen    Always uses a seatbelt       Medications and Allergies:     Current Outpatient Medications   Medication Sig Dispense Refill    amitriptyline (ELAVIL) 50 mg tablet Take 1 tablet (50 mg total) by mouth daily 90 tablet 1    Biotin 2500 MCG CAPS Take 2 capsules by mouth daily      Cholecalciferol (VITAMIN D-3 PO) Take 1 tablet by mouth daily      Cyanocobalamin (VITAMIN B-12 CR PO) Take 1 tablet by mouth daily      diclofenac sodium (VOLTAREN) 1 % APPLY TO THE AFFECTED AREA(S) TWICE DAILY AS DIRECTED   100 g 0    dicyclomine (BENTYL) 10 mg capsule Take 1 capsule (10 mg total) by mouth 2 (two) times a day 180 capsule 1    EPINEPHrine (EPIPEN 2-AREN) 0 3 mg/0 3 mL SOAJ Inject 0 3 mL (0 3 mg total) into a muscle as needed for anaphylaxis 2 each 5    fexofenadine (ALLEGRA) 180 MG tablet Take 180 mg by mouth daily      gabapentin (NEURONTIN) 300 mg capsule Take 1 capsule (300 mg total) by mouth 3 (three) times a day 270 capsule 1    hydrOXYzine HCL (ATARAX) 10 mg tablet Take 2 tablets at bedtime 180 tablet 3    losartan (COZAAR) 50 mg tablet Take 1 tablet (50 mg total) by mouth daily 90 tablet 3    montelukast (SINGULAIR) 10 mg tablet Take 1 tablet (10 mg total) by mouth daily at bedtime 90 tablet 0    Omega-3 Fatty Acids (FISH OIL) 1,000 mg Take 1,000 mg by mouth 3 (three) times a day      omeprazole (PriLOSEC) 40 MG capsule Take 1 capsule (40 mg total) by mouth daily 90 capsule 3    simvastatin (ZOCOR) 10 mg tablet Take 1 tablet (10 mg total) by mouth every other day 90 tablet 3     No current facility-administered medications for this visit  Allergies   Allergen Reactions    Bee Venom     Codeine Other (See Comments)     headaches    Egg Yolk     Iodinated Diagnostic Agents Hives    Other      Adhesive tape    Penicillins Hives    Bactrim [Sulfamethoxazole-Trimethoprim] Rash    Elmiron [Pentosan Polysulfate] Rash    Latex Rash    Oxybutynin Rash      Immunizations:     Immunization History   Administered Date(s) Administered    Tdap 12/09/2016, 12/09/2016, 07/10/2019      Health Maintenance:         Topic Date Due    DXA SCAN  01/03/2019    MAMMOGRAM  02/26/2020    CRC Screening: Colonoscopy  07/28/2025    Hepatitis C Screening  Completed         Topic Date Due    Pneumococcal Vaccine: 65+ Years (1 of 2 - PCV13) 01/21/2016    Influenza Vaccine  07/01/2019      Medicare Health Risk Assessment:     Pulse 99   Temp 97 6 °F (36 4 °C) (Tympanic)   Ht 5' 2" (1 575 m)   Wt 86 4 kg (190 lb 6 oz)   SpO2 97%   BMI 34 82 kg/m²      Krista Wooten is here for her Subsequent Wellness visit  Last Medicare Wellness visit information reviewed, patient interviewed and updates made to the record today  Health Risk Assessment:   Patient rates overall health as very good  Patient feels that their physical health rating is slightly better  Eyesight was rated as same  Hearing was rated as same   Patient feels that their emotional and mental health rating is same  Pain experienced in the last 7 days has been some  Patient's pain rating has been 7/10  Patient states that she has experienced no weight loss or gain in last 6 months  Depression Screening:   PHQ-2 Score: 0      Fall Risk Screening: In the past year, patient has experienced: no history of falling in past year      Urinary Incontinence Screening:   Patient has not leaked urine accidently in the last six months  Home Safety:  Patient does not have trouble with stairs inside or outside of their home  Patient has working smoke alarms and has working carbon monoxide detector  Home safety hazards include: none  Nutrition:   Current diet is Regular  Medications:   Patient is currently taking over-the-counter supplements  OTC medications include: see medication list  Patient is able to manage medications  Activities of Daily Living (ADLs)/Instrumental Activities of Daily Living (IADLs):   Walk and transfer into and out of bed and chair?: Yes  Dress and groom yourself?: Yes    Bathe or shower yourself?: Yes    Feed yourself?  Yes  Do your laundry/housekeeping?: Yes  Manage your money, pay your bills and track your expenses?: Yes  Make your own meals?: Yes    Do your own shopping?: Yes    Previous Hospitalizations:   Any hospitalizations or ED visits within the last 12 months?: Yes    How many hospitalizations have you had in the last year?: 1-2    Advance Care Planning:   Living will: No    Durable POA for healthcare: No    Advanced directive: No    Advanced directive counseling given: Yes    Five wishes given: Yes    End of Life Decisions reviewed with patient: Yes      PREVENTIVE SCREENINGS      Cardiovascular Screening:    General: Screening Not Indicated and History Lipid Disorder      Diabetes Screening:     General: Screening Not Indicated      Colorectal Cancer Screening:     General: Screening Current      Breast Cancer Screening:     General: Screening Current      Cervical Cancer Screening:    General: Screening Not Indicated      Osteoporosis Screening:    General: Risks and Benefits Discussed      Abdominal Aortic Aneurysm (AAA) Screening:        General: Screening Not Indicated      Lung Cancer Screening:     General: Screening Not Indicated      Hepatitis C Screening:    General: Screening Current    Other Counseling Topics:   Regular weightbearing exercise         Markos Hoang DO

## 2020-01-13 ENCOUNTER — TELEPHONE (OUTPATIENT)
Dept: INTERNAL MEDICINE CLINIC | Facility: CLINIC | Age: 70
End: 2020-01-13

## 2020-01-13 RX ORDER — SIMVASTATIN 10 MG
10 TABLET ORAL EVERY OTHER DAY
COMMUNITY
End: 2020-06-21 | Stop reason: CLARIF

## 2020-01-13 NOTE — TELEPHONE ENCOUNTER
FYI: Pt states that she stopped taking the crestor last week because she started to get leg cramps and started to take the simvastatin 10mg every other day this week

## 2020-01-14 DIAGNOSIS — B99.9 INFECTION: Primary | ICD-10-CM

## 2020-01-14 RX ORDER — DOXYCYCLINE HYCLATE 100 MG/1
100 CAPSULE ORAL EVERY 12 HOURS SCHEDULED
Qty: 14 CAPSULE | Refills: 0 | Status: SHIPPED | OUTPATIENT
Start: 2020-01-14 | End: 2020-01-21

## 2020-01-21 ENCOUNTER — APPOINTMENT (OUTPATIENT)
Dept: RADIOLOGY | Facility: MEDICAL CENTER | Age: 70
End: 2020-01-21
Payer: MEDICARE

## 2020-01-21 ENCOUNTER — OFFICE VISIT (OUTPATIENT)
Dept: OBGYN CLINIC | Facility: CLINIC | Age: 70
End: 2020-01-21
Payer: MEDICARE

## 2020-01-21 VITALS
SYSTOLIC BLOOD PRESSURE: 141 MMHG | BODY MASS INDEX: 35.03 KG/M2 | WEIGHT: 190.38 LBS | RESPIRATION RATE: 18 BRPM | DIASTOLIC BLOOD PRESSURE: 86 MMHG | HEART RATE: 84 BPM | HEIGHT: 62 IN

## 2020-01-21 DIAGNOSIS — M79.641 RIGHT HAND PAIN: ICD-10-CM

## 2020-01-21 DIAGNOSIS — M67.449 DIGITAL MUCINOUS CYST OF FINGER: Primary | ICD-10-CM

## 2020-01-21 PROCEDURE — 73120 X-RAY EXAM OF HAND: CPT

## 2020-01-21 PROCEDURE — 99212 OFFICE O/P EST SF 10 MIN: CPT | Performed by: ORTHOPAEDIC SURGERY

## 2020-01-21 RX ORDER — ASPIRIN 81 MG/1
81 TABLET, CHEWABLE ORAL DAILY
COMMUNITY
End: 2020-09-30 | Stop reason: HOSPADM

## 2020-01-21 NOTE — PROGRESS NOTES
70 y o female presents for evaluation right thumb pain with bump and redness that is present for the past 2 weeks  She denies any known recent injury or trauma  She notes if she uses scissors or hits this it causes significant pain  She states that she had almost pus-like material coming out of it at times  She notes sensitive her started there appears to be a a thin type of scab  Denies any numbness or tingling  She notes that she has P seen Dr Denzel Stevenson, a hand surgeon with [facility] in the past for other ailments  She denies any numbness or tingling  She has no other similar findings on any of her other fingers  She notes since using the antibiotics redness is improved and so has the discharge but is  to palpation  Review of Systems  Review of systems negative unless otherwise specified in HPI    Past Medical History  Past Medical History:   Diagnosis Date    Abnormal findings on diagnostic imaging of breast     Anxiety     Arthritis     Fibrocystic breast disease     Foot pain     GERD (gastroesophageal reflux disease)     Hyperlipidemia     Hypertension     Interstitial cystitis     Osteopenia      Past Surgical History  Past Surgical History:   Procedure Laterality Date    APPENDECTOMY      BACK SURGERY      BREAST EXCISIONAL BIOPSY Left 1996    benign    CARPAL BOSS EXCISION      CHOLECYSTECTOMY      DIAGNOSTIC LAPAROSCOPY      FOOT SURGERY      HYSTERECTOMY      age 33    HYSTEROSCOPY      KIDNEY SURGERY Left     KNEE ARTHROSCOPY Bilateral     LAPAROSCOPY      hynecologic with adhesions    OOPHORECTOMY Bilateral     age 33    CA SURG IMPLNT [address] N/A 5/18/2017    Procedure: PLACEMENT OF THORACIC SPINAL CORD STIMULATOR WITH LEFT BUTTOCK IMPLANTABLE PULSE GENERATOR (IMPULSE MONITORING-MOTORS (TCEMEP), EMG, SSEP);   Surgeon: Laila Campbell MD;  [facility];  Service: Neurosurgery    SPINAL STIMULATOR PLACEMENT  05/2017    TONSILLECTOMY AND ADENOIDECTOMY      onset: unknown    TOTAL KNEE ARTHROPLASTY Right      Current Medications  Current Outpatient Medications on File Prior to Visit   Medication Sig Dispense Refill    amitriptyline (ELAVIL) 50 mg tablet Take 1 tablet (50 mg total) by mouth daily 90 tablet 1    aspirin 81 mg chewable tablet Chew 81 mg daily      Biotin 2500 MCG CAPS Take 2 capsules by mouth daily      Cholecalciferol (VITAMIN D-3 PO) Take 1 tablet by mouth daily      Cyanocobalamin (VITAMIN B-12 CR PO) Take 1 tablet by mouth daily      diclofenac sodium (VOLTAREN) 1 % APPLY TO THE AFFECTED AREA(S) TWICE DAILY AS DIRECTED  100 g 0    dicyclomine (BENTYL) 10 mg capsule Take 1 capsule (10 mg total) by mouth 2 (two) times a day 180 capsule 1    doxycycline hyclate (VIBRAMYCIN) 100 mg capsule Take 1 capsule (100 mg total) by mouth every 12 (twelve) hours for 7 days 14 capsule 0    EPINEPHrine (EPIPEN 2-AREN) 0 3 mg/0 3 mL SOAJ Inject 0 3 mL (0 3 mg total) into a muscle as needed for anaphylaxis 2 each 5    fexofenadine (ALLEGRA) 180 MG tablet Take 180 mg by mouth daily      gabapentin (NEURONTIN) 300 mg capsule Take 1 capsule (300 mg total) by mouth 3 (three) times a day 270 capsule 1    hydrOXYzine HCL (ATARAX) 10 mg tablet Take 2 tablets at bedtime 180 tablet 3    losartan (COZAAR) 50 mg tablet Take 1 tablet (50 mg total) by mouth daily 90 tablet 3    montelukast (SINGULAIR) 10 mg tablet Take 1 tablet (10 mg total) by mouth daily at bedtime 90 tablet 0    mupirocin (BACTROBAN) 2 % ointment       Omega-3 Fatty Acids (FISH OIL) 1,000 mg Take 1,000 mg by mouth 3 (three) times a day      omeprazole (PriLOSEC) 40 MG capsule Take 1 capsule (40 mg total) by mouth daily 90 capsule 3    simvastatin (ZOCOR) 10 mg tablet Take 10 mg by mouth every other day       No current facility-administered medications on file prior to visit        Recent Labs [facility])  0   Lab Value Date/Time    HCT 43 0 11/08/2018 0819    HCT 44 0 11/09/2015 0821    HGB 14 3 11/08/2018 0819    HGB 15 2 11/09/2015 0821    WBC 9 33 11/08/2018 0819    WBC 7 19 11/09/2015 0821    INR 0 97 08/09/2017 1945    INR 0 92 05/31/2015 2345    GLUCOSE 118 12/08/2016 2334    GLUCOSE 106 11/09/2015 0821    HGBA1C 6 0 08/10/2017 0510    HGBA1C 5 6 02/12/2015 1306     Physical exam  · General: Awake, Alert, Oriented, BMI 34 8  · Eyes: Pupils equal, round and reactive to light  · Heart: regular rate and rhythm  · Lungs: No audible wheezing  · Abdomen: soft  right thumb: There is approximately a 6 mm round area mildly erythematous raised with appears to be a central scab over the dorsal radial side of the thumb IP joint region slightly distal   This is tender to palpation  There is minimal fluctuance there  No fluid is able be expressed at this point time  She has almost full range of motion of the IP joint with some discomfort near the and degrees of flexion  She has full extension  Light touch sensation is intact throughout  This is not congruent with any tendons  There is no wrist pain with range of motion  Strength is 4+/5  Procedure  None  Imaging  X-rays right thumb notes approximately 1 mm round bony appears to be loose fragment at the IP joint on the dorsal radial side of the thumb  Does not seen on the other view  There is no significant arthritis in this joint  This possibly related to old nonunion fracture  1  Digital mucinous cyst of finger    2  Right hand pain      Assessment:  right thumb IP cyst with possible improving infection    Plan:  Patient is being referred to Hand surgery further expertise in this  Patient prefers to go out of network to Dr Beth Gonzalez with [facility] as she has seen him in the past   She is instructed to finish her antibiotics until complete  May continue using the ointment provided by Dermatology  Follow up in this office on an as-needed basis  Avoid trauma to the area  Report any worsening findings  Patient was evaluated by Dr Mikala Mace as well

## 2020-01-21 NOTE — PATIENT INSTRUCTIONS
Patient is being referred to Hand surgery further expertise in this  Patient prefers to go out of network to Dr Mauro Martinez with [facility] as she has seen him in the past   She is instructed to finish her antibiotics until complete  May continue using the ointment provided by Dermatology  Follow up in this office on an as-needed basis  Avoid trauma to the area  Report any worsening findings

## 2020-02-03 ENCOUNTER — TELEPHONE (OUTPATIENT)
Dept: INTERNAL MEDICINE CLINIC | Facility: CLINIC | Age: 70
End: 2020-02-03

## 2020-02-03 ENCOUNTER — APPOINTMENT (OUTPATIENT)
Dept: LAB | Facility: MEDICAL CENTER | Age: 70
End: 2020-02-03
Payer: MEDICARE

## 2020-02-03 DIAGNOSIS — N30.00 ACUTE CYSTITIS WITHOUT HEMATURIA: Primary | ICD-10-CM

## 2020-02-03 LAB
BACTERIA UR QL AUTO: ABNORMAL /HPF
BILIRUB UR QL STRIP: NEGATIVE
CLARITY UR: ABNORMAL
COLOR UR: YELLOW
GLUCOSE UR STRIP-MCNC: NEGATIVE MG/DL
HGB UR QL STRIP.AUTO: ABNORMAL
HYALINE CASTS #/AREA URNS LPF: ABNORMAL /LPF
KETONES UR STRIP-MCNC: NEGATIVE MG/DL
LEUKOCYTE ESTERASE UR QL STRIP: ABNORMAL
NITRITE UR QL STRIP: NEGATIVE
NON-SQ EPI CELLS URNS QL MICRO: ABNORMAL /HPF
PH UR STRIP.AUTO: 8 [PH]
PROT UR STRIP-MCNC: NEGATIVE MG/DL
RBC #/AREA URNS AUTO: ABNORMAL /HPF
SP GR UR STRIP.AUTO: 1.01 (ref 1–1.03)
UROBILINOGEN UR QL STRIP.AUTO: 0.2 E.U./DL
WBC #/AREA URNS AUTO: ABNORMAL /HPF

## 2020-02-03 PROCEDURE — 81001 URINALYSIS AUTO W/SCOPE: CPT

## 2020-02-03 RX ORDER — CIPROFLOXACIN 500 MG/1
500 TABLET, FILM COATED ORAL EVERY 12 HOURS SCHEDULED
Qty: 10 TABLET | Refills: 0 | Status: SHIPPED | OUTPATIENT
Start: 2020-02-03 | End: 2020-02-08

## 2020-02-03 NOTE — TELEPHONE ENCOUNTER
Patient having burning, strong odor to urine, pain in side and back     Asking if you would order something for UTI     Allergies: PCN, Bactrim

## 2020-02-03 NOTE — TELEPHONE ENCOUNTER
If she can drop off sample please do  Cipro 500mg bid for 5 days if she can tolerate that - not on her list

## 2020-03-23 ENCOUNTER — APPOINTMENT (OUTPATIENT)
Dept: LAB | Facility: MEDICAL CENTER | Age: 70
End: 2020-03-23
Payer: MEDICARE

## 2020-03-23 ENCOUNTER — TELEPHONE (OUTPATIENT)
Dept: INTERNAL MEDICINE CLINIC | Facility: CLINIC | Age: 70
End: 2020-03-23

## 2020-03-23 ENCOUNTER — TELEMEDICINE (OUTPATIENT)
Dept: INTERNAL MEDICINE CLINIC | Facility: CLINIC | Age: 70
End: 2020-03-23
Payer: MEDICARE

## 2020-03-23 DIAGNOSIS — R10.9 FLANK PAIN: Primary | ICD-10-CM

## 2020-03-23 DIAGNOSIS — N30.11 INTERSTITIAL CYSTITIS (CHRONIC) WITH HEMATURIA: ICD-10-CM

## 2020-03-23 DIAGNOSIS — M79.2 NEUROPATHIC PAIN: ICD-10-CM

## 2020-03-23 LAB
BACTERIA UR QL AUTO: ABNORMAL /HPF
BILIRUB UR QL STRIP: NEGATIVE
CLARITY UR: ABNORMAL
COLOR UR: YELLOW
GLUCOSE UR STRIP-MCNC: NEGATIVE MG/DL
HGB UR QL STRIP.AUTO: NEGATIVE
HYALINE CASTS #/AREA URNS LPF: ABNORMAL /LPF
KETONES UR STRIP-MCNC: NEGATIVE MG/DL
LEUKOCYTE ESTERASE UR QL STRIP: ABNORMAL
NITRITE UR QL STRIP: NEGATIVE
NON-SQ EPI CELLS URNS QL MICRO: ABNORMAL /HPF
PH UR STRIP.AUTO: 6.5 [PH]
PROT UR STRIP-MCNC: NEGATIVE MG/DL
RBC #/AREA URNS AUTO: ABNORMAL /HPF
SP GR UR STRIP.AUTO: 1.02 (ref 1–1.03)
UROBILINOGEN UR QL STRIP.AUTO: 0.2 E.U./DL
WBC #/AREA URNS AUTO: ABNORMAL /HPF

## 2020-03-23 PROCEDURE — G2012 BRIEF CHECK IN BY MD/QHP: HCPCS | Performed by: INTERNAL MEDICINE

## 2020-03-23 PROCEDURE — 87086 URINE CULTURE/COLONY COUNT: CPT

## 2020-03-23 PROCEDURE — 81001 URINALYSIS AUTO W/SCOPE: CPT

## 2020-03-23 NOTE — TELEPHONE ENCOUNTER
Patient having more nerve pain, tingling at bottom of rib cage on back  She did increase her Gabapentin  Not able to sleep    No temp or other sxs    She was requesting appt

## 2020-03-23 NOTE — PROGRESS NOTES
Virtual Brief Visit    Reason for visit is flank pain      Encounter provider Mira Jo DO    Provider located at [address] [address]      Recent Visits  No visits were found meeting these conditions  Showing recent visits within past 7 days and meeting all other requirements     Today's Visits  Date Type Provider Dept   03/23/20 Telephone Winifred Montero, [address] [facility] [address] [place] [address] today's visits and meeting all other requirements     Future Appointments  No visits were found meeting these conditions  Showing future appointments within next 150 days and meeting all other requirements        Patient agrees to participate in a virtual check in via telephone or video visit instead of presenting to the office to address urgent/immediate medical needs  Patient is aware this is a billable service  After connecting through telephone, the patient was identified by name and date of birth  Crystal Bronson was informed that this was a telemedicine visit and that the visit is being conducted through telephone which may not be secure and therefore might not be HIPAA-compliant  My office door was closed  No one else was in the room  She acknowledged consent and understanding of privacy and security of the virtual check-in visit  I informed the patient that I have reviewed her record in Epic and presented the opportunity for her to ask any questions regarding the visit today  The patient initiated communication and agreed to participate  Subjective  Crystal Bronson is a 72 y o  female who has increasing flank pain as well as thoracic nerve pain She has longstanding chronic pain syndrome and has been followed by pain mgmt  Pt is on gabapentin and did increase to 300mg tid last week as she had been directed if nerve pain sxs increased    Pt had flank area pain on the right and the nerve pain which kept her up Temp wnl at 97 4 per pt No chills No n/v No abdominal pain Urine has been clear per patient although she has hx of interstitial cystitis She has been stressed in recent days with everything going on      Past Medical History:   Diagnosis Date    Abnormal findings on diagnostic imaging of breast     Anxiety     Arthritis     Fibrocystic breast disease     Foot pain     GERD (gastroesophageal reflux disease)     Hyperlipidemia     Hypertension     Interstitial cystitis     Osteopenia        Past Surgical History:   Procedure Laterality Date    APPENDECTOMY      BACK SURGERY      BREAST EXCISIONAL BIOPSY Left 1996    benign    CARPAL BOSS EXCISION      CHOLECYSTECTOMY      DIAGNOSTIC LAPAROSCOPY      FOOT SURGERY      HYSTERECTOMY      age 33    HYSTEROSCOPY      KIDNEY SURGERY Left     KNEE ARTHROSCOPY Bilateral     LAPAROSCOPY      hynecologic with adhesions    OOPHORECTOMY Bilateral     age 33    PA SURG IMPLNT NEUROELECT,EPIDURAL N/A 5/18/2017    Procedure: PLACEMENT OF THORACIC SPINAL CORD STIMULATOR WITH LEFT BUTTOCK IMPLANTABLE PULSE GENERATOR (IMPULSE MONITORING-MOTORS (TCEMEP), EMG, SSEP); Surgeon: Devora Gustafson MD;  [facility];  Service: Neurosurgery    SPINAL STIMULATOR PLACEMENT  05/2017    TONSILLECTOMY AND ADENOIDECTOMY      onset: unknown    TOTAL KNEE ARTHROPLASTY Right        Current Outpatient Medications   Medication Sig Dispense Refill    amitriptyline (ELAVIL) 50 mg tablet Take 1 tablet (50 mg total) by mouth daily 90 tablet 1    aspirin 81 mg chewable tablet Chew 81 mg daily      Biotin 2500 MCG CAPS Take 2 capsules by mouth daily      Cholecalciferol (VITAMIN D-3 PO) Take 1 tablet by mouth daily      Cyanocobalamin (VITAMIN B-12 CR PO) Take 1 tablet by mouth daily      diclofenac sodium (VOLTAREN) 1 % APPLY TO THE AFFECTED AREA(S) TWICE DAILY AS DIRECTED   100 g 0    dicyclomine (BENTYL) 10 mg capsule Take 1 capsule (10 mg total) by mouth 2 (two) times a day 180 capsule 1  EPINEPHrine (EPIPEN 2-AREN) 0 3 mg/0 3 mL SOAJ Inject 0 3 mL (0 3 mg total) into a muscle as needed for anaphylaxis 2 each 5    fexofenadine (ALLEGRA) 180 MG tablet Take 180 mg by mouth daily      gabapentin (NEURONTIN) 300 mg capsule Take 1 capsule (300 mg total) by mouth 3 (three) times a day 270 capsule 1    hydrOXYzine HCL (ATARAX) 10 mg tablet Take 2 tablets at bedtime 180 tablet 3    losartan (COZAAR) 50 mg tablet Take 1 tablet (50 mg total) by mouth daily 90 tablet 3    montelukast (SINGULAIR) 10 mg tablet Take 1 tablet (10 mg total) by mouth daily at bedtime 90 tablet 0    mupirocin (BACTROBAN) 2 % ointment       Omega-3 Fatty Acids (FISH OIL) 1,000 mg Take 1,000 mg by mouth 3 (three) times a day      omeprazole (PriLOSEC) 40 MG capsule Take 1 capsule (40 mg total) by mouth daily 90 capsule 3    simvastatin (ZOCOR) 10 mg tablet Take 10 mg by mouth every other day       No current facility-administered medications for this visit  Allergies   Allergen Reactions    Bee Venom     Codeine Other (See Comments)     headaches    Egg Yolk     Iodinated Diagnostic Agents Hives    Other      Adhesive tape    Penicillins Hives    Bactrim [Sulfamethoxazole-Trimethoprim] Rash    Elmiron [Pentosan Polysulfate] Rash    Latex Rash    Oxybutynin Rash       Assessment  Flank pain r/o uti  Submit urine today  Increase fluids  Will call patient with prelim tomorrow    Neuropathic pain  Trial lidocaine patch for up to 12 hours  Use voltaren gel prn  Take 600mg Gabapentin in PM starting tonight    Hx interstitial cystitis  Follow IC diet recommendations Rest fluids    Call if sxs worsen     Rickey Mulligan has flank pain along with exacerbation of chronic neuropathic pain  She will drop off urine today and continue to drink fluids, use topical analgesics to the area    She is to start additional 300mg of gabapentin at HS  She is already on 50mg Elavil         Disposition:    See orders above and will call patient likely tomorrow with preliminary urine results  If sxs worsen, she is to call for further recommendations and pt voiced understanding and expressed she does not want to go to ER if possible and presently sxs do not seem to warrant further level care    I spent 10 minutes with the patient during this virtual check-in visit

## 2020-03-24 LAB — BACTERIA UR CULT: NORMAL

## 2020-03-25 DIAGNOSIS — M54.16 LUMBAR RADICULOPATHY, CHRONIC: Primary | ICD-10-CM

## 2020-03-25 DIAGNOSIS — M19.91 DEGENERATIVE JOINT DISEASE OF RIGHT LOWER EXTREMITY: ICD-10-CM

## 2020-03-25 RX ORDER — CYCLOBENZAPRINE HCL 5 MG
5 TABLET ORAL EVERY 6 HOURS PRN
Qty: 20 TABLET | Refills: 0 | Status: SHIPPED | OUTPATIENT
Start: 2020-03-25 | End: 2020-04-25

## 2020-03-25 NOTE — TELEPHONE ENCOUNTER
----- Message from Consuelo Diaz DO sent at 3/24/2020  6:05 PM EDT -----  Urine culture showed no growth

## 2020-03-27 ENCOUNTER — TELEPHONE (OUTPATIENT)
Dept: INTERNAL MEDICINE CLINIC | Facility: CLINIC | Age: 70
End: 2020-03-27

## 2020-03-27 NOTE — TELEPHONE ENCOUNTER
Pt calling asking if she can take more than the 3 prescribed gabapentin prescribed daily  Not sure how many is to many  Please advise on how many she can take daily

## 2020-04-24 DIAGNOSIS — R05.9 COUGH: ICD-10-CM

## 2020-04-24 DIAGNOSIS — F41.9 ANXIETY: ICD-10-CM

## 2020-04-25 DIAGNOSIS — M54.16 LUMBAR RADICULOPATHY, CHRONIC: ICD-10-CM

## 2020-04-25 DIAGNOSIS — M19.91 DEGENERATIVE JOINT DISEASE OF RIGHT LOWER EXTREMITY: ICD-10-CM

## 2020-04-25 RX ORDER — CYCLOBENZAPRINE HCL 5 MG
TABLET ORAL
Qty: 20 TABLET | Refills: 0 | Status: SHIPPED | OUTPATIENT
Start: 2020-04-25 | End: 2020-06-26 | Stop reason: SDUPTHER

## 2020-04-27 RX ORDER — MONTELUKAST SODIUM 10 MG/1
10 TABLET ORAL
Qty: 90 TABLET | Refills: 3 | Status: SHIPPED | OUTPATIENT
Start: 2020-04-27 | End: 2021-02-03

## 2020-04-27 RX ORDER — AMITRIPTYLINE HYDROCHLORIDE 50 MG/1
50 TABLET, FILM COATED ORAL DAILY
Qty: 90 TABLET | Refills: 3 | Status: SHIPPED | OUTPATIENT
Start: 2020-04-27 | End: 2021-02-15

## 2020-05-26 DIAGNOSIS — K58.9 IRRITABLE BOWEL SYNDROME WITHOUT DIARRHEA: ICD-10-CM

## 2020-05-26 RX ORDER — DICYCLOMINE HYDROCHLORIDE 10 MG/1
10 CAPSULE ORAL 2 TIMES DAILY
Qty: 180 CAPSULE | Refills: 0 | Status: SHIPPED | OUTPATIENT
Start: 2020-05-26 | End: 2020-11-12 | Stop reason: SDUPTHER

## 2020-05-29 ENCOUNTER — TRANSCRIBE ORDERS (OUTPATIENT)
Dept: ADMINISTRATIVE | Facility: HOSPITAL | Age: 70
End: 2020-05-29

## 2020-05-29 DIAGNOSIS — Z12.31 SCREENING MAMMOGRAM FOR HIGH-RISK PATIENT: Primary | ICD-10-CM

## 2020-05-29 DIAGNOSIS — Z13.820 SPECIAL SCREENING FOR OSTEOPOROSIS: ICD-10-CM

## 2020-06-08 ENCOUNTER — APPOINTMENT (OUTPATIENT)
Dept: LAB | Facility: MEDICAL CENTER | Age: 70
End: 2020-06-08
Payer: MEDICARE

## 2020-06-08 DIAGNOSIS — I10 HYPERTENSION, UNSPECIFIED TYPE: ICD-10-CM

## 2020-06-08 DIAGNOSIS — E78.2 MIXED HYPERLIPIDEMIA: ICD-10-CM

## 2020-06-08 LAB
ALBUMIN SERPL BCP-MCNC: 3.8 G/DL (ref 3.5–5)
ALP SERPL-CCNC: 88 U/L (ref 46–116)
ALT SERPL W P-5'-P-CCNC: 61 U/L (ref 12–78)
ANION GAP SERPL CALCULATED.3IONS-SCNC: 4 MMOL/L (ref 4–13)
AST SERPL W P-5'-P-CCNC: 36 U/L (ref 5–45)
BILIRUB SERPL-MCNC: 0.46 MG/DL (ref 0.2–1)
BUN SERPL-MCNC: 14 MG/DL (ref 5–25)
CALCIUM SERPL-MCNC: 9.2 MG/DL (ref 8.3–10.1)
CHLORIDE SERPL-SCNC: 108 MMOL/L (ref 100–108)
CO2 SERPL-SCNC: 28 MMOL/L (ref 21–32)
CREAT SERPL-MCNC: 0.66 MG/DL (ref 0.6–1.3)
GFR SERPL CREATININE-BSD FRML MDRD: 90 ML/MIN/1.73SQ M
GLUCOSE P FAST SERPL-MCNC: 104 MG/DL (ref 65–99)
LDLC SERPL DIRECT ASSAY-MCNC: 156 MG/DL (ref 0–100)
POTASSIUM SERPL-SCNC: 4.3 MMOL/L (ref 3.5–5.3)
PROT SERPL-MCNC: 7 G/DL (ref 6.4–8.2)
SODIUM SERPL-SCNC: 140 MMOL/L (ref 136–145)

## 2020-06-08 PROCEDURE — 36415 COLL VENOUS BLD VENIPUNCTURE: CPT

## 2020-06-08 PROCEDURE — 80053 COMPREHEN METABOLIC PANEL: CPT

## 2020-06-08 PROCEDURE — 83721 ASSAY OF BLOOD LIPOPROTEIN: CPT

## 2020-06-15 ENCOUNTER — OFFICE VISIT (OUTPATIENT)
Dept: INTERNAL MEDICINE CLINIC | Facility: CLINIC | Age: 70
End: 2020-06-15
Payer: MEDICARE

## 2020-06-15 VITALS
DIASTOLIC BLOOD PRESSURE: 78 MMHG | TEMPERATURE: 97 F | HEART RATE: 83 BPM | WEIGHT: 200 LBS | OXYGEN SATURATION: 94 % | BODY MASS INDEX: 36.8 KG/M2 | SYSTOLIC BLOOD PRESSURE: 124 MMHG | HEIGHT: 62 IN

## 2020-06-15 DIAGNOSIS — L29.9 ITCHING: ICD-10-CM

## 2020-06-15 DIAGNOSIS — G89.29 CHRONIC RIGHT-SIDED THORACIC BACK PAIN: ICD-10-CM

## 2020-06-15 DIAGNOSIS — G60.9 IDIOPATHIC PERIPHERAL NEUROPATHY: ICD-10-CM

## 2020-06-15 DIAGNOSIS — M54.16 LUMBAR RADICULOPATHY, CHRONIC: Primary | ICD-10-CM

## 2020-06-15 DIAGNOSIS — I10 ESSENTIAL HYPERTENSION: ICD-10-CM

## 2020-06-15 DIAGNOSIS — E78.5 HYPERLIPIDEMIA, UNSPECIFIED HYPERLIPIDEMIA TYPE: ICD-10-CM

## 2020-06-15 DIAGNOSIS — M54.6 CHRONIC RIGHT-SIDED THORACIC BACK PAIN: ICD-10-CM

## 2020-06-15 PROBLEM — M79.2 NEUROPATHIC PAIN: Status: RESOLVED | Noted: 2020-03-23 | Resolved: 2020-06-15

## 2020-06-15 PROBLEM — R79.89 ELEVATED LFTS: Status: RESOLVED | Noted: 2017-08-10 | Resolved: 2020-06-15

## 2020-06-15 PROBLEM — M79.604 RIGHT LEG PAIN: Status: RESOLVED | Noted: 2019-07-11 | Resolved: 2020-06-15
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
## 2020-06-15 PROBLEM — R10.9 FLANK PAIN: Status: RESOLVED | Noted: 2020-03-23 | Resolved: 2020-06-15

## 2020-06-15 PROBLEM — M77.31 HEEL SPUR, RIGHT: Status: RESOLVED | Noted: 2019-07-24 | Resolved: 2020-06-15

## 2020-06-15 PROCEDURE — 99214 OFFICE O/P EST MOD 30 MIN: CPT | Performed by: INTERNAL MEDICINE

## 2020-06-15 PROCEDURE — 3008F BODY MASS INDEX DOCD: CPT | Performed by: INTERNAL MEDICINE

## 2020-06-15 PROCEDURE — 1160F RVW MEDS BY RX/DR IN RCRD: CPT | Performed by: INTERNAL MEDICINE

## 2020-06-15 PROCEDURE — 3078F DIAST BP <80 MM HG: CPT | Performed by: INTERNAL MEDICINE

## 2020-06-15 PROCEDURE — 1036F TOBACCO NON-USER: CPT | Performed by: INTERNAL MEDICINE

## 2020-06-15 PROCEDURE — 3074F SYST BP LT 130 MM HG: CPT | Performed by: INTERNAL MEDICINE

## 2020-06-15 RX ORDER — GABAPENTIN 300 MG/1
300 CAPSULE ORAL 3 TIMES DAILY
Qty: 270 CAPSULE | Refills: 3 | Status: SHIPPED | OUTPATIENT
Start: 2020-06-15 | End: 2020-10-07 | Stop reason: SDUPTHER

## 2020-06-15 RX ORDER — HYDROXYZINE HYDROCHLORIDE 10 MG/1
TABLET, FILM COATED ORAL
Qty: 180 TABLET | Refills: 3 | Status: SHIPPED | OUTPATIENT
Start: 2020-06-15 | End: 2021-06-16 | Stop reason: SDUPTHER

## 2020-06-20 DIAGNOSIS — E78.2 MIXED HYPERLIPIDEMIA: ICD-10-CM

## 2020-06-21 RX ORDER — ROSUVASTATIN CALCIUM 10 MG/1
10 TABLET, COATED ORAL DAILY
Qty: 90 TABLET | Refills: 0 | Status: SHIPPED | OUTPATIENT
Start: 2020-06-21 | End: 2021-06-16 | Stop reason: SDUPTHER

## 2020-06-23 ENCOUNTER — OFFICE VISIT (OUTPATIENT)
Dept: UROLOGY | Facility: CLINIC | Age: 70
End: 2020-06-23
Payer: MEDICARE

## 2020-06-23 VITALS
HEIGHT: 62 IN | BODY MASS INDEX: 36.92 KG/M2 | WEIGHT: 200.62 LBS | DIASTOLIC BLOOD PRESSURE: 80 MMHG | SYSTOLIC BLOOD PRESSURE: 122 MMHG | HEART RATE: 83 BPM | TEMPERATURE: 96.6 F

## 2020-06-23 DIAGNOSIS — N30.11 INTERSTITIAL CYSTITIS (CHRONIC) WITH HEMATURIA: ICD-10-CM

## 2020-06-23 DIAGNOSIS — Z01.419 WELL WOMAN EXAM WITH ROUTINE GYNECOLOGICAL EXAM: Primary | ICD-10-CM

## 2020-06-23 PROCEDURE — 1160F RVW MEDS BY RX/DR IN RCRD: CPT | Performed by: PHYSICIAN ASSISTANT

## 2020-06-23 PROCEDURE — 3079F DIAST BP 80-89 MM HG: CPT | Performed by: PHYSICIAN ASSISTANT

## 2020-06-23 PROCEDURE — 3008F BODY MASS INDEX DOCD: CPT | Performed by: PHYSICIAN ASSISTANT

## 2020-06-23 PROCEDURE — 99213 OFFICE O/P EST LOW 20 MIN: CPT | Performed by: PHYSICIAN ASSISTANT

## 2020-06-23 PROCEDURE — 3074F SYST BP LT 130 MM HG: CPT | Performed by: PHYSICIAN ASSISTANT

## 2020-06-23 PROCEDURE — 1036F TOBACCO NON-USER: CPT | Performed by: PHYSICIAN ASSISTANT

## 2020-06-24 ENCOUNTER — HOSPITAL ENCOUNTER (OUTPATIENT)
Dept: MAMMOGRAPHY | Facility: HOSPITAL | Age: 70
Discharge: HOME | End: 2020-06-24
Payer: MEDICARE

## 2020-06-24 ENCOUNTER — HOSPITAL ENCOUNTER (OUTPATIENT)
Dept: BONE DENSITY | Facility: HOSPITAL | Age: 70
Discharge: HOME | End: 2020-06-24
Payer: MEDICARE

## 2020-06-24 ENCOUNTER — HOSPITAL ENCOUNTER (OUTPATIENT)
Dept: CT IMAGING | Facility: HOSPITAL | Age: 70
Discharge: HOME | End: 2020-06-24
Attending: INTERNAL MEDICINE
Payer: MEDICARE

## 2020-06-24 VITALS — HEIGHT: 62 IN | BODY MASS INDEX: 38.56 KG/M2 | WEIGHT: 209.56 LBS

## 2020-06-24 DIAGNOSIS — Z13.820 SPECIAL SCREENING FOR OSTEOPOROSIS: ICD-10-CM

## 2020-06-24 DIAGNOSIS — G89.29 CHRONIC RIGHT-SIDED THORACIC BACK PAIN: ICD-10-CM

## 2020-06-24 DIAGNOSIS — M54.6 CHRONIC RIGHT-SIDED THORACIC BACK PAIN: ICD-10-CM

## 2020-06-24 DIAGNOSIS — Z12.31 ENCOUNTER FOR SCREENING MAMMOGRAM FOR MALIGNANT NEOPLASM OF BREAST: ICD-10-CM

## 2020-06-24 DIAGNOSIS — Z12.31 SCREENING MAMMOGRAM FOR HIGH-RISK PATIENT: ICD-10-CM

## 2020-06-24 PROCEDURE — 77080 DXA BONE DENSITY AXIAL: CPT

## 2020-06-24 PROCEDURE — 77063 BREAST TOMOSYNTHESIS BI: CPT

## 2020-06-24 PROCEDURE — 72128 CT CHEST SPINE W/O DYE: CPT

## 2020-06-24 PROCEDURE — 77067 SCR MAMMO BI INCL CAD: CPT

## 2020-06-26 DIAGNOSIS — M19.91 DEGENERATIVE JOINT DISEASE OF RIGHT LOWER EXTREMITY: ICD-10-CM

## 2020-06-26 DIAGNOSIS — M54.16 LUMBAR RADICULOPATHY, CHRONIC: ICD-10-CM

## 2020-06-26 RX ORDER — CYCLOBENZAPRINE HCL 5 MG
5 TABLET ORAL EVERY 6 HOURS PRN
Qty: 20 TABLET | Refills: 0 | Status: SHIPPED | OUTPATIENT
Start: 2020-06-26 | End: 2020-08-13

## 2020-06-29 NOTE — PROGRESS NOTES
PT Evaluation     Today's date: 2020  Patient name: José Aiken  : 1950  MRN: [id number]  Referring provider: Adalgisa Carr DO  Dx:   Encounter Diagnosis     ICD-10-CM    1  Lumbar radiculopathy, chronic M54 16 Ambulatory referral to Physical Therapy   2  Degenerative joint disease of right lower extremity M19 91 Ambulatory referral to Physical Therapy                  Assessment  Assessment details:   CURRENT FUNCTIONAL STATUS    Standing/ADL tolerance 30 minutes  Walking tolerance 2-3 blocks  Ability to bend forward: Poor       SHORT TERM GOALS (2 WEEKS)    Increase thoraco-lumbar spine AROM 25 % in all planes  Decrease pain to 1-4/10  Standing/ADL tolerance 45 minutes  Walking tolerance 4-5 blocks  Ability to bend forward: Fair     LONG TERM GOALS (DISCHARGE)    Lumbar Spine AROM: WFL in all planes  Decrease pain to 0-3/10  Standing/ADL tolerance 60 minutes  Walking tolerance 5-6 blocks  Ability to bend forward: Fair/Good   Understanding of Dx/Px/POC: good   Prognosis: fair    Goals  See assessment details above  Plan  Plan details: José Aiken is a 72 y o  female presenting to PT with pain, decreased range of motion, and decreased tolerance to activity  This patient would benefit from skilled PT services to address these issues and to maximize function  A home exercise program was provided and all questions were answered  Thank you for the referral     Patient would benefit from: skilled physical therapy  Planned modality interventions: thermotherapy: hydrocollator packs and ultrasound  Planned therapy interventions: manual therapy, self care, therapeutic activities and therapeutic exercise  Frequency: 2x week  Duration in weeks: 4        Subjective Evaluation    History of Present Illness  Mechanism of injury: CC: Right sided low back pain  HPI: The patient has had chronic back pain for many years   This episode began this past March for no apparent reason  Her pain is between her lower rib cage and pelvis  She states that it is worsening since onset  A CT scan was normal  She takes muscle relaxers for relief  She works part time as a   Pain  Current pain ratin  At best pain ratin  At worst pain ratin    Patient Goals  Patient goals for therapy: decreased pain and increased motion          Objective     Postural Observations    Additional Postural Observation Details  Gait and transfers normal, no lateral shift  General Comments:      Lumbar Comments  CURRENT OBJECTIVE MEASUREMENTS    Thoraco-Lumbar Spine AROM: F=50 %, EXT=25 %, R SB=50 %, L SB=25 %  Marked tenderness and spasm in the right thoraco-lumbar spine                      Precautions: None      Manuals         STM R T-L spine RK        P/A Mob         Flex/Rot Mob         Hamstring Stretch         Piriformis Stretch                  Neuro Re-Ed         Quadruped Arm Raises         Quadruped Leg Raises         Quadruped Arm and Leg Raises         AB Supine         AB with Arm Raises Supine         AB with Leg Raises Supine         AB with Arm and Leg Raises Supine         Prone TB ROT         Prone Ball Squeeze         Prone Hip EXT         Prone Planks         Squats with Arm Raises         Centraliztion Concepts         Body Mechanics Instruction for Lifting         Disc Mechanics Instruction         Sitting Posture Correction with Roll         Sleeping Posture Correction with Roll         Prophylaxis of Recurrence                  Ther Ex         T-L L SB standing 10x10" TID HEP        Curl Ups Straight         Curl Ups Oblique         BACK EXT:  F , P , C         PYR AB:  S , P         Rotary Torso:  S , P , C         Hoist Row: S         LAT Pulldown         SA Pulldown         Oblique Press         Multi Hip: Flex  F              EXT  P              ABD                  ADD         PYR QUAD:  S , P , C         NuStep:   S , A                  Ther Activity Squatting         Lifting         Carrying         Lunging                  Modalities         HP/IFC         US 3 3 m/cm2 15'

## 2020-07-01 ENCOUNTER — EVALUATION (OUTPATIENT)
Dept: PHYSICAL THERAPY | Facility: CLINIC | Age: 70
End: 2020-07-01
Payer: MEDICARE

## 2020-07-01 DIAGNOSIS — M54.16 LUMBAR RADICULOPATHY, CHRONIC: Primary | ICD-10-CM

## 2020-07-01 DIAGNOSIS — M19.91 DEGENERATIVE JOINT DISEASE OF RIGHT LOWER EXTREMITY: ICD-10-CM

## 2020-07-01 PROCEDURE — 97035 APP MDLTY 1+ULTRASOUND EA 15: CPT | Performed by: PHYSICAL THERAPIST

## 2020-07-01 PROCEDURE — 97140 MANUAL THERAPY 1/> REGIONS: CPT | Performed by: PHYSICAL THERAPIST

## 2020-07-01 PROCEDURE — 97161 PT EVAL LOW COMPLEX 20 MIN: CPT | Performed by: PHYSICAL THERAPIST

## 2020-07-01 PROCEDURE — 97110 THERAPEUTIC EXERCISES: CPT | Performed by: PHYSICAL THERAPIST

## 2020-07-03 ENCOUNTER — APPOINTMENT (OUTPATIENT)
Dept: PHYSICAL THERAPY | Facility: CLINIC | Age: 70
End: 2020-07-03
Payer: MEDICARE

## 2020-07-07 ENCOUNTER — OFFICE VISIT (OUTPATIENT)
Dept: PHYSICAL THERAPY | Facility: CLINIC | Age: 70
End: 2020-07-07
Payer: MEDICARE

## 2020-07-07 DIAGNOSIS — M19.91 DEGENERATIVE JOINT DISEASE OF RIGHT LOWER EXTREMITY: ICD-10-CM

## 2020-07-07 DIAGNOSIS — M54.16 LUMBAR RADICULOPATHY, CHRONIC: Primary | ICD-10-CM

## 2020-07-07 PROCEDURE — 97035 APP MDLTY 1+ULTRASOUND EA 15: CPT

## 2020-07-07 PROCEDURE — 97110 THERAPEUTIC EXERCISES: CPT

## 2020-07-07 PROCEDURE — 97140 MANUAL THERAPY 1/> REGIONS: CPT

## 2020-07-07 NOTE — PROGRESS NOTES
Daily Note     Today's date: 2020  Patient name: Kira Ward  : 1950  MRN: [id number]  Referring provider: Ra Jack DO  Dx:   Encounter Diagnosis     ICD-10-CM    1  Lumbar radiculopathy, chronic M54 16    2  Degenerative joint disease of right lower extremity M19 91                   Subjective: Patient reports compliance with HEP  She states R rib soreness is no worse  Objective: See treatment diary below      Assessment: Tolerated treatment well  Patient exhibited good technique with therapeutic exercises      Plan: Continue per plan of care           Precautions: None      Manuals        STM R T-L spine RK LF       P/A Mob         Flex/Rot Mob         Hamstring Stretch         Piriformis Stretch                  Neuro Re-Ed         Quadruped Arm Raises         Quadruped Leg Raises         Quadruped Arm and Leg Raises         AB Supine         AB with Arm Raises Supine         AB with Leg Raises Supine         AB with Arm and Leg Raises Supine         Prone TB ROT         Prone Ball Squeeze         Prone Hip EXT         Prone Planks         Squats with Arm Raises         Centraliztion Concepts         Body Mechanics Instruction for Lifting         Disc Mechanics Instruction         Sitting Posture Correction with Roll         Sleeping Posture Correction with Roll         Prophylaxis of Recurrence                  Ther Ex         T-L L SB standing 10x10" TID HEP 10" X10       Curl Ups Straight         Curl Ups Oblique         BACK EXT:  F , P , C         PYR AB:  S , P         Rotary Torso:  S , P , C         Hoist Row: S         LAT Pulldown         SA Pulldown         Oblique Press         Multi Hip: Flex  F              EXT  P              ABD                  ADD         PYR QUAD:  S , P , C         NuStep:   S , A                  Ther Activity         Squatting         Lifting         Carrying         Lunging                  Modalities         HP/IFC         US 3 3 m/cm2 15' 15'

## 2020-07-08 ENCOUNTER — OFFICE VISIT (OUTPATIENT)
Dept: PHYSICAL THERAPY | Facility: CLINIC | Age: 70
End: 2020-07-08
Payer: MEDICARE

## 2020-07-08 DIAGNOSIS — M54.16 LUMBAR RADICULOPATHY, CHRONIC: Primary | ICD-10-CM

## 2020-07-08 DIAGNOSIS — M19.91 DEGENERATIVE JOINT DISEASE OF RIGHT LOWER EXTREMITY: ICD-10-CM

## 2020-07-08 PROCEDURE — 97110 THERAPEUTIC EXERCISES: CPT

## 2020-07-08 PROCEDURE — 97035 APP MDLTY 1+ULTRASOUND EA 15: CPT

## 2020-07-08 PROCEDURE — 97140 MANUAL THERAPY 1/> REGIONS: CPT

## 2020-07-08 NOTE — PROGRESS NOTES
Daily Note     Today's date: 2020  Patient name: Marylen Mini  : 1950  MRN: [id number]  Referring provider: Halina Paget, DO  Dx:   Encounter Diagnosis     ICD-10-CM    1  Lumbar radiculopathy, chronic M54 16    2  Degenerative joint disease of right lower extremity M19 91                   Subjective: Patient reports her R side rib area is has been feeling better, until working and twisting today  Objective: See treatment diary below      Assessment: Tolerated treatment well  Patient exhibited good technique with therapeutic exercises  Patient reported the treatment felt good and relieved all discomfort  Plan: Continue per plan of care           Precautions: None      Manuals       STM R T-L spine RK LF LF      P/A Mob         Flex/Rot Mob         Hamstring Stretch         Piriformis Stretch                  Neuro Re-Ed         Quadruped Arm Raises         Quadruped Leg Raises         Quadruped Arm and Leg Raises         AB Supine         AB with Arm Raises Supine         AB with Leg Raises Supine         AB with Arm and Leg Raises Supine         Prone TB ROT         Prone Ball Squeeze         Prone Hip EXT         Prone Planks         Squats with Arm Raises         Centraliztion Concepts         Body Mechanics Instruction for Lifting         Disc Mechanics Instruction         Sitting Posture Correction with Roll         Sleeping Posture Correction with Roll         Prophylaxis of Recurrence                  Ther Ex         T-L L SB standing 10x10" TID HEP 10" X10 10" X 10      Curl Ups Straight         Curl Ups Oblique         BACK EXT:  F , P , C         PYR AB:  S , P         Rotary Torso:  S , P , C         Hoist Row: S         LAT Pulldown         SA Pulldown         Oblique Press         Multi Hip: Flex  F              EXT  P              ABD                  ADD         PYR QUAD:  S , P , C         NuStep:   S , A                  Ther Activity         Squatting Lifting         Carrying         Lunging                  Modalities         HP/IFC         US 3 3 m/cm2 15' 15' 15'

## 2020-07-14 ENCOUNTER — OFFICE VISIT (OUTPATIENT)
Dept: PHYSICAL THERAPY | Facility: CLINIC | Age: 70
End: 2020-07-14
Payer: MEDICARE

## 2020-07-14 DIAGNOSIS — M54.16 LUMBAR RADICULOPATHY, CHRONIC: Primary | ICD-10-CM

## 2020-07-14 DIAGNOSIS — M19.91 DEGENERATIVE JOINT DISEASE OF RIGHT LOWER EXTREMITY: ICD-10-CM

## 2020-07-14 PROCEDURE — 97140 MANUAL THERAPY 1/> REGIONS: CPT

## 2020-07-14 PROCEDURE — 97035 APP MDLTY 1+ULTRASOUND EA 15: CPT

## 2020-07-14 PROCEDURE — 97110 THERAPEUTIC EXERCISES: CPT

## 2020-07-14 NOTE — PROGRESS NOTES
Daily Note     Today's date: 2020  Patient name: Zina Hinson  : 1950  MRN: [id number]  Referring provider: Gilbert oS DO  Dx: No diagnosis found  Subjective: Patient reports she was very sore on the R side 2 days ago after increased activity  She reports she is trying to be more careful when working  Objective: See treatment diary below      Assessment: Tolerated treatment well  Patient exhibited good technique with therapeutic exercises  Patient c/o tenderness with STM on R side, rib area, where hard area about size of quarter can be palpated  Patient reported reduced soreness post session  Plan: Continue per plan of care           Precautions: None      Manuals      STM R T-L spine RK LF LF LF     P/A Mob         Flex/Rot Mob         Hamstring Stretch         Piriformis Stretch                  Neuro Re-Ed         Quadruped Arm Raises         Quadruped Leg Raises         Quadruped Arm and Leg Raises         AB Supine         AB with Arm Raises Supine         AB with Leg Raises Supine         AB with Arm and Leg Raises Supine         Prone TB ROT         Prone Ball Squeeze         Prone Hip EXT         Prone Planks         Squats with Arm Raises         Centraliztion Concepts         Body Mechanics Instruction for Lifting         Disc Mechanics Instruction         Sitting Posture Correction with Roll         Sleeping Posture Correction with Roll         Prophylaxis of Recurrence                  Ther Ex         T-L L SB standing 10x10" TID HEP 10" X10 10" X 10 10" X 10     Curl Ups Straight         Curl Ups Oblique         BACK EXT:  F , P , C         PYR AB:  S , P         Rotary Torso:  S , P , C         Hoist Row: S         LAT Pulldown         SA Pulldown         Oblique Press         Multi Hip: Flex  F              EXT  P              ABD                  ADD         PYR QUAD:  S , P , C         NuStep:   S , A                  Ther Activity Squatting         Lifting         Carrying         Lunging                  Modalities         HP/IFC         US 3 3 m/cm2 15' 15' 15' 15'

## 2020-07-15 ENCOUNTER — OFFICE VISIT (OUTPATIENT)
Dept: PHYSICAL THERAPY | Facility: CLINIC | Age: 70
End: 2020-07-15
Payer: MEDICARE

## 2020-07-15 DIAGNOSIS — M54.16 LUMBAR RADICULOPATHY, CHRONIC: Primary | ICD-10-CM

## 2020-07-15 DIAGNOSIS — M19.91 DEGENERATIVE JOINT DISEASE OF RIGHT LOWER EXTREMITY: ICD-10-CM

## 2020-07-15 PROCEDURE — 97140 MANUAL THERAPY 1/> REGIONS: CPT | Performed by: PHYSICAL THERAPIST

## 2020-07-15 PROCEDURE — 97035 APP MDLTY 1+ULTRASOUND EA 15: CPT | Performed by: PHYSICAL THERAPIST

## 2020-07-15 PROCEDURE — 97110 THERAPEUTIC EXERCISES: CPT | Performed by: PHYSICAL THERAPIST

## 2020-07-15 NOTE — PROGRESS NOTES
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  Objective: Extension, L SB, and L ROT are paiful in the right low back  Assessment: Spinal AROM was much less painful after treatment  Plan: Continue per plan of care           Precautions: None      Manuals 7/1 7/7 7/8 7/14 7/15    STM R T-L spine RK LF LF LF RK    P/A Mob         Flex/Rot Mob         Hamstring Stretch         Piriformis Stretch                  Neuro Re-Ed         Quadruped Arm Raises         Quadruped Leg Raises         Quadruped Arm and Leg Raises         AB Supine         AB with Arm Raises Supine         AB with Leg Raises Supine         AB with Arm and Leg Raises Supine         Prone TB ROT         Prone Ball Squeeze         Prone Hip EXT         Prone Planks         Squats with Arm Raises         Centraliztion Concepts         Body Mechanics Instruction for Lifting         Disc Mechanics Instruction         Sitting Posture Correction with Roll         Sleeping Posture Correction with Roll         Prophylaxis of Recurrence                  Ther Ex         T-L L SB standing 10x10" TID HEP 10" X10 10" X 10 10" X 10 10"x10    SBB     10x TID HEP    T-L L ROT sitting     10x TID HEP             Ther Activity         Squatting         Lifting         Carrying         Lunging                  Modalities         HP/IFC         US 3 3 m/cm2 15' 15' 15' 15' 15'

## 2020-07-20 ENCOUNTER — OFFICE VISIT (OUTPATIENT)
Dept: PHYSICAL THERAPY | Facility: CLINIC | Age: 70
End: 2020-07-20
Payer: MEDICARE

## 2020-07-20 DIAGNOSIS — M54.16 LUMBAR RADICULOPATHY, CHRONIC: Primary | ICD-10-CM

## 2020-07-20 PROCEDURE — 97140 MANUAL THERAPY 1/> REGIONS: CPT | Performed by: PHYSICAL THERAPIST

## 2020-07-20 PROCEDURE — 97035 APP MDLTY 1+ULTRASOUND EA 15: CPT | Performed by: PHYSICAL THERAPIST

## 2020-07-20 NOTE — PROGRESS NOTES
Daily Note     Today's date: 2020  Patient name: Bowen Ferro  : 1950  MRN: [id number]  Referring provider: Kayden Orta DO  Dx:   Encounter Diagnosis     ICD-10-CM    1  Lumbar radiculopathy, chronic M54 16                   Subjective: Patient states that she was swimming overt the weekend, and hs increased pain for a few hours afterward  Changing positions in bed is uncomfortable  Stretching helps it a little  Objective:  B SB AROM moderately limited by right lateral back pain  Assessment: ROM was increased 25 % after treatment, and pain was moderately decreased  Plan: Continue per plan of care           Precautions: None      Manuals 7/1 7/7 7/8 7/14 7/15 7/20   STM R T-L spine RK LF LF LF RK RK   P/A Mob         Flex/Rot Mob         Hamstring Stretch         Piriformis Stretch                  Neuro Re-Ed         Quadruped Arm Raises         Quadruped Leg Raises         Quadruped Arm and Leg Raises         AB Supine         AB with Arm Raises Supine         AB with Leg Raises Supine         AB with Arm and Leg Raises Supine         Prone TB ROT         Prone Ball Squeeze         Prone Hip EXT         Prone Planks         Squats with Arm Raises         Centraliztion Concepts         Body Mechanics Instruction for Lifting         Disc Mechanics Instruction         Sitting Posture Correction with Roll         Sleeping Posture Correction with Roll         Prophylaxis of Recurrence                  Ther Ex         T-L L SB standing 10x10" TID HEP 10" X10 10" X 10 10" X 10 10"x10    SBB     10x TID HEP    T-L L ROT sitting     10x TID HEP             Ther Activity         Squatting         Lifting         Carrying         Lunging                  Modalities         HP Right lateral T-L      10'   US 3 3 m/cm2 15' 15' 15' 15' 15' 15'

## 2020-07-21 ENCOUNTER — OFFICE VISIT (OUTPATIENT)
Dept: PHYSICAL THERAPY | Facility: CLINIC | Age: 70
End: 2020-07-21
Payer: MEDICARE

## 2020-07-21 DIAGNOSIS — M54.16 LUMBAR RADICULOPATHY, CHRONIC: Primary | ICD-10-CM

## 2020-07-21 DIAGNOSIS — M19.91 DEGENERATIVE JOINT DISEASE OF RIGHT LOWER EXTREMITY: ICD-10-CM

## 2020-07-21 PROCEDURE — 97140 MANUAL THERAPY 1/> REGIONS: CPT

## 2020-07-21 PROCEDURE — 97035 APP MDLTY 1+ULTRASOUND EA 15: CPT

## 2020-07-21 NOTE — PROGRESS NOTES
Daily Note     Today's date: 2020  Patient name: Brenda Sims  : 1950  MRN: [id number]  Referring provider: Johnnie Bailey DO  Dx:   Encounter Diagnosis     ICD-10-CM    1  Lumbar radiculopathy, chronic M54 16    2  Degenerative joint disease of right lower extremity M19 91                   Subjective: Patient reports she is not too sore today, but had increased pain yesterday  She states the pain level constantly varies  Objective: See treatment diary below      Assessment: Tolerated treatment well  Patient exhibited good technique with therapeutic exercises      Plan: Continue per plan of care           Precautions: None      Manuals 7/21 7/7 7/8 7/14 7/15 7/20   STM R T-L spine LF LF LF LF RK RK   P/A Mob         Flex/Rot Mob         Hamstring Stretch         Piriformis Stretch                  Neuro Re-Ed         Quadruped Arm Raises         Quadruped Leg Raises         Quadruped Arm and Leg Raises         AB Supine         AB with Arm Raises Supine         AB with Leg Raises Supine         AB with Arm and Leg Raises Supine         Prone TB ROT         Prone Ball Squeeze         Prone Hip EXT         Prone Planks         Squats with Arm Raises         Centraliztion Concepts         Body Mechanics Instruction for Lifting         Disc Mechanics Instruction         Sitting Posture Correction with Roll         Sleeping Posture Correction with Roll         Prophylaxis of Recurrence                  Ther Ex         T-L L SB standing  10" X10 10" X 10 10" X 10 10"x10    SBB     10x TID HEP    T-L L ROT sitting     10x TID HEP             Ther Activity         Squatting         Lifting         Carrying         Lunging                  Modalities         HP Right lateral T-L 10'     10'   US 3 3 m/cm2 15' 15' 15' 15' 15' 15'

## 2020-07-26 ENCOUNTER — APPOINTMENT (EMERGENCY)
Dept: RADIOLOGY | Facility: HOSPITAL | Age: 70
End: 2020-07-26
Payer: MEDICARE

## 2020-07-26 ENCOUNTER — HOSPITAL ENCOUNTER (EMERGENCY)
Facility: HOSPITAL | Age: 70
Discharge: HOME | End: 2020-07-26
Attending: EMERGENCY MEDICINE
Payer: MEDICARE

## 2020-07-26 VITALS
BODY MASS INDEX: 37.18 KG/M2 | HEART RATE: 84 BPM | OXYGEN SATURATION: 97 % | TEMPERATURE: 97.9 F | DIASTOLIC BLOOD PRESSURE: 72 MMHG | SYSTOLIC BLOOD PRESSURE: 156 MMHG | RESPIRATION RATE: 18 BRPM | WEIGHT: 203.26 LBS

## 2020-07-26 DIAGNOSIS — R07.9 CHEST PAIN: Primary | ICD-10-CM

## 2020-07-26 DIAGNOSIS — M54.6 THORACIC BACK PAIN: ICD-10-CM

## 2020-07-26 LAB
ALBUMIN SERPL BCP-MCNC: 4 G/DL (ref 3.5–5)
ALP SERPL-CCNC: 94 U/L (ref 46–116)
ALT SERPL W P-5'-P-CCNC: 63 U/L (ref 12–78)
ANION GAP SERPL CALCULATED.3IONS-SCNC: 9 MMOL/L (ref 4–13)
APTT PPP: 27 SECONDS (ref 23–37)
AST SERPL W P-5'-P-CCNC: 36 U/L (ref 5–45)
BASOPHILS # BLD AUTO: 0.07 THOUSANDS/ΜL (ref 0–0.1)
BASOPHILS NFR BLD AUTO: 1 % (ref 0–1)
BILIRUB SERPL-MCNC: 0.4 MG/DL (ref 0.2–1)
BUN SERPL-MCNC: 11 MG/DL (ref 5–25)
CALCIUM SERPL-MCNC: 9.3 MG/DL (ref 8.3–10.1)
CHLORIDE SERPL-SCNC: 104 MMOL/L (ref 100–108)
CO2 SERPL-SCNC: 27 MMOL/L (ref 21–32)
CREAT SERPL-MCNC: 0.75 MG/DL (ref 0.6–1.3)
EOSINOPHIL # BLD AUTO: 0.18 THOUSAND/ΜL (ref 0–0.61)
EOSINOPHIL NFR BLD AUTO: 2 % (ref 0–6)
ERYTHROCYTE [DISTWIDTH] IN BLOOD BY AUTOMATED COUNT: 12.7 % (ref 11.6–15.1)
GFR SERPL CREATININE-BSD FRML MDRD: 82 ML/MIN/1.73SQ M
GLUCOSE SERPL-MCNC: 91 MG/DL (ref 65–140)
HCT VFR BLD AUTO: 45.7 % (ref 34.8–46.1)
HGB BLD-MCNC: 15.3 G/DL (ref 11.5–15.4)
IMM GRANULOCYTES # BLD AUTO: 0.02 THOUSAND/UL (ref 0–0.2)
IMM GRANULOCYTES NFR BLD AUTO: 0 % (ref 0–2)
INR PPP: 0.91 (ref 0.84–1.19)
LYMPHOCYTES # BLD AUTO: 1.98 THOUSANDS/ΜL (ref 0.6–4.47)
LYMPHOCYTES NFR BLD AUTO: 24 % (ref 14–44)
MAGNESIUM SERPL-MCNC: 2.1 MG/DL (ref 1.6–2.6)
MCH RBC QN AUTO: 29.8 PG (ref 26.8–34.3)
MCHC RBC AUTO-ENTMCNC: 33.5 G/DL (ref 31.4–37.4)
MCV RBC AUTO: 89 FL (ref 82–98)
MONOCYTES # BLD AUTO: 0.51 THOUSAND/ΜL (ref 0.17–1.22)
MONOCYTES NFR BLD AUTO: 6 % (ref 4–12)
NEUTROPHILS # BLD AUTO: 5.47 THOUSANDS/ΜL (ref 1.85–7.62)
NEUTS SEG NFR BLD AUTO: 67 % (ref 43–75)
NRBC BLD AUTO-RTO: 0 /100 WBCS
NT-PROBNP SERPL-MCNC: 63 PG/ML
PLATELET # BLD AUTO: 223 THOUSANDS/UL (ref 149–390)
PMV BLD AUTO: 9.1 FL (ref 8.9–12.7)
POTASSIUM SERPL-SCNC: 3.8 MMOL/L (ref 3.5–5.3)
PROT SERPL-MCNC: 7.8 G/DL (ref 6.4–8.2)
PROTHROMBIN TIME: 12.3 SECONDS (ref 11.6–14.5)
RBC # BLD AUTO: 5.14 MILLION/UL (ref 3.81–5.12)
SODIUM SERPL-SCNC: 140 MMOL/L (ref 136–145)
TROPONIN I SERPL-MCNC: <0.02 NG/ML
TROPONIN I SERPL-MCNC: <0.02 NG/ML
TSH SERPL DL<=0.05 MIU/L-ACNC: 0.94 UIU/ML (ref 0.36–3.74)
WBC # BLD AUTO: 8.23 THOUSAND/UL (ref 4.31–10.16)

## 2020-07-26 PROCEDURE — 83880 ASSAY OF NATRIURETIC PEPTIDE: CPT | Performed by: PHYSICIAN ASSISTANT

## 2020-07-26 PROCEDURE — 83735 ASSAY OF MAGNESIUM: CPT | Performed by: PHYSICIAN ASSISTANT

## 2020-07-26 PROCEDURE — 84443 ASSAY THYROID STIM HORMONE: CPT | Performed by: PHYSICIAN ASSISTANT

## 2020-07-26 PROCEDURE — 99284 EMERGENCY DEPT VISIT MOD MDM: CPT | Performed by: PHYSICIAN ASSISTANT

## 2020-07-26 PROCEDURE — 85730 THROMBOPLASTIN TIME PARTIAL: CPT | Performed by: PHYSICIAN ASSISTANT

## 2020-07-26 PROCEDURE — 71045 X-RAY EXAM CHEST 1 VIEW: CPT

## 2020-07-26 PROCEDURE — 84484 ASSAY OF TROPONIN QUANT: CPT | Performed by: PHYSICIAN ASSISTANT

## 2020-07-26 PROCEDURE — 85025 COMPLETE CBC W/AUTO DIFF WBC: CPT | Performed by: PHYSICIAN ASSISTANT

## 2020-07-26 PROCEDURE — 80053 COMPREHEN METABOLIC PANEL: CPT | Performed by: PHYSICIAN ASSISTANT

## 2020-07-26 PROCEDURE — 99285 EMERGENCY DEPT VISIT HI MDM: CPT

## 2020-07-26 PROCEDURE — 85610 PROTHROMBIN TIME: CPT | Performed by: PHYSICIAN ASSISTANT

## 2020-07-26 PROCEDURE — 36415 COLL VENOUS BLD VENIPUNCTURE: CPT | Performed by: PHYSICIAN ASSISTANT

## 2020-07-26 PROCEDURE — 93005 ELECTROCARDIOGRAM TRACING: CPT

## 2020-07-26 RX ORDER — METHOCARBAMOL 500 MG/1
500 TABLET, FILM COATED ORAL 2 TIMES DAILY PRN
Qty: 20 TABLET | Refills: 0 | Status: SHIPPED | OUTPATIENT
Start: 2020-07-26 | End: 2020-08-05 | Stop reason: SDUPTHER

## 2020-07-26 RX ORDER — SODIUM CHLORIDE 9 MG/ML
3 INJECTION INTRAVENOUS
Status: DISCONTINUED | OUTPATIENT
Start: 2020-07-26 | End: 2020-07-26 | Stop reason: HOSPADM

## 2020-07-26 RX ORDER — DIAZEPAM 2 MG/1
2 TABLET ORAL ONCE
Status: COMPLETED | OUTPATIENT
Start: 2020-07-26 | End: 2020-07-26

## 2020-07-26 RX ORDER — METHOCARBAMOL 500 MG/1
500 TABLET, FILM COATED ORAL ONCE
Status: COMPLETED | OUTPATIENT
Start: 2020-07-26 | End: 2020-07-26

## 2020-07-26 RX ORDER — ASPIRIN 81 MG/1
324 TABLET, CHEWABLE ORAL ONCE
Status: COMPLETED | OUTPATIENT
Start: 2020-07-26 | End: 2020-07-26

## 2020-07-26 RX ADMIN — DIAZEPAM 2 MG: 2 TABLET ORAL at 13:57

## 2020-07-26 RX ADMIN — METHOCARBAMOL TABLETS 500 MG: 500 TABLET, COATED ORAL at 16:47

## 2020-07-26 RX ADMIN — ASPIRIN 81 MG 324 MG: 81 TABLET ORAL at 12:38

## 2020-07-26 NOTE — ED PROVIDER NOTES
History  Chief Complaint   Patient presents with    Chest Pain     burning pains in chest for awhile but now has new pain on right side of chest     72year old female presents for evaluation of burning along her left chest   She notes this started a few days ago  She contributed this to her neuropathy and back problems  She notes this morning she developed pain along her right shoulder and side  She notes increased pain with movement  She indicates she's been under a lot of stress lately and feels like is making her symptoms worse  She denies injury or trauma  Denies any falls  She has been going to PT for her back issues  She thinks some of the exercises caused this pain she's been having in her right back  She sees a specialist in regards to her spinal stimulator  She previously followed with pain management but no current visits as she notes she was doing really good in this aspect  Denies SOB  Denies fever, chills, cough, congestion or recent illness  Denies N/V, abdominal pain  PMH includes HTN, HLD, GERD, DJD          History provided by:  Patient   used: No    Chest Pain   Pain location:  L chest  Pain quality: burning    Pain radiates to:  Does not radiate  Pain radiates to the back: no    Duration:  2 days  Timing:  Constant  Chronicity:  New  Context: not breathing, not eating, no movement and no trauma    Relieved by:  Nothing  Ineffective treatments:  None tried  Associated symptoms: anxiety and back pain (chronic)    Associated symptoms: no abdominal pain, no altered mental status, no cough, no diaphoresis, no dizziness, no fatigue, no fever, no headache, no heartburn, no lower extremity edema, no nausea, no numbness, no palpitations, no shortness of breath, no syncope and not vomiting    Risk factors: hypertension and obesity    Risk factors: no coronary artery disease, no diabetes mellitus, no prior DVT/PE, no smoking and no surgery        Prior to Admission Medications   Prescriptions Last Dose Informant Patient Reported? Taking?    Biotin 2500 MCG CAPS  Self Yes No   Sig: Take 2 capsules by mouth daily   Cholecalciferol (VITAMIN D-3 PO)  Self Yes No   Sig: Take 1 tablet by mouth daily   Cyanocobalamin (VITAMIN B-12 CR PO)  Self Yes No   Sig: Take 1 tablet by mouth daily   EPINEPHrine (EPIPEN 2-AREN) 0 3 mg/0 3 mL SOAJ   No No   Sig: Inject 0 3 mL (0 3 mg total) into a muscle as needed for anaphylaxis   Omega-3 Fatty Acids (FISH OIL) 1,000 mg  Self Yes No   Sig: Take 1,000 mg by mouth 3 (three) times a day   amitriptyline (ELAVIL) 50 mg tablet   No No   Sig: Take 1 tablet (50 mg total) by mouth daily   aspirin 81 mg chewable tablet   Yes No   Sig: Chew 81 mg daily   cyclobenzaprine (FLEXERIL) 5 mg tablet   No No   Sig: Take 1 tablet (5 mg total) by mouth every 6 (six) hours as needed for muscle spasms   diclofenac sodium (VOLTAREN) 1 %   No No   Sig: APPLY TO THE AFFECTED AREA(S) TWICE DAILY AS DIRECTED    dicyclomine (BENTYL) 10 mg capsule   No No   Sig: Take 1 capsule (10 mg total) by mouth 2 (two) times a day   fexofenadine (ALLEGRA) 180 MG tablet  Self Yes No   Sig: Take 180 mg by mouth daily   gabapentin (NEURONTIN) 300 mg capsule   No No   Sig: Take 1 capsule (300 mg total) by mouth 3 (three) times a day   hydrOXYzine HCL (ATARAX) 10 mg tablet   No No   Sig: Take 2 tablets at bedtime   losartan (COZAAR) 50 mg tablet   No No   Sig: Take 1 tablet (50 mg total) by mouth daily   montelukast (SINGULAIR) 10 mg tablet   No No   Sig: Take 1 tablet (10 mg total) by mouth daily at bedtime   mupirocin (BACTROBAN) 2 % ointment   Yes No   omeprazole (PriLOSEC) 40 MG capsule   No No   Sig: Take 1 capsule (40 mg total) by mouth daily   rosuvastatin (CRESTOR) 10 MG tablet   No No   Sig: Take 1 tablet (10 mg total) by mouth daily      Facility-Administered Medications: None       Past Medical History:   Diagnosis Date    Abnormal findings on diagnostic imaging of breast     Anxiety     Arthritis     Fibrocystic breast disease     Foot pain     GERD (gastroesophageal reflux disease)     Hyperlipidemia     Hypertension     Interstitial cystitis     Osteopenia        Past Surgical History:   Procedure Laterality Date    APPENDECTOMY      BACK SURGERY      BREAST EXCISIONAL BIOPSY Left 1996    benign    CARPAL BOSS EXCISION      CHOLECYSTECTOMY      DIAGNOSTIC LAPAROSCOPY      FOOT SURGERY      HYSTERECTOMY      age 33    HYSTEROSCOPY      KIDNEY SURGERY Left     KNEE ARTHROSCOPY Bilateral     LAPAROSCOPY      hynecologic with adhesions    OOPHORECTOMY Bilateral     age 33    MO SURG IMPLNT [address] N/A 5/18/2017    Procedure: PLACEMENT OF THORACIC SPINAL CORD STIMULATOR WITH LEFT BUTTOCK IMPLANTABLE PULSE GENERATOR (IMPULSE MONITORING-MOTORS (TCEMEP), EMG, SSEP); Surgeon: Sophia Zamora MD;  Location: [facility];  Service: Neurosurgery    SPINAL STIMULATOR PLACEMENT  05/2017    TONSILLECTOMY AND ADENOIDECTOMY      onset: unknown    TOTAL KNEE ARTHROPLASTY Right        Family History   Problem Relation Age of Onset    Bone cancer Mother     Hypertension Father     Brain cancer Father     Stroke Father         stroke sydrome    Diabetes Paternal Grandmother     Breast cancer Paternal Grandmother 61    Breast cancer Maternal Aunt 79    Breast cancer additional onset Maternal Aunt 67    Depression Sister     Asthma Sister     Heart disease Brother      I have reviewed and agree with the history as documented  E-Cigarette/Vaping    E-Cigarette Use Never User      E-Cigarette/Vaping Substances    Nicotine No     THC No     CBD No     Flavoring No     Other No     Unknown No      Social History     Tobacco Use    Smoking status: Never Smoker    Smokeless tobacco: Never Used   Substance Use Topics    Alcohol use:  Yes     Alcohol/week: 1 0 standard drinks     Types: 1 Glasses of wine per week     Frequency: 2-4 times a month Comment: social less than a drink per week    Drug use: No       Review of Systems   Constitutional: Negative  Negative for chills, diaphoresis, fatigue and fever  HENT: Negative  Negative for congestion, rhinorrhea and sore throat  Eyes: Negative  Negative for visual disturbance  Respiratory: Negative  Negative for cough, shortness of breath and wheezing  Cardiovascular: Positive for chest pain  Negative for palpitations, leg swelling and syncope  Gastrointestinal: Negative  Negative for abdominal pain, constipation, diarrhea, heartburn, nausea and vomiting  Genitourinary: Negative  Negative for dysuria, flank pain, frequency and hematuria  Musculoskeletal: Positive for back pain (chronic)  Negative for myalgias  Skin: Negative  Negative for rash  Neurological: Negative  Negative for dizziness, light-headedness, numbness and headaches  Psychiatric/Behavioral: Negative  Negative for confusion  All other systems reviewed and are negative  Physical Exam  Physical Exam   Constitutional: She is oriented to person, place, and time  She appears well-developed and well-nourished  Non-toxic appearance  No distress  HENT:   Head: Normocephalic and atraumatic  Right Ear: Hearing, tympanic membrane, external ear and ear canal normal    Left Ear: Hearing, tympanic membrane, external ear and ear canal normal    Nose: Nose normal    Mouth/Throat: Uvula is midline, oropharynx is clear and moist and mucous membranes are normal  No oropharyngeal exudate  Eyes: Pupils are equal, round, and reactive to light  Conjunctivae and EOM are normal  No scleral icterus  Neck: Trachea normal and normal range of motion  Neck supple  No tracheal deviation present  Cardiovascular: Normal rate, regular rhythm, normal heart sounds, intact distal pulses and normal pulses  No murmur heard  Pulmonary/Chest: Effort normal and breath sounds normal  No respiratory distress  She has no wheezes   She has no rhonchi  She has no rales  She exhibits no tenderness  Abdominal: Soft  Bowel sounds are normal  There is no tenderness  There is no rebound, no guarding and no CVA tenderness  Musculoskeletal: Normal range of motion  She exhibits no edema  Thoracic back: She exhibits tenderness and pain  She exhibits normal range of motion, no bony tenderness, no swelling, no deformity, no laceration and normal pulse  Back:         Right lower leg: Normal  She exhibits no tenderness and no edema  Left lower leg: Normal  She exhibits no tenderness and no edema  Neurological: She is alert and oriented to person, place, and time  No cranial nerve deficit or sensory deficit  She exhibits normal muscle tone  Gait normal  GCS eye subscore is 4  GCS verbal subscore is 5  GCS motor subscore is 6  Skin: Skin is warm and dry  Capillary refill takes less than 2 seconds  No rash noted  Psychiatric: Her speech is normal and behavior is normal  Her mood appears anxious  Nursing note and vitals reviewed        Vital Signs  ED Triage Vitals   Temperature Pulse Respirations Blood Pressure SpO2   07/26/20 1209 07/26/20 1209 07/26/20 1209 07/26/20 1212 07/26/20 1209   97 9 °F (36 6 °C) 105 20 165/81 97 %      Temp Source Heart Rate Source Patient Position - Orthostatic VS BP Location FiO2 (%)   07/26/20 1209 07/26/20 1209 07/26/20 1400 07/26/20 1400 --   Temporal Monitor Lying Right arm       Pain Score       07/26/20 1209       8           Vitals:    07/26/20 1530 07/26/20 1600 07/26/20 1630 07/26/20 1645   BP: 139/63 128/74  156/72   Pulse: 75 78 84    Patient Position - Orthostatic VS: Lying Lying           Visual Acuity      ED Medications  Medications   aspirin chewable tablet 324 mg (324 mg Oral Given 7/26/20 1238)   diazepam (VALIUM) tablet 2 mg (2 mg Oral Given 7/26/20 1357)   methocarbamol (ROBAXIN) tablet 500 mg (500 mg Oral Given 7/26/20 1647)       Diagnostic Studies  Results Reviewed     Procedure Component Value Units Date/Time    Troponin I [[id number]]  (Normal) Collected:  07/26/20 1532    Lab Status:  Final result Specimen:  Blood from Arm, Left Updated:  07/26/20 1555     Troponin I <0 02 ng/mL     NT-BNP PRO [[id number]]  (Normal) Collected:  07/26/20 1239    Lab Status:  Final result Specimen:  Blood from Arm, Left Updated:  07/26/20 1314     NT-proBNP 63 pg/mL     Magnesium [[id number]]  (Normal) Collected:  07/26/20 1239    Lab Status:  Final result Specimen:  Blood from Arm, Left Updated:  07/26/20 1314     Magnesium 2 1 mg/dL     TSH, 3rd generation [[id number]]  (Normal) Collected:  07/26/20 1239    Lab Status:  Final result Specimen:  Blood from Arm, Left Updated:  07/26/20 1314     TSH 3RD GENERATON 0 939 uIU/mL     Narrative:       Patients undergoing fluorescein dye angiography may retain small amounts of fluorescein in the body for 48-72 hours post procedure  Samples containing fluorescein can produce falsely depressed TSH values  If the patient had this procedure,a specimen should be resubmitted post fluorescein clearance        Troponin I [[id number]]  (Normal) Collected:  07/26/20 1239    Lab Status:  Final result Specimen:  Blood from Arm, Left Updated:  07/26/20 1310     Troponin I <0 02 ng/mL     Comprehensive metabolic panel [[id number]] Collected:  07/26/20 1239    Lab Status:  Final result Specimen:  Blood from Arm, Left Updated:  07/26/20 1307     Sodium 140 mmol/L      Potassium 3 8 mmol/L      Chloride 104 mmol/L      CO2 27 mmol/L      ANION GAP 9 mmol/L      BUN 11 mg/dL      Creatinine 0 75 mg/dL      Glucose 91 mg/dL      Calcium 9 3 mg/dL      AST 36 U/L      ALT 63 U/L      Alkaline Phosphatase 94 U/L      Total Protein 7 8 g/dL      Albumin 4 0 g/dL      Total Bilirubin 0 40 mg/dL      eGFR 82 ml/min/1 73sq m     Narrative:       [place] guidelines for Chronic Kidney Disease (CKD):     Stage 1 with normal or high GFR (GFR > 90 mL/min/1 73 square meters)    Stage 2 Mild CKD (GFR = 60-89 mL/min/1 73 square meters)    Stage 3A Moderate CKD (GFR = 45-59 mL/min/1 73 square meters)    Stage 3B Moderate CKD (GFR = 30-44 mL/min/1 73 square meters)    Stage 4 Severe CKD (GFR = 15-29 mL/min/1 73 square meters)    Stage 5 End Stage CKD (GFR <15 mL/min/1 73 square meters)  Note: GFR calculation is accurate only with a steady state creatinine    Protime-INR [[id number]]  (Normal) Collected:  07/26/20 1239    Lab Status:  Final result Specimen:  Blood from Arm, Left Updated:  07/26/20 1256     Protime 12 3 seconds      INR 0 91    APTT [[id number]]  (Normal) Collected:  07/26/20 1239    Lab Status:  Final result Specimen:  Blood from Arm, Left Updated:  07/26/20 1256     PTT 27 seconds     CBC and differential [[id number]]  (Abnormal) Collected:  07/26/20 1239    Lab Status:  Final result Specimen:  Blood from Arm, Left Updated:  07/26/20 1245     WBC 8 23 Thousand/uL      RBC 5 14 Million/uL      Hemoglobin 15 3 g/dL      Hematocrit 45 7 %      MCV 89 fL      MCH 29 8 pg      MCHC 33 5 g/dL      RDW 12 7 %      MPV 9 1 fL      Platelets 238 Thousands/uL      nRBC 0 /100 WBCs      Neutrophils Relative 67 %      Immat GRANS % 0 %      Lymphocytes Relative 24 %      Monocytes Relative 6 %      Eosinophils Relative 2 %      Basophils Relative 1 %      Neutrophils Absolute 5 47 Thousands/µL      Immature Grans Absolute 0 02 Thousand/uL      Lymphocytes Absolute 1 98 Thousands/µL      Monocytes Absolute 0 51 Thousand/µL      Eosinophils Absolute 0 18 Thousand/µL      Basophils Absolute 0 07 Thousands/µL                  X-ray chest 1 view portable   Final Result by Reji Brizuela MD (07/26 2028)      No acute cardiopulmonary disease              Workstation performed: [id number]                    Procedures  ECG 12 Lead Documentation Only  Date/Time: 7/26/2020 12:15 PM  Performed by: Nicholas Valdivia PA-C  Authorized by: Nicholas Valdivia PA-C     Indications / Diagnosis: Chest pain  ECG reviewed by me, the ED Provider: yes    Patient location:  ED  Previous ECG:     Previous ECG:  Compared to current    Comparison ECG info:  3/31/18    Similarity:  No change    Comparison to cardiac monitor: Yes    Interpretation:     Interpretation: non-specific    Rate:     ECG rate:  99    ECG rate assessment: normal    Rhythm:     Rhythm: sinus rhythm    Ectopy:     Ectopy: none    QRS:     QRS axis:  Normal    QRS intervals:  Normal  Conduction:     Conduction: normal    ST segments:     ST segments:  Normal  T waves:     T waves: non-specific    Comments:      , QRS 88, QT/QTc 352/451; no acute ischemic changes, no significant interval change from prior  ED Course  ED Course as of Jul 26 2224   Sun Jul 26, 2020   1310 WBC: 8 23   1311 Hemoglobin: 15 3   1311 Platelet Count: 196   1311 Glucose, Random: 91   1311 Creatinine: 0 75   1311 BUN: 11   1311 Sodium: 140   1311 Potassium: 3 8   1311 Chloride: 104   1311 CO2: 27   1311 Anion Gap: 9   1311 Calcium: 9 3   1311 AST: 36   1311 ALT: 63   1311 Alkaline Phosphatase: 94   1311 Total Protein: 7 8   1311 Albumin: 4 0   1311 TOTAL BILIRUBIN: 0 40   1311 eGFR: 82   1311 INR: 0 91   1311 Protime: 12 3   1311 PTT: 27   1311 Troponin I: <0 02   1327 NT-proBNP: 63   1327 Magnesium: 2 1   1327 TSH 3RD GENERATON: 0 939   1329 Independently viewed and interpreted by me - no acute cardiopulmonary process, no significant interval change; pending official read  X-ray chest 1 view portable   1334 Pt updated on results  Symptoms unchanged  Notes chronic back pain, c/o pain with movement and changing position  Has spinal stimulator present T spine  I feel her symptomatology is related to her back  She also admits to increased stress and is very anxious  Will give dose of valium to help with her anxiety and muscle spasm  She is agreeable to stay for a repeat troponin  1600 Troponin I: <0 02   1615 Pt updated on results    Repeat troponin negative  Suspect pt is having MSK pain causing her symptoms  She notes her PCP had performed CT for her symptoms  I reviewed this result with no acute findings noted  Was on flexeril but finished with this  Does not tolerate the NSAIDs and hasn't done well with prednisone in the past   She is not interested in either of these as prescription  Will try course of robaxin  Pt agreeable  Advised to continue PT and follow up with her spine specialist         Discussed continued symptomatic/supportive care  Advised alternating ice/heat, topical muscle rubs, etc   Rx muscle relaxant - sedation precautions reviewed  Strict return precautions outlined  Advised outpatient follow up with PCP as well as her spine specialist or return to ER for change in condition as outlined  Pt verbalized understanding and had no further questions  US AUDIT      Most Recent Value   Initial Alcohol Screen: US AUDIT-C    1  How often do you have a drink containing alcohol?  0 Filed at: 07/26/2020 1226   2  How many drinks containing alcohol do you have on a typical day you are drinking? 0 Filed at: 07/26/2020 1226   3b  FEMALE Any Age, or MALE 65+: How often do you have 4 or more drinks on one occassion? 0 Filed at: 07/26/2020 1226   Audit-C Score  0 Filed at: 07/26/2020 1226            HEART Risk Score      Most Recent Value   Heart Score Risk Calculator   History  0 Filed at: 07/26/2020 1328   ECG  1 Filed at: 07/26/2020 1328   Age  2 Filed at: 07/26/2020 1328   Risk Factors  1 Filed at: 07/26/2020 1328   Troponin  0 Filed at: 07/26/2020 1328   HEART Score  4 Filed at: 07/26/2020 1328            NOHEMY/DAST-10      Most Recent Value   How many times in the past year have you    Used an illegal drug or used a prescription medication for non-medical reasons?   Never Filed at: 07/26/2020 1225                                MDM  Number of Diagnoses or Management Options  Chest pain: new and requires workup  Thoracic back pain: established and worsening     Amount and/or Complexity of Data Reviewed  Clinical lab tests: ordered and reviewed  Tests in the radiology section of CPT®: ordered and reviewed  Decide to obtain previous medical records or to obtain history from someone other than the patient: yes  Obtain history from someone other than the patient: yes  Review and summarize past medical records: yes  Independent visualization of images, tracings, or specimens: yes    Patient Progress  Patient progress: improved        Disposition  Final diagnoses:   Chest pain   Thoracic back pain     Time reflects when diagnosis was documented in both MDM as applicable and the Disposition within this note     Time User Action Codes Description Comment    7/26/2020  4:22 PM Gopal Lipoma Add [R07 9] Chest pain     7/26/2020  4:23 PM Gopal Lipoma Add [M54 6] Thoracic back pain       ED Disposition     ED Disposition Condition Date/Time Comment    Discharge Stable [place] Jul 26, 2020  4:22 PM Johnny Wheatley discharge to home/self care              Follow-up Information     Follow up With Specialties Details Why Contact Info Additional DO [facility] Schedule an appointment as soon as possible for a visit   [address] [place] [address] Emergency Department Emergency Medicine  As needed [address] [facility] ED, [address], [address]          Discharge Medication List as of 7/26/2020  4:34 PM      START taking these medications    Details   methocarbamol (ROBAXIN) 500 mg tablet Take 1 tablet (500 mg total) by mouth 2 (two) times a day as needed for muscle spasms, Starting Sun 7/26/2020, Normal         CONTINUE these medications which have NOT CHANGED    Details   amitriptyline (ELAVIL) 50 mg tablet Take 1 tablet (50 mg total) by mouth daily, Starting Mon 4/27/2020, Normal aspirin 81 mg chewable tablet Chew 81 mg daily, Historical Med      Biotin 2500 MCG CAPS Take 2 capsules by mouth daily, Historical Med      Cholecalciferol (VITAMIN D-3 PO) Take 1 tablet by mouth daily, Historical Med      Cyanocobalamin (VITAMIN B-12 CR PO) Take 1 tablet by mouth daily, Historical Med      cyclobenzaprine (FLEXERIL) 5 mg tablet Take 1 tablet (5 mg total) by mouth every 6 (six) hours as needed for muscle spasms, Starting Fri 6/26/2020, Normal      diclofenac sodium (VOLTAREN) 1 % APPLY TO THE AFFECTED AREA(S) TWICE DAILY AS DIRECTED , Normal      dicyclomine (BENTYL) 10 mg capsule Take 1 capsule (10 mg total) by mouth 2 (two) times a day, Starting Tue 5/26/2020, Normal      EPINEPHrine (EPIPEN 2-AREN) 0 3 mg/0 3 mL SOAJ Inject 0 3 mL (0 3 mg total) into a muscle as needed for anaphylaxis, Starting Tue 12/10/2019, Normal      fexofenadine (ALLEGRA) 180 MG tablet Take 180 mg by mouth daily, Historical Med      gabapentin (NEURONTIN) 300 mg capsule Take 1 capsule (300 mg total) by mouth 3 (three) times a day, Starting Mon 6/15/2020, Normal      hydrOXYzine HCL (ATARAX) 10 mg tablet Take 2 tablets at bedtime, Normal      losartan (COZAAR) 50 mg tablet Take 1 tablet (50 mg total) by mouth daily, Starting Tue 12/10/2019, Normal      montelukast (SINGULAIR) 10 mg tablet Take 1 tablet (10 mg total) by mouth daily at bedtime, Starting Mon 4/27/2020, Normal      mupirocin (BACTROBAN) 2 % ointment Starting Thu 1/16/2020, Historical Med      Omega-3 Fatty Acids (FISH OIL) 1,000 mg Take 1,000 mg by mouth 3 (three) times a day, Historical Med      omeprazole (PriLOSEC) 40 MG capsule Take 1 capsule (40 mg total) by mouth daily, Starting Wed 11/6/2019, Normal      rosuvastatin (CRESTOR) 10 MG tablet Take 1 tablet (10 mg total) by mouth daily, Starting Sun 6/21/2020, Normal           No discharge procedures on file      PDMP Review     None          ED Provider  Electronically Signed by           Nicholas Valdivia IGOR  07/26/20 [id number]

## 2020-07-26 NOTE — DISCHARGE INSTRUCTIONS
Alternate ice/heat, topical muscle rubs, etc   Try the robaxin in place of the flexeril  Caution sedation  No driving while taking  Contact your specialist regarding your spinal stimulator and follow up with pain management  Follow up with your PCP  Return to ER as needed

## 2020-07-27 ENCOUNTER — TELEPHONE (OUTPATIENT)
Dept: INTERNAL MEDICINE CLINIC | Facility: CLINIC | Age: 70
End: 2020-07-27

## 2020-07-27 LAB
ATRIAL RATE: 99 BPM
P AXIS: 64 DEGREES
PR INTERVAL: 146 MS
QRS AXIS: 64 DEGREES
QRSD INTERVAL: 88 MS
QT INTERVAL: 352 MS
QTC INTERVAL: 451 MS
T WAVE AXIS: 32 DEGREES
VENTRICULAR RATE: 99 BPM

## 2020-07-27 PROCEDURE — 93010 ELECTROCARDIOGRAM REPORT: CPT | Performed by: INTERNAL MEDICINE

## 2020-07-28 ENCOUNTER — EVALUATION (OUTPATIENT)
Dept: PHYSICAL THERAPY | Facility: CLINIC | Age: 70
End: 2020-07-28
Payer: MEDICARE

## 2020-07-28 ENCOUNTER — TELEMEDICINE (OUTPATIENT)
Dept: INTERNAL MEDICINE CLINIC | Facility: CLINIC | Age: 70
End: 2020-07-28
Payer: MEDICARE

## 2020-07-28 VITALS — DIASTOLIC BLOOD PRESSURE: 78 MMHG | SYSTOLIC BLOOD PRESSURE: 160 MMHG

## 2020-07-28 DIAGNOSIS — M54.16 LUMBAR RADICULOPATHY, CHRONIC: Primary | ICD-10-CM

## 2020-07-28 DIAGNOSIS — I10 ESSENTIAL HYPERTENSION: ICD-10-CM

## 2020-07-28 DIAGNOSIS — G89.29 CHRONIC THORACIC BACK PAIN, UNSPECIFIED BACK PAIN LATERALITY: Primary | ICD-10-CM

## 2020-07-28 DIAGNOSIS — M19.91 DEGENERATIVE JOINT DISEASE OF RIGHT LOWER EXTREMITY: ICD-10-CM

## 2020-07-28 DIAGNOSIS — M54.6 CHRONIC THORACIC BACK PAIN, UNSPECIFIED BACK PAIN LATERALITY: Primary | ICD-10-CM

## 2020-07-28 PROCEDURE — 97140 MANUAL THERAPY 1/> REGIONS: CPT | Performed by: PHYSICAL THERAPIST

## 2020-07-28 PROCEDURE — 97164 PT RE-EVAL EST PLAN CARE: CPT | Performed by: PHYSICAL THERAPIST

## 2020-07-28 PROCEDURE — 99213 OFFICE O/P EST LOW 20 MIN: CPT | Performed by: INTERNAL MEDICINE

## 2020-07-28 PROCEDURE — 3077F SYST BP >= 140 MM HG: CPT | Performed by: INTERNAL MEDICINE

## 2020-07-28 PROCEDURE — 97035 APP MDLTY 1+ULTRASOUND EA 15: CPT | Performed by: PHYSICAL THERAPIST

## 2020-07-28 PROCEDURE — 1036F TOBACCO NON-USER: CPT | Performed by: INTERNAL MEDICINE

## 2020-07-28 PROCEDURE — 1160F RVW MEDS BY RX/DR IN RCRD: CPT | Performed by: INTERNAL MEDICINE

## 2020-07-28 PROCEDURE — 3078F DIAST BP <80 MM HG: CPT | Performed by: INTERNAL MEDICINE

## 2020-07-28 NOTE — PROGRESS NOTES
Virtual Regular Visit      Assessment/Plan:    Problem List Items Addressed This Visit        Cardiovascular and Mediastinum    Hypertension       Other    Chronic thoracic back pain - Primary      Increase losartan to 100mg daily  Has appt to check stimulator next week   Hold PT for now  Setup followup with pain mgmt for reeval   Monitor BP at home Increase water intake, rest     Reason for visit is followup ER  Chief Complaint   Patient presents with    Virtual Regular Visit        Encounter provider Marck Cary DO    Provider located at [address] [place] [id number]      Recent Visits  Date Type Provider Dept   07/27/20 Telephone Delaney Smith [facility] At [address] [place] [address] recent visits within past 7 days and meeting all other requirements     Today's Visits  Date Type Provider Dept   07/28/20 Telemedicine Marck Cary DO [facility] At [address] [place] [address] today's visits and meeting all other requirements     Future Appointments  Date Type Provider Dept   07/28/20 Telemedicine Marck Cary DO [facility]   Showing future appointments within next 150 days and meeting all other requirements        The patient was identified by name and date of birth  Antoni Mata was informed that this is a telemedicine visit and that the visit is being conducted through [facility] and patient was informed that this is a secure, HIPAA-compliant platform  She agrees to proceed     My office door was closed  No one else was in the room  She acknowledged consent and understanding of privacy and security of the video platform  The patient has agreed to participate and understands they can discontinue the visit at any time  Patient is aware this is a billable service       Subjective  Antoni Mata is a 72 y o  female with ongoing thoracic pain and no change with PT She has stimulator in place and has appt next week to check that Was in ER with chest pain over the weekend workup negative but BP elevated and she has noted it higher at home      HPI   Pt has ongoing thoracic area pain She did not get relief with PT and has some relief with robaxin from the ER She is scheduled to have stimulator checked next week and that runs to T8 area Ct in June did not suggest displacement No sob No further cp Her bp has been higher and fluxes more     Past Medical History:   Diagnosis Date    Abnormal findings on diagnostic imaging of breast     Anxiety     Arthritis     Fibrocystic breast disease     Foot pain     GERD (gastroesophageal reflux disease)     Hyperlipidemia     Hypertension     Interstitial cystitis     Osteopenia        Past Surgical History:   Procedure Laterality Date    APPENDECTOMY      BACK SURGERY      BREAST EXCISIONAL BIOPSY Left 1996    benign    CARPAL BOSS EXCISION      CHOLECYSTECTOMY      DIAGNOSTIC LAPAROSCOPY      FOOT SURGERY      HYSTERECTOMY      age 33    HYSTEROSCOPY      KIDNEY SURGERY Left     KNEE ARTHROSCOPY Bilateral     LAPAROSCOPY      hynecologic with adhesions    OOPHORECTOMY Bilateral     age 33    AR SURG IMPLNT [address] N/A 5/18/2017    Procedure: PLACEMENT OF THORACIC SPINAL CORD STIMULATOR WITH LEFT BUTTOCK IMPLANTABLE PULSE GENERATOR (IMPULSE MONITORING-MOTORS (TCEMEP), EMG, SSEP);   Surgeon: Sridevi Amador MD;  [facility];  Service: Neurosurgery    SPINAL STIMULATOR PLACEMENT  05/2017    TONSILLECTOMY AND ADENOIDECTOMY      onset: unknown    TOTAL KNEE ARTHROPLASTY Right        Current Outpatient Medications   Medication Sig Dispense Refill    amitriptyline (ELAVIL) 50 mg tablet Take 1 tablet (50 mg total) by mouth daily 90 tablet 3    aspirin 81 mg chewable tablet Chew 81 mg daily      Biotin 2500 MCG CAPS Take 2 capsules by mouth daily      Cholecalciferol (VITAMIN D-3 PO) Take 1 tablet by mouth daily      Cyanocobalamin (VITAMIN B-12 CR PO) Take 1 tablet by mouth daily      cyclobenzaprine (FLEXERIL) 5 mg tablet Take 1 tablet (5 mg total) by mouth every 6 (six) hours as needed for muscle spasms 20 tablet 0    diclofenac sodium (VOLTAREN) 1 % APPLY TO THE AFFECTED AREA(S) TWICE DAILY AS DIRECTED  100 g 0    dicyclomine (BENTYL) 10 mg capsule Take 1 capsule (10 mg total) by mouth 2 (two) times a day 180 capsule 0    EPINEPHrine (EPIPEN 2-AREN) 0 3 mg/0 3 mL SOAJ Inject 0 3 mL (0 3 mg total) into a muscle as needed for anaphylaxis 2 each 5    fexofenadine (ALLEGRA) 180 MG tablet Take 180 mg by mouth daily      gabapentin (NEURONTIN) 300 mg capsule Take 1 capsule (300 mg total) by mouth 3 (three) times a day 270 capsule 3    hydrOXYzine HCL (ATARAX) 10 mg tablet Take 2 tablets at bedtime 180 tablet 3    losartan (COZAAR) 50 mg tablet Take 1 tablet (50 mg total) by mouth daily 90 tablet 3    methocarbamol (ROBAXIN) 500 mg tablet Take 1 tablet (500 mg total) by mouth 2 (two) times a day as needed for muscle spasms 20 tablet 0    montelukast (SINGULAIR) 10 mg tablet Take 1 tablet (10 mg total) by mouth daily at bedtime 90 tablet 3    mupirocin (BACTROBAN) 2 % ointment       Omega-3 Fatty Acids (FISH OIL) 1,000 mg Take 1,000 mg by mouth 3 (three) times a day      omeprazole (PriLOSEC) 40 MG capsule Take 1 capsule (40 mg total) by mouth daily 90 capsule 3    rosuvastatin (CRESTOR) 10 MG tablet Take 1 tablet (10 mg total) by mouth daily 90 tablet 0     No current facility-administered medications for this visit  Allergies   Allergen Reactions    Bee Venom     Codeine Other (See Comments)     headaches    Egg Yolk     Iodinated Diagnostic Agents Hives    Other      Adhesive tape    Penicillins Hives    Bactrim [Sulfamethoxazole-Trimethoprim] Rash    Elmiron [Pentosan Polysulfate] Rash    Latex Rash    Oxybutynin Rash       Review of Systems   Constitutional: Positive for activity change  Negative for chills and fever     Respiratory: Negative for cough, chest tightness and shortness of breath  Cardiovascular: Positive for chest pain  Negative for palpitations and leg swelling  Musculoskeletal: Positive for arthralgias and back pain  Neurological: Negative for dizziness, light-headedness and headaches  Psychiatric/Behavioral: Positive for sleep disturbance  Video Exam    Vitals:    07/28/20 1634   BP: 160/78       Physical Exam   Constitutional: She is oriented to person, place, and time  She appears well-developed and well-nourished  No distress  HENT:   Head: Normocephalic and atraumatic  Right Ear: External ear normal    Left Ear: External ear normal    Nose: Nose normal    Mouth/Throat: Oropharynx is clear and moist    Neck: No JVD present  Cardiovascular: Normal rate  Pulmonary/Chest: Effort normal  No respiratory distress  She has no wheezes  Musculoskeletal: She exhibits tenderness  Tenderness mid thoracic spine   Neurological: She is alert and oriented to person, place, and time  Skin: No rash noted  She is not diaphoretic  No erythema  Psychiatric: She has a normal mood and affect  Her behavior is normal  Judgment and thought content normal         I spent 12 minutes directly with the patient during this visit      [address] [place] [address] acknowledges that she has consented to an online visit or consultation  She understands that the online visit is based solely on information provided by her, and that, in the absence of a face-to-face physical evaluation by the physician, the diagnosis she receives is both limited and provisional in terms of accuracy and completeness  This is not intended to replace a full medical face-to-face evaluation by the physician  Mireille Campos understands and accepts these terms

## 2020-07-28 NOTE — PROGRESS NOTES
PT Re-Evaluation  and PT Discharge    Today's date: 2020  Patient name: Toñito Veloz  : 1950  MRN: [id number]  Referring provider: Yaneth Sheldon DO  Dx:   Encounter Diagnosis     ICD-10-CM    1  Lumbar radiculopathy, chronic M54 16    2  Degenerative joint disease of right lower extremity M19 91      Addendum 2020: The patient contacted our office after a virtual visit with her PCP  She is to follow up with pain management and will be stopping PT  This reevaluation provides her most recent findings  Assessment  Assessment details:   CURRENT FUNCTIONAL STATUS    Standing/ADL tolerance 30 minutes  Walking tolerance 2-3 blocks  Ability to bend forward: Poor       SHORT TERM GOALS (2 WEEKS)    Increase thoraco-lumbar spine AROM 25 % in all planes  Decrease pain to 1-4/10  Standing/ADL tolerance 45 minutes  Walking tolerance 4-5 blocks  Ability to bend forward: Fair     LONG TERM GOALS (DISCHARGE)    Lumbar Spine AROM: WFL in all planes  -not met  Decrease pain to 0-3/10 -not met  Standing/ADL tolerance 60 minutes  -not met  Walking tolerance 5-6 blocks  -not met     Ability to bend forward: Fair/Good-not met   Understanding of Dx/Px/POC: good   Prognosis: poor    Goals  See assessment details above  Plan  Plan details: The patient's pain and activity tolerance are essentially unchanged since initiating PT  She has been placed on hold from treatment and will follow up with her PCP later today  Planned modality interventions: thermotherapy: hydrocollator packs and ultrasound  Planned therapy interventions: manual therapy, self care, therapeutic activities and therapeutic exercise        Subjective Evaluation    History of Present Illness  Mechanism of injury: Subjective: The patient's back pain has been variable t/o the course of treatment   Two days ago she awoke with severe pain, being unable to bend or twist  She went to the ED, where she was given a muscle relaxer  She states that her tolerance for standing, walking, and bending are unchanged since initiating therapy  She gets temporary relief from PT treament, but her pain continues at constant, moderate to severe levels  She has a virtual visit scheduled with her PCP later today  Pain  Current pain ratin  At best pain ratin  At worst pain ratin    Patient Goals  Patient goals for therapy: decreased pain and increased motion          Objective     Postural Observations    Additional Postural Observation Details  Gait and transfers normal, no lateral shift  General Comments:      Lumbar Comments  CURRENT OBJECTIVE MEASUREMENTS    Thoraco-Lumbar Spine AROM: F=50 %, EXT=25 %, R SB=50 %, L SB=40 %  Marked tenderness along the right lateral lower rib cage  A palpable band of cord-like soft tiisue approximately 2 inches long is present along that same area                      Precautions: None        Manuals 7/21 7/28 7/8 7/14 7/15 7/20   STM R T-L spine LF RK LF LF RK RK                   Neuro Re-Ed                               Ther Ex               T-L L SB standing    10" X 10 10" X 10 10"x10     SBB         10x TID HEP     T-L L ROT sitting         10x TID HEP                     Ther Activity                               Modalities               HP Right lateral T-L 10' 10'       10'   US 3 3 m/cm2 15' 15' 15' 15' 15' 15'

## 2020-07-29 ENCOUNTER — APPOINTMENT (OUTPATIENT)
Dept: PHYSICAL THERAPY | Facility: CLINIC | Age: 70
End: 2020-07-29
Payer: MEDICARE

## 2020-07-31 ENCOUNTER — TELEPHONE (OUTPATIENT)
Dept: INTERNAL MEDICINE CLINIC | Facility: CLINIC | Age: 70
End: 2020-07-31

## 2020-07-31 NOTE — TELEPHONE ENCOUNTER
Did not hear anything back - I suspect they may be awaiting the info from her stimulator followup - she said next week  Get that date and I will send message directly to Dr Carlotta Marino to see if he can followup

## 2020-08-03 DIAGNOSIS — M54.6 ACUTE BILATERAL THORACIC BACK PAIN: Primary | ICD-10-CM

## 2020-08-05 ENCOUNTER — TELEPHONE (OUTPATIENT)
Dept: INTERNAL MEDICINE CLINIC | Facility: CLINIC | Age: 70
End: 2020-08-05

## 2020-08-05 DIAGNOSIS — M54.6 THORACIC BACK PAIN: ICD-10-CM

## 2020-08-05 RX ORDER — METHOCARBAMOL 500 MG/1
500 TABLET, FILM COATED ORAL 2 TIMES DAILY PRN
Qty: 20 TABLET | Refills: 0 | Status: SHIPPED | OUTPATIENT
Start: 2020-08-05 | End: 2020-08-13 | Stop reason: SDUPTHER

## 2020-08-05 NOTE — TELEPHONE ENCOUNTER
Patient seen the person about her stimulator today    Linda Harmon said she will just have Dr Christiane Christy send you something after his appt next week

## 2020-08-13 ENCOUNTER — APPOINTMENT (OUTPATIENT)
Dept: RADIOLOGY | Facility: MEDICAL CENTER | Age: 70
End: 2020-08-13
Payer: MEDICARE

## 2020-08-13 ENCOUNTER — OFFICE VISIT (OUTPATIENT)
Dept: PAIN MEDICINE | Facility: MEDICAL CENTER | Age: 70
End: 2020-08-13
Payer: MEDICARE

## 2020-08-13 VITALS
HEART RATE: 84 BPM | RESPIRATION RATE: 16 BRPM | WEIGHT: 199 LBS | DIASTOLIC BLOOD PRESSURE: 76 MMHG | BODY MASS INDEX: 36.62 KG/M2 | TEMPERATURE: 98.2 F | SYSTOLIC BLOOD PRESSURE: 152 MMHG | HEIGHT: 62 IN

## 2020-08-13 DIAGNOSIS — G89.29 CHRONIC RIGHT-SIDED LOW BACK PAIN WITHOUT SCIATICA: ICD-10-CM

## 2020-08-13 DIAGNOSIS — M54.6 THORACIC BACK PAIN: ICD-10-CM

## 2020-08-13 DIAGNOSIS — M54.50 CHRONIC RIGHT-SIDED LOW BACK PAIN WITHOUT SCIATICA: ICD-10-CM

## 2020-08-13 DIAGNOSIS — M79.18 MYOFASCIAL PAIN SYNDROME: Primary | ICD-10-CM

## 2020-08-13 DIAGNOSIS — M54.6 ACUTE BILATERAL THORACIC BACK PAIN: ICD-10-CM

## 2020-08-13 PROCEDURE — 99214 OFFICE O/P EST MOD 30 MIN: CPT | Performed by: PHYSICAL MEDICINE & REHABILITATION

## 2020-08-13 PROCEDURE — 1036F TOBACCO NON-USER: CPT | Performed by: PHYSICAL MEDICINE & REHABILITATION

## 2020-08-13 PROCEDURE — 3008F BODY MASS INDEX DOCD: CPT | Performed by: PHYSICAL MEDICINE & REHABILITATION

## 2020-08-13 PROCEDURE — 3078F DIAST BP <80 MM HG: CPT | Performed by: PHYSICAL MEDICINE & REHABILITATION

## 2020-08-13 PROCEDURE — 72070 X-RAY EXAM THORAC SPINE 2VWS: CPT

## 2020-08-13 PROCEDURE — 3077F SYST BP >= 140 MM HG: CPT | Performed by: PHYSICAL MEDICINE & REHABILITATION

## 2020-08-13 PROCEDURE — 1160F RVW MEDS BY RX/DR IN RCRD: CPT | Performed by: PHYSICAL MEDICINE & REHABILITATION

## 2020-08-13 RX ORDER — PHENOL 1.4 %
1200 AEROSOL, SPRAY (ML) MUCOUS MEMBRANE 2 TIMES DAILY WITH MEALS
COMMUNITY

## 2020-08-13 RX ORDER — METHOCARBAMOL 750 MG/1
750 TABLET, FILM COATED ORAL 2 TIMES DAILY PRN
Qty: 60 TABLET | Refills: 1 | Status: SHIPPED | OUTPATIENT
Start: 2020-08-13 | End: 2020-10-13 | Stop reason: DRUGHIGH

## 2020-08-13 RX ORDER — SIMVASTATIN 10 MG
10 TABLET ORAL EVERY OTHER DAY
COMMUNITY
End: 2021-02-03 | Stop reason: ALTCHOICE

## 2020-08-13 NOTE — PROGRESS NOTES
Assessment  1  Myofascial pain syndrome    2  Acute bilateral thoracic back pain    3  Thoracic back pain    4  Chronic right-sided low back pain without sciatica        Plan  1  We will increase the patient's methocarbamol to 750 mg twice daily  2  Schedule for trigger point injections under ultrasound guidance of the right thoracic paraspinal lumbar paraspinal musculature  3  Obtain x-ray of the thoracic spine to demonstrate lead positioning  She has lost some capture of the right lower back region and notes less stimulation in the right lower extremity  I do believe the spinal cord stimulator lead has migrated towards the left which may require repositioning with Neurosurgery if we are unable to improve her pain otherwise  My impressions and treatment recommendations were discussed in detail with the patient who verbalized understanding and had no further questions  Discharge instructions were provided  I personally saw and examined the patient and I agree with the above discussed plan of care  Orders Placed This Encounter   Procedures    XR spine thoracic 2 vw     Please compare placement of stim lead to prior xrays     Standing Status:   Future     Standing Expiration Date:   8/13/2024     Scheduling Instructions:      Bring along any outside films relating to this procedure             Order Specific Question:   Reason for Exam:     Answer:   spinal cord stimulator placement     New Medications Ordered This Visit   Medications    simvastatin (ZOCOR) 10 mg tablet     Sig: Take 10 mg by mouth daily at bedtime    Ezetimibe-Simvastatin (VYTORIN PO)     Sig: Take by mouth    calcium carbonate (OS-ANTHONY) 600 MG tablet     Sig: Take 1,200 mg by mouth 2 (two) times a day with meals    methocarbamol (ROBAXIN) 750 mg tablet     Sig: Take 1 tablet (750 mg total) by mouth 2 (two) times a day as needed for muscle spasms     Dispense:  60 tablet     Refill:  1       History of Present Illness    Zee Wheatley is a 72 y o  female who presents in follow-up regarding worsening back pain complaints especially along the right lower thoracic and lumbar paraspinal region  She previously was having excellent capture of these regions of pain with her spinal cord stimulator  She continues to have good left lower back coverage and bilateral leg coverage although the left lower extremity feels more stimulation on the right per her report  I did review her recent CT scan and this does demonstrate migration of the lead towards the left  I would like to compare that with an updated thoracic spine x-ray as well  Patient is describing moderate to severe intensity pain rated as an 8/10 which is nearly constant and worse in the evening and nighttime described as burning, shooting, sharp  She has been using methocarbamol with some good relief but is hopeful for further improvement  She has no side effects from the medication and therefore we will increase the dose for her  I have personally reviewed and/or updated the patient's past medical history, past surgical history, family history, social history, current medications, allergies, and vital signs today  Review of Systems   Respiratory: Negative for shortness of breath  Cardiovascular: Negative for chest pain  Gastrointestinal: Negative for constipation, diarrhea, nausea and vomiting  Musculoskeletal: Positive for back pain (thoracic and lumbar), joint swelling and myalgias  Negative for arthralgias and gait problem  Skin: Negative for rash  Neurological: Negative for dizziness, seizures and weakness  Psychiatric/Behavioral: Positive for decreased concentration and sleep disturbance  The patient is nervous/anxious  All other systems reviewed and are negative        Patient Active Problem List   Diagnosis    Hypertension    Hyperlipidemia    Acid reflux disease    Chronic low back pain    Degenerative joint disease of right lower extremity    Irritable bowel syndrome    Lumbar radiculopathy, chronic    Neuralgia    Peripheral neuropathy    Chronic thoracic back pain    Interstitial cystitis (chronic) with hematuria    Medicare annual wellness visit, subsequent    S/P right knee surgery    Itching       Past Medical History:   Diagnosis Date    Abnormal findings on diagnostic imaging of breast     Anxiety     Arthritis     Chronic pain disorder     Extremity pain     Fibrocystic breast disease     Foot pain     GERD (gastroesophageal reflux disease)     Hyperlipidemia     Hypertension     Interstitial cystitis     Joint pain     Low back pain     Osteoarthritis     Osteopenia        Past Surgical History:   Procedure Laterality Date    APPENDECTOMY      BACK SURGERY      BREAST EXCISIONAL BIOPSY Left 1996    benign    CARPAL BOSS EXCISION      CHOLECYSTECTOMY      DIAGNOSTIC LAPAROSCOPY      FOOT SURGERY      FRACTURE SURGERY      HYSTERECTOMY      age 33    HYSTEROSCOPY      JOINT REPLACEMENT      KIDNEY SURGERY Left     KNEE ARTHROSCOPY Bilateral     LAPAROSCOPY      hynecologic with adhesions    OOPHORECTOMY Bilateral     age 33   Latosha Mo ORTHOPEDIC SURGERY      [place] SURG IMPLNT [address] N/A 5/18/2017    Procedure: PLACEMENT OF THORACIC SPINAL CORD STIMULATOR WITH LEFT BUTTOCK IMPLANTABLE PULSE GENERATOR (IMPULSE MONITORING-MOTORS (TCEMEP), EMG, SSEP);   Surgeon: Quinn Hatchet, MD;  [facility];  Service: Neurosurgery    SPINAL STIMULATOR PLACEMENT  05/2017    TONSILLECTOMY AND ADENOIDECTOMY      onset: unknown    TOTAL KNEE ARTHROPLASTY Right        Family History   Problem Relation Age of Onset    Bone cancer Mother     Hypertension Father     Brain cancer Father     Stroke Father         stroke sydrome    Diabetes Paternal Grandmother     Breast cancer Paternal Grandmother 61    Breast cancer Maternal Aunt 79    Breast cancer additional onset Maternal Aunt 67    Depression Sister    Latosha Mo Asthma Sister     Heart disease Brother        Social History     Occupational History    Occupation:    Tobacco Use    Smoking status: Never Smoker    Smokeless tobacco: Never Used   Substance and Sexual Activity    Alcohol use: Yes     Alcohol/week: 1 0 standard drinks     Types: 1 Glasses of wine per week     Frequency: 2-4 times a month     Comment: social less than a drink per week    Drug use: No    Sexual activity: Not on file       Current Outpatient Medications on File Prior to Visit   Medication Sig    amitriptyline (ELAVIL) 50 mg tablet Take 1 tablet (50 mg total) by mouth daily    aspirin 81 mg chewable tablet Chew 81 mg daily    Biotin 2500 MCG CAPS Take 2 capsules by mouth daily    calcium carbonate (OS-ANTHONY) 600 MG tablet Take 1,200 mg by mouth 2 (two) times a day with meals    Cholecalciferol (VITAMIN D-3 PO) Take 1 tablet by mouth daily    Cyanocobalamin (VITAMIN B-12 CR PO) Take 1 tablet by mouth daily    diclofenac sodium (VOLTAREN) 1 % APPLY TO THE AFFECTED AREA(S) TWICE DAILY AS DIRECTED      dicyclomine (BENTYL) 10 mg capsule Take 1 capsule (10 mg total) by mouth 2 (two) times a day    EPINEPHrine (EPIPEN 2-AREN) 0 3 mg/0 3 mL SOAJ Inject 0 3 mL (0 3 mg total) into a muscle as needed for anaphylaxis    Ezetimibe-Simvastatin (VYTORIN PO) Take by mouth    fexofenadine (ALLEGRA) 180 MG tablet Take 180 mg by mouth daily    gabapentin (NEURONTIN) 300 mg capsule Take 1 capsule (300 mg total) by mouth 3 (three) times a day    hydrOXYzine HCL (ATARAX) 10 mg tablet Take 2 tablets at bedtime    losartan (COZAAR) 50 mg tablet Take 1 tablet (50 mg total) by mouth daily    montelukast (SINGULAIR) 10 mg tablet Take 1 tablet (10 mg total) by mouth daily at bedtime    mupirocin (BACTROBAN) 2 % ointment     Omega-3 Fatty Acids (FISH OIL) 1,000 mg Take 1,000 mg by mouth 3 (three) times a day    omeprazole (PriLOSEC) 40 MG capsule Take 1 capsule (40 mg total) by mouth daily    rosuvastatin (CRESTOR) 10 MG tablet Take 1 tablet (10 mg total) by mouth daily    simvastatin (ZOCOR) 10 mg tablet Take 10 mg by mouth daily at bedtime    [DISCONTINUED] methocarbamol (ROBAXIN) 500 mg tablet Take 1 tablet (500 mg total) by mouth 2 (two) times a day as needed for muscle spasms    [DISCONTINUED] cyclobenzaprine (FLEXERIL) 5 mg tablet Take 1 tablet (5 mg total) by mouth every 6 (six) hours as needed for muscle spasms (Patient not taking: Reported on 8/13/2020)     No current facility-administered medications on file prior to visit  Allergies   Allergen Reactions    Bee Venom     Codeine Other (See Comments)     headaches    Egg Yolk     Iodinated Diagnostic Agents Hives    Other      Adhesive tape    Penicillins Hives    Bactrim [Sulfamethoxazole-Trimethoprim] Rash    Elmiron [Pentosan Polysulfate] Rash    Latex Rash    Oxybutynin Rash       Physical Exam    /76   Pulse 84   Temp 98 2 °F (36 8 °C)   Resp 16   Ht 5' 2" (1 575 m)   Wt 90 3 kg (199 lb)   BMI 36 40 kg/m²     Constitutional: normal, well developed, well nourished, alert, in no distress and non-toxic and no overt pain behavior  Eyes: anicteric  HEENT: grossly intact  Neck: supple, symmetric, trachea midline and no masses   Pulmonary:even and unlabored  Cardiovascular:No edema or pitting edema present  Skin:Normal without rashes or lesions and well hydrated  Psychiatric:Mood and affect appropriate  Neurologic:Cranial Nerves II-XII grossly intact  Musculoskeletal:normal, except for tenderness to palpation along the right thoracic and lumbar paraspinal musculature reproducing her pain complaint, some pain is elicited with forward flexion and no significant pain with lumbar extension    Imaging  Study Result     CT THORACIC SPINE     INDICATION:   M54 6: Pain in thoracic spine  G89 29: Other chronic pain  Chronic right-sided lower thoracic pain    Upper and mid back pain      COMPARISON:     TECHNIQUE: Contiguous axial images were obtained  Sagittal and coronal reconstructions were performed        Radiation dose length product (DLP) for this visit:  795 25 mGy-cm   This examination, like all CT scans performed in the [facility], was performed utilizing techniques to minimize radiation dose exposure, including the use of iterative   reconstruction and automated exposure control         IMAGE QUALITY:  Diagnostic      FINDINGS:     ALIGNMENT:  Normal alignment of the thoracic spine  No subluxation      VERTEBRAL BODIES: No fracture      DEGENERATIVE CHANGES:  Disc spaces are maintained      PARASPINAL SOFT TISSUES:  Dorsal column stimulator inserted into the spinal canal extending to the mid T8 vertebral level   Normal      IMPRESSION:     Dorsal column stimulator inserted extends to the mid T8 level and the spinal canal   No fracture or traumatic malalignment            Workstation

## 2020-08-17 ENCOUNTER — TELEPHONE (OUTPATIENT)
Dept: PAIN MEDICINE | Facility: MEDICAL CENTER | Age: 70
End: 2020-08-17

## 2020-08-17 NOTE — TELEPHONE ENCOUNTER
----- Message from Hina Raymond DO sent at 8/17/2020  1:03 PM EDT -----  The stimulator paddle is definitely more off to the left than it had been previously  Please have her follow up with [facility] and also pursue evaluation with Dr Kilo Brandt from Neurosurgery to discuss adjusting the paddle placement

## 2020-08-17 NOTE — RESULT ENCOUNTER NOTE
The stimulator paddle is definitely more off to the left than it had been previously  Please have her follow up with [facility] and also pursue evaluation with Dr Mckayla Vance from Neurosurgery to discuss adjusting the paddle placement

## 2020-08-18 DIAGNOSIS — M54.16 LUMBAR RADICULOPATHY, CHRONIC: Primary | ICD-10-CM

## 2020-08-18 NOTE — TELEPHONE ENCOUNTER
RN s/w pt regarding previous  Pt provided with ph# for Neurosurgery and will contact her [facility]  Has phone number for [facility] if any further questions or concerns

## 2020-08-18 NOTE — TELEPHONE ENCOUNTER
Pt called back to state that she spoke with the rep from [facility] and then called Dr Viviana abdalla and they requested that [facility] [address] a referral so that they can schedule her appt  RN advised that she would send this to [address] [place] [address] for same and if Dr Viviana Doshi office doesn't call her to schedule by tomorrow she can call them back  --please advise thank you--  Dr Viviana abdalla if requesting a referral so pt can be seen by Dr Vincent Newell

## 2020-08-24 DIAGNOSIS — I10 HYPERTENSION, UNSPECIFIED TYPE: ICD-10-CM

## 2020-08-24 RX ORDER — LOSARTAN POTASSIUM 100 MG/1
100 TABLET ORAL DAILY
Qty: 90 TABLET | Refills: 3 | Status: SHIPPED | OUTPATIENT
Start: 2020-08-24 | End: 2021-07-20

## 2020-09-02 ENCOUNTER — TELEPHONE (OUTPATIENT)
Dept: NEUROSURGERY | Facility: CLINIC | Age: 70
End: 2020-09-02

## 2020-09-03 ENCOUNTER — OFFICE VISIT (OUTPATIENT)
Dept: NEUROSURGERY | Facility: CLINIC | Age: 70
End: 2020-09-03
Payer: MEDICARE

## 2020-09-03 VITALS
DIASTOLIC BLOOD PRESSURE: 70 MMHG | BODY MASS INDEX: 37.36 KG/M2 | HEART RATE: 68 BPM | TEMPERATURE: 98.4 F | WEIGHT: 203 LBS | SYSTOLIC BLOOD PRESSURE: 130 MMHG | RESPIRATION RATE: 16 BRPM | HEIGHT: 62 IN

## 2020-09-03 DIAGNOSIS — G89.4 CHRONIC PAIN SYNDROME: Primary | ICD-10-CM

## 2020-09-03 DIAGNOSIS — M54.16 LUMBAR RADICULOPATHY, CHRONIC: ICD-10-CM

## 2020-09-03 DIAGNOSIS — Z51.81 ADMISSION FOR LONG-TERM (CURRENT) USE OF ANTICOAGULANTS: ICD-10-CM

## 2020-09-03 DIAGNOSIS — Z79.899 ENCOUNTER FOR LONG-TERM (CURRENT) USE OF MEDICATIONS: ICD-10-CM

## 2020-09-03 DIAGNOSIS — Z79.01 ADMISSION FOR LONG-TERM (CURRENT) USE OF ANTICOAGULANTS: ICD-10-CM

## 2020-09-03 PROCEDURE — 99204 OFFICE O/P NEW MOD 45 MIN: CPT | Performed by: NEUROLOGICAL SURGERY

## 2020-09-03 RX ORDER — VANCOMYCIN HYDROCHLORIDE 1 G/200ML
1000 INJECTION, SOLUTION INTRAVENOUS ONCE
Status: CANCELLED | OUTPATIENT
Start: 2020-09-03 | End: 2020-09-03

## 2020-09-03 RX ORDER — CHLORHEXIDINE GLUCONATE 0.12 MG/ML
15 RINSE ORAL ONCE
Status: CANCELLED | OUTPATIENT
Start: 2020-09-03 | End: 2020-09-03

## 2020-09-03 NOTE — PROGRESS NOTES
Assessment/Plan:    No problem-specific Assessment & Plan notes found for this encounter  Patient is stable  Symptoms, as detailed in HPI, continue to significantly impact of patient's quality of life in daily activities  After carefully considering presentation, investigations, functional status and co-morbidities, the risk/benefit profile of surgical intervention is favorable  History, physical examination and diagnostic tests were reviewed and questions answered  Diagnosis, care plan and treatment options were discussed  The patient understand instructions and will follow up as directed  Patient with effective stimulation of an abbott thoracic stimulator but recently lost coverage of the right  Her Xray shows displacement of the paddle towards the left  Recommended revision of the system via laminectomy and movement of the paddle versus replacement with percutaneous leads  Discussed the risks and benefits she agreed to proceed  OR for revision     Expected postoperative course, including activity restrictions, expected pain and postoperative medication were reviewed  Patient provided verbal consent to surgical procedure and signed consent form: Yes    We also discussed the risks and benefits of the procedure the risks being including: infection (~2%), neurologic injury (<1%), new pain, revisions surgery, failure to relieve pain, hardware issues  The benefits including relief of pain as in prior  The patient stated understanding of the risks and benefits and agreed to proceed  IMAGING REVIEWED: XR thoracic shows leftward displacement of electrode paddle          Diagnoses and all orders for this visit:    Chronic pain syndrome  -     Case request operating room: LAMINECTOMY THORACIC FOR REVISION OF DORSAL COLUMN SPINAL CORD STIMULATOR LEAD AND GENERATOR, LEFT; Standing  -     Case request operating room: LAMINECTOMY THORACIC FOR REVISION OF DORSAL COLUMN SPINAL CORD STIMULATOR LEAD AND GENERATOR, LEFT    Lumbar radiculopathy, chronic  -     Ambulatory referral to Neurosurgery  -     Case request operating room: LAMINECTOMY THORACIC FOR REVISION OF DORSAL COLUMN SPINAL CORD STIMULATOR LEAD AND GENERATOR, LEFT; Standing  -     Case request operating room: [address] CORD STIMULATOR LEAD AND GENERATOR, LEFT    Other orders  -     Diet NPO; Sips with meds; Standing  -     Nursing Communication [facility] Interventions Implemented; Standing  -     Nursing Communication CHG bath, have staff wash entire body (neck down) per pre-op bathing protocol  Routine, evening prior to, and day of surgery ; Standing  -     Nursing Communication Swab both nares with Povidone-Iodine solution, EXCLUDE if patient has shellfish/Iodine allergy  Routine, day of surgery, on call to OR; Standing  -     chlorhexidine (PERIDEX) 0 12 % oral rinse 15 mL  -     Void on call to OR; Standing  -     Insert peripheral IV; Standing  -     vancomycin (VANCOCIN) IVPB (premix) 1,000 mg 200 mL        I have spent 60 minutes with Patient  today in which greater than 50% of this time was spent in counseling/coordination of care regarding Diagnostic results, Prognosis, Risks and benefits of tx options, Intructions for management, Patient and family education, Importance of tx compliance, Risk factor reductions and Impressions  Subjective:      Patient ID: Carlotta March is a 72 y o  female  Patient is a 72year old female with symptoms of low back RLE pain  Pain is severe and constant in nature  She had good relief with a thoracic SCS stimulator placed by Reg Umanzor in 2017  She had good results but recently lost stimulation on the right side  She complains of right pain and no coverage with the stimulator  She followed with Dr Kaplan who ordered and XR        The following portions of the patient's history were reviewed and updated as appropriate:   She  has a past medical history of Abnormal findings on diagnostic imaging of breast, Anxiety, Arthritis, Chronic pain disorder, Extremity pain, Fibrocystic breast disease, Foot pain, GERD (gastroesophageal reflux disease), Hyperlipidemia, Hypertension, Interstitial cystitis, Joint pain, Low back pain, Osteoarthritis, and Osteopenia  She   Patient Active Problem List    Diagnosis Date Noted    Itching 06/15/2020    S/P right knee surgery 07/11/2019    Medicare annual wellness visit, subsequent 12/06/2018    Interstitial cystitis (chronic) with hematuria 08/07/2018    Chronic thoracic back pain 04/03/2018    Hypertension 08/10/2017    Hyperlipidemia 08/10/2017    Lumbar radiculopathy, chronic 07/29/2016    Acid reflux disease 05/20/2016    Chronic low back pain 06/26/2015    Peripheral neuropathy 02/12/2015    Irritable bowel syndrome 01/13/2015    Neuralgia 01/12/2015    Degenerative joint disease of right lower extremity 10/18/2012     She  has a past surgical history that includes Back surgery; Knee arthroscopy (Bilateral); Kidney surgery (Left); Cholecystectomy; Appendectomy; Foot surgery; Carpal boss excision; pr surg implnt neuroelect,epidural (N/A, 5/18/2017); SPINAL STIMULATOR PLACEMENT (05/2017); LAPAROSCOPY; Hysteroscopy; Diagnostic laparoscopy; Tonsillectomy and adenoidectomy; Oophorectomy (Bilateral); Hysterectomy; Breast excisional biopsy (Left, 1996); Total knee arthroplasty (Right); orthopedic surgery; Joint replacement; and Fracture surgery  Her family history includes Asthma in her sister; Bone cancer in her mother; Brain cancer in her father; Breast cancer (age of onset: 61) in her paternal grandmother; Breast cancer (age of onset: 79) in her maternal aunt; Breast cancer additional onset (age of onset: 67) in her maternal aunt; Depression in her sister; Diabetes in her paternal grandmother; Heart disease in her brother; Hypertension in her father; Stroke in her father  She  reports that she has never smoked   She has never used smokeless tobacco  She reports current alcohol use of about 1 0 standard drinks of alcohol per week  She reports that she does not use drugs  Current Outpatient Medications   Medication Sig Dispense Refill    amitriptyline (ELAVIL) 50 mg tablet Take 1 tablet (50 mg total) by mouth daily 90 tablet 3    aspirin 81 mg chewable tablet Chew 81 mg daily      Biotin 2500 MCG CAPS Take 2 capsules by mouth daily      Cholecalciferol (VITAMIN D-3 PO) Take 1 tablet by mouth daily      Cyanocobalamin (VITAMIN B-12 CR PO) Take 1 tablet by mouth daily      diclofenac sodium (VOLTAREN) 1 % APPLY TO THE AFFECTED AREA(S) TWICE DAILY AS DIRECTED   100 g 0    dicyclomine (BENTYL) 10 mg capsule Take 1 capsule (10 mg total) by mouth 2 (two) times a day 180 capsule 0    EPINEPHrine (EPIPEN 2-AREN) 0 3 mg/0 3 mL SOAJ Inject 0 3 mL (0 3 mg total) into a muscle as needed for anaphylaxis 2 each 5    Ezetimibe-Simvastatin (VYTORIN PO) Take by mouth      fexofenadine (ALLEGRA) 180 MG tablet Take 180 mg by mouth daily      gabapentin (NEURONTIN) 300 mg capsule Take 1 capsule (300 mg total) by mouth 3 (three) times a day 270 capsule 3    hydrOXYzine HCL (ATARAX) 10 mg tablet Take 2 tablets at bedtime 180 tablet 3    losartan (COZAAR) 100 MG tablet Take 1 tablet (100 mg total) by mouth daily 90 tablet 3    methocarbamol (ROBAXIN) 750 mg tablet Take 1 tablet (750 mg total) by mouth 2 (two) times a day as needed for muscle spasms 60 tablet 1    montelukast (SINGULAIR) 10 mg tablet Take 1 tablet (10 mg total) by mouth daily at bedtime 90 tablet 3    Omega-3 Fatty Acids (FISH OIL) 1,000 mg Take 1,000 mg by mouth 3 (three) times a day      omeprazole (PriLOSEC) 40 MG capsule Take 1 capsule (40 mg total) by mouth daily 90 capsule 3    rosuvastatin (CRESTOR) 10 MG tablet Take 1 tablet (10 mg total) by mouth daily 90 tablet 0    simvastatin (ZOCOR) 10 mg tablet Take 10 mg by mouth daily at bedtime      calcium carbonate (OS-ANTHONY) 600 MG tablet Take 1,200 mg by mouth 2 (two) times a day with meals      mupirocin (BACTROBAN) 2 % ointment        No current facility-administered medications for this visit  Current Outpatient Medications on File Prior to Visit   Medication Sig    amitriptyline (ELAVIL) 50 mg tablet Take 1 tablet (50 mg total) by mouth daily    aspirin 81 mg chewable tablet Chew 81 mg daily    Biotin 2500 MCG CAPS Take 2 capsules by mouth daily    Cholecalciferol (VITAMIN D-3 PO) Take 1 tablet by mouth daily    Cyanocobalamin (VITAMIN B-12 CR PO) Take 1 tablet by mouth daily    diclofenac sodium (VOLTAREN) 1 % APPLY TO THE AFFECTED AREA(S) TWICE DAILY AS DIRECTED      dicyclomine (BENTYL) 10 mg capsule Take 1 capsule (10 mg total) by mouth 2 (two) times a day    EPINEPHrine (EPIPEN 2-AREN) 0 3 mg/0 3 mL SOAJ Inject 0 3 mL (0 3 mg total) into a muscle as needed for anaphylaxis    Ezetimibe-Simvastatin (VYTORIN PO) Take by mouth    fexofenadine (ALLEGRA) 180 MG tablet Take 180 mg by mouth daily    gabapentin (NEURONTIN) 300 mg capsule Take 1 capsule (300 mg total) by mouth 3 (three) times a day    hydrOXYzine HCL (ATARAX) 10 mg tablet Take 2 tablets at bedtime    losartan (COZAAR) 100 MG tablet Take 1 tablet (100 mg total) by mouth daily    methocarbamol (ROBAXIN) 750 mg tablet Take 1 tablet (750 mg total) by mouth 2 (two) times a day as needed for muscle spasms    montelukast (SINGULAIR) 10 mg tablet Take 1 tablet (10 mg total) by mouth daily at bedtime    Omega-3 Fatty Acids (FISH OIL) 1,000 mg Take 1,000 mg by mouth 3 (three) times a day    omeprazole (PriLOSEC) 40 MG capsule Take 1 capsule (40 mg total) by mouth daily    rosuvastatin (CRESTOR) 10 MG tablet Take 1 tablet (10 mg total) by mouth daily    simvastatin (ZOCOR) 10 mg tablet Take 10 mg by mouth daily at bedtime    calcium carbonate (OS-ANTHONY) 600 MG tablet Take 1,200 mg by mouth 2 (two) times a day with meals    mupirocin (BACTROBAN) 2 % ointment      No current facility-administered medications on file prior to visit  She is allergic to bee venom; codeine; egg yolk; iodinated diagnostic agents; penicillins; bactrim [sulfamethoxazole-trimethoprim]; elmiron [pentosan polysulfate]; latex; other; and oxybutynin       Review of Systems   Constitutional: Negative  HENT: Negative  Eyes: Negative  Respiratory: Negative  Cardiovascular: Negative  Gastrointestinal: Negative  Endocrine: Negative  Genitourinary: Negative  Musculoskeletal: Positive for back pain (radiates to b/l legs) and gait problem  SCS Lead Migration   Skin: Negative  Allergic/Immunologic: Negative  Neurological: Negative for weakness and numbness  Hematological: Negative  Psychiatric/Behavioral: Negative  All other systems reviewed and are negative  I have personally reviewed all aspects of the review of systems as documented    Objective:      /70   Pulse 68   Temp 98 4 °F (36 9 °C) (Tympanic)   Resp 16   Ht 5' 2" (1 575 m)   Wt 92 1 kg (203 lb)   BMI 37 13 kg/m²          Physical Exam  Constitutional:       Appearance: Normal appearance  She is normal weight  HENT:      Head: Normocephalic and atraumatic  Eyes:      General:         Right eye: No discharge  Left eye: No discharge  Extraocular Movements: Extraocular movements intact  Conjunctiva/sclera: Conjunctivae normal       Pupils: Pupils are equal, round, and reactive to light  Cardiovascular:      Rate and Rhythm: Normal rate  Pulmonary:      Effort: Pulmonary effort is normal  No respiratory distress  Breath sounds: No wheezing  Neurological:      General: No focal deficit present  Mental Status: She is alert and oriented to person, place, and time  Mental status is at baseline  Cranial Nerves: Cranial nerves are intact  Sensory: Sensation is intact  Motor: Motor function is intact     Psychiatric:         Mood and Affect: Mood normal          Thought Content:  Thought content normal

## 2020-09-06 NOTE — PROGRESS NOTES
Attachment CAROLINA session report Problem: Falls - Risk of  Goal: *Absence of falls  Outcome: Progressing Towards Goal  Pt progressing towards goal. No falls since admission. Bed low and locked. Call light within reach. Side rails x 2. Gripper socks applied. Personal belongings within reach. Pt verbalizes understanding to call for assistance.

## 2020-09-10 ENCOUNTER — PROCEDURE VISIT (OUTPATIENT)
Dept: PAIN MEDICINE | Facility: MEDICAL CENTER | Age: 70
End: 2020-09-10
Payer: MEDICARE

## 2020-09-10 DIAGNOSIS — M54.50 CHRONIC RIGHT-SIDED LOW BACK PAIN WITHOUT SCIATICA: Primary | ICD-10-CM

## 2020-09-10 DIAGNOSIS — G89.29 CHRONIC RIGHT-SIDED LOW BACK PAIN WITHOUT SCIATICA: Primary | ICD-10-CM

## 2020-09-10 PROCEDURE — 20550 NJX 1 TENDON SHEATH/LIGAMENT: CPT | Performed by: PHYSICAL MEDICINE & REHABILITATION

## 2020-09-10 PROCEDURE — 76942 ECHO GUIDE FOR BIOPSY: CPT | Performed by: PHYSICAL MEDICINE & REHABILITATION

## 2020-09-10 RX ORDER — BUPIVACAINE HYDROCHLORIDE 2.5 MG/ML
10 INJECTION, SOLUTION EPIDURAL; INFILTRATION; INTRACAUDAL ONCE
Status: COMPLETED | OUTPATIENT
Start: 2020-09-10 | End: 2020-09-10

## 2020-09-10 RX ORDER — METHYLPREDNISOLONE ACETATE 40 MG/ML
40 INJECTION, SUSPENSION INTRA-ARTICULAR; INTRALESIONAL; INTRAMUSCULAR; SOFT TISSUE ONCE
Status: COMPLETED | OUTPATIENT
Start: 2020-09-10 | End: 2020-09-10

## 2020-09-10 RX ADMIN — BUPIVACAINE HYDROCHLORIDE 10 ML: 2.5 INJECTION, SOLUTION EPIDURAL; INFILTRATION; INTRACAUDAL at 16:13

## 2020-09-10 RX ADMIN — METHYLPREDNISOLONE ACETATE 40 MG: 40 INJECTION, SUSPENSION INTRA-ARTICULAR; INTRALESIONAL; INTRAMUSCULAR; SOFT TISSUE at 16:14

## 2020-09-10 NOTE — PROGRESS NOTES
Indication:  Right low back pain  Preprocedure diagnosis:  Myofascial pain syndrome  Postprocedure diagnosis:  Myofascial pain syndrome    Procedure: Ultrasound-guided right lumbar paraspinal tendon sheath injection     After discussing the risks, benefits, and alternatives to the procedure, the patient expressed understanding and wished to proceed  The patient was brought to the procedure suite and placed in the prone position  A procedural pause was conducted to verify: correct patient identity, procedure to be performed and as applicable, correct side and site, correct patient position, and availability of implants, special equipment or special requirements  A simple surgical tray was used  Prior to the procedure, the treatment area was examined with a 12-MHz linear transducer to visualize the lumbar paraspinal tendon and determine the optimal needle path  Following this, the region prepared with a ChloraPrep scrub, then re-examined using the same transducer, a sterile ultrasound transducer cover, and sterile ultrasound transducer gel  Thereafter, using continuous ultrasound guidance, a 2 5 inch 25 gauge needle was advanced into the tendon sheath  After visualization of the tip in the target area and negative aspiration for blood, a mixture 40 mg Depo-Medrol in 3 mL of 0 25% bupivacaine was injected into the tendon sheath  Following the injection the needle was withdrawn  The patient tolerated the procedure well and there were no apparent complications  After an appropriate amount of observation, the patient was dismissed from the clinic in good condition under their own power  STEROID DISCLAIMER:  Given the potential for immune suppression with exposure to steroids and worsening response to infection with COVID-19 the patient was also verbally consented regarding this information and told to strictly follow current CDC guidelines

## 2020-09-14 ENCOUNTER — APPOINTMENT (OUTPATIENT)
Dept: LAB | Facility: MEDICAL CENTER | Age: 70
End: 2020-09-14
Payer: MEDICARE

## 2020-09-14 DIAGNOSIS — Z51.81 ADMISSION FOR LONG-TERM (CURRENT) USE OF ANTICOAGULANTS: ICD-10-CM

## 2020-09-14 DIAGNOSIS — Z79.899 ENCOUNTER FOR LONG-TERM (CURRENT) USE OF MEDICATIONS: ICD-10-CM

## 2020-09-14 DIAGNOSIS — M54.16 LUMBAR RADICULOPATHY, CHRONIC: ICD-10-CM

## 2020-09-14 DIAGNOSIS — G89.4 CHRONIC PAIN SYNDROME: ICD-10-CM

## 2020-09-14 DIAGNOSIS — Z79.01 ADMISSION FOR LONG-TERM (CURRENT) USE OF ANTICOAGULANTS: ICD-10-CM

## 2020-09-14 DIAGNOSIS — I10 ESSENTIAL HYPERTENSION: ICD-10-CM

## 2020-09-14 DIAGNOSIS — E78.5 HYPERLIPIDEMIA, UNSPECIFIED HYPERLIPIDEMIA TYPE: ICD-10-CM

## 2020-09-14 LAB
ALBUMIN SERPL BCP-MCNC: 3.7 G/DL (ref 3.5–5)
ALP SERPL-CCNC: 83 U/L (ref 46–116)
ALT SERPL W P-5'-P-CCNC: 51 U/L (ref 12–78)
ANION GAP SERPL CALCULATED.3IONS-SCNC: 6 MMOL/L (ref 4–13)
APTT PPP: 26 SECONDS (ref 23–37)
AST SERPL W P-5'-P-CCNC: 30 U/L (ref 5–45)
BACTERIA UR QL AUTO: ABNORMAL /HPF
BASOPHILS # BLD AUTO: 0.12 THOUSANDS/ΜL (ref 0–0.1)
BASOPHILS NFR BLD AUTO: 1 % (ref 0–1)
BILIRUB SERPL-MCNC: 0.52 MG/DL (ref 0.2–1)
BILIRUB UR QL STRIP: NEGATIVE
BUN SERPL-MCNC: 15 MG/DL (ref 5–25)
CALCIUM SERPL-MCNC: 9.3 MG/DL (ref 8.3–10.1)
CHLORIDE SERPL-SCNC: 107 MMOL/L (ref 100–108)
CHOLEST SERPL-MCNC: 262 MG/DL (ref 50–200)
CLARITY UR: ABNORMAL
CO2 SERPL-SCNC: 27 MMOL/L (ref 21–32)
COLOR UR: YELLOW
CREAT SERPL-MCNC: 0.68 MG/DL (ref 0.6–1.3)
EOSINOPHIL # BLD AUTO: 0.22 THOUSAND/ΜL (ref 0–0.61)
EOSINOPHIL NFR BLD AUTO: 2 % (ref 0–6)
ERYTHROCYTE [DISTWIDTH] IN BLOOD BY AUTOMATED COUNT: 12.9 % (ref 11.6–15.1)
GFR SERPL CREATININE-BSD FRML MDRD: 90 ML/MIN/1.73SQ M
GLUCOSE P FAST SERPL-MCNC: 98 MG/DL (ref 65–99)
GLUCOSE UR STRIP-MCNC: NEGATIVE MG/DL
HCT VFR BLD AUTO: 45.3 % (ref 34.8–46.1)
HDLC SERPL-MCNC: 78 MG/DL
HGB BLD-MCNC: 14.5 G/DL (ref 11.5–15.4)
HGB UR QL STRIP.AUTO: NEGATIVE
IMM GRANULOCYTES # BLD AUTO: 0.05 THOUSAND/UL (ref 0–0.2)
IMM GRANULOCYTES NFR BLD AUTO: 0 % (ref 0–2)
INR PPP: 0.93 (ref 0.84–1.19)
KETONES UR STRIP-MCNC: NEGATIVE MG/DL
LDLC SERPL CALC-MCNC: 165 MG/DL (ref 0–100)
LEUKOCYTE ESTERASE UR QL STRIP: ABNORMAL
LYMPHOCYTES # BLD AUTO: 3.49 THOUSANDS/ΜL (ref 0.6–4.47)
LYMPHOCYTES NFR BLD AUTO: 30 % (ref 14–44)
MCH RBC QN AUTO: 29.1 PG (ref 26.8–34.3)
MCHC RBC AUTO-ENTMCNC: 32 G/DL (ref 31.4–37.4)
MCV RBC AUTO: 91 FL (ref 82–98)
MONOCYTES # BLD AUTO: 0.73 THOUSAND/ΜL (ref 0.17–1.22)
MONOCYTES NFR BLD AUTO: 6 % (ref 4–12)
MUCOUS THREADS UR QL AUTO: ABNORMAL
NEUTROPHILS # BLD AUTO: 7.04 THOUSANDS/ΜL (ref 1.85–7.62)
NEUTS SEG NFR BLD AUTO: 61 % (ref 43–75)
NITRITE UR QL STRIP: NEGATIVE
NON-SQ EPI CELLS URNS QL MICRO: ABNORMAL /HPF
NONHDLC SERPL-MCNC: 184 MG/DL
NRBC BLD AUTO-RTO: 0 /100 WBCS
PH UR STRIP.AUTO: 7 [PH]
PLATELET # BLD AUTO: 270 THOUSANDS/UL (ref 149–390)
PMV BLD AUTO: 9.6 FL (ref 8.9–12.7)
POTASSIUM SERPL-SCNC: 4.5 MMOL/L (ref 3.5–5.3)
PROT SERPL-MCNC: 7.2 G/DL (ref 6.4–8.2)
PROT UR STRIP-MCNC: NEGATIVE MG/DL
PROTHROMBIN TIME: 12.5 SECONDS (ref 11.6–14.5)
RBC # BLD AUTO: 4.99 MILLION/UL (ref 3.81–5.12)
RBC #/AREA URNS AUTO: ABNORMAL /HPF
SODIUM SERPL-SCNC: 140 MMOL/L (ref 136–145)
SP GR UR STRIP.AUTO: 1.02 (ref 1–1.03)
TRIGL SERPL-MCNC: 95 MG/DL
UROBILINOGEN UR QL STRIP.AUTO: 0.2 E.U./DL
WBC # BLD AUTO: 11.65 THOUSAND/UL (ref 4.31–10.16)
WBC #/AREA URNS AUTO: ABNORMAL /HPF

## 2020-09-14 PROCEDURE — 80061 LIPID PANEL: CPT

## 2020-09-14 PROCEDURE — 81001 URINALYSIS AUTO W/SCOPE: CPT | Performed by: NEUROLOGICAL SURGERY

## 2020-09-14 PROCEDURE — 83036 HEMOGLOBIN GLYCOSYLATED A1C: CPT

## 2020-09-14 PROCEDURE — 80053 COMPREHEN METABOLIC PANEL: CPT

## 2020-09-14 PROCEDURE — 85610 PROTHROMBIN TIME: CPT

## 2020-09-14 PROCEDURE — 85025 COMPLETE CBC W/AUTO DIFF WBC: CPT

## 2020-09-14 PROCEDURE — 85730 THROMBOPLASTIN TIME PARTIAL: CPT

## 2020-09-14 PROCEDURE — 36415 COLL VENOUS BLD VENIPUNCTURE: CPT

## 2020-09-15 ENCOUNTER — TELEPHONE (OUTPATIENT)
Dept: PAIN MEDICINE | Facility: MEDICAL CENTER | Age: 70
End: 2020-09-15

## 2020-09-15 LAB
EST. AVERAGE GLUCOSE BLD GHB EST-MCNC: 111 MG/DL
HBA1C MFR BLD: 5.5 %

## 2020-09-17 ENCOUNTER — TELEPHONE (OUTPATIENT)
Dept: PAIN MEDICINE | Facility: CLINIC | Age: 70
End: 2020-09-17

## 2020-09-23 ENCOUNTER — CONSULT (OUTPATIENT)
Dept: INTERNAL MEDICINE CLINIC | Facility: CLINIC | Age: 70
End: 2020-09-23
Payer: MEDICARE

## 2020-09-23 VITALS
HEIGHT: 62 IN | SYSTOLIC BLOOD PRESSURE: 124 MMHG | TEMPERATURE: 96.2 F | WEIGHT: 200.13 LBS | BODY MASS INDEX: 36.83 KG/M2 | OXYGEN SATURATION: 99 % | DIASTOLIC BLOOD PRESSURE: 70 MMHG | HEART RATE: 66 BPM

## 2020-09-23 DIAGNOSIS — T85.122S: Primary | ICD-10-CM

## 2020-09-23 DIAGNOSIS — M54.6 ACUTE MIDLINE THORACIC BACK PAIN: ICD-10-CM

## 2020-09-23 DIAGNOSIS — K21.9 GASTROESOPHAGEAL REFLUX DISEASE, ESOPHAGITIS PRESENCE NOT SPECIFIED: ICD-10-CM

## 2020-09-23 PROBLEM — T85.122A: Status: ACTIVE | Noted: 2020-09-23

## 2020-09-23 PROBLEM — M85.80 OSTEOPENIA: Status: ACTIVE | Noted: 2020-09-04

## 2020-09-23 PROCEDURE — 99214 OFFICE O/P EST MOD 30 MIN: CPT | Performed by: INTERNAL MEDICINE

## 2020-09-23 RX ORDER — OMEPRAZOLE 40 MG/1
40 CAPSULE, DELAYED RELEASE ORAL DAILY
Qty: 90 CAPSULE | Refills: 3 | Status: SHIPPED | OUTPATIENT
Start: 2020-09-23 | End: 2021-02-03 | Stop reason: SDUPTHER

## 2020-09-23 NOTE — PROGRESS NOTES
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, sequela  Surgery 9/29 Stable for planned procedure with Dr Henderson File reviewed Urine does not reflex to culture and pt without sxs  Her sister will drive her and she is planning 2 weeks off     Gastroesophageal reflux disease, esophagitis presence not specified  -     omeprazole (PriLOSEC) 40 MG capsule; Take 1 capsule (40 mg total) by mouth daily  Refill sent to pharmacy    Acute midline thoracic back pain  Pt had some relief with recent injection and muscle relaxant  Surgery on Tuesday     Has AWV December   Patient ID: Claire Velazquez is a 72 y o  female  HPI  Pt planned surgery on Tuesday for displaced lead At least the source of pain seems to have been located She had recent injection and it helped for now and she has muscle relaxant to take at [address] which has helped the past week or so  No chest pain or sob She is ready for the procedure and hopes this will resolve sxs No numbness or tingling No uri sxs no cough She plans 2 weeks off work No acute issues other than back pain for which is scheduled Tuesday       Review of Systems   Constitutional: Positive for activity change  Negative for chills and fever  HENT: Negative for congestion, sinus pressure and sinus pain  Eyes: Negative for visual disturbance  Respiratory: Negative for cough and shortness of breath  Cardiovascular: Negative for chest pain  Genitourinary: Negative for difficulty urinating, flank pain and frequency  Musculoskeletal: Positive for arthralgias and back pain  Skin: Negative for color change and pallor  Neurological: Negative for dizziness, light-headedness and headaches  Hematological: Negative for adenopathy  Psychiatric/Behavioral: Negative for sleep disturbance  The patient is not nervous/anxious          Past Medical History:   Diagnosis Date    Abnormal findings on diagnostic imaging of breast     Anxiety     Arthritis     Chronic pain disorder     Extremity pain     Fibrocystic breast disease     Foot pain     GERD (gastroesophageal reflux disease)     Hyperlipidemia     Hypertension     Interstitial cystitis     Joint pain     Low back pain     Osteoarthritis     Osteopenia      Past Surgical History:   Procedure Laterality Date    APPENDECTOMY      BACK SURGERY      BREAST EXCISIONAL BIOPSY Left 1996    benign    CARPAL BOSS EXCISION      CHOLECYSTECTOMY      DIAGNOSTIC LAPAROSCOPY      FOOT SURGERY      FRACTURE SURGERY      HYSTERECTOMY      age 33    HYSTEROSCOPY      JOINT REPLACEMENT      KIDNEY SURGERY Left     KNEE ARTHROSCOPY Bilateral     LAPAROSCOPY      hynecologic with adhesions    OOPHORECTOMY Bilateral     age 33   [address] [id number] N/A 5/18/2017    Procedure: PLACEMENT OF THORACIC SPINAL CORD STIMULATOR WITH LEFT BUTTOCK IMPLANTABLE PULSE GENERATOR (IMPULSE MONITORING-MOTORS (TCEMEP), EMG, SSEP); Surgeon: Estefany Salazar MD;  Location: [facility];  Service: Neurosurgery    SPINAL STIMULATOR PLACEMENT  05/2017    TONSILLECTOMY AND ADENOIDECTOMY      onset: unknown    TOTAL KNEE ARTHROPLASTY Right      Social History     Socioeconomic History    Marital status: Single     Spouse name: Not on file    Number of children: Not on file    Years of education: Not on file    Highest education level: Not on file   Occupational History    Occupation:    Social Needs    Financial resource strain: Not on file    Food insecurity     Worry: Not on file     Inability: Not on file   [employer] needs     Medical: Not on file     Non-medical: Not on file   Tobacco Use    Smoking status: Never Smoker    Smokeless tobacco: Never Used   Substance and Sexual Activity    Alcohol use:  Yes     Alcohol/week: 1 0 standard drinks     Types: 1 Glasses of wine per week Frequency: 2-4 times a month     Comment: social less than a drink per week    Drug use: No    Sexual activity: Not on file   Lifestyle    Physical activity     Days per week: Not on file     Minutes per session: Not on file    Stress: Not on file   Relationships    Social connections     Talks on phone: Not on file     Gets together: Not on file     Attends Baptism service: Not on file     Active member of club or organization: Not on file     Attends meetings of clubs or organizations: Not on file     Relationship status: Not on file    Intimate partner violence     Fear of current or ex partner: Not on file     Emotionally abused: Not on file     Physically abused: Not on file     Forced sexual activity: Not on file   Other Topics Concern    Not on file   Social History Narrative    No caffeine use    Always uses sunscreen    Always uses a seatbelt     Allergies   Allergen Reactions    Bee Venom Shortness Of Breath    Chlorhexidine Rash    Egg Yolk Hives    Iodinated Diagnostic Agents Hives    Penicillins Hives    Bactrim [Sulfamethoxazole-Trimethoprim] Rash    Codeine Other (See Comments)     headaches    Latex Rash    Other Rash     Adhesive tape    Oxybutynin Rash    Pentosan Polysulfate Rash     BMI Counseling: Body mass index is 36 6 kg/m²  The BMI is above normal  Nutrition recommendations include consuming healthier snacks and moderation in carbohydrate intake  Exercise recommendations include exercising 3-5 times per week  /70   Pulse 66   Temp (!) 96 2 °F (35 7 °C) (Temporal)   Ht 5' 2" (1 575 m)   Wt 90 8 kg (200 lb 2 oz)   SpO2 99%   BMI 36 60 kg/m²          Physical Exam  Vitals signs reviewed  Constitutional:       General: She is not in acute distress  Appearance: Normal appearance  She is normal weight  She is not ill-appearing, toxic-appearing or diaphoretic  HENT:      Head: Normocephalic and atraumatic        Right Ear: Tympanic membrane, ear canal and external ear normal       Left Ear: Tympanic membrane, ear canal and external ear normal       Nose: Nose normal  No congestion or rhinorrhea  Mouth/Throat:      Mouth: Mucous membranes are dry  Pharynx: Oropharynx is clear  Eyes:      General: No scleral icterus  Extraocular Movements: Extraocular movements intact  Conjunctiva/sclera: Conjunctivae normal       Pupils: Pupils are equal, round, and reactive to light  Neck:      Musculoskeletal: Normal range of motion and neck supple  No neck rigidity  Cardiovascular:      Rate and Rhythm: Normal rate and regular rhythm  Pulses: Normal pulses  Heart sounds: Normal heart sounds  No murmur  Pulmonary:      Effort: Pulmonary effort is normal  No respiratory distress  Breath sounds: Normal breath sounds  No wheezing  Abdominal:      General: Bowel sounds are normal  There is no distension  Palpations: Abdomen is soft  Tenderness: There is no abdominal tenderness  Musculoskeletal:         General: No swelling  Lymphadenopathy:      Cervical: No cervical adenopathy  Skin:     Coloration: Skin is not jaundiced  Neurological:      General: No focal deficit present  Mental Status: She is alert and oriented to person, place, and time  Mental status is at baseline  Cranial Nerves: No cranial nerve deficit  Motor: No weakness  Psychiatric:         Mood and Affect: Mood normal          Behavior: Behavior normal          Thought Content:  Thought content normal          Judgment: Judgment normal

## 2020-09-28 ENCOUNTER — TELEPHONE (OUTPATIENT)
Dept: NEUROSURGERY | Facility: CLINIC | Age: 70
End: 2020-09-28

## 2020-09-28 DIAGNOSIS — G89.18 POSTOPERATIVE PAIN: ICD-10-CM

## 2020-09-28 DIAGNOSIS — Z79.2 PROPHYLACTIC ANTIBIOTIC: ICD-10-CM

## 2020-09-28 DIAGNOSIS — Z98.890 POSTOPERATIVE STATE: Primary | ICD-10-CM

## 2020-09-28 RX ORDER — OXYCODONE HYDROCHLORIDE 5 MG/1
TABLET ORAL
Qty: 30 TABLET | Refills: 0 | Status: SHIPPED | OUTPATIENT
Start: 2020-09-28 | End: 2020-10-05 | Stop reason: DRUGHIGH

## 2020-09-28 RX ORDER — CLINDAMYCIN HYDROCHLORIDE 300 MG/1
600 CAPSULE ORAL 3 TIMES DAILY
Qty: 18 CAPSULE | Refills: 0 | Status: SHIPPED | OUTPATIENT
Start: 2020-09-28 | End: 2020-10-01

## 2020-09-28 NOTE — TELEPHONE ENCOUNTER
Pre operative call day prior surgery scheduled in the AM w/ Dr Tyler Mcmahon, LEFT (Left Spine Thoracic)     Discussion/Review    Allergies ---Reviewed   Hold medications --- Reviewed [facility] diclofenac gel, omega 3 , vitamins , dietary supplements   NPO after MN, night prior surgery ---Reviewed as instructed by [facility] nurse  Medication (s) instructed by healthcare provider to take the morning of surgery w/ sip of water 4 OZ discussed: as instructed by [facility] nurse  Post operative antibiotic electronic transmission to pharmacy: clindamycin , multiple allergies     PDMP site reviewed accessed and reviewed scheduled drug list printed and scanned into record  Pain management script:oxycodone 5 mg add acetaminophen 600 mg w/ each will , 1 tab every 4 hrs or 2 tabs every 8 hours  --  Pre- operative shower protocol reviewed; Clarify instructions as per protocol, third chlorhexidine shower tonight before surgery, then use VIRGIL wipes as per packaging instructions, Use a clean towel and wash cloth starting tonight and continue nightly until seen 2 weeks post operative visit for incision check removal  Change bed linens tonight and continue at least 1-2 times weekly  ---reinforced     Informed will receive a telephone call tonight from a hospital representative with time to report on surgery day: pending call     Informed will receive a f/u call within in 24 -48 hours post-op to assess recovery reinforce instructions, and to answer any questions  --reinforced     Follow-up appointments reviewed: documented in discharge paper work 2 week 10/13/2020  6 week  11/19/2020     Patient verbalized understanding information provided /discussed

## 2020-09-29 ENCOUNTER — ANESTHESIA (OUTPATIENT)
Dept: PERIOP | Facility: HOSPITAL | Age: 70
End: 2020-09-29
Payer: MEDICARE

## 2020-09-29 ENCOUNTER — ANESTHESIA EVENT (OUTPATIENT)
Dept: PERIOP | Facility: HOSPITAL | Age: 70
End: 2020-09-29
Payer: MEDICARE

## 2020-09-29 ENCOUNTER — HOSPITAL ENCOUNTER (OUTPATIENT)
Facility: HOSPITAL | Age: 70
Setting detail: OUTPATIENT SURGERY
Discharge: HOME | End: 2020-09-30
Attending: NEUROLOGICAL SURGERY | Admitting: NEUROLOGICAL SURGERY
Payer: MEDICARE

## 2020-09-29 ENCOUNTER — APPOINTMENT (OUTPATIENT)
Dept: RADIOLOGY | Facility: HOSPITAL | Age: 70
End: 2020-09-29
Payer: MEDICARE

## 2020-09-29 VITALS — HEART RATE: 72 BPM

## 2020-09-29 DIAGNOSIS — K21.9 GASTROESOPHAGEAL REFLUX DISEASE, ESOPHAGITIS PRESENCE NOT SPECIFIED: ICD-10-CM

## 2020-09-29 DIAGNOSIS — I10 ESSENTIAL HYPERTENSION: Primary | ICD-10-CM

## 2020-09-29 DIAGNOSIS — E78.2 MODERATE MIXED HYPERLIPIDEMIA NOT REQUIRING STATIN THERAPY: ICD-10-CM

## 2020-09-29 PROCEDURE — C1778 LEAD, NEUROSTIMULATOR: HCPCS | Performed by: NEUROLOGICAL SURGERY

## 2020-09-29 PROCEDURE — 72070 X-RAY EXAM THORAC SPINE 2VWS: CPT

## 2020-09-29 PROCEDURE — 63688 REV/RMV IMP SP NPG/R DTCH CN: CPT | Performed by: PHYSICIAN ASSISTANT

## 2020-09-29 PROCEDURE — 63662 REMOVE SPINE ELTRD PLATE: CPT | Performed by: NEUROLOGICAL SURGERY

## 2020-09-29 PROCEDURE — 63688 REV/RMV IMP SP NPG/R DTCH CN: CPT | Performed by: NEUROLOGICAL SURGERY

## 2020-09-29 PROCEDURE — 99024 POSTOP FOLLOW-UP VISIT: CPT | Performed by: NEUROLOGICAL SURGERY

## 2020-09-29 PROCEDURE — 99222 1ST HOSP IP/OBS MODERATE 55: CPT | Performed by: INTERNAL MEDICINE

## 2020-09-29 PROCEDURE — 63662 REMOVE SPINE ELTRD PLATE: CPT | Performed by: PHYSICIAN ASSISTANT

## 2020-09-29 RX ORDER — SODIUM CHLORIDE, SODIUM LACTATE, POTASSIUM CHLORIDE, CALCIUM CHLORIDE 600; 310; 30; 20 MG/100ML; MG/100ML; MG/100ML; MG/100ML
125 INJECTION, SOLUTION INTRAVENOUS CONTINUOUS
Status: DISCONTINUED | OUTPATIENT
Start: 2020-09-29 | End: 2020-09-29

## 2020-09-29 RX ORDER — DICYCLOMINE HYDROCHLORIDE 10 MG/1
10 CAPSULE ORAL 2 TIMES DAILY
Status: DISCONTINUED | OUTPATIENT
Start: 2020-09-29 | End: 2020-09-30 | Stop reason: HOSPADM

## 2020-09-29 RX ORDER — VANCOMYCIN HYDROCHLORIDE 1 G/200ML
1000 INJECTION, SOLUTION INTRAVENOUS ONCE
Status: COMPLETED | OUTPATIENT
Start: 2020-09-29 | End: 2020-09-29

## 2020-09-29 RX ORDER — EPHEDRINE SULFATE 50 MG/ML
INJECTION INTRAVENOUS AS NEEDED
Status: DISCONTINUED | OUTPATIENT
Start: 2020-09-29 | End: 2020-09-29

## 2020-09-29 RX ORDER — LIDOCAINE HYDROCHLORIDE AND EPINEPHRINE 10; 10 MG/ML; UG/ML
INJECTION, SOLUTION INFILTRATION; PERINEURAL AS NEEDED
Status: DISCONTINUED | OUTPATIENT
Start: 2020-09-29 | End: 2020-09-29 | Stop reason: HOSPADM

## 2020-09-29 RX ORDER — OXYCODONE HYDROCHLORIDE 5 MG/1
5 TABLET ORAL
Status: DISCONTINUED | OUTPATIENT
Start: 2020-09-29 | End: 2020-09-30 | Stop reason: HOSPADM

## 2020-09-29 RX ORDER — HYDROXYZINE HYDROCHLORIDE 10 MG/1
20 TABLET, FILM COATED ORAL
Status: DISCONTINUED | OUTPATIENT
Start: 2020-09-29 | End: 2020-09-30 | Stop reason: HOSPADM

## 2020-09-29 RX ORDER — HEPARIN SODIUM 5000 [USP'U]/ML
5000 INJECTION, SOLUTION INTRAVENOUS; SUBCUTANEOUS EVERY 12 HOURS SCHEDULED
Status: DISCONTINUED | OUTPATIENT
Start: 2020-09-29 | End: 2020-09-30 | Stop reason: HOSPADM

## 2020-09-29 RX ORDER — PROPOFOL 10 MG/ML
INJECTION, EMULSION INTRAVENOUS CONTINUOUS PRN
Status: DISCONTINUED | OUTPATIENT
Start: 2020-09-29 | End: 2020-09-29

## 2020-09-29 RX ORDER — PANTOPRAZOLE SODIUM 40 MG/1
40 TABLET, DELAYED RELEASE ORAL
Status: DISCONTINUED | OUTPATIENT
Start: 2020-09-30 | End: 2020-09-30 | Stop reason: HOSPADM

## 2020-09-29 RX ORDER — BUPIVACAINE HYDROCHLORIDE 2.5 MG/ML
INJECTION, SOLUTION EPIDURAL; INFILTRATION; INTRACAUDAL AS NEEDED
Status: DISCONTINUED | OUTPATIENT
Start: 2020-09-29 | End: 2020-09-29 | Stop reason: HOSPADM

## 2020-09-29 RX ORDER — HYDROMORPHONE HCL/PF 1 MG/ML
0.5 SYRINGE (ML) INJECTION
Status: DISCONTINUED | OUTPATIENT
Start: 2020-09-29 | End: 2020-09-29

## 2020-09-29 RX ORDER — ROCURONIUM BROMIDE 10 MG/ML
INJECTION, SOLUTION INTRAVENOUS AS NEEDED
Status: DISCONTINUED | OUTPATIENT
Start: 2020-09-29 | End: 2020-09-29

## 2020-09-29 RX ORDER — ONDANSETRON 2 MG/ML
INJECTION INTRAMUSCULAR; INTRAVENOUS AS NEEDED
Status: DISCONTINUED | OUTPATIENT
Start: 2020-09-29 | End: 2020-09-29

## 2020-09-29 RX ORDER — LIDOCAINE 50 MG/G
1 PATCH TOPICAL DAILY
Status: DISCONTINUED | OUTPATIENT
Start: 2020-09-30 | End: 2020-09-29

## 2020-09-29 RX ORDER — FENTANYL CITRATE/PF 50 MCG/ML
25 SYRINGE (ML) INJECTION
Status: DISCONTINUED | OUTPATIENT
Start: 2020-09-29 | End: 2020-09-29

## 2020-09-29 RX ORDER — GLYCOPYRROLATE 0.2 MG/ML
INJECTION INTRAMUSCULAR; INTRAVENOUS AS NEEDED
Status: DISCONTINUED | OUTPATIENT
Start: 2020-09-29 | End: 2020-09-29

## 2020-09-29 RX ORDER — AMITRIPTYLINE HYDROCHLORIDE 25 MG/1
50 TABLET, FILM COATED ORAL
Status: DISCONTINUED | OUTPATIENT
Start: 2020-09-29 | End: 2020-09-30 | Stop reason: HOSPADM

## 2020-09-29 RX ORDER — CHLORAL HYDRATE 500 MG
1000 CAPSULE ORAL 3 TIMES DAILY
Status: DISCONTINUED | OUTPATIENT
Start: 2020-09-29 | End: 2020-09-30 | Stop reason: HOSPADM

## 2020-09-29 RX ORDER — CLINDAMYCIN HYDROCHLORIDE 150 MG/1
600 CAPSULE ORAL 3 TIMES DAILY
Status: DISCONTINUED | OUTPATIENT
Start: 2020-09-29 | End: 2020-09-30 | Stop reason: HOSPADM

## 2020-09-29 RX ORDER — MONTELUKAST SODIUM 10 MG/1
10 TABLET ORAL
Status: DISCONTINUED | OUTPATIENT
Start: 2020-09-29 | End: 2020-09-30 | Stop reason: HOSPADM

## 2020-09-29 RX ORDER — METHOCARBAMOL 500 MG/1
750 TABLET, FILM COATED ORAL 2 TIMES DAILY PRN
Status: DISCONTINUED | OUTPATIENT
Start: 2020-09-29 | End: 2020-09-30 | Stop reason: HOSPADM

## 2020-09-29 RX ORDER — ATORVASTATIN CALCIUM 20 MG/1
20 TABLET, FILM COATED ORAL
Status: DISCONTINUED | OUTPATIENT
Start: 2020-09-29 | End: 2020-09-30 | Stop reason: HOSPADM

## 2020-09-29 RX ORDER — FENTANYL CITRATE 50 UG/ML
INJECTION, SOLUTION INTRAMUSCULAR; INTRAVENOUS AS NEEDED
Status: DISCONTINUED | OUTPATIENT
Start: 2020-09-29 | End: 2020-09-29

## 2020-09-29 RX ORDER — BIOTIN 2500 MCG
2 CAPSULE ORAL DAILY
Status: DISCONTINUED | OUTPATIENT
Start: 2020-09-29 | End: 2020-09-29 | Stop reason: RX

## 2020-09-29 RX ORDER — SODIUM CHLORIDE, SODIUM LACTATE, POTASSIUM CHLORIDE, CALCIUM CHLORIDE 600; 310; 30; 20 MG/100ML; MG/100ML; MG/100ML; MG/100ML
75 INJECTION, SOLUTION INTRAVENOUS CONTINUOUS
Status: DISCONTINUED | OUTPATIENT
Start: 2020-09-29 | End: 2020-09-29

## 2020-09-29 RX ORDER — GINSENG 100 MG
CAPSULE ORAL AS NEEDED
Status: DISCONTINUED | OUTPATIENT
Start: 2020-09-29 | End: 2020-09-29 | Stop reason: HOSPADM

## 2020-09-29 RX ORDER — SUCCINYLCHOLINE/SOD CL,ISO/PF 100 MG/5ML
SYRINGE (ML) INTRAVENOUS AS NEEDED
Status: DISCONTINUED | OUTPATIENT
Start: 2020-09-29 | End: 2020-09-29

## 2020-09-29 RX ORDER — MIDAZOLAM HYDROCHLORIDE 2 MG/2ML
INJECTION, SOLUTION INTRAMUSCULAR; INTRAVENOUS AS NEEDED
Status: DISCONTINUED | OUTPATIENT
Start: 2020-09-29 | End: 2020-09-29

## 2020-09-29 RX ORDER — DEXAMETHASONE SODIUM PHOSPHATE 10 MG/ML
INJECTION, SOLUTION INTRAMUSCULAR; INTRAVENOUS AS NEEDED
Status: DISCONTINUED | OUTPATIENT
Start: 2020-09-29 | End: 2020-09-29

## 2020-09-29 RX ORDER — METOCLOPRAMIDE HYDROCHLORIDE 5 MG/ML
10 INJECTION INTRAMUSCULAR; INTRAVENOUS ONCE AS NEEDED
Status: DISCONTINUED | OUTPATIENT
Start: 2020-09-29 | End: 2020-09-29

## 2020-09-29 RX ORDER — GABAPENTIN 300 MG/1
300 CAPSULE ORAL 3 TIMES DAILY
Status: DISCONTINUED | OUTPATIENT
Start: 2020-09-29 | End: 2020-09-30 | Stop reason: HOSPADM

## 2020-09-29 RX ADMIN — GLYCOPYRROLATE 0.2 MG: 0.2 INJECTION, SOLUTION INTRAMUSCULAR; INTRAVENOUS at 07:38

## 2020-09-29 RX ADMIN — PROPOFOL 100 MCG/KG/MIN: 10 INJECTION, EMULSION INTRAVENOUS at 07:47

## 2020-09-29 RX ADMIN — FENTANYL CITRATE 25 MCG: 50 INJECTION, SOLUTION INTRAMUSCULAR; INTRAVENOUS at 11:31

## 2020-09-29 RX ADMIN — VANCOMYCIN HYDROCHLORIDE 1000 MG: 1 INJECTION, SOLUTION INTRAVENOUS at 06:28

## 2020-09-29 RX ADMIN — MONTELUKAST SODIUM 10 MG: 10 TABLET, FILM COATED ORAL at 22:27

## 2020-09-29 RX ADMIN — HYDROMORPHONE HYDROCHLORIDE 0.5 MG: 1 INJECTION, SOLUTION INTRAMUSCULAR; INTRAVENOUS; SUBCUTANEOUS at 12:14

## 2020-09-29 RX ADMIN — OMEGA-3 FATTY ACIDS CAP 1000 MG 1000 MG: 1000 CAP at 20:10

## 2020-09-29 RX ADMIN — Medication 100 MG: at 07:38

## 2020-09-29 RX ADMIN — FENTANYL CITRATE 50 MCG: 50 INJECTION, SOLUTION INTRAMUSCULAR; INTRAVENOUS at 09:26

## 2020-09-29 RX ADMIN — OXYCODONE HYDROCHLORIDE 5 MG: 5 TABLET ORAL at 15:19

## 2020-09-29 RX ADMIN — EPHEDRINE SULFATE 5 MG: 50 INJECTION, SOLUTION INTRAVENOUS at 08:48

## 2020-09-29 RX ADMIN — METHOCARBAMOL TABLETS 750 MG: 500 TABLET, COATED ORAL at 17:17

## 2020-09-29 RX ADMIN — EPHEDRINE SULFATE 10 MG: 50 INJECTION, SOLUTION INTRAVENOUS at 08:42

## 2020-09-29 RX ADMIN — AMITRIPTYLINE HYDROCHLORIDE 50 MG: 25 TABLET, FILM COATED ORAL at 22:27

## 2020-09-29 RX ADMIN — DEXAMETHASONE SODIUM PHOSPHATE 10 MG: 10 INJECTION, SOLUTION INTRAMUSCULAR; INTRAVENOUS at 08:45

## 2020-09-29 RX ADMIN — GABAPENTIN 300 MG: 300 CAPSULE ORAL at 15:30

## 2020-09-29 RX ADMIN — ROCURONIUM BROMIDE 5 MG: 10 INJECTION, SOLUTION INTRAVENOUS at 07:38

## 2020-09-29 RX ADMIN — FENTANYL CITRATE 25 MCG: 50 INJECTION, SOLUTION INTRAMUSCULAR; INTRAVENOUS at 10:38

## 2020-09-29 RX ADMIN — DICYCLOMINE HYDROCHLORIDE 10 MG: 10 CAPSULE ORAL at 17:18

## 2020-09-29 RX ADMIN — EPHEDRINE SULFATE 5 MG: 50 INJECTION, SOLUTION INTRAVENOUS at 08:54

## 2020-09-29 RX ADMIN — ONDANSETRON 4 MG: 2 INJECTION INTRAMUSCULAR; INTRAVENOUS at 09:03

## 2020-09-29 RX ADMIN — OXYCODONE HYDROCHLORIDE 5 MG: 5 TABLET ORAL at 20:10

## 2020-09-29 RX ADMIN — CLINDAMYCIN HYDROCHLORIDE 600 MG: 150 CAPSULE ORAL at 20:10

## 2020-09-29 RX ADMIN — SODIUM CHLORIDE, SODIUM LACTATE, POTASSIUM CHLORIDE, AND CALCIUM CHLORIDE 75 ML/HR: .6; .31; .03; .02 INJECTION, SOLUTION INTRAVENOUS at 06:26

## 2020-09-29 RX ADMIN — FENTANYL CITRATE 50 MCG: 50 INJECTION, SOLUTION INTRAMUSCULAR; INTRAVENOUS at 07:38

## 2020-09-29 RX ADMIN — EPHEDRINE SULFATE 5 MG: 50 INJECTION, SOLUTION INTRAVENOUS at 09:11

## 2020-09-29 RX ADMIN — FENTANYL CITRATE 25 MCG: 50 INJECTION, SOLUTION INTRAMUSCULAR; INTRAVENOUS at 10:11

## 2020-09-29 RX ADMIN — HEPARIN SODIUM 5000 UNITS: 5000 INJECTION INTRAVENOUS; SUBCUTANEOUS at 20:11

## 2020-09-29 RX ADMIN — GABAPENTIN 300 MG: 300 CAPSULE ORAL at 20:10

## 2020-09-29 RX ADMIN — MIDAZOLAM 2 MG: 1 INJECTION INTRAMUSCULAR; INTRAVENOUS at 07:38

## 2020-09-29 NOTE — ANESTHESIA PREPROCEDURE EVALUATION
Procedure:  LAMINECTOMY THORACIC FOR REVISION OF DORSAL COLUMN SPINAL CORD STIMULATOR LEAD AND GENERATOR, LEFT (Left Spine Thoracic)    Relevant Problems   CARDIO   (+) Hyperlipidemia   (+) Hypertension      GI/HEPATIC   (+) Acid reflux disease      MUSCULOSKELETAL   (+) Acute midline thoracic back pain   (+) Chronic low back pain   (+) Chronic thoracic back pain   (+) Degenerative joint disease of right lower extremity      NEURO/PSYCH   (+) Chronic thoracic back pain        Physical Exam    Airway    Mallampati score: II  TM Distance: >3 FB  Neck ROM: full     Dental   No notable dental hx     Cardiovascular  Cardiovascular exam normal    Pulmonary  Pulmonary exam normal     Other Findings        Anesthesia Plan  ASA Score- 3     Anesthesia Type- general with ASA Monitors  Additional Monitors:   Airway Plan:           Plan Factors-    Chart reviewed  EKG reviewed  Patient summary reviewed  Patient is not a current smoker  Induction- intravenous  Postoperative Plan-     Informed Consent- Anesthetic plan and risks discussed with patient  I personally reviewed this patient with the CRNA  Discussed and agreed on the Anesthesia Plan with the CRNA  Corey Marie

## 2020-09-29 NOTE — ANESTHESIA POSTPROCEDURE EVALUATION
Post-Op Assessment Note    CV Status:  Stable  Pain Score: 8    Pain management: inadequate     Mental Status:  Alert   Hydration Status:  Stable   PONV Controlled:  Controlled  Airway: intubated      Post Op Vitals Reviewed: Yes      Staff: CRNA         No complications documented      BP     Temp      Pulse     Resp      SpO2

## 2020-09-30 VITALS
OXYGEN SATURATION: 95 % | HEIGHT: 62 IN | BODY MASS INDEX: 36.36 KG/M2 | DIASTOLIC BLOOD PRESSURE: 65 MMHG | TEMPERATURE: 98 F | RESPIRATION RATE: 18 BRPM | WEIGHT: 197.6 LBS | HEART RATE: 58 BPM | SYSTOLIC BLOOD PRESSURE: 141 MMHG

## 2020-09-30 LAB
ANION GAP SERPL CALCULATED.3IONS-SCNC: 7 MMOL/L (ref 4–13)
BASOPHILS # BLD AUTO: 0.05 THOUSANDS/ΜL (ref 0–0.1)
BASOPHILS NFR BLD AUTO: 0 % (ref 0–1)
BUN SERPL-MCNC: 12 MG/DL (ref 5–25)
CALCIUM SERPL-MCNC: 8.4 MG/DL (ref 8.3–10.1)
CHLORIDE SERPL-SCNC: 103 MMOL/L (ref 100–108)
CO2 SERPL-SCNC: 29 MMOL/L (ref 21–32)
CREAT SERPL-MCNC: 0.63 MG/DL (ref 0.6–1.3)
EOSINOPHIL # BLD AUTO: 0.13 THOUSAND/ΜL (ref 0–0.61)
EOSINOPHIL NFR BLD AUTO: 1 % (ref 0–6)
ERYTHROCYTE [DISTWIDTH] IN BLOOD BY AUTOMATED COUNT: 13 % (ref 11.6–15.1)
GFR SERPL CREATININE-BSD FRML MDRD: 92 ML/MIN/1.73SQ M
GLUCOSE SERPL-MCNC: 115 MG/DL (ref 65–140)
HCT VFR BLD AUTO: 40.4 % (ref 34.8–46.1)
HGB BLD-MCNC: 13.4 G/DL (ref 11.5–15.4)
IMM GRANULOCYTES # BLD AUTO: 0.07 THOUSAND/UL (ref 0–0.2)
IMM GRANULOCYTES NFR BLD AUTO: 0 % (ref 0–2)
LYMPHOCYTES # BLD AUTO: 2.86 THOUSANDS/ΜL (ref 0.6–4.47)
LYMPHOCYTES NFR BLD AUTO: 17 % (ref 14–44)
MCH RBC QN AUTO: 29.6 PG (ref 26.8–34.3)
MCHC RBC AUTO-ENTMCNC: 33.2 G/DL (ref 31.4–37.4)
MCV RBC AUTO: 89 FL (ref 82–98)
MONOCYTES # BLD AUTO: 1.18 THOUSAND/ΜL (ref 0.17–1.22)
MONOCYTES NFR BLD AUTO: 7 % (ref 4–12)
NEUTROPHILS # BLD AUTO: 12.14 THOUSANDS/ΜL (ref 1.85–7.62)
NEUTS SEG NFR BLD AUTO: 75 % (ref 43–75)
NRBC BLD AUTO-RTO: 0 /100 WBCS
PLATELET # BLD AUTO: 216 THOUSANDS/UL (ref 149–390)
PMV BLD AUTO: 9.2 FL (ref 8.9–12.7)
POTASSIUM SERPL-SCNC: 3.8 MMOL/L (ref 3.5–5.3)
RBC # BLD AUTO: 4.52 MILLION/UL (ref 3.81–5.12)
SODIUM SERPL-SCNC: 139 MMOL/L (ref 136–145)
WBC # BLD AUTO: 16.43 THOUSAND/UL (ref 4.31–10.16)

## 2020-09-30 PROCEDURE — 80048 BASIC METABOLIC PNL TOTAL CA: CPT | Performed by: INTERNAL MEDICINE

## 2020-09-30 PROCEDURE — 85025 COMPLETE CBC W/AUTO DIFF WBC: CPT | Performed by: INTERNAL MEDICINE

## 2020-09-30 PROCEDURE — 99024 POSTOP FOLLOW-UP VISIT: CPT | Performed by: NEUROLOGICAL SURGERY

## 2020-09-30 RX ADMIN — PANTOPRAZOLE SODIUM 40 MG: 40 TABLET, DELAYED RELEASE ORAL at 04:37

## 2020-09-30 RX ADMIN — OXYCODONE HYDROCHLORIDE 5 MG: 5 TABLET ORAL at 09:46

## 2020-09-30 RX ADMIN — OMEGA-3 FATTY ACIDS CAP 1000 MG 1000 MG: 1000 CAP at 08:20

## 2020-09-30 RX ADMIN — OXYCODONE HYDROCHLORIDE 5 MG: 5 TABLET ORAL at 04:37

## 2020-09-30 RX ADMIN — CLINDAMYCIN HYDROCHLORIDE 600 MG: 150 CAPSULE ORAL at 08:20

## 2020-09-30 RX ADMIN — GABAPENTIN 300 MG: 300 CAPSULE ORAL at 08:20

## 2020-09-30 RX ADMIN — METHOCARBAMOL TABLETS 750 MG: 500 TABLET, COATED ORAL at 09:46

## 2020-09-30 RX ADMIN — DICYCLOMINE HYDROCHLORIDE 10 MG: 10 CAPSULE ORAL at 08:20

## 2020-09-30 RX ADMIN — HEPARIN SODIUM 5000 UNITS: 5000 INJECTION INTRAVENOUS; SUBCUTANEOUS at 08:21

## 2020-10-01 ENCOUNTER — TELEMEDICINE (OUTPATIENT)
Dept: NEUROSURGERY | Facility: CLINIC | Age: 70
End: 2020-10-01

## 2020-10-01 DIAGNOSIS — Z98.890 STATUS POST SURGERY: Primary | ICD-10-CM

## 2020-10-01 PROCEDURE — 99024 POSTOP FOLLOW-UP VISIT: CPT

## 2020-10-02 ENCOUNTER — TELEPHONE (OUTPATIENT)
Dept: NEUROSURGERY | Facility: CLINIC | Age: 70
End: 2020-10-02

## 2020-10-02 ENCOUNTER — HOSPITAL ENCOUNTER (EMERGENCY)
Facility: HOSPITAL | Age: 70
Discharge: HOME | End: 2020-10-02
Attending: EMERGENCY MEDICINE
Payer: MEDICARE

## 2020-10-02 ENCOUNTER — APPOINTMENT (EMERGENCY)
Dept: CT IMAGING | Facility: HOSPITAL | Age: 70
End: 2020-10-02
Payer: MEDICARE

## 2020-10-02 VITALS
HEART RATE: 78 BPM | RESPIRATION RATE: 18 BRPM | SYSTOLIC BLOOD PRESSURE: 148 MMHG | OXYGEN SATURATION: 95 % | BODY MASS INDEX: 36.58 KG/M2 | DIASTOLIC BLOOD PRESSURE: 67 MMHG | TEMPERATURE: 97 F | WEIGHT: 200 LBS

## 2020-10-02 DIAGNOSIS — Z98.890 STATUS POST SURGERY: ICD-10-CM

## 2020-10-02 DIAGNOSIS — N28.89 MASS OF LEFT KIDNEY: ICD-10-CM

## 2020-10-02 DIAGNOSIS — R10.31 RIGHT LOWER QUADRANT ABDOMINAL PAIN: Primary | ICD-10-CM

## 2020-10-02 LAB
ALBUMIN SERPL BCP-MCNC: 3.3 G/DL (ref 3.5–5)
ALP SERPL-CCNC: 94 U/L (ref 46–116)
ALT SERPL W P-5'-P-CCNC: 68 U/L (ref 12–78)
AMORPH PHOS CRY URNS QL MICRO: ABNORMAL /HPF
ANION GAP SERPL CALCULATED.3IONS-SCNC: 8 MMOL/L (ref 4–13)
AST SERPL W P-5'-P-CCNC: 58 U/L (ref 5–45)
BACTERIA UR QL AUTO: ABNORMAL /HPF
BASOPHILS # BLD AUTO: 0.05 THOUSANDS/ΜL (ref 0–0.1)
BASOPHILS NFR BLD AUTO: 1 % (ref 0–1)
BILIRUB SERPL-MCNC: 0.4 MG/DL (ref 0.2–1)
BILIRUB UR QL STRIP: NEGATIVE
BUN SERPL-MCNC: 12 MG/DL (ref 5–25)
CALCIUM ALBUM COR SERPL-MCNC: 9.9 MG/DL (ref 8.3–10.1)
CALCIUM SERPL-MCNC: 9.3 MG/DL (ref 8.3–10.1)
CHLORIDE SERPL-SCNC: 102 MMOL/L (ref 100–108)
CLARITY UR: ABNORMAL
CO2 SERPL-SCNC: 26 MMOL/L (ref 21–32)
COLOR UR: YELLOW
CREAT SERPL-MCNC: 0.62 MG/DL (ref 0.6–1.3)
EOSINOPHIL # BLD AUTO: 0.59 THOUSAND/ΜL (ref 0–0.61)
EOSINOPHIL NFR BLD AUTO: 6 % (ref 0–6)
ERYTHROCYTE [DISTWIDTH] IN BLOOD BY AUTOMATED COUNT: 12.8 % (ref 11.6–15.1)
GFR SERPL CREATININE-BSD FRML MDRD: 92 ML/MIN/1.73SQ M
GLUCOSE SERPL-MCNC: 119 MG/DL (ref 65–140)
GLUCOSE UR STRIP-MCNC: NEGATIVE MG/DL
HCT VFR BLD AUTO: 44.7 % (ref 34.8–46.1)
HGB BLD-MCNC: 14.8 G/DL (ref 11.5–15.4)
HGB UR QL STRIP.AUTO: ABNORMAL
IMM GRANULOCYTES # BLD AUTO: 0.02 THOUSAND/UL (ref 0–0.2)
IMM GRANULOCYTES NFR BLD AUTO: 0 % (ref 0–2)
KETONES UR STRIP-MCNC: ABNORMAL MG/DL
LEUKOCYTE ESTERASE UR QL STRIP: ABNORMAL
LIPASE SERPL-CCNC: 130 U/L (ref 73–393)
LYMPHOCYTES # BLD AUTO: 2.76 THOUSANDS/ΜL (ref 0.6–4.47)
LYMPHOCYTES NFR BLD AUTO: 27 % (ref 14–44)
MCH RBC QN AUTO: 29.5 PG (ref 26.8–34.3)
MCHC RBC AUTO-ENTMCNC: 33.1 G/DL (ref 31.4–37.4)
MCV RBC AUTO: 89 FL (ref 82–98)
MONOCYTES # BLD AUTO: 0.75 THOUSAND/ΜL (ref 0.17–1.22)
MONOCYTES NFR BLD AUTO: 7 % (ref 4–12)
NEUTROPHILS # BLD AUTO: 6.01 THOUSANDS/ΜL (ref 1.85–7.62)
NEUTS SEG NFR BLD AUTO: 59 % (ref 43–75)
NITRITE UR QL STRIP: NEGATIVE
NON-SQ EPI CELLS URNS QL MICRO: ABNORMAL /HPF
NRBC BLD AUTO-RTO: 0 /100 WBCS
PH UR STRIP.AUTO: 7 [PH]
PLATELET # BLD AUTO: 197 THOUSANDS/UL (ref 149–390)
PMV BLD AUTO: 10 FL (ref 8.9–12.7)
POTASSIUM SERPL-SCNC: 4.9 MMOL/L (ref 3.5–5.3)
PROT SERPL-MCNC: 7.6 G/DL (ref 6.4–8.2)
PROT UR STRIP-MCNC: NEGATIVE MG/DL
RBC # BLD AUTO: 5.01 MILLION/UL (ref 3.81–5.12)
RBC #/AREA URNS AUTO: ABNORMAL /HPF
SODIUM SERPL-SCNC: 136 MMOL/L (ref 136–145)
SP GR UR STRIP.AUTO: 1.02 (ref 1–1.03)
UROBILINOGEN UR QL STRIP.AUTO: 0.2 E.U./DL
WBC # BLD AUTO: 10.18 THOUSAND/UL (ref 4.31–10.16)
WBC #/AREA URNS AUTO: ABNORMAL /HPF

## 2020-10-02 PROCEDURE — 74176 CT ABD & PELVIS W/O CONTRAST: CPT

## 2020-10-02 PROCEDURE — G1004 CDSM NDSC: HCPCS

## 2020-10-02 PROCEDURE — 80053 COMPREHEN METABOLIC PANEL: CPT | Performed by: EMERGENCY MEDICINE

## 2020-10-02 PROCEDURE — 99284 EMERGENCY DEPT VISIT MOD MDM: CPT

## 2020-10-02 PROCEDURE — 83690 ASSAY OF LIPASE: CPT | Performed by: EMERGENCY MEDICINE

## 2020-10-02 PROCEDURE — 81001 URINALYSIS AUTO W/SCOPE: CPT | Performed by: EMERGENCY MEDICINE

## 2020-10-02 PROCEDURE — 96361 HYDRATE IV INFUSION ADD-ON: CPT

## 2020-10-02 PROCEDURE — 99284 EMERGENCY DEPT VISIT MOD MDM: CPT | Performed by: EMERGENCY MEDICINE

## 2020-10-02 PROCEDURE — 36415 COLL VENOUS BLD VENIPUNCTURE: CPT | Performed by: EMERGENCY MEDICINE

## 2020-10-02 PROCEDURE — 85025 COMPLETE CBC W/AUTO DIFF WBC: CPT | Performed by: EMERGENCY MEDICINE

## 2020-10-02 PROCEDURE — 96374 THER/PROPH/DIAG INJ IV PUSH: CPT

## 2020-10-02 PROCEDURE — 1124F ACP DISCUSS-NO DSCNMKR DOCD: CPT | Performed by: EMERGENCY MEDICINE

## 2020-10-02 RX ORDER — MORPHINE SULFATE 10 MG/ML
6 INJECTION, SOLUTION INTRAMUSCULAR; INTRAVENOUS ONCE
Status: COMPLETED | OUTPATIENT
Start: 2020-10-02 | End: 2020-10-02

## 2020-10-02 RX ADMIN — SODIUM CHLORIDE 1000 ML: 0.9 INJECTION, SOLUTION INTRAVENOUS at 20:18

## 2020-10-02 RX ADMIN — MORPHINE SULFATE 6 MG: 10 INJECTION INTRAVENOUS at 20:17

## 2020-10-05 ENCOUNTER — TELEPHONE (OUTPATIENT)
Dept: NEUROSURGERY | Facility: CLINIC | Age: 70
End: 2020-10-05

## 2020-10-05 DIAGNOSIS — G89.18 POSTOPERATIVE PAIN: Primary | ICD-10-CM

## 2020-10-05 DIAGNOSIS — Z98.890 POSTOPERATIVE STATE: ICD-10-CM

## 2020-10-05 RX ORDER — OXYCODONE HYDROCHLORIDE 5 MG/1
5 TABLET ORAL EVERY 4 HOURS PRN
Qty: 40 TABLET | Refills: 0 | Status: SHIPPED | OUTPATIENT
Start: 2020-10-05 | End: 2020-10-13 | Stop reason: SDUPTHER

## 2020-10-07 ENCOUNTER — OFFICE VISIT (OUTPATIENT)
Dept: PAIN MEDICINE | Facility: MEDICAL CENTER | Age: 70
End: 2020-10-07
Payer: MEDICARE

## 2020-10-07 VITALS
HEART RATE: 90 BPM | SYSTOLIC BLOOD PRESSURE: 136 MMHG | WEIGHT: 199 LBS | DIASTOLIC BLOOD PRESSURE: 84 MMHG | BODY MASS INDEX: 36.62 KG/M2 | HEIGHT: 62 IN | TEMPERATURE: 97.5 F

## 2020-10-07 DIAGNOSIS — T85.122S: ICD-10-CM

## 2020-10-07 DIAGNOSIS — M54.6 THORACIC BACK PAIN: ICD-10-CM

## 2020-10-07 DIAGNOSIS — G60.9 IDIOPATHIC PERIPHERAL NEUROPATHY: ICD-10-CM

## 2020-10-07 DIAGNOSIS — M54.16 LUMBAR RADICULOPATHY, CHRONIC: Primary | ICD-10-CM

## 2020-10-07 DIAGNOSIS — M54.41 CHRONIC RIGHT-SIDED LOW BACK PAIN WITH RIGHT-SIDED SCIATICA: ICD-10-CM

## 2020-10-07 DIAGNOSIS — G89.29 CHRONIC RIGHT-SIDED LOW BACK PAIN WITH RIGHT-SIDED SCIATICA: ICD-10-CM

## 2020-10-07 PROCEDURE — 99213 OFFICE O/P EST LOW 20 MIN: CPT | Performed by: NURSE PRACTITIONER

## 2020-10-07 RX ORDER — GABAPENTIN 300 MG/1
600 CAPSULE ORAL 3 TIMES DAILY
Qty: 270 CAPSULE | Refills: 2 | Status: SHIPPED | OUTPATIENT
Start: 2020-10-07 | End: 2021-03-17 | Stop reason: SDUPTHER

## 2020-10-09 ENCOUNTER — OFFICE VISIT (OUTPATIENT)
Dept: INTERNAL MEDICINE CLINIC | Facility: CLINIC | Age: 70
End: 2020-10-09
Payer: MEDICARE

## 2020-10-09 VITALS
HEART RATE: 88 BPM | SYSTOLIC BLOOD PRESSURE: 132 MMHG | DIASTOLIC BLOOD PRESSURE: 80 MMHG | OXYGEN SATURATION: 98 % | WEIGHT: 201.5 LBS | BODY MASS INDEX: 37.08 KG/M2 | TEMPERATURE: 97.8 F | HEIGHT: 62 IN

## 2020-10-09 DIAGNOSIS — M79.2 NEURALGIA: ICD-10-CM

## 2020-10-09 DIAGNOSIS — T85.122S: Primary | ICD-10-CM

## 2020-10-09 DIAGNOSIS — G89.29 CHRONIC RIGHT-SIDED LOW BACK PAIN WITH RIGHT-SIDED SCIATICA: ICD-10-CM

## 2020-10-09 DIAGNOSIS — R93.429 ABNORMAL CT SCAN, KIDNEY: ICD-10-CM

## 2020-10-09 DIAGNOSIS — M54.41 CHRONIC RIGHT-SIDED LOW BACK PAIN WITH RIGHT-SIDED SCIATICA: ICD-10-CM

## 2020-10-09 PROCEDURE — 99495 TRANSJ CARE MGMT MOD F2F 14D: CPT | Performed by: INTERNAL MEDICINE

## 2020-10-12 ENCOUNTER — TELEPHONE (OUTPATIENT)
Dept: NEUROSURGERY | Facility: CLINIC | Age: 70
End: 2020-10-12

## 2020-10-13 ENCOUNTER — OFFICE VISIT (OUTPATIENT)
Dept: NEUROSURGERY | Facility: CLINIC | Age: 70
End: 2020-10-13

## 2020-10-13 VITALS
WEIGHT: 201 LBS | RESPIRATION RATE: 16 BRPM | TEMPERATURE: 98.1 F | HEIGHT: 62 IN | BODY MASS INDEX: 36.99 KG/M2 | DIASTOLIC BLOOD PRESSURE: 74 MMHG | SYSTOLIC BLOOD PRESSURE: 138 MMHG | HEART RATE: 65 BPM

## 2020-10-13 DIAGNOSIS — Z98.890 POSTOPERATIVE STATE: ICD-10-CM

## 2020-10-13 DIAGNOSIS — G89.18 POSTOPERATIVE PAIN: ICD-10-CM

## 2020-10-13 DIAGNOSIS — G89.4 CHRONIC PAIN SYNDROME: Primary | ICD-10-CM

## 2020-10-13 DIAGNOSIS — T85.122A: ICD-10-CM

## 2020-10-13 DIAGNOSIS — M54.6 THORACIC BACK PAIN: ICD-10-CM

## 2020-10-13 DIAGNOSIS — M62.830 BACK MUSCLE SPASM: ICD-10-CM

## 2020-10-13 DIAGNOSIS — M54.16 LUMBAR RADICULOPATHY, CHRONIC: ICD-10-CM

## 2020-10-13 PROCEDURE — 99024 POSTOP FOLLOW-UP VISIT: CPT | Performed by: NURSE PRACTITIONER

## 2020-10-13 RX ORDER — METHOCARBAMOL 750 MG/1
750 TABLET, FILM COATED ORAL EVERY 6 HOURS PRN
Qty: 56 TABLET | Refills: 0 | Status: SHIPPED | OUTPATIENT
Start: 2020-10-15 | End: 2020-11-11 | Stop reason: SDUPTHER

## 2020-10-13 RX ORDER — OXYCODONE HYDROCHLORIDE 5 MG/1
5 TABLET ORAL EVERY 6 HOURS PRN
Qty: 40 TABLET | Refills: 0 | Status: SHIPPED | OUTPATIENT
Start: 2020-10-15 | End: 2020-11-02 | Stop reason: SDUPTHER

## 2020-11-02 ENCOUNTER — DOCUMENTATION (OUTPATIENT)
Dept: NEUROSURGERY | Facility: CLINIC | Age: 70
End: 2020-11-02

## 2020-11-02 ENCOUNTER — TELEPHONE (OUTPATIENT)
Dept: NEUROSURGERY | Facility: CLINIC | Age: 70
End: 2020-11-02

## 2020-11-02 DIAGNOSIS — G89.18 POSTOPERATIVE PAIN: ICD-10-CM

## 2020-11-02 DIAGNOSIS — G89.4 CHRONIC PAIN SYNDROME: Primary | ICD-10-CM

## 2020-11-02 DIAGNOSIS — M54.16 LUMBAR RADICULOPATHY, CHRONIC: ICD-10-CM

## 2020-11-02 DIAGNOSIS — M54.6 CHRONIC BILATERAL THORACIC BACK PAIN: ICD-10-CM

## 2020-11-02 DIAGNOSIS — G89.29 CHRONIC BILATERAL THORACIC BACK PAIN: ICD-10-CM

## 2020-11-02 DIAGNOSIS — Z98.890 POSTOPERATIVE STATE: ICD-10-CM

## 2020-11-02 RX ORDER — OXYCODONE HYDROCHLORIDE 5 MG/1
TABLET ORAL
Qty: 30 TABLET | Refills: 0 | Status: SHIPPED | OUTPATIENT
Start: 2020-11-02 | End: 2020-11-19

## 2020-11-11 ENCOUNTER — TELEPHONE (OUTPATIENT)
Dept: INTERNAL MEDICINE CLINIC | Facility: CLINIC | Age: 70
End: 2020-11-11

## 2020-11-11 ENCOUNTER — HOSPITAL ENCOUNTER (OUTPATIENT)
Dept: ULTRASOUND IMAGING | Facility: HOSPITAL | Age: 70
Discharge: HOME | End: 2020-11-11
Attending: INTERNAL MEDICINE
Payer: MEDICARE

## 2020-11-11 ENCOUNTER — TELEPHONE (OUTPATIENT)
Dept: PAIN MEDICINE | Facility: MEDICAL CENTER | Age: 70
End: 2020-11-11

## 2020-11-11 DIAGNOSIS — N28.89 RENAL MASS: Primary | ICD-10-CM

## 2020-11-11 DIAGNOSIS — Z98.890 POSTOPERATIVE STATE: ICD-10-CM

## 2020-11-11 DIAGNOSIS — M62.830 BACK MUSCLE SPASM: ICD-10-CM

## 2020-11-11 DIAGNOSIS — R93.429 ABNORMAL CT SCAN, KIDNEY: ICD-10-CM

## 2020-11-11 PROCEDURE — 76770 US EXAM ABDO BACK WALL COMP: CPT

## 2020-11-11 RX ORDER — METHOCARBAMOL 750 MG/1
750 TABLET, FILM COATED ORAL EVERY 6 HOURS PRN
Qty: 120 TABLET | Refills: 0 | Status: SHIPPED | OUTPATIENT
Start: 2020-11-11 | End: 2020-12-07 | Stop reason: SDUPTHER

## 2020-11-12 ENCOUNTER — TELEMEDICINE (OUTPATIENT)
Dept: INTERNAL MEDICINE CLINIC | Facility: CLINIC | Age: 70
End: 2020-11-12
Payer: MEDICARE

## 2020-11-12 ENCOUNTER — TELEPHONE (OUTPATIENT)
Dept: INTERNAL MEDICINE CLINIC | Facility: CLINIC | Age: 70
End: 2020-11-12

## 2020-11-12 DIAGNOSIS — R93.429 ABNORMAL CT SCAN, KIDNEY: ICD-10-CM

## 2020-11-12 DIAGNOSIS — N28.89 RENAL MASS: Primary | ICD-10-CM

## 2020-11-12 DIAGNOSIS — K58.9 IRRITABLE BOWEL SYNDROME WITHOUT DIARRHEA: ICD-10-CM

## 2020-11-12 DIAGNOSIS — Z20.822 EXPOSURE TO COVID-19 VIRUS: Primary | ICD-10-CM

## 2020-11-12 PROCEDURE — 99441 PR PHYS/QHP TELEPHONE EVALUATION 5-10 MIN: CPT | Performed by: INTERNAL MEDICINE

## 2020-11-12 RX ORDER — DICYCLOMINE HYDROCHLORIDE 10 MG/1
10 CAPSULE ORAL 2 TIMES DAILY
Qty: 180 CAPSULE | Refills: 0 | Status: SHIPPED | OUTPATIENT
Start: 2020-11-12 | End: 2021-02-03 | Stop reason: SDUPTHER

## 2020-11-13 PROCEDURE — U0003 INFECTIOUS AGENT DETECTION BY NUCLEIC ACID (DNA OR RNA); SEVERE ACUTE RESPIRATORY SYNDROME CORONAVIRUS 2 (SARS-COV-2) (CORONAVIRUS DISEASE [COVID-19]), AMPLIFIED PROBE TECHNIQUE, MAKING USE OF HIGH THROUGHPUT TECHNOLOGIES AS DESCRIBED BY CMS-2020-01-R: HCPCS | Performed by: INTERNAL MEDICINE

## 2020-11-15 LAB — SARS-COV-2 RNA SPEC QL NAA+PROBE: NOT DETECTED

## 2020-11-19 ENCOUNTER — TELEMEDICINE (OUTPATIENT)
Dept: NEUROSURGERY | Facility: CLINIC | Age: 70
End: 2020-11-19

## 2020-11-19 DIAGNOSIS — G89.4 CHRONIC PAIN SYNDROME: Primary | ICD-10-CM

## 2020-11-19 PROCEDURE — 99024 POSTOP FOLLOW-UP VISIT: CPT | Performed by: NEUROLOGICAL SURGERY

## 2020-11-27 ENCOUNTER — HOSPITAL ENCOUNTER (OUTPATIENT)
Dept: CT IMAGING | Facility: HOSPITAL | Age: 70
Discharge: HOME | End: 2020-11-27
Attending: INTERNAL MEDICINE
Payer: MEDICARE

## 2020-11-27 ENCOUNTER — TELEPHONE (OUTPATIENT)
Dept: NEUROSURGERY | Facility: CLINIC | Age: 70
End: 2020-11-27

## 2020-11-27 DIAGNOSIS — N28.89 RENAL MASS: ICD-10-CM

## 2020-11-27 PROCEDURE — 74176 CT ABD & PELVIS W/O CONTRAST: CPT

## 2020-11-27 PROCEDURE — G1004 CDSM NDSC: HCPCS

## 2020-12-03 ENCOUNTER — OFFICE VISIT (OUTPATIENT)
Dept: NEUROLOGY | Facility: CLINIC | Age: 70
End: 2020-12-03
Payer: MEDICARE

## 2020-12-03 VITALS
DIASTOLIC BLOOD PRESSURE: 78 MMHG | WEIGHT: 200.6 LBS | BODY MASS INDEX: 36.91 KG/M2 | HEART RATE: 98 BPM | HEIGHT: 62 IN | SYSTOLIC BLOOD PRESSURE: 140 MMHG

## 2020-12-03 DIAGNOSIS — G60.9 IDIOPATHIC PERIPHERAL NEUROPATHY: ICD-10-CM

## 2020-12-03 DIAGNOSIS — M54.16 LUMBAR RADICULOPATHY, CHRONIC: Primary | ICD-10-CM

## 2020-12-03 DIAGNOSIS — G89.4 CHRONIC PAIN SYNDROME: ICD-10-CM

## 2020-12-03 PROCEDURE — 99215 OFFICE O/P EST HI 40 MIN: CPT | Performed by: NURSE PRACTITIONER

## 2020-12-07 ENCOUNTER — TELEPHONE (OUTPATIENT)
Dept: NEUROSURGERY | Facility: CLINIC | Age: 70
End: 2020-12-07

## 2020-12-07 ENCOUNTER — OFFICE VISIT (OUTPATIENT)
Dept: PAIN MEDICINE | Facility: MEDICAL CENTER | Age: 70
End: 2020-12-07
Payer: MEDICARE

## 2020-12-07 ENCOUNTER — TRANSCRIBE ORDERS (OUTPATIENT)
Dept: LAB | Facility: MEDICAL CENTER | Age: 70
End: 2020-12-07

## 2020-12-07 VITALS
BODY MASS INDEX: 36.62 KG/M2 | SYSTOLIC BLOOD PRESSURE: 140 MMHG | RESPIRATION RATE: 16 BRPM | HEIGHT: 62 IN | HEART RATE: 67 BPM | WEIGHT: 199 LBS | DIASTOLIC BLOOD PRESSURE: 82 MMHG | TEMPERATURE: 98.2 F

## 2020-12-07 DIAGNOSIS — Z98.890 POSTOPERATIVE STATE: ICD-10-CM

## 2020-12-07 DIAGNOSIS — M62.830 BACK MUSCLE SPASM: ICD-10-CM

## 2020-12-07 DIAGNOSIS — M54.16 LUMBAR RADICULOPATHY, CHRONIC: Primary | ICD-10-CM

## 2020-12-07 DIAGNOSIS — G89.4 CHRONIC PAIN SYNDROME: ICD-10-CM

## 2020-12-07 DIAGNOSIS — Z09 FOLLOW-UP EXAMINATION, FOLLOWING OTHER SURGERY: ICD-10-CM

## 2020-12-07 DIAGNOSIS — G89.4 CHRONIC PAIN SYNDROME: Primary | ICD-10-CM

## 2020-12-07 PROCEDURE — 99214 OFFICE O/P EST MOD 30 MIN: CPT | Performed by: PHYSICAL MEDICINE & REHABILITATION

## 2020-12-07 RX ORDER — METHOCARBAMOL 750 MG/1
750 TABLET, FILM COATED ORAL EVERY 6 HOURS PRN
Qty: 120 TABLET | Refills: 0 | Status: SHIPPED | OUTPATIENT
Start: 2020-12-07 | End: 2021-02-01 | Stop reason: SDUPTHER

## 2020-12-15 ENCOUNTER — TRANSCRIBE ORDERS (OUTPATIENT)
Dept: ADMINISTRATIVE | Facility: HOSPITAL | Age: 70
End: 2020-12-15

## 2020-12-15 DIAGNOSIS — G89.4 CHRONIC PAIN SYNDROME: Primary | ICD-10-CM

## 2020-12-15 DIAGNOSIS — M54.16 LUMBAR RADICULOPATHY: ICD-10-CM

## 2020-12-15 DIAGNOSIS — M96.1 POSTLAMINECTOMY SYNDROME, THORACIC REGION: ICD-10-CM

## 2020-12-15 DIAGNOSIS — M51.36 DEGENERATION OF LUMBAR INTERVERTEBRAL DISC: ICD-10-CM

## 2020-12-16 ENCOUNTER — OFFICE VISIT (OUTPATIENT)
Dept: INTERNAL MEDICINE CLINIC | Facility: CLINIC | Age: 70
End: 2020-12-16
Payer: MEDICARE

## 2020-12-16 VITALS
DIASTOLIC BLOOD PRESSURE: 78 MMHG | HEIGHT: 62 IN | SYSTOLIC BLOOD PRESSURE: 124 MMHG | BODY MASS INDEX: 36.53 KG/M2 | TEMPERATURE: 97.3 F | WEIGHT: 198.5 LBS

## 2020-12-16 DIAGNOSIS — E66.01 OBESITY, MORBID (HCC): ICD-10-CM

## 2020-12-16 DIAGNOSIS — E78.2 MIXED HYPERLIPIDEMIA: ICD-10-CM

## 2020-12-16 DIAGNOSIS — Z00.00 MEDICARE ANNUAL WELLNESS VISIT, SUBSEQUENT: Primary | ICD-10-CM

## 2020-12-16 PROCEDURE — G0438 PPPS, INITIAL VISIT: HCPCS | Performed by: INTERNAL MEDICINE

## 2020-12-17 ENCOUNTER — HOSPITAL ENCOUNTER (OUTPATIENT)
Dept: CT IMAGING | Facility: HOSPITAL | Age: 70
Discharge: HOME | End: 2020-12-17
Payer: MEDICARE

## 2020-12-17 DIAGNOSIS — M51.36 DEGENERATION OF LUMBAR INTERVERTEBRAL DISC: ICD-10-CM

## 2020-12-17 DIAGNOSIS — M96.1 POSTLAMINECTOMY SYNDROME, THORACIC REGION: ICD-10-CM

## 2020-12-17 DIAGNOSIS — G89.4 CHRONIC PAIN SYNDROME: ICD-10-CM

## 2020-12-17 DIAGNOSIS — M54.16 LUMBAR RADICULOPATHY: ICD-10-CM

## 2020-12-17 PROCEDURE — 72128 CT CHEST SPINE W/O DYE: CPT

## 2020-12-17 PROCEDURE — G1004 CDSM NDSC: HCPCS

## 2020-12-18 ENCOUNTER — TELEPHONE (OUTPATIENT)
Dept: PAIN MEDICINE | Facility: MEDICAL CENTER | Age: 70
End: 2020-12-18

## 2020-12-23 ENCOUNTER — TELEPHONE (OUTPATIENT)
Dept: NEUROSURGERY | Facility: CLINIC | Age: 70
End: 2020-12-23

## 2021-01-04 ENCOUNTER — OFFICE VISIT (OUTPATIENT)
Dept: PAIN MEDICINE | Facility: MEDICAL CENTER | Age: 71
End: 2021-01-04
Payer: MEDICARE

## 2021-01-04 VITALS
RESPIRATION RATE: 16 BRPM | TEMPERATURE: 98.2 F | WEIGHT: 196 LBS | SYSTOLIC BLOOD PRESSURE: 127 MMHG | BODY MASS INDEX: 36.07 KG/M2 | DIASTOLIC BLOOD PRESSURE: 85 MMHG | HEART RATE: 88 BPM | HEIGHT: 62 IN

## 2021-01-04 DIAGNOSIS — M54.16 LUMBAR RADICULOPATHY, CHRONIC: ICD-10-CM

## 2021-01-04 DIAGNOSIS — Z79.891 LONG-TERM CURRENT USE OF OPIATE ANALGESIC: ICD-10-CM

## 2021-01-04 DIAGNOSIS — M96.1 POSTLAMINECTOMY SYNDROME OF LUMBAR REGION: ICD-10-CM

## 2021-01-04 DIAGNOSIS — G89.4 CHRONIC PAIN SYNDROME: Primary | ICD-10-CM

## 2021-01-04 DIAGNOSIS — F11.20 UNCOMPLICATED OPIOID DEPENDENCE (HCC): ICD-10-CM

## 2021-01-04 DIAGNOSIS — M54.16 LUMBAR RADICULITIS: ICD-10-CM

## 2021-01-04 PROCEDURE — 80305 DRUG TEST PRSMV DIR OPT OBS: CPT | Performed by: PHYSICAL MEDICINE & REHABILITATION

## 2021-01-04 PROCEDURE — 99214 OFFICE O/P EST MOD 30 MIN: CPT | Performed by: PHYSICAL MEDICINE & REHABILITATION

## 2021-01-04 RX ORDER — NALOXONE HYDROCHLORIDE 4 MG/.1ML
SPRAY NASAL
Qty: 1 EACH | Refills: 1 | Status: SHIPPED | OUTPATIENT
Start: 2021-01-04 | End: 2021-05-12 | Stop reason: ALTCHOICE

## 2021-01-04 NOTE — LETTER
January 4, 2021     Arloa Dancer, DO  [address] [facility]    Patient: Francis Knapp   YOB: 1950   Date of Visit: 1/4/2021       Dear Dr Nola Sy: Thank you for referring Ollie Huffman to me for evaluation  Below are my notes for this consultation  If you have questions, please do not hesitate to call me  I look forward to following your patient along with you  Sincerely,        Sunita Francis DO        CC: Darlyn President, MD Sunita Francis DO  1/4/2021  3:19 PM  Incomplete  Assessment  1  Chronic pain syndrome    2  Long-term current use of opiate analgesic    3  Uncomplicated opioid dependence ([address] )    4  Lumbar radiculitis    5  Postlaminectomy syndrome of lumbar region    6  Lumbar radiculopathy, chronic        Plan  1  At this time we will increase the dose of her Nucynta ER to 100 mg to be used every 12 hours  2  Continue with muscle relaxers and gabapentin  3  Patient to see Dr Edvin Irvin next week with updated thoracic spine and lumbar spine MRI  We will await his decision on reimplanting a spinal cord stimulator system  4  Follow-up in 4 weeks  My impressions and treatment recommendations were discussed in detail with the patient who verbalized understanding and had no further questions  Discharge instructions were provided  I personally saw and examined the patient and I agree with the above discussed plan of care  While the patient was in the office today an opioid agreement was thoroughly reviewed and signed by the patient  The patient was given adequate time to ask questions in regards to the agreement and a signed copy was provided for their records  A urine drug screen was collected at today's office visit as part of our medication management protocol  The point of care testing results were appropriate for what was being prescribed  The specimen will be sent for confirmatory testing   The drug screen is medically necessary because the patient is either dependent on opioid medication or is being considered for opioid medication therapy and the results could impact ongoing or future treatment  The drug screen is to evaluate for the presence or absence of prescribed, non-prescribed, and/or illicit drugs/substances  I did review the [facility] Prescription Drug Monitoring Program website today which was appropriate for the medications being prescribed            Orders Placed This Encounter   Procedures    MM ALL_Prescribed Meds and Special Instructions    MM DT_Alprazolam Definitive Test    MM DT_Amphetamine Definitive Test    MM DT_Aripiprazole Definitive Test    MM DT_Bath Salts Definitive Test    MM DT_Buprenorphine Definitive Test    MM DT_Butalbital Definitive Test    MM DT_Clonazepam Definitive Test    MM DT_Clozapine Definitive Test    MM DT_Cocaine Definitive Test    MM DT_Codeine Definitive Test    MM DT_Desipramine Definitive Test    MM DT_Dextromethorphan Definitive Test    MM Diazepam Definitive Test    MM DT_Ethyl Glucuronide/Ethyl Sulfate Definitive Test    MM DT_Fentanyl Definitive Test    MM DT_Heroin Definitive Test    MM DT_Hydrocodone Definitive Test    MM DT_Hydromorphone Definitive Test    MM DT_Kratom Definitive Test    MM DT_Levorphanol Definitive Test    MM Lorazepam Definitive Test    MM DT_MDMA Definitive Test    MM DT_Meperidine Definitive Test    MM DT_Methadone Definitive Test    MM DT_Methamphetamine Definitive Test    MM DT_Methylphenidate Definitive Test    MM DT_Morphine Definitive Test    MM DT_Olanzapine Definitive Test    MM DT_Oxazepam Definitive Test    MM DT_Oxycodone Definitive Test    MM DT_Oxymorphone Definitive Test    MM DT_Phenobarbital Definitive Test    MM DT_Phentermine Definitive Test    MM DT_Secobarbital Definitive Test    MM DT_Spice Definitive Test    MM DT_Tapentadol Definitive Test    MM DT_Temazapam Definitive Test    MM DT_THC Definitive Test    MM DT_Tramadol Definitive Test    MM DT_Validity Specific    MM DT_Validity pH    MM DT_Validity Creatinine    MM DT_Validity Oxidant     New Medications Ordered This Visit   Medications    Tapentadol HCl  MG TB12     Sig: Take 1 tablet (100 mg total) by mouth every 12 (twelve) hoursMax Daily Amount: 200 mg     Dispense:  60 tablet     Refill:  0    naloxone (NARCAN) 4 mg/0 1 mL nasal spray     Sig: Administer 1 spray into a nostril  If no response after 2-3 minutes, give another dose in the other nostril using a new spray  Dispense:  1 each     Refill:  1       History of Present Illness    Lito Stokes is a 72 y o  female returns in follow-up after initiating Nucynta ER 50 mg 1 tablet every 12 hours  She is deriving about 25% relief of her symptoms with the medication and not experiencing significant side effects  She did have updated MRI of the thoracic and lumbar spine regions  There is not much change to the lumbar spine area although there was mention of potential hydronephrosis on the left  No real targets to go after from an interventional standpoint  No significant changes from her previous MRI from 2015  She continues to describe similar type pain rated as an 8/10 which is constant throughout the entirety of the day characterized as burning, sharp, throbbing, shooting, numbness  I have personally reviewed and/or updated the patient's past medical history, past surgical history, family history, social history, current medications, allergies, and vital signs today  Review of Systems   Respiratory: Negative for shortness of breath  Cardiovascular: Negative for chest pain  Gastrointestinal: Negative for constipation, diarrhea, nausea and vomiting  Musculoskeletal: Positive for gait problem and joint swelling (knees)  Negative for arthralgias and myalgias  Skin: Negative for rash  Neurological: Negative for dizziness, seizures and weakness     All other systems reviewed and are negative  Patient Active Problem List   Diagnosis    Hypertension    Hyperlipidemia    Acid reflux disease    Chronic low back pain    Degenerative joint disease of right lower extremity    Irritable bowel syndrome    Lumbar radiculopathy, chronic    Neuralgia    Peripheral neuropathy    Acute midline thoracic back pain    Chronic thoracic back pain    Interstitial cystitis (chronic) with hematuria    Medicare annual wellness visit, subsequent    S/P right knee surgery    Itching    Body mass index (BMI) of 35 0 to 35 9    Osteopenia    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)    Abnormal CT scan, kidney    Chronic pain syndrome    Obesity, morbid (HCC)       Past Medical History:   Diagnosis Date    Abnormal findings on diagnostic imaging of breast     Anxiety     Arthritis     Chronic pain disorder     Extremity pain     Fibrocystic breast disease     Foot pain     GERD (gastroesophageal reflux disease)     Hyperlipidemia     Hypertension     Interstitial cystitis     Joint pain     Low back pain     Osteoarthritis     Osteopenia        Past Surgical History:   Procedure Laterality Date    APPENDECTOMY      BACK SURGERY      BREAST EXCISIONAL BIOPSY Left 1996    benign    CARPAL BOSS EXCISION      CHOLECYSTECTOMY      DIAGNOSTIC LAPAROSCOPY      FOOT SURGERY      FRACTURE SURGERY      HYSTERECTOMY      age 33    HYSTEROSCOPY      JOINT REPLACEMENT      KIDNEY SURGERY Left     KNEE ARTHROSCOPY Bilateral     LAPAROSCOPY      hynecologic with adhesions    OOPHORECTOMY Bilateral     age 33   [facility]      DC SURG IMPLNT NEUROELECT,EPIDURAL N/A 5/18/2017    Procedure: PLACEMENT OF THORACIC SPINAL CORD STIMULATOR WITH LEFT BUTTOCK IMPLANTABLE PULSE GENERATOR (IMPULSE MONITORING-MOTORS (TCEMEP), EMG, SSEP);   Surgeon: Reza Chapa MD;  Location: [facility];  Service: Neurosurgery    SPINAL CORD STIMULATOR IMPLANT Left 9/29/2020    Procedure: LAMINECTOMY THORACIC , REMOVAL OF SCS LEADS AND GENERATOR;  Surgeon: Moe Carmichael MD;  Location:  MAIN OR;  Service: Neurosurgery    SPINAL STIMULATOR PLACEMENT  05/2017    TONSILLECTOMY AND ADENOIDECTOMY      onset: unknown    TOTAL KNEE ARTHROPLASTY Right        Family History   Problem Relation Age of Onset    Bone cancer Mother     Hypertension Father     Brain cancer Father     Stroke Father         stroke sydrome    Diabetes Paternal Grandmother     Breast cancer Paternal Grandmother 61    Breast cancer Maternal Aunt 79    Breast cancer additional onset Maternal Aunt 67    Depression Sister     No Known Problems Maternal Grandmother     No Known Problems Maternal Grandfather     No Known Problems Paternal Grandfather     Asthma Sister     Heart disease Brother        Social History     Occupational History    Occupation:    Tobacco Use    Smoking status: Never Smoker    Smokeless tobacco: Never Used   Substance and Sexual Activity    Alcohol use: Yes     Comment: Socially    Drug use: No    Sexual activity: Not on file       Current Outpatient Medications on File Prior to Visit   Medication Sig    amitriptyline (ELAVIL) 50 mg tablet Take 1 tablet (50 mg total) by mouth daily (Patient taking differently: Take 50 mg by mouth daily at bedtime )    Biotin 2500 MCG CAPS Take 1 capsule by mouth 2 (two) times a day     calcium carbonate (OS-ANTHONY) 600 MG tablet Take 1,200 mg by mouth 2 (two) times a day with meals    Cholecalciferol (VITAMIN D-3 PO) Take 1 tablet by mouth daily    Cyanocobalamin (VITAMIN B-12 CR PO) Take 1 tablet by mouth daily    diclofenac sodium (VOLTAREN) 1 % APPLY TO THE AFFECTED AREA(S) TWICE DAILY AS DIRECTED   (Patient taking differently: as needed )    dicyclomine (BENTYL) 10 mg capsule Take 1 capsule (10 mg total) by mouth 2 (two) times a day    EPINEPHrine (EPIPEN 2-AREN) 0 3 mg/0 3 mL SOAJ Inject 0 3 mL (0 3 mg total) into a muscle as needed for anaphylaxis    fexofenadine (ALLEGRA) 180 MG tablet Take 180 mg by mouth daily    gabapentin (NEURONTIN) 300 mg capsule Take 2 capsules (600 mg total) by mouth 3 (three) times a day (Patient taking differently: Take 1,800 mg by mouth 3 (three) times a day 1800 mg TID)    hydrOXYzine HCL (ATARAX) 10 mg tablet Take 2 tablets at bedtime (Patient taking differently: daily at bedtime as needed Take 2 tablets at bedtime)    losartan (COZAAR) 100 MG tablet Take 1 tablet (100 mg total) by mouth daily    methocarbamol (ROBAXIN) 750 mg tablet Take 1 tablet (750 mg total) by mouth every 6 (six) hours as needed for muscle spasms    montelukast (SINGULAIR) 10 mg tablet Take 1 tablet (10 mg total) by mouth daily at bedtime    Omega-3 Fatty Acids (FISH OIL) 1,000 mg Take 1,000 mg by mouth 3 (three) times a day    rosuvastatin (CRESTOR) 10 MG tablet Take 1 tablet (10 mg total) by mouth daily (Patient taking differently: Take 10 mg by mouth every other day )    simvastatin (ZOCOR) 10 mg tablet Take 10 mg by mouth every other day Alternates with crestor    [DISCONTINUED] Tapentadol HCl ER 50 MG TB12 Take 1 tablet (50 mg total) by mouth every 12 (twelve) hoursMax Daily Amount: 100 mg    omeprazole (PriLOSEC) 40 MG capsule Take 1 capsule (40 mg total) by mouth daily     No current facility-administered medications on file prior to visit          Allergies   Allergen Reactions    Bee Venom Shortness Of Breath    Chlorhexidine Rash    Egg Yolk Hives    Iodinated Diagnostic Agents Hives    Penicillins Hives    Bactrim [Sulfamethoxazole-Trimethoprim] Rash    Codeine Other (See Comments)     headaches    Latex Rash    Other Rash     Adhesive tape    Oxybutynin Rash    Pentosan Polysulfate Rash    Povidone Iodine Rash       Physical Exam    /85   Pulse 88   Temp 98 2 °F (36 8 °C)   Resp 16   Ht 5' 2" (1 575 m)   Wt 88 9 kg (196 lb)   BMI 35 85 kg/m²     Constitutional: normal, well developed, well nourished, alert, in no distress and non-toxic and no overt pain behavior    Eyes: anicteric  HEENT: grossly intact  Neck: supple, symmetric, trachea midline and no masses   Pulmonary:even and unlabored  Cardiovascular:No edema or pitting edema present  Skin:Normal without rashes or lesions and well hydrated  Psychiatric:Mood and affect appropriate  Neurologic:Cranial Nerves II-XII grossly intact  Musculoskeletal:normal, except for continued complaints of back and radiating leg pain and also developing some more pain at the thoracic surgical site    Imaging  See reports from thoracic and lumbar MRIs

## 2021-01-04 NOTE — PROGRESS NOTES
Assessment  1  Chronic pain syndrome    2  Long-term current use of opiate analgesic    3  Uncomplicated opioid dependence ([address] )    4  Lumbar radiculitis    5  Postlaminectomy syndrome of lumbar region    6  Lumbar radiculopathy, chronic        Plan  1  At this time we will increase the dose of her Nucynta ER to 100 mg to be used every 12 hours  2  Continue with muscle relaxers and gabapentin  3  Patient to see Dr Vincent Cortes next week with updated thoracic spine and lumbar spine MRI  We will await his decision on reimplanting a spinal cord stimulator system  4  Follow-up in 4 weeks  My impressions and treatment recommendations were discussed in detail with the patient who verbalized understanding and had no further questions  Discharge instructions were provided  I personally saw and examined the patient and I agree with the above discussed plan of care  While the patient was in the office today an opioid agreement was thoroughly reviewed and signed by the patient  The patient was given adequate time to ask questions in regards to the agreement and a signed copy was provided for their records  A urine drug screen was collected at today's office visit as part of our medication management protocol  The point of care testing results were appropriate for what was being prescribed  The specimen will be sent for confirmatory testing  The drug screen is medically necessary because the patient is either dependent on opioid medication or is being considered for opioid medication therapy and the results could impact ongoing or future treatment  The drug screen is to evaluate for the presence or absence of prescribed, non-prescribed, and/or illicit drugs/substances  I did review the [place] Prescription Drug Monitoring Program website today which was appropriate for the medications being prescribed            Orders Placed This Encounter   Procedures    MM ALL_Prescribed Meds and Special Instructions  MM DT_Alprazolam Definitive Test    MM DT_Amphetamine Definitive Test    MM DT_Aripiprazole Definitive Test    MM DT_Bath Salts Definitive Test    MM DT_Buprenorphine Definitive Test    MM DT_Butalbital Definitive Test    MM DT_Clonazepam Definitive Test    MM DT_Clozapine Definitive Test    MM DT_Cocaine Definitive Test    MM DT_Codeine Definitive Test    MM DT_Desipramine Definitive Test    MM DT_Dextromethorphan Definitive Test    MM Diazepam Definitive Test    MM DT_Ethyl Glucuronide/Ethyl Sulfate Definitive Test    MM DT_Fentanyl Definitive Test    MM DT_Heroin Definitive Test    MM DT_Hydrocodone Definitive Test    MM DT_Hydromorphone Definitive Test    MM DT_Kratom Definitive Test    MM DT_Levorphanol Definitive Test    MM Lorazepam Definitive Test    MM DT_MDMA Definitive Test    MM DT_Meperidine Definitive Test    MM DT_Methadone Definitive Test    MM DT_Methamphetamine Definitive Test    MM DT_Methylphenidate Definitive Test    MM DT_Morphine Definitive Test    MM DT_Olanzapine Definitive Test    MM DT_Oxazepam Definitive Test    MM DT_Oxycodone Definitive Test    MM DT_Oxymorphone Definitive Test    MM DT_Phenobarbital Definitive Test    MM DT_Phentermine Definitive Test    MM DT_Secobarbital Definitive Test    MM DT_Spice Definitive Test    MM DT_Tapentadol Definitive Test    MM DT_Temazapam Definitive Test    MM DT_THC Definitive Test    MM DT_Tramadol Definitive Test    MM DT_Validity Specific    MM DT_Validity pH    MM DT_Validity Creatinine    MM DT_Validity Oxidant     New Medications Ordered This Visit   Medications    Tapentadol HCl  MG TB12     Sig: Take 1 tablet (100 mg total) by mouth every 12 (twelve) hoursMax Daily Amount: 200 mg     Dispense:  60 tablet     Refill:  0    naloxone (NARCAN) 4 mg/0 1 mL nasal spray     Sig: Administer 1 spray into a nostril   If no response after 2-3 minutes, give another dose in the other nostril using a new spray      Dispense:  1 each     Refill:  1       History of Present Illness    Francis Knapp is a 72 y o  female returns in follow-up after initiating Nucynta ER 50 mg 1 tablet every 12 hours  She is deriving about 25% relief of her symptoms with the medication and not experiencing significant side effects  She did have updated MRI of the thoracic and lumbar spine regions  There is not much change to the lumbar spine area although there was mention of potential hydronephrosis on the left  No real targets to go after from an interventional standpoint  No significant changes from her previous MRI from 2015  She continues to describe similar type pain rated as an 8/10 which is constant throughout the entirety of the day characterized as burning, sharp, throbbing, shooting, numbness  I have personally reviewed and/or updated the patient's past medical history, past surgical history, family history, social history, current medications, allergies, and vital signs today  Review of Systems   Respiratory: Negative for shortness of breath  Cardiovascular: Negative for chest pain  Gastrointestinal: Negative for constipation, diarrhea, nausea and vomiting  Musculoskeletal: Positive for gait problem and joint swelling (knees)  Negative for arthralgias and myalgias  Skin: Negative for rash  Neurological: Negative for dizziness, seizures and weakness  All other systems reviewed and are negative        Patient Active Problem List   Diagnosis    Hypertension    Hyperlipidemia    Acid reflux disease    Chronic low back pain    Degenerative joint disease of right lower extremity    Irritable bowel syndrome    Lumbar radiculopathy, chronic    Neuralgia    Peripheral neuropathy    Acute midline thoracic back pain    Chronic thoracic back pain    Interstitial cystitis (chronic) with hematuria    Medicare annual wellness visit, subsequent    S/P right knee surgery    Itching    Body mass index (BMI) of 35 0 to 35 9    Osteopenia    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)    Abnormal CT scan, kidney    Chronic pain syndrome    Obesity, morbid (HCC)       Past Medical History:   Diagnosis Date    Abnormal findings on diagnostic imaging of breast     Anxiety     Arthritis     Chronic pain disorder     Extremity pain     Fibrocystic breast disease     Foot pain     GERD (gastroesophageal reflux disease)     Hyperlipidemia     Hypertension     Interstitial cystitis     Joint pain     Low back pain     Osteoarthritis     Osteopenia        Past Surgical History:   Procedure Laterality Date    APPENDECTOMY      BACK SURGERY      BREAST EXCISIONAL BIOPSY Left 1996    benign    CARPAL BOSS EXCISION      CHOLECYSTECTOMY      DIAGNOSTIC LAPAROSCOPY      FOOT SURGERY      FRACTURE SURGERY      HYSTERECTOMY      age 33    HYSTEROSCOPY      JOINT REPLACEMENT      KIDNEY SURGERY Left     KNEE ARTHROSCOPY Bilateral     LAPAROSCOPY      hynecologic with adhesions    OOPHORECTOMY Bilateral     age 33   [employer] [facility]THOP[facility] SURG[facility]      SC SURG IMPLNT Angy Jordan N/A 5/18/2017    Procedure: PLACEMENT OF THORACIC SPINAL CORD STIMULATOR WITH LEFT BUTTOCK IMPLANTABLE PULSE GENERATOR (IMPULSE MONITORING-MOTORS (TCEMEP), EMG, SSEP);   Surgeon: Nate Goodson MD;  [facility];  Service: Neurosurgery    SPINAL CORD STIMULATOR IMPLANT Left 9/29/2020    Procedure: LAMINECTOMY THORACIC , REMOVAL OF SCS LEADS AND GENERATOR;  Surgeon: Quinton Ly MD;  Location: [facility];  Service: Neurosurgery    SPINAL STIMULATOR PLACEMENT  05/2017    TONSILLECTOMY AND ADENOIDECTOMY      onset: unknown    TOTAL KNEE ARTHROPLASTY Right        Family History   Problem Relation Age of Onset    Bone cancer Mother     Hypertension Father     Brain cancer Father     Stroke Father         stroke sydrome    Diabetes Paternal Grandmother     Breast cancer Paternal Grandmother 61    Breast cancer Maternal Aunt 79    Breast cancer additional onset Maternal Aunt 67    Depression Sister     No Known Problems Maternal Grandmother     No Known Problems Maternal Grandfather     No Known Problems Paternal Grandfather     Asthma Sister     Heart disease Brother        Social History     Occupational History    Occupation:    Tobacco Use    Smoking status: Never Smoker    Smokeless tobacco: Never Used   Substance and Sexual Activity    Alcohol use: Yes     Comment: Socially    Drug use: No    Sexual activity: Not on file       Current Outpatient Medications on File Prior to Visit   Medication Sig    amitriptyline (ELAVIL) 50 mg tablet Take 1 tablet (50 mg total) by mouth daily (Patient taking differently: Take 50 mg by mouth daily at bedtime )    Biotin 2500 MCG CAPS Take 1 capsule by mouth 2 (two) times a day     calcium carbonate (OS-ANTHONY) 600 MG tablet Take 1,200 mg by mouth 2 (two) times a day with meals    Cholecalciferol (VITAMIN D-3 PO) Take 1 tablet by mouth daily    Cyanocobalamin (VITAMIN B-12 CR PO) Take 1 tablet by mouth daily    diclofenac sodium (VOLTAREN) 1 % APPLY TO THE AFFECTED AREA(S) TWICE DAILY AS DIRECTED   (Patient taking differently: as needed )    dicyclomine (BENTYL) 10 mg capsule Take 1 capsule (10 mg total) by mouth 2 (two) times a day    EPINEPHrine (EPIPEN 2-AREN) 0 3 mg/0 3 mL SOAJ Inject 0 3 mL (0 3 mg total) into a muscle as needed for anaphylaxis    fexofenadine (ALLEGRA) 180 MG tablet Take 180 mg by mouth daily    gabapentin (NEURONTIN) 300 mg capsule Take 2 capsules (600 mg total) by mouth 3 (three) times a day (Patient taking differently: Take 1,800 mg by mouth 3 (three) times a day 1800 mg TID)    hydrOXYzine HCL (ATARAX) 10 mg tablet Take 2 tablets at bedtime (Patient taking differently: daily at bedtime as needed Take 2 tablets at bedtime)    losartan (COZAAR) 100 MG tablet Take 1 tablet (100 mg total) by mouth daily    methocarbamol (ROBAXIN) 750 mg tablet Take 1 tablet (750 mg total) by mouth every 6 (six) hours as needed for muscle spasms    montelukast (SINGULAIR) 10 mg tablet Take 1 tablet (10 mg total) by mouth daily at bedtime    Omega-3 Fatty Acids (FISH OIL) 1,000 mg Take 1,000 mg by mouth 3 (three) times a day    rosuvastatin (CRESTOR) 10 MG tablet Take 1 tablet (10 mg total) by mouth daily (Patient taking differently: Take 10 mg by mouth every other day )    simvastatin (ZOCOR) 10 mg tablet Take 10 mg by mouth every other day Alternates with crestor    [DISCONTINUED] Tapentadol HCl ER 50 MG TB12 Take 1 tablet (50 mg total) by mouth every 12 (twelve) hoursMax Daily Amount: 100 mg    omeprazole (PriLOSEC) 40 MG capsule Take 1 capsule (40 mg total) by mouth daily     No current facility-administered medications on file prior to visit  Allergies   Allergen Reactions    Bee Venom Shortness Of Breath    Chlorhexidine Rash    Egg Yolk Hives    Iodinated Diagnostic Agents Hives    Penicillins Hives    Bactrim [Sulfamethoxazole-Trimethoprim] Rash    Codeine Other (See Comments)     headaches    Latex Rash    Other Rash     Adhesive tape    Oxybutynin Rash    Pentosan Polysulfate Rash    Povidone Iodine Rash       Physical Exam    /85   Pulse 88   Temp 98 2 °F (36 8 °C)   Resp 16   Ht 5' 2" (1 575 m)   Wt 88 9 kg (196 lb)   BMI 35 85 kg/m²     Constitutional: normal, well developed, well nourished, alert, in no distress and non-toxic and no overt pain behavior    Eyes: anicteric  HEENT: grossly intact  Neck: supple, symmetric, trachea midline and no masses   Pulmonary:even and unlabored  Cardiovascular:No edema or pitting edema present  Skin:Normal without rashes or lesions and well hydrated  Psychiatric:Mood and affect appropriate  Neurologic:Cranial Nerves II-XII grossly intact  Musculoskeletal:normal, except for continued complaints of back and radiating leg pain and also developing some more pain at the thoracic surgical site    Imaging  See reports from thoracic and lumbar MRIs

## 2021-01-06 LAB
6MAM UR QL CFM: NEGATIVE NG/ML
7AMINOCLONAZEPAM UR QL CFM: NEGATIVE NG/ML
A-OH ALPRAZ UR QL CFM: NEGATIVE NG/ML
ACCEPTABLE CREAT UR QL: NORMAL MG/DL
ACCEPTIBLE SP GR UR QL: NORMAL
AMPHET UR QL CFM: NEGATIVE NG/ML
AMPHET UR QL CFM: NEGATIVE NG/ML
BUPRENORPHINE UR QL CFM: NEGATIVE NG/ML
BUTALBITAL UR QL CFM: NEGATIVE NG/ML
BZE UR QL CFM: NEGATIVE NG/ML
CODEINE UR QL CFM: NEGATIVE NG/ML
DESIPRAMINE UR QL CFM: NEGATIVE NG/ML
EDDP UR QL CFM: NEGATIVE NG/ML
ETHYL GLUCURONIDE UR QL CFM: NEGATIVE NG/ML
ETHYL SULFATE UR QL SCN: NEGATIVE NG/ML
FENTANYL UR QL CFM: NEGATIVE NG/ML
GLIADIN IGG SER IA-ACNC: NEGATIVE NG/ML
GLUCOSE 30M P 50 G LAC PO SERPL-MCNC: NEGATIVE NG/ML
HYDROCODONE UR QL CFM: NEGATIVE NG/ML
HYDROCODONE UR QL CFM: NEGATIVE NG/ML
HYDROMORPHONE UR QL CFM: NEGATIVE NG/ML
LORAZEPAM UR QL CFM: NEGATIVE NG/ML
MDMA UR QL CFM: NEGATIVE NG/ML
ME-PHENIDATE UR QL CFM: NEGATIVE NG/ML
MEPERIDINE UR QL CFM: NEGATIVE NG/ML
MEPHEDRONE UR QL CFM: NEGATIVE NG/ML
METHADONE UR QL CFM: NEGATIVE NG/ML
METHAMPHET UR QL CFM: NEGATIVE NG/ML
MORPHINE UR QL CFM: NEGATIVE NG/ML
MORPHINE UR QL CFM: NEGATIVE NG/ML
NITRITE UR QL: NORMAL UG/ML
NORBUPRENORPHINE UR QL CFM: NEGATIVE NG/ML
NORDIAZEPAM UR QL CFM: NEGATIVE NG/ML
NORFENTANYL UR QL CFM: NEGATIVE NG/ML
NORHYDROCODONE UR QL CFM: NEGATIVE NG/ML
NORHYDROCODONE UR QL CFM: NEGATIVE NG/ML
NORMEPERIDINE UR QL CFM: NEGATIVE NG/ML
NOROXYCODONE UR QL CFM: NEGATIVE NG/ML
OLANZAPINE QUANTIFICATION: NEGATIVE NG/ML
OPC-3373 QUANTIFICATION: NEGATIVE
OXAZEPAM UR QL CFM: NEGATIVE NG/ML
OXYCODONE UR QL CFM: NEGATIVE NG/ML
OXYMORPHONE UR QL CFM: NEGATIVE NG/ML
OXYMORPHONE UR QL CFM: NEGATIVE NG/ML
PHENOBARB UR QL CFM: NEGATIVE NG/ML
RESULT ALL_PRESCRIBED MEDS AND SPECIAL INSTRUCTIONS: NORMAL
SECOBARBITAL UR QL CFM: NEGATIVE NG/ML
SL AMB 3-METHYL-FENTANYL QUANTIFICATION: NORMAL NG/ML
SL AMB 4-ANPP QUANTIFICATION: NORMAL NG/ML
SL AMB 4-FIBF QUANTIFICATION: NORMAL NG/ML
SL AMB 5F-ADB-M7 METABOLITE QUANTIFICATION: NEGATIVE NG/ML
SL AMB 7-OH-MITRAGYNINE (KRATOM ALKALOID) QUANTIFICATION: NEGATIVE NG/ML
SL AMB AB-FUBINACA-M3 METABOLITE QUANTIFICATION: NEGATIVE NG/ML
SL AMB ACETYL FENTANYL QUANTIFICATION: NORMAL NG/ML
SL AMB ACETYL NORFENTANYL QUANTIFICATION: NORMAL NG/ML
SL AMB ACRYL FENTANYL QUANTIFICATION: NORMAL NG/ML
SL AMB BATH SALTS: NEGATIVE NG/ML
SL AMB BUTRYL FENTANYL QUANTIFICATION: NORMAL NG/ML
SL AMB CARFENTANIL QUANTIFICATION: NORMAL NG/ML
SL AMB CLOZAPINE QUANTIFICATION: NEGATIVE NG/ML
SL AMB CTHC (MARIJUANA METABOLITE) QUANTIFICATION: NEGATIVE NG/ML
SL AMB CYCLOPROPYL FENTANYL QUANTIFICATION: NORMAL NG/ML
SL AMB DEXTROMETHORPHAN QUANTIFICATION: NEGATIVE NG/ML
SL AMB DEXTRORPHAN (DEXTROMETHORPHAN METABOLITE) QUANT: NEGATIVE NG/ML
SL AMB DEXTRORPHAN (DEXTROMETHORPHAN METABOLITE) QUANT: NEGATIVE NG/ML
SL AMB FURANYL FENTANYL QUANTIFICATION: NORMAL NG/ML
SL AMB JWH018 METABOLITE QUANTIFICATION: NEGATIVE NG/ML
SL AMB JWH073 METABOLITE QUANTIFICATION: NEGATIVE NG/ML
SL AMB MDMB-FUBINACA-M1 METABOLITE QUANTIFICATION: NEGATIVE NG/ML
SL AMB METHOXYACETYL FENTANYL QUANTIFICATION: NORMAL NG/ML
SL AMB METHYLONE QUANTIFICATION: NEGATIVE NG/ML
SL AMB N-DESMETHYL U-47700 QUANTIFICATION: NORMAL NG/ML
SL AMB N-DESMETHYL-TRAMADOL QUANTIFICATION: NEGATIVE NG/ML
SL AMB N-DESMETHYLCLOZAPINE QUANTIFICATION: NEGATIVE NG/ML
SL AMB PHENTERMINE QUANTIFICATION: NEGATIVE NG/ML
SL AMB RCS4 METABOLITE QUANTIFICATION: NEGATIVE NG/ML
SL AMB RITALINIC ACID QUANTIFICATION: NEGATIVE NG/ML
SL AMB U-47700 QUANTIFICATION: NORMAL NG/ML
SPECIMEN DRAWN SERPL: NEGATIVE NG/ML
SPECIMEN PH ACCEPTABLE UR: NORMAL
TAPENTADOL UR QL CFM: NORMAL NG/ML
TEMAZEPAM UR QL CFM: NEGATIVE NG/ML
TEMAZEPAM UR QL CFM: NEGATIVE NG/ML
TRAMADOL UR QL CFM: NEGATIVE NG/ML
URATE/CREAT 24H UR: NEGATIVE NG/ML

## 2021-01-14 ENCOUNTER — TELEPHONE (OUTPATIENT)
Dept: PAIN MEDICINE | Facility: MEDICAL CENTER | Age: 71
End: 2021-01-14

## 2021-01-14 NOTE — TELEPHONE ENCOUNTER
Pt is calling stating that she spoke with Dr Ann Alejandro who stated she could get an injection 2 weeks before the surgery to get her STIM re-implanted       Pt is hoping sometime in Feb    Please advise  Pt can be reached at [phone]

## 2021-01-15 NOTE — TELEPHONE ENCOUNTER
I reviewed Dr Alejandro Loving note  We can set her up for an L5 LESI if she'd like and this may decrease some of the radicular pain

## 2021-01-15 NOTE — TELEPHONE ENCOUNTER
Called and scheduled:     L5 LESI    Reviewed instructions: , NPO 1 hour prior, loose-fitting/comfortable pants, if ill/fever/infx/abx to call and reschedule  No immunizations or vaccinations 2w prior/after steroid injections  Hold the following meds:none    Patient stated verbal understanding

## 2021-01-25 ENCOUNTER — TRANSCRIBE ORDERS (OUTPATIENT)
Dept: ADMINISTRATIVE | Facility: HOSPITAL | Age: 71
End: 2021-01-25

## 2021-01-25 ENCOUNTER — APPOINTMENT (OUTPATIENT)
Dept: LAB | Facility: HOSPITAL | Age: 71
End: 2021-01-25
Payer: MEDICARE

## 2021-01-25 ENCOUNTER — HOSPITAL ENCOUNTER (OUTPATIENT)
Dept: RADIOLOGY | Facility: HOSPITAL | Age: 71
Discharge: HOME | End: 2021-01-25
Payer: MEDICARE

## 2021-01-25 ENCOUNTER — HOSPITAL ENCOUNTER (OUTPATIENT)
Dept: NON INVASIVE DIAGNOSTICS | Facility: HOSPITAL | Age: 71
Discharge: HOME | End: 2021-01-25
Payer: MEDICARE

## 2021-01-25 DIAGNOSIS — Z79.01 LONG TERM (CURRENT) USE OF ANTICOAGULANTS: ICD-10-CM

## 2021-01-25 DIAGNOSIS — M96.1 POSTLAMINECTOMY SYNDROME, NOT ELSEWHERE CLASSIFIED: ICD-10-CM

## 2021-01-25 DIAGNOSIS — M54.16 LUMBAR RADICULOPATHY: ICD-10-CM

## 2021-01-25 DIAGNOSIS — Z01.818 OTHER SPECIFIED PRE-OPERATIVE EXAMINATION: ICD-10-CM

## 2021-01-25 DIAGNOSIS — G89.4 CHRONIC PAIN SYNDROME: ICD-10-CM

## 2021-01-25 DIAGNOSIS — M51.36 DEGENERATION OF LUMBAR INTERVERTEBRAL DISC: ICD-10-CM

## 2021-01-25 DIAGNOSIS — G89.4 CHRONIC PAIN SYNDROME: Primary | ICD-10-CM

## 2021-01-25 LAB
ALBUMIN SERPL BCP-MCNC: 4 G/DL (ref 3.5–5)
ALP SERPL-CCNC: 91 U/L (ref 46–116)
ALT SERPL W P-5'-P-CCNC: 56 U/L (ref 12–78)
ANION GAP SERPL CALCULATED.3IONS-SCNC: 7 MMOL/L (ref 4–13)
AST SERPL W P-5'-P-CCNC: 24 U/L (ref 5–45)
BASOPHILS # BLD AUTO: 0.07 THOUSANDS/ΜL (ref 0–0.1)
BASOPHILS NFR BLD AUTO: 1 % (ref 0–1)
BILIRUB SERPL-MCNC: 0.3 MG/DL (ref 0.2–1)
BUN SERPL-MCNC: 15 MG/DL (ref 5–25)
CALCIUM SERPL-MCNC: 9.3 MG/DL (ref 8.3–10.1)
CHLORIDE SERPL-SCNC: 103 MMOL/L (ref 100–108)
CO2 SERPL-SCNC: 28 MMOL/L (ref 21–32)
CREAT SERPL-MCNC: 0.74 MG/DL (ref 0.6–1.3)
EOSINOPHIL # BLD AUTO: 0.21 THOUSAND/ΜL (ref 0–0.61)
EOSINOPHIL NFR BLD AUTO: 2 % (ref 0–6)
ERYTHROCYTE [DISTWIDTH] IN BLOOD BY AUTOMATED COUNT: 12.3 % (ref 11.6–15.1)
GFR SERPL CREATININE-BSD FRML MDRD: 82 ML/MIN/1.73SQ M
GLUCOSE SERPL-MCNC: 90 MG/DL (ref 65–140)
HCT VFR BLD AUTO: 44.8 % (ref 34.8–46.1)
HGB BLD-MCNC: 14.6 G/DL (ref 11.5–15.4)
IMM GRANULOCYTES # BLD AUTO: 0.03 THOUSAND/UL (ref 0–0.2)
IMM GRANULOCYTES NFR BLD AUTO: 0 % (ref 0–2)
INR PPP: 0.95 (ref 0.84–1.19)
LYMPHOCYTES # BLD AUTO: 2.6 THOUSANDS/ΜL (ref 0.6–4.47)
LYMPHOCYTES NFR BLD AUTO: 30 % (ref 14–44)
MCH RBC QN AUTO: 29.7 PG (ref 26.8–34.3)
MCHC RBC AUTO-ENTMCNC: 32.6 G/DL (ref 31.4–37.4)
MCV RBC AUTO: 91 FL (ref 82–98)
MONOCYTES # BLD AUTO: 0.53 THOUSAND/ΜL (ref 0.17–1.22)
MONOCYTES NFR BLD AUTO: 6 % (ref 4–12)
NEUTROPHILS # BLD AUTO: 5.23 THOUSANDS/ΜL (ref 1.85–7.62)
NEUTS SEG NFR BLD AUTO: 61 % (ref 43–75)
NRBC BLD AUTO-RTO: 0 /100 WBCS
PLATELET # BLD AUTO: 239 THOUSANDS/UL (ref 149–390)
PMV BLD AUTO: 8.9 FL (ref 8.9–12.7)
POTASSIUM SERPL-SCNC: 4.2 MMOL/L (ref 3.5–5.3)
PROT SERPL-MCNC: 7.6 G/DL (ref 6.4–8.2)
PROTHROMBIN TIME: 12.5 SECONDS (ref 11.6–14.5)
RBC # BLD AUTO: 4.92 MILLION/UL (ref 3.81–5.12)
SODIUM SERPL-SCNC: 138 MMOL/L (ref 136–145)
WBC # BLD AUTO: 8.67 THOUSAND/UL (ref 4.31–10.16)

## 2021-01-25 PROCEDURE — 36415 COLL VENOUS BLD VENIPUNCTURE: CPT

## 2021-01-25 PROCEDURE — 85610 PROTHROMBIN TIME: CPT

## 2021-01-25 PROCEDURE — 71046 X-RAY EXAM CHEST 2 VIEWS: CPT

## 2021-01-25 PROCEDURE — 93005 ELECTROCARDIOGRAM TRACING: CPT

## 2021-01-25 PROCEDURE — 80053 COMPREHEN METABOLIC PANEL: CPT

## 2021-01-25 PROCEDURE — 85025 COMPLETE CBC W/AUTO DIFF WBC: CPT

## 2021-01-26 LAB
ATRIAL RATE: 76 BPM
P AXIS: 64 DEGREES
PR INTERVAL: 144 MS
QRS AXIS: 64 DEGREES
QRSD INTERVAL: 84 MS
QT INTERVAL: 382 MS
QTC INTERVAL: 429 MS
T WAVE AXIS: 33 DEGREES
VENTRICULAR RATE: 76 BPM

## 2021-01-26 PROCEDURE — 93010 ELECTROCARDIOGRAM REPORT: CPT | Performed by: INTERNAL MEDICINE

## 2021-02-01 ENCOUNTER — VBI (OUTPATIENT)
Dept: ADMINISTRATIVE | Facility: OTHER | Age: 71
End: 2021-02-01

## 2021-02-01 ENCOUNTER — TELEMEDICINE (OUTPATIENT)
Dept: PAIN MEDICINE | Facility: MEDICAL CENTER | Age: 71
End: 2021-02-01

## 2021-02-01 DIAGNOSIS — M54.16 LUMBAR RADICULITIS: ICD-10-CM

## 2021-02-01 DIAGNOSIS — F11.20 UNCOMPLICATED OPIOID DEPENDENCE (HCC): ICD-10-CM

## 2021-02-01 DIAGNOSIS — G89.4 CHRONIC PAIN SYNDROME: ICD-10-CM

## 2021-02-01 DIAGNOSIS — G89.29 CHRONIC RIGHT-SIDED LOW BACK PAIN WITH RIGHT-SIDED SCIATICA: ICD-10-CM

## 2021-02-01 DIAGNOSIS — M54.41 CHRONIC RIGHT-SIDED LOW BACK PAIN WITH RIGHT-SIDED SCIATICA: ICD-10-CM

## 2021-02-01 DIAGNOSIS — M54.16 LUMBAR RADICULOPATHY, CHRONIC: Primary | ICD-10-CM

## 2021-02-01 DIAGNOSIS — M62.830 BACK MUSCLE SPASM: ICD-10-CM

## 2021-02-01 DIAGNOSIS — G60.9 IDIOPATHIC PERIPHERAL NEUROPATHY: ICD-10-CM

## 2021-02-01 DIAGNOSIS — Z79.891 LONG-TERM CURRENT USE OF OPIATE ANALGESIC: ICD-10-CM

## 2021-02-01 DIAGNOSIS — M96.1 POSTLAMINECTOMY SYNDROME OF LUMBAR REGION: ICD-10-CM

## 2021-02-01 DIAGNOSIS — Z98.890 POSTOPERATIVE STATE: ICD-10-CM

## 2021-02-01 PROCEDURE — 99214 OFFICE O/P EST MOD 30 MIN: CPT | Performed by: NURSE PRACTITIONER

## 2021-02-01 RX ORDER — METHOCARBAMOL 750 MG/1
750 TABLET, FILM COATED ORAL EVERY 6 HOURS PRN
Qty: 120 TABLET | Refills: 2 | Status: SHIPPED | OUTPATIENT
Start: 2021-02-01 | End: 2021-05-12 | Stop reason: SDUPTHER

## 2021-02-01 NOTE — PROGRESS NOTES
Virtual Regular Visit      Assessment/Plan:   continue with Nucynta a 4 week supply was provided to the patient  With 0 refills  continue with gabapentin no refill needed  Methocarbamol was also refilled  We will see the patient on Wednesday February 3, 2021 for her L5 lumbar epidural steroid injection with Dr Gomez Stage  Problem List Items Addressed This Visit        Nervous and Auditory    Lumbar radiculopathy, chronic - Primary    Peripheral neuropathy       Other    Chronic low back pain    Chronic pain syndrome      Other Visit Diagnoses     Postoperative state        Relevant Medications    methocarbamol (ROBAXIN) 750 mg tablet    Back muscle spasm        Relevant Medications    methocarbamol (ROBAXIN) 750 mg tablet    Long-term current use of opiate analgesic        Uncomplicated opioid dependence (HCC)        Lumbar radiculitis        Postlaminectomy syndrome of lumbar region                   Reason for visit is   Medication follow-up  Chief Complaint   Patient presents with    Virtual Regular Visit        Encounter provider EBONI Vizcarra    Provider located at [address] [place] [id number]      Recent Visits  No visits were found meeting these conditions  Showing recent visits within past 7 days and meeting all other requirements     Future Appointments  No visits were found meeting these conditions  Showing future appointments within next 150 days and meeting all other requirements        The patient was identified by name and date of birth  Crystal Bronson was informed that this is a telemedicine visit and that the visit is being conducted through [facility] and patient was informed that this is a secure, HIPAA-compliant platform  She agrees to proceed     My office door was closed  No one else was in the room  She acknowledged consent and understanding of privacy and security of the video platform  The patient has agreed to participate and understands they can discontinue the visit at any time  Patient is aware this is a billable service  Sabrina Francis is a 80 y o  female  Presents for follow-up related to her chronic low back and leg pain  Today she rates her pain 8 to 9/10  Patient continues to use Nucynta  mg however she tells me lately she has only been taking 1 tablet  daily instead of 2 because it makes her too tired when she is working  She also uses gabapentin 2 tablets 3 times daily and methocarbamol 750 mg 4 times a day  She does get moderate relief from her medications without any side effects or issues  Patient does have surgery on February 23, 2021 with Arely Hamm to place her new permanent stimulator  She is hoping that once this is up and running appropriately that she can stop all of her medications  Stacy RUTLEDGE     Past Medical History:   Diagnosis Date    Abnormal findings on diagnostic imaging of breast     Anxiety     Arthritis     Chronic pain disorder     Extremity pain     Fibrocystic breast disease     Foot pain     GERD (gastroesophageal reflux disease)     Hyperlipidemia     Hypertension     Interstitial cystitis     Joint pain     Low back pain     Osteoarthritis     Osteopenia        Past Surgical History:   Procedure Laterality Date    APPENDECTOMY      BACK SURGERY      BREAST EXCISIONAL BIOPSY Left 1996    benign    CARPAL BOSS EXCISION      CHOLECYSTECTOMY      DIAGNOSTIC LAPAROSCOPY      FOOT SURGERY      FRACTURE SURGERY      HYSTERECTOMY      age 33    HYSTEROSCOPY      JOINT REPLACEMENT      KIDNEY SURGERY Left     KNEE ARTHROSCOPY Bilateral     LAPAROSCOPY      hynecologic with adhesions    OOPHORECTOMY Bilateral     age 33    ORTHOPEDIC SURGERY      SD SURG IMPLNT Shaheed Dixon N/A 5/18/2017    Procedure: PLACEMENT OF THORACIC SPINAL CORD STIMULATOR WITH LEFT BUTTOCK IMPLANTABLE PULSE GENERATOR (IMPULSE MONITORING-MOTORS (TCEMEP), EMG, SSEP); Surgeon: Nate Goodson MD;  [facility];  Service: Neurosurgery    SPINAL CORD STIMULATOR IMPLANT Left 9/29/2020    Procedure: LAMINECTOMY THORACIC , REMOVAL OF SCS LEADS AND GENERATOR;  Surgeon: Quinton Ly MD;  [facility];  Service: Neurosurgery    SPINAL STIMULATOR PLACEMENT  05/2017    TONSILLECTOMY AND ADENOIDECTOMY      onset: unknown    TOTAL KNEE ARTHROPLASTY Right        Current Outpatient Medications   Medication Sig Dispense Refill    amitriptyline (ELAVIL) 50 mg tablet Take 1 tablet (50 mg total) by mouth daily (Patient taking differently: Take 50 mg by mouth daily at bedtime ) 90 tablet 3    Biotin 2500 MCG CAPS Take 1 capsule by mouth 2 (two) times a day       calcium carbonate (OS-ANTHONY) 600 MG tablet Take 1,200 mg by mouth 2 (two) times a day with meals      Cholecalciferol (VITAMIN D-3 PO) Take 1 tablet by mouth daily      Cyanocobalamin (VITAMIN B-12 CR PO) Take 1 tablet by mouth daily      diclofenac sodium (VOLTAREN) 1 % APPLY TO THE AFFECTED AREA(S) TWICE DAILY AS DIRECTED   (Patient taking differently: as needed ) 100 g 0    dicyclomine (BENTYL) 10 mg capsule Take 1 capsule (10 mg total) by mouth 2 (two) times a day 180 capsule 0    EPINEPHrine (EPIPEN 2-AREN) 0 3 mg/0 3 mL SOAJ Inject 0 3 mL (0 3 mg total) into a muscle as needed for anaphylaxis 2 each 5    fexofenadine (ALLEGRA) 180 MG tablet Take 180 mg by mouth daily      gabapentin (NEURONTIN) 300 mg capsule Take 2 capsules (600 mg total) by mouth 3 (three) times a day (Patient taking differently: Take 1,800 mg by mouth 3 (three) times a day 1800 mg TID) 270 capsule 2    hydrOXYzine HCL (ATARAX) 10 mg tablet Take 2 tablets at bedtime (Patient taking differently: daily at bedtime as needed Take 2 tablets at bedtime) 180 tablet 3    losartan (COZAAR) 100 MG tablet Take 1 tablet (100 mg total) by mouth daily 90 tablet 3    methocarbamol (ROBAXIN) 750 mg tablet Take 1 tablet (750 mg total) by mouth every 6 (six) hours as needed for muscle spasms 120 tablet 2    montelukast (SINGULAIR) 10 mg tablet Take 1 tablet (10 mg total) by mouth daily at bedtime 90 tablet 3    naloxone (NARCAN) 4 mg/0 1 mL nasal spray Administer 1 spray into a nostril  If no response after 2-3 minutes, give another dose in the other nostril using a new spray  1 each 1    Omega-3 Fatty Acids (FISH OIL) 1,000 mg Take 1,000 mg by mouth 3 (three) times a day      omeprazole (PriLOSEC) 40 MG capsule Take 1 capsule (40 mg total) by mouth daily 90 capsule 3    rosuvastatin (CRESTOR) 10 MG tablet Take 1 tablet (10 mg total) by mouth daily (Patient taking differently: Take 10 mg by mouth every other day ) 90 tablet 0    simvastatin (ZOCOR) 10 mg tablet Take 10 mg by mouth every other day Alternates with crestor      Tapentadol HCl  MG TB12 Take 1 tablet (100 mg total) by mouth every 12 (twelve) hoursMax Daily Amount: 200 mg 60 tablet 0     No current facility-administered medications for this visit  Allergies   Allergen Reactions    Bee Venom Shortness Of Breath    Chlorhexidine Rash    Egg Yolk Hives    Iodinated Diagnostic Agents Hives    Penicillins Hives    Bactrim [Sulfamethoxazole-Trimethoprim] Rash    Codeine Other (See Comments)     headaches    Latex Rash    Other Rash     Adhesive tape    Oxybutynin Rash    Pentosan Polysulfate Rash    Povidone Iodine Rash       Review of Systems    Video Exam    There were no vitals filed for this visit  Physical Exam     I spent 6 minutes directly with the patient during this visit      [address] [place] [address] acknowledges that she has consented to an online visit or consultation   She understands that the online visit is based solely on information provided by her, and that, in the absence of a face-to-face physical evaluation by the physician, the diagnosis she receives is both limited and provisional in terms of accuracy and completeness  This is not intended to replace a full medical face-to-face evaluation by the physician  Karin Montes understands and accepts these terms

## 2021-02-03 ENCOUNTER — HOSPITAL ENCOUNTER (OUTPATIENT)
Dept: RADIOLOGY | Facility: MEDICAL CENTER | Age: 71
Discharge: HOME | End: 2021-02-03
Attending: PHYSICAL MEDICINE & REHABILITATION | Admitting: PHYSICAL MEDICINE & REHABILITATION
Payer: MEDICARE

## 2021-02-03 ENCOUNTER — CONSULT (OUTPATIENT)
Dept: INTERNAL MEDICINE CLINIC | Facility: CLINIC | Age: 71
End: 2021-02-03
Payer: MEDICARE

## 2021-02-03 VITALS
BODY MASS INDEX: 37.45 KG/M2 | TEMPERATURE: 97.3 F | DIASTOLIC BLOOD PRESSURE: 78 MMHG | WEIGHT: 203.5 LBS | SYSTOLIC BLOOD PRESSURE: 124 MMHG | HEIGHT: 62 IN

## 2021-02-03 VITALS
TEMPERATURE: 97.7 F | HEART RATE: 60 BPM | DIASTOLIC BLOOD PRESSURE: 81 MMHG | SYSTOLIC BLOOD PRESSURE: 131 MMHG | OXYGEN SATURATION: 95 % | RESPIRATION RATE: 20 BRPM

## 2021-02-03 DIAGNOSIS — Z91.030 BEE STING ALLERGY: ICD-10-CM

## 2021-02-03 DIAGNOSIS — K58.9 IRRITABLE BOWEL SYNDROME WITHOUT DIARRHEA: ICD-10-CM

## 2021-02-03 DIAGNOSIS — G89.29 CHRONIC RIGHT-SIDED LOW BACK PAIN WITH RIGHT-SIDED SCIATICA: ICD-10-CM

## 2021-02-03 DIAGNOSIS — K21.9 GASTROESOPHAGEAL REFLUX DISEASE: ICD-10-CM

## 2021-02-03 DIAGNOSIS — R05.9 COUGH: ICD-10-CM

## 2021-02-03 DIAGNOSIS — L85.3 DRY SKIN: ICD-10-CM

## 2021-02-03 DIAGNOSIS — G89.4 CHRONIC PAIN SYNDROME: ICD-10-CM

## 2021-02-03 DIAGNOSIS — T85.122A: Primary | ICD-10-CM

## 2021-02-03 DIAGNOSIS — M54.41 CHRONIC RIGHT-SIDED LOW BACK PAIN WITH RIGHT-SIDED SCIATICA: ICD-10-CM

## 2021-02-03 DIAGNOSIS — M54.16 LUMBAR RADICULOPATHY: ICD-10-CM

## 2021-02-03 DIAGNOSIS — I10 ESSENTIAL HYPERTENSION: ICD-10-CM

## 2021-02-03 PROBLEM — E66.01 OBESITY, MORBID (HCC): Status: RESOLVED | Noted: 2020-12-16 | Resolved: 2021-02-03

## 2021-02-03 PROBLEM — L29.9 ITCHING: Status: RESOLVED | Noted: 2020-06-15 | Resolved: 2021-02-03

## 2021-02-03 PROCEDURE — 99214 OFFICE O/P EST MOD 30 MIN: CPT | Performed by: INTERNAL MEDICINE

## 2021-02-03 PROCEDURE — 62323 NJX INTERLAMINAR LMBR/SAC: CPT | Performed by: PHYSICAL MEDICINE & REHABILITATION

## 2021-02-03 RX ORDER — METHYLPREDNISOLONE ACETATE 80 MG/ML
80 INJECTION, SUSPENSION INTRA-ARTICULAR; INTRALESIONAL; INTRAMUSCULAR; PARENTERAL; SOFT TISSUE ONCE
Status: COMPLETED | OUTPATIENT
Start: 2021-02-03 | End: 2021-02-03

## 2021-02-03 RX ORDER — CLOBETASOL PROPIONATE 0.5 MG/G
CREAM TOPICAL 2 TIMES DAILY
Qty: 30 G | Refills: 5 | Status: SHIPPED | OUTPATIENT
Start: 2021-02-03

## 2021-02-03 RX ORDER — OMEPRAZOLE 40 MG/1
40 CAPSULE, DELAYED RELEASE ORAL DAILY
Qty: 90 CAPSULE | Refills: 3 | Status: SHIPPED | OUTPATIENT
Start: 2021-02-03 | End: 2021-10-11 | Stop reason: SDUPTHER

## 2021-02-03 RX ORDER — LIDOCAINE HYDROCHLORIDE 10 MG/ML
5 INJECTION, SOLUTION EPIDURAL; INFILTRATION; INTRACAUDAL; PERINEURAL ONCE
Status: COMPLETED | OUTPATIENT
Start: 2021-02-03 | End: 2021-02-03

## 2021-02-03 RX ORDER — MONTELUKAST SODIUM 10 MG/1
10 TABLET ORAL
Qty: 90 TABLET | Refills: 0 | Status: SHIPPED | OUTPATIENT
Start: 2021-02-03 | End: 2021-05-04 | Stop reason: SDUPTHER

## 2021-02-03 RX ORDER — EPINEPHRINE 0.3 MG/.3ML
0.3 INJECTION SUBCUTANEOUS AS NEEDED
Qty: 2 EACH | Refills: 5 | Status: SHIPPED | OUTPATIENT
Start: 2021-02-03 | End: 2022-06-09

## 2021-02-03 RX ORDER — DICYCLOMINE HYDROCHLORIDE 10 MG/1
10 CAPSULE ORAL 2 TIMES DAILY
Qty: 180 CAPSULE | Refills: 0 | Status: SHIPPED | OUTPATIENT
Start: 2021-02-03 | End: 2021-05-12 | Stop reason: SDUPTHER

## 2021-02-03 RX ADMIN — LIDOCAINE HYDROCHLORIDE 2 ML: 10 INJECTION, SOLUTION EPIDURAL; INFILTRATION; INTRACAUDAL; PERINEURAL at 14:22

## 2021-02-03 RX ADMIN — METHYLPREDNISOLONE ACETATE 80 MG: 80 INJECTION, SUSPENSION INTRA-ARTICULAR; INTRALESIONAL; INTRAMUSCULAR; PARENTERAL; SOFT TISSUE at 14:24

## 2021-02-03 NOTE — H&P
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)    Abnormal CT scan, kidney    Chronic pain syndrome       Past Medical History:   Diagnosis Date    Abnormal findings on diagnostic imaging of breast     Anxiety     Arthritis     Chronic pain disorder     Extremity pain     Fibrocystic breast disease     Foot pain     GERD (gastroesophageal reflux disease)     Hyperlipidemia     Hypertension     Interstitial cystitis     Joint pain     Low back pain     Osteoarthritis     Osteopenia        Past Surgical History:   Procedure Laterality Date    APPENDECTOMY      BACK SURGERY      BREAST EXCISIONAL BIOPSY Left 1996    benign    CARPAL BOSS EXCISION      CHOLECYSTECTOMY      DIAGNOSTIC LAPAROSCOPY      FOOT SURGERY      FRACTURE SURGERY      HYSTERECTOMY      age 33    HYSTEROSCOPY      JOINT REPLACEMENT      KIDNEY SURGERY Left     KNEE ARTHROSCOPY Bilateral     LAPAROSCOPY      hynecologic with adhesions    OOPHORECTOMY Bilateral     age 33   [facility]olds ORTHOPEDIC SURGERY      IN SURG IM[address]NT [address] N/A 5/18/2017    Procedure: PLACEMENT OF THORACIC SPINAL CORD STIMULATOR WITH LEFT BUTTOCK IMPLANTABLE PULSE GENERATOR (IMPULSE MONITORING-MOTORS (TCEMEP), EMG, SSEP);   Surgeon: Laila Campbell MD;  [facility];  Service: Neurosurgery   [facility] STIMULATOR IMPLANT Left 9/29/2020    Procedure: LAMINECTOMY THORACIC , REMOVAL OF SCS LEADS AND GENERATOR;  Surgeon: Kyleigh Howard MD;  Location:  MAIN OR;  Service: Neurosurgery    SPINAL STIMULATOR PLACEMENT  05/2017    TONSILLECTOMY AND ADENOIDECTOMY      onset: unknown    TOTAL KNEE ARTHROPLASTY Right          Current Outpatient Medications:     amitriptyline (ELAVIL) 50 mg tablet, Take 1 tablet (50 mg total) by mouth daily (Patient taking differently: Take 50 mg by mouth daily at bedtime ), Disp: 90 tablet, Rfl: 3    Biotin 2500 MCG CAPS, Take 1 capsule by mouth 2 (two) times a day , Disp: , Rfl:     calcium carbonate (OS-ANTHONY) 600 MG tablet, Take 1,200 mg by mouth 2 (two) times a day with meals, Disp: , Rfl:     Cholecalciferol (VITAMIN D-3 PO), Take 1 tablet by mouth daily, Disp: , Rfl:     clobetasol (TEMOVATE) 0 05 % cream, Apply topically 2 (two) times a day, Disp: 30 g, Rfl: 5    Cyanocobalamin (VITAMIN B-12 CR PO), Take 1 tablet by mouth daily, Disp: , Rfl:     diclofenac sodium (VOLTAREN) 1 %, APPLY TO THE AFFECTED AREA(S) TWICE DAILY AS DIRECTED   (Patient taking differently: as needed ), Disp: 100 g, Rfl: 0    dicyclomine (BENTYL) 10 mg capsule, Take 1 capsule (10 mg total) by mouth 2 (two) times a day, Disp: 180 capsule, Rfl: 0    EPINEPHrine (EpiPen 2-Michel) 0 3 mg/0 3 mL SOAJ, Inject 0 3 mL (0 3 mg total) into a muscle as needed for anaphylaxis, Disp: 2 each, Rfl: 5    fexofenadine (ALLEGRA) 180 MG tablet, Take 180 mg by mouth daily, Disp: , Rfl:     gabapentin (NEURONTIN) 300 mg capsule, Take 2 capsules (600 mg total) by mouth 3 (three) times a day (Patient taking differently: Take 1,800 mg by mouth 3 (three) times a day 1800 mg TID), Disp: 270 capsule, Rfl: 2    hydrOXYzine HCL (ATARAX) 10 mg tablet, Take 2 tablets at bedtime (Patient taking differently: daily at bedtime as needed Take 2 tablets at bedtime), Disp: 180 tablet, Rfl: 3    losartan (COZAAR) 100 MG tablet, Take 1 tablet (100 mg total) by mouth daily, Disp: 90 tablet, Rfl: 3    methocarbamol (ROBAXIN) 750 mg tablet, Take 1 tablet (750 mg total) by mouth every 6 (six) hours as needed for muscle spasms, Disp: 120 tablet, Rfl: 2    montelukast (SINGULAIR) 10 mg tablet, Take 1 tablet (10 mg total) by mouth daily at bedtime, Disp: 90 tablet, Rfl: 0    naloxone (NARCAN) 4 mg/0 1 mL nasal spray, Administer 1 spray into a nostril   If no response after 2-3 minutes, give another dose in the other nostril using a new spray , Disp: 1 each, Rfl: 1    Omega-3 Fatty Acids (FISH OIL) 1,000 mg, Take 1,000 mg by mouth 3 (three) times a day, Disp: , Rfl:     omeprazole (PriLOSEC) 40 MG capsule, Take 1 capsule (40 mg total) by mouth daily, Disp: 90 capsule, Rfl: 3    rosuvastatin (CRESTOR) 10 MG tablet, Take 1 tablet (10 mg total) by mouth daily (Patient taking differently: Take 10 mg by mouth every other day ), Disp: 90 tablet, Rfl: 0    Tapentadol HCl  MG TB12, Take 1 tablet (100 mg total) by mouth every 12 (twelve) hoursMax Daily Amount: 200 mg, Disp: 60 tablet, Rfl: 0    Current Facility-Administered Medications:     lidocaine (PF) (XYLOCAINE-MPF) 1 % injection 5 mL, 5 mL, Infiltration, Once, Kareem Smoker, DO    methylPREDNISolone acetate (DEPO-MEDROL) injection 80 mg, 80 mg, Epidural, Once, Kareem Smoker, DO    Allergies   Allergen Reactions    Bee Venom Shortness Of Breath    Chlorhexidine Rash    Egg Yolk Hives    Iodinated Diagnostic Agents Hives    Penicillins Hives    Bactrim [Sulfamethoxazole-Trimethoprim] Rash    Codeine Other (See Comments)     headaches    Latex Rash    Other Rash     Adhesive tape    Oxybutynin Rash    Pentosan Polysulfate Rash    Povidone Iodine Rash       Physical Exam:   Vitals:    02/03/21 1402   BP: 131/72   Pulse: 69   Resp: 20   Temp: 97 7 °F (36 5 °C)   SpO2: 96%     General: Awake, Alert, Oriented x 3, Mood and affect appropriate  Respiratory: Respirations even and unlabored  Cardiovascular: Peripheral pulses intact; no edema  Musculoskeletal Exam:  Back radiating leg pain    ASA Score:  2  Patient/Chart Verification  Patient ID Verified: Verbal  ID Band Applied: No  Consents Confirmed: Procedural  H&P( within 30 days) Verified: To be obtained in the Pre-Procedure area  Interval H&P(within 24 hr) Complete (required for Outpatients and Surgery Admit only): To be obtained in the Pre-Procedure area  Allergies Reviewed: Yes  Anticoag/NSAID held?: NA  Currently on antibiotics?: No  Pregnancy denied?: NA    Assessment:   1   Lumbar radiculopathy        Plan: L5 LESI

## 2021-02-03 NOTE — DISCHARGE INSTRUCTIONS
Epidural Steroid Injection   WHAT YOU NEED TO KNOW:   An epidural steroid injection (DORINA) is a procedure to inject steroid medicine into the epidural space  The epidural space is between your spinal cord and vertebrae  Steroids reduce inflammation and fluid buildup in your spine that may be causing pain  You may be given pain medicine along with the steroids  ACTIVITY  · Do not drive or operate machinery today  · No strenuous activity today - bending, lifting, etc   · You may resume normal activites starting tomorrow - start slowly and as tolerated  · You may shower today, but no tub baths or hot tubs  · You may have numbness for several hours from the local anesthetic  Please use caution and common sense, especially with weight-bearing activities  CARE OF THE INJECTION SITE  · If you have soreness or pain, apply ice to the area today (20 minutes on/20 minutes off)  · Starting tomorrow, you may use warm, moist heat or ice if needed  · You may have an increase or change in your discomfort for 36-48 hours after your treatment  · Apply ice and continue with any pain medication you have been prescribed  · Notify [facility] if you have any of the following: redness, drainage, swelling, headache, stiff neck or fever above 100°F     SPECIAL INSTRUCTIONS  · Our office will contact you in approximately 7 days for a progress report  MEDICATIONS  · Continue to take all routine medications  · Our office may have instructed you to hold some medications  If you have a problem specifically related to your procedure, please call our office at [phone]  Problems not related to your procedure should be directed to your primary care physician

## 2021-02-03 NOTE — PROGRESS NOTES
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, initial encounter ([facility] [address] )  PATs reviewed EKG unchanged and no sxs CXR negative   She has a  for the procedure    Bee sting allergy  -     EPINEPHrine (EpiPen 2-Michel) 0 3 mg/0 3 mL SOAJ; Inject 0 3 mL (0 3 mg total) into a muscle as needed for anaphylaxis    Irritable bowel syndrome without diarrhea  -     dicyclomine (BENTYL) 10 mg capsule; Take 1 capsule (10 mg total) by mouth 2 (two) times a day  Continue rx Increase fiber and water intake    Gastroesophageal reflux disease  -     omeprazole (PriLOSEC) 40 MG capsule; Take 1 capsule (40 mg total) by mouth daily  PPI daily GERD diet   Essential hypertension  No added salt diet Continue current rx     Chronic pain syndrome  Pt will followup with richie jones post surgery for further tx     Chronic right-sided low back pain with right-sided sciatica  Hopeful that planned procedure will alleviate some of her ongoing pain sxs      Rto June       Patient ID: Sinan Session is a 80 y o  female  HPI  Pt going to [facility] for repair of displaced lead in stimulator by Dr Kaya Colon She has had ongoing pain and prior attempt was aborted   She otherwise has been stable No chest pain or sob No bowel or bladder dysfunction No cough No fever or chills She had PATs done She has someone to drive her to the procedure on 2/23 She has some leg radiating sxs and weakness No abdominal pain Some tingling and pain in thoracic area She has injection today thru pain mgmt to help limit sxs prior to procedure       Review of Systems   Constitutional: Negative  HENT: Negative  Respiratory: Negative  Cardiovascular: Negative  Gastrointestinal: Negative for abdominal distention and abdominal pain  Genitourinary: Negative    Negative for difficulty urinating, dysuria and flank pain  Musculoskeletal: Positive for arthralgias and back pain  Skin: Negative  Neurological: Negative for dizziness, light-headedness and headaches  Psychiatric/Behavioral: Negative for sleep disturbance  The patient is not nervous/anxious  Past Medical History:   Diagnosis Date    Abnormal findings on diagnostic imaging of breast     Anxiety     Arthritis     Chronic pain disorder     Extremity pain     Fibrocystic breast disease     Foot pain     GERD (gastroesophageal reflux disease)     Hyperlipidemia     Hypertension     Interstitial cystitis     Joint pain     Low back pain     Osteoarthritis     Osteopenia      Past Surgical History:   Procedure Laterality Date    APPENDECTOMY      BACK SURGERY      BREAST EXCISIONAL BIOPSY Left 1996    benign    CARPAL BOSS EXCISION      CHOLECYSTECTOMY      DIAGNOSTIC LAPAROSCOPY      FOOT SURGERY      FRACTURE SURGERY      HYSTERECTOMY      age 33    HYSTEROSCOPY      JOINT REPLACEMENT      KIDNEY SURGERY Left     KNEE ARTHROSCOPY Bilateral     LAPAROSCOPY      hynecologic with adhesions    OOPHORECTOMY Bilateral     age 33   Osker Ok ORTHOPEDIC SURGERY      IN SURG IMPLNT [address] N/A 5/18/2017    Procedure: PLACEMENT OF THORACIC SPINAL CORD STIMULATOR WITH LEFT BUTTOCK IMPLANTABLE PULSE GENERATOR (IMPULSE MONITORING-MOTORS (TCEMEP), EMG, SSEP);   Surgeon: Mariusz oTrres MD;  [facility];  Service: Neurosurgery    SPINAL CORD STIMULATOR IMPLANT Left 9/29/2020    Procedure: LAMINECTOMY THORACIC , REMOVAL OF SCS LEADS AND GENERATOR;  Surgeon: Allegra Owens MD;  Location:  MAIN OR;  Service: Neurosurgery    SPINAL STIMULATOR PLACEMENT  05/2017    TONSILLECTOMY AND ADENOIDECTOMY      onset: unknown    TOTAL KNEE ARTHROPLASTY Right      Social History     Socioeconomic History    Marital status: Single     Spouse name: Not on file    Number of children: Not on file    Years of education: Not on file    Highest education level: Not on file   Occupational History    Occupation:    Social Needs    Financial resource strain: Not on file    Food insecurity     Worry: Not on file     Inability: Not on file   [employer] needs     Medical: Not on file     Non-medical: Not on file   Tobacco Use    Smoking status: Never Smoker    Smokeless tobacco: Never Used   Substance and Sexual Activity    Alcohol use: Yes     Comment: Socially    Drug use: No    Sexual activity: Not on file   Lifestyle    Physical activity     Days per week: Not on file     Minutes per session: Not on file    Stress: Not on file   Relationships    Social connections     Talks on phone: Not on file     Gets together: Not on file     Attends Zoroastrianism service: Not on file     Active member of club or organization: Not on file     Attends meetings of clubs or organizations: Not on file     Relationship status: Not on file    Intimate partner violence     Fear of current or ex partner: Not on file     Emotionally abused: Not on file     Physically abused: Not on file     Forced sexual activity: Not on file   Other Topics Concern    Not on file   Social History Narrative    No caffeine use    Always uses sunscreen    Always uses a seatbelt     Allergies   Allergen Reactions    Bee Venom Shortness Of Breath    Chlorhexidine Rash    Egg Yolk Hives    Iodinated Diagnostic Agents Hives    Penicillins Hives    Bactrim [Sulfamethoxazole-Trimethoprim] Rash    Codeine Other (See Comments)     headaches    Latex Rash    Other Rash     Adhesive tape    Oxybutynin Rash    Pentosan Polysulfate Rash    Povidone Iodine Rash     BMI Counseling: Body mass index is 37 22 kg/m²  The BMI is above normal  Nutrition recommendations include consuming healthier snacks and moderation in carbohydrate intake  Exercise recommendations include exercising 3-5 times per week  No pharmacotherapy was ordered  /78   Temp (!) 97 3 °F (36 3 °C) (Temporal)   Ht 5' 2" (1 575 m)   Wt 92 3 kg (203 lb 8 oz)   BMI 37 22 kg/m²          Physical Exam  Vitals signs reviewed  Constitutional:       General: She is not in acute distress  Appearance: Normal appearance  She is not ill-appearing, toxic-appearing or diaphoretic  HENT:      Head: Normocephalic and atraumatic  Right Ear: Tympanic membrane, ear canal and external ear normal  There is no impacted cerumen  Left Ear: Tympanic membrane, ear canal and external ear normal  There is no impacted cerumen  Nose: Nose normal       Mouth/Throat:      Mouth: Mucous membranes are moist    Eyes:      General: No scleral icterus  Extraocular Movements: Extraocular movements intact  Conjunctiva/sclera: Conjunctivae normal       Pupils: Pupils are equal, round, and reactive to light  Neck:      Musculoskeletal: Normal range of motion and neck supple  No neck rigidity  Cardiovascular:      Rate and Rhythm: Normal rate and regular rhythm  Pulses: Normal pulses  Pulmonary:      Effort: Pulmonary effort is normal  No respiratory distress  Breath sounds: Normal breath sounds  No wheezing  Abdominal:      General: Bowel sounds are normal  There is no distension  Palpations: Abdomen is soft  Tenderness: There is no abdominal tenderness  Musculoskeletal:         General: No swelling  Right lower leg: No edema  Left lower leg: No edema  Lymphadenopathy:      Cervical: No cervical adenopathy  Skin:     General: Skin is dry  Coloration: Skin is not pale  Findings: No erythema  Neurological:      General: No focal deficit present  Mental Status: She is alert and oriented to person, place, and time  Mental status is at baseline  Cranial Nerves: No cranial nerve deficit  Sensory: Sensory deficit present  Motor: Weakness present     Psychiatric:         Mood and Affect: Mood normal          Behavior: Behavior normal          Thought Content:  Thought content normal          Judgment: Judgment normal

## 2021-02-10 ENCOUNTER — TELEPHONE (OUTPATIENT)
Dept: PAIN MEDICINE | Facility: CLINIC | Age: 71
End: 2021-02-10

## 2021-02-10 NOTE — TELEPHONE ENCOUNTER
Patient called in stating that the injection helped about 25-30% improvement & pain level is 5/10  She wanted to thank the provider and all the staff for their help   Thank you       [phone]

## 2021-02-15 DIAGNOSIS — F41.9 ANXIETY: ICD-10-CM

## 2021-02-15 RX ORDER — AMITRIPTYLINE HYDROCHLORIDE 50 MG/1
50 TABLET, FILM COATED ORAL
Qty: 90 TABLET | Refills: 3 | Status: SHIPPED | OUTPATIENT
Start: 2021-02-15 | End: 2021-05-12 | Stop reason: SDUPTHER

## 2021-02-16 ENCOUNTER — TELEPHONE (OUTPATIENT)
Dept: NEUROLOGY | Facility: CLINIC | Age: 71
End: 2021-02-16

## 2021-02-16 NOTE — TELEPHONE ENCOUNTER
Spoke to Kolby Tomlin she advised she is having surgery so she is unable to drive so she was unable to keep the appointment on 3/10  She had her post op follow up on 3/11 and was hoping she was cleared to drive at that time  I offered patient to do a virtual visit for the 3/10 appointment so she could at least keep her follow up and she agreed  She is scheduled for [facility] on 3/10 at 8 am with Zaira

## 2021-03-03 ENCOUNTER — TELEPHONE (OUTPATIENT)
Dept: NEUROLOGY | Facility: CLINIC | Age: 71
End: 2021-03-03

## 2021-03-03 NOTE — TELEPHONE ENCOUNTER
Left message for patient to confirm the up coming appointment on 3/10/20 with Vivienne at 8 am as a virtual visit through [facility]  Asking for patient to call the office to confirm that she will be keeping the appointment   Please discuss and document

## 2021-03-04 NOTE — TELEPHONE ENCOUNTER
Kirsty Arce called back to confirm [facility] [address] [place] [address] no issues at this time

## 2021-03-09 DIAGNOSIS — Z23 ENCOUNTER FOR IMMUNIZATION: ICD-10-CM

## 2021-03-09 NOTE — PROGRESS NOTES
Virtual Regular Visit      Assessment/Plan:   peripheral neuropathy  Patient neuropathy remains fairly stable, mild  ongoing complaints of lower extremity paresthesias  --continues on amitriptyline 50 mg at bedtime  --has access to Robaxin, currently using every 6-8 hours  --continues with gabapentin 600 mg 3 times a day, once cord stimulator has been program, will hopefully titrate down  --encouraged good foot hygiene  --continue vitamin therapy    Chronic lumbar radiculopathy  --patient recently underwent lumbar injection on 02/03 via Pain Management with benefit  --had repair replacement of her spinal cord stimulator on February 23rd  Has follow-up appointment with Neurosurgery to begin programming  Current pain level 6-7  Overall does feel much better  --patient following with pain management    Problem List Items Addressed This Visit        Nervous and Auditory    Lumbar radiculopathy - Primary    Peripheral neuropathy       Other    Chronic low back pain               Reason for visit is peripheral neuropathy, chronic lumbar radiculopathy  Chief Complaint   Patient presents with    Virtual Regular Visit    Virtual Regular Visit        Encounter provider Chris Nichols [facility]    Provider located at [address] [address]      Recent Visits  No visits were found meeting these conditions  Showing recent visits within past 7 days and meeting all other requirements     Today's Visits  Date Type Provider Dept   03/10/21 Telemedicine [facility] [place]   Showing today's visits and meeting all other requirements     Future Appointments  No visits were found meeting these conditions  Showing future appointments within next 150 days and meeting all other requirements        The patient was identified by name and date of birth   Vik Chin was informed that this is a telemedicine visit and that the visit is being conducted through [facility] and patient was informed that this is a secure, HIPAA-compliant platform  She agrees to proceed     My office door was closed  No one else was in the room  She acknowledged consent and understanding of privacy and security of the video platform  The patient has agreed to participate and understands they can discontinue the visit at any time  Patient is aware this is a billable service  We discussed the telemedicine platform and that my medical advice and exam would be limited as a consequence  I informed the patient that I reviewed her records in epic and provided her with an opportunity to ask any questions regarding today's visit    Subjective  Rey Perkins is a 80 y o  female  Patient initially presented 2015 with complaints of diffuse paresthesias, EMG showed carpal tunnel, lab work was unrevealing  MRI of the brain was essentially normal   Her symptoms have been fairly well controlled on a combination of gabapentin Elavil  She does have chronic low back pain, MRI thoracic spine with mild degenerative changes T10-T11, lumbar MRI with mild to moderate bone and discogenic degenerative changes from L2-L5 with spondylosis  No stenosis or nerve root compression  Patient seen by pain management, subsequently underwent spinal cord stimulator placement in May of 2017   unfortunately, patient required a further intervention for her back, during surgery in September 29th 2019, during the procedure well breaking up a scar pocket, patient noted to have lost signal on the right, surgery was aborted  System was unfortunately removed  Naty RUTLEDGE   Patient was last seen in December 2020, that point, she noted ongoing complaints of chronic pain affecting her back and lower extremities, right greater than left  She was having difficulty standing or sitting for any length of time    It was suggested that she increase her gabapentin, she was continued on Robaxin from pain management  In the interim, she underwent updated MRI of the thoracic spine, evidence of progression in degenerative disc disease noted from T5-T7  Patient subsequently seen by Pain Management, had some ongoing tingling and pain in thoracic area, underwent injections 2/3 through Pain Management  Due to her ongoing pain, and issues with her stimulator she subsequently underwent repair and replacement on February 23rd  Today, fortunately, as she feels better overall  Her her recent stimulator replacement went well, she just had her staples taken out yesterday  They will begin slowly programming  As she does note some ongoing numbness in the toes of her right foot  Overall pain at this point is a 6-7/10  As she denies any issues with bowel or bladder  As a her pain lessens, as she will attempt to slowly titrate her gabapentin  As she remains on Robaxin as needed and amitriptyline  She denies any interim falls  Patient follows with ortho for chronic issues with her knees, she had a right total knee replacement in June 2019  She was supposed to have the left side done but was holding off      Past Medical History:   Diagnosis Date    Abnormal findings on diagnostic imaging of breast     Anxiety     Arthritis     Chronic pain disorder     Extremity pain     Fibrocystic breast disease     Foot pain     GERD (gastroesophageal reflux disease)     Hyperlipidemia     Hypertension     Interstitial cystitis     Joint pain     Low back pain     Osteoarthritis     Osteopenia        Past Surgical History:   Procedure Laterality Date    APPENDECTOMY      BACK SURGERY      BREAST EXCISIONAL BIOPSY Left 1996    benign    CARPAL BOSS EXCISION      CHOLECYSTECTOMY      DIAGNOSTIC LAPAROSCOPY      FOOT SURGERY      FRACTURE SURGERY      HYSTERECTOMY      age 33    HYSTEROSCOPY      JOINT REPLACEMENT      KIDNEY SURGERY Left     KNEE ARTHROSCOPY Bilateral     LAPAROSCOPY      hynecologic with adhesions    OOPHORECTOMY Bilateral     age 33    ORTHOPEDIC SURGERY      [place] SURG IMPLNT NEUROELECT,EPIDURAL N/A 5/18/2017    Procedure: PLACEMENT OF THORACIC SPINAL CORD STIMULATOR WITH LEFT BUTTOCK IMPLANTABLE PULSE GENERATOR (IMPULSE MONITORING-MOTORS (TCEMEP), EMG, SSEP); Surgeon: Saran Vyas MD;  [facility];  Service: Neurosurgery    SPINAL CORD STIMULATOR IMPLANT Left 9/29/2020    Procedure: LAMINECTOMY THORACIC , REMOVAL OF SCS LEADS AND GENERATOR;  Surgeon: Eddy Gillespie MD;  [facility];  Service: Neurosurgery    SPINAL STIMULATOR PLACEMENT  05/2017    TONSILLECTOMY AND ADENOIDECTOMY      onset: unknown    TOTAL KNEE ARTHROPLASTY Right        Current Outpatient Medications   Medication Sig Dispense Refill    amitriptyline (ELAVIL) 50 mg tablet Take 1 tablet (50 mg total) by mouth daily at bedtime 90 tablet 3    Biotin 2500 MCG CAPS Take 1 capsule by mouth 2 (two) times a day       calcium carbonate (OS-ANTHONY) 600 MG tablet Take 1,200 mg by mouth 2 (two) times a day with meals      Cholecalciferol (VITAMIN D-3 PO) Take 1 tablet by mouth daily      clobetasol (TEMOVATE) 0 05 % cream Apply topically 2 (two) times a day 30 g 5    Cyanocobalamin (VITAMIN B-12 CR PO) Take 1 tablet by mouth daily      diclofenac sodium (VOLTAREN) 1 % APPLY TO THE AFFECTED AREA(S) TWICE DAILY AS DIRECTED   (Patient taking differently: as needed ) 100 g 0    dicyclomine (BENTYL) 10 mg capsule Take 1 capsule (10 mg total) by mouth 2 (two) times a day 180 capsule 0    EPINEPHrine (EpiPen 2-Michel) 0 3 mg/0 3 mL SOAJ Inject 0 3 mL (0 3 mg total) into a muscle as needed for anaphylaxis 2 each 5    fexofenadine (ALLEGRA) 180 MG tablet Take 180 mg by mouth daily      gabapentin (NEURONTIN) 300 mg capsule Take 2 capsules (600 mg total) by mouth 3 (three) times a day (Patient taking differently: Take 1,800 mg by mouth 3 (three) times a day 1800 mg TID) 270 capsule 2    hydrOXYzine HCL (ATARAX) 10 mg tablet Take 2 tablets at bedtime (Patient taking differently: daily at bedtime as needed Take 2 tablets at bedtime) 180 tablet 3    methocarbamol (ROBAXIN) 500 mg tablet       montelukast (SINGULAIR) 10 mg tablet Take 1 tablet (10 mg total) by mouth daily at bedtime 90 tablet 0    Omega-3 Fatty Acids (FISH OIL) 1,000 mg Take 1,000 mg by mouth 3 (three) times a day      omeprazole (PriLOSEC) 40 MG capsule Take 1 capsule (40 mg total) by mouth daily 90 capsule 3    rosuvastatin (CRESTOR) 10 MG tablet Take 1 tablet (10 mg total) by mouth daily (Patient taking differently: Take 10 mg by mouth every other day ) 90 tablet 0    losartan (COZAAR) 100 MG tablet Take 1 tablet (100 mg total) by mouth daily 90 tablet 3    methocarbamol (ROBAXIN) 750 mg tablet Take 1 tablet (750 mg total) by mouth every 6 (six) hours as needed for muscle spasms 120 tablet 2    naloxone (NARCAN) 4 mg/0 1 mL nasal spray Administer 1 spray into a nostril  If no response after 2-3 minutes, give another dose in the other nostril using a new spray  1 each 1    oxyCODONE-acetaminophen (PERCOCET) 5-325 mg per tablet        No current facility-administered medications for this visit  Allergies   Allergen Reactions    Bee Venom Shortness Of Breath    Chlorhexidine Rash    Egg Yolk Hives    Iodinated Diagnostic Agents Hives    Penicillins Hives    Bactrim [Sulfamethoxazole-Trimethoprim] Rash    Codeine Other (See Comments)     headaches    Latex Rash    Other Rash     Adhesive tape    Oxybutynin Rash    Pentosan Polysulfate Rash    Povidone Iodine Rash       Review of Systems   Musculoskeletal: Positive for back pain and gait problem  personally reviewed with patient  Constitutional: Negative  Negative for appetite change and fever  HENT: Negative  Negative for hearing loss, tinnitus, trouble swallowing and voice change  Eyes: Negative  Negative for photophobia and pain  Respiratory: Negative    Negative for shortness of breath  Cardiovascular: Negative  Negative for palpitations  Gastrointestinal: Negative  Negative for nausea and vomiting  Endocrine: Negative  Negative for cold intolerance  Genitourinary: Negative  Negative for dysuria, frequency and urgency  Musculoskeletal: Negative  Negative for myalgias and neck pain  Skin: Negative  Negative for rash  Neurological: Negative  Negative for dizziness, tremors, seizures, syncope, facial asymmetry, speech difficulty, weakness, light-headedness, numbness and headaches  Hematological: Negative  Does not bruise/bleed easily  Psychiatric/Behavioral: Negative  Negative for confusion, hallucinations and sleep disturbance         Video Exam    There were no vitals filed for this visit  Physical Exam  Constitutional:       Appearance: Normal appearance  HENT:      Head: Normocephalic  Pulmonary:      Effort: Pulmonary effort is normal    Psychiatric:         Mood and Affect: Mood normal          Behavior: Behavior normal          Thought Content: Thought content normal       neurological exam  Mental status  Patient awake and alert, follows commands  Speech fluent  Oriented x3  Affect appropriate  Cranial nerve  Pupils equal, extraocular movements full  No evidence of facial asymmetry  Tongue midline  SCM symmetric  Hearing appears to be intact  When she touches her face, states the sensation is symmetric  Motor  Upper extremity strength fairly well preserved  Patient noted to have some weakness on the right lower extremity versus the left lower extremity     coordination/gait  Coordination intact finger-to-nose maneuvers bilaterally, no dysmetria  No pronator drift    Ambulates independently wide-based, mild antalgic like gait    I spent 15 minutes with patient today in which greater than 50% of the time was spent in counseling/coordination of care regarding Treatment options, recent surgical intervention, medications, follow-up care,      VIRTUAL VISIT DISCLAIMER    Robert Benjamin acknowledges that she has consented to an online visit or consultation  She understands that the online visit is based solely on information provided by her, and that, in the absence of a face-to-face physical evaluation by the physician, the diagnosis she receives is both limited and provisional in terms of accuracy and completeness  This is not intended to replace a full medical face-to-face evaluation by the physician  Robert Benjamin understands and accepts these terms

## 2021-03-10 ENCOUNTER — TELEMEDICINE (OUTPATIENT)
Dept: NEUROLOGY | Facility: CLINIC | Age: 71
End: 2021-03-10
Payer: MEDICARE

## 2021-03-10 DIAGNOSIS — G89.29 CHRONIC LOW BACK PAIN: ICD-10-CM

## 2021-03-10 DIAGNOSIS — G62.9 PERIPHERAL NEUROPATHY: ICD-10-CM

## 2021-03-10 DIAGNOSIS — M54.16 LUMBAR RADICULOPATHY: Primary | ICD-10-CM

## 2021-03-10 DIAGNOSIS — M54.50 CHRONIC LOW BACK PAIN: ICD-10-CM

## 2021-03-10 PROCEDURE — 99212 OFFICE O/P EST SF 10 MIN: CPT | Performed by: NURSE PRACTITIONER

## 2021-03-10 RX ORDER — OXYCODONE HYDROCHLORIDE AND ACETAMINOPHEN 5; 325 MG/1; MG/1
TABLET ORAL
COMMUNITY
Start: 2021-02-23 | End: 2021-05-12 | Stop reason: ALTCHOICE

## 2021-03-10 RX ORDER — METHOCARBAMOL 500 MG/1
TABLET, FILM COATED ORAL
COMMUNITY
Start: 2021-02-23 | End: 2021-05-12 | Stop reason: ALTCHOICE

## 2021-03-10 NOTE — PROGRESS NOTES
Virtual Regular Visit      Assessment/Plan:    Problem List Items Addressed This Visit     None               Reason for visit is   Chief Complaint   Patient presents with    Virtual Regular Visit    Virtual Regular Visit        Encounter provider Matias Jeffrey, [facility]    Provider located at [address] [facility] [address]      Recent Visits  No visits were found meeting these conditions  Showing recent visits within past 7 days and meeting all other requirements     Today's Visits  Date Type Provider Dept   03/10/21 Telemedicine [facility] [place]   Showing today's visits and meeting all other requirements     Future Appointments  No visits were found meeting these conditions  Showing future appointments within next 150 days and meeting all other requirements        The patient was identified by name and date of birth  Markos Ward was informed that this is a telemedicine visit and that the visit is being conducted through {AMB CORONAVIRUS VISIT [facility]:14965}  {Telemedicine confidentiality :38482} {Telemedicine participants:69373}  She acknowledged consent and understanding of privacy and security of the video platform  The patient has agreed to participate and understands they can discontinue the visit at any time  Patient is aware this is a billable service  Subjective  Markos Ward is a 80 y o  female ***         HPI     Past Medical History:   Diagnosis Date    Abnormal findings on diagnostic imaging of breast     Anxiety     Arthritis     Chronic pain disorder     Extremity pain     Fibrocystic breast disease     Foot pain     GERD (gastroesophageal reflux disease)     Hyperlipidemia     Hypertension     Interstitial cystitis     Joint pain     Low back pain     Osteoarthritis     Osteopenia        Past Surgical History:   Procedure Laterality Date    APPENDECTOMY      BACK SURGERY  BREAST EXCISIONAL BIOPSY Left 1996    benign    CARPAL BOSS EXCISION      CHOLECYSTECTOMY      DIAGNOSTIC LAPAROSCOPY      FOOT SURGERY      FRACTURE SURGERY      HYSTERECTOMY      age 33    HYSTEROSCOPY      JOINT REPLACEMENT      KIDNEY SURGERY Left     KNEE ARTHROSCOPY Bilateral     LAPAROSCOPY      hynecologic with adhesions    OOPHORECTOMY Bilateral     age 33    ORTHOPEDIC SURGERY      [place] SURG IMPLNT [address] N/A 5/18/2017    Procedure: PLACEMENT OF THORACIC SPINAL CORD STIMULATOR WITH LEFT BUTTOCK IMPLANTABLE PULSE GENERATOR (IMPULSE MONITORING-MOTORS (TCEMEP), EMG, SSEP); Surgeon: Johny Collins MD;  [facility];  Service: Neurosurgery    SPINAL CORD STIMULATOR IMPLANT Left 9/29/2020    Procedure: LAMINECTOMY THORACIC , REMOVAL OF SCS LEADS AND GENERATOR;  Surgeon: Luli Severino MD;  [facility];  Service: Neurosurgery    SPINAL STIMULATOR PLACEMENT  05/2017    TONSILLECTOMY AND ADENOIDECTOMY      onset: unknown    TOTAL KNEE ARTHROPLASTY Right        Current Outpatient Medications   Medication Sig Dispense Refill    amitriptyline (ELAVIL) 50 mg tablet Take 1 tablet (50 mg total) by mouth daily at bedtime 90 tablet 3    Biotin 2500 MCG CAPS Take 1 capsule by mouth 2 (two) times a day       calcium carbonate (OS-ANTHONY) 600 MG tablet Take 1,200 mg by mouth 2 (two) times a day with meals      Cholecalciferol (VITAMIN D-3 PO) Take 1 tablet by mouth daily      clobetasol (TEMOVATE) 0 05 % cream Apply topically 2 (two) times a day 30 g 5    Cyanocobalamin (VITAMIN B-12 CR PO) Take 1 tablet by mouth daily      diclofenac sodium (VOLTAREN) 1 % APPLY TO THE AFFECTED AREA(S) TWICE DAILY AS DIRECTED   (Patient taking differently: as needed ) 100 g 0    dicyclomine (BENTYL) 10 mg capsule Take 1 capsule (10 mg total) by mouth 2 (two) times a day 180 capsule 0    EPINEPHrine (EpiPen 2-Michel) 0 3 mg/0 3 mL SOAJ Inject 0 3 mL (0 3 mg total) into a muscle as needed for anaphylaxis 2 each 5    fexofenadine (ALLEGRA) 180 MG tablet Take 180 mg by mouth daily      gabapentin (NEURONTIN) 300 mg capsule Take 2 capsules (600 mg total) by mouth 3 (three) times a day (Patient taking differently: Take 1,800 mg by mouth 3 (three) times a day 1800 mg TID) 270 capsule 2    hydrOXYzine HCL (ATARAX) 10 mg tablet Take 2 tablets at bedtime (Patient taking differently: daily at bedtime as needed Take 2 tablets at bedtime) 180 tablet 3    methocarbamol (ROBAXIN) 500 mg tablet       montelukast (SINGULAIR) 10 mg tablet Take 1 tablet (10 mg total) by mouth daily at bedtime 90 tablet 0    Omega-3 Fatty Acids (FISH OIL) 1,000 mg Take 1,000 mg by mouth 3 (three) times a day      omeprazole (PriLOSEC) 40 MG capsule Take 1 capsule (40 mg total) by mouth daily 90 capsule 3    rosuvastatin (CRESTOR) 10 MG tablet Take 1 tablet (10 mg total) by mouth daily (Patient taking differently: Take 10 mg by mouth every other day ) 90 tablet 0    losartan (COZAAR) 100 MG tablet Take 1 tablet (100 mg total) by mouth daily 90 tablet 3    methocarbamol (ROBAXIN) 750 mg tablet Take 1 tablet (750 mg total) by mouth every 6 (six) hours as needed for muscle spasms 120 tablet 2    naloxone (NARCAN) 4 mg/0 1 mL nasal spray Administer 1 spray into a nostril  If no response after 2-3 minutes, give another dose in the other nostril using a new spray  1 each 1    oxyCODONE-acetaminophen (PERCOCET) 5-325 mg per tablet        No current facility-administered medications for this visit           Allergies   Allergen Reactions    Bee Venom Shortness Of Breath    Chlorhexidine Rash    Egg Yolk Hives    Iodinated Diagnostic Agents Hives    Penicillins Hives    Bactrim [Sulfamethoxazole-Trimethoprim] Rash    Codeine Other (See Comments)     headaches    Latex Rash    Other Rash     Adhesive tape    Oxybutynin Rash    Pentosan Polysulfate Rash    Povidone Iodine Rash       Review of Systems   Constitutional: Negative  Negative for appetite change and fever  HENT: Negative  Negative for hearing loss, tinnitus, trouble swallowing and voice change  Eyes: Negative  Negative for photophobia and pain  Respiratory: Negative  Negative for shortness of breath  Cardiovascular: Negative  Negative for palpitations  Gastrointestinal: Negative  Negative for nausea and vomiting  Endocrine: Negative  Negative for cold intolerance  Genitourinary: Negative  Negative for dysuria, frequency and urgency  Musculoskeletal: Negative  Negative for myalgias and neck pain  Skin: Negative  Negative for rash  Neurological: Negative  Negative for dizziness, tremors, seizures, syncope, facial asymmetry, speech difficulty, weakness, light-headedness, numbness and headaches  Hematological: Negative  Does not bruise/bleed easily  Psychiatric/Behavioral: Negative  Negative for confusion, hallucinations and sleep disturbance  Video Exam    There were no vitals filed for this visit  Physical Exam     {covid time spent:78142}      VIRTUAL VISIT DISCLAIMER    Tana Calless acknowledges that she has consented to an online visit or consultation  She understands that the online visit is based solely on information provided by her, and that, in the absence of a face-to-face physical evaluation by the physician, the diagnosis she receives is both limited and provisional in terms of accuracy and completeness  This is not intended to replace a full medical face-to-face evaluation by the physician  Tana Antonio understands and accepts these terms

## 2021-03-10 NOTE — PATIENT INSTRUCTIONS
Patient doing better since her spinal cord stimulator has been replaced  Following with Dr Renuka Huddleston  Following with pain management  Patient to continue current medication  Continue vitamin therapy  Patient to continue Robaxin as needed, amitriptyline 50 mg  Once her pain in proved with further programming of her stimulator, will slowly attempt to decrease back her gabapentin

## 2021-03-17 ENCOUNTER — OFFICE VISIT (OUTPATIENT)
Dept: PAIN MEDICINE | Facility: MEDICAL CENTER | Age: 71
End: 2021-03-17
Payer: MEDICARE

## 2021-03-17 VITALS
TEMPERATURE: 98.6 F | HEART RATE: 81 BPM | BODY MASS INDEX: 36.8 KG/M2 | SYSTOLIC BLOOD PRESSURE: 137 MMHG | WEIGHT: 200 LBS | DIASTOLIC BLOOD PRESSURE: 85 MMHG | HEIGHT: 62 IN

## 2021-03-17 DIAGNOSIS — M54.41 CHRONIC RIGHT-SIDED LOW BACK PAIN WITH RIGHT-SIDED SCIATICA: ICD-10-CM

## 2021-03-17 DIAGNOSIS — M54.16 LUMBAR RADICULOPATHY: Primary | ICD-10-CM

## 2021-03-17 DIAGNOSIS — G89.29 CHRONIC RIGHT-SIDED LOW BACK PAIN WITH RIGHT-SIDED SCIATICA: ICD-10-CM

## 2021-03-17 DIAGNOSIS — G89.4 CHRONIC PAIN SYNDROME: ICD-10-CM

## 2021-03-17 DIAGNOSIS — G60.9 IDIOPATHIC PERIPHERAL NEUROPATHY: ICD-10-CM

## 2021-03-17 PROCEDURE — 99213 OFFICE O/P EST LOW 20 MIN: CPT | Performed by: NURSE PRACTITIONER

## 2021-03-17 RX ORDER — GABAPENTIN 300 MG/1
300 CAPSULE ORAL 3 TIMES DAILY
Qty: 270 CAPSULE | Refills: 0 | Status: SHIPPED | OUTPATIENT
Start: 2021-03-17 | End: 2021-05-12 | Stop reason: SDUPTHER

## 2021-03-17 NOTE — PROGRESS NOTES
Assessment  1  Lumbar radiculopathy    2  Idiopathic peripheral neuropathy    3  Chronic right-sided low back pain with right-sided sciatica    4  Chronic pain syndrome        Plan    Continue with gabapentin she is feeling better so we will decrease her dose down to 1 tablet 3 times daily  She was given instruction how to safely decrease the dose  continue with methocarbamol no refill needed today  Follow-up in 8 weeks        My impressions and treatment recommendations were discussed in detail with the patient who verbalized understanding and had no further questions  Discharge instructions were provided  I personally saw and examined the patient and I agree with the above discussed plan of care  No orders of the defined types were placed in this encounter  New Medications Ordered This Visit   Medications    gabapentin (NEURONTIN) 300 mg capsule     Sig: Take 1 capsule (300 mg total) by mouth 3 (three) times a day     Dispense:  270 capsule     Refill:  0       History of Present Illness    Skip Blunt is a 80 y o  female  Presents for follow-up related to her low back and leg pain  Today she reports she is doing better rates her pain 5/10  She has intermittent pain  Throughout the entirety of the day  She describes this pain as burning, dull, aching, and pins and needles  She is status post a permanent spinal cord stimulator replacement with Dr Alexa Joyner on  February 23, 2021 and she tells me that she is doing better at this point  She continues with gabapentin 2 tablets 3 times daily for a total of 1800 mg as well as methocarbamol 750 mg 4 times daily  She is interested in starting to wean down on her gabapentin since she is doing better    She has already discontinued her Nucynta on her own    I have personally reviewed and/or updated the patient's past medical history, past surgical history, family history, social history, current medications, allergies, and vital signs today      Review of Systems   Respiratory: Negative for shortness of breath  Cardiovascular: Negative for chest pain  Gastrointestinal: Negative for constipation, diarrhea, nausea and vomiting  Musculoskeletal: Negative for arthralgias, gait problem, joint swelling and myalgias  Skin: Negative for rash  Neurological: Negative for dizziness, seizures and weakness  All other systems reviewed and are negative        Patient Active Problem List   Diagnosis    Hypertension    Hyperlipidemia    Gastroesophageal reflux disease    Chronic low back pain    Degenerative joint disease of right lower extremity    Irritable bowel syndrome    Lumbar radiculopathy    Neuralgia    Peripheral neuropathy    Chronic thoracic back pain    Interstitial cystitis (chronic) with hematuria    Medicare annual wellness visit, subsequent    S/P right knee surgery    Osteopenia    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)    Abnormal CT scan, kidney    Chronic pain syndrome       Past Medical History:   Diagnosis Date    Abnormal findings on diagnostic imaging of breast     Arthritis     Chronic pain disorder     Extremity pain     Fibrocystic breast disease     Foot pain     GERD (gastroesophageal reflux disease)     Hyperlipidemia     Hypertension     Interstitial cystitis     Joint pain     Low back pain     Osteoarthritis     Osteopenia        Past Surgical History:   Procedure Laterality Date    APPENDECTOMY      BACK SURGERY      BREAST EXCISIONAL BIOPSY Left 1996    benign    CARPAL BOSS EXCISION      CHOLECYSTECTOMY      DIAGNOSTIC LAPAROSCOPY      FOOT SURGERY      FRACTURE SURGERY      HYSTERECTOMY      age 33    HYSTEROSCOPY      JOINT REPLACEMENT      KIDNEY SURGERY Left     KNEE ARTHROSCOPY Bilateral     LAMINECTOMY      LAPAROSCOPY      hynecologic with adhesions    OOPHORECTOMY Bilateral     age 33   [address] [facility]  [place] SURG IMPLNT NEUROELECT,EPIDURAL N/A 5/18/2017    Procedure: PLACEMENT OF THORACIC SPINAL CORD STIMULATOR WITH LEFT BUTTOCK IMPLANTABLE PULSE GENERATOR (IMPULSE MONITORING-MOTORS (TCEMEP), EMG, SSEP); Surgeon: Dannielle Schultz MD;  Location: [facility];  Service: Neurosurgery    SPINAL CORD STIMULATOR IMPLANT Left 9/29/2020    Procedure: LAMINECTOMY THORACIC , REMOVAL OF SCS LEADS AND GENERATOR;  Surgeon: Olman Torres MD;  Location: [facility];  Service: Neurosurgery    SPINAL STIMULATOR PLACEMENT  05/2017    TONSILLECTOMY AND ADENOIDECTOMY      onset: unknown    TOTAL KNEE ARTHROPLASTY Right        Family History   Problem Relation Age of Onset    Bone cancer Mother     Cancer Mother     Brain cancer Father     Stroke Father         stroke sydrome    Diabetes Paternal Grandmother     Breast cancer Paternal Grandmother 61    Breast cancer Maternal Aunt 79    Breast cancer additional onset Maternal Aunt 67    Depression Sister     Migraines Sister     No Known Problems Maternal Grandmother     No Known Problems Maternal Grandfather     No Known Problems Paternal Grandfather     Asthma Sister     Heart disease Brother        Social History     Occupational History    Occupation:    Tobacco Use    Smoking status: Never Smoker    Smokeless tobacco: Never Used   Substance and Sexual Activity    Alcohol use:  Yes     Alcohol/week: 1 0 standard drinks     Types: 1 Glasses of wine per week    Drug use: No    Sexual activity: Not Currently     Partners: Male     Birth control/protection: None       Current Outpatient Medications on File Prior to Visit   Medication Sig    amitriptyline (ELAVIL) 50 mg tablet Take 1 tablet (50 mg total) by mouth daily at bedtime    Biotin 2500 MCG CAPS Take 1 capsule by mouth 2 (two) times a day     calcium carbonate (OS-ANTHONY) 600 MG tablet Take 1,200 mg by mouth 2 (two) times a day with meals    Cholecalciferol (VITAMIN D-3 PO) Take 1 tablet by mouth daily    clobetasol (TEMOVATE) 0 05 % cream Apply topically 2 (two) times a day    Cyanocobalamin (VITAMIN B-12 CR PO) Take 1 tablet by mouth daily    diclofenac sodium (VOLTAREN) 1 % APPLY TO THE AFFECTED AREA(S) TWICE DAILY AS DIRECTED  (Patient taking differently: as needed )    dicyclomine (BENTYL) 10 mg capsule Take 1 capsule (10 mg total) by mouth 2 (two) times a day    fexofenadine (ALLEGRA) 180 MG tablet Take 180 mg by mouth daily    hydrOXYzine HCL (ATARAX) 10 mg tablet Take 2 tablets at bedtime (Patient taking differently: daily at bedtime as needed Take 2 tablets at bedtime)    losartan (COZAAR) 100 MG tablet Take 1 tablet (100 mg total) by mouth daily    methocarbamol (ROBAXIN) 750 mg tablet Take 1 tablet (750 mg total) by mouth every 6 (six) hours as needed for muscle spasms    montelukast (SINGULAIR) 10 mg tablet Take 1 tablet (10 mg total) by mouth daily at bedtime    Omega-3 Fatty Acids (FISH OIL) 1,000 mg Take 1,000 mg by mouth 3 (three) times a day    omeprazole (PriLOSEC) 40 MG capsule Take 1 capsule (40 mg total) by mouth daily    rosuvastatin (CRESTOR) 10 MG tablet Take 1 tablet (10 mg total) by mouth daily (Patient taking differently: Take 10 mg by mouth every other day )    [DISCONTINUED] gabapentin (NEURONTIN) 300 mg capsule Take 2 capsules (600 mg total) by mouth 3 (three) times a day (Patient taking differently: Take 1,800 mg by mouth 3 (three) times a day 1800 mg TID)    EPINEPHrine (EpiPen 2-Michel) 0 3 mg/0 3 mL SOAJ Inject 0 3 mL (0 3 mg total) into a muscle as needed for anaphylaxis    methocarbamol (ROBAXIN) 500 mg tablet     naloxone (NARCAN) 4 mg/0 1 mL nasal spray Administer 1 spray into a nostril  If no response after 2-3 minutes, give another dose in the other nostril using a new spray   oxyCODONE-acetaminophen (PERCOCET) 5-325 mg per tablet      No current facility-administered medications on file prior to visit          Allergies   Allergen Reactions    Bee Venom Shortness Of Breath    Chlorhexidine Rash    Egg Yolk Hives    Iodinated Diagnostic Agents Hives    Penicillins Hives    Bactrim [Sulfamethoxazole-Trimethoprim] Rash    Codeine Other (See Comments)     headaches    Latex Rash    Other Rash     Adhesive tape    Oxybutynin Rash    Pentosan Polysulfate Rash    Povidone Iodine Rash       Physical Exam    /85   Pulse 81   Temp 98 6 °F (37 °C)   Ht 5' 2" (1 575 m)   Wt 90 7 kg (200 lb)   BMI 36 58 kg/m²     Constitutional: normal, well developed, well nourished, alert, in no distress and non-toxic and no overt pain behavior    Eyes: anicteric  HEENT: grossly intact  Neck: supple, symmetric, trachea midline and no masses   Pulmonary:even and unlabored  Cardiovascular:No edema or pitting edema present  Skin:Normal without rashes or lesions and well hydrated  Psychiatric:Mood and affect appropriate  Neurologic:Cranial Nerves II-XII grossly intact  Musculoskeletal:normal    Imaging

## 2021-04-12 NOTE — PROGRESS NOTES
PT Evaluation     Today's date: 2021  Patient name: Lindsay Loya  : 1950  MRN: [id number]  Referring provider: Deb Cerrato MD  Dx:   Encounter Diagnosis     ICD-10-CM    1  Postlaminectomy syndrome, lumbar region  M96 1    2  Degeneration of lumbar intervertebral disc  M51 36                   Assessment  Assessment details:   CURRENT FUNCTIONAL STATUS    Standing/ADL tolerance 60 minutes  Walking tolerance 4 blocks  SHORT TERM GOALS (2 WEEKS)    Lumbar spine AROM 10 % in all planes  Improve abdominal muscle control  Decrease pain to 2-6/10  Standing/ADL tolerance 90 minutes  Walking tolerance 5-6 blocks  LONG TERM GOALS (DISCHARGE)    Lumbar Spine AROM: F=90 %, EXT=50 %, R SB=75 %, L SB=75 %  Lower Extremity Strength: Grossly 5/5 t/o  Good control of abdominal muscles during abdominal bracing  Decrease pain to 1-4/10  Standing/ADL tolerance 2 hours  Walking tolerance 6-8 blocks  Understanding of Dx/Px/POC: good   Prognosis: good    Goals  See assessment details above  Plan  Plan details: Lindsay Loya is a 80 y o  female presenting to PT with pain, decreased range of motion, decreased strength, and decreased tolerance to activity  This patient would benefit from skilled PT services to address these issues and to maximize function  A home exercise program was provided and all questions were answered  Thank you for the referral     Patient would benefit from: skilled physical therapy  Planned therapy interventions: self care, therapeutic activities, therapeutic exercise, neuromuscular re-education and home exercise program  Frequency: 1x week  Duration in weeks: 4        Subjective Evaluation    History of Present Illness  Mechanism of injury: CC: Bilateral low back, buttock, and thigh pain, worse on the left side  Tingling in the back of both calves and bottoms of both feet  Denies having any weakness    HPI: The patient has had chronic back pain since having a herniated disc when she was in her twenties  She had a laminectomy and had good relief for years  She then developed chronic back pain for which she had a pain stimulator implanted in May 2016  She had the pain stimulator reimplanted on 2021  She also takes medication for relief  She works part time as a , and also cleans houses 5 hours per week  Pain  Current pain ratin  At best pain rating: 3  At worst pain ratin    Patient Goals  Patient goals for therapy: decreased pain and increased strength  Patient goal: Improved tolerance for standing and walking  Objective     Postural Observations    Additional Postural Observation Details  Gait and transfers normal, no lateral shift  General Comments:      Lumbar Comments  CURRENT OBJECTIVE MEASUREMENTS    Lumbar Spine AROM: F=90 %, EXT=25 % with low back pain, R SB=50 %, L SB=50 %  Lower Extremity Strength: Grossly 5/5 t/o  Fair control of abdominal muscles during abdominal bracing                      Precautions: None      Manuals                  Neuro Re-Ed         AB Supine 10x BID HEP        AB with Arm Raises Supine         AB with Leg Raises Supine         AB with Arm and Leg Raises Supine                  Ther Ex         Curl Ups Straight         Curl Ups Oblique         TB Row         TB SA Pulldown         Oblique Press         NuStep:   S , A                  Ther Activity                  Modalities

## 2021-04-13 ENCOUNTER — EVALUATION (OUTPATIENT)
Dept: PHYSICAL THERAPY | Facility: CLINIC | Age: 71
End: 2021-04-13
Payer: MEDICARE

## 2021-04-13 DIAGNOSIS — M96.1 POSTLAMINECTOMY SYNDROME, LUMBAR REGION: Primary | ICD-10-CM

## 2021-04-13 DIAGNOSIS — M51.36 DEGENERATION OF LUMBAR INTERVERTEBRAL DISC: ICD-10-CM

## 2021-04-13 PROCEDURE — 97110 THERAPEUTIC EXERCISES: CPT | Performed by: PHYSICAL THERAPIST

## 2021-04-13 PROCEDURE — 97162 PT EVAL MOD COMPLEX 30 MIN: CPT | Performed by: PHYSICAL THERAPIST

## 2021-04-13 NOTE — LETTER
2021    [facility] [id number]    Patient: Simon Zhao   YOB: 1950   Date of Visit: 2021     Encounter Diagnosis     ICD-10-CM    1  Postlaminectomy syndrome, lumbar region  M96 1    2  Degeneration of lumbar intervertebral disc  M51 36        Dear Dr Steph Talbert:    Thank you for your recent referral of Simon Zhao  Please review the attached evaluation summary from Ingrid's recent visit  Please verify that you agree with the plan of care by signing the attached order  If you have any questions or concerns, please do not hesitate to call  I sincerely appreciate the opportunity to share in the care of one of your patients and hope to have another opportunity to work with you in the near future  Sincerely,    Shiva Damian, PT      Referring Provider:      I certify that I have read the below Plan of Care and certify the need for these services furnished under this plan of treatment while under my care  MD Dominguez Buckley [place] [id number]  Via Fax: [phone]          PT Evaluation     Today's date: 2021  Patient name: Simon Zhao  : 1950  MRN: [id number]  Referring provider: Lupe Atkins MD  Dx:   Encounter Diagnosis     ICD-10-CM    1  Postlaminectomy syndrome, lumbar region  M96 1    2  Degeneration of lumbar intervertebral disc  M51 36                   Assessment  Assessment details:   CURRENT FUNCTIONAL STATUS    Standing/ADL tolerance 60 minutes  Walking tolerance 4 blocks  SHORT TERM GOALS (2 WEEKS)    Lumbar spine AROM 10 % in all planes  Improve abdominal muscle control  Decrease pain to 2-6/10  Standing/ADL tolerance 90 minutes  Walking tolerance 5-6 blocks  LONG TERM GOALS (DISCHARGE)    Lumbar Spine AROM: F=90 %, EXT=50 %, R SB=75 %, L SB=75 %  Lower Extremity Strength: Grossly 5/5 t/o    Good control of abdominal muscles during abdominal bracing  Decrease pain to 1-4/10  Standing/ADL tolerance 2 hours  Walking tolerance 6-8 blocks  Understanding of Dx/Px/POC: good   Prognosis: good    Goals  See assessment details above  Plan  Plan details: Mallory Kinney is a 80 y o  female presenting to PT with pain, decreased range of motion, decreased strength, and decreased tolerance to activity  This patient would benefit from skilled PT services to address these issues and to maximize function  A home exercise program was provided and all questions were answered  Thank you for the referral     Patient would benefit from: skilled physical therapy  Planned therapy interventions: self care, therapeutic activities, therapeutic exercise, neuromuscular re-education and home exercise program  Frequency: 1x week  Duration in weeks: 4        Subjective Evaluation    History of Present Illness  Mechanism of injury: CC: Bilateral low back, buttock, and thigh pain, worse on the left side  Tingling in the back of both calves and bottoms of both feet  Denies having any weakness  HPI: The patient has had chronic back pain since having a herniated disc when she was in her twenties  She had a laminectomy and had good relief for years  She then developed chronic back pain for which she had a pain stimulator implanted in May 2016  She had the pain stimulator reimplanted on 2021  She also takes medication for relief  She works part time as a , and also cleans houses 5 hours per week  Pain  Current pain ratin  At best pain rating: 3  At worst pain ratin    Patient Goals  Patient goals for therapy: decreased pain and increased strength  Patient goal: Improved tolerance for standing and walking  Objective     Postural Observations    Additional Postural Observation Details  Gait and transfers normal, no lateral shift      General Comments:      Lumbar Comments  CURRENT OBJECTIVE MEASUREMENTS    Lumbar Spine AROM: F=90 %, EXT=25 % with low back pain, R SB=50 %, L SB=50 %  Lower Extremity Strength: Grossly 5/5 t/o  Fair control of abdominal muscles during abdominal bracing                      Precautions: None      Manuals 4/13                 Neuro Re-Ed         AB Supine 10x BID HEP        AB with Arm Raises Supine         AB with Leg Raises Supine         AB with Arm and Leg Raises Supine                  Ther Ex         Curl Ups Straight         Curl Ups Oblique         TB Row         TB SA Pulldown         Oblique Press         NuStep:   S , A                  Ther Activity                  Modalities

## 2021-04-14 ENCOUNTER — TRANSCRIBE ORDERS (OUTPATIENT)
Dept: PHYSICAL THERAPY | Facility: CLINIC | Age: 71
End: 2021-04-14

## 2021-04-14 DIAGNOSIS — M96.1 POSTLAMINECTOMY SYNDROME, LUMBAR REGION: Primary | ICD-10-CM

## 2021-04-20 ENCOUNTER — OFFICE VISIT (OUTPATIENT)
Dept: PHYSICAL THERAPY | Facility: CLINIC | Age: 71
End: 2021-04-20
Payer: MEDICARE

## 2021-04-20 DIAGNOSIS — M96.1 POSTLAMINECTOMY SYNDROME, LUMBAR REGION: Primary | ICD-10-CM

## 2021-04-20 DIAGNOSIS — M51.36 DEGENERATION OF LUMBAR INTERVERTEBRAL DISC: ICD-10-CM

## 2021-04-20 PROCEDURE — 97112 NEUROMUSCULAR REEDUCATION: CPT | Performed by: PHYSICAL THERAPIST

## 2021-04-20 NOTE — PROGRESS NOTES
Daily Note     Today's date: 2021  Patient name: Malena Villaseñor  : 1950  MRN: [id number]  Referring provider: John Shah MD  Dx:   Encounter Diagnosis     ICD-10-CM    1  Postlaminectomy syndrome, lumbar region  M96 1    2  Degeneration of lumbar intervertebral disc  M51 36                   Subjective: Patient notes some abdominal muscle soreness from doing her HEP  Objective: Patient has good control of abdominal bracing exercise  Assessment: Tolerated treatment well  Patient exhibited good technique with therapeutic exercises      Plan: Progress treatment as tolerated            Precautions: None      Manuals                 Neuro Re-Ed         AB Supine 10x BID HEP 10x       AB with Leg Raises Supine  10x BID HEP       AB with SLR         Double leg bridging  10x BID HEP       Bridging with ball squeeze  10x BID HEP       Bridging with TB ABD  L3 10x BID HEP       Bridging with marching  10x BID HEP                Ther Ex         TB Row         TB SA Pulldown         TB Oblique Press                  Ther Activity                  Modalities

## 2021-04-21 ENCOUNTER — APPOINTMENT (OUTPATIENT)
Dept: PHYSICAL THERAPY | Facility: CLINIC | Age: 71
End: 2021-04-21
Payer: MEDICARE

## 2021-04-27 ENCOUNTER — OFFICE VISIT (OUTPATIENT)
Dept: PHYSICAL THERAPY | Facility: CLINIC | Age: 71
End: 2021-04-27
Payer: MEDICARE

## 2021-04-27 DIAGNOSIS — M51.36 DEGENERATION OF LUMBAR INTERVERTEBRAL DISC: ICD-10-CM

## 2021-04-27 DIAGNOSIS — M96.1 POSTLAMINECTOMY SYNDROME, LUMBAR REGION: Primary | ICD-10-CM

## 2021-04-27 PROCEDURE — 97112 NEUROMUSCULAR REEDUCATION: CPT | Performed by: PHYSICAL THERAPIST

## 2021-04-27 PROCEDURE — 97110 THERAPEUTIC EXERCISES: CPT | Performed by: PHYSICAL THERAPIST

## 2021-04-27 NOTE — PROGRESS NOTES
Daily Note     Today's date: 2021  Patient name: Dimas Clemente  : 1950  MRN: [id number]  Referring provider: Princess Jerome MD  Dx:   Encounter Diagnosis     ICD-10-CM    1  Postlaminectomy syndrome, lumbar region  M96 1    2  Degeneration of lumbar intervertebral disc  M51 36                   Subjective: Patient had abdominal and back muscle soreness for a few days after the last session  She started riding her stationary bike at home  Currently she is not having any pain  Objective: Lumbar spine AROM is painfree in all planes  Assessment: Patient noted some back muscle soreness after progressing to standing stabilization exercises  Plan: Progress note during next visit           Precautions: None      Manuals                Neuro Re-Ed         AB Supine 10x BID HEP 10x 10x      AB with Single Leg Raises Supine  10x BID HEP 10x      AB with Double Leg Raises and ball squeeze Raises Supine   10x BID HEP      AB with SLR   10x BID HEP      Double leg bridging  10x BID HEP 10x      Bridging with ball squeeze  10x BID HEP 10x      Bridging with TB ABD  L3 10x BID HEP L3 10x      Bridging with marching  10x BID HEP 10x               Ther Ex         TB Row   L4 10x BID HEP      TB SA Pulldown   L4 10x BID HEP      TB Oblique Press   L4 10x BID HEP               Ther Activity                  Modalities

## 2021-05-02 NOTE — PROGRESS NOTES
PT Discharge    Today's date: 2021  Patient name: Quang Blancas  : 1950  MRN: [id number]  Referring provider: Isaak Keller MD  Dx:   Encounter Diagnosis     ICD-10-CM    1  Postlaminectomy syndrome, lumbar region  M96 1    2  Degeneration of lumbar intervertebral disc  M51 36                   Assessment  Assessment details:   CURRENT FUNCTIONAL STATUS    Standing/ADL tolerance 90 minutes  Walking tolerance 4 blocks  LONG TERM GOALS (DISCHARGE)    Lumbar Spine AROM: F=90 %, EXT=50 %, R SB=75 %, L SB=75 % -partially met  Lower Extremity Strength: Grossly 5/5 t/o -partially met  Good control of abdominal muscles during abdominal bracing -partially met  Decrease pain to 1-10 -partially met  Standing/ADL tolerance 2 hours  -partially met    Walking tolerance 6-8 blocks  -partially met  Understanding of Dx/Px/POC: good   Prognosis: good    Goals  See assessment details above  Plan  Plan details: The patient has shown improvement in PT demonstrating decreased pain, increased range of motion, increased strength, and increased tolerance to activity  She is doing well with work and daily activities and has been discharged to a home exercise program       Patient would benefit from: skilled physical therapy  Planned therapy interventions: self care, therapeutic activities, therapeutic exercise, neuromuscular re-education and home exercise program        Subjective Evaluation    History of Present Illness  Mechanism of injury: Subjective: The patient's back pain has decreased in intensity, but it remains constant  She can perform standing activities for longer periods of time, but she has not attempted walking longer distances  She is doing well with her HEP    Pain  Current pain rating: 3  At best pain rating: 3  At worst pain ratin    Patient Goals  Patient goals for therapy: decreased pain and increased strength  Patient goal: Improved tolerance for standing and walking  Objective     Postural Observations    Additional Postural Observation Details  Gait and transfers normal, no lateral shift  General Comments:      Lumbar Comments  CURRENT OBJECTIVE MEASUREMENTS    Lumbar Spine AROM: F=90 %, EXT=50 % with minimal low back pain, R SB=75 %, L SB=75 %  Lower Extremity Strength: Grossly 5/5 t/o  Good control of abdominal muscles during abdominal bracing                      Precautions: None        Manuals 4/13 4/20 4/27 5/5                       Neuro Re-Ed               AB Supine 10x BID HEP 10x 10x  10x TIW HEP       AB with Single Leg Raises Supine   10x BID HEP 10x  10x TIW HEP       AB with Double Leg Raises and ball squeeze Raises Supine     10x BID HEP  10x TIW HEP       AB with SLR     10x BID HEP  10x TIW HEP       Double leg bridging   10x BID HEP 10x  10x TIW HEP       Bridging with ball squeeze   10x BID HEP 10x  10x TIW HEP       Bridging with TB ABD   L3 10x BID HEP L3 10x  L310x TIW HEP       Bridging with marching   10x BID HEP 10x  10x TIW HEP                       Ther Ex               TB Row     L4 10x BID HEP  L410x TIW HEP       TB SA Pulldown     L4 10x BID HEP  L410x TIW HEP       TB Oblique Press     L4 10x BID HEP  L410x TIW HEP                       Ther Activity                               Modalities

## 2021-05-04 DIAGNOSIS — R05.9 COUGH: ICD-10-CM

## 2021-05-04 RX ORDER — MONTELUKAST SODIUM 10 MG/1
10 TABLET ORAL
Qty: 90 TABLET | Refills: 3 | Status: SHIPPED | OUTPATIENT
Start: 2021-05-04 | End: 2021-10-11 | Stop reason: SDUPTHER

## 2021-05-05 ENCOUNTER — EVALUATION (OUTPATIENT)
Dept: PHYSICAL THERAPY | Facility: CLINIC | Age: 71
End: 2021-05-05
Payer: MEDICARE

## 2021-05-05 ENCOUNTER — TRANSCRIBE ORDERS (OUTPATIENT)
Dept: PHYSICAL THERAPY | Facility: CLINIC | Age: 71
End: 2021-05-05

## 2021-05-05 DIAGNOSIS — M51.36 DEGENERATION OF LUMBAR INTERVERTEBRAL DISC: ICD-10-CM

## 2021-05-05 DIAGNOSIS — M96.1 POSTLAMINECTOMY SYNDROME, LUMBAR REGION: Primary | ICD-10-CM

## 2021-05-05 PROCEDURE — 97535 SELF CARE MNGMENT TRAINING: CPT | Performed by: PHYSICAL THERAPIST

## 2021-05-05 NOTE — LETTER
May 5, 2021    Yahir Wild MD   [address] [address]    Patient: Marylen Mini   YOB: 1950   Date of Visit: 2021     Encounter Diagnosis     ICD-10-CM    1  Postlaminectomy syndrome, lumbar region  M96 1    2  Degeneration of lumbar intervertebral disc  M51 36        Dear Dr Marilee Rosarel:    Thank you for your recent referral of Marylen Mini  Please review the attached discharge summary from Ingrid's recent visit  Please verify that you agree with the plan of care by signing the attached order  If you have any questions or concerns, please do not hesitate to call  I sincerely appreciate the opportunity to share in the care of one of your patients and hope to have another opportunity to work with you in the near future  Sincerely,    Víctor Álvarez, PT      Referring Provider:      I certify that I have read the below Plan of Care and certify the need for these services furnished under this plan of treatment while under my care  Yahir Wild MD   [address] [address]  Via Fax: [phone]          PT Discharge    Today's date: 2021  Patient name: Marylen Mini  : 1950  MRN: [id number]  Referring provider: Daniella Waggoner MD  Dx:   Encounter Diagnosis     ICD-10-CM    1  Postlaminectomy syndrome, lumbar region  M96 1    2  Degeneration of lumbar intervertebral disc  M51 36                   Assessment  Assessment details:   CURRENT FUNCTIONAL STATUS    Standing/ADL tolerance 90 minutes  Walking tolerance 4 blocks  LONG TERM GOALS (DISCHARGE)    Lumbar Spine AROM: F=90 %, EXT=50 %, R SB=75 %, L SB=75 % -partially met  Lower Extremity Strength: Grossly 5/5 t/o -partially met  Good control of abdominal muscles during abdominal bracing -partially met  Decrease pain to 1-4/10 -partially met  Standing/ADL tolerance 2 hours  -partially met    Walking tolerance 6-8 blocks  -partially met  Understanding of Dx/Px/POC: good   Prognosis: good    Goals  See assessment details above  Plan  Plan details: The patient has shown improvement in PT demonstrating decreased pain, increased range of motion, increased strength, and increased tolerance to activity  She is doing well with work and daily activities and has been discharged to a home exercise program       Patient would benefit from: skilled physical therapy  Planned therapy interventions: self care, therapeutic activities, therapeutic exercise, neuromuscular re-education and home exercise program        Subjective Evaluation    History of Present Illness  Mechanism of injury: Subjective: The patient's back pain has decreased in intensity, but it remains constant  She can perform standing activities for longer periods of time, but she has not attempted walking longer distances  She is doing well with her HEP  Pain  Current pain rating: 3  At best pain rating: 3  At worst pain ratin    Patient Goals  Patient goals for therapy: decreased pain and increased strength  Patient goal: Improved tolerance for standing and walking  Objective     Postural Observations    Additional Postural Observation Details  Gait and transfers normal, no lateral shift  General Comments:      Lumbar Comments  CURRENT OBJECTIVE MEASUREMENTS    Lumbar Spine AROM: F=90 %, EXT=50 % with minimal low back pain, R SB=75 %, L SB=75 %  Lower Extremity Strength: Grossly 5/5 t/o  Good control of abdominal muscles during abdominal bracing                      Precautions: None        Manuals   5                       Neuro Re-Ed               AB Supine 10x BID HEP 10x 10x  10x TIW HEP       AB with Single Leg Raises Supine   10x BID HEP 10x  10x TIW HEP       AB with Double Leg Raises and ball squeeze Raises Supine     10x BID HEP  10x TIW HEP       AB with SLR     10x BID HEP  10x TIW HEP       Double leg bridging   10x BID HEP 10x  10x TIW HEP     Bridging with ball squeeze   10x BID HEP 10x  10x TIW HEP       Bridging with TB ABD   L3 10x BID HEP L3 10x  L310x TIW HEP       Bridging with marching   10x BID HEP 10x  10x TIW HEP                       Ther Ex               TB Row     L4 10x BID HEP  L410x TIW HEP       TB SA Pulldown     L4 10x BID HEP  L410x TIW HEP       TB Oblique Press     L4 10x BID HEP  L410x TIW HEP                       Ther Activity                               Modalities

## 2021-05-11 ENCOUNTER — TELEPHONE (OUTPATIENT)
Dept: NEUROLOGY | Facility: CLINIC | Age: 71
End: 2021-05-11

## 2021-05-11 NOTE — TELEPHONE ENCOUNTER
Patient called for refill of Amitriptyline 50mg  This was prescribed by patient's PCP  Advised patient to call PCP for refill  Patient verbalized understanding

## 2021-05-12 ENCOUNTER — OFFICE VISIT (OUTPATIENT)
Dept: PAIN MEDICINE | Facility: MEDICAL CENTER | Age: 71
End: 2021-05-12
Payer: MEDICARE

## 2021-05-12 VITALS
SYSTOLIC BLOOD PRESSURE: 137 MMHG | TEMPERATURE: 97.9 F | HEIGHT: 62 IN | HEART RATE: 89 BPM | DIASTOLIC BLOOD PRESSURE: 78 MMHG | WEIGHT: 203 LBS | BODY MASS INDEX: 37.36 KG/M2

## 2021-05-12 DIAGNOSIS — F41.9 ANXIETY: ICD-10-CM

## 2021-05-12 DIAGNOSIS — M17.12 PRIMARY OSTEOARTHRITIS OF LEFT KNEE: ICD-10-CM

## 2021-05-12 DIAGNOSIS — K58.9 IRRITABLE BOWEL SYNDROME WITHOUT DIARRHEA: ICD-10-CM

## 2021-05-12 DIAGNOSIS — G89.4 CHRONIC PAIN SYNDROME: ICD-10-CM

## 2021-05-12 DIAGNOSIS — G60.9 IDIOPATHIC PERIPHERAL NEUROPATHY: ICD-10-CM

## 2021-05-12 DIAGNOSIS — M54.16 LUMBAR RADICULOPATHY: Primary | ICD-10-CM

## 2021-05-12 DIAGNOSIS — Z98.890 POSTOPERATIVE STATE: ICD-10-CM

## 2021-05-12 DIAGNOSIS — M62.830 BACK MUSCLE SPASM: ICD-10-CM

## 2021-05-12 PROCEDURE — 99213 OFFICE O/P EST LOW 20 MIN: CPT | Performed by: NURSE PRACTITIONER

## 2021-05-12 RX ORDER — AMITRIPTYLINE HYDROCHLORIDE 50 MG/1
50 TABLET, FILM COATED ORAL
Qty: 90 TABLET | Refills: 3 | Status: SHIPPED | OUTPATIENT
Start: 2021-05-12 | End: 2022-06-06 | Stop reason: SDUPTHER

## 2021-05-12 RX ORDER — METHOCARBAMOL 750 MG/1
750 TABLET, FILM COATED ORAL EVERY 8 HOURS SCHEDULED
Qty: 90 TABLET | Refills: 2 | Status: SHIPPED | OUTPATIENT
Start: 2021-05-12 | End: 2021-09-08 | Stop reason: SDUPTHER

## 2021-05-12 RX ORDER — DICYCLOMINE HYDROCHLORIDE 10 MG/1
10 CAPSULE ORAL 2 TIMES DAILY
Qty: 180 CAPSULE | Refills: 0 | Status: SHIPPED | OUTPATIENT
Start: 2021-05-12 | End: 2021-09-07 | Stop reason: SDUPTHER

## 2021-05-12 RX ORDER — GABAPENTIN 300 MG/1
300 CAPSULE ORAL 3 TIMES DAILY
Qty: 270 CAPSULE | Refills: 0 | Status: SHIPPED | OUTPATIENT
Start: 2021-05-12 | End: 2021-06-07 | Stop reason: HOSPADM

## 2021-05-12 NOTE — PROGRESS NOTES
Assessment  1  Lumbar radiculopathy    2  Idiopathic peripheral neuropathy    3  Postoperative state    4  Back muscle spasm    5  Chronic pain syndrome    6  Primary osteoarthritis of left knee        Plan   continue gabapentin, methocarbamol and Voltaren gel these were all refilled today  patient will have a reprogramming today with the [employer] representative  follow-up in 12 weeks for medication refills        My impressions and treatment recommendations were discussed in detail with the patient who verbalized understanding and had no further questions  Discharge instructions were provided  I personally saw and examined the patient and I agree with the above discussed plan of care  No orders of the defined types were placed in this encounter  New Medications Ordered This Visit   Medications    gabapentin (NEURONTIN) 300 mg capsule     Sig: Take 1 capsule (300 mg total) by mouth 3 (three) times a day     Dispense:  270 capsule     Refill:  0    methocarbamol (ROBAXIN) 750 mg tablet     Sig: Take 1 tablet (750 mg total) by mouth every 8 (eight) hours     Dispense:  90 tablet     Refill:  2    Diclofenac Sodium (VOLTAREN) 1 %     Sig: Apply 2 g topically 4 (four) times a day     Dispense:  150 g     Refill:  1       History of Present Illness    Betty King is a 80 y o  female  Who presents for follow-up related to chronic low back and leg pain  Today she reports she is doing better rates her pain 4/10  She has intermittent pain at night described as burning, dull, aching, shooting, and pins and needles  Patient continues with gabapentin 300 mg 1 tablet 3 times daily, methocarbamol 750 mg 3 times daily and she does use Voltaren gel as needed  She does feel the medication is giving her good relief without side effects or issues        Patient does have an [employer] spinal cord stimulator and she feels that it is giving her at least 50% relief but she still is looking for some better coverage in her legs  I have personally reviewed and/or updated the patient's past medical history, past surgical history, family history, social history, current medications, allergies, and vital signs today  Review of Systems   Respiratory: Negative for shortness of breath  Cardiovascular: Negative for chest pain  Gastrointestinal: Negative for constipation, diarrhea, nausea and vomiting  Musculoskeletal: Positive for back pain, gait problem and joint swelling  Negative for arthralgias and myalgias  Skin: Negative for rash  Neurological: Negative for dizziness, seizures and weakness  All other systems reviewed and are negative        Patient Active Problem List   Diagnosis    Hypertension    Hyperlipidemia    Gastroesophageal reflux disease    Chronic low back pain    Degenerative joint disease of right lower extremity    Irritable bowel syndrome    Lumbar radiculopathy    Neuralgia    Peripheral neuropathy    Chronic thoracic back pain    Interstitial cystitis (chronic) with hematuria    Medicare annual wellness visit, subsequent    S/P right knee surgery    Osteopenia    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)    Abnormal CT scan, kidney    Chronic pain syndrome    Osteoarthritis       Past Medical History:   Diagnosis Date    Abnormal findings on diagnostic imaging of breast     Arthritis     Chronic pain disorder     Extremity pain     Fibrocystic breast disease     Foot pain     GERD (gastroesophageal reflux disease)     Hyperlipidemia     Hypertension     Interstitial cystitis     Joint pain     Low back pain     Osteoarthritis     Osteopenia        Past Surgical History:   Procedure Laterality Date    APPENDECTOMY      BACK SURGERY      BREAST EXCISIONAL BIOPSY Left 1996    benign    CARPAL BOSS EXCISION      CHOLECYSTECTOMY      DIAGNOSTIC LAPAROSCOPY      FOOT SURGERY      FRACTURE SURGERY      HYSTERECTOMY      age 33    HYSTEROSCOPY      JOINT REPLACEMENT      KIDNEY SURGERY Left     KNEE ARTHROSCOPY Bilateral     LAMINECTOMY      LAPAROSCOPY      hynecologic with adhesions    OOPHORECTOMY Bilateral     age 33    ORTHOPEDIC SURGERY      CA SURG IMPLNT NEUROELECT,EPIDURAL N/A 5/18/2017    Procedure: PLACEMENT OF THORACIC SPINAL CORD STIMULATOR WITH LEFT BUTTOCK IMPLANTABLE PULSE GENERATOR (IMPULSE MONITORING-MOTORS (TCEMEP), EMG, SSEP); Surgeon: Jennifer Storm MD;  Location: [facility];  Service: Neurosurgery    SPINAL CORD STIMULATOR IMPLANT Left 9/29/2020    Procedure: LAMINECTOMY THORACIC , REMOVAL OF SCS LEADS AND GENERATOR;  Surgeon: Hakn Medina MD;  Location: [facility];  Service: Neurosurgery    SPINAL STIMULATOR PLACEMENT  05/2017    TONSILLECTOMY AND ADENOIDECTOMY      onset: unknown    TOTAL KNEE ARTHROPLASTY Right        Family History   Problem Relation Age of Onset    Bone cancer Mother     Cancer Mother     Brain cancer Father     Stroke Father         stroke sydrome    Diabetes Paternal Grandmother     Breast cancer Paternal Grandmother 61    Breast cancer Maternal Aunt 79    Breast cancer additional onset Maternal Aunt 67    Depression Sister     Migraines Sister     No Known Problems Maternal Grandmother     No Known Problems Maternal Grandfather     No Known Problems Paternal Grandfather     Asthma Sister     Heart disease Brother        Social History     Occupational History    Occupation:    Tobacco Use    Smoking status: Never Smoker    Smokeless tobacco: Never Used   Substance and Sexual Activity    Alcohol use:  Yes     Alcohol/week: 1 0 standard drinks     Types: 1 Glasses of wine per week    Drug use: No    Sexual activity: Not Currently     Partners: Male     Birth control/protection: None       Current Outpatient Medications on File Prior to Visit   Medication Sig    Biotin 2500 MCG CAPS Take 1 capsule by mouth 2 (two) times a day  calcium carbonate (OS-ANTHONY) 600 MG tablet Take 1,200 mg by mouth 2 (two) times a day with meals    Cholecalciferol (VITAMIN D-3 PO) Take 1 tablet by mouth daily    clobetasol (TEMOVATE) 0 05 % cream Apply topically 2 (two) times a day    Cyanocobalamin (VITAMIN B-12 CR PO) Take 1 tablet by mouth daily    EPINEPHrine (EpiPen 2-Michel) 0 3 mg/0 3 mL SOAJ Inject 0 3 mL (0 3 mg total) into a muscle as needed for anaphylaxis    fexofenadine (ALLEGRA) 180 MG tablet Take 180 mg by mouth daily    hydrOXYzine HCL (ATARAX) 10 mg tablet Take 2 tablets at bedtime (Patient taking differently: daily at bedtime as needed Take 2 tablets at bedtime)    losartan (COZAAR) 100 MG tablet Take 1 tablet (100 mg total) by mouth daily    montelukast (SINGULAIR) 10 mg tablet Take 1 tablet (10 mg total) by mouth daily at bedtime    Omega-3 Fatty Acids (FISH OIL) 1,000 mg Take 1,000 mg by mouth 3 (three) times a day    omeprazole (PriLOSEC) 40 MG capsule Take 1 capsule (40 mg total) by mouth daily    rosuvastatin (CRESTOR) 10 MG tablet Take 1 tablet (10 mg total) by mouth daily (Patient taking differently: Take 10 mg by mouth every other day )    [DISCONTINUED] diclofenac sodium (VOLTAREN) 1 % APPLY TO THE AFFECTED AREA(S) TWICE DAILY AS DIRECTED  (Patient taking differently: as needed )    [DISCONTINUED] gabapentin (NEURONTIN) 300 mg capsule Take 1 capsule (300 mg total) by mouth 3 (three) times a day    [DISCONTINUED] methocarbamol (ROBAXIN) 750 mg tablet Take 1 tablet (750 mg total) by mouth every 6 (six) hours as needed for muscle spasms    [DISCONTINUED] amitriptyline (ELAVIL) 50 mg tablet Take 1 tablet (50 mg total) by mouth daily at bedtime    [DISCONTINUED] dicyclomine (BENTYL) 10 mg capsule Take 1 capsule (10 mg total) by mouth 2 (two) times a day    [DISCONTINUED] methocarbamol (ROBAXIN) 500 mg tablet     [DISCONTINUED] naloxone (NARCAN) 4 mg/0 1 mL nasal spray Administer 1 spray into a nostril   If no response after 2-3 minutes, give another dose in the other nostril using a new spray   [DISCONTINUED] oxyCODONE-acetaminophen (PERCOCET) 5-325 mg per tablet      No current facility-administered medications on file prior to visit  Allergies   Allergen Reactions    Bee Venom Shortness Of Breath    Chlorhexidine Rash    Egg Yolk - Food Allergy Hives    Iodinated Diagnostic Agents Hives    Penicillins Hives    Bactrim [Sulfamethoxazole-Trimethoprim] Rash    Codeine Other (See Comments)     headaches    Latex Rash    Medical Tape Blisters, Hives, Itching and Rash    Other Rash     Adhesive tape    Oxybutynin Rash    Pentosan Polysulfate Rash    Povidone Iodine Rash       Physical Exam    /78   Pulse 89   Temp 97 9 °F (36 6 °C)   Ht 5' 2" (1 575 m)   Wt 92 1 kg (203 lb)   BMI 37 13 kg/m²     Constitutional: normal, well developed, well nourished, alert, in no distress and non-toxic and no overt pain behavior    Eyes: anicteric  HEENT: grossly intact  Neck: supple, symmetric, trachea midline and no masses   Pulmonary:even and unlabored  Cardiovascular:No edema or pitting edema present  Skin:Normal without rashes or lesions and well hydrated  Psychiatric:Mood and affect appropriate  Neurologic:Cranial Nerves II-XII grossly intact  Musculoskeletal:normal    Imaging

## 2021-05-19 ENCOUNTER — APPOINTMENT (OUTPATIENT)
Dept: RADIOLOGY | Facility: MEDICAL CENTER | Age: 71
End: 2021-05-19
Payer: MEDICARE

## 2021-05-19 DIAGNOSIS — M96.1 POSTLAMINECTOMY SYNDROME, THORACIC REGION: ICD-10-CM

## 2021-05-19 DIAGNOSIS — M51.36 DEGENERATION OF LUMBAR INTERVERTEBRAL DISC: ICD-10-CM

## 2021-05-19 PROCEDURE — 72070 X-RAY EXAM THORAC SPINE 2VWS: CPT

## 2021-05-20 ENCOUNTER — TELEPHONE (OUTPATIENT)
Dept: UROLOGY | Facility: CLINIC | Age: 71
End: 2021-05-20

## 2021-06-04 ENCOUNTER — HOSPITAL ENCOUNTER (INPATIENT)
Facility: HOSPITAL | Age: 71
LOS: 3 days | Discharge: HOME | DRG: 563 | End: 2021-06-07
Attending: EMERGENCY MEDICINE | Admitting: INTERNAL MEDICINE
Payer: MEDICARE

## 2021-06-04 ENCOUNTER — APPOINTMENT (EMERGENCY)
Dept: RADIOLOGY | Facility: HOSPITAL | Age: 71
DRG: 563 | End: 2021-06-04
Payer: MEDICARE

## 2021-06-04 ENCOUNTER — APPOINTMENT (EMERGENCY)
Dept: CT IMAGING | Facility: HOSPITAL | Age: 71
DRG: 563 | End: 2021-06-04
Payer: MEDICARE

## 2021-06-04 DIAGNOSIS — S82.832A CLOSED FRACTURE OF PROXIMAL END OF LEFT FIBULA, UNSPECIFIED FRACTURE MORPHOLOGY, INITIAL ENCOUNTER: ICD-10-CM

## 2021-06-04 DIAGNOSIS — S82.402A LEFT FIBULAR FRACTURE: ICD-10-CM
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
## 2021-06-04 DIAGNOSIS — M54.41 CHRONIC RIGHT-SIDED LOW BACK PAIN WITH RIGHT-SIDED SCIATICA: ICD-10-CM

## 2021-06-04 DIAGNOSIS — G57.32 DISORDER OF LEFT SUPERFICIAL PERONEAL NERVE: ICD-10-CM

## 2021-06-04 DIAGNOSIS — G89.29 CHRONIC RIGHT-SIDED LOW BACK PAIN WITH RIGHT-SIDED SCIATICA: ICD-10-CM

## 2021-06-04 PROBLEM — R74.01 TRANSAMINITIS: Status: ACTIVE | Noted: 2021-06-04

## 2021-06-04 LAB
ALBUMIN SERPL BCP-MCNC: 3.9 G/DL (ref 3.5–5)
ALP SERPL-CCNC: 92 U/L (ref 46–116)
ALT SERPL W P-5'-P-CCNC: 68 U/L (ref 12–78)
ANION GAP SERPL CALCULATED.3IONS-SCNC: 7 MMOL/L (ref 4–13)
APTT PPP: 26 SECONDS (ref 23–37)
AST SERPL W P-5'-P-CCNC: 58 U/L (ref 5–45)
BASOPHILS # BLD AUTO: 0.07 THOUSANDS/ΜL (ref 0–0.1)
BASOPHILS NFR BLD AUTO: 1 % (ref 0–1)
BILIRUB SERPL-MCNC: 0.48 MG/DL (ref 0.2–1)
BUN SERPL-MCNC: 14 MG/DL (ref 5–25)
CALCIUM SERPL-MCNC: 9 MG/DL (ref 8.3–10.1)
CHLORIDE SERPL-SCNC: 106 MMOL/L (ref 100–108)
CO2 SERPL-SCNC: 27 MMOL/L (ref 21–32)
CREAT SERPL-MCNC: 0.7 MG/DL (ref 0.6–1.3)
EOSINOPHIL # BLD AUTO: 0.38 THOUSAND/ΜL (ref 0–0.61)
EOSINOPHIL NFR BLD AUTO: 5 % (ref 0–6)
ERYTHROCYTE [DISTWIDTH] IN BLOOD BY AUTOMATED COUNT: 12.4 % (ref 11.6–15.1)
GFR SERPL CREATININE-BSD FRML MDRD: 88 ML/MIN/1.73SQ M
GLUCOSE SERPL-MCNC: 122 MG/DL (ref 65–140)
HCT VFR BLD AUTO: 41.9 % (ref 34.8–46.1)
HGB BLD-MCNC: 14 G/DL (ref 11.5–15.4)
IMM GRANULOCYTES # BLD AUTO: 0.02 THOUSAND/UL (ref 0–0.2)
IMM GRANULOCYTES NFR BLD AUTO: 0 % (ref 0–2)
INR PPP: 0.97 (ref 0.84–1.19)
LYMPHOCYTES # BLD AUTO: 3.04 THOUSANDS/ΜL (ref 0.6–4.47)
LYMPHOCYTES NFR BLD AUTO: 37 % (ref 14–44)
MCH RBC QN AUTO: 29.7 PG (ref 26.8–34.3)
MCHC RBC AUTO-ENTMCNC: 33.4 G/DL (ref 31.4–37.4)
MCV RBC AUTO: 89 FL (ref 82–98)
MONOCYTES # BLD AUTO: 0.58 THOUSAND/ΜL (ref 0.17–1.22)
MONOCYTES NFR BLD AUTO: 7 % (ref 4–12)
NEUTROPHILS # BLD AUTO: 4.15 THOUSANDS/ΜL (ref 1.85–7.62)
NEUTS SEG NFR BLD AUTO: 50 % (ref 43–75)
NRBC BLD AUTO-RTO: 0 /100 WBCS
PLATELET # BLD AUTO: 240 THOUSANDS/UL (ref 149–390)
PMV BLD AUTO: 9.3 FL (ref 8.9–12.7)
POTASSIUM SERPL-SCNC: 4.1 MMOL/L (ref 3.5–5.3)
PROT SERPL-MCNC: 7.2 G/DL (ref 6.4–8.2)
PROTHROMBIN TIME: 12.7 SECONDS (ref 11.6–14.5)
RBC # BLD AUTO: 4.72 MILLION/UL (ref 3.81–5.12)
SARS-COV-2 RNA RESP QL NAA+PROBE: NEGATIVE
SODIUM SERPL-SCNC: 140 MMOL/L (ref 136–145)
WBC # BLD AUTO: 8.24 THOUSAND/UL (ref 4.31–10.16)

## 2021-06-04 PROCEDURE — 99285 EMERGENCY DEPT VISIT HI MDM: CPT

## 2021-06-04 PROCEDURE — 99222 1ST HOSP IP/OBS MODERATE 55: CPT | Performed by: ORTHOPAEDIC SURGERY

## 2021-06-04 PROCEDURE — 93005 ELECTROCARDIOGRAM TRACING: CPT

## 2021-06-04 PROCEDURE — G1004 CDSM NDSC: HCPCS

## 2021-06-04 PROCEDURE — 99223 1ST HOSP IP/OBS HIGH 75: CPT | Performed by: INTERNAL MEDICINE

## 2021-06-04 PROCEDURE — 73564 X-RAY EXAM KNEE 4 OR MORE: CPT

## 2021-06-04 PROCEDURE — 85025 COMPLETE CBC W/AUTO DIFF WBC: CPT | Performed by: EMERGENCY MEDICINE

## 2021-06-04 PROCEDURE — U0005 INFEC AGEN DETEC AMPLI PROBE: HCPCS | Performed by: INTERNAL MEDICINE

## 2021-06-04 PROCEDURE — 36415 COLL VENOUS BLD VENIPUNCTURE: CPT | Performed by: EMERGENCY MEDICINE

## 2021-06-04 PROCEDURE — 96375 TX/PRO/DX INJ NEW DRUG ADDON: CPT

## 2021-06-04 PROCEDURE — 96374 THER/PROPH/DIAG INJ IV PUSH: CPT

## 2021-06-04 PROCEDURE — 96376 TX/PRO/DX INJ SAME DRUG ADON: CPT

## 2021-06-04 PROCEDURE — 1124F ACP DISCUSS-NO DSCNMKR DOCD: CPT | Performed by: EMERGENCY MEDICINE

## 2021-06-04 PROCEDURE — 2W3MXYZ IMMOBILIZATION OF LEFT LOWER EXTREMITY USING OTHER DEVICE: ICD-10-PCS | Performed by: EMERGENCY MEDICINE

## 2021-06-04 PROCEDURE — 29515 APPLICATION SHORT LEG SPLINT: CPT | Performed by: EMERGENCY MEDICINE

## 2021-06-04 PROCEDURE — 71045 X-RAY EXAM CHEST 1 VIEW: CPT

## 2021-06-04 PROCEDURE — 85610 PROTHROMBIN TIME: CPT | Performed by: EMERGENCY MEDICINE

## 2021-06-04 PROCEDURE — 73590 X-RAY EXAM OF LOWER LEG: CPT

## 2021-06-04 PROCEDURE — 85730 THROMBOPLASTIN TIME PARTIAL: CPT | Performed by: EMERGENCY MEDICINE

## 2021-06-04 PROCEDURE — 73610 X-RAY EXAM OF ANKLE: CPT

## 2021-06-04 PROCEDURE — 70450 CT HEAD/BRAIN W/O DYE: CPT

## 2021-06-04 PROCEDURE — 72125 CT NECK SPINE W/O DYE: CPT

## 2021-06-04 PROCEDURE — 74176 CT ABD & PELVIS W/O CONTRAST: CPT

## 2021-06-04 PROCEDURE — 73630 X-RAY EXAM OF FOOT: CPT

## 2021-06-04 PROCEDURE — 80053 COMPREHEN METABOLIC PANEL: CPT | Performed by: EMERGENCY MEDICINE

## 2021-06-04 PROCEDURE — U0003 INFECTIOUS AGENT DETECTION BY NUCLEIC ACID (DNA OR RNA); SEVERE ACUTE RESPIRATORY SYNDROME CORONAVIRUS 2 (SARS-COV-2) (CORONAVIRUS DISEASE [COVID-19]), AMPLIFIED PROBE TECHNIQUE, MAKING USE OF HIGH THROUGHPUT TECHNOLOGIES AS DESCRIBED BY CMS-2020-01-R: HCPCS | Performed by: INTERNAL MEDICINE

## 2021-06-04 PROCEDURE — 99285 EMERGENCY DEPT VISIT HI MDM: CPT | Performed by: EMERGENCY MEDICINE

## 2021-06-04 RX ORDER — OXYCODONE HYDROCHLORIDE 5 MG/1
5 TABLET ORAL EVERY 4 HOURS PRN
Status: DISCONTINUED | OUTPATIENT
Start: 2021-06-04 | End: 2021-06-05

## 2021-06-04 RX ORDER — FENTANYL CITRATE 50 UG/ML
50 INJECTION, SOLUTION INTRAMUSCULAR; INTRAVENOUS ONCE
Status: COMPLETED | OUTPATIENT
Start: 2021-06-04 | End: 2021-06-04

## 2021-06-04 RX ORDER — LORATADINE 10 MG/1
10 TABLET ORAL DAILY
Status: DISCONTINUED | OUTPATIENT
Start: 2021-06-05 | End: 2021-06-07 | Stop reason: HOSPADM

## 2021-06-04 RX ORDER — HYDROXYZINE HYDROCHLORIDE 10 MG/1
10 TABLET, FILM COATED ORAL
Status: DISCONTINUED | OUTPATIENT
Start: 2021-06-04 | End: 2021-06-04

## 2021-06-04 RX ORDER — CALCIUM CARBONATE 500(1250)
1 TABLET ORAL 2 TIMES DAILY WITH MEALS
Status: DISCONTINUED | OUTPATIENT
Start: 2021-06-04 | End: 2021-06-07 | Stop reason: HOSPADM

## 2021-06-04 RX ORDER — HYDROMORPHONE HCL/PF 1 MG/ML
0.5 SYRINGE (ML) INJECTION ONCE
Status: COMPLETED | OUTPATIENT
Start: 2021-06-04 | End: 2021-06-04

## 2021-06-04 RX ORDER — PRAVASTATIN SODIUM 40 MG
80 TABLET ORAL
Status: DISCONTINUED | OUTPATIENT
Start: 2021-06-04 | End: 2021-06-05

## 2021-06-04 RX ORDER — AMITRIPTYLINE HYDROCHLORIDE 50 MG/1
50 TABLET, FILM COATED ORAL
Status: DISCONTINUED | OUTPATIENT
Start: 2021-06-04 | End: 2021-06-07 | Stop reason: HOSPADM

## 2021-06-04 RX ORDER — OXYCODONE HYDROCHLORIDE 5 MG/1
5 TABLET ORAL EVERY 6 HOURS PRN
Status: DISCONTINUED | OUTPATIENT
Start: 2021-06-04 | End: 2021-06-04

## 2021-06-04 RX ORDER — PANTOPRAZOLE SODIUM 40 MG/1
40 TABLET, DELAYED RELEASE ORAL
Status: DISCONTINUED | OUTPATIENT
Start: 2021-06-04 | End: 2021-06-07 | Stop reason: HOSPADM

## 2021-06-04 RX ORDER — MELATONIN
1000 DAILY
Status: DISCONTINUED | OUTPATIENT
Start: 2021-06-05 | End: 2021-06-07 | Stop reason: HOSPADM

## 2021-06-04 RX ORDER — HYDROMORPHONE HCL/PF 1 MG/ML
0.5 SYRINGE (ML) INJECTION EVERY 6 HOURS PRN
Status: DISCONTINUED | OUTPATIENT
Start: 2021-06-04 | End: 2021-06-04

## 2021-06-04 RX ORDER — KETOROLAC TROMETHAMINE 30 MG/ML
15 INJECTION, SOLUTION INTRAMUSCULAR; INTRAVENOUS ONCE
Status: COMPLETED | OUTPATIENT
Start: 2021-06-04 | End: 2021-06-04

## 2021-06-04 RX ORDER — DICYCLOMINE HYDROCHLORIDE 10 MG/1
10 CAPSULE ORAL 2 TIMES DAILY
Status: DISCONTINUED | OUTPATIENT
Start: 2021-06-04 | End: 2021-06-07 | Stop reason: HOSPADM

## 2021-06-04 RX ORDER — HYDROMORPHONE HCL/PF 1 MG/ML
0.5 SYRINGE (ML) INJECTION EVERY 4 HOURS PRN
Status: DISCONTINUED | OUTPATIENT
Start: 2021-06-04 | End: 2021-06-05

## 2021-06-04 RX ORDER — MORPHINE SULFATE 4 MG/ML
4 INJECTION, SOLUTION INTRAMUSCULAR; INTRAVENOUS ONCE
Status: COMPLETED | OUTPATIENT
Start: 2021-06-04 | End: 2021-06-04

## 2021-06-04 RX ORDER — MONTELUKAST SODIUM 10 MG/1
10 TABLET ORAL
Status: DISCONTINUED | OUTPATIENT
Start: 2021-06-04 | End: 2021-06-07 | Stop reason: HOSPADM

## 2021-06-04 RX ORDER — ONDANSETRON 2 MG/ML
4 INJECTION INTRAMUSCULAR; INTRAVENOUS ONCE
Status: COMPLETED | OUTPATIENT
Start: 2021-06-04 | End: 2021-06-04

## 2021-06-04 RX ORDER — CHLORAL HYDRATE 500 MG
1000 CAPSULE ORAL 3 TIMES DAILY
Status: DISCONTINUED | OUTPATIENT
Start: 2021-06-04 | End: 2021-06-07 | Stop reason: HOSPADM

## 2021-06-04 RX ORDER — HYDRALAZINE HYDROCHLORIDE 20 MG/ML
5 INJECTION INTRAMUSCULAR; INTRAVENOUS EVERY 6 HOURS PRN
Status: DISCONTINUED | OUTPATIENT
Start: 2021-06-04 | End: 2021-06-06

## 2021-06-04 RX ORDER — GABAPENTIN 300 MG/1
300 CAPSULE ORAL 3 TIMES DAILY
Status: DISCONTINUED | OUTPATIENT
Start: 2021-06-04 | End: 2021-06-06

## 2021-06-04 RX ADMIN — GABAPENTIN 300 MG: 300 CAPSULE ORAL at 18:39

## 2021-06-04 RX ADMIN — GABAPENTIN 300 MG: 300 CAPSULE ORAL at 21:22

## 2021-06-04 RX ADMIN — ENOXAPARIN SODIUM 40 MG: 40 INJECTION SUBCUTANEOUS at 18:39

## 2021-06-04 RX ADMIN — AMITRIPTYLINE HYDROCHLORIDE 50 MG: 50 TABLET, FILM COATED ORAL at 21:22

## 2021-06-04 RX ADMIN — PANTOPRAZOLE SODIUM 40 MG: 40 TABLET, DELAYED RELEASE ORAL at 21:22

## 2021-06-04 RX ADMIN — FENTANYL CITRATE 50 MCG: 50 INJECTION, SOLUTION INTRAMUSCULAR; INTRAVENOUS at 12:51

## 2021-06-04 RX ADMIN — OMEGA-3 FATTY ACIDS CAP 1000 MG 1000 MG: 1000 CAP at 21:21

## 2021-06-04 RX ADMIN — HYDROMORPHONE HYDROCHLORIDE 0.5 MG: 1 INJECTION, SOLUTION INTRAMUSCULAR; INTRAVENOUS; SUBCUTANEOUS at 18:58

## 2021-06-04 RX ADMIN — HYDROMORPHONE HYDROCHLORIDE 0.5 MG: 1 INJECTION, SOLUTION INTRAMUSCULAR; INTRAVENOUS; SUBCUTANEOUS at 18:00

## 2021-06-04 RX ADMIN — DICYCLOMINE HYDROCHLORIDE 10 MG: 10 CAPSULE ORAL at 18:39

## 2021-06-04 RX ADMIN — MONTELUKAST 10 MG: 10 TABLET, FILM COATED ORAL at 21:22

## 2021-06-04 RX ADMIN — FENTANYL CITRATE 50 MCG: 50 INJECTION, SOLUTION INTRAMUSCULAR; INTRAVENOUS at 13:55

## 2021-06-04 RX ADMIN — MORPHINE SULFATE 4 MG: 4 INJECTION, SOLUTION INTRAMUSCULAR; INTRAVENOUS at 16:02

## 2021-06-04 RX ADMIN — OXYCODONE HYDROCHLORIDE 5 MG: 5 TABLET ORAL at 23:01

## 2021-06-04 RX ADMIN — KETOROLAC TROMETHAMINE 15 MG: 30 INJECTION, SOLUTION INTRAMUSCULAR; INTRAVENOUS at 14:45

## 2021-06-04 RX ADMIN — ONDANSETRON 4 MG: 2 INJECTION INTRAMUSCULAR; INTRAVENOUS at 13:26

## 2021-06-04 NOTE — ASSESSMENT & PLAN NOTE
Likely In the setting of acute pain  Will trend blood pressure     Give IV hydralazine PRN for SBP > 170

## 2021-06-04 NOTE — ED PROVIDER NOTES
Emergency Department Trauma Note  Malena Villaseñor 80 y o  female MRN: [id number]  Unit/Bed#: /411-01 Encounter: [id number]      Trauma Alert: Trauma Acuity: Trauma Evaluation  Model of Arrival: Mode of Arrival: BLS via Trauma Squad Name and Number: Sae [facility]   Trauma Team: Current Providers  Attending Provider: Armaan Erazo MD  Attending Provider: Armaan Erazo MD  Attending Provider: Sanket Eduardo DO  Physician Assistant: Carmen Arguello PA-C  Registered Nurse: Estrella Regalado RN  Consulting Physician: Sophia Park MD  Registered Nurse: Tita Venegas RN  Respiratory Therapist: RT Epi  Consultants: None      History of Present Illness     Chief Complaint:   Chief Complaint   Patient presents with    Trauma     trauma eval called; patient fell going down two steps when knee gave out landing on left ankle/knee; no loc or no thinners      HPI:  Malena Villaseñor is a 80 y o  female who presents with see below  Mechanism:Details of Incident: (S) knee gave out going down two steps landing on left knee and foot on carpet; no LOC/no head injury  Injury Date: 06/04/21 Injury Time: 1130 Injury Occurence Location - [address] [place] [address] Way: [address] [place] [address]    56-year-old female presents with a fall down 2 steps she has been having increasing left knee pain her left knee gave out she landed on her left side on carpet in her home  She initially denied any loss of conscious or head injury but due to the degree of pain she was experiencing she was confused as to the laterality of her injury  She is complaining of a stinging sensation going from the top of her foot up the leg she is not otherwise anticoagulated except for fish oil has no headache no neck pain but she has excruciating pain to her foot she is denying any hip pain she has no chest pain shortness of breath no rib pain no abdominal pain no nausea vomiting    Any movement of the foot or touching of her leg makes the pain worse there is no history of any bowel or bladder incontinence she does have a nerve stimulator to her low back for back pain she does not feel her back pain; she does not use a cane or walker to ambulate her last tetanus shot was 2019  Review of Systems   Constitutional: Positive for activity change  Negative for chills, diaphoresis and fever  HENT: Negative for congestion, ear pain, rhinorrhea, sneezing and sore throat  Eyes: Negative for discharge  Respiratory: Negative for cough, chest tightness and shortness of breath  Cardiovascular: Negative for chest pain and leg swelling  Gastrointestinal: Negative for abdominal pain, blood in stool, diarrhea, nausea and vomiting  Endocrine: Negative for polyuria  Genitourinary: Negative for difficulty urinating, dysuria, frequency and urgency  Musculoskeletal: Positive for back pain (chronic back pain)  Negative for myalgias, neck pain and neck stiffness  Skin: Negative for rash  Neurological: Positive for weakness and numbness (left foot)  Negative for dizziness, speech difficulty, light-headedness and headaches  Hematological: Negative for adenopathy  Psychiatric/Behavioral: Negative for confusion  All other systems reviewed and are negative        Historical Information     Immunizations:   Immunization History   Administered Date(s) Administered    Tdap 12/09/2016, 12/09/2016, 07/10/2019       Past Medical History:   Diagnosis Date    Abnormal findings on diagnostic imaging of breast     Arthritis     Chronic pain disorder     Extremity pain     Fibrocystic breast disease     Foot pain     GERD (gastroesophageal reflux disease)     Hyperlipidemia     Hypertension     Interstitial cystitis     Joint pain     Low back pain     Osteoarthritis     Osteopenia        Family History   Problem Relation Age of Onset    Bone cancer Mother     Cancer Mother     Brain cancer Father     Stroke Father         stroke sydrome    Diabetes Paternal Grandmother     Breast cancer Paternal Grandmother 61    Breast cancer Maternal Aunt 79    Breast cancer additional onset Maternal Aunt 67    Depression Sister     Migraines Sister     No Known Problems Maternal Grandmother     No Known Problems Maternal Grandfather     No Known Problems Paternal Grandfather     Asthma Sister     Heart disease Brother      Past Surgical History:   Procedure Laterality Date    APPENDECTOMY      BACK SURGERY      BREAST EXCISIONAL BIOPSY Left 1996    benign    CARPAL BOSS EXCISION      CHOLECYSTECTOMY      DIAGNOSTIC LAPAROSCOPY      FOOT SURGERY      FRACTURE SURGERY      HYSTERECTOMY      age 33    HYSTEROSCOPY      JOINT REPLACEMENT      KIDNEY SURGERY Left     KNEE ARTHROSCOPY Bilateral     LAMINECTOMY      LAPAROSCOPY      hynecologic with adhesions    OOPHORECTOMY Bilateral     age 33   [facility] SURGERY      IA SURG IMPLNT [address] N/A 5/18/2017    Procedure: PLACEMENT OF THORACIC SPINAL CORD STIMULATOR WITH LEFT BUTTOCK IMPLANTABLE PULSE GENERATOR (IMPULSE MONITORING-MOTORS (TCEMEP), EMG, SSEP);   Surgeon: Racheal Bustos MD;  Location:  MAIN OR;  Service: Neurosurgery   [address] IMPLANT Left 9/29/2020    Procedure: LAMINECTOMY THORACIC , REMOVAL OF SCS LEADS AND GENERATOR;  Surgeon: Cheryl Koroma MD;  Location:  MAIN OR;  Service: Neurosurgery    SPINAL STIMULATOR PLACEMENT  05/2017    TONSILLECTOMY AND ADENOIDECTOMY      onset: unknown    TOTAL KNEE ARTHROPLASTY Right      Social History     Tobacco Use    Smoking status: Never Smoker    Smokeless tobacco: Never Used   Substance Use Topics    Alcohol use: Never     Alcohol/week: 0 0 standard drinks     Frequency: Never     Drinks per session: Patient refused     Binge frequency: Never     Comment: socially    Drug use: No     E-Cigarette/Vaping    E-Cigarette Use Never User      E-Cigarette/Vaping Substances    Nicotine No     THC No     CBD No     Flavoring No     Other No     Unknown No        Family History: non-contributory    Meds/Allergies   Prior to Admission Medications   Prescriptions Last Dose Informant Patient Reported? Taking?    Biotin 2500 MCG CAPS 6/4/2021 at Unknown time Self Yes Yes   Sig: Take 1 capsule by mouth 2 (two) times a day    Cholecalciferol (VITAMIN D-3 PO) 6/4/2021 at Unknown time Self Yes Yes   Sig: Take 1 tablet by mouth daily   Cyanocobalamin (VITAMIN B-12 CR PO) 6/4/2021 at Unknown time Self Yes Yes   Sig: Take 1 tablet by mouth daily   Diclofenac Sodium (VOLTAREN) 1 % 6/4/2021 at Unknown time  No Yes   Sig: Apply 2 g topically 4 (four) times a day   EPINEPHrine (EpiPen 2-Michel) 0 3 mg/0 3 mL SOAJ Unknown at Unknown time  No No   Sig: Inject 0 3 mL (0 3 mg total) into a muscle as needed for anaphylaxis   Omega-3 Fatty Acids (FISH OIL) 1,000 mg 6/4/2021 at Unknown time Self Yes Yes   Sig: Take 1,000 mg by mouth 3 (three) times a day   amitriptyline (ELAVIL) 50 mg tablet 6/3/2021 at Unknown time  No Yes   Sig: Take 1 tablet (50 mg total) by mouth daily at bedtime   calcium carbonate (OS-ANTHONY) 600 MG tablet 6/4/2021 at Unknown time  Yes Yes   Sig: Take 1,200 mg by mouth 2 (two) times a day with meals   clobetasol (TEMOVATE) 0 05 % cream Unknown at Unknown time  No No   Sig: Apply topically 2 (two) times a day   dicyclomine (BENTYL) 10 mg capsule 6/4/2021 at Unknown time  No Yes   Sig: Take 1 capsule (10 mg total) by mouth 2 (two) times a day   fexofenadine (ALLEGRA) 180 MG tablet 6/4/2021 at Unknown time Self Yes Yes   Sig: Take 180 mg by mouth daily   gabapentin (NEURONTIN) 300 mg capsule 6/4/2021 at Unknown time  No Yes   Sig: Take 1 capsule (300 mg total) by mouth 3 (three) times a day   hydrOXYzine HCL (ATARAX) 10 mg tablet Unknown at Unknown time  No No   Sig: Take 2 tablets at bedtime   Patient taking differently: daily at bedtime as needed Take 2 tablets at bedtime   losartan (COZAAR) 100 MG tablet 6/4/2021 at Unknown time No Yes   Sig: Take 1 tablet (100 mg total) by mouth daily   methocarbamol (ROBAXIN) 750 mg tablet 6/4/2021 at Unknown time  No Yes   Sig: Take 1 tablet (750 mg total) by mouth every 8 (eight) hours   montelukast (SINGULAIR) 10 mg tablet 6/3/2021 at Unknown time  No Yes   Sig: Take 1 tablet (10 mg total) by mouth daily at bedtime   omeprazole (PriLOSEC) 40 MG capsule 6/3/2021 at Unknown time  No Yes   Sig: Take 1 capsule (40 mg total) by mouth daily   rosuvastatin (CRESTOR) 10 MG tablet 6/4/2021 at Unknown time  No Yes   Sig: Take 1 tablet (10 mg total) by mouth daily   Patient taking differently: Take 10 mg by mouth every other day       Facility-Administered Medications: None       Allergies   Allergen Reactions    Bee Venom Shortness Of Breath    Chlorhexidine Rash    Egg Yolk - Food Allergy Hives    Iodinated Diagnostic Agents Hives    Penicillins Hives    Bactrim [Sulfamethoxazole-Trimethoprim] Rash    Codeine Other (See Comments)     headaches    Latex Rash    Medical Tape Blisters, Hives, Itching and Rash    Other Rash     Adhesive tape    Oxybutynin Rash    Pentosan Polysulfate Rash    Povidone Iodine Rash       PHYSICAL EXAM    PE limited by: n/a    Objective   Vitals:   First set: Temperature: 97 8 °F (36 6 °C) (06/04/21 1235)  Pulse: 61 (06/04/21 1235)  Respirations: 16 (06/04/21 1235)  Blood Pressure: 115/56 (06/04/21 1235)  SpO2: 98 % (06/04/21 1235)    Primary Survey:   (A) Airway: speaking in full sentences  (B) Breathing: BS equal bilaterally  (C) Circulation: Pulses:   normal  (D) Disabliity:  GCS Total:  15  (E) Expose:  Completed    Secondary Survey: (Click on Physical Exam tab above)  Physical Exam  Vitals signs and nursing note reviewed  Constitutional:       General: She is in acute distress  Appearance: She is not ill-appearing, toxic-appearing or diaphoretic  HENT:      Head: Normocephalic and atraumatic        Right Ear: Tympanic membrane normal  There is no impacted cerumen  Left Ear: Tympanic membrane normal  There is no impacted cerumen  Nose: Nose normal  No congestion or rhinorrhea  Mouth/Throat:      Mouth: Mucous membranes are moist    Eyes:      General:         Right eye: No discharge  Left eye: No discharge  Extraocular Movements: Extraocular movements intact  Conjunctiva/sclera: Conjunctivae normal       Pupils: Pupils are equal, round, and reactive to light  Comments: 3 mm equal   Neck:      Comments: c-collar in situ no midline or paraspinous tendnerness  Cardiovascular:      Rate and Rhythm: Normal rate and regular rhythm  Pulses: Normal pulses  Pulmonary:      Effort: Pulmonary effort is normal  No respiratory distress  Breath sounds: Normal breath sounds  No wheezing or rales  Chest:      Chest wall: No tenderness  Abdominal:      General: Bowel sounds are normal  There is no distension  Tenderness: There is no abdominal tenderness  There is no right CVA tenderness, left CVA tenderness or guarding  Comments: Back no midline T or Lspine tenderness no ecchymosis or brusing   Genitourinary:     Comments: Refused rectal exam  Musculoskeletal:      Left hip: Normal       Left knee: Normal       Left ankle: She exhibits decreased range of motion  She exhibits no swelling, no ecchymosis, no deformity, no laceration and normal pulse  Tenderness  Proximal fibula tenderness found  No lateral malleolus, no medial malleolus, no AITFL, no CF ligament, no posterior TFL and no head of 5th metatarsal tenderness found  Achilles tendon normal  Achilles tendon exhibits no pain, no defect and normal Salamanca's test results  Left upper leg: Normal       Left lower leg: She exhibits tenderness  She exhibits no bony tenderness, no swelling, no deformity and no laceration  No edema  Legs:       Left foot: Normal range of motion and normal capillary refill  Tenderness present   No bony tenderness, swelling, crepitus, deformity or laceration  Comments: 1+ AT pulses bilaterally  No deformity of LLE; patient is able to flex left hip with knee extended  Lachmans of left knee negative    Patient has diffuse tenderness of left lower leg but soft tissues remain soft no induration  Rom of toes or ankle do not trigger pain out of proportion  Anterior drawer of left ankle negative  No midfoot tenderness to palp   Skin:     General: Skin is warm and dry  Capillary Refill: Capillary refill takes less than 2 seconds  Neurological:      General: No focal deficit present  Mental Status: She is alert  Cranial Nerves: No cranial nerve deficit  Sensory: No sensory deficit  Motor: No weakness  Deep Tendon Reflexes: Reflexes normal    Psychiatric:         Mood and Affect: Mood normal          Cervical spine cleared by clinical criteria?  No (imaging required)      Invasive Devices     None                 Lab Results:   Results Reviewed     Procedure Component Value Units Date/Time    NOVEL CORONAVIRUS (COVID-19), PCR [facility] [[id number]]     Lab Status: No result Specimen: Nasopharyngeal Swab     Comprehensive metabolic panel [[id number]]  (Abnormal) Collected: 06/04/21 1306    Lab Status: Final result Specimen: Blood from Arm, Right Updated: 06/04/21 1326     Sodium 140 mmol/L      Potassium 4 1 mmol/L      Chloride 106 mmol/L      CO2 27 mmol/L      ANION GAP 7 mmol/L      BUN 14 mg/dL      Creatinine 0 70 mg/dL      Glucose 122 mg/dL      Calcium 9 0 mg/dL      AST 58 U/L      ALT 68 U/L      Alkaline Phosphatase 92 U/L      Total Protein 7 2 g/dL      Albumin 3 9 g/dL      Total Bilirubin 0 48 mg/dL      eGFR 88 ml/min/1 73sq m     Narrative:      [place] guidelines for Chronic Kidney Disease (CKD):     Stage 1 with normal or high GFR (GFR > 90 mL/min/1 73 square meters)    Stage 2 Mild CKD (GFR = 60-89 mL/min/1 73 square meters)    Stage 3A Moderate CKD (GFR = 45-59 mL/min/1 73 square meters)    Stage 3B Moderate CKD (GFR = 30-44 mL/min/1 73 square meters)    Stage 4 Severe CKD (GFR = 15-29 mL/min/1 73 square meters)    Stage 5 End Stage CKD (GFR <15 mL/min/1 73 square meters)  Note: GFR calculation is accurate only with a steady state creatinine    Protime-INR [[id number]]  (Normal) Collected: 06/04/21 1306    Lab Status: Final result Specimen: Blood from Arm, Right Updated: 06/04/21 1323     Protime 12 7 seconds      INR 0 97    APTT [[id number]]  (Normal) Collected: 06/04/21 1306    Lab Status: Final result Specimen: Blood from Arm, Right Updated: 06/04/21 1323     PTT 26 seconds     CBC and differential [[id number]] Collected: 06/04/21 1306    Lab Status: Final result Specimen: Blood from Arm, Right Updated: 06/04/21 1311     WBC 8 24 Thousand/uL      RBC 4 72 Million/uL      Hemoglobin 14 0 g/dL      Hematocrit 41 9 %      MCV 89 fL      MCH 29 7 pg      MCHC 33 4 g/dL      RDW 12 4 %      MPV 9 3 fL      Platelets 957 Thousands/uL      nRBC 0 /100 WBCs      Neutrophils Relative 50 %      Immat GRANS % 0 %      Lymphocytes Relative 37 %      Monocytes Relative 7 %      Eosinophils Relative 5 %      Basophils Relative 1 %      Neutrophils Absolute 4 15 Thousands/µL      Immature Grans Absolute 0 02 Thousand/uL      Lymphocytes Absolute 3 04 Thousands/µL      Monocytes Absolute 0 58 Thousand/µL      Eosinophils Absolute 0 38 Thousand/µL      Basophils Absolute 0 07 Thousands/µL     UA (URINE) with reflex to Scope [[id number]]     Lab Status: No result Specimen: Urine                  Imaging Studies:   Direct to CT: No  XR knee 4+ views left injury   Final Result by Yue Franklin MD (06/04 1426)      Nondisplaced fracture of the proximal left fibular shaft              Workstation performed: [id number]         XR chest 1 view portable   Final Result by Yue Franklin MD (06/04 1428)      No acute abnormality in the chest                   Workstation performed: [id number] XR tibia fibula 2 views LEFT   Final Result by Jose Francisco Bautista MD (06/04 1419)      Nondisplaced fracture of the proximal left fibular shaft  Workstation performed: [id number]         XR foot 3+ views LEFT   Final Result by Jose Francisco Bautista MD (06/04 1425)      No acute osseous abnormality  Workstation performed: [id number]         XR ankle 3+ views LEFT   Final Result by Jose Francisco Bautista MD (06/04 1422)      Normal left ankle  Workstation performed: [id number]         CT abdomen pelvis wo contrast   Final Result by Alan Jimenes DO (06/04 1352)      LIMITED STUDY WITHOUT CONTRAST  No acute intra-abdominal abnormality or evidence for traumatic injury within limitations of the exam       3 cm left renal cystic lesion, incompletely characterized without IV contrast   Given ultrasound finding of a possible solid lesion, this finding should be deemed indeterminate  Nonemergent follow-up MRI abdomen or contrast-enhanced CT study with renal    protocol is recommended  Fatty liver  The study was marked in Epic for follow-up  Workstation performed: [id number]         CT cervical spine without contrast   Final Result by Alan Jimenes DO (06/04 1333)      No cervical spine fracture or traumatic malalignment  Workstation performed: [id number]         CT head without contrast   Final Result by Alan Jimenes DO (06/04 1326)      No acute intracranial abnormality                    Workstation performed: [id number]               Procedures  Splint application    Date/Time: 6/4/2021 12:35 PM  Performed by: Sylvie Sellers MD  Authorized by: Sylvie Sellers MD   Universal Protocol:  Procedure performed by: Divine Watsno RN)  Risks and benefits: risks, benefits and alternatives were discussed  Consent given by: patient  Patient understanding: patient states understanding of the procedure being performed  Patient identity confirmed: verbally with patient and arm band      Pre-procedure details:     Sensation:  Normal  Procedure details:     Laterality:  Left    Location:  Leg    Leg:  L lower leg    Splint type:  Short leg    Supplies:  Ortho-Glass and elastic bandage  Post-procedure details:     Pain:  Improved    Sensation:  Normal    Patient tolerance of procedure: Tolerated well, no immediate complications             ED Course  ED Course as of Jun 04 1806 Fri Jun 04, 2021   1348 CT C-spine report reviewed patient re-examined no pain with ROM or radicular symptoms C-Collar immobilization discontinued         1520 Reviewed films with patient has nondisplaced proximal fibular fracture which is a nonweightbearing bone but she is complaining of pain to the lower leg and dorsum of the foot consistent with neurapraxia of the superficial peroneal nerve will consult with Orthopedics foot was re-examined there is no bony tenderness  1534 Case reviewed with Dr Yanira Novak recommends weight bearing as tolerated obs to medicine will follow      1547 Case reviewed with Dr Ramos Martines recommends inpatient admission      [id number] Splint removed by me patient has no reproducible tenderness to the foot or midfoot region  MDM  Number of Diagnoses or Management Options  Disorder of left superficial peroneal nerve:   Fall:   Left fibular fracture:   Diagnosis management comments: Mdm:  Left knee giving out causing a mechanical fall  Due to confusion regarding laterality and presence of a distracting injury will proceed with CT head and neck  As well as abdomen pelvis as she has a burning sensation the leg to evaluate for possibility of a lumbar fracture will obtain plain films of chest and lower extremity  Administer fentanyl for pain management      Patient was protectively splinted to facilitate movement to from CT;   patients foot was re-examined no reproducable bony tendneress; no clinical evidence of compartment syndrome    Reviewed incidental finding of left renal cyst patient states she had had it rechecked multiple times and is aware  Disposition  [facility] Transfer: No  Final diagnoses:   Fall   Left fibular fracture - proximal nondisplaced   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     Time reflects when diagnosis was documented in both MDM as applicable and the Disposition within this note     Time User Action Codes Description Comment    6/4/2021  3:37 PM Aguilar Stevenson Add [D30  GJZM] Fall     6/4/2021  3:40 PM Aguilar Stevenson Add [S82 402A] Left fibular fracture     6/4/2021  3:40 PM Aguilar Graham Modify [T53 411R] Left fibular fracture proximal nondisplaced    6/4/2021  3:41 PM Aguilar Stevenson Add [G57 32] Disorder of left superficial peroneal nerve     6/4/2021  3:41 PM Aguilar Graham Modify [G57 32] Disorder of left superficial peroneal nerve neuropraxia    6/4/2021  4:02 PM Christa Rasmussen [D06 469X] Closed fracture of proximal end of left fibula, unspecified fracture morphology, initial encounter       ED Disposition     ED Disposition Condition Date/Time Comment    Admit Stable Fri Jun 4, 2021  3:48 PM Case was discussed with Dr Mj Escobar  and Fr Juan Diego Jenkins the patient's admission status was agreed to be Admission Status: inpatient status to the service of Dr Taylor Rodriges   Follow-up Information     Follow up With Specialties Details Frankie Cloud,[address], MD Family Medicine   [address]      Vidya Monahan MD Orthopedic Surgery Follow up In 2 weeks for follow-up of left proximal nondisplaced fibular neck fracture and knee arthritis   [address] [facility] [address] [phone]          Current Discharge Medication List      CONTINUE these medications which have NOT CHANGED    Details   amitriptyline (ELAVIL) 50 mg tablet Take 1 tablet (50 mg total) by mouth daily at bedtime  Qty: 90 tablet, Refills: 3    Associated Diagnoses: Anxiety      Biotin 2500 MCG CAPS Take 1 capsule by mouth 2 (two) times a day       calcium carbonate (OS-ANTHONY) 600 MG tablet Take 1,200 mg by mouth 2 (two) times a day with meals      Cholecalciferol (VITAMIN D-3 PO) Take 1 tablet by mouth daily      Cyanocobalamin (VITAMIN B-12 CR PO) Take 1 tablet by mouth daily      Diclofenac Sodium (VOLTAREN) 1 % Apply 2 g topically 4 (four) times a day  Qty: 150 g, Refills: 1    Associated Diagnoses: Primary osteoarthritis of left knee      dicyclomine (BENTYL) 10 mg capsule Take 1 capsule (10 mg total) by mouth 2 (two) times a day  Qty: 180 capsule, Refills: 0    Associated Diagnoses: Irritable bowel syndrome without diarrhea      fexofenadine (ALLEGRA) 180 MG tablet Take 180 mg by mouth daily      gabapentin (NEURONTIN) 300 mg capsule Take 1 capsule (300 mg total) by mouth 3 (three) times a day  Qty: 270 capsule, Refills: 0    Associated Diagnoses: Idiopathic peripheral neuropathy      losartan (COZAAR) 100 MG tablet Take 1 tablet (100 mg total) by mouth daily  Qty: 90 tablet, Refills: 3    Associated Diagnoses: Hypertension, unspecified type      methocarbamol (ROBAXIN) 750 mg tablet Take 1 tablet (750 mg total) by mouth every 8 (eight) hours  Qty: 90 tablet, Refills: 2    Associated Diagnoses: Postoperative state; Back muscle spasm      montelukast (SINGULAIR) 10 mg tablet Take 1 tablet (10 mg total) by mouth daily at bedtime  Qty: 90 tablet, Refills: 3    Associated Diagnoses: Cough      Omega-3 Fatty Acids (FISH OIL) 1,000 mg Take 1,000 mg by mouth 3 (three) times a day      omeprazole (PriLOSEC) 40 MG capsule Take 1 capsule (40 mg total) by mouth daily  Qty: 90 capsule, Refills: 3    Associated Diagnoses: Gastroesophageal reflux disease      rosuvastatin (CRESTOR) 10 MG tablet Take 1 tablet (10 mg total) by mouth daily  Qty: 90 tablet, Refills: 0    Associated Diagnoses: Mixed hyperlipidemia      clobetasol (TEMOVATE) 0 05 % cream Apply topically 2 (two) times a day  Qty: 30 g, Refills: 5    Associated Diagnoses: Dry skin      EPINEPHrine (EpiPen 2-Michel) 0 3 mg/0 3 mL SOAJ Inject 0 3 mL (0 3 mg total) into a muscle as needed for anaphylaxis  Qty: 2 each, Refills: 5    Associated Diagnoses: Bee sting allergy      hydrOXYzine HCL (ATARAX) 10 mg tablet Take 2 tablets at bedtime  Qty: 180 tablet, Refills: 3    Associated Diagnoses: Itching           No discharge procedures on file      PDMP Review       Value Time User    PDMP Reviewed  Yes 1/4/2021  3:12 PM Lora Florentino DO          ED Provider  Electronically Signed by         Clementina Arredondo MD  06/04/21 8406

## 2021-06-04 NOTE — PLAN OF CARE
Problem: Potential for Falls  Goal: Patient will remain free of falls  Description: INTERVENTIONS:  - Assess patient frequently for physical needs  -  Identify cognitive and physical deficits and behaviors that affect risk of falls    -  [employer] fall precautions as indicated by assessment   - Educate patient/family on patient safety including physical limitations  - Instruct patient to call for assistance with activity based on assessment  - Modify environment to reduce risk of injury  - Consider OT/PT consult to assist with strengthening/mobility  Outcome: Progressing     Problem: Prexisting or High Potential for Compromised Skin Integrity  Goal: Skin integrity is maintained or improved  Description: INTERVENTIONS:  - Identify patients at risk for skin breakdown  - Assess and monitor skin integrity  - Assess and monitor nutrition and hydration status  - Monitor labs   - Assess for incontinence   - Turn and reposition patient  - Assist with mobility/ambulation  - Relieve pressure over bony prominences  - Avoid friction and shearing  - Provide appropriate hygiene as needed including keeping skin clean and dry  - Evaluate need for skin moisturizer/barrier cream  - Collaborate with interdisciplinary team   - Patient/family teaching  - Consider wound care consult   Outcome: Progressing     Problem: PAIN - ADULT  Goal: Verbalizes/displays adequate comfort level or baseline comfort level  Description: Interventions:  - Encourage patient to monitor pain and request assistance  - Assess pain using appropriate pain scale  - Administer analgesics based on type and severity of pain and evaluate response  - Implement non-pharmacological measures as appropriate and evaluate response  - Consider cultural and social influences on pain and pain management  - Notify physician/advanced practitioner if interventions unsuccessful or patient reports new pain  Outcome: Progressing     Problem: INFECTION - ADULT  Goal: Absence or prevention of progression during hospitalization  Description: INTERVENTIONS:  - Assess and monitor for signs and symptoms of infection  - Monitor lab/diagnostic results  - Monitor all insertion sites, i e  indwelling lines, tubes, and drains  - Monitor endotracheal if appropriate and nasal secretions for changes in amount and color  - [place] appropriate cooling/warming therapies per order  - Administer medications as ordered  - Instruct and encourage patient and family to use good hand hygiene technique  - Identify and instruct in appropriate isolation precautions for identified infection/condition  Outcome: Progressing     Problem: SAFETY ADULT  Goal: Patient will remain free of falls  Description: INTERVENTIONS:  - Assess patient frequently for physical needs  -  Identify cognitive and physical deficits and behaviors that affect risk of falls    -  [place] fall precautions as indicated by assessment   - Educate patient/family on patient safety including physical limitations  - Instruct patient to call for assistance with activity based on assessment  - Modify environment to reduce risk of injury  - Consider OT/PT consult to assist with strengthening/mobility  Outcome: Progressing  Goal: Maintain or return to baseline ADL function  Description: INTERVENTIONS:  -  Assess patient's ability to carry out ADLs; assess patient's baseline for ADL function and identify physical deficits which impact ability to perform ADLs (bathing, care of mouth/teeth, toileting, grooming, dressing, etc )  - Assess/evaluate cause of self-care deficits   - Assess range of motion  - Assess patient's mobility; develop plan if impaired  - Assess patient's need for assistive devices and provide as appropriate  - Encourage maximum independence but intervene and supervise when necessary  - Involve family in performance of ADLs  - Assess for home care needs following discharge   - Consider OT consult to assist with ADL evaluation and planning for discharge  - Provide patient education as appropriate  Outcome: Progressing  Goal: Maintain or return mobility status to optimal level  Description: INTERVENTIONS:  - Assess patient's baseline mobility status (ambulation, transfers, stairs, etc )    - Identify cognitive and physical deficits and behaviors that affect mobility  - Identify mobility aids required to assist with transfers and/or ambulation (gait belt, sit-to-stand, lift, walker, cane, etc )  - [place] fall precautions as indicated by assessment  - Record patient progress and toleration of activity level on Mobility SBAR; progress patient to next Phase/Stage  - Instruct patient to call for assistance with activity based on assessment  - Consider rehabilitation consult to assist with strengthening/weightbearing, etc   Outcome: Progressing     Problem: DISCHARGE PLANNING  Goal: Discharge to home or other facility with appropriate resources  Description: INTERVENTIONS:  - Identify barriers to discharge w/patient and caregiver  - Arrange for needed discharge resources and transportation as appropriate  - Identify discharge learning needs (meds, wound care, etc )  - Arrange for interpretive services to assist at discharge as needed  - Refer to Case Management Department for coordinating discharge planning if the patient needs post-hospital services based on physician/advanced practitioner order or complex needs related to functional status, cognitive ability, or social support system  Outcome: Progressing     Problem: Knowledge Deficit  Goal: Patient/family/caregiver demonstrates understanding of disease process, treatment plan, medications, and discharge instructions  Description: Complete learning assessment and assess knowledge base    Interventions:  - Provide teaching at level of understanding  - Provide teaching via preferred learning methods  Outcome: Progressing     Problem: MUSCULOSKELETAL - ADULT  Goal: Maintain or return mobility to safest level of function  Description: INTERVENTIONS:  - Assess patient's ability to carry out ADLs; assess patient's baseline for ADL function and identify physical deficits which impact ability to perform ADLs (bathing, care of mouth/teeth, toileting, grooming, dressing, etc )  - Assess/evaluate cause of self-care deficits   - Assess range of motion  - Assess patient's mobility  - Assess patient's need for assistive devices and provide as appropriate  - Encourage maximum independence but intervene and supervise when necessary  - Involve family in performance of ADLs  - Assess for home care needs following discharge   - Consider OT consult to assist with ADL evaluation and planning for discharge  - Provide patient education as appropriate  Outcome: Progressing  Goal: Maintain proper alignment of affected body part  Description: INTERVENTIONS:  - Support, maintain and protect limb and body alignment  - Provide patient/ family with appropriate education  Outcome: Progressing

## 2021-06-04 NOTE — NURSING NOTE
Patient found to have a 20 gauge IV in the Right antecubital area placed prior to admission to 4th floor  Upon initial assessment the IV was infiltrated and removed  Cellulitis

## 2021-06-04 NOTE — CONSULTS
Orthopedics   Haily House 80 y o  female MRN: [id number]  Unit/Bed#: MI XRAY      Chief Complaint:   left leg and ankle pain    HPI:   80 y  o female complaining of left leg and ankle pain after falling at home down the last 2 stairs today while taking the garbage out  She feels that her arthritic left knee gave out and caused her to fall  She denies loss of consciousness  She denies pain in other areas of her body  She reports that she does have a nerve stimulator in place and has historically been and pain management on higher dose narcotics in the past   She also has a history of neuropathy in her feet  She also had a right knee replacement by Dr Fabiola Benson who she is due to see in 2 weeks for injections into her left knee  She notes that she has pain in the lateral aspect of her left shin that goes down into her left ankle and some into the foot  X-rays were taken of all areas and note only a proximal fibular nondisplaced fracture  She notes pain with attempted motion of the foot and ankle in the shin and ankle area  She was placed in a fiberglass posterior splint but had it removed as this seem to be causing her more pain and prefers not to be in it  She denies left hip pain  She has multiple steps to get into her home and works part-time at her [employer]      Review Of Systems:   · Skin: Normal  · Neuro: See HPI  · Musculoskeletal: See HPI  · 14 point review of systems negative except as stated above     Past Medical History:   Past Medical History:   Diagnosis Date    Abnormal findings on diagnostic imaging of breast     Arthritis     Chronic pain disorder     Extremity pain     Fibrocystic breast disease     Foot pain     GERD (gastroesophageal reflux disease)     Hyperlipidemia     Hypertension     Interstitial cystitis     Joint pain     Low back pain     Osteoarthritis     Osteopenia      Past Surgical History:   Past Surgical History:   Procedure Laterality Date    APPENDECTOMY  BACK SURGERY      BREAST EXCISIONAL BIOPSY Left 1996    benign    CARPAL BOSS EXCISION      CHOLECYSTECTOMY      DIAGNOSTIC LAPAROSCOPY      FOOT SURGERY      FRACTURE SURGERY      HYSTERECTOMY      age 33    HYSTEROSCOPY      JOINT REPLACEMENT      KIDNEY SURGERY Left     KNEE ARTHROSCOPY Bilateral     LAMINECTOMY      LAPAROSCOPY      hynecologic with adhesions    OOPHORECTOMY Bilateral     age 33    ORTHOPEDIC SURGERY      [facility] [address] N/A 5/18/2017    Procedure: PLACEMENT OF THORACIC SPINAL CORD STIMULATOR WITH LEFT BUTTOCK IMPLANTABLE PULSE GENERATOR (IMPULSE MONITORING-MOTORS (TCEMEP), EMG, SSEP);   Surgeon: Liz Bhatti MD;  [facility];  Service: Neurosurgery    SPINAL CORD STIMULATOR IMPLANT Left 9/29/2020    Procedure: LAMINECTOMY THORACIC , REMOVAL OF SCS LEADS AND GENERATOR;  Surgeon: Lorne Palencia MD;  Location: [facility];  Service: Neurosurgery    SPINAL STIMULATOR PLACEMENT  05/2017    TONSILLECTOMY AND ADENOIDECTOMY      onset: unknown    TOTAL KNEE ARTHROPLASTY Right      Family History:  Family history reviewed and non-contributory  Family History   Problem Relation Age of Onset    Bone cancer Mother     Cancer Mother     Brain cancer Father     Stroke Father         stroke sydrome    Diabetes Paternal Grandmother     Breast cancer Paternal Grandmother 61    Breast cancer Maternal Aunt 79    Breast cancer additional onset Maternal Aunt 67    Depression Sister     Migraines Sister     No Known Problems Maternal Grandmother     No Known Problems Maternal Grandfather     No Known Problems Paternal Grandfather     Asthma Sister     Heart disease Brother      Social History:  Social History     Socioeconomic History    Marital status: Single     Spouse name: None    Number of children: None    Years of education: None    Highest education level: None   Occupational History    Occupation:    Social Needs    Financial resource strain: None    Food insecurity     Worry: None     Inability: None    Transportation needs     Medical: None     Non-medical: None   Tobacco Use    Smoking status: Never Smoker    Smokeless tobacco: Never Used   Substance and Sexual Activity    Alcohol use: Yes     Alcohol/week: 1 0 standard drinks     Types: 1 Glasses of wine per week    Drug use: No    Sexual activity: Not Currently     Partners: Male     Birth control/protection: None   Lifestyle    Physical activity     Days per week: None     Minutes per session: None    Stress: None   Relationships    Social connections     Talks on phone: None     Gets together: None     Attends Congregation service: None     Active member of club or organization: None     Attends meetings of clubs or organizations: None     Relationship status: None    Intimate partner violence     Fear of current or ex partner: None     Emotionally abused: None     Physically abused: None     Forced sexual activity: None   Other Topics Concern    None   Social History Narrative    No caffeine use    Always uses sunscreen    Always uses a seatbelt     Allergies:    Allergies   Allergen Reactions    Bee Venom Shortness Of Breath    Chlorhexidine Rash    Egg Yolk - Food Allergy Hives    Iodinated Diagnostic Agents Hives    Penicillins Hives    Bactrim [Sulfamethoxazole-Trimethoprim] Rash    Codeine Other (See Comments)     headaches    Latex Rash    Medical Tape Blisters, Hives, Itching and Rash    Other Rash     Adhesive tape    Oxybutynin Rash    Pentosan Polysulfate Rash    Povidone Iodine Rash     Labs:  0   Lab Value Date/Time    HCT 41 9 06/04/2021 1306    HCT 44 8 01/25/2021 1330    HCT 44 7 10/02/2020 1957    HCT 44 0 11/09/2015 0821    HCT 41 2 06/05/2015 0846    HCT 34 0 (L) 06/02/2015 0710    HGB 14 0 06/04/2021 1306    HGB 14 6 01/25/2021 1330    HGB 14 8 10/02/2020 1957    HGB 15 2 11/09/2015 0821    HGB 14 6 06/05/2015 0846    HGB 11 8 06/02/2015 0710    INR 0 97 06/04/2021 1306    INR 0 92 05/31/2015 2345    WBC 8 24 06/04/2021 1306    WBC 8 67 01/25/2021 1330    WBC 10 18 (H) 10/02/2020 1957    WBC 7 19 11/09/2015 0821    WBC 6 98 06/05/2015 0846    WBC 8 65 06/02/2015 0710     Meds:  No current facility-administered medications for this encounter       Current Outpatient Medications:     amitriptyline (ELAVIL) 50 mg tablet, Take 1 tablet (50 mg total) by mouth daily at bedtime, Disp: 90 tablet, Rfl: 3    Biotin 2500 MCG CAPS, Take 1 capsule by mouth 2 (two) times a day , Disp: , Rfl:     calcium carbonate (OS-ANTHONY) 600 MG tablet, Take 1,200 mg by mouth 2 (two) times a day with meals, Disp: , Rfl:     Cholecalciferol (VITAMIN D-3 PO), Take 1 tablet by mouth daily, Disp: , Rfl:     clobetasol (TEMOVATE) 0 05 % cream, Apply topically 2 (two) times a day, Disp: 30 g, Rfl: 5    Cyanocobalamin (VITAMIN B-12 CR PO), Take 1 tablet by mouth daily, Disp: , Rfl:     Diclofenac Sodium (VOLTAREN) 1 %, Apply 2 g topically 4 (four) times a day, Disp: 150 g, Rfl: 1    dicyclomine (BENTYL) 10 mg capsule, Take 1 capsule (10 mg total) by mouth 2 (two) times a day, Disp: 180 capsule, Rfl: 0    EPINEPHrine (EpiPen 2-Michel) 0 3 mg/0 3 mL SOAJ, Inject 0 3 mL (0 3 mg total) into a muscle as needed for anaphylaxis, Disp: 2 each, Rfl: 5    fexofenadine (ALLEGRA) 180 MG tablet, Take 180 mg by mouth daily, Disp: , Rfl:     gabapentin (NEURONTIN) 300 mg capsule, Take 1 capsule (300 mg total) by mouth 3 (three) times a day, Disp: 270 capsule, Rfl: 0    hydrOXYzine HCL (ATARAX) 10 mg tablet, Take 2 tablets at bedtime (Patient taking differently: daily at bedtime as needed Take 2 tablets at bedtime), Disp: 180 tablet, Rfl: 3    losartan (COZAAR) 100 MG tablet, Take 1 tablet (100 mg total) by mouth daily, Disp: 90 tablet, Rfl: 3    methocarbamol (ROBAXIN) 750 mg tablet, Take 1 tablet (750 mg total) by mouth every 8 (eight) hours, Disp: 90 tablet, Rfl: 2    montelukast (SINGULAIR) 10 mg tablet, Take 1 tablet (10 mg total) by mouth daily at bedtime, Disp: 90 tablet, Rfl: 3    Omega-3 Fatty Acids (FISH OIL) 1,000 mg, Take 1,000 mg by mouth 3 (three) times a day, Disp: , Rfl:     omeprazole (PriLOSEC) 40 MG capsule, Take 1 capsule (40 mg total) by mouth daily, Disp: 90 capsule, Rfl: 3    rosuvastatin (CRESTOR) 10 MG tablet, Take 1 tablet (10 mg total) by mouth daily (Patient taking differently: Take 10 mg by mouth every other day ), Disp: 90 tablet, Rfl: 0    Blood Culture:   No results found for: BLOODCX    Wound Culture:   No results found for: WOUNDCULT    Ins and Outs:  No intake/output data recorded  Physical Exam:   BP (!) 191/78   Pulse 86   Temp 97 8 °F (36 6 °C) (Temporal)   Resp (!) 24   Ht 5' 2" (1 575 m)   Wt 92 6 kg (204 lb 2 3 oz)   SpO2 97%   BMI 37 34 kg/m²   Gen: Alert and oriented to person, place, time  HEENT: EOMI, eyes clear, moist mucus membranes, hearing intact  Respiratory: Bilateral chest rise  No audible wheezing found  Cardiovascular: Regular Rate and Rhythm  Abdomen: soft nontender/nondistended  Musculoskeletal: left lower extremity  · Skin intact without open wounds, erythema, warmth nor ecchymosis or gross soft tissue swelling  · Tender to palpation over lateral left shin and into the left ankle and proximal foot  There is no long bone deformity  Compartments are soft and there is no calf pain to palpation  · Left knee is without an effusion  · Can perform straight leg raise  · Patient notes pain with primarily dorsiflexion but also with some plantar flexion of the ankle  She is able to curl her toes but with some discomfort as well she can also extend her toes  Expected weakness with plantar dorsiflexion  · Sensation intact L3-S1 to light touch  · There is no gross medial or lateral ankle fusion  Achilles tendon is intact grossly    · 2+ DP pulse and symmetric bilaterally  · Again patient is seen without the fiberglass splint as this was causing her more discomfort than being in bed without support  Radiology:   I personally reviewed the films  X-rays left knee shows tricompartmental arthritis with a nondisplaced fracture of the proximal fibular neck region  Other x-rays of the left foot and ankle note no fracture or soft tissue swelling  There is no dislocation  Assessment:  80 y o  female with left shin and ankle/proximal foot pain secondary to fall down her stairs today with a noted nondisplaced proximal fibular neck fracture  Patient has and asymptomatic right total knee arthroplasty done by Dr Jaron Brady and left knee arthritis followed by Dr Jaron Brady as well  Plan:   · Weight bearing as tolerated with walker and the left lower extremity in a high Cam boot or posterior fiberglass splint  · PT for weight-bearing and high Cam boot placement  · Pain control per primary service which may require higher than normal dosages secondary to her history of narcotic use and neuropathy  · Body mass index is 37 34 kg/m²  moderately obese  Recommend behavior modifications  · Dispo: Ortho signing off for now  Patient should follow up with Orthopedics in approximately 2 weeks with either Dr Barb Lopez or more likely Dr Lexi Ricci who seems to be her preference at this point time as he is her treating physician previously  · Ice and elevation to the left proximal fibula and ankle foot region as needed 20 minutes at a time multiple times per day  · Recommend DVT prophylaxis while hospitalized (Lovenox per primary service) and may be discharged on 325 aspirin b i d      Rod Flores PA-C

## 2021-06-04 NOTE — Clinical Note
Case was discussed with Dr Geovanna Lemon  and Fr Otilia Brantley the patient's admission status was agreed to be Admission Status: observation status to the service of Dr Meliton Reeves

## 2021-06-04 NOTE — ASSESSMENT & PLAN NOTE
She has severe pain of the left foot, inability to ambulate  Xrays show acute fracture of the proximal fibula, non displaced  Continue pain management with combination of IV dilaudid PRN and PO oxycodone PRN  DVT prophylaxis - Lovenox  D/w orthopedics  Non-operative management planned  Will need CAM boot  PT/OT consult  Will plan for probable STR

## 2021-06-04 NOTE — H&P
[facility] [address]  H&P- Betty King 1950, 80 y o  female MRN: [id number]  Unit/Bed#: RM11 Encounter: [id number]  Primary Care Provider: Carlo Tipton DO   Date and time admitted to hospital: 6/4/2021 12:30 PM    * Closed fracture of proximal end of left fibula  Assessment & Plan  She has severe pain of the left foot, inability to ambulate  Xrays show acute fracture of the proximal fibula, non displaced  Continue pain management with combination of IV dilaudid PRN and PO oxycodone PRN  DVT prophylaxis - Lovenox  D/w orthopedics  Non-operative management planned  Will need CAM boot  PT/OT consult  Will plan for probable STR  Chronic low back pain  Assessment & Plan  Patient takes amitriptyline, neurontin for chronic pain - will continue  Gastroesophageal reflux disease  Assessment & Plan  Continue Protonix  Hyperlipidemia  Assessment & Plan  Substitute pravastatin for crestor per formulary  Accelerated hypertension  Assessment & Plan  Likely In the setting of acute pain  Will trend blood pressure  Give IV hydralazine PRN for SBP > 170    VTE Prophylaxis: Enoxaparin (Lovenox)  / sequential compression device   Code Status: full code      Anticipated Length of Stay:  Patient will be admitted on an Inpatient basis with an anticipated length of stay of  Greater than 2 midnights  Justification for Hospital Stay: need for     Total Time for Visit, including Counseling / Coordination of Care: 60 minutes  Greater than 50% of this total time spent on direct patient counseling and coordination of care  Chief Complaint:   Left foot pain    History of Present Illness:  Betty King is a 80 y o  female who presents with severe left foot pain following accidental fall  The patient reports that around 12 30 today she was walking down few steps when she feels her left knee gave out causing her to fall    Everything happened so quickly and she cannot recall the exact mechanism of injury but she developed immediate pain in the left foot and ankle following the fall  She denies any lightheadedness or dizziness prior to the fall  She did not hit her head or lose consciousness  Review of Systems:  Review of Systems      Past Medical and Surgical History:   Past Medical History:   Diagnosis Date    Abnormal findings on diagnostic imaging of breast     Arthritis     Chronic pain disorder     Extremity pain     Fibrocystic breast disease     Foot pain     GERD (gastroesophageal reflux disease)     Hyperlipidemia     Hypertension     Interstitial cystitis     Joint pain     Low back pain     Osteoarthritis     Osteopenia        Past Surgical History:   Procedure Laterality Date    APPENDECTOMY      BACK SURGERY      BREAST EXCISIONAL BIOPSY Left 1996    benign    CARPAL BOSS EXCISION      CHOLECYSTECTOMY      DIAGNOSTIC LAPAROSCOPY      FOOT SURGERY      FRACTURE SURGERY      HYSTERECTOMY      age 33    HYSTEROSCOPY      JOINT REPLACEMENT      KIDNEY SURGERY Left     KNEE ARTHROSCOPY Bilateral     LAMINECTOMY      LAPAROSCOPY      hynecologic with adhesions    OOPHORECTOMY Bilateral     age 33   [facility] ORTHOPEDIC SURGERY      HI SURG IMPLNT [address] N/A 5/18/2017    Procedure: PLACEMENT OF THORACIC SPINAL CORD STIMULATOR WITH LEFT BUTTOCK IMPLANTABLE PULSE GENERATOR (IMPULSE MONITORING-MOTORS (TCEMEP), EMG, SSEP); Surgeon: Quinn Hatchet, MD;  Location:  MAIN OR;  Service: Neurosurgery    SPINAL CORD STIMULATOR IMPLANT Left 9/29/2020    Procedure: LAMINECTOMY THORACIC , REMOVAL OF SCS LEADS AND GENERATOR;  Surgeon: Batsheva Brady MD;  Location:  MAIN OR;  Service: Neurosurgery    SPINAL STIMULATOR PLACEMENT  05/2017    TONSILLECTOMY AND ADENOIDECTOMY      onset: unknown    TOTAL KNEE ARTHROPLASTY Right        Meds/Allergies:    Prior to Admission medications    Medication Sig Start Date End Date Taking?  Authorizing Provider amitriptyline (ELAVIL) 50 mg tablet Take 1 tablet (50 mg total) by mouth daily at bedtime 5/12/21   Kvng Rehman DO   Biotin 2500 MCG CAPS Take 1 capsule by mouth 2 (two) times a day     Historical Provider, MD   calcium carbonate (OS-ANTHONY) 600 MG tablet Take 1,200 mg by mouth 2 (two) times a day with meals    Historical Provider, MD   Cholecalciferol (VITAMIN D-3 PO) Take 1 tablet by mouth daily    Historical Provider, MD   clobetasol (TEMOVATE) 0 05 % cream Apply topically 2 (two) times a day 2/3/21   Kvng Rehman DO   Cyanocobalamin (VITAMIN B-12 CR PO) Take 1 tablet by mouth daily    Historical Provider, MD   Diclofenac Sodium (VOLTAREN) 1 % Apply 2 g topically 4 (four) times a day 5/12/21   EBONI Overton   dicyclomine (BENTYL) 10 mg capsule Take 1 capsule (10 mg total) by mouth 2 (two) times a day 5/12/21   Kvng Rehman DO   EPINEPHrine (EpiPen 2-Michel) 0 3 mg/0 3 mL SOAJ Inject 0 3 mL (0 3 mg total) into a muscle as needed for anaphylaxis 2/3/21   Kvng Rehman DO   fexofenadine (ALLEGRA) 180 MG tablet Take 180 mg by mouth daily    Historical Provider, MD   gabapentin (NEURONTIN) 300 mg capsule Take 1 capsule (300 mg total) by mouth 3 (three) times a day 5/12/21 8/10/21  EBONI Overton   hydrOXYzine HCL (ATARAX) 10 mg tablet Take 2 tablets at bedtime  Patient taking differently: daily at bedtime as needed Take 2 tablets at bedtime 6/15/20   Kvng Rehman DO   losartan (COZAAR) 100 MG tablet Take 1 tablet (100 mg total) by mouth daily 8/24/20 5/12/21  Kvng Rehman DO   methocarbamol (ROBAXIN) 750 mg tablet Take 1 tablet (750 mg total) by mouth every 8 (eight) hours 5/12/21 6/11/21  EBONI Overton   montelukast (SINGULAIR) 10 mg tablet Take 1 tablet (10 mg total) by mouth daily at bedtime 5/4/21   Kvng Rehman DO   Omega-3 Fatty Acids (FISH OIL) 1,000 mg Take 1,000 mg by mouth 3 (three) times a day    Historical Provider, MD   omeprazole (PriLOSEC) 40 MG capsule Take 1 capsule (40 mg total) by mouth daily 2/3/21   Peyman Crump DO   rosuvastatin (CRESTOR) 10 MG tablet Take 1 tablet (10 mg total) by mouth daily  Patient taking differently: Take 10 mg by mouth every other day  6/21/20   Peyman Crump DO     I have reviewed home medications using allscripts  Allergies: Allergies   Allergen Reactions    Bee Venom Shortness Of Breath    Chlorhexidine Rash    Egg Yolk - Food Allergy Hives    Iodinated Diagnostic Agents Hives    Penicillins Hives    Bactrim [Sulfamethoxazole-Trimethoprim] Rash    Codeine Other (See Comments)     headaches    Latex Rash    Medical Tape Blisters, Hives, Itching and Rash    Other Rash     Adhesive tape    Oxybutynin Rash    Pentosan Polysulfate Rash    Povidone Iodine Rash       Social History:     Marital Status: Single     Substance Use History:   Social History     Substance and Sexual Activity   Alcohol Use Yes    Alcohol/week: 1 0 standard drinks    Types: 1 Glasses of wine per week     Social History     Tobacco Use   Smoking Status Never Smoker   Smokeless Tobacco Never Used     Social History     Substance and Sexual Activity   Drug Use No       Family History:    non-contributory    Physical Exam:   Vitals:   Blood Pressure: (!) 191/78 (06/04/21 1605)  Pulse: 86 (06/04/21 1605)  Temperature: 97 8 °F (36 6 °C) (06/04/21 1235)  Temp Source: Oral (06/04/21 1320)  Respirations: (!) 24 (06/04/21 1605)  Height: 5' 2" (157 5 cm) (06/04/21 1235)  Weight - Scale: 92 6 kg (204 lb 2 3 oz) (06/04/21 1235)  SpO2: 97 % (06/04/21 1605)    Physical Exam  Vitals signs and nursing note reviewed  Constitutional:       General: She is not in acute distress  Appearance: She is not toxic-appearing  HENT:      Head: Normocephalic  Mouth/Throat:      Mouth: Mucous membranes are moist    Cardiovascular:      Rate and Rhythm: Normal rate and regular rhythm  Heart sounds: No murmur     Pulmonary:      Effort: Pulmonary effort is normal       Breath sounds: No wheezing  Abdominal:      Tenderness: There is no abdominal tenderness  Musculoskeletal:      Right lower leg: No edema  Left lower leg: No edema  Comments: L ankle with decreased ROM due to pain  Patient will not actively dorsiflex or plantar flex due to pain  Skin:     General: Skin is warm and dry  Neurological:      Mental Status: She is alert and oriented to person, place, and time  Comments: Slight decreased sensation to light touch of the dorsal surface of the left foot  Psychiatric:         Mood and Affect: Mood normal              Additional Data:   Lab Results: I have personally reviewed pertinent reports  Results from last 7 days   Lab Units 06/04/21  1306   WBC Thousand/uL 8 24   HEMOGLOBIN g/dL 14 0   HEMATOCRIT % 41 9   PLATELETS Thousands/uL 240   NEUTROS PCT % 50   LYMPHS PCT % 37   MONOS PCT % 7   EOS PCT % 5     Results from last 7 days   Lab Units 06/04/21  1306   SODIUM mmol/L 140   POTASSIUM mmol/L 4 1   CHLORIDE mmol/L 106   CO2 mmol/L 27   BUN mg/dL 14   CREATININE mg/dL 0 70   ANION GAP mmol/L 7   CALCIUM mg/dL 9 0   ALBUMIN g/dL 3 9   TOTAL BILIRUBIN mg/dL 0 48   ALK PHOS U/L 92   ALT U/L 68   AST U/L 58*   GLUCOSE RANDOM mg/dL 122     Results from last 7 days   Lab Units 06/04/21  1306   INR  0 97                   Imaging: I have personally reviewed pertinent reports  XR knee 4+ views left injury   Final Result by Wilfrid Anders MD (06/04 1426)      Nondisplaced fracture of the proximal left fibular shaft  Workstation performed: [id number]         XR chest 1 view portable   Final Result by Wilfrid Anders MD (06/04 0018)      No acute abnormality in the chest                   Workstation performed: [id number]         XR tibia fibula 2 views LEFT   Final Result by Wilfrid Anders MD (06/04 1749)      Nondisplaced fracture of the proximal left fibular shaft              Workstation performed: [id number]         XR foot 3+ views LEFT   Final Result by Jose Francisco Bautista MD (06/04 1425)      No acute osseous abnormality  Workstation performed: [id number]         XR ankle 3+ views LEFT   Final Result by Jose Francisco Bautista MD (06/04 1422)      Normal left ankle  Workstation performed: [id number]         CT abdomen pelvis wo contrast   Final Result by Alan Jimenes DO (06/04 1352)      LIMITED STUDY WITHOUT CONTRAST  No acute intra-abdominal abnormality or evidence for traumatic injury within limitations of the exam       3 cm left renal cystic lesion, incompletely characterized without IV contrast   Given ultrasound finding of a possible solid lesion, this finding should be deemed indeterminate  Nonemergent follow-up MRI abdomen or contrast-enhanced CT study with renal    protocol is recommended  Fatty liver  The study was marked in Epic for follow-up  Workstation performed: [id number]         CT cervical spine without contrast   Final Result by Alan Jimenes DO (06/04 1333)      No cervical spine fracture or traumatic malalignment  Workstation performed: [id number]         CT head without contrast   Final Result by Alan Jimenes DO (06/04 1326)      No acute intracranial abnormality  Workstation performed: [id number]             EKG, Pathology, and Other Studies Reviewed on Admission:   · EKG: pending    Allscripts / Epic Records Reviewed: Yes     ** Please Note: This note has been constructed using a voice recognition system   **

## 2021-06-05 LAB
ANION GAP SERPL CALCULATED.3IONS-SCNC: 10 MMOL/L (ref 4–13)
BUN SERPL-MCNC: 13 MG/DL (ref 5–25)
CALCIUM SERPL-MCNC: 8.8 MG/DL (ref 8.3–10.1)
CHLORIDE SERPL-SCNC: 106 MMOL/L (ref 100–108)
CO2 SERPL-SCNC: 26 MMOL/L (ref 21–32)
CREAT SERPL-MCNC: 0.73 MG/DL (ref 0.6–1.3)
ERYTHROCYTE [DISTWIDTH] IN BLOOD BY AUTOMATED COUNT: 12.5 % (ref 11.6–15.1)
GFR SERPL CREATININE-BSD FRML MDRD: 84 ML/MIN/1.73SQ M
GLUCOSE SERPL-MCNC: 103 MG/DL (ref 65–140)
HCT VFR BLD AUTO: 41.5 % (ref 34.8–46.1)
HGB BLD-MCNC: 13.5 G/DL (ref 11.5–15.4)
MCH RBC QN AUTO: 28.8 PG (ref 26.8–34.3)
MCHC RBC AUTO-ENTMCNC: 32.5 G/DL (ref 31.4–37.4)
MCV RBC AUTO: 89 FL (ref 82–98)
PLATELET # BLD AUTO: 211 THOUSANDS/UL (ref 149–390)
PMV BLD AUTO: 9.5 FL (ref 8.9–12.7)
POTASSIUM SERPL-SCNC: 4 MMOL/L (ref 3.5–5.3)
RBC # BLD AUTO: 4.68 MILLION/UL (ref 3.81–5.12)
SODIUM SERPL-SCNC: 142 MMOL/L (ref 136–145)
WBC # BLD AUTO: 9.5 THOUSAND/UL (ref 4.31–10.16)

## 2021-06-05 PROCEDURE — 99232 SBSQ HOSP IP/OBS MODERATE 35: CPT | Performed by: INTERNAL MEDICINE

## 2021-06-05 PROCEDURE — 97163 PT EVAL HIGH COMPLEX 45 MIN: CPT | Performed by: PHYSICAL THERAPIST

## 2021-06-05 PROCEDURE — 85027 COMPLETE CBC AUTOMATED: CPT | Performed by: PHYSICIAN ASSISTANT

## 2021-06-05 PROCEDURE — 97530 THERAPEUTIC ACTIVITIES: CPT | Performed by: PHYSICAL THERAPIST

## 2021-06-05 PROCEDURE — 97530 THERAPEUTIC ACTIVITIES: CPT

## 2021-06-05 PROCEDURE — 97167 OT EVAL HIGH COMPLEX 60 MIN: CPT

## 2021-06-05 PROCEDURE — 80048 BASIC METABOLIC PNL TOTAL CA: CPT | Performed by: PHYSICIAN ASSISTANT

## 2021-06-05 RX ORDER — HYDROMORPHONE HCL/PF 1 MG/ML
0.5 SYRINGE (ML) INJECTION ONCE
Status: COMPLETED | OUTPATIENT
Start: 2021-06-05 | End: 2021-06-05

## 2021-06-05 RX ORDER — HYDROMORPHONE HCL/PF 1 MG/ML
0.2 SYRINGE (ML) INJECTION ONCE
Status: COMPLETED | OUTPATIENT
Start: 2021-06-05 | End: 2021-06-05

## 2021-06-05 RX ORDER — HYDROMORPHONE HCL/PF 1 MG/ML
1 SYRINGE (ML) INJECTION
Status: DISCONTINUED | OUTPATIENT
Start: 2021-06-05 | End: 2021-06-07

## 2021-06-05 RX ORDER — HYDROMORPHONE HCL/PF 1 MG/ML
0.5 SYRINGE (ML) INJECTION
Status: DISCONTINUED | OUTPATIENT
Start: 2021-06-05 | End: 2021-06-07

## 2021-06-05 RX ADMIN — GABAPENTIN 300 MG: 300 CAPSULE ORAL at 21:42

## 2021-06-05 RX ADMIN — MONTELUKAST 10 MG: 10 TABLET, FILM COATED ORAL at 21:42

## 2021-06-05 RX ADMIN — ENOXAPARIN SODIUM 40 MG: 40 INJECTION SUBCUTANEOUS at 17:05

## 2021-06-05 RX ADMIN — HYDROMORPHONE HYDROCHLORIDE 1 MG: 1 INJECTION, SOLUTION INTRAMUSCULAR; INTRAVENOUS; SUBCUTANEOUS at 18:39

## 2021-06-05 RX ADMIN — GABAPENTIN 300 MG: 300 CAPSULE ORAL at 16:43

## 2021-06-05 RX ADMIN — CALCIUM 1 TABLET: 500 TABLET ORAL at 08:03

## 2021-06-05 RX ADMIN — DICYCLOMINE HYDROCHLORIDE 10 MG: 10 CAPSULE ORAL at 17:06

## 2021-06-05 RX ADMIN — HYDROMORPHONE HYDROCHLORIDE 0.5 MG: 1 INJECTION, SOLUTION INTRAMUSCULAR; INTRAVENOUS; SUBCUTANEOUS at 03:09

## 2021-06-05 RX ADMIN — HYDROMORPHONE HYDROCHLORIDE 1 MG: 1 INJECTION, SOLUTION INTRAMUSCULAR; INTRAVENOUS; SUBCUTANEOUS at 14:14

## 2021-06-05 RX ADMIN — HYDROMORPHONE HYDROCHLORIDE 0.5 MG: 1 INJECTION, SOLUTION INTRAMUSCULAR; INTRAVENOUS; SUBCUTANEOUS at 10:39

## 2021-06-05 RX ADMIN — AMITRIPTYLINE HYDROCHLORIDE 50 MG: 50 TABLET, FILM COATED ORAL at 21:42

## 2021-06-05 RX ADMIN — HYDROMORPHONE HYDROCHLORIDE 0.2 MG: 1 INJECTION, SOLUTION INTRAMUSCULAR; INTRAVENOUS; SUBCUTANEOUS at 23:20

## 2021-06-05 RX ADMIN — CALCIUM 1 TABLET: 500 TABLET ORAL at 16:43

## 2021-06-05 RX ADMIN — OMEGA-3 FATTY ACIDS CAP 1000 MG 1000 MG: 1000 CAP at 08:03

## 2021-06-05 RX ADMIN — DICYCLOMINE HYDROCHLORIDE 10 MG: 10 CAPSULE ORAL at 08:03

## 2021-06-05 RX ADMIN — OMEGA-3 FATTY ACIDS CAP 1000 MG 1000 MG: 1000 CAP at 21:42

## 2021-06-05 RX ADMIN — LORATADINE 10 MG: 10 TABLET ORAL at 08:03

## 2021-06-05 RX ADMIN — HYDROMORPHONE HYDROCHLORIDE 1 MG: 1 INJECTION, SOLUTION INTRAMUSCULAR; INTRAVENOUS; SUBCUTANEOUS at 21:42

## 2021-06-05 RX ADMIN — GABAPENTIN 300 MG: 300 CAPSULE ORAL at 08:04

## 2021-06-05 RX ADMIN — Medication 1000 UNITS: at 08:03

## 2021-06-05 RX ADMIN — PANTOPRAZOLE SODIUM 40 MG: 40 TABLET, DELAYED RELEASE ORAL at 21:42

## 2021-06-05 RX ADMIN — VITAM B12 50 MCG: 100 TAB at 08:04

## 2021-06-05 RX ADMIN — OMEGA-3 FATTY ACIDS CAP 1000 MG 1000 MG: 1000 CAP at 16:43

## 2021-06-05 RX ADMIN — HYDROMORPHONE HYDROCHLORIDE 0.5 MG: 1 INJECTION, SOLUTION INTRAMUSCULAR; INTRAVENOUS; SUBCUTANEOUS at 07:49

## 2021-06-05 NOTE — ASSESSMENT & PLAN NOTE
Patient takes amitriptyline, neurontin for chronic pain - will continue    Also with a spinal stimulator in place  Outpatient follow-up with Pain Management and with Spine Surgery

## 2021-06-05 NOTE — PROGRESS NOTES
[facility] [address]  Progress Note Mariam  1950, 80 y o  female MRN: [id number]  Unit/Bed#: 584-46 Encounter: [id number]  Primary Care Provider: Gloria Chavez DO   Date and time admitted to hospital: 6/4/2021 12:30 PM    * Closed fracture of proximal end of left fibula  Assessment & Plan  · The patient was seen in consultation by Orthopedic Surgery  · Non-operative management at this time  · Weight-bearing tolerated with a Cam boot applied to the left foot  · Utilize lovenox 40 mg SQ every 24 hours for DVT prophylaxis  · Requiring regular doses of IV dilaudid to achieve adequate pain control  · The patient was evaluated by PT during the hospitalization  · The patient requires short-term rehabilitation placement upon discharge  Transaminitis  Assessment & Plan  · Hold statin therapy  · Check an acute hepatitis panel  · Avoid all hepatotoxic agents  · Follow the liver function tests    Results from last 7 days   Lab Units 06/05/21  0439 06/04/21  1306   SODIUM mmol/L 142 140   POTASSIUM mmol/L 4 0 4 1   CHLORIDE mmol/L 106 106   CO2 mmol/L 26 27   BUN mg/dL 13 14   CREATININE mg/dL 0 73 0 70   CALCIUM mg/dL 8 8 9 0   ALK PHOS U/L  --  92   ALT U/L  --  68   AST U/L  --  58*         Chronic low back pain  Assessment & Plan  Patient takes amitriptyline, neurontin for chronic pain - will continue  Also with a spinal stimulator in place  Outpatient follow-up with Pain Management and with Spine Surgery    Hyperlipidemia  Assessment & Plan  · Hold statin therapy with the transaminitis    Accelerated hypertension  Assessment & Plan  Likely In the setting of acute pain  Will trend blood pressure  Give IV hydralazine PRN for SBP > 170        VTE Pharmacologic Prophylaxis:   Pharmacologic: Enoxaparin (Lovenox)  Mechanical VTE Prophylaxis in Place: Yes    Patient Centered Rounds: I have performed bedside rounds with nursing staff today      Discussions with Specialists or Other Care Team Provider: I discussed the case with Orthopedic Surgery  Time Spent for Care: 30 minutes  More than 50% of total time spent on counseling and coordination of care as described above  Current Length of Stay: 1 day(s)    Current Patient Status: Inpatient   Certification Statement: The patient will continue to require additional inpatient hospital stay due to the patient requiring regular doses of IV dilaudid to achieve adequate pain control and for short-term rehabilitation placement upon discharge  Code Status: Level 1 - Full Code      Subjective: The patient was seen and examined  The patient complains of severe left lower extremity pain from the left knee down to the left foot  Objective:     Vitals:   Temp (24hrs), Av °F (37 2 °C), Min:98 7 °F (37 1 °C), Max:99 2 °F (37 3 °C)    Temp:  [98 7 °F (37 1 °C)-99 2 °F (37 3 °C)] 98 7 °F (37 1 °C)  HR:  [71-89] 89  Resp:  [16-24] 16  BP: (123-191)/() 154/118  SpO2:  [93 %-99 %] 95 %  Body mass index is 37 34 kg/m²  Input and Output Summary (last 24 hours):        Intake/Output Summary (Last 24 hours) at 2021 1407  Last data filed at 2021 1229  Gross per 24 hour   Intake 420 ml   Output --   Net 420 ml       Physical Exam:     Physical Exam  General:  NAD, follows commands  HEENT:  NC/AT, mucous membranes moist  Neck:  Supple, No JVP elevation  CV:  + S1, + S2, RRR  Pulm:  Lung fields are CTA bilaterally  Abd:  Soft, Non-tender, Non-distended  Ext:  No clubbing/cyanosis/edema  Skin:  No rashes  Neuro:  Awake, alert, oriented  Psych:  Normal mood and affect      Additional Data:    Labs:    Results from last 7 days   Lab Units 21  0439 21  1306   WBC Thousand/uL 9 50 8 24   HEMOGLOBIN g/dL 13 5 14 0   HEMATOCRIT % 41 5 41 9   PLATELETS Thousands/uL 211 240   NEUTROS PCT %  --  50   LYMPHS PCT %  --  37   MONOS PCT %  --  7   EOS PCT %  --  5     Results from last 7 days   Lab Units 21  0439 21  1306 SODIUM mmol/L 142 140   POTASSIUM mmol/L 4 0 4 1   CHLORIDE mmol/L 106 106   CO2 mmol/L 26 27   BUN mg/dL 13 14   CREATININE mg/dL 0 73 0 70   ANION GAP mmol/L 10 7   CALCIUM mg/dL 8 8 9 0   ALBUMIN g/dL  --  3 9   TOTAL BILIRUBIN mg/dL  --  0 48   ALK PHOS U/L  --  92   ALT U/L  --  68   AST U/L  --  58*   GLUCOSE RANDOM mg/dL 103 122     Results from last 7 days   Lab Units 06/04/21  1306   INR  0 97                       * I Have Reviewed All Lab Data Listed Above  * Additional Pertinent Lab Tests Reviewed: Kai 66 Admission Reviewed      Recent Cultures (last 7 days):           Last 24 Hours Medication List:   Current Facility-Administered Medications   Medication Dose Route Frequency Provider Last Rate    amitriptyline  50 mg Oral HS Madhu Cullen PA-C      calcium carbonate  1 tablet Oral BID With Meals AMINA Butts-C      cholecalciferol  1,000 Units Oral Daily Dana Johnathan, PA-C      cyanocobalamin  50 mcg Oral Daily Dana AMINA Mann-C      dicyclomine  10 mg Oral BID Dana AMINA Mann-C      enoxaparin  40 mg Subcutaneous Q24H Mesfin Callahan DO      fish oil  1,000 mg Oral TID Dana AMINA Mann-BERNY      gabapentin  300 mg Oral TID Dana Johnathan, PA-C      hydrALAZINE  5 mg Intravenous Q6H PRN Dana AMINA Mann-C      HYDROmorphone  0 5 mg Intravenous Q3H PRN Mesfin Callahan DO      Or    HYDROmorphone  1 mg Intravenous Q3H PRN Mesfin Callahan DO      loratadine  10 mg Oral Daily Madhu Cullen PA-C      montelukast  10 mg Oral HS Madhu Cullen PA-C      pantoprazole  40 mg Oral Early Morning Dana Mann PA-C          Today, Patient Was Seen By: Mesfin Callahan DO    ** Please Note: Dictation voice to text software may have been used in the creation of this document   **

## 2021-06-05 NOTE — DISCHARGE INSTRUCTIONS
Weight-bearing as tolerated left lower extremity with walker in high Cam boot  Ice times 20 minutes 4-6 times per day to the fracture site and ankle region as needed  Pain meds as needed  Home on aspirin 325 mg tablet twice a day with food for DVT prophylaxis    Patient to follow-up with orthopedic doctor, Dr Corina Caraballo in 2 weeks

## 2021-06-05 NOTE — PLAN OF CARE
Problem: PHYSICAL THERAPY ADULT  Goal: Performs mobility at highest level of function for planned discharge setting  See evaluation for individualized goals  Description: Treatment/Interventions: ADL retraining, LE strengthening/ROM, Functional transfer training, Elevations, Therapeutic exercise, Bed mobility, Gait training, Endurance training          See flowsheet documentation for full assessment, interventions and recommendations  Note: Prognosis: Good  Problem List: Decreased strength, Decreased range of motion, Decreased endurance, Impaired balance, Decreased mobility  Assessment: Pt is 80 y o  female seen for PT evaluation s/p admit to [address] on 6/4/2021 w/ Closed fracture of proximal end of left fibula  PT consulted to assess pt's functional mobility and d/c needs  Order placed for PT eval and tx, w/ up and OOB as tolerated order  Comorbidities affecting pt's physical performance at time of assessment include: Left fibular fracture, HTN, hyperlipidemia, GERD, arthritis, osteopenia,   PTA, pt was independent w/ all functional mobility w/ no AD  Personal factors affecting pt at time of IE include: ambulating w/ assistive device, stairs to enter home, inability to ambulate household distances, inability to navigate community distances, inability to navigate level surfaces w/o external assistance, unable to perform dynamic tasks in community, positive fall history, unable to perform physical activity, inability to perform IADLs and inability to perform ADLs  Please find objective findings from PT assessment regarding body systems outlined above with impairments and limitations including weakness, decreased ROM, impaired balance, decreased endurance, impaired coordination, gait deviations, pain, decreased activity tolerance, decreased functional mobility tolerance and altered sensation   Pt to benefit from continued PT tx to address deficits as defined above and maximize level of functional independent mobility and consistency  From PT/mobility standpoint, recommendation at time of d/c would be post acute rehabilitation services pending progress in order to facilitate return to PLOF  PT Discharge Recommendation: Post acute rehabilitation services          See flowsheet documentation for full assessment

## 2021-06-05 NOTE — PLAN OF CARE
Problem: Potential for Falls  Goal: Patient will remain free of falls  Description: INTERVENTIONS:  - Assess patient frequently for physical needs  -  Identify cognitive and physical deficits and behaviors that affect risk of falls    -  [place] fall precautions as indicated by assessment   - Educate patient/family on patient safety including physical limitations  - Instruct patient to call for assistance with activity based on assessment  - Modify environment to reduce risk of injury  - Consider OT/PT consult to assist with strengthening/mobility  Outcome: Progressing     Problem: Prexisting or High Potential for Compromised Skin Integrity  Goal: Skin integrity is maintained or improved  Description: INTERVENTIONS:  - Identify patients at risk for skin breakdown  - Assess and monitor skin integrity  - Assess and monitor nutrition and hydration status  - Monitor labs   - Assess for incontinence   - Turn and reposition patient  - Assist with mobility/ambulation  - Relieve pressure over bony prominences  - Avoid friction and shearing  - Provide appropriate hygiene as needed including keeping skin clean and dry  - Evaluate need for skin moisturizer/barrier cream  - Collaborate with interdisciplinary team   - Patient/family teaching  - Consider wound care consult   Outcome: Progressing     Problem: PAIN - ADULT  Goal: Verbalizes/displays adequate comfort level or baseline comfort level  Description: Interventions:  - Encourage patient to monitor pain and request assistance  - Assess pain using appropriate pain scale  - Administer analgesics based on type and severity of pain and evaluate response  - Implement non-pharmacological measures as appropriate and evaluate response  - Consider cultural and social influences on pain and pain management  - Notify physician/advanced practitioner if interventions unsuccessful or patient reports new pain  Outcome: Progressing     Problem: INFECTION - ADULT  Goal: Absence or prevention of progression during hospitalization  Description: INTERVENTIONS:  - Assess and monitor for signs and symptoms of infection  - Monitor lab/diagnostic results  - Monitor all insertion sites, i e  indwelling lines, tubes, and drains  - Monitor endotracheal if appropriate and nasal secretions for changes in amount and color  - [place] appropriate cooling/warming therapies per order  - Administer medications as ordered  - Instruct and encourage patient and family to use good hand hygiene technique  - Identify and instruct in appropriate isolation precautions for identified infection/condition  Outcome: Progressing     Problem: SAFETY ADULT  Goal: Patient will remain free of falls  Description: INTERVENTIONS:  - Assess patient frequently for physical needs  -  Identify cognitive and physical deficits and behaviors that affect risk of falls    -  [place] fall precautions as indicated by assessment   - Educate patient/family on patient safety including physical limitations  - Instruct patient to call for assistance with activity based on assessment  - Modify environment to reduce risk of injury  - Consider OT/PT consult to assist with strengthening/mobility  Outcome: Progressing  Goal: Maintain or return to baseline ADL function  Description: INTERVENTIONS:  -  Assess patient's ability to carry out ADLs; assess patient's baseline for ADL function and identify physical deficits which impact ability to perform ADLs (bathing, care of mouth/teeth, toileting, grooming, dressing, etc )  - Assess/evaluate cause of self-care deficits   - Assess range of motion  - Assess patient's mobility; develop plan if impaired  - Assess patient's need for assistive devices and provide as appropriate  - Encourage maximum independence but intervene and supervise when necessary  - Involve family in performance of ADLs  - Assess for home care needs following discharge   - Consider OT consult to assist with ADL evaluation and planning for discharge  - Provide patient education as appropriate  Outcome: Progressing  Goal: Maintain or return mobility status to optimal level  Description: INTERVENTIONS:  - Assess patient's baseline mobility status (ambulation, transfers, stairs, etc )    - Identify cognitive and physical deficits and behaviors that affect mobility  - Identify mobility aids required to assist with transfers and/or ambulation (gait belt, sit-to-stand, lift, walker, cane, etc )  - [place] fall precautions as indicated by assessment  - Record patient progress and toleration of activity level on Mobility SBAR; progress patient to next Phase/Stage  - Instruct patient to call for assistance with activity based on assessment  - Consider rehabilitation consult to assist with strengthening/weightbearing, etc   Outcome: Progressing     Problem: DISCHARGE PLANNING  Goal: Discharge to home or other facility with appropriate resources  Description: INTERVENTIONS:  - Identify barriers to discharge w/patient and caregiver  - Arrange for needed discharge resources and transportation as appropriate  - Identify discharge learning needs (meds, wound care, etc )  - Arrange for interpretive services to assist at discharge as needed  - Refer to Case Management Department for coordinating discharge planning if the patient needs post-hospital services based on physician/advanced practitioner order or complex needs related to functional status, cognitive ability, or social support system  Outcome: Progressing     Problem: Knowledge Deficit  Goal: Patient/family/caregiver demonstrates understanding of disease process, treatment plan, medications, and discharge instructions  Description: Complete learning assessment and assess knowledge base    Interventions:  - Provide teaching at level of understanding  - Provide teaching via preferred learning methods  Outcome: Progressing     Problem: MUSCULOSKELETAL - ADULT  Goal: Maintain or return mobility to safest level of function  Description: INTERVENTIONS:  - Assess patient's ability to carry out ADLs; assess patient's baseline for ADL function and identify physical deficits which impact ability to perform ADLs (bathing, care of mouth/teeth, toileting, grooming, dressing, etc )  - Assess/evaluate cause of self-care deficits   - Assess range of motion  - Assess patient's mobility  - Assess patient's need for assistive devices and provide as appropriate  - Encourage maximum independence but intervene and supervise when necessary  - Involve family in performance of ADLs  - Assess for home care needs following discharge   - Consider OT consult to assist with ADL evaluation and planning for discharge  - Provide patient education as appropriate  Outcome: Progressing  Goal: Maintain proper alignment of affected body part  Description: INTERVENTIONS:  - Support, maintain and protect limb and body alignment  - Provide patient/ family with appropriate education  Outcome: Progressing

## 2021-06-05 NOTE — OCCUPATIONAL THERAPY NOTE
Occupational Therapy Evaluation     Patient Name: Mayo SINGLETON Date: 6/5/2021  Problem List  Principal Problem:    Closed fracture of proximal end of left fibula  Active Problems:    Accelerated hypertension    Hyperlipidemia    Gastroesophageal reflux disease    Chronic low back pain    Past Medical History  Past Medical History:   Diagnosis Date    Abnormal findings on diagnostic imaging of breast     Arthritis     Chronic pain disorder     Extremity pain     Fibrocystic breast disease     Foot pain     GERD (gastroesophageal reflux disease)     Hyperlipidemia     Hypertension     Interstitial cystitis     Joint pain     Low back pain     Osteoarthritis     Osteopenia      Past Surgical History  Past Surgical History:   Procedure Laterality Date    APPENDECTOMY      BACK SURGERY      BREAST EXCISIONAL BIOPSY Left 1996    benign    CARPAL BOSS EXCISION      CHOLECYSTECTOMY      DIAGNOSTIC LAPAROSCOPY      FOOT SURGERY      FRACTURE SURGERY      HYSTERECTOMY      age 33    HYSTEROSCOPY      JOINT REPLACEMENT      KIDNEY SURGERY Left     KNEE ARTHROSCOPY Bilateral     LAMINECTOMY      LAPAROSCOPY      hynecologic with adhesions    OOPHORECTOMY Bilateral     age 33   [place] ORTHOPEDIC SURGERY      AR SURG IMPLNT Rondi Racer N/A 5/18/2017    Procedure: PLACEMENT OF THORACIC SPINAL CORD STIMULATOR WITH LEFT BUTTOCK IMPLANTABLE PULSE GENERATOR (IMPULSE MONITORING-MOTORS (TCEMEP), EMG, SSEP);   Surgeon: Yovany Tse MD;  Location:  MAIN OR;  Service: Neurosurgery    SPINAL CORD STIMULATOR IMPLANT Left 9/29/2020    Procedure: LAMINECTOMY THORACIC , REMOVAL OF SCS LEADS AND GENERATOR;  Surgeon: Onofre Knott MD;  Location:  MAIN OR;  Service: Neurosurgery    SPINAL STIMULATOR PLACEMENT  05/2017    TONSILLECTOMY AND ADENOIDECTOMY      onset: unknown    TOTAL KNEE ARTHROPLASTY Right              06/05/21 0921   OT Last Visit   OT Visit Date 06/05/21   Note Type Note type Evaluation   Restrictions/Precautions   Weight Bearing Precautions Per Order Yes   LLE Weight Bearing Per Order WBAT   Braces or Orthoses CAM Boot   Other Precautions Bed Alarm; Fall Risk;Pain   Pain Assessment   Pain Assessment Tool 0-10   Pain Score Worst Possible Pain   Pain Location/Orientation Orientation: Left; Location: Leg   Home Living   Type of [address] One level;Performs ADLs on one level; Able to live on main level with bedroom/bathroom;Stairs to enter with rails; Other (Comment)  (17 ANTONY c HR)   Bathroom Shower/Tub Tub/shower unit   Bathroom Toilet Standard   Bathroom Accessibility Accessible   Home Equipment Other (Comment)  (standard walker)   Additional Comments pt reports no DME at baseline during mobility   Prior Function   Level of [place] Independent with ADLs and functional mobility   Lives With Alone   ADL Assistance Independent   IADLs Independent   Falls in the last 6 months 1 to 4   Vocational Part time employment   Comments pt is (I) with driving   Psychosocial   Psychosocial (WDL) WDL   Subjective   Subjective "I must have hit my knee when I fell down those steps"   ADL   Where Assessed Supine, bed   LB Dressing Assistance 2  Maximal Assistance   LB Dressing Deficit Don/doff R sock; Don/doff L sock   Additional Comments pt also requires max (A) for dressing as well as donning CAM boot to L LE   Bed Mobility   Supine to Sit 4  Minimal assistance   Additional items Assist x 2;Bedrails; Increased time required;Verbal cues;LE management   Sit to Supine   (seated in chair at end of session)   Additional Comments pt on RA during session; SpO2 WFL with no complaints of SOB   Transfers   Sit to Stand 4  Minimal assistance   Additional items Assist x 1; Increased time required;Verbal cues  (RW)   Stand to Sit 4  Minimal assistance   Additional items Assist x 1; Increased time required;Verbal cues  (RW)   Additional Comments pt performed with use of RW this date; no significant instability or LOB, however increased pain when perform mobility and transfers with L LE   Functional Mobility   Functional Mobility 4  Minimal assistance   Additional Comments x1 with use of RW; pt with ability to perform WBAT to L LE, however significantly limited due to pain with all movement of L LE; performed ~3ft to the chair this date; limited by pain   Additional items Rolling walker   Balance   Static Sitting Fair +   Dynamic Sitting Fair +   Static Standing Fair   Dynamic Standing Fair -   Ambulatory Poor +   Activity Tolerance   Activity Tolerance Patient limited by pain; Patient limited by fatigue   RUE Assessment   RUE Assessment WFL   LUE Assessment   LUE Assessment WFL   Hand Function   Gross Motor Coordination Functional   Fine Motor Coordination Functional   Sensation   Light Touch No apparent deficits   Sharp/Dull No apparent deficits   Cognition   Overall Cognitive Status WFL   Arousal/Participation Alert   Attention Within functional limits   Orientation Level Oriented X4   Memory Within functional limits   Following Commands Follows all commands and directions without difficulty   Assessment   Limitation Decreased ADL status; Decreased UE strength;Decreased Safe judgement during ADL;Decreased endurance;Decreased self-care trans;Decreased high-level ADLs   Assessment  Pt is a 80 y o  female seen for OT evaluation s/p admit to [facility] on 6/4/2021 w/ Closed fracture of proximal end of left fibula  Comorbidities affecting pt's functional performance at time of assessment include: HTN, hyperlipidemia, GERD, arthritis, fibrocystic breast disease, osteopenia, osteoarthritis  Personal factors affecting pt at time of IE include:steps to enter environment, difficulty performing ADLS, difficulty performing IADLS , decreased initiation and engagement  and health management   Prior to admission, pt was (I) with ADLs and IADLs with no device during functional mobility   Upon evaluation: Pt requires min-max (A) x1 with use of RW during mobility with WBAT to L LE in CAM boot 2* the following deficits impacting occupational performance: weakness, decreased strength, decreased balance, decreased tolerance, impaired initiation, decreased safety awareness, increased pain, orthopedic restrictions and decreased coping skills  Pt to benefit from continued skilled OT tx while in the hospital to address deficits as defined above and maximize level of functional independence w ADL's and functional mobility  Occupational Performance areas to address include: grooming, bathing/shower, toilet hygiene, dressing, functional mobility, community mobility and clothing management  The patient's raw score on the AM-PAC Daily Activity inpatient short form is 18, standardized score is 38 66, less than 39 4  Patients at this level are likely to benefit from discharge to post-acute rehabilitation services  Please refer to the recommendation of the Occupational Therapist for safe discharge planning  Goals   Patient Goals to go home    Short Term Goal  pt will perform UE strengthening exercises    Long Term Goal #1 pt will demonstrate toilet transfers and hygiene at (I) level   Long Term Goal #2 pt will demonstrate UB/LB bathing and grooming tasks at (I) level   Long Term Goal pt will demonstrate functional mobility with WBAT to L LE in CAM boot with RW at mod (I) level   Plan   Treatment Interventions ADL retraining;Functional transfer training;UE strengthening/ROM; Endurance training;Patient/family training;Equipment evaluation/education; Activityengagement   Goal Expiration Date 06/19/21   OT Frequency 3-5x/wk   Recommendation   OT Discharge Recommendation Post acute rehabilitation services   AM-[facility] Daily Activity Inpatient   Lower Body Dressing 2   Bathing 2   Toileting 4   Upper Body Dressing 3   Grooming 3   Eating 4   Daily Activity Raw Score 18   Daily Activity Standardized Score (Calc for Raw Score >=11) 38 66   AM-PAC Applied Cognition Inpatient   Following a Speech/Presentation 4   Understanding Ordinary Conversation 4   Taking Medications 4   Remembering Where Things Are Placed or Put Away 4   Remembering List of 4-5 Errands 4   Taking Care of Complicated Tasks 4   Applied Cognition Raw Score 24   Applied Cognition Standardized Score 62 21     Pt will benefit from continued OT services in order to maximize (I) c ADL performance, FM c RW, and improve overall endurance/strength required to complete functional tasks in preparation for d/c  Pt left seated in chair at end of session; all needs within reach; all lines intact

## 2021-06-05 NOTE — CASE MANAGEMENT
CM met with  Patient to evaluate the patients prior function and any barriers to d/c and form a safe d/c plan  CM also evaluated patient for any sevices in home or needs for service  Patient lives a lone in an apartment w/ 0 ANTONY's and 17 inside steps  She is independent w/her ADL's and does not have or use any DME's at home  Never had home care  She drives herself to her appointments but states one of her sisters will transport her on D/C    PCP Dr Feng Stoner in [place]

## 2021-06-05 NOTE — ASSESSMENT & PLAN NOTE
· Hold statin therapy  · Check an acute hepatitis panel  · Avoid all hepatotoxic agents  · Follow the liver function tests    Results from last 7 days   Lab Units 06/05/21  0439 06/04/21  1306   SODIUM mmol/L 142 140   POTASSIUM mmol/L 4 0 4 1   CHLORIDE mmol/L 106 106   CO2 mmol/L 26 27   BUN mg/dL 13 14   CREATININE mg/dL 0 73 0 70   CALCIUM mg/dL 8 8 9 0   ALK PHOS U/L  --  92   ALT U/L  --  68   AST U/L  --  58*

## 2021-06-05 NOTE — PLAN OF CARE
Problem: OCCUPATIONAL THERAPY ADULT  Goal: Performs self-care activities at highest level of function for planned discharge setting  See evaluation for individualized goals  Description: Treatment Interventions: ADL retraining, Functional transfer training, UE strengthening/ROM, Endurance training, Patient/family training, Equipment evaluation/education, Activityengagement          See flowsheet documentation for full assessment, interventions and recommendations  Note: Limitation: Decreased ADL status, Decreased UE strength, Decreased Safe judgement during ADL, Decreased endurance, Decreased self-care trans, Decreased high-level ADLs     Assessment:  Pt is a 80 y o  female seen for OT evaluation s/p admit to [facility] on 6/4/2021 w/ Closed fracture of proximal end of left fibula  Comorbidities affecting pt's functional performance at time of assessment include: HTN, hyperlipidemia, GERD, arthritis, fibrocystic breast disease, osteopenia, osteoarthritis  Personal factors affecting pt at time of IE include:steps to enter environment, difficulty performing ADLS, difficulty performing IADLS , decreased initiation and engagement  and health management   Prior to admission, pt was (I) with ADLs and IADLs with no device during functional mobility  Upon evaluation: Pt requires min-max (A) x1 with use of RW during mobility with WBAT to L LE in CAM boot 2* the following deficits impacting occupational performance: weakness, decreased strength, decreased balance, decreased tolerance, impaired initiation, decreased safety awareness, increased pain, orthopedic restrictions and decreased coping skills  Pt to benefit from continued skilled OT tx while in the hospital to address deficits as defined above and maximize level of functional independence w ADL's and functional mobility   Occupational Performance areas to address include: grooming, bathing/shower, toilet hygiene, dressing, functional mobility, community mobility and clothing management  The patient's raw score on the AM-PAC Daily Activity inpatient short form is 18, standardized score is 38 66, less than 39 4  Patients at this level are likely to benefit from discharge to post-acute rehabilitation services  Please refer to the recommendation of the Occupational Therapist for safe discharge planning       OT Discharge Recommendation: Post acute rehabilitation services

## 2021-06-05 NOTE — PHYSICAL THERAPY NOTE
Physical Therapy Evaluation     Patient Name: Mayo Irwin    DKAXD'C Date: 6/5/2021     Problem List  Principal Problem:    Closed fracture of proximal end of left fibula  Active Problems:    Accelerated hypertension    Hyperlipidemia    Gastroesophageal reflux disease    Chronic low back pain       Past Medical History  Past Medical History:   Diagnosis Date    Abnormal findings on diagnostic imaging of breast     Arthritis     Chronic pain disorder     Extremity pain     Fibrocystic breast disease     Foot pain     GERD (gastroesophageal reflux disease)     Hyperlipidemia     Hypertension     Interstitial cystitis     Joint pain     Low back pain     Osteoarthritis     Osteopenia         Past Surgical History  Past Surgical History:   Procedure Laterality Date    APPENDECTOMY      BACK SURGERY      BREAST EXCISIONAL BIOPSY Left 1996    benign    CARPAL BOSS EXCISION      CHOLECYSTECTOMY      DIAGNOSTIC LAPAROSCOPY      FOOT SURGERY      FRACTURE SURGERY      HYSTERECTOMY      age 33    HYSTEROSCOPY      JOINT REPLACEMENT      KIDNEY SURGERY Left     KNEE ARTHROSCOPY Bilateral     LAMINECTOMY      LAPAROSCOPY      hynecologic with adhesions    OOPHORECTOMY Bilateral     age 33   Paramjit Lemme ORTHOPEDIC SURGERY      ND SURG IMPLNT [address] N/A 5/18/2017    Procedure: PLACEMENT OF THORACIC SPINAL CORD STIMULATOR WITH LEFT BUTTOCK IMPLANTABLE PULSE GENERATOR (IMPULSE MONITORING-MOTORS (TCEMEP), EMG, SSEP);   Surgeon: Yovany Tse MD;  Location:  MAIN OR;  Service: Neurosurgery    SPINAL CORD STIMULATOR IMPLANT Left 9/29/2020    Procedure: LAMINECTOMY THORACIC , REMOVAL OF SCS LEADS AND GENERATOR;  Surgeon: Onofre Knott MD;  Location:  MAIN OR;  Service: Neurosurgery    SPINAL STIMULATOR PLACEMENT  05/2017    TONSILLECTOMY AND ADENOIDECTOMY      onset: unknown    TOTAL KNEE ARTHROPLASTY Right            06/05/21 1000 Note Type   Note type Evaluation   Pain Assessment   Pain Assessment Tool 0-10   Pain Score Worst Possible Pain   Pain Location/Orientation Orientation: Left; Location: Foot; Location: Leg   Home Living   Additional Comments See OT Eval   Prior Function   Comments See  OT Eval   Restrictions/Precautions   Weight Bearing Precautions Per Order Yes   LLE Weight Bearing Per Order WBAT   Braces or Orthoses CAM Boot   Other Precautions Bed Alarm; Fall Risk;Pain   Cognition   Overall Cognitive Status WFL   Arousal/Participation Alert   Attention Within functional limits   Orientation Level Oriented X4   Memory Within functional limits   Following Commands Follows all commands and directions without difficulty   RLE Assessment   RLE Assessment WFL   LLE Assessment   LLE Assessment X  (WBAT LLE in CAM boot L Fibular Fx )   Bed Mobility   Supine to Sit 4  Minimal assistance   Additional items Assist x 2;Leg ;Verbal cues; Bedrails   Transfers   Sit to Stand 4  Minimal assistance   Additional items Assist x 1; Increased time required;Verbal cues   Stand to Sit 4  Minimal assistance   Additional items Assist x 1; Increased time required;Verbal cues   Additional Comments RW   Ambulation/Elevation   Gait pattern L Foot drag;Decreased L stance;L Knee Grant; Improper Weight shift; Poor UE support; Inconsistent jayson; Excessively slow; Step to   Gait Assistance 4  Minimal assist   Additional items Assist x 1   Assistive Device Rolling walker   Distance 5 feet to chair    Balance   Static Sitting Fair +   Dynamic Sitting Fair +   Static Standing Fair   Dynamic Standing Fair -   Ambulatory Poor +   Activity Tolerance   Activity Tolerance Patient limited by fatigue   Assessment   Prognosis Good   Problem List Decreased strength;Decreased range of motion;Decreased endurance; Impaired balance;Decreased mobility   Assessment Pt is 80 y o  female seen for PT evaluation s/p admit to [address] on 6/4/2021 w/ Closed fracture of proximal end of left fibula  PT consulted to assess pt's functional mobility and d/c needs  Order placed for PT eval and tx, w/ up and OOB as tolerated order  Comorbidities affecting pt's physical performance at time of assessment include: Left fibular fracture, HTN, hyperlipidemia, GERD, arthritis, osteopenia,   PTA, pt was independent w/ all functional mobility w/ no AD  Personal factors affecting pt at time of IE include: ambulating w/ assistive device, stairs to enter home, inability to ambulate household distances, inability to navigate community distances, inability to navigate level surfaces w/o external assistance, unable to perform dynamic tasks in community, positive fall history, unable to perform physical activity, inability to perform IADLs and inability to perform ADLs  Please find objective findings from PT assessment regarding body systems outlined above with impairments and limitations including weakness, decreased ROM, impaired balance, decreased endurance, impaired coordination, gait deviations, pain, decreased activity tolerance, decreased functional mobility tolerance and altered sensation  Pt to benefit from continued PT tx to address deficits as defined above and maximize level of functional independent mobility and consistency  From PT/mobility standpoint, recommendation at time of d/c would be post acute rehabilitation services pending progress in order to facilitate return to PLOF  Goals   Patient Goals to feel better    LTG Expiration Date 06/19/21   Long Term Goal #1 Patient will be (I) with all transfers and bed mobility    Long Term Goal #2 Patient will ambulate 100 feet with (S)   Plan   Treatment/Interventions ADL retraining;LE strengthening/ROM; Functional transfer training;Elevations; Therapeutic exercise; Bed mobility;Gait training; Endurance training   PT Frequency   (3-5x per week )   Recommendation   PT Discharge Recommendation Post acute rehabilitation services   [facility] Basic Mobility Inpatient   Turning in Bed Without Bedrails 2   Lying on Back to Sitting on Edge of Flat Bed 2   Moving Bed to Chair 2   Standing Up From Chair 2   Walk in Room 2   Climb 3-5 Stairs 2   Basic Mobility Inpatient Raw Score 12   Basic Mobility Standardized Score 32 23     Patient in bed when PT entered  Patient seated in chair when PT left  All lines intact, all needs within reach  Patients raw score on the AM-[facility] Basic Mobility inpatient short form is 12, standardized score is 32 23, less than) 42  9  patient's at this level are likely to benefit from D/C to /post acute rehab services, however, please refer to therapist recommendation for safe D/C Plan

## 2021-06-05 NOTE — ASSESSMENT & PLAN NOTE
· The patient was seen in consultation by Orthopedic Surgery  · Non-operative management at this time  · Weight-bearing tolerated with a Cam boot applied to the left foot  · Utilize lovenox 40 mg SQ every 24 hours for DVT prophylaxis  · Requiring regular doses of IV dilaudid to achieve adequate pain control  · The patient was evaluated by PT during the hospitalization  · The patient requires short-term rehabilitation placement upon discharge

## 2021-06-06 PROBLEM — D72.829 LEUKOCYTOSIS: Status: ACTIVE | Noted: 2021-06-06

## 2021-06-06 PROBLEM — R60.0 EDEMA OF LEFT LOWER EXTREMITY: Status: ACTIVE | Noted: 2021-06-06

## 2021-06-06 LAB
ALBUMIN SERPL BCP-MCNC: 3.5 G/DL (ref 3.5–5)
ALP SERPL-CCNC: 85 U/L (ref 46–116)
ALT SERPL W P-5'-P-CCNC: 48 U/L (ref 12–78)
ANION GAP SERPL CALCULATED.3IONS-SCNC: 10 MMOL/L (ref 4–13)
AST SERPL W P-5'-P-CCNC: 29 U/L (ref 5–45)
BACTERIA UR QL AUTO: ABNORMAL /HPF
BASOPHILS # BLD AUTO: 0.07 THOUSANDS/ΜL (ref 0–0.1)
BASOPHILS NFR BLD AUTO: 1 % (ref 0–1)
BILIRUB SERPL-MCNC: 0.47 MG/DL (ref 0.2–1)
BILIRUB UR QL STRIP: NEGATIVE
BUN SERPL-MCNC: 11 MG/DL (ref 5–25)
CALCIUM SERPL-MCNC: 9.1 MG/DL (ref 8.3–10.1)
CHLORIDE SERPL-SCNC: 103 MMOL/L (ref 100–108)
CK SERPL-CCNC: 58 U/L (ref 26–192)
CLARITY UR: ABNORMAL
CO2 SERPL-SCNC: 26 MMOL/L (ref 21–32)
COLOR UR: YELLOW
CREAT SERPL-MCNC: 0.71 MG/DL (ref 0.6–1.3)
EOSINOPHIL # BLD AUTO: 0.39 THOUSAND/ΜL (ref 0–0.61)
EOSINOPHIL NFR BLD AUTO: 4 % (ref 0–6)
ERYTHROCYTE [DISTWIDTH] IN BLOOD BY AUTOMATED COUNT: 12.3 % (ref 11.6–15.1)
GFR SERPL CREATININE-BSD FRML MDRD: 87 ML/MIN/1.73SQ M
GLUCOSE SERPL-MCNC: 111 MG/DL (ref 65–140)
GLUCOSE UR STRIP-MCNC: NEGATIVE MG/DL
HAV IGM SER QL: NORMAL
HBV CORE IGM SER QL: NORMAL
HBV SURFACE AG SER QL: NORMAL
HCT VFR BLD AUTO: 41.3 % (ref 34.8–46.1)
HCV AB SER QL: NORMAL
HGB BLD-MCNC: 13.5 G/DL (ref 11.5–15.4)
HGB UR QL STRIP.AUTO: ABNORMAL
IMM GRANULOCYTES # BLD AUTO: 0.04 THOUSAND/UL (ref 0–0.2)
IMM GRANULOCYTES NFR BLD AUTO: 0 % (ref 0–2)
KETONES UR STRIP-MCNC: NEGATIVE MG/DL
LEUKOCYTE ESTERASE UR QL STRIP: ABNORMAL
LYMPHOCYTES # BLD AUTO: 2.49 THOUSANDS/ΜL (ref 0.6–4.47)
LYMPHOCYTES NFR BLD AUTO: 24 % (ref 14–44)
MAGNESIUM SERPL-MCNC: 2 MG/DL (ref 1.6–2.6)
MCH RBC QN AUTO: 29.1 PG (ref 26.8–34.3)
MCHC RBC AUTO-ENTMCNC: 32.7 G/DL (ref 31.4–37.4)
MCV RBC AUTO: 89 FL (ref 82–98)
MONOCYTES # BLD AUTO: 0.77 THOUSAND/ΜL (ref 0.17–1.22)
MONOCYTES NFR BLD AUTO: 7 % (ref 4–12)
NEUTROPHILS # BLD AUTO: 6.59 THOUSANDS/ΜL (ref 1.85–7.62)
NEUTS SEG NFR BLD AUTO: 64 % (ref 43–75)
NITRITE UR QL STRIP: POSITIVE
NON-SQ EPI CELLS URNS QL MICRO: ABNORMAL /HPF
NRBC BLD AUTO-RTO: 0 /100 WBCS
PH UR STRIP.AUTO: 6.5 [PH]
PLATELET # BLD AUTO: 211 THOUSANDS/UL (ref 149–390)
PMV BLD AUTO: 9.6 FL (ref 8.9–12.7)
POTASSIUM SERPL-SCNC: 3.9 MMOL/L (ref 3.5–5.3)
PREALB SERPL-MCNC: 16.9 MG/DL (ref 18–40)
PROCALCITONIN SERPL-MCNC: <0.05 NG/ML
PROT SERPL-MCNC: 6.8 G/DL (ref 6.4–8.2)
PROT UR STRIP-MCNC: NEGATIVE MG/DL
RBC # BLD AUTO: 4.64 MILLION/UL (ref 3.81–5.12)
RBC #/AREA URNS AUTO: ABNORMAL /HPF
SODIUM SERPL-SCNC: 139 MMOL/L (ref 136–145)
SP GR UR STRIP.AUTO: 1.01 (ref 1–1.03)
UROBILINOGEN UR QL STRIP.AUTO: 0.2 E.U./DL
WBC # BLD AUTO: 10.35 THOUSAND/UL (ref 4.31–10.16)
WBC #/AREA URNS AUTO: ABNORMAL /HPF

## 2021-06-06 PROCEDURE — 99232 SBSQ HOSP IP/OBS MODERATE 35: CPT | Performed by: INTERNAL MEDICINE

## 2021-06-06 PROCEDURE — 87186 SC STD MICRODIL/AGAR DIL: CPT | Performed by: INTERNAL MEDICINE

## 2021-06-06 PROCEDURE — 87181 SC STD AGAR DILUTION PER AGT: CPT | Performed by: INTERNAL MEDICINE

## 2021-06-06 PROCEDURE — 81001 URINALYSIS AUTO W/SCOPE: CPT | Performed by: INTERNAL MEDICINE

## 2021-06-06 PROCEDURE — 84145 PROCALCITONIN (PCT): CPT | Performed by: INTERNAL MEDICINE

## 2021-06-06 PROCEDURE — 82550 ASSAY OF CK (CPK): CPT | Performed by: INTERNAL MEDICINE

## 2021-06-06 PROCEDURE — 83735 ASSAY OF MAGNESIUM: CPT | Performed by: INTERNAL MEDICINE

## 2021-06-06 PROCEDURE — 85025 COMPLETE CBC W/AUTO DIFF WBC: CPT | Performed by: INTERNAL MEDICINE

## 2021-06-06 PROCEDURE — 80074 ACUTE HEPATITIS PANEL: CPT | Performed by: INTERNAL MEDICINE

## 2021-06-06 PROCEDURE — 87086 URINE CULTURE/COLONY COUNT: CPT | Performed by: INTERNAL MEDICINE

## 2021-06-06 PROCEDURE — 80053 COMPREHEN METABOLIC PANEL: CPT | Performed by: INTERNAL MEDICINE

## 2021-06-06 PROCEDURE — 87077 CULTURE AEROBIC IDENTIFY: CPT | Performed by: INTERNAL MEDICINE

## 2021-06-06 PROCEDURE — 84134 ASSAY OF PREALBUMIN: CPT | Performed by: INTERNAL MEDICINE

## 2021-06-06 RX ORDER — LOSARTAN POTASSIUM 50 MG/1
100 TABLET ORAL DAILY
Status: DISCONTINUED | OUTPATIENT
Start: 2021-06-06 | End: 2021-06-07 | Stop reason: HOSPADM

## 2021-06-06 RX ORDER — GABAPENTIN 400 MG/1
400 CAPSULE ORAL 3 TIMES DAILY
Status: DISCONTINUED | OUTPATIENT
Start: 2021-06-06 | End: 2021-06-07 | Stop reason: HOSPADM

## 2021-06-06 RX ORDER — LABETALOL 20 MG/4 ML (5 MG/ML) INTRAVENOUS SYRINGE
10 EVERY 6 HOURS PRN
Status: DISCONTINUED | OUTPATIENT
Start: 2021-06-06 | End: 2021-06-07 | Stop reason: HOSPADM

## 2021-06-06 RX ORDER — POTASSIUM CHLORIDE 20 MEQ/1
20 TABLET, EXTENDED RELEASE ORAL ONCE
Status: COMPLETED | OUTPATIENT
Start: 2021-06-06 | End: 2021-06-06

## 2021-06-06 RX ORDER — METHOCARBAMOL 500 MG/1
750 TABLET, FILM COATED ORAL EVERY 8 HOURS SCHEDULED
Status: DISCONTINUED | OUTPATIENT
Start: 2021-06-06 | End: 2021-06-07 | Stop reason: HOSPADM

## 2021-06-06 RX ADMIN — HYDROMORPHONE HYDROCHLORIDE 1 MG: 1 INJECTION, SOLUTION INTRAMUSCULAR; INTRAVENOUS; SUBCUTANEOUS at 19:57

## 2021-06-06 RX ADMIN — CALCIUM 1 TABLET: 500 TABLET ORAL at 15:39

## 2021-06-06 RX ADMIN — CALCIUM 1 TABLET: 500 TABLET ORAL at 08:14

## 2021-06-06 RX ADMIN — ENOXAPARIN SODIUM 40 MG: 40 INJECTION SUBCUTANEOUS at 16:48

## 2021-06-06 RX ADMIN — METHOCARBAMOL 750 MG: 500 TABLET ORAL at 22:10

## 2021-06-06 RX ADMIN — PANTOPRAZOLE SODIUM 40 MG: 40 TABLET, DELAYED RELEASE ORAL at 22:11

## 2021-06-06 RX ADMIN — MONTELUKAST 10 MG: 10 TABLET, FILM COATED ORAL at 22:11

## 2021-06-06 RX ADMIN — LORATADINE 10 MG: 10 TABLET ORAL at 08:14

## 2021-06-06 RX ADMIN — OMEGA-3 FATTY ACIDS CAP 1000 MG 1000 MG: 1000 CAP at 15:39

## 2021-06-06 RX ADMIN — HYDROMORPHONE HYDROCHLORIDE 1 MG: 1 INJECTION, SOLUTION INTRAMUSCULAR; INTRAVENOUS; SUBCUTANEOUS at 04:47

## 2021-06-06 RX ADMIN — AMITRIPTYLINE HYDROCHLORIDE 50 MG: 50 TABLET, FILM COATED ORAL at 22:11

## 2021-06-06 RX ADMIN — OMEGA-3 FATTY ACIDS CAP 1000 MG 1000 MG: 1000 CAP at 08:14

## 2021-06-06 RX ADMIN — Medication 1000 UNITS: at 08:14

## 2021-06-06 RX ADMIN — GABAPENTIN 400 MG: 400 CAPSULE ORAL at 15:39

## 2021-06-06 RX ADMIN — LOSARTAN POTASSIUM 100 MG: 50 TABLET, FILM COATED ORAL at 15:39

## 2021-06-06 RX ADMIN — DICYCLOMINE HYDROCHLORIDE 10 MG: 10 CAPSULE ORAL at 08:14

## 2021-06-06 RX ADMIN — HYDROMORPHONE HYDROCHLORIDE 1 MG: 1 INJECTION, SOLUTION INTRAMUSCULAR; INTRAVENOUS; SUBCUTANEOUS at 23:04

## 2021-06-06 RX ADMIN — HYDROMORPHONE HYDROCHLORIDE 1 MG: 1 INJECTION, SOLUTION INTRAMUSCULAR; INTRAVENOUS; SUBCUTANEOUS at 15:40

## 2021-06-06 RX ADMIN — GABAPENTIN 400 MG: 400 CAPSULE ORAL at 22:10

## 2021-06-06 RX ADMIN — METHOCARBAMOL 750 MG: 500 TABLET ORAL at 11:56

## 2021-06-06 RX ADMIN — DICYCLOMINE HYDROCHLORIDE 10 MG: 10 CAPSULE ORAL at 17:15

## 2021-06-06 RX ADMIN — HYDROMORPHONE HYDROCHLORIDE 1 MG: 1 INJECTION, SOLUTION INTRAMUSCULAR; INTRAVENOUS; SUBCUTANEOUS at 11:57

## 2021-06-06 RX ADMIN — OMEGA-3 FATTY ACIDS CAP 1000 MG 1000 MG: 1000 CAP at 22:11

## 2021-06-06 RX ADMIN — GABAPENTIN 300 MG: 300 CAPSULE ORAL at 08:14

## 2021-06-06 RX ADMIN — VITAM B12 50 MCG: 100 TAB at 08:14

## 2021-06-06 RX ADMIN — HYDROMORPHONE HYDROCHLORIDE 1 MG: 1 INJECTION, SOLUTION INTRAMUSCULAR; INTRAVENOUS; SUBCUTANEOUS at 07:55

## 2021-06-06 RX ADMIN — HYDROMORPHONE HYDROCHLORIDE 1 MG: 1 INJECTION, SOLUTION INTRAMUSCULAR; INTRAVENOUS; SUBCUTANEOUS at 00:43

## 2021-06-06 RX ADMIN — POTASSIUM CHLORIDE 20 MEQ: 1500 TABLET, EXTENDED RELEASE ORAL at 08:23

## 2021-06-06 NOTE — PLAN OF CARE
Problem: Potential for Falls  Goal: Patient will remain free of falls  Description: INTERVENTIONS:  - Assess patient frequently for physical needs  -  Identify cognitive and physical deficits and behaviors that affect risk of falls    -  [place] fall precautions as indicated by assessment   - Educate patient/family on patient safety including physical limitations  - Instruct patient to call for assistance with activity based on assessment  - Modify environment to reduce risk of injury  - Consider OT/PT consult to assist with strengthening/mobility  Outcome: Progressing     Problem: Prexisting or High Potential for Compromised Skin Integrity  Goal: Skin integrity is maintained or improved  Description: INTERVENTIONS:  - Identify patients at risk for skin breakdown  - Assess and monitor skin integrity  - Assess and monitor nutrition and hydration status  - Monitor labs   - Assess for incontinence   - Turn and reposition patient  - Assist with mobility/ambulation  - Relieve pressure over bony prominences  - Avoid friction and shearing  - Provide appropriate hygiene as needed including keeping skin clean and dry  - Evaluate need for skin moisturizer/barrier cream  - Collaborate with interdisciplinary team   - Patient/family teaching  - Consider wound care consult   Outcome: Progressing     Problem: PAIN - ADULT  Goal: Verbalizes/displays adequate comfort level or baseline comfort level  Description: Interventions:  - Encourage patient to monitor pain and request assistance  - Assess pain using appropriate pain scale  - Administer analgesics based on type and severity of pain and evaluate response  - Implement non-pharmacological measures as appropriate and evaluate response  - Consider cultural and social influences on pain and pain management  - Notify physician/advanced practitioner if interventions unsuccessful or patient reports new pain  Outcome: Progressing     Problem: INFECTION - ADULT  Goal: Absence or prevention of progression during hospitalization  Description: INTERVENTIONS:  - Assess and monitor for signs and symptoms of infection  - Monitor lab/diagnostic results  - Monitor all insertion sites, i e  indwelling lines, tubes, and drains  - Monitor endotracheal if appropriate and nasal secretions for changes in amount and color  - [place] appropriate cooling/warming therapies per order  - Administer medications as ordered  - Instruct and encourage patient and family to use good hand hygiene technique  - Identify and instruct in appropriate isolation precautions for identified infection/condition  Outcome: Progressing     Problem: SAFETY ADULT  Goal: Patient will remain free of falls  Description: INTERVENTIONS:  - Assess patient frequently for physical needs  -  Identify cognitive and physical deficits and behaviors that affect risk of falls    -  [place] fall precautions as indicated by assessment   - Educate patient/family on patient safety including physical limitations  - Instruct patient to call for assistance with activity based on assessment  - Modify environment to reduce risk of injury  - Consider OT/PT consult to assist with strengthening/mobility  Outcome: Progressing  Goal: Maintain or return to baseline ADL function  Description: INTERVENTIONS:  -  Assess patient's ability to carry out ADLs; assess patient's baseline for ADL function and identify physical deficits which impact ability to perform ADLs (bathing, care of mouth/teeth, toileting, grooming, dressing, etc )  - Assess/evaluate cause of self-care deficits   - Assess range of motion  - Assess patient's mobility; develop plan if impaired  - Assess patient's need for assistive devices and provide as appropriate  - Encourage maximum independence but intervene and supervise when necessary  - Involve family in performance of ADLs  - Assess for home care needs following discharge   - Consider OT consult to assist with ADL evaluation and planning for discharge  - Provide patient education as appropriate  Outcome: Progressing  Goal: Maintain or return mobility status to optimal level  Description: INTERVENTIONS:  - Assess patient's baseline mobility status (ambulation, transfers, stairs, etc )    - Identify cognitive and physical deficits and behaviors that affect mobility  - Identify mobility aids required to assist with transfers and/or ambulation (gait belt, sit-to-stand, lift, walker, cane, etc )  - [place] fall precautions as indicated by assessment  - Record patient progress and toleration of activity level on Mobility SBAR; progress patient to next Phase/Stage  - Instruct patient to call for assistance with activity based on assessment  - Consider rehabilitation consult to assist with strengthening/weightbearing, etc   Outcome: Progressing     Problem: DISCHARGE PLANNING  Goal: Discharge to home or other facility with appropriate resources  Description: INTERVENTIONS:  - Identify barriers to discharge w/patient and caregiver  - Arrange for needed discharge resources and transportation as appropriate  - Identify discharge learning needs (meds, wound care, etc )  - Arrange for interpretive services to assist at discharge as needed  - Refer to Case Management Department for coordinating discharge planning if the patient needs post-hospital services based on physician/advanced practitioner order or complex needs related to functional status, cognitive ability, or social support system  Outcome: Progressing     Problem: Knowledge Deficit  Goal: Patient/family/caregiver demonstrates understanding of disease process, treatment plan, medications, and discharge instructions  Description: Complete learning assessment and assess knowledge base    Interventions:  - Provide teaching at level of understanding  - Provide teaching via preferred learning methods  Outcome: Progressing     Problem: MUSCULOSKELETAL - ADULT  Goal: Maintain or return mobility to safest level of function  Description: INTERVENTIONS:  - Assess patient's ability to carry out ADLs; assess patient's baseline for ADL function and identify physical deficits which impact ability to perform ADLs (bathing, care of mouth/teeth, toileting, grooming, dressing, etc )  - Assess/evaluate cause of self-care deficits   - Assess range of motion  - Assess patient's mobility  - Assess patient's need for assistive devices and provide as appropriate  - Encourage maximum independence but intervene and supervise when necessary  - Involve family in performance of ADLs  - Assess for home care needs following discharge   - Consider OT consult to assist with ADL evaluation and planning for discharge  - Provide patient education as appropriate  Outcome: Progressing  Goal: Maintain proper alignment of affected body part  Description: INTERVENTIONS:  - Support, maintain and protect limb and body alignment  - Provide patient/ family with appropriate education  Outcome: Progressing

## 2021-06-06 NOTE — ASSESSMENT & PLAN NOTE
· Patient takes amitriptyline, neurontin for chronic pain - will continue    · Increase gabapentin to 400 mg PO TID for now with increasing lower back pain  · Also with a spinal stimulator in place  · Outpatient follow-up with Pain Management and with Spine Surgery

## 2021-06-06 NOTE — ASSESSMENT & PLAN NOTE
· Mild leukocytosis  · No obvious infectious etiology at this time  · Check a urinalysis and urine culture  · Check a procalcitonin level  · If the leukocytosis persists, check blood cultures x 2 sets  · COVID-19 testing was negative      Results from last 7 days   Lab Units 06/06/21  0457 06/05/21  0439 06/04/21  1306   WBC Thousand/uL 10 35* 9 50 8 24   HEMOGLOBIN g/dL 13 5 13 5 14 0   HEMATOCRIT % 41 3 41 5 41 9   PLATELETS Thousands/uL 211 211 240

## 2021-06-06 NOTE — ASSESSMENT & PLAN NOTE
· The patient was seen in consultation by Orthopedic Surgery  · Non-operative management at this time  · Weight-bearing tolerated with a Cam boot applied to the left foot  · Utilize lovenox 40 mg SQ every 24 hours for DVT prophylaxis  · Requiring regular doses of IV dilaudid to achieve adequate pain control  · The patient was evaluated by PT during the hospitalization  · The patient requires short-term rehabilitation placement upon discharge, but the patient declines short-term rehabilitation placement at this time

## 2021-06-06 NOTE — ASSESSMENT & PLAN NOTE
· In the setting of acute pain  · The accelerated hypertension has improved    · Continue her home medication of losartan 100 mg PO Qdaily  · Utilize PRN IV labetalol  · Follow the blood pressure trend   Outpatient follow-up with PCP in regards to this matter

## 2021-06-06 NOTE — PROGRESS NOTES
[address]  Progress Note Raymond Mariee 1950, 80 y o  female MRN: [id number]  Unit/Bed#: 005-21 Encounter: [id number]  Primary Care Provider: Dellar Fabry, DO   Date and time admitted to hospital: 6/4/2021 12:30 PM    * Closed fracture of proximal end of left fibula  Assessment & Plan  · The patient was seen in consultation by Orthopedic Surgery  · Non-operative management at this time  · Weight-bearing tolerated with a Cam boot applied to the left foot  · Utilize lovenox 40 mg SQ every 24 hours for DVT prophylaxis  · Requiring regular doses of IV dilaudid to achieve adequate pain control  · The patient was evaluated by PT during the hospitalization  · The patient requires short-term rehabilitation placement upon discharge, but the patient declines short-term rehabilitation placement at this time  Leukocytosis  Assessment & Plan  · Mild leukocytosis  · No obvious infectious etiology at this time  · Check a urinalysis and urine culture  · Check a procalcitonin level  · If the leukocytosis persists, check blood cultures x 2 sets  · COVID-19 testing was negative      Results from last 7 days   Lab Units 06/06/21  0457 06/05/21  0439 06/04/21  1306   WBC Thousand/uL 10 35* 9 50 8 24   HEMOGLOBIN g/dL 13 5 13 5 14 0   HEMATOCRIT % 41 3 41 5 41 9   PLATELETS Thousands/uL 211 211 240         Edema of left lower extremity  Assessment & Plan  · Increasing edema on 06/06/2021  · Check a venous duplex of the left lower extremity    Transaminitis  Assessment & Plan  · Hold statin therapy for now  · Check an acute hepatitis panel  · Avoid all hepatotoxic agents  · Follow the liver function tests    Results from last 7 days   Lab Units 06/06/21  0457 06/05/21  0439 06/04/21  1306   SODIUM mmol/L 139 142 140   POTASSIUM mmol/L 3 9 4 0 4 1   CHLORIDE mmol/L 103 106 106   CO2 mmol/L 26 26 27   BUN mg/dL 11 13 14   CREATININE mg/dL 0 71 0 73 0 70   CALCIUM mg/dL 9 1 8 8 9 0   ALK PHOS U/L 85  --  92   ALT U/L 48  --  68   AST U/L 29  --  58*     Results for Addis Panda (MRN [id number]) as of 6/6/2021 14:07   Ref  Range 6/6/2021 04:57   Total CK Latest Ref Range: 26 - 192 U/L 58       Chronic low back pain  Assessment & Plan  · Patient takes amitriptyline, neurontin for chronic pain - will continue  · Increase gabapentin to 400 mg PO TID for now with increasing lower back pain  · Also with a spinal stimulator in place  · Outpatient follow-up with Pain Management and with Spine Surgery    Hyperlipidemia  Assessment & Plan  · Hold statin therapy for now with the transaminitis    Accelerated hypertension  Assessment & Plan  · In the setting of acute pain  · The accelerated hypertension has improved  · Continue her home medication of losartan 100 mg PO Qdaily  · Utilize PRN IV labetalol  · Follow the blood pressure trend   Outpatient follow-up with PCP in regards to this matter        VTE Pharmacologic Prophylaxis:   Pharmacologic: Enoxaparin (Lovenox)  Mechanical VTE Prophylaxis in Place: Yes    Patient Centered Rounds: I have performed bedside rounds with nursing staff today  Time Spent for Care: 30 minutes  More than 50% of total time spent on counseling and coordination of care as described above  Current Length of Stay: 2 day(s)    Current Patient Status: Inpatient   Certification Statement: The patient will continue to require additional inpatient hospital stay due to the patient requiring regular doses of IV dilaudid to achieve adequate pain control and with the patient being unable to ambulate at this time  Code Status: Level 1 - Full Code      Subjective: The patient was seen and examined  The patient complains of severe left knee pain radiating down to her left foot, which she describes as nerve pain "  The patient is also complaining of her chronic, lower back pain  No chest pain  No shortness of breath  No abdominal pain  No nausea or vomiting        Objective: Vitals:   Temp (24hrs), Av 6 °F (37 °C), Min:98 3 °F (36 8 °C), Max:99 2 °F (37 3 °C)    Temp:  [98 3 °F (36 8 °C)-99 2 °F (37 3 °C)] 98 3 °F (36 8 °C)  HR:  [64-79] 70  Resp:  [16-18] 16  BP: (133-152)/(66-68) 133/66  SpO2:  [92 %-96 %] 95 %  Body mass index is 37 34 kg/m²  Input and Output Summary (last 24 hours): Intake/Output Summary (Last 24 hours) at 2021 1414  Last data filed at 2021 1237  Gross per 24 hour   Intake 0 ml   Output 600 ml   Net -600 ml       Physical Exam:     Physical Exam  General:  NAD, follows commands  HEENT:  NC/AT, mucous membranes moist  Neck:  Supple, No JVP elevation  CV:  + S1, + S2, RRR  Pulm:  Lung fields are CTA bilaterally  Abd:  Soft, Non-tender, Non-distended  Ext:  No clubbing/cyanosis, Edema of the left lower extremity  Skin:  No rashes  Neuro:  Awake, alert, oriented  Psych:  Normal mood and affect      Additional Data:    Labs:    Results from last 7 days   Lab Units 21  0457   WBC Thousand/uL 10 35*   HEMOGLOBIN g/dL 13 5   HEMATOCRIT % 41 3   PLATELETS Thousands/uL 211   NEUTROS PCT % 64   LYMPHS PCT % 24   MONOS PCT % 7   EOS PCT % 4     Results from last 7 days   Lab Units 21  0457   SODIUM mmol/L 139   POTASSIUM mmol/L 3 9   CHLORIDE mmol/L 103   CO2 mmol/L 26   BUN mg/dL 11   CREATININE mg/dL 0 71   ANION GAP mmol/L 10   CALCIUM mg/dL 9 1   ALBUMIN g/dL 3 5   TOTAL BILIRUBIN mg/dL 0 47   ALK PHOS U/L 85   ALT U/L 48   AST U/L 29   GLUCOSE RANDOM mg/dL 111     Results from last 7 days   Lab Units 21  1306   INR  0 97                       * I Have Reviewed All Lab Data Listed Above  * Additional Pertinent Lab Tests Reviewed:  All [place] Admission Reviewed      Recent Cultures (last 7 days):           Last 24 Hours Medication List:   Current Facility-Administered Medications   Medication Dose Route Frequency Provider Last Rate    amitriptyline  50 mg Oral HS Madhu Cullen PA-C      calcium carbonate  1 tablet Oral BID With Meals Sg Mancilla PA-C      cholecalciferol  1,000 Units Oral Daily Sg Mancilla PA-C      cyanocobalamin  50 mcg Oral Daily Sg Mancilla PA-C      dicyclomine  10 mg Oral BID Sg Mancilla PA-C      enoxaparin  40 mg Subcutaneous Q24H Sankethuong Eduardo,       fish oil  1,000 mg Oral TID Sg Mancilla PA-C      gabapentin  400 mg Oral TID [employer], DO      HYDROmorphone  0 5 mg Intravenous Q3H PRN [employer], DO      Or    HYDROmorphone  1 mg Intravenous Q3H PRN [employer], DO      Labetalol HCl  10 mg Intravenous Q6H PRN [employer], DO      loratadine  10 mg Oral Daily Madhu Cullen PA-C      losartan  100 mg Oral Daily Sanket Eduardo,       methocarbamol  750 mg Oral Q8H [address] Reza Rasmussen,       montelukast  10 mg Oral HS Madhu Cullen PA-C      pantoprazole  40 mg Oral Early Morning Sg Mancilla PA-C          Today, Patient Was Seen By: Sanket Eduardo DO    ** Please Note: Dictation voice to text software may have been used in the creation of this document   **

## 2021-06-06 NOTE — ASSESSMENT & PLAN NOTE
· Hold statin therapy for now  · Check an acute hepatitis panel  · Avoid all hepatotoxic agents  · Follow the liver function tests    Results from last 7 days   Lab Units 06/06/21  0457 06/05/21  0439 06/04/21  1306   SODIUM mmol/L 139 142 140   POTASSIUM mmol/L 3 9 4 0 4 1   CHLORIDE mmol/L 103 106 106   CO2 mmol/L 26 26 27   BUN mg/dL 11 13 14   CREATININE mg/dL 0 71 0 73 0 70   CALCIUM mg/dL 9 1 8 8 9 0   ALK PHOS U/L 85  --  92   ALT U/L 48  --  68   AST U/L 29  --  58*     Results for Charles Askew (MRN [id number]) as of 6/6/2021 14:07   Ref   Range 6/6/2021 04:57   Total CK Latest Ref Range: 26 - 192 U/L 58

## 2021-06-07 ENCOUNTER — HOSPITAL ENCOUNTER (INPATIENT)
Facility: HOSPITAL | Age: 71
LOS: 4 days | Discharge: HOME HEALTH | DRG: 560 | End: 2021-06-11
Attending: INTERNAL MEDICINE | Admitting: INTERNAL MEDICINE
Payer: MEDICARE

## 2021-06-07 ENCOUNTER — APPOINTMENT (INPATIENT)
Dept: NON INVASIVE DIAGNOSTICS | Facility: HOSPITAL | Age: 71
DRG: 563 | End: 2021-06-07
Payer: MEDICARE

## 2021-06-07 VITALS
DIASTOLIC BLOOD PRESSURE: 55 MMHG | BODY MASS INDEX: 37.57 KG/M2 | RESPIRATION RATE: 18 BRPM | HEIGHT: 62 IN | TEMPERATURE: 99.1 F | HEART RATE: 77 BPM | SYSTOLIC BLOOD PRESSURE: 95 MMHG | WEIGHT: 204.15 LBS | OXYGEN SATURATION: 94 %

## 2021-06-07 PROBLEM — M25.562 ACUTE PAIN OF LEFT KNEE: Status: ACTIVE | Noted: 2021-06-07

## 2021-06-07 LAB
ALBUMIN SERPL BCP-MCNC: 3.1 G/DL (ref 3.5–5)
ALP SERPL-CCNC: 72 U/L (ref 46–116)
ALT SERPL W P-5'-P-CCNC: 39 U/L (ref 12–78)
ANION GAP SERPL CALCULATED.3IONS-SCNC: 8 MMOL/L (ref 4–13)
AST SERPL W P-5'-P-CCNC: 26 U/L (ref 5–45)
ATRIAL RATE: 93 BPM
BASOPHILS # BLD AUTO: 0.06 THOUSANDS/ΜL (ref 0–0.1)
BASOPHILS NFR BLD AUTO: 1 % (ref 0–1)
BILIRUB SERPL-MCNC: 0.35 MG/DL (ref 0.2–1)
BUN SERPL-MCNC: 12 MG/DL (ref 5–25)
CALCIUM ALBUM COR SERPL-MCNC: 9.4 MG/DL (ref 8.3–10.1)
CALCIUM SERPL-MCNC: 8.7 MG/DL (ref 8.3–10.1)
CHLORIDE SERPL-SCNC: 104 MMOL/L (ref 100–108)
CO2 SERPL-SCNC: 27 MMOL/L (ref 21–32)
CREAT SERPL-MCNC: 0.64 MG/DL (ref 0.6–1.3)
EOSINOPHIL # BLD AUTO: 0.26 THOUSAND/ΜL (ref 0–0.61)
EOSINOPHIL NFR BLD AUTO: 3 % (ref 0–6)
ERYTHROCYTE [DISTWIDTH] IN BLOOD BY AUTOMATED COUNT: 12.2 % (ref 11.6–15.1)
GFR SERPL CREATININE-BSD FRML MDRD: 91 ML/MIN/1.73SQ M
GLUCOSE SERPL-MCNC: 119 MG/DL (ref 65–140)
HCT VFR BLD AUTO: 39.9 % (ref 34.8–46.1)
HGB BLD-MCNC: 13.1 G/DL (ref 11.5–15.4)
IMM GRANULOCYTES # BLD AUTO: 0.05 THOUSAND/UL (ref 0–0.2)
IMM GRANULOCYTES NFR BLD AUTO: 1 % (ref 0–2)
LYMPHOCYTES # BLD AUTO: 1.86 THOUSANDS/ΜL (ref 0.6–4.47)
LYMPHOCYTES NFR BLD AUTO: 20 % (ref 14–44)
MAGNESIUM SERPL-MCNC: 2 MG/DL (ref 1.6–2.6)
MCH RBC QN AUTO: 29.2 PG (ref 26.8–34.3)
MCHC RBC AUTO-ENTMCNC: 32.8 G/DL (ref 31.4–37.4)
MCV RBC AUTO: 89 FL (ref 82–98)
MONOCYTES # BLD AUTO: 0.84 THOUSAND/ΜL (ref 0.17–1.22)
MONOCYTES NFR BLD AUTO: 9 % (ref 4–12)
NEUTROPHILS # BLD AUTO: 6.07 THOUSANDS/ΜL (ref 1.85–7.62)
NEUTS SEG NFR BLD AUTO: 66 % (ref 43–75)
NRBC BLD AUTO-RTO: 0 /100 WBCS
P AXIS: 54 DEGREES
PHOSPHATE SERPL-MCNC: 3.9 MG/DL (ref 2.3–4.1)
PLATELET # BLD AUTO: 201 THOUSANDS/UL (ref 149–390)
PMV BLD AUTO: 9.3 FL (ref 8.9–12.7)
POTASSIUM SERPL-SCNC: 4 MMOL/L (ref 3.5–5.3)
PR INTERVAL: 148 MS
PROCALCITONIN SERPL-MCNC: <0.05 NG/ML
PROT SERPL-MCNC: 6.4 G/DL (ref 6.4–8.2)
QRS AXIS: 19 DEGREES
QRSD INTERVAL: 90 MS
QT INTERVAL: 372 MS
QTC INTERVAL: 462 MS
RBC # BLD AUTO: 4.48 MILLION/UL (ref 3.81–5.12)
SARS-COV-2 RNA RESP QL NAA+PROBE: NEGATIVE
SODIUM SERPL-SCNC: 139 MMOL/L (ref 136–145)
T WAVE AXIS: 16 DEGREES
VENTRICULAR RATE: 93 BPM
WBC # BLD AUTO: 9.14 THOUSAND/UL (ref 4.31–10.16)

## 2021-06-07 PROCEDURE — 85025 COMPLETE CBC W/AUTO DIFF WBC: CPT | Performed by: INTERNAL MEDICINE

## 2021-06-07 PROCEDURE — 80053 COMPREHEN METABOLIC PANEL: CPT | Performed by: INTERNAL MEDICINE

## 2021-06-07 PROCEDURE — 83735 ASSAY OF MAGNESIUM: CPT | Performed by: INTERNAL MEDICINE

## 2021-06-07 PROCEDURE — 99239 HOSP IP/OBS DSCHRG MGMT >30: CPT | Performed by: PHYSICIAN ASSISTANT

## 2021-06-07 PROCEDURE — 97535 SELF CARE MNGMENT TRAINING: CPT

## 2021-06-07 PROCEDURE — 97530 THERAPEUTIC ACTIVITIES: CPT

## 2021-06-07 PROCEDURE — 84100 ASSAY OF PHOSPHORUS: CPT | Performed by: INTERNAL MEDICINE

## 2021-06-07 PROCEDURE — 84145 PROCALCITONIN (PCT): CPT | Performed by: INTERNAL MEDICINE

## 2021-06-07 PROCEDURE — U0003 INFECTIOUS AGENT DETECTION BY NUCLEIC ACID (DNA OR RNA); SEVERE ACUTE RESPIRATORY SYNDROME CORONAVIRUS 2 (SARS-COV-2) (CORONAVIRUS DISEASE [COVID-19]), AMPLIFIED PROBE TECHNIQUE, MAKING USE OF HIGH THROUGHPUT TECHNOLOGIES AS DESCRIBED BY CMS-2020-01-R: HCPCS | Performed by: PHYSICIAN ASSISTANT

## 2021-06-07 PROCEDURE — 93971 EXTREMITY STUDY: CPT | Performed by: SURGERY

## 2021-06-07 PROCEDURE — 93010 ELECTROCARDIOGRAM REPORT: CPT | Performed by: INTERNAL MEDICINE

## 2021-06-07 PROCEDURE — U0005 INFEC AGEN DETEC AMPLI PROBE: HCPCS | Performed by: PHYSICIAN ASSISTANT

## 2021-06-07 PROCEDURE — 97116 GAIT TRAINING THERAPY: CPT

## 2021-06-07 PROCEDURE — 93971 EXTREMITY STUDY: CPT

## 2021-06-07 RX ORDER — OXYCODONE HYDROCHLORIDE 5 MG/1
5 TABLET ORAL EVERY 6 HOURS PRN
Status: DISCONTINUED | OUTPATIENT
Start: 2021-06-07 | End: 2021-06-07 | Stop reason: HOSPADM

## 2021-06-07 RX ORDER — GABAPENTIN 400 MG/1
400 CAPSULE ORAL 3 TIMES DAILY
Refills: 0
Start: 2021-06-07 | End: 2021-07-07 | Stop reason: DRUGHIGH

## 2021-06-07 RX ORDER — OXYCODONE HYDROCHLORIDE 5 MG/1
5 TABLET ORAL EVERY 6 HOURS PRN
Qty: 8 TABLET | Refills: 0 | Status: SHIPPED | OUTPATIENT
Start: 2021-06-07 | End: 2021-06-17

## 2021-06-07 RX ORDER — ASPIRIN 325 MG
325 TABLET, DELAYED RELEASE (ENTERIC COATED) ORAL 2 TIMES DAILY
Qty: 60 TABLET | Refills: 0 | Status: SHIPPED | OUTPATIENT
Start: 2021-06-07

## 2021-06-07 RX ADMIN — CALCIUM 1 TABLET: 500 TABLET ORAL at 08:43

## 2021-06-07 RX ADMIN — METHOCARBAMOL 750 MG: 500 TABLET ORAL at 14:11

## 2021-06-07 RX ADMIN — DICYCLOMINE HYDROCHLORIDE 10 MG: 10 CAPSULE ORAL at 08:44

## 2021-06-07 RX ADMIN — OXYCODONE HYDROCHLORIDE 5 MG: 5 TABLET ORAL at 16:15

## 2021-06-07 RX ADMIN — VITAM B12 50 MCG: 100 TAB at 08:43

## 2021-06-07 RX ADMIN — HYDROMORPHONE HYDROCHLORIDE 1 MG: 1 INJECTION, SOLUTION INTRAMUSCULAR; INTRAVENOUS; SUBCUTANEOUS at 14:11

## 2021-06-07 RX ADMIN — LOSARTAN POTASSIUM 100 MG: 50 TABLET, FILM COATED ORAL at 08:43

## 2021-06-07 RX ADMIN — GABAPENTIN 400 MG: 400 CAPSULE ORAL at 08:44

## 2021-06-07 RX ADMIN — HYDROMORPHONE HYDROCHLORIDE 1 MG: 1 INJECTION, SOLUTION INTRAMUSCULAR; INTRAVENOUS; SUBCUTANEOUS at 08:44

## 2021-06-07 RX ADMIN — OMEGA-3 FATTY ACIDS CAP 1000 MG 1000 MG: 1000 CAP at 08:43

## 2021-06-07 RX ADMIN — LORATADINE 10 MG: 10 TABLET ORAL at 08:44

## 2021-06-07 RX ADMIN — Medication 1000 UNITS: at 08:44

## 2021-06-07 RX ADMIN — HYDROMORPHONE HYDROCHLORIDE 1 MG: 1 INJECTION, SOLUTION INTRAMUSCULAR; INTRAVENOUS; SUBCUTANEOUS at 02:37

## 2021-06-07 RX ADMIN — METHOCARBAMOL 750 MG: 500 TABLET ORAL at 05:26

## 2021-06-07 NOTE — PLAN OF CARE
Problem: PHYSICAL THERAPY ADULT  Goal: Performs mobility at highest level of function for planned discharge setting  See evaluation for individualized goals  Description:  Outcome: Progressing  Note: Prognosis: Good  Problem List: Decreased strength, Decreased range of motion, Decreased endurance, Impaired balance, Decreased mobility, Orthopedic restrictions, Pain  Assessment: Pt  seen for PT treatment session this date with interventions consisting of   bed mobility, transfers and  gait training w/ emphasis on improving pt's ability to ambulate  Pt  Currently performing  tx and ambulation at ( min) x 1 level of function  The patient's AM-PAC Basic Mobility Inpatient Short Form Raw Score is 18, Standardized Score is 41 05  A standardized score less than 42 9 suggests the patient may benefit from discharge to post-acute rehabilitation services  Please also refer to physical therapy recommendation for safe DC planning  In comparison to previous session, Pt  With improvements in activity tolerance  Increased time for bed mobility due to L foot pain  Episode L knee buckling on stairs, able to self correct  Pt is in need of continued activity in PT to improve strength balance endurance mobility transfers and ambulation with return to maximize LOF  From PT/mobility standpoint, recommendation at time of d/c would be post acute rehab  in order to promote return to PLOF and independence  PT Discharge Recommendation: Post acute rehabilitation services          See flowsheet documentation for full assessment

## 2021-06-07 NOTE — ASSESSMENT & PLAN NOTE
· In the setting of acute pain  · The accelerated hypertension has improved    · Continue her home medication of losartan 100 mg PO Qdaily  · Follow the blood pressure trend   Outpatient follow-up with PCP in regards to this matter

## 2021-06-07 NOTE — PHYSICAL THERAPY NOTE
PHYSICAL THERAPY NOTE          Patient Name: Nat Gill  KCEUC'B Date: 6/7/2021 06/07/21 1008   Pain Assessment   Pain Assessment Tool 0-10   Pain Score 6   Pain Location/Orientation Orientation: Left; Location: Knee; Location: Foot   Restrictions/Precautions   Weight Bearing Precautions Per Order No   LLE Weight Bearing Per Order WBAT   Braces or Orthoses CAM Boot   Other Precautions Bed Alarm; Chair Alarm; Fall Risk;Pain   Cognition   Overall Cognitive Status WFL   Arousal/Participation Alert   Following Commands Follows all commands and directions without difficulty   Subjective   Subjective reports she has more pain in her knee than foot  Agreeable to therapy  Bed Mobility   Supine to Sit 4  Minimal assistance   Additional items Assist x 1;HOB elevated; Bedrails; Increased time required;Verbal cues;LE management   Transfers   Sit to Stand 4  Minimal assistance   Additional items Assist x 1; Increased time required;Verbal cues   Stand to Sit 4  Minimal assistance   Additional items Assist x 1; Armrests; Increased time required;Verbal cues   Stand pivot 4  Minimal assistance   Additional items Verbal cues   Additional Comments utilized RW for transfers  Requires assist to don CAM Boot  Difficulty leaning forward due to previous back problems and foot pain  Ambulation/Elevation   Gait pattern Decreased L stance;L Knee Grant; Step to;Excessively slow   Gait Assistance 4  Minimal assist   Additional items Assist x 1;Verbal cues   Assistive Device Rolling walker   Distance 45', 5' x 2   Stair Management Technique Two rails; Step to pattern; Foreward   Number of Stairs 7  (4 steps up/down seated rest, 5 steps up/down seated rest)   Balance   Static Sitting Fair +   Dynamic Sitting Fair +   Static Standing Fair   Dynamic Standing Fair -   Ambulatory Fair -  (RW)   Endurance Deficit   Endurance Deficit Yes   Activity Tolerance Activity Tolerance Patient limited by fatigue;Patient limited by pain   Assessment   Prognosis Good   Problem List Decreased strength;Decreased range of motion;Decreased endurance; Impaired balance;Decreased mobility;Orthopedic restrictions;Pain   Assessment Pt  seen for PT treatment session this date with interventions consisting of   bed mobility, transfers and  gait training w/ emphasis on improving pt's ability to ambulate  Pt  Currently performing  tx and ambulation at ( min) x 1 level of function  The patient's AM-[facility] Basic Mobility Inpatient Short Form Raw Score is 18, Standardized Score is 41 05  A standardized score less than 42 9 suggests the patient may benefit from discharge to post-acute rehabilitation services  Please also refer to physical therapy recommendation for safe DC planning  In comparison to previous session, Pt  With improvements in activity tolerance  Increased time for bed mobility due to L foot pain  Episode L knee buckling on stairs, able to self correct  Pt is in need of continued activity in PT to improve strength balance endurance mobility transfers and ambulation with return to maximize LOF  From PT/mobility standpoint, recommendation at time of d/c would be post acute rehab  in order to promote return to PLOF and independence  Goals   LTG Expiration Date 06/19/21   PT Treatment Day 1   Plan   Treatment/Interventions Functional transfer training;LE strengthening/ROM; Therapeutic exercise;Elevations; Endurance training;Bed mobility;Gait training   Progress Progressing toward goals   Recommendation   PT Discharge Recommendation Post acute rehabilitation services   [facility] Basic Mobility Inpatient   Turning in Bed Without Bedrails 3   Lying on Back to Sitting on Edge of Flat Bed 3   Moving Bed to Chair 3   Standing Up From Chair 3   Walk in Room 3   Climb 3-5 Stairs 3   Basic Mobility Inpatient Raw Score 18   Basic Mobility Standardized Score 41 05   Pt   OOB in chair  with call bell within reach, all lines intact and alarm on at end of PT session  Discussed with  PT today's treatment and patient's current level of function for care coordination

## 2021-06-07 NOTE — OCCUPATIONAL THERAPY NOTE
OccupationalTherapy Progress Note     Patient Name: Carlotta March  WMUSQ'H Date: 6/7/2021  Problem List  Principal Problem:    Closed fracture of proximal end of left fibula  Active Problems:    Accelerated hypertension    Hyperlipidemia    Chronic low back pain    Transaminitis    Edema of left lower extremity    Leukocytosis          06/07/21 1001   OT Last Visit   OT Visit Date 06/07/21   Note Type   Note Type Treatment   Restrictions/Precautions   Weight Bearing Precautions Per Order No   LLE Weight Bearing Per Order WBAT   Braces or Orthoses CAM Boot   Other Precautions Pain; Fall Risk;Bed Alarm   Pain Assessment   Pain Assessment Tool 0-10   Pain Score 6   Pain Location/Orientation Orientation: Left; Location: Knee; Location: Leg;Location: Foot   ADL   Where Assessed Chair   Grooming Assistance 5  Supervision/Setup   Grooming Deficit Setup   UB Bathing Assistance 5  Supervision/Setup   UB Bathing Deficit Setup   LB Bathing Assistance 3  Moderate Assistance   LB Bathing Deficit Setup;Verbal cueing;Supervision/safety; Increased time to complete; Buttocks;Right lower leg including foot; Left lower leg including foot   UB Dressing Assistance 5  Supervision/Setup   UB Dressing Deficit Setup   LB Dressing Assistance 2  Maximal Assistance   LB Dressing Deficit Setup;Steadying;Supervision/safety;Verbal cueing; Don/doff R sock; Don/doff L sock  (Donning and doffing L CAM BOOT)   Transfers   Sit to Stand 4  Minimal assistance   Additional items Assist x 1; Increased time required;Verbal cues   Stand to Sit 4  Minimal assistance   Additional items Assist x 1; Increased time required;Verbal cues;Armrests   Stand pivot 4  Minimal assistance   Additional items Assist x 1; Increased time required;Verbal cues   Additional Comments Use of RW   Functional Mobility   Functional Mobility 4  Minimal assistance   Additional Comments Pt completed standing balance/functional mobility around room and hallways with RW and Min Ax1 with verbal cues throughout for safety with RW with L knee buckle noted 1x with patient able to self correct, with distance limited by pain and fatigue      Additional items Rolling walker   Cognition   Overall Cognitive Status WFL   Arousal/Participation Alert   Attention Within functional limits   Orientation Level Oriented X4   Memory Within functional limits   Following Commands Follows all commands and directions without difficulty   Activity Tolerance   Activity Tolerance Patient limited by pain   Assessment   Assessment Patient participated in Skilled OT session this date with interventions consisting of ADL re training with the use of correct body mechnaics and  therapeutic activities to: increase activity tolerance   Patient agreeable to OT treatment session, upon arrival patient was found seated at edge of bed  Sit to stand txfrs from bed with Min Ax1, standing balance/functional mobility around room and hallways with RW and Min Ax1 with patients distance limited by pain and fatigue  Pt required verbal cues throughout for safety with RW with L knee buckle noted 1x with patient able to self correct  To increase posture, dynamic standing balance, balance during self cares, use of BUE  Pt completed self care tasks at this time seated in neil chair  UE bathing/dressing set up A, LE bathing Mod A to wash bottom and B feet, LE dressing Max A to aaron B  socks and aaron CMA Boot to L LE with patient reporting "I had a hard time at home getting my socks on and things because of my back " Grooming Set up A  Patient requiring verbal cues for safety and verbal cues for correct technique  Patient continues to be functioning below baseline level, occupational performance remains limited secondary to factors listed above and increased risk for falls and injury  From OT standpoint, recommendation at time of d/c would be Post acute rehabilitation services    The patient's raw score on the AM-PAC Daily Activity inpatient short form is 18, standardized score is 38 66, less than 39 4  Patients at this level are likely to benefit from discharge to post-acute rehabilitation services  Please refer to the recommendation of the Occupational Therapist for safe discharge planning  Plan   Goal Expiration Date 06/19/21   OT Treatment Day 1   OT Frequency 3-5x/wk   Recommendation   OT Discharge Recommendation Post acute rehabilitation services   AM-PAC Daily Activity Inpatient   Lower Body Dressing 2   Bathing 2   Toileting 4   Upper Body Dressing 3   Grooming 3   Eating 4   Daily Activity Raw Score 18   Daily Activity Standardized Score (Calc for Raw Score >=11) 38 66   AM-PAC Applied Cognition Inpatient   Following a Speech/Presentation 4   Understanding Ordinary Conversation 4   Taking Medications 4   Remembering Where Things Are Placed or Put Away 4   Remembering List of 4-5 Errands 4   Taking Care of Complicated Tasks 4   Applied Cognition Raw Score 24   Applied Cognition Standardized Score 62 21     Pt left seated in neil chair with chair alarm on, R BLANCA SCD donned and all needs in reach

## 2021-06-07 NOTE — ASSESSMENT & PLAN NOTE
· The patient was seen in consultation by Orthopedic Surgery  · Continue Non-operative management at this time  · Weight-bearing tolerated with a Cam boot applied to the left foot  · Utilized lovenox 40 mg SQ every 24 hours for DVT prophylaxis while here, will continue Aspirin 325mg BID on discharge for a 30 day course  · Requiring regular doses of IV dilaudid to achieve adequate pain control initially, now transitioned to PO oxycodone on discharge  · The patient was evaluated by PT during the hospitalization - would benefit from short term rehab  · The patient initially declined short term rehab, but given ongoing pain and difficulty with ambulation, now agreeable to go to the 5th floor

## 2021-06-07 NOTE — ASSESSMENT & PLAN NOTE
· Increasing edema on 06/06/2021  · Venous duplex of the left lower extremity - preliminary report is suggesting Orlando's cyst vs  Hematoma of the LLE

## 2021-06-07 NOTE — ASSESSMENT & PLAN NOTE
· Now improved  · Holding statin therapy for now  · acute hepatitis panel - negative  Results from last 7 days   Lab Units 06/07/21  0447 06/06/21  0457 06/05/21  0439 06/04/21  1306   SODIUM mmol/L 139 139 142 140   POTASSIUM mmol/L 4 0 3 9 4 0 4 1   CHLORIDE mmol/L 104 103 106 106   CO2 mmol/L 27 26 26 27   BUN mg/dL 12 11 13 14   CREATININE mg/dL 0 64 0 71 0 73 0 70   CALCIUM mg/dL 8 7 9 1 8 8 9 0   ALK PHOS U/L 72 85  --  92   ALT U/L 39 48  --  68   AST U/L 26 29  --  58*     Results for Nishant Najera (MRN [id number]) as of 6/6/2021 14:07   Ref   Range 6/6/2021 04:57   Total CK Latest Ref Range: 26 - 192 U/L 58

## 2021-06-07 NOTE — PLAN OF CARE
Problem: OCCUPATIONAL THERAPY ADULT  Goal: Performs self-care activities at highest level of function for planned discharge setting  See evaluation for individualized goals  Description: Treatment Interventions: ADL retraining, Functional transfer training, UE strengthening/ROM, Endurance training, Patient/family training, Equipment evaluation/education, Activityengagement          See flowsheet documentation for full assessment, interventions and recommendations  Outcome: Progressing  Note: Limitation: Decreased ADL status, Decreased UE strength, Decreased Safe judgement during ADL, Decreased endurance, Decreased self-care trans, Decreased high-level ADLs     Assessment: Patient participated in Skilled OT session this date with interventions consisting of ADL re training with the use of correct body mechnaics and  therapeutic activities to: increase activity tolerance   Patient agreeable to OT treatment session, upon arrival patient was found seated at edge of bed  Sit to stand txfrs from bed with Min Ax1, standing balance/functional mobility around room and hallways with RW and Min Ax1 with patients distance limited by pain and fatigue  Pt required verbal cues throughout for safety with RW with L knee buckle noted 1x with patient able to self correct  To increase posture, dynamic standing balance, balance during self cares, use of BUE  Pt completed self care tasks at this time seated in neil chair  UE bathing/dressing set up A, LE bathing Mod A to wash bottom and B feet, LE dressing Max A to aaron B  socks and aaron CMA Boot to L LE with patient reporting "I had a hard time at home getting my socks on and things because of my back " Grooming Set up A  Patient requiring verbal cues for safety and verbal cues for correct technique  Patient continues to be functioning below baseline level, occupational performance remains limited secondary to factors listed above and increased risk for falls and injury  From OT standpoint, recommendation at time of d/c would be Post acute rehabilitation services  The patient's raw score on the AM-PAC Daily Activity inpatient short form is 18, standardized score is 38 66, less than 39 4  Patients at this level are likely to benefit from discharge to post-acute rehabilitation services  Please refer to the recommendation of the Occupational Therapist for safe discharge planning       OT Discharge Recommendation: Post acute rehabilitation services

## 2021-06-07 NOTE — NURSING NOTE
Report given to [facility] on the 5th floor  Patient in no acute distress, able to leave floor via wheelchair accompanied by PCA

## 2021-06-07 NOTE — ASSESSMENT & PLAN NOTE
· Acute on chronic left knee is present, likely worsened in the setting of trauma  · Has known osteoarthritis of the left knee, for outpatient follow up with Dr Asad Armendariz  · Venous doppler of the LLE is showing Bakers cyst vs  Possible small hematoma  · Overall, pain control, PT/OT, and close outpatient follow up with orthopedics is warranted

## 2021-06-07 NOTE — DISCHARGE SUMMARY
[facility] [address]  Discharge- Bowen Ferro 1950, 80 y o  female MRN: [id number]  Unit/Bed#: 026-30 Encounter: [id number]  Primary Care Provider: Laura Henderson DO   Date and time admitted to hospital: 6/4/2021 12:30 PM    * Closed fracture of proximal end of left fibula  Assessment & Plan  · The patient was seen in consultation by Orthopedic Surgery  · Continue Non-operative management at this time  · Weight-bearing tolerated with a Cam boot applied to the left foot  · Utilized lovenox 40 mg SQ every 24 hours for DVT prophylaxis while here, will continue Aspirin 325mg BID on discharge for a 30 day course  · Requiring regular doses of IV dilaudid to achieve adequate pain control initially, now transitioned to PO oxycodone on discharge  · The patient was evaluated by PT during the hospitalization - would benefit from short term rehab  · The patient initially declined short term rehab, but given ongoing pain and difficulty with ambulation, now agreeable to go to the 5th floor  Acute pain of left knee  Assessment & Plan  · Acute on chronic left knee is present, likely worsened in the setting of trauma  · Has known osteoarthritis of the left knee, for outpatient follow up with Dr Corina Caraballo  · Venous doppler of the LLE is showing Bakers cyst vs  Possible small hematoma  · Overall, pain control, PT/OT, and close outpatient follow up with orthopedics is warranted  Leukocytosis  Assessment & Plan  · Mild leukocytosis, now resolved    remains afebrile  · No obvious infectious etiology at this time  · Urinalysis is showing the presence of bacteria, leukocytes, nitrites  Awaiting urine culture results  · Check a procalcitonin level  · COVID-19 testing was negative      Results from last 7 days   Lab Units 06/07/21  0447 06/06/21  0457 06/05/21  0439   WBC Thousand/uL 9 14 10 35* 9 50   HEMOGLOBIN g/dL 13 1 13 5 13 5   HEMATOCRIT % 39 9 41 3 41 5   PLATELETS Thousands/uL 201 211 211 Edema of left lower extremity  Assessment & Plan  · Increasing edema on 06/06/2021  · Venous duplex of the left lower extremity - preliminary report is suggesting [place]'s cyst vs  Hematoma of the LLE  Transaminitis  Assessment & Plan  · Now improved  · Holding statin therapy for now  · acute hepatitis panel - negative  Results from last 7 days   Lab Units 06/07/21  0447 06/06/21  0457 06/05/21  0439 06/04/21  1306   SODIUM mmol/L 139 139 142 140   POTASSIUM mmol/L 4 0 3 9 4 0 4 1   CHLORIDE mmol/L 104 103 106 106   CO2 mmol/L 27 26 26 27   BUN mg/dL 12 11 13 14   CREATININE mg/dL 0 64 0 71 0 73 0 70   CALCIUM mg/dL 8 7 9 1 8 8 9 0   ALK PHOS U/L 72 85  --  92   ALT U/L 39 48  --  68   AST U/L 26 29  --  58*     Results for Aime Lindsey (MRN [id number]) as of 6/6/2021 14:07   Ref  Range 6/6/2021 04:57   Total CK Latest Ref Range: 26 - 192 U/L 58       Chronic low back pain  Assessment & Plan  · Patient takes amitriptyline, neurontin for chronic pain - will continue  · Increased gabapentin to 400 mg PO TID for now with increasing lower back pain  · Also with a spinal stimulator in place  · Outpatient follow-up with Pain Management and with Spine Surgery    Hyperlipidemia  Assessment & Plan  · Hold statin therapy for now with the transaminitis    Accelerated hypertension  Assessment & Plan  · In the setting of acute pain  · The accelerated hypertension has improved  · Continue her home medication of losartan 100 mg PO Qdaily  · Follow the blood pressure trend   Outpatient follow-up with PCP in regards to this matter      Discharging Physician / Practitioner: Cherie Swanson PA-C  PCP: Catia Correia DO  Admission Date:   Admission Orders (From admission, onward)     Ordered        06/04/21 1548  Inpatient Admission  Once                   Discharge Date: 06/07/21    Resolved Problems  Date Reviewed: 6/7/2021    None          Consultations During Hospital Stay:  · Orthopedics        Procedures Performed:   · none      Significant Findings / Test Results:   Ct Abdomen Pelvis Wo Contrast  Result Date: 6/4/2021  Impression: LIMITED STUDY WITHOUT CONTRAST  No acute intra-abdominal abnormality or evidence for traumatic injury within limitations of the exam  3 cm left renal cystic lesion, incompletely characterized without IV contrast   Given ultrasound finding of a possible solid lesion, this finding should be deemed indeterminate  Nonemergent follow-up MRI abdomen or contrast-enhanced CT study with renal protocol is recommended  Fatty liver  The study was marked in Epic for follow-up  Workstation performed: [id number]     Xr Chest 1 View Portable  Result Date: 6/4/2021  Impression: No acute abnormality in the chest  Workstation performed: [id number]     Xr Knee 4+ Views Left Injury  Result Date: 6/4/2021  Impression: Nondisplaced fracture of the proximal left fibular shaft  Workstation performed: [id number]     Xr Tibia Fibula 2 Views Left  Result Date: 6/4/2021  Impression: Nondisplaced fracture of the proximal left fibular shaft  Workstation performed: [id number]     Xr Ankle 3+ Views Left  Result Date: 6/4/2021  Impression: Normal left ankle  Workstation performed: [id number]     Xr Foot 3+ Views Left  Result Date: 6/4/2021  Impression: No acute osseous abnormality  Workstation performed: [id number]     Ct Head Without Contrast  Result Date: 6/4/2021  Impression: No acute intracranial abnormality  Workstation performed: [id number]     Ct Cervical Spine Without Contrast  Result Date: 6/4/2021  Impression: No cervical spine fracture or traumatic malalignment  Workstation performed: [id number]     Incidental Findings:   · none    Test Results Pending at Discharge (will require follow up): · Venous doppler of the LLE (preliminary report as noted above)     Outpatient Tests Requested:  · none    Complications:  none    Reason for Admission: intractable pain due to proximal fibula fracture      Hospital Course:     Brenda Sims is a 80 y o  female patient who originally presented to the hospital on 6/4/2021 due to Trauma (trauma eval called; patient fell going down two steps when knee gave out landing on left ankle/knee; no loc or no thinners )  Please see admission H&P for further details regarding the patient's initial presentation and management  Patient was found to have a nondisplaced proximal humerus fracture, which was non-operative  She was admitted for pain control and orthopedics consultation, PT/OT consultation  She would benefit from short term rehab on discharge  Please see above list of diagnoses and related plan for additional information  Condition at Discharge: good     Discharge Day Visit / Exam:   Subjective:  Patient is feeling better today, pain more controlled, not as severe  Vitals: Blood Pressure: 117/62 (06/07/21 0838)  Pulse: 91 (06/07/21 0838)  Temperature: 98 7 °F (37 1 °C) (06/07/21 0838)  Temp Source: Oral (06/07/21 0653)  Respirations: 18 (06/07/21 0838)  Height: 5' 2" (157 5 cm) (06/04/21 1235)  Weight - Scale: 92 6 kg (204 lb 2 3 oz) (06/04/21 1235)  SpO2: 92 % (06/07/21 4846)  Exam:   Physical Exam  Vitals signs and nursing note reviewed  Constitutional:       General: She is not in acute distress  Appearance: She is not ill-appearing  HENT:      Head: Normocephalic  Cardiovascular:      Rate and Rhythm: Normal rate and regular rhythm  Heart sounds: No murmur  Pulmonary:      Effort: Pulmonary effort is normal       Breath sounds: Normal breath sounds  Musculoskeletal:      Right lower leg: No edema  Left lower leg: No edema  Comments: ROM exam deferred due to known fracture, bakers cyst    Skin:     General: Skin is warm and dry  Neurological:      Mental Status: She is alert and oriented to person, place, and time  Sensory: No sensory deficit  Comments: Equal sensation to light touch of the BLE     Psychiatric:         Mood and Affect: Mood normal            Discharge instructions/Information to patient and family:   See after visit summary for information provided to patient and family  Provisions for Follow-Up Care:  See after visit summary for information related to follow-up care and any pertinent home health orders  Disposition:   Other: short term rehab, [facility] (5th floor)  Planned Readmission: no     Discharge Statement:  I spent 45 minutes discharging the patient  This time was spent on the day of discharge  I had direct contact with the patient on the day of discharge  Greater than 50% of the total time was spent examining patient, answering all patient questions, arranging and discussing plan of care with patient as well as directly providing post-discharge instructions  Additional time then spent on discharge activities  Discharge Medications:  See after visit summary for reconciled discharge medications provided to patient and family        ** Please Note: This note has been constructed using a voice recognition system **

## 2021-06-07 NOTE — ASSESSMENT & PLAN NOTE
· Patient takes amitriptyline, neurontin for chronic pain - will continue    · Increased gabapentin to 400 mg PO TID for now with increasing lower back pain  · Also with a spinal stimulator in place  · Outpatient follow-up with Pain Management and with Spine Surgery

## 2021-06-07 NOTE — CASE MANAGEMENT
A post acute care recommendation was made by your care team for STR  Discussed Freedom of Choice with patient  List of facilities given to patient via in person  patient aware the list is custom filtered for them by preference  and that [facility]'s post acute providers are designated  At pt request a referral was made to the 5th floor

## 2021-06-07 NOTE — ASSESSMENT & PLAN NOTE
· Mild leukocytosis, now resolved    remains afebrile  · No obvious infectious etiology at this time  · Urinalysis is showing the presence of bacteria, leukocytes, nitrites  Awaiting urine culture results  · Check a procalcitonin level  · COVID-19 testing was negative      Results from last 7 days   Lab Units 06/07/21  0447 06/06/21  0457 06/05/21  0439   WBC Thousand/uL 9 14 10 35* 9 50   HEMOGLOBIN g/dL 13 1 13 5 13 5   HEMATOCRIT % 39 9 41 3 41 5   PLATELETS Thousands/uL 201 211 211

## 2021-06-07 NOTE — PLAN OF CARE
Problem: Potential for Falls  Goal: Patient will remain free of falls  Description: INTERVENTIONS:  - Assess patient frequently for physical needs  -  Identify cognitive and physical deficits and behaviors that affect risk of falls    -  [place] fall precautions as indicated by assessment   - Educate patient/family on patient safety including physical limitations  - Instruct patient to call for assistance with activity based on assessment  - Modify environment to reduce risk of injury  - Consider OT/PT consult to assist with strengthening/mobility  Outcome: Progressing     Problem: Prexisting or High Potential for Compromised Skin Integrity  Goal: Skin integrity is maintained or improved  Description: INTERVENTIONS:  - Identify patients at risk for skin breakdown  - Assess and monitor skin integrity  - Assess and monitor nutrition and hydration status  - Monitor labs   - Assess for incontinence   - Turn and reposition patient  - Assist with mobility/ambulation  - Relieve pressure over bony prominences  - Avoid friction and shearing  - Provide appropriate hygiene as needed including keeping skin clean and dry  - Evaluate need for skin moisturizer/barrier cream  - Collaborate with interdisciplinary team   - Patient/family teaching  - Consider wound care consult   Outcome: Progressing     Problem: PAIN - ADULT  Goal: Verbalizes/displays adequate comfort level or baseline comfort level  Description: Interventions:  - Encourage patient to monitor pain and request assistance  - Assess pain using appropriate pain scale  - Administer analgesics based on type and severity of pain and evaluate response  - Implement non-pharmacological measures as appropriate and evaluate response  - Consider cultural and social influences on pain and pain management  - Notify physician/advanced practitioner if interventions unsuccessful or patient reports new pain  Outcome: Progressing     Problem: INFECTION - ADULT  Goal: Absence or prevention of progression during hospitalization  Description: INTERVENTIONS:  - Assess and monitor for signs and symptoms of infection  - Monitor lab/diagnostic results  - Monitor all insertion sites, i e  indwelling lines, tubes, and drains  - Monitor endotracheal if appropriate and nasal secretions for changes in amount and color  - [place] appropriate cooling/warming therapies per order  - Administer medications as ordered  - Instruct and encourage patient and family to use good hand hygiene technique  - Identify and instruct in appropriate isolation precautions for identified infection/condition  Outcome: Progressing     Problem: SAFETY ADULT  Goal: Patient will remain free of falls  Description: INTERVENTIONS:  - Assess patient frequently for physical needs  -  Identify cognitive and physical deficits and behaviors that affect risk of falls    -  [place] fall precautions as indicated by assessment   - Educate patient/family on patient safety including physical limitations  - Instruct patient to call for assistance with activity based on assessment  - Modify environment to reduce risk of injury  - Consider OT/PT consult to assist with strengthening/mobility  Outcome: Progressing  Goal: Maintain or return to baseline ADL function  Description: INTERVENTIONS:  -  Assess patient's ability to carry out ADLs; assess patient's baseline for ADL function and identify physical deficits which impact ability to perform ADLs (bathing, care of mouth/teeth, toileting, grooming, dressing, etc )  - Assess/evaluate cause of self-care deficits   - Assess range of motion  - Assess patient's mobility; develop plan if impaired  - Assess patient's need for assistive devices and provide as appropriate  - Encourage maximum independence but intervene and supervise when necessary  - Involve family in performance of ADLs  - Assess for home care needs following discharge   - Consider OT consult to assist with ADL evaluation and planning for discharge  - Provide patient education as appropriate  Outcome: Progressing  Goal: Maintain or return mobility status to optimal level  Description: INTERVENTIONS:  - Assess patient's baseline mobility status (ambulation, transfers, stairs, etc )    - Identify cognitive and physical deficits and behaviors that affect mobility  - Identify mobility aids required to assist with transfers and/or ambulation (gait belt, sit-to-stand, lift, walker, cane, etc )  - [place] fall precautions as indicated by assessment  - Record patient progress and toleration of activity level on Mobility SBAR; progress patient to next Phase/Stage  - Instruct patient to call for assistance with activity based on assessment  - Consider rehabilitation consult to assist with strengthening/weightbearing, etc   Outcome: Progressing     Problem: DISCHARGE PLANNING  Goal: Discharge to home or other facility with appropriate resources  Description: INTERVENTIONS:  - Identify barriers to discharge w/patient and caregiver  - Arrange for needed discharge resources and transportation as appropriate  - Identify discharge learning needs (meds, wound care, etc )  - Arrange for interpretive services to assist at discharge as needed  - Refer to Case Management Department for coordinating discharge planning if the patient needs post-hospital services based on physician/advanced practitioner order or complex needs related to functional status, cognitive ability, or social support system  Outcome: Progressing     Problem: Knowledge Deficit  Goal: Patient/family/caregiver demonstrates understanding of disease process, treatment plan, medications, and discharge instructions  Description: Complete learning assessment and assess knowledge base    Interventions:  - Provide teaching at level of understanding  - Provide teaching via preferred learning methods  Outcome: Progressing     Problem: MUSCULOSKELETAL - ADULT  Goal: Maintain or return mobility to safest level of function  Description: INTERVENTIONS:  - Assess patient's ability to carry out ADLs; assess patient's baseline for ADL function and identify physical deficits which impact ability to perform ADLs (bathing, care of mouth/teeth, toileting, grooming, dressing, etc )  - Assess/evaluate cause of self-care deficits   - Assess range of motion  - Assess patient's mobility  - Assess patient's need for assistive devices and provide as appropriate  - Encourage maximum independence but intervene and supervise when necessary  - Involve family in performance of ADLs  - Assess for home care needs following discharge   - Consider OT consult to assist with ADL evaluation and planning for discharge  - Provide patient education as appropriate  Outcome: Progressing  Goal: Maintain proper alignment of affected body part  Description: INTERVENTIONS:  - Support, maintain and protect limb and body alignment  - Provide patient/ family with appropriate education  Outcome: Progressing

## 2021-06-08 PROCEDURE — 97116 GAIT TRAINING THERAPY: CPT

## 2021-06-08 PROCEDURE — 97166 OT EVAL MOD COMPLEX 45 MIN: CPT

## 2021-06-08 PROCEDURE — 97163 PT EVAL HIGH COMPLEX 45 MIN: CPT

## 2021-06-08 PROCEDURE — 97535 SELF CARE MNGMENT TRAINING: CPT

## 2021-06-08 NOTE — QUICK NOTE
Reviewed urine culture results  Notified 5th floor (short term rehab) RN of significant findings  She will discuss with provider

## 2021-06-09 PROCEDURE — 97535 SELF CARE MNGMENT TRAINING: CPT

## 2021-06-09 PROCEDURE — 97530 THERAPEUTIC ACTIVITIES: CPT

## 2021-06-09 PROCEDURE — 97116 GAIT TRAINING THERAPY: CPT

## 2021-06-10 LAB
BACTERIA UR CULT: ABNORMAL
BACTERIA UR CULT: ABNORMAL

## 2021-06-10 PROCEDURE — 97530 THERAPEUTIC ACTIVITIES: CPT

## 2021-06-10 PROCEDURE — 97110 THERAPEUTIC EXERCISES: CPT

## 2021-06-10 PROCEDURE — 97535 SELF CARE MNGMENT TRAINING: CPT

## 2021-06-10 PROCEDURE — 97116 GAIT TRAINING THERAPY: CPT

## 2021-06-11 ENCOUNTER — TRANSITIONAL CARE MANAGEMENT (OUTPATIENT)
Dept: INTERNAL MEDICINE CLINIC | Facility: CLINIC | Age: 71
End: 2021-06-11

## 2021-06-14 ENCOUNTER — DOCUMENTATION (OUTPATIENT)
Dept: SOCIAL WORK | Facility: HOSPITAL | Age: 71
End: 2021-06-14

## 2021-06-14 ENCOUNTER — APPOINTMENT (OUTPATIENT)
Dept: LAB | Facility: MEDICAL CENTER | Age: 71
End: 2021-06-14
Payer: MEDICARE

## 2021-06-14 DIAGNOSIS — E78.2 MIXED HYPERLIPIDEMIA: ICD-10-CM

## 2021-06-14 LAB
CHOLEST SERPL-MCNC: 200 MG/DL (ref 50–200)
HDLC SERPL-MCNC: 51 MG/DL
LDLC SERPL CALC-MCNC: 120 MG/DL (ref 0–100)
NONHDLC SERPL-MCNC: 149 MG/DL
TRIGL SERPL-MCNC: 147 MG/DL

## 2021-06-14 PROCEDURE — 36415 COLL VENOUS BLD VENIPUNCTURE: CPT

## 2021-06-14 PROCEDURE — 80061 LIPID PANEL: CPT

## 2021-06-14 NOTE — PROGRESS NOTES
Notification of Assess not [address] NOLANDA has assessed your patient for Home Health services and has determined the patient is not eligible for service due to the following patient is not homebound  Patient to benefit from outpatient physical therapy       Sharri Gale, PT

## 2021-06-15 ENCOUNTER — OFFICE VISIT (OUTPATIENT)
Dept: OBGYN CLINIC | Facility: CLINIC | Age: 71
End: 2021-06-15
Payer: MEDICARE

## 2021-06-15 ENCOUNTER — APPOINTMENT (OUTPATIENT)
Dept: RADIOLOGY | Facility: MEDICAL CENTER | Age: 71
End: 2021-06-15
Payer: MEDICARE

## 2021-06-15 VITALS — WEIGHT: 204 LBS | HEIGHT: 62 IN | BODY MASS INDEX: 37.54 KG/M2

## 2021-06-15 DIAGNOSIS — S82.832D CLOSED FRACTURE OF PROXIMAL END OF LEFT FIBULA WITH ROUTINE HEALING, UNSPECIFIED FRACTURE MORPHOLOGY, SUBSEQUENT ENCOUNTER: ICD-10-CM

## 2021-06-15 DIAGNOSIS — S82.832D CLOSED FRACTURE OF PROXIMAL END OF LEFT FIBULA WITH ROUTINE HEALING, UNSPECIFIED FRACTURE MORPHOLOGY, SUBSEQUENT ENCOUNTER: Primary | ICD-10-CM

## 2021-06-15 PROCEDURE — 99213 OFFICE O/P EST LOW 20 MIN: CPT | Performed by: ORTHOPAEDIC SURGERY

## 2021-06-15 PROCEDURE — 73590 X-RAY EXAM OF LOWER LEG: CPT

## 2021-06-15 NOTE — PROGRESS NOTES
80 y o female presents for follow-up of left proximal fibular nondisplaced fracture  Patient was seen in the hospital as a consult 06/04/2021  She is a former patient of Dr Ana Patel was initially going to seek follow-up with him or her she is side the seek follow up in this office  She also has a known arthritic left knee  Apparently at the hospital her IV had infiltrated in the pain med was not being absorbed  She notes her pain is a lot better today  She has very little discomfort the fracture site more pain related to her chronic nerve pain which is more distal in the foot and ankle  Review of Systems  Review of systems negative unless otherwise specified in HPI    Past Medical History  Past Medical History:   Diagnosis Date    Abnormal findings on diagnostic imaging of breast     Arthritis     Chronic pain disorder     Extremity pain     Fibrocystic breast disease     Foot pain     GERD (gastroesophageal reflux disease)     Hyperlipidemia     Hypertension     Interstitial cystitis     Joint pain     Low back pain     Osteoarthritis     Osteopenia      Past Surgical History  Past Surgical History:   Procedure Laterality Date    APPENDECTOMY      BACK SURGERY      BREAST EXCISIONAL BIOPSY Left 1996    benign    CARPAL BOSS EXCISION      CHOLECYSTECTOMY      DIAGNOSTIC LAPAROSCOPY      FOOT SURGERY      FRACTURE SURGERY      HYSTERECTOMY      age 33    HYSTEROSCOPY      JOINT REPLACEMENT      KIDNEY SURGERY Left     KNEE ARTHROSCOPY Bilateral     LAMINECTOMY      LAPAROSCOPY      hynecologic with adhesions    OOPHORECTOMY Bilateral     age 33   [facility] ORTHOPEDIC SURGERY      [place] SURG IMPLNT [address] N/A 5/18/2017    Procedure: PLACEMENT OF THORACIC SPINAL CORD STIMULATOR WITH LEFT BUTTOCK IMPLANTABLE PULSE GENERATOR (IMPULSE MONITORING-MOTORS (TCEMEP), EMG, SSEP);   Surgeon: Elizabeth Guzman MD;  [facility];  Service: Neurosurgery   [address] IMPLANT Left 9/29/2020    Procedure: LAMINECTOMY THORACIC , REMOVAL OF SCS LEADS AND GENERATOR;  Surgeon: Narcisa Caceres MD;  Location:  MAIN OR;  Service: Neurosurgery    SPINAL STIMULATOR PLACEMENT  05/2017    TONSILLECTOMY AND ADENOIDECTOMY      onset: unknown    TOTAL KNEE ARTHROPLASTY Right      Current Medications  Current Outpatient Medications on File Prior to Visit   Medication Sig Dispense Refill    amitriptyline (ELAVIL) 50 mg tablet Take 1 tablet (50 mg total) by mouth daily at bedtime 90 tablet 3    aspirin (ECOTRIN) 325 mg EC tablet Take 1 tablet (325 mg total) by mouth 2 (two) times a day 60 tablet 0    Biotin 2500 MCG CAPS Take 1 capsule by mouth 2 (two) times a day       calcium carbonate (OS-ANTHONY) 600 MG tablet Take 1,200 mg by mouth 2 (two) times a day with meals      Cholecalciferol (VITAMIN D-3 PO) Take 1 tablet by mouth daily      clobetasol (TEMOVATE) 0 05 % cream Apply topically 2 (two) times a day 30 g 5    Cyanocobalamin (VITAMIN B-12 CR PO) Take 1 tablet by mouth daily      Diclofenac Sodium (VOLTAREN) 1 % Apply 2 g topically 4 (four) times a day 150 g 1    dicyclomine (BENTYL) 10 mg capsule Take 1 capsule (10 mg total) by mouth 2 (two) times a day 180 capsule 0    EPINEPHrine (EpiPen 2-Michel) 0 3 mg/0 3 mL SOAJ Inject 0 3 mL (0 3 mg total) into a muscle as needed for anaphylaxis 2 each 5    fexofenadine (ALLEGRA) 180 MG tablet Take 180 mg by mouth daily      gabapentin (NEURONTIN) 400 mg capsule Take 1 capsule (400 mg total) by mouth 3 (three) times a day  0    hydrOXYzine HCL (ATARAX) 10 mg tablet Take 2 tablets at bedtime (Patient taking differently: daily at bedtime as needed Take 2 tablets at bedtime) 180 tablet 3    montelukast (SINGULAIR) 10 mg tablet Take 1 tablet (10 mg total) by mouth daily at bedtime 90 tablet 3    Omega-3 Fatty Acids (FISH OIL) 1,000 mg Take 1,000 mg by mouth 3 (three) times a day      omeprazole (PriLOSEC) 40 MG capsule Take 1 capsule (40 mg total) by mouth daily 90 capsule 3    oxyCODONE (ROXICODONE) 5 mg immediate release tablet Take 1 tablet (5 mg total) by mouth every 6 (six) hours as needed for moderate pain or severe pain for up to 10 daysMax Daily Amount: 20 mg 8 tablet 0    rosuvastatin (CRESTOR) 10 MG tablet Take 1 tablet (10 mg total) by mouth daily (Patient taking differently: Take 10 mg by mouth every other day ) 90 tablet 0    losartan (COZAAR) 100 MG tablet Take 1 tablet (100 mg total) by mouth daily 90 tablet 3    methocarbamol (ROBAXIN) 750 mg tablet Take 1 tablet (750 mg total) by mouth every 8 (eight) hours 90 tablet 2     No current facility-administered medications on file prior to visit  Recent Labs [facility]  0   Lab Value Date/Time    HCT 39 9 06/07/2021 0447    HCT 44 0 11/09/2015 0821    HGB 13 1 06/07/2021 0447    HGB 15 2 11/09/2015 0821    WBC 9 14 06/07/2021 0447    WBC 7 19 11/09/2015 0821    INR 0 97 06/04/2021 1306    INR 0 92 05/31/2015 2345    GLUCOSE 118 12/08/2016 2334    GLUCOSE 106 11/09/2015 0821    HGBA1C 5 5 09/14/2020 0855    HGBA1C 5 6 02/12/2015 1306     Physical exam  Body mass index is 37 31 kg/m²  · General: Awake, Alert, Oriented  · Eyes: Pupils equal, round and reactive to light  · Heart: regular rate and rhythm  · Lungs: No audible wheezing  · Abdomen: soft  left Knee exam  · Continued discomfort with palpation at the fibular fracture area  There is no long bone deformity  The ecchymosis has resolved  He is in a Cam boot is able to arise from sitting to weightbear as tolerated in the Cam boot not much pain or difficulty  Removal the Cam boot note good plantar flexion/dorsiflexion, inversion/eversion ankle range of motion without pain  There is no ecchymosis or swelling in the ankle area  Is negative Homans sign  She is grossly neurovascularly intact      Procedure  None today    Imaging  I personally reviewed x-rays left knee taken the office today which note slight shift the proximal fibular fracture but well within acceptable ranges  1  Closed fracture of proximal end of left fibula with routine healing, unspecified fracture morphology, subsequent encounter      Assessment:  left knee tricompartmental arthritis with minimally displaced fracture proximal fibular neck  Plan:  Would have the patient continue in a Cam boot for another 3 weeks  She may try and transition to a shoe at that point time  We will see her back in this office in 4 weeks with repeat x-rays of the left proximal fibula  Continue weight-bearing as tolerated  Ice as needed for pain and discomfort  Elevation as needed  She may remove the Cam boot to sleep  Continue to work on ankle and foot range of motion  Evaluate for possible physical therapy needs at next visit  This note was created with voice recognition software

## 2021-06-15 NOTE — PATIENT INSTRUCTIONS
Would have the patient continue in a Cam boot for another 3 weeks  She may try and transition to a shoe at that point time  We will see her back in this office in 4 weeks with repeat x-rays of the left proximal fibula  Continue weight-bearing as tolerated  Ice as needed for pain and discomfort  Elevation as needed  She may remove the Cam boot to sleep  Continue to work on ankle and foot range of motion  Evaluate for possible physical therapy needs at next visit

## 2021-06-16 ENCOUNTER — OFFICE VISIT (OUTPATIENT)
Dept: INTERNAL MEDICINE CLINIC | Facility: CLINIC | Age: 71
End: 2021-06-16
Payer: MEDICARE

## 2021-06-16 VITALS
DIASTOLIC BLOOD PRESSURE: 70 MMHG | HEART RATE: 101 BPM | OXYGEN SATURATION: 94 % | SYSTOLIC BLOOD PRESSURE: 126 MMHG | TEMPERATURE: 97.8 F

## 2021-06-16 DIAGNOSIS — M54.41 CHRONIC RIGHT-SIDED LOW BACK PAIN WITH RIGHT-SIDED SCIATICA: ICD-10-CM

## 2021-06-16 DIAGNOSIS — M79.2 NEURALGIA: ICD-10-CM

## 2021-06-16 DIAGNOSIS — E78.2 MIXED HYPERLIPIDEMIA: ICD-10-CM

## 2021-06-16 DIAGNOSIS — L29.9 ITCHING: ICD-10-CM

## 2021-06-16 DIAGNOSIS — G89.29 CHRONIC RIGHT-SIDED LOW BACK PAIN WITH RIGHT-SIDED SCIATICA: ICD-10-CM

## 2021-06-16 DIAGNOSIS — M25.562 ACUTE PAIN OF LEFT KNEE: Primary | ICD-10-CM

## 2021-06-16 DIAGNOSIS — Z12.31 ENCOUNTER FOR SCREENING MAMMOGRAM FOR MALIGNANT NEOPLASM OF BREAST: ICD-10-CM

## 2021-06-16 PROBLEM — D72.829 LEUKOCYTOSIS: Status: RESOLVED | Noted: 2021-06-06 | Resolved: 2021-06-16

## 2021-06-16 PROBLEM — R93.429 ABNORMAL CT SCAN, KIDNEY: Status: RESOLVED | Noted: 2020-10-09 | Resolved: 2021-06-16

## 2021-06-16 PROBLEM — I10 ACCELERATED HYPERTENSION: Status: RESOLVED | Noted: 2017-08-10 | Resolved: 2021-06-16

## 2021-06-16 PROCEDURE — 99495 TRANSJ CARE MGMT MOD F2F 14D: CPT | Performed by: INTERNAL MEDICINE

## 2021-06-16 RX ORDER — HYDROXYZINE HYDROCHLORIDE 10 MG/1
20 TABLET, FILM COATED ORAL
Qty: 180 TABLET | Refills: 1 | Status: SHIPPED | OUTPATIENT
Start: 2021-06-16 | End: 2022-06-08

## 2021-06-16 RX ORDER — ROSUVASTATIN CALCIUM 10 MG/1
10 TABLET, COATED ORAL EVERY OTHER DAY
Qty: 45 TABLET | Refills: 3 | Status: SHIPPED | OUTPATIENT
Start: 2021-06-16 | End: 2022-06-08

## 2021-06-16 NOTE — PROGRESS NOTES
Assessment/Plan:         Diagnoses and all orders for this visit:    Acute pain of left knee  Pt did see Dr Jaron Brady this week and will followup in 2 months She had injections and plan for replacement in about 6 months     Encounter for screening mammogram for malignant neoplasm of breast  -     Mammo screening bilateral w 3d & cad; Future  Pt will schedule mammogram     Itching  -     hydrOXYzine HCL (ATARAX) 10 mg tablet; Take 2 tablets (20 mg total) by mouth daily at bedtime Take 2 tablets at bedtime    Mixed hyperlipidemia  -     rosuvastatin (CRESTOR) 10 MG tablet; Take 1 tablet (10 mg total) by mouth every other day  Cholesterol recent levels much improved Continue current rx and improved diet habits     Neuralgia  Pt followed by ortho and has appt next month She is in immobilizer and doing home exercise regimen and sxs are slowly resolving     Chronic right-sided low back pain with right-sided sciatica  She did r/s appt with back s[pecilaist since she could not get to [facility] for appt this week  She did speak with tech regarding settings and sxs are slowly improving to baseline so will monitor for now         Has routine appt /prn   Patient ID: Zina Hinson is a 80 y o  female      HPI   Pt felll going down her steps and injured her left knee (which is her "bad knee" that was to be replaced but she needed back procedure first) and has nerve damage to left foot and lower leg She was hospitalized and then to str for a few days Now home She is able to stay in first floor apt which is helping so she does not have as many stairs She went back to work this weekShe saw Dr Jaron Brady and had injections to left knee He said in about 6 months would consider left tkr He back spasms flared with recent leg injury and slowly improving She was to see back specialist this week but had to r/s since could not drive there She is icing her lower leg at rest and the swelling is improving She has home exercises to do for the nerve pain and they do seem to be slowly improving the sxs She has ortho followup for the nerve damage in one month and Dr Primitivo Smith in 2 months She was dc from homecare as she is doing adls on her own at this time and has friend/family who help as well       TCM Call (since 5/16/2021)     Date and time call was made  6/11/2021 10:08 AM    Hospital care reviewed  Records reviewed    Patient was hospitialized at  -- (Comment)  [facility]        Date of Admission  06/07/21    Date of discharge  06/11/21    Diagnosis  Fracture left fibula    Were the patients medications reviewed and updated  No    Current Symptoms  None      TCM Call (since 5/16/2021)     Post hospital issues  Reduced activity    Should patient be enrolled in anticoag monitoring? No    Scheduled for follow up? Yes    Did you obtain your prescribed medications  Yes    Do you need help managing your prescriptions or medications  No    Is transportation to your appointment needed  No    I have advised the patient to call PCP with any new or worsening symptoms  Carly Grier,  II    Living Arrangements  Family members    Support System  Family    The type of support provided  Physical; Emotional    Do you have social support  Yes, some    Are you recieving any outpatient services  No    Are you recieving home care services  No    Are you using any community resources  No    Current waiver services  No    Interperter language line needed  No    Counseling  Patient          Review of Systems   Constitutional: Positive for activity change  Negative for chills, diaphoresis and fever  HENT: Negative  Eyes: Negative for visual disturbance  Respiratory: Negative for cough, chest tightness and shortness of breath  Cardiovascular: Positive for leg swelling  Negative for chest pain and palpitations  Gastrointestinal: Negative for abdominal distention, abdominal pain, constipation and diarrhea     Genitourinary: Negative for difficulty urinating, dysuria, flank pain and frequency  Musculoskeletal: Positive for arthralgias, gait problem and joint swelling  Neurological: Negative for dizziness, light-headedness and headaches  Psychiatric/Behavioral: Negative for sleep disturbance  The patient is not nervous/anxious  Past Medical History:   Diagnosis Date    Abnormal findings on diagnostic imaging of breast     Arthritis     Chronic pain disorder     Extremity pain     Fibrocystic breast disease     Foot pain     GERD (gastroesophageal reflux disease)     Hyperlipidemia     Hypertension     Interstitial cystitis     Joint pain     Low back pain     Osteoarthritis     Osteopenia      Past Surgical History:   Procedure Laterality Date    APPENDECTOMY      BACK SURGERY      BREAST EXCISIONAL BIOPSY Left 1996    benign    CARPAL BOSS EXCISION      CHOLECYSTECTOMY      DIAGNOSTIC LAPAROSCOPY      FOOT SURGERY      FRACTURE SURGERY      HYSTERECTOMY      age 33    HYSTEROSCOPY      JOINT REPLACEMENT      KIDNEY SURGERY Left     KNEE ARTHROSCOPY Bilateral     LAMINECTOMY      LAPAROSCOPY      hynecologic with adhesions    OOPHORECTOMY Bilateral     age 33   [facility]      [place] SURG IMPLNT [address] N/A 5/18/2017    Procedure: PLACEMENT OF THORACIC SPINAL CORD STIMULATOR WITH LEFT BUTTOCK IMPLANTABLE PULSE GENERATOR (IMPULSE MONITORING-MOTORS (TCEMEP), EMG, SSEP);   Surgeon: Quinn Hatchet, MD;  [facility];  Service: Neurosurgery    SPINAL CORD STIMULATOR IMPLANT Left 9/29/2020    Procedure: LAMINECTOMY THORACIC , REMOVAL OF SCS LEADS AND GENERATOR;  Surgeon: Batsheva Brady MD;  Location:  MAIN OR;  Service: Neurosurgery    SPINAL STIMULATOR PLACEMENT  05/2017    TONSILLECTOMY AND ADENOIDECTOMY      onset: unknown    TOTAL KNEE ARTHROPLASTY Right      Social History     Socioeconomic History    Marital status: Single     Spouse name: Not on file    Number of children: Not on file    Years of education: Not on file    Highest education level: Not on file   Occupational History    Occupation:    Tobacco Use    Smoking status: Never Smoker    Smokeless tobacco: Never Used   Vaping Use    Vaping Use: Never used   Substance and Sexual Activity    Alcohol use: Never     Alcohol/week: 0 0 standard drinks     Comment: socially    Drug use: No    Sexual activity: Not Currently     Partners: Male     Birth control/protection: None   Other Topics Concern    Not on file   Social History Narrative    No caffeine use    Always uses sunscreen    Always uses a seatbelt     Social Determinants of Health     Financial Resource Strain:     Difficulty of Paying Living Expenses:    Food Insecurity:     Worried About Running Out of Food in the Last Year:     Ran Out of Food in the Last Year:    Transportation Needs:     Lack of Transportation (Medical):  Lack of Transportation (Non-Medical):    Physical Activity:     Days of Exercise per Week:     Minutes of Exercise per Session:    Stress:     Feeling of Stress :    Social Connections:     Frequency of Communication with Friends and Family:     Frequency of Social Gatherings with Friends and Family:     Attends [employer] Services:     Active Member of Clubs or Organizations:     Attends Club or Organization Meetings:     Marital Status:    Intimate Partner Violence:     Fear of Current or Ex-Partner:     Emotionally Abused:     Physically Abused:     Sexually Abused:       Allergies   Allergen Reactions    Bee Venom Shortness Of Breath    Chlorhexidine Rash    Egg Yolk - Food Allergy Hives    Iodinated Diagnostic Agents Hives    Penicillins Hives    Bactrim [Sulfamethoxazole-Trimethoprim] Rash    Codeine Other (See Comments)     headaches    Latex Rash    Medical Tape Blisters, Hives, Itching and Rash    Other Rash     Adhesive tape    Oxybutynin Rash    Pentosan Polysulfate Rash    Povidone Iodine Rash            /70   Pulse 101   Temp 97 8 °F (36 6 °C) (Temporal)   SpO2 94%          Physical Exam  Vitals reviewed  Constitutional:       General: She is not in acute distress  Appearance: Normal appearance  She is not ill-appearing, toxic-appearing or diaphoretic  HENT:      Head: Normocephalic and atraumatic  Right Ear: Tympanic membrane, ear canal and external ear normal  There is no impacted cerumen  Left Ear: Tympanic membrane, ear canal and external ear normal  There is no impacted cerumen  Nose: Nose normal       Mouth/Throat:      Mouth: Mucous membranes are dry  Eyes:      General: No scleral icterus  Extraocular Movements: Extraocular movements intact  Conjunctiva/sclera: Conjunctivae normal       Pupils: Pupils are equal, round, and reactive to light  Cardiovascular:      Rate and Rhythm: Normal rate and regular rhythm  Pulses: Normal pulses  Heart sounds: Normal heart sounds  No murmur heard  Pulmonary:      Effort: Pulmonary effort is normal  No respiratory distress  Breath sounds: Normal breath sounds  No wheezing  Abdominal:      General: Bowel sounds are normal  There is no distension  Palpations: Abdomen is soft  Tenderness: There is no abdominal tenderness  Musculoskeletal:         General: Tenderness and deformity present  Normal range of motion  Cervical back: Normal range of motion and neck supple  No rigidity or tenderness  Left lower leg: Edema present  Comments: Localized swelling left knee from recent fall    Lymphadenopathy:      Cervical: No cervical adenopathy  Skin:     General: Skin is dry  Capillary Refill: Capillary refill takes less than 2 seconds  Coloration: Skin is not jaundiced or pale  Neurological:      General: No focal deficit present  Mental Status: She is alert and oriented to person, place, and time  Mental status is at baseline  Cranial Nerves:  No cranial nerve deficit  Sensory: Sensory deficit present  Gait: Gait abnormal       Comments: Has immobilizer left lower extremity    Psychiatric:         Mood and Affect: Mood normal          Behavior: Behavior normal          Thought Content:  Thought content normal          Judgment: Judgment normal

## 2021-06-21 ENCOUNTER — TELEPHONE (OUTPATIENT)
Dept: OBGYN CLINIC | Facility: CLINIC | Age: 71
End: 2021-06-21

## 2021-06-22 DIAGNOSIS — M79.606 PAIN OF LOWER EXTREMITY, UNSPECIFIED LATERALITY: Primary | ICD-10-CM

## 2021-06-22 RX ORDER — OXYCODONE HYDROCHLORIDE 5 MG/1
5 TABLET ORAL EVERY 6 HOURS PRN
Qty: 20 TABLET | Refills: 0 | Status: SHIPPED | OUTPATIENT
Start: 2021-06-22 | End: 2021-12-10 | Stop reason: DRUGHIGH

## 2021-06-22 NOTE — TELEPHONE ENCOUNTER
Called patient and she is asking for oxycodone 5mg  She is also taking the gabapentin and muscle relaxer as well  Please advise

## 2021-06-22 NOTE — TELEPHONE ENCOUNTER
Can give #20 but if she has lingering need for that pain mgmt would have to evaluate to address further need or alternate rx if pain remains at that level

## 2021-07-07 ENCOUNTER — TELEPHONE (OUTPATIENT)
Dept: PAIN MEDICINE | Facility: MEDICAL CENTER | Age: 71
End: 2021-07-07

## 2021-07-07 DIAGNOSIS — M54.16 LUMBAR RADICULOPATHY: Primary | ICD-10-CM

## 2021-07-07 RX ORDER — GABAPENTIN 300 MG/1
300 CAPSULE ORAL 3 TIMES DAILY
Qty: 90 CAPSULE | Refills: 2 | Status: SHIPPED | OUTPATIENT
Start: 2021-07-07 | End: 2021-08-04 | Stop reason: SDUPTHER

## 2021-07-07 NOTE — TELEPHONE ENCOUNTER
Patient is calling in to say that she did not receive her script for Gabapentin       Pt contacted Call Center requested refill of their medication          Medication Name: gabapentin (NEURONTIN          Dosage of Med: 400 mg       Frequency of Med: 3xs a day       Remaining Medication: just a few       Pharmacy and Location:  [address] [place] [address], [address] [place] [address]    Please follow up with pt once script has been sent

## 2021-07-07 NOTE — TELEPHONE ENCOUNTER
S/w pt  Pt is requesting a refill of her gabapentin to go to [facility]  Per pt she takes 300 mg tid   Pt states she was in the hospital recently but AO prescribed this in the past       Please advise-

## 2021-07-07 NOTE — PROGRESS NOTES
Assessment and plan:       1  Interstitial cystitis noted on cystoscopy 2018    continue pelvic floor physical therapy exercises    continue Elavil 10 mg q h s    Reviewed hydration goals    Follow up prn        EBONI Diop    History of Present Illness     Eduardo Beyer is a 80 y o  female here for evaluation of annual visit IC followup  On elavil since 2018  One breakthrough e coli UTI March 2019  Culture March 2020 no growth- treated with muscle relaxer for flank pain  She did have a positive urine culture on 06/06/2021 for E coli 2 strains 1 being ESBL  Was asymptomatic, she is not sure why they checked her urine  Has not had any IC problems for 2 years  Dr Bre Fabian has been ordering her elavil  Following with her spinal specialist as well, with spinal stimulator for MSK   [place] down 2 steps and fractured her fibula, has a cam walker  Laboratory     Lab Results   Component Value Date    BUN 12 06/07/2021    CREATININE 0 64 06/07/2021       No components found for: GFR    Lab Results   Component Value Date    GLUCOSE 118 12/08/2016    CALCIUM 8 7 06/07/2021     11/09/2015    K 4 0 06/07/2021    CO2 27 06/07/2021     06/07/2021       Lab Results   Component Value Date    WBC 9 14 06/07/2021    HGB 13 1 06/07/2021    HCT 39 9 06/07/2021    MCV 89 06/07/2021     06/07/2021       No results found for: PSA    No results found for this or any previous visit (from the past 1 hour(s))  @RESULT(URINEMICROSCOPIC)@    @RESULT(URINECULTURE)@    Radiology       Review of Systems     Review of Systems   Constitutional: Negative for activity change, appetite change, chills, fatigue, fever and unexpected weight change  HENT: Negative for facial swelling  Eyes: Negative for discharge  Respiratory: Negative  Negative for cough and shortness of breath  Cardiovascular: Negative for chest pain and leg swelling  Gastrointestinal: Negative    Negative for abdominal distention, abdominal pain, constipation, diarrhea, nausea and vomiting  Endocrine: Negative  Genitourinary: Negative  Negative for decreased urine volume, difficulty urinating, dysuria, enuresis, flank pain, frequency, genital sores, hematuria and urgency  Musculoskeletal: Negative for back pain and myalgias  Fractured left fibula   Skin: Negative for pallor and rash  Allergic/Immunologic: Negative  Negative for immunocompromised state  Neurological: Negative for facial asymmetry and speech difficulty  Psychiatric/Behavioral: Negative for agitation and confusion  Allergies     Allergies   Allergen Reactions    Bee Venom Shortness Of Breath    Chlorhexidine Rash    Egg Yolk - Food Allergy Hives    Iodinated Diagnostic Agents Hives    Penicillins Hives    Bactrim [Sulfamethoxazole-Trimethoprim] Rash    Codeine Other (See Comments)     headaches    Latex Rash    Medical Tape Blisters, Hives, Itching and Rash    Other Rash     Adhesive tape    Oxybutynin Rash    Pentosan Polysulfate Rash    Povidone Iodine Rash       Physical Exam     Physical Exam  Vitals reviewed  Constitutional:       General: She is not in acute distress  Appearance: Normal appearance  She is obese  She is not ill-appearing, toxic-appearing or diaphoretic  HENT:      Head: Normocephalic and atraumatic  Eyes:      General: No scleral icterus  Cardiovascular:      Rate and Rhythm: Normal rate  Pulmonary:      Effort: Pulmonary effort is normal  No respiratory distress  Abdominal:      General: Abdomen is flat  There is no distension  Palpations: Abdomen is soft  Tenderness: There is no abdominal tenderness  There is no right CVA tenderness, left CVA tenderness, guarding or rebound  Musculoskeletal:         General: No swelling  Cervical back: Normal range of motion  Comments: Cam walker on left lower extremity   Skin:     General: Skin is warm and dry        Coloration: Skin is not jaundiced or pale  Findings: No rash  Neurological:      General: No focal deficit present  Mental Status: She is alert and oriented to person, place, and time  Gait: Gait normal    Psychiatric:         Mood and Affect: Mood normal          Behavior: Behavior normal          Thought Content:  Thought content normal          Judgment: Judgment normal          Vital Signs     Vitals:    07/12/21 0849   BP: 132/80   Pulse: 74   Weight: 91 5 kg (201 lb 11 5 oz)       Current Medications       Current Outpatient Medications:     amitriptyline (ELAVIL) 50 mg tablet, Take 1 tablet (50 mg total) by mouth daily at bedtime, Disp: 90 tablet, Rfl: 3    Biotin 2500 MCG CAPS, Take 1 capsule by mouth 2 (two) times a day , Disp: , Rfl:     calcium carbonate (OS-ANTHONY) 600 MG tablet, Take 1,200 mg by mouth 2 (two) times a day with meals, Disp: , Rfl:     Cholecalciferol (VITAMIN D-3 PO), Take 1 tablet by mouth daily, Disp: , Rfl:     clobetasol (TEMOVATE) 0 05 % cream, Apply topically 2 (two) times a day, Disp: 30 g, Rfl: 5    Cyanocobalamin (VITAMIN B-12 CR PO), Take 1 tablet by mouth daily, Disp: , Rfl:     Diclofenac Sodium (VOLTAREN) 1 %, Apply 2 g topically 4 (four) times a day, Disp: 150 g, Rfl: 1    dicyclomine (BENTYL) 10 mg capsule, Take 1 capsule (10 mg total) by mouth 2 (two) times a day, Disp: 180 capsule, Rfl: 0    EPINEPHrine (EpiPen 2-Michel) 0 3 mg/0 3 mL SOAJ, Inject 0 3 mL (0 3 mg total) into a muscle as needed for anaphylaxis, Disp: 2 each, Rfl: 5    fexofenadine (ALLEGRA) 180 MG tablet, Take 180 mg by mouth daily, Disp: , Rfl:     gabapentin (NEURONTIN) 300 mg capsule, Take 1 capsule (300 mg total) by mouth 3 (three) times a day, Disp: 90 capsule, Rfl: 2    hydrOXYzine HCL (ATARAX) 10 mg tablet, Take 2 tablets (20 mg total) by mouth daily at bedtime Take 2 tablets at bedtime, Disp: 180 tablet, Rfl: 1    montelukast (SINGULAIR) 10 mg tablet, Take 1 tablet (10 mg total) by mouth daily at bedtime, Disp: 90 tablet, Rfl: 3    Omega-3 Fatty Acids (FISH OIL) 1,000 mg, Take 1,000 mg by mouth 3 (three) times a day, Disp: , Rfl:     omeprazole (PriLOSEC) 40 MG capsule, Take 1 capsule (40 mg total) by mouth daily, Disp: 90 capsule, Rfl: 3    oxyCODONE (ROXICODONE) 5 mg immediate release tablet, Take 1 tablet (5 mg total) by mouth every 6 (six) hours as needed for severe painMax Daily Amount: 20 mg, Disp: 20 tablet, Rfl: 0    rosuvastatin (CRESTOR) 10 MG tablet, Take 1 tablet (10 mg total) by mouth every other day, Disp: 45 tablet, Rfl: 3    aspirin (ECOTRIN) 325 mg EC tablet, Take 1 tablet (325 mg total) by mouth 2 (two) times a day (Patient taking differently: Take 325 mg by mouth daily ), Disp: 60 tablet, Rfl: 0    losartan (COZAAR) 100 MG tablet, Take 1 tablet (100 mg total) by mouth daily, Disp: 90 tablet, Rfl: 3    methocarbamol (ROBAXIN) 750 mg tablet, Take 1 tablet (750 mg total) by mouth every 8 (eight) hours, Disp: 90 tablet, Rfl: 2    Active Problems     Patient Active Problem List   Diagnosis    Hyperlipidemia    Chronic low back pain    Degenerative joint disease of right lower extremity    Irritable bowel syndrome    Lumbar radiculopathy    Neuralgia    Peripheral neuropathy    Chronic thoracic back pain    Interstitial cystitis (chronic) with hematuria    Medicare annual wellness visit, subsequent    S/P right knee surgery    Osteopenia    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)    Chronic pain syndrome    Osteoarthritis    Closed fracture of proximal end of left fibula    Transaminitis    Edema of left lower extremity    Acute pain of left knee       Past Medical History     Past Medical History:   Diagnosis Date    Abnormal findings on diagnostic imaging of breast     Arthritis     Chronic pain disorder     Extremity pain     Fibrocystic breast disease     Foot pain     GERD (gastroesophageal reflux disease)  Hyperlipidemia     Hypertension     Interstitial cystitis     Joint pain     Low back pain     Osteoarthritis     Osteopenia        Surgical History     Past Surgical History:   Procedure Laterality Date    APPENDECTOMY      BACK SURGERY      BREAST EXCISIONAL BIOPSY Left 1996    benign    CARPAL BOSS EXCISION      CHOLECYSTECTOMY      DIAGNOSTIC LAPAROSCOPY      FOOT SURGERY      FRACTURE SURGERY      HYSTERECTOMY      age 33    HYSTEROSCOPY      JOINT REPLACEMENT      KIDNEY SURGERY Left     KNEE ARTHROSCOPY Bilateral     LAMINECTOMY      LAPAROSCOPY      hynecologic with adhesions    OOPHORECTOMY Bilateral     age 33    ORTHOPEDIC SURGERY      [place] SURG IMPLNT [address] N/A 5/18/2017    Procedure: PLACEMENT OF THORACIC SPINAL CORD STIMULATOR WITH LEFT BUTTOCK IMPLANTABLE PULSE GENERATOR (IMPULSE MONITORING-MOTORS (TCEMEP), EMG, SSEP);   Surgeon: Susan Manrique MD;  [facility]: [facility];  Service: Neurosurgery    SPINAL CORD STIMULATOR IMPLANT Left 9/29/2020    Procedure: LAMINECTOMY THORACIC , REMOVAL OF SCS LEADS AND GENERATOR;  Surgeon: Jovanny Hall MD;  Location:  MAIN OR;  Service: Neurosurgery    SPINAL STIMULATOR PLACEMENT  05/2017    TONSILLECTOMY AND ADENOIDECTOMY      onset: unknown    TOTAL KNEE ARTHROPLASTY Right        Family History     Family History   Problem Relation Age of Onset    Bone cancer Mother     Cancer Mother     Brain cancer Father     Stroke Father         stroke sydrome    Diabetes Paternal Grandmother     Breast cancer Paternal Grandmother 61    Breast cancer Maternal Aunt 79    Breast cancer additional onset Maternal Aunt 67    Depression Sister     Migraines Sister     No Known Problems Maternal Grandmother     No Known Problems Maternal Grandfather     No Known Problems Paternal Grandfather     Asthma Sister     Heart disease Brother        Social History     Social History     Social History     Tobacco Use   Smoking Status Never Smoker   Smokeless Tobacco Never Used       Past Surgical History:   Procedure Laterality Date    APPENDECTOMY      BACK SURGERY      BREAST EXCISIONAL BIOPSY Left 1996    benign    CARPAL BOSS EXCISION      CHOLECYSTECTOMY      DIAGNOSTIC LAPAROSCOPY      FOOT SURGERY      FRACTURE SURGERY      HYSTERECTOMY      age 33    HYSTEROSCOPY      JOINT REPLACEMENT      KIDNEY SURGERY Left     KNEE ARTHROSCOPY Bilateral     LAMINECTOMY      LAPAROSCOPY      hynecologic with adhesions    OOPHORECTOMY Bilateral     age 33    ORTHOPEDIC SURGERY      IN SURG IMPLNT Jose Hendrix N/A 5/18/2017    Procedure: PLACEMENT OF THORACIC SPINAL CORD STIMULATOR WITH LEFT BUTTOCK IMPLANTABLE PULSE GENERATOR (IMPULSE MONITORING-MOTORS (TCEMEP), EMG, SSEP); Surgeon: Brielle Soria MD;  [facility];  Service: Neurosurgery    SPINAL CORD STIMULATOR IMPLANT Left 9/29/2020    Procedure: LAMINECTOMY THORACIC , REMOVAL OF SCS LEADS AND GENERATOR;  Surgeon: Dannielle Mcgee MD;  Location:  MAIN OR;  Service: Neurosurgery    SPINAL STIMULATOR PLACEMENT  05/2017    TONSILLECTOMY AND ADENOIDECTOMY      onset: unknown    TOTAL KNEE ARTHROPLASTY Right          The following portions of the patient's history were reviewed and updated as appropriate: allergies, current medications, past family history, past medical history, past social history, past surgical history and problem list    Please note :  Voice dictation software has been used to create this document  There may be inadvertent transcription errors      [id number] [id number] Alyssa Manzano

## 2021-07-07 NOTE — TELEPHONE ENCOUNTER
Prescription for Gabapentin 300 mg 3 times a day sent to [facility] pharmacy

## 2021-07-07 NOTE — TELEPHONE ENCOUNTER
According to the records the patient is taking 400 mg of gabapentin 3 times a day  prescribed by another office  Please have her contact the office that prescribes it for refills

## 2021-07-07 NOTE — TELEPHONE ENCOUNTER
After looking through the patient's chart I see that Juan Diego Patel has prescribed gabapentin 300 mg 3 times a day in May  Please confirm with the patient what dose she is taking and then I will call it in to the pharmacy    If she is taking 400 mg 3 times a day I do not want to call in 300 mg 3 times a day

## 2021-07-12 ENCOUNTER — OFFICE VISIT (OUTPATIENT)
Dept: UROLOGY | Facility: CLINIC | Age: 71
End: 2021-07-12
Payer: MEDICARE

## 2021-07-12 VITALS
WEIGHT: 201.72 LBS | SYSTOLIC BLOOD PRESSURE: 132 MMHG | HEART RATE: 74 BPM | DIASTOLIC BLOOD PRESSURE: 80 MMHG | BODY MASS INDEX: 36.9 KG/M2

## 2021-07-12 DIAGNOSIS — N30.11 INTERSTITIAL CYSTITIS (CHRONIC) WITH HEMATURIA: Primary | ICD-10-CM

## 2021-07-12 PROCEDURE — 99213 OFFICE O/P EST LOW 20 MIN: CPT | Performed by: NURSE PRACTITIONER

## 2021-07-12 NOTE — PATIENT INSTRUCTIONS
BLADDER HEALTH    WHAT IS CONSIDERED NORMAL?  The average bladder can hold about 2 cups of urine before it needs to be emptied   The normal range of voiding urine is 6 to 8 times during a 24 hour period  As we get older, our bladder capacity can get smaller and we may need to pass urine more frequently but usually not more than every 2 hours   Urine should flow easily without discomfort in a good, steady stream until the bladder is empty  No pushing or straining is necessary to empty the bladder   An urge is a signal that you feel as the bladder stretches to fill with urine  Urges can be felt even if the bladder is not full  Urges are not commands to go to the toilet, merely a signal and can be controlled  WHAT ARE GOOD BLADDER HABITS?  Take your time when emptying your bladder  Dont strain or push to empty your bladder  Make sure you empty your bladder completely each time you pass urine  Do not rush the process   Consistently ignoring the urge to go (waiting more than 4 hours between toileting) or urinating too infrequently may be convenient but not healthy for your bladder   Avoid going to the toilet just in case or more often than every 2 hours  It is usually not necessary to go when you feel the first urge  Try to go only when your bladder is full  Urgency and frequency of urination can be improved by retraining the bladder and spacing your fluid intake throughout the day  Practice good toilet habits  Dont let your bladder control your life  TIPS TO MAINTAIN GOOD BLADDER HABITS   Maintain a good fluid intake  Depending on your body size and environment, drink 6 -8 cups (8 oz each) of fluid per day unless otherwise advised by your doctor  Not enough fluid creates a foul odor and dark color of the urine     Limit the amount of caffeine (coffee, cola, chocolate or tea) and citrus foods that you consume as these foods can be associated with increased sensation of urinary urgency and frequency   Limit the amount of alcohol you drink  Alcohol increases urine production and makes it difficult for the brain to coordinate bladder control   Avoid constipation by maintaining a balanced diet of dietary fiber   Cigarette smoking is also irritating to the bladder surface and is associated with bladder cancer  In addition, the coughing associated with smoking may lead to increased incontinent episodes because of the increased pressure  HOW DIET CAN AFFECT YOUR BLADDER  Although there is no particular "diet" that can cure bladder control, there are certain dietary suggestions you can use to help control the problem  There are 2 points to consider when evaluating how your habits and diet may affect your bladder:    1  Foods and fluids can irritate the bladder  Some foods and beverages are thought to contribute to bladder leakage and irritability  However their effect on the bladder is not completely understood and you may want to see if eliminating one or all of these items improves your bladder control  If you are unable to give them up completely, it is recommended that you use the following items in moderation:   Acidic beverages and foods (orange juice, grapefruit juice, lemonade etc)   Alcoholic beverages   Vinegar   Coffee (regular and decaf)   Tea (regular and decaf)   Caffeinated beverages   Carbonated beverages          2  Drinking enough and the right kinds of fluids  Many people with bladder control issues decrease their intake of liquids in hope that they will need to urinate less frequently or have less urinary leakage   You should not restrict fluids to control your bladder  While a decrease in liquid intake does result in a decrease in the volume of urine, the smaller amount of urine may be more highly concentrated         Highly concentrated, dark yellow urine is irritating to the bladder surface and may actually cause you to go to the bathroom more frequently   It also encourages the growth of bacteria, which may lead to infections resulting in incontinence   Substitutions for Bladder Irritants: water is always the best beverage choice    Grape and apple juice are thirst quenchers are good selections and are not as irritating to the bladder   o Low acid fruits:  Pears, apricots, papaya, watermelon  o For coffee drinkers: KAVA®, Postum®, Juan David®, Kaffree Caroline®  o For tea drinkers:  non-citrus or herbal and sun brewed tea

## 2021-07-13 ENCOUNTER — OFFICE VISIT (OUTPATIENT)
Dept: OBGYN CLINIC | Facility: CLINIC | Age: 71
End: 2021-07-13
Payer: MEDICARE

## 2021-07-13 ENCOUNTER — APPOINTMENT (OUTPATIENT)
Dept: RADIOLOGY | Facility: MEDICAL CENTER | Age: 71
End: 2021-07-13
Payer: MEDICARE

## 2021-07-13 VITALS
SYSTOLIC BLOOD PRESSURE: 134 MMHG | WEIGHT: 201 LBS | HEART RATE: 83 BPM | DIASTOLIC BLOOD PRESSURE: 80 MMHG | HEIGHT: 62 IN | BODY MASS INDEX: 36.99 KG/M2

## 2021-07-13 DIAGNOSIS — S82.832D CLOSED FRACTURE OF PROXIMAL END OF LEFT FIBULA WITH ROUTINE HEALING, UNSPECIFIED FRACTURE MORPHOLOGY, SUBSEQUENT ENCOUNTER: Primary | ICD-10-CM

## 2021-07-13 DIAGNOSIS — S82.832D CLOSED FRACTURE OF PROXIMAL END OF LEFT FIBULA WITH ROUTINE HEALING, UNSPECIFIED FRACTURE MORPHOLOGY, SUBSEQUENT ENCOUNTER: ICD-10-CM

## 2021-07-13 PROCEDURE — 73590 X-RAY EXAM OF LOWER LEG: CPT

## 2021-07-13 PROCEDURE — 99213 OFFICE O/P EST LOW 20 MIN: CPT | Performed by: PHYSICIAN ASSISTANT

## 2021-07-13 NOTE — PATIENT INSTRUCTIONS
It was reviewed with the patient that the fracture site appears too distal to be involved with the nerve wrapping around the head of the fibula  Also discussed with her that dorsiflexion is the L5 nerve root which is where her spinal cord stimulator involved  She is informed that there may have been some sort of stretching of the nerve the time of injury but does not seem to be related to the fracture itself  Could be due to pelvic height changes with the boot compared to a regular shoe  She was encouraged to go without the boot now and wear just regular shoe and see if this resolves however if it is related to her back and spinal cord it may not resolve and may get progressively worse  She does have appointment with Dr nell nolasco and her pain management doctor the next 2 months  We will see her back in 3 months for final x-ray and follow-up

## 2021-07-13 NOTE — PROGRESS NOTES
80 y o female presents for 6 week follow-up of left proximal fibular fracture  Notes the fracture site feels pretty good but she does have a history neuropathy  She has noticed that she has difficulty extending her great toe  She has a spinal cord stimulator placed by Dr stanislaw nolasco who is now at [facility]   She also sees a pain management doctor and neurologist   She states she does not have this problem before  The that she has no complaints  He did come out of the Cam boot as it was hurting her back  Review of Systems  Review of systems negative unless otherwise specified in HPI    Past Medical History  Past Medical History:   Diagnosis Date    Abnormal findings on diagnostic imaging of breast     Arthritis     Chronic pain disorder     Extremity pain     Fibrocystic breast disease     Foot pain     GERD (gastroesophageal reflux disease)     Hyperlipidemia     Hypertension     Interstitial cystitis     Joint pain     Low back pain     Osteoarthritis     Osteopenia      Past Surgical History  Past Surgical History:   Procedure Laterality Date    APPENDECTOMY      BACK SURGERY      BREAST EXCISIONAL BIOPSY Left 1996    benign    CARPAL BOSS EXCISION      CHOLECYSTECTOMY      DIAGNOSTIC LAPAROSCOPY      FOOT SURGERY      FRACTURE SURGERY      HYSTERECTOMY      age 33    HYSTEROSCOPY      JOINT REPLACEMENT      KIDNEY SURGERY Left     KNEE ARTHROSCOPY Bilateral     LAMINECTOMY      LAPAROSCOPY      hynecologic with adhesions    OOPHORECTOMY Bilateral     age 33   Learta January ORTHOPEDIC SURGERY      MA SURG IMPLNT [address] N/A 5/18/2017    Procedure: PLACEMENT OF THORACIC SPINAL CORD STIMULATOR WITH LEFT BUTTOCK IMPLANTABLE PULSE GENERATOR (IMPULSE MONITORING-MOTORS (TCEMEP), EMG, SSEP);   Surgeon: Akilah Bernard MD;  [facility];  Service: Neurosurgery    SPINAL CORD STIMULATOR IMPLANT Left 9/29/2020    Procedure: LAMINECTOMY THORACIC , REMOVAL OF SCS LEADS AND GENERATOR;  Surgeon: Aramis Worthington MD;  Location:  MAIN OR;  Service: Neurosurgery    SPINAL STIMULATOR PLACEMENT  05/2017    TONSILLECTOMY AND ADENOIDECTOMY      onset: unknown    TOTAL KNEE ARTHROPLASTY Right      Current Medications  Current Outpatient Medications on File Prior to Visit   Medication Sig Dispense Refill    amitriptyline (ELAVIL) 50 mg tablet Take 1 tablet (50 mg total) by mouth daily at bedtime 90 tablet 3    Biotin 2500 MCG CAPS Take 1 capsule by mouth 2 (two) times a day       calcium carbonate (OS-ANTHONY) 600 MG tablet Take 1,200 mg by mouth 2 (two) times a day with meals      Cholecalciferol (VITAMIN D-3 PO) Take 1 tablet by mouth daily      clobetasol (TEMOVATE) 0 05 % cream Apply topically 2 (two) times a day 30 g 5    Cyanocobalamin (VITAMIN B-12 CR PO) Take 1 tablet by mouth daily      Diclofenac Sodium (VOLTAREN) 1 % Apply 2 g topically 4 (four) times a day 150 g 1    dicyclomine (BENTYL) 10 mg capsule Take 1 capsule (10 mg total) by mouth 2 (two) times a day 180 capsule 0    EPINEPHrine (EpiPen 2-Michel) 0 3 mg/0 3 mL SOAJ Inject 0 3 mL (0 3 mg total) into a muscle as needed for anaphylaxis 2 each 5    fexofenadine (ALLEGRA) 180 MG tablet Take 180 mg by mouth daily      gabapentin (NEURONTIN) 300 mg capsule Take 1 capsule (300 mg total) by mouth 3 (three) times a day 90 capsule 2    hydrOXYzine HCL (ATARAX) 10 mg tablet Take 2 tablets (20 mg total) by mouth daily at bedtime Take 2 tablets at bedtime 180 tablet 1    montelukast (SINGULAIR) 10 mg tablet Take 1 tablet (10 mg total) by mouth daily at bedtime 90 tablet 3    Omega-3 Fatty Acids (FISH OIL) 1,000 mg Take 1,000 mg by mouth 3 (three) times a day      omeprazole (PriLOSEC) 40 MG capsule Take 1 capsule (40 mg total) by mouth daily 90 capsule 3    oxyCODONE (ROXICODONE) 5 mg immediate release tablet Take 1 tablet (5 mg total) by mouth every 6 (six) hours as needed for severe painMax Daily Amount: 20 mg 20 tablet 0  rosuvastatin (CRESTOR) 10 MG tablet Take 1 tablet (10 mg total) by mouth every other day 45 tablet 3    aspirin (ECOTRIN) 325 mg EC tablet Take 1 tablet (325 mg total) by mouth 2 (two) times a day (Patient taking differently: Take 325 mg by mouth daily ) 60 tablet 0    losartan (COZAAR) 100 MG tablet Take 1 tablet (100 mg total) by mouth daily 90 tablet 3    methocarbamol (ROBAXIN) 750 mg tablet Take 1 tablet (750 mg total) by mouth every 8 (eight) hours 90 tablet 2     No current facility-administered medications on file prior to visit  Recent Labs [facility] [address])  0   Lab Value Date/Time    HCT 39 9 06/07/2021 0447    HCT 44 0 11/09/2015 0821    HGB 13 1 06/07/2021 0447    HGB 15 2 11/09/2015 0821    WBC 9 14 06/07/2021 0447    WBC 7 19 11/09/2015 0821    INR 0 97 06/04/2021 1306    INR 0 92 05/31/2015 2345    GLUCOSE 118 12/08/2016 2334    GLUCOSE 106 11/09/2015 0821    HGBA1C 5 5 09/14/2020 0855    HGBA1C 5 6 02/12/2015 1306     Physical exam  Body mass index is 36 76 kg/m²  · General: Awake, Alert, Oriented  · Eyes: Pupils equal, round and reactive to light  · Heart: regular rate and rhythm  · Lungs: No audible wheezing  · Abdomen: soft  left Knee exam  · No tenderness with palpation at the proximal fibular fracture site  A long bone deformity no swelling no ecchymosis  No distal fibular pain  Patient has sensation to her big toe but cannot actively dorsiflex it  She does have plantar flexion  She does have neuropathy with decreased sensation on the toes  Procedure  None    Imaging  I personally reviewed x-rays taken the office today which note significant callus at the fracture site with alignment grossly within normal limits  1  Closed fracture of proximal end of left fibula with routine healing, unspecified fracture morphology, subsequent encounter      Assessment:  left proximal fibula fracture 10weeks-old healing nicely with good callus  Plan:   It was reviewed with the patient that the fracture site appears too distal to be involved with the nerve wrapping around the head of the fibula  Also discussed with her that dorsiflexion is the L5 nerve root which is where her spinal cord stimulator involved  She is informed that there may have been some sort of stretching of the nerve the time of injury but does not seem to be related to the fracture itself  Could be due to pelvic height changes with the boot compared to a regular shoe  She was encouraged to go without the boot now and wear just regular shoe and see if this resolves however if it is related to her back and spinal cord it may not resolve and may get progressively worse  She does have appointment with Dr nell nolasco and her pain management doctor the next 2 months  We will see her back in 3 months for final x-ray and follow-up  This note was created with voice recognition software

## 2021-07-16 ENCOUNTER — TELEPHONE (OUTPATIENT)
Dept: OBGYN CLINIC | Facility: HOSPITAL | Age: 71
End: 2021-07-16

## 2021-07-16 NOTE — TELEPHONE ENCOUNTER
Patricia Mckay    Patient was seen today and forgot to ask if she can start riding her bike?      # [phone]

## 2021-07-16 NOTE — TELEPHONE ENCOUNTER
Spoke with patient and informed her that she may start riding her bike next week or if this causes any discomfort then she should stop await for 3 weeks

## 2021-07-20 DIAGNOSIS — I10 HYPERTENSION, UNSPECIFIED TYPE: ICD-10-CM

## 2021-07-20 RX ORDER — LOSARTAN POTASSIUM 100 MG/1
100 TABLET ORAL DAILY
Qty: 90 TABLET | Refills: 0 | Status: SHIPPED | OUTPATIENT
Start: 2021-07-20 | End: 2022-04-13 | Stop reason: SDUPTHER

## 2021-08-04 ENCOUNTER — OFFICE VISIT (OUTPATIENT)
Dept: PAIN MEDICINE | Facility: MEDICAL CENTER | Age: 71
End: 2021-08-04
Payer: MEDICARE

## 2021-08-04 VITALS
TEMPERATURE: 97.4 F | HEIGHT: 62 IN | SYSTOLIC BLOOD PRESSURE: 114 MMHG | WEIGHT: 199 LBS | BODY MASS INDEX: 36.62 KG/M2 | HEART RATE: 81 BPM | DIASTOLIC BLOOD PRESSURE: 60 MMHG

## 2021-08-04 DIAGNOSIS — M54.41 CHRONIC RIGHT-SIDED LOW BACK PAIN WITH RIGHT-SIDED SCIATICA: ICD-10-CM

## 2021-08-04 DIAGNOSIS — M54.16 LUMBAR RADICULOPATHY: Primary | ICD-10-CM

## 2021-08-04 DIAGNOSIS — G60.9 IDIOPATHIC PERIPHERAL NEUROPATHY: ICD-10-CM

## 2021-08-04 DIAGNOSIS — G89.4 CHRONIC PAIN SYNDROME: ICD-10-CM

## 2021-08-04 DIAGNOSIS — G89.29 CHRONIC RIGHT-SIDED LOW BACK PAIN WITH RIGHT-SIDED SCIATICA: ICD-10-CM

## 2021-08-04 PROCEDURE — 99213 OFFICE O/P EST LOW 20 MIN: CPT | Performed by: NURSE PRACTITIONER

## 2021-08-04 RX ORDER — GABAPENTIN 300 MG/1
CAPSULE ORAL
Qty: 90 CAPSULE | Refills: 2 | Status: SHIPPED | OUTPATIENT
Start: 2021-08-04 | End: 2021-11-03 | Stop reason: SDUPTHER

## 2021-08-04 NOTE — PROGRESS NOTES
Assessment  1  Lumbar radiculopathy    2  Idiopathic peripheral neuropathy    3  Chronic right-sided low back pain with right-sided sciatica    4  Chronic pain syndrome        Plan    Continue with gabapentin and slightly increase the dose to 2 tablets at bedtime  She will continue with  Her 1 tablet in the morning and afternoon  continue with methocarbamol this  Did not require refill today  Follow-up visit in 3 months  My impressions and treatment recommendations were discussed in detail with the patient who verbalized understanding and had no further questions  Discharge instructions were provided  I personally saw and examined the patient and I agree with the above discussed plan of care  No orders of the defined types were placed in this encounter  New Medications Ordered This Visit   Medications    gabapentin (NEURONTIN) 300 mg capsule     Sig: Take 1 tablet in the morning, 1 tablet afternoon and 2 bedtime     Dispense:  90 capsule     Refill:  2       History of Present Illness    Julienne Kelly is a 80 y o  female  Presents for follow-up related to her chronic low back and bilateral leg pain  Today she rates her pain 7/10 this is constant and most bothersome in the evening and night  She describes the pain as burning, sharp, shooting, and pins and needles  She tells me that her symptoms have worsened since she had a fall 2 months ago while she was going down the steps taking out her recycling she fractured her left fibula and she had to wear a Cam boot for 6 weeks  She tells me that from wearing the boot it through offer whole back and increased her pain symptoms  She has been working with 1 of the representatives from [address] [place] [address] [employer] to try and get her stimulator reprogrammed they did reprogrammed yesterday but she has not seen much of a change yet        She continues with gabapentin 300 mg 3 times daily along with methocarbamol 750 mg 3 times daily and Voltaren gel she is getting some slight relief but she tells me it is hard to tell how much exactly at this time  I have personally reviewed and/or updated the patient's past medical history, past surgical history, family history, social history, current medications, allergies, and vital signs today  Review of Systems   Respiratory: Negative for shortness of breath  Cardiovascular: Negative for chest pain  Gastrointestinal: Negative for constipation, diarrhea, nausea and vomiting  Musculoskeletal: Positive for back pain (left leg and foot), gait problem and joint swelling  Negative for arthralgias and myalgias  Skin: Negative for rash  Neurological: Negative for dizziness, seizures and weakness  All other systems reviewed and are negative        Patient Active Problem List   Diagnosis    Hyperlipidemia    Chronic low back pain    Degenerative joint disease of right lower extremity    Irritable bowel syndrome    Lumbar radiculopathy    Neuralgia    Peripheral neuropathy    Chronic thoracic back pain    Interstitial cystitis (chronic) with hematuria    Medicare annual wellness visit, subsequent    S/P right knee surgery    Osteopenia    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)    Chronic pain syndrome    Osteoarthritis    Closed fracture of proximal end of left fibula    Transaminitis    Edema of left lower extremity    Acute pain of left knee       Past Medical History:   Diagnosis Date    Abnormal findings on diagnostic imaging of breast     Arthritis     Chronic pain disorder     Extremity pain     Fibrocystic breast disease     Foot pain     GERD (gastroesophageal reflux disease)     Hyperlipidemia     Hypertension     Interstitial cystitis     Joint pain     Low back pain     Osteoarthritis     Osteopenia        Past Surgical History:   Procedure Laterality Date    APPENDECTOMY      BACK SURGERY      BREAST EXCISIONAL BIOPSY Left 1996 benign    CARPAL BOSS EXCISION      CHOLECYSTECTOMY      DIAGNOSTIC LAPAROSCOPY      FOOT SURGERY      FRACTURE SURGERY      HYSTERECTOMY      age 33    HYSTEROSCOPY      JOINT REPLACEMENT      KIDNEY SURGERY Left     KNEE ARTHROSCOPY Bilateral     LAMINECTOMY      LAPAROSCOPY      hynecologic with adhesions    OOPHORECTOMY Bilateral     age 33    ORTHOPEDIC SURGERY      [place] SURG IMPLNT [address] N/A 5/18/2017    Procedure: PLACEMENT OF THORACIC SPINAL CORD STIMULATOR WITH LEFT BUTTOCK IMPLANTABLE PULSE GENERATOR (IMPULSE MONITORING-MOTORS (TCEMEP), EMG, SSEP); Surgeon: Wendie Michaud MD;  [facility];  Service: Neurosurgery    SPINAL CORD STIMULATOR IMPLANT Left 9/29/2020    Procedure: LAMINECTOMY THORACIC , REMOVAL OF SCS LEADS AND GENERATOR;  Surgeon: Azeem Harkins MD;  [facility];  Service: Neurosurgery    SPINAL STIMULATOR PLACEMENT  05/2017    TONSILLECTOMY AND ADENOIDECTOMY      onset: unknown    TOTAL KNEE ARTHROPLASTY Right        Family History   Problem Relation Age of Onset    Bone cancer Mother     Cancer Mother     Brain cancer Father     Stroke Father         stroke sydrome    Diabetes Paternal Grandmother     Breast cancer Paternal Grandmother 61    Breast cancer Maternal Aunt 79    Breast cancer additional onset Maternal Aunt 67    Depression Sister     Migraines Sister     No Known Problems Maternal Grandmother     No Known Problems Maternal Grandfather     No Known Problems Paternal Grandfather     Asthma Sister     Heart disease Brother        Social History     Occupational History    Occupation:    Tobacco Use    Smoking status: Never Smoker    Smokeless tobacco: Never Used   Vaping Use    Vaping Use: Never used   Substance and Sexual Activity    Alcohol use:  Yes     Alcohol/week: 0 0 standard drinks     Comment: socially    Drug use: No    Sexual activity: Not Currently     Partners: Male     Birth control/protection: None       Current Outpatient Medications on File Prior to Visit   Medication Sig    amitriptyline (ELAVIL) 50 mg tablet Take 1 tablet (50 mg total) by mouth daily at bedtime    Biotin 2500 MCG CAPS Take 1 capsule by mouth 2 (two) times a day     calcium carbonate (OS-ANTHONY) 600 MG tablet Take 1,200 mg by mouth 2 (two) times a day with meals    Cholecalciferol (VITAMIN D-3 PO) Take 1 tablet by mouth daily    clobetasol (TEMOVATE) 0 05 % cream Apply topically 2 (two) times a day    Cyanocobalamin (VITAMIN B-12 CR PO) Take 1 tablet by mouth daily    Diclofenac Sodium (VOLTAREN) 1 % Apply 2 g topically 4 (four) times a day    dicyclomine (BENTYL) 10 mg capsule Take 1 capsule (10 mg total) by mouth 2 (two) times a day    fexofenadine (ALLEGRA) 180 MG tablet Take 180 mg by mouth daily    hydrOXYzine HCL (ATARAX) 10 mg tablet Take 2 tablets (20 mg total) by mouth daily at bedtime Take 2 tablets at bedtime    losartan (COZAAR) 100 MG tablet Take 1 tablet (100 mg total) by mouth daily    methocarbamol (ROBAXIN) 750 mg tablet Take 1 tablet (750 mg total) by mouth every 8 (eight) hours    montelukast (SINGULAIR) 10 mg tablet Take 1 tablet (10 mg total) by mouth daily at bedtime    Omega-3 Fatty Acids (FISH OIL) 1,000 mg Take 1,000 mg by mouth 3 (three) times a day    omeprazole (PriLOSEC) 40 MG capsule Take 1 capsule (40 mg total) by mouth daily    rosuvastatin (CRESTOR) 10 MG tablet Take 1 tablet (10 mg total) by mouth every other day    [DISCONTINUED] gabapentin (NEURONTIN) 300 mg capsule Take 1 capsule (300 mg total) by mouth 3 (three) times a day    aspirin (ECOTRIN) 325 mg EC tablet Take 1 tablet (325 mg total) by mouth 2 (two) times a day (Patient taking differently: Take 325 mg by mouth daily )    EPINEPHrine (EpiPen 2-Michel) 0 3 mg/0 3 mL SOAJ Inject 0 3 mL (0 3 mg total) into a muscle as needed for anaphylaxis    oxyCODONE (ROXICODONE) 5 mg immediate release tablet Take 1 tablet (5 mg total) by mouth every 6 (six) hours as needed for severe painMax Daily Amount: 20 mg     No current facility-administered medications on file prior to visit  Allergies   Allergen Reactions    Bee Venom Shortness Of Breath    Chlorhexidine Rash    Egg Yolk - Food Allergy Hives    Iodinated Diagnostic Agents Hives    Penicillins Hives    Bactrim [Sulfamethoxazole-Trimethoprim] Rash    Codeine Other (See Comments)     headaches    Latex Rash    Medical Tape Blisters, Hives, Itching and Rash    Other Rash     Adhesive tape    Oxybutynin Rash    Pentosan Polysulfate Rash    Povidone Iodine Rash       Physical Exam    /60   Pulse 81   Temp (!) 97 4 °F (36 3 °C)   Ht 5' 2" (1 575 m)   Wt 90 3 kg (199 lb)   BMI 36 40 kg/m²     Constitutional: normal, well developed, well nourished, alert, in no distress and non-toxic and no overt pain behavior    Eyes: anicteric  HEENT: grossly intact  Neck: supple, symmetric, trachea midline and no masses   Pulmonary:even and unlabored  Cardiovascular:No edema or pitting edema present  Skin:Normal without rashes or lesions and well hydrated  Psychiatric:Mood and affect appropriate  Neurologic:Cranial Nerves II-XII grossly intact  Musculoskeletal:normal    Imaging

## 2021-08-11 ENCOUNTER — HOSPITAL ENCOUNTER (OUTPATIENT)
Dept: MAMMOGRAPHY | Facility: HOSPITAL | Age: 71
Discharge: HOME | End: 2021-08-11
Attending: INTERNAL MEDICINE
Payer: MEDICARE

## 2021-08-11 VITALS — HEIGHT: 62 IN | WEIGHT: 199 LBS | BODY MASS INDEX: 36.62 KG/M2

## 2021-08-11 DIAGNOSIS — Z12.31 ENCOUNTER FOR SCREENING MAMMOGRAM FOR MALIGNANT NEOPLASM OF BREAST: ICD-10-CM

## 2021-08-11 PROCEDURE — 77063 BREAST TOMOSYNTHESIS BI: CPT

## 2021-08-11 PROCEDURE — 77067 SCR MAMMO BI INCL CAD: CPT

## 2021-08-26 ENCOUNTER — TELEPHONE (OUTPATIENT)
Dept: NEUROLOGY | Facility: CLINIC | Age: 71
End: 2021-08-26

## 2021-09-02 ENCOUNTER — TELEPHONE (OUTPATIENT)
Dept: PAIN MEDICINE | Facility: MEDICAL CENTER | Age: 71
End: 2021-09-02

## 2021-09-02 NOTE — TELEPHONE ENCOUNTER
Pt called in schedule an injection , she had a fall in June and wanted to see if the doctor would recommend that  Please be advised thank you    Pt can be reached @   [phone]

## 2021-09-02 NOTE — TELEPHONE ENCOUNTER
TRES-    S/w pt    Pt scheduled for an OV on 9/8 with AO for a 1:45 arrival time  Per pt there were adjustments with her SCS but she continues to have pain and would like to discuss tx options

## 2021-09-07 DIAGNOSIS — K58.9 IRRITABLE BOWEL SYNDROME WITHOUT DIARRHEA: ICD-10-CM

## 2021-09-07 RX ORDER — DICYCLOMINE HYDROCHLORIDE 10 MG/1
10 CAPSULE ORAL 2 TIMES DAILY
Qty: 180 CAPSULE | Refills: 1 | Status: SHIPPED | OUTPATIENT
Start: 2021-09-07 | End: 2022-03-14 | Stop reason: SDUPTHER

## 2021-09-08 ENCOUNTER — OFFICE VISIT (OUTPATIENT)
Dept: PAIN MEDICINE | Facility: MEDICAL CENTER | Age: 71
End: 2021-09-08
Payer: MEDICARE

## 2021-09-08 VITALS
WEIGHT: 203 LBS | HEART RATE: 93 BPM | HEIGHT: 62 IN | DIASTOLIC BLOOD PRESSURE: 74 MMHG | SYSTOLIC BLOOD PRESSURE: 127 MMHG | BODY MASS INDEX: 37.36 KG/M2

## 2021-09-08 DIAGNOSIS — G89.4 CHRONIC PAIN SYNDROME: ICD-10-CM

## 2021-09-08 DIAGNOSIS — M54.41 CHRONIC RIGHT-SIDED LOW BACK PAIN WITH RIGHT-SIDED SCIATICA: ICD-10-CM

## 2021-09-08 DIAGNOSIS — M54.16 LUMBAR RADICULOPATHY: ICD-10-CM

## 2021-09-08 DIAGNOSIS — G89.29 CHRONIC RIGHT-SIDED LOW BACK PAIN WITH RIGHT-SIDED SCIATICA: ICD-10-CM

## 2021-09-08 DIAGNOSIS — M46.1 SACROILIITIS (HCC): Primary | ICD-10-CM

## 2021-09-08 DIAGNOSIS — Z96.89 SPINAL CORD STIMULATOR STATUS: ICD-10-CM

## 2021-09-08 DIAGNOSIS — M62.830 BACK MUSCLE SPASM: ICD-10-CM

## 2021-09-08 DIAGNOSIS — M79.18 MYOFASCIAL PAIN SYNDROME: ICD-10-CM

## 2021-09-08 PROCEDURE — 99214 OFFICE O/P EST MOD 30 MIN: CPT | Performed by: NURSE PRACTITIONER

## 2021-09-08 RX ORDER — METHOCARBAMOL 750 MG/1
750 TABLET, FILM COATED ORAL EVERY 8 HOURS SCHEDULED
Qty: 90 TABLET | Refills: 2 | Status: SHIPPED | OUTPATIENT
Start: 2021-09-08 | End: 2021-11-03 | Stop reason: SDUPTHER

## 2021-09-08 NOTE — PROGRESS NOTES
Assessment:  1  Sacroiliitis ([address] )    2  Myofascial pain syndrome    3  Back muscle spasm        Plan:   Based on patient report and physical exam, the patient's symptomatology does seem to be consistent with sacroiliac mediated pain from sacroiliitis  We will schedule the patient for a  bilateral SIJ injection to decrease any inflammatory components of the patient's pain symptoms  continue with gabapentin and methocarbamol, methocarbamol with the only medication that needed refilled today  The [employer] representative was available during the office visit today however the patient did not wish to have a reprogramming  Follow up after procedure      Complete risks and benefits including bleeding, infection, tissue reaction, nerve injury and allergic reaction were discussed  The approach was demonstrated using models and literature was provided  Verbal and written consent was obtained  History of Present Illness: The patient is a 80 y o  female who presents for a follow up office visit in regards to Back Pain, Leg Pain, and Buttocks Pain  The patients current symptoms include   Worsening pain rated 8/10 this is constant most bothersome in the evening  She describes her pain as burning, sharp, throbbing, and pins and needles  She localizes her pain across her bilateral low back over the PSIS regions with radiation down  Her buttock and posterior thigh stopping at the knees  Patient tells me this pain has been worsening since she fell in June down steps taking out her recycling  Current pain medications includes: gabapentin, and methocarbamol    The patient reports that this regimen is providing  moderate pain relief  The patient is reporting no side effects from this pain medication regimen  I have personally reviewed and/or updated the patient's past medical history, past surgical history, family history, social history, current medications, allergies, and vital signs today  Review of Systems  Review of Systems   Constitutional: Negative for fatigue and unexpected weight change  HENT: Negative for dental problem, ear pain, hearing loss and sneezing  Eyes: Negative for visual disturbance  Respiratory: Negative for cough and chest tightness  Cardiovascular: Negative for leg swelling  Gastrointestinal: Negative for anal bleeding  Endocrine: Negative for heat intolerance  Genitourinary: Negative for flank pain and genital sores  Musculoskeletal: Positive for gait problem  Joint stiffness  Pain in extremity: legs   Skin: Negative for wound  Allergic/Immunologic: Negative for immunocompromised state  Neurological: Negative for speech difficulty and light-headedness  Hematological: Negative for adenopathy  Psychiatric/Behavioral: Negative for confusion  The patient is not hyperactive  All other systems reviewed and are negative          Past Medical History:   Diagnosis Date    Abnormal findings on diagnostic imaging of breast     Arthritis     Chronic pain disorder     Extremity pain     Fibrocystic breast disease     Foot pain     GERD (gastroesophageal reflux disease)     Hyperlipidemia     Hypertension     Interstitial cystitis     Joint pain     Low back pain     Osteoarthritis     Osteopenia        Past Surgical History:   Procedure Laterality Date    APPENDECTOMY      BACK SURGERY      BREAST EXCISIONAL BIOPSY Left 1996    benign    CARPAL BOSS EXCISION      CHOLECYSTECTOMY      DIAGNOSTIC LAPAROSCOPY      FOOT SURGERY      FRACTURE SURGERY      HYSTERECTOMY      age 33    HYSTEROSCOPY      JOINT REPLACEMENT      KIDNEY SURGERY Left     KNEE ARTHROSCOPY Bilateral     LAMINECTOMY      LAPAROSCOPY      hynecologic with adhesions    OOPHORECTOMY Bilateral     age 33    ORTHOPEDIC SURGERY      ND SURG IMPLNT [address] N/A 5/18/2017    Procedure: PLACEMENT OF THORACIC SPINAL CORD STIMULATOR WITH LEFT BUTTOCK IMPLANTABLE PULSE GENERATOR (IMPULSE MONITORING-MOTORS (TCEMEP), EMG, SSEP); Surgeon: Jaylene Jeffery MD;  [facility]: [facility];  Service: Neurosurgery    SPINAL CORD STIMULATOR IMPLANT Left 9/29/2020    Procedure: LAMINECTOMY THORACIC , REMOVAL OF SCS LEADS AND GENERATOR;  Surgeon: Amarilys Smith MD;  Location: [facility];  Service: Neurosurgery    SPINAL STIMULATOR PLACEMENT  05/2017    TONSILLECTOMY AND ADENOIDECTOMY      onset: unknown    TOTAL KNEE ARTHROPLASTY Right        Family History   Problem Relation Age of Onset    Bone cancer Mother     Cancer Mother     Brain cancer Father     Stroke Father         stroke sydrome    Diabetes Paternal Grandmother     Breast cancer Paternal Grandmother 61    Breast cancer Maternal Aunt 79    Breast cancer additional onset Maternal Aunt 67    Depression Sister     Migraines Sister     No Known Problems Maternal Grandmother     No Known Problems Maternal Grandfather     No Known Problems Paternal Grandfather     Asthma Sister     Heart disease Brother        Social History     Occupational History    Occupation:    Tobacco Use    Smoking status: Never Smoker    Smokeless tobacco: Never Used   Vaping Use    Vaping Use: Never used   Substance and Sexual Activity    Alcohol use:  Yes     Alcohol/week: 0 0 standard drinks     Comment: socially    Drug use: No    Sexual activity: Not Currently     Partners: Male     Birth control/protection: None         Current Outpatient Medications:     amitriptyline (ELAVIL) 50 mg tablet, Take 1 tablet (50 mg total) by mouth daily at bedtime, Disp: 90 tablet, Rfl: 3    Biotin 2500 MCG CAPS, Take 1 capsule by mouth 2 (two) times a day , Disp: , Rfl:     calcium carbonate (OS-ANTHONY) 600 MG tablet, Take 1,200 mg by mouth 2 (two) times a day with meals, Disp: , Rfl:     Cholecalciferol (VITAMIN D-3 PO), Take 1 tablet by mouth daily, Disp: , Rfl:     clobetasol (TEMOVATE) 0 05 % cream, Apply topically 2 (two) times a day, Disp: 30 g, Rfl: 5    Cyanocobalamin (VITAMIN B-12 CR PO), Take 1 tablet by mouth daily, Disp: , Rfl:     Diclofenac Sodium (VOLTAREN) 1 %, Apply 2 g topically 4 (four) times a day, Disp: 150 g, Rfl: 1    dicyclomine (BENTYL) 10 mg capsule, Take 1 capsule (10 mg total) by mouth 2 (two) times a day, Disp: 180 capsule, Rfl: 1    EPINEPHrine (EpiPen 2-Michel) 0 3 mg/0 3 mL SOAJ, Inject 0 3 mL (0 3 mg total) into a muscle as needed for anaphylaxis, Disp: 2 each, Rfl: 5    fexofenadine (ALLEGRA) 180 MG tablet, Take 180 mg by mouth daily, Disp: , Rfl:     gabapentin (NEURONTIN) 300 mg capsule, Take 1 tablet in the morning, 1 tablet afternoon and 2 bedtime, Disp: 90 capsule, Rfl: 2    hydrOXYzine HCL (ATARAX) 10 mg tablet, Take 2 tablets (20 mg total) by mouth daily at bedtime Take 2 tablets at bedtime, Disp: 180 tablet, Rfl: 1    losartan (COZAAR) 100 MG tablet, Take 1 tablet (100 mg total) by mouth daily, Disp: 90 tablet, Rfl: 0    montelukast (SINGULAIR) 10 mg tablet, Take 1 tablet (10 mg total) by mouth daily at bedtime, Disp: 90 tablet, Rfl: 3    Omega-3 Fatty Acids (FISH OIL) 1,000 mg, Take 1,000 mg by mouth 3 (three) times a day, Disp: , Rfl:     omeprazole (PriLOSEC) 40 MG capsule, Take 1 capsule (40 mg total) by mouth daily, Disp: 90 capsule, Rfl: 3    rosuvastatin (CRESTOR) 10 MG tablet, Take 1 tablet (10 mg total) by mouth every other day, Disp: 45 tablet, Rfl: 3    aspirin (ECOTRIN) 325 mg EC tablet, Take 1 tablet (325 mg total) by mouth 2 (two) times a day (Patient taking differently: Take 325 mg by mouth daily ), Disp: 60 tablet, Rfl: 0    methocarbamol (ROBAXIN) 750 mg tablet, Take 1 tablet (750 mg total) by mouth every 8 (eight) hours, Disp: 90 tablet, Rfl: 2    oxyCODONE (ROXICODONE) 5 mg immediate release tablet, Take 1 tablet (5 mg total) by mouth every 6 (six) hours as needed for severe painMax Daily Amount: 20 mg, Disp: 20 tablet, Rfl: 0    Allergies   Allergen Reactions  Bee Venom Shortness Of Breath    Chlorhexidine Rash    Egg Yolk - Food Allergy Hives    Iodinated Diagnostic Agents Hives    Penicillins Hives    Bactrim [Sulfamethoxazole-Trimethoprim] Rash    Codeine Other (See Comments)     headaches    Latex Rash    Medical Tape Blisters, Hives, Itching and Rash    Other Rash     Adhesive tape    Oxybutynin Rash    Pentosan Polysulfate Rash    Povidone Iodine Rash       Physical Exam:    /74   Pulse 93   Ht 5' 2" (1 575 m)   Wt 92 1 kg (203 lb)   BMI 37 13 kg/m²     Constitutional:normal, well developed, well nourished, alert, in no distress and non-toxic and no overt pain behavior    Eyes:anicteric  HEENT:grossly intact  Neck:supple, symmetric, trachea midline and no masses   Pulmonary:even and unlabored  Cardiovascular:No edema or pitting edema present  Skin:Normal without rashes or lesions and well hydrated  Psychiatric:Mood and affect appropriate  Neurologic:Cranial Nerves II-XII grossly intact  Musculoskeletal:normal    Lumbar Spine Exam    Appearance:  Normal lordosis  Palpation/Tenderness:  left sacroiliac joint tenderness  right sacroiliac joint tenderness      Range of Motion:  Flexion:  No limitation  without pain  Extension:  No limitation  without pain  Lateral Flexion - Left:  No limitation  with pain  Lateral Flexion - Right:  No limitation  with pain  Rotation - Left:  No limitation  with pain  Rotation - Right:  No limitation  with pain    Special Tests:  Left Straight Leg Test:  negative  Right Straight Leg Test:  negative  Left Sixto's Maneuver:  positive  Right Sixto's Maneuver:  positive  Left Gaenslen's Test:  positive  Right Gaenslen's Test:  positive  Left Pelvic Distraction Test:  positive  Right Pelvic Distraction Test:  positive    Imaging  FL spine and pain procedure    (Results Pending)       Orders Placed This Encounter   Procedures    FL spine and pain procedure

## 2021-09-08 NOTE — PROGRESS NOTES
Patient ID: Campos Alexander is a 80 y o  female  Assessment/Plan:  Assessment/Plan:   peripheral neuropathy  Patient neuropathy remains fairly stable, mild  ongoing complaints of lower extremity paresthesias  Unfortunately, she has had some mild increased paresthesias of her left foot secondary to recent fracture status post fall,  --continues on amitriptyline 50 mg at bedtime  --has access to Robaxin, currently using every 6-8 hours  --continues with gabapentin 600 mg 4 times a day  --suggested possible use of some lidocaine cream to the dorsal aspect of her left foot  --encouraged good foot hygiene  --continue vitamin therapy     Chronic lumbar radiculopathy  --patient recently underwent lumbar injection on 02/03 via Pain Management with benefit  --had repair replacement of her spinal cord stimulator on February 23rd  patient following with Neurosurgery  --patient following with pain management, has had recent increase in her low back bilaterally secondary to hurt gait being off secondary to her fractured left foot  At this point, is due to undergo bilateral SI injections      Patient noted to have increased neuropathic pain of her left foot, involving primarily her toes, in particular her great toe  Has absent great toe extension, flexion fairly well preserved  Most likely secondary to perineal nerve issue  Discussed potential EMG however, most likely would unchanged the course of her treatment  Explained that nerve repair is very slow, she needs to give it more time to truly determine how much movement she will get back  Patient following with ortho        No problem-specific Assessment & Plan notes found for this encounter  Diagnoses and all orders for this visit:    Peripheral neuropathy  -     lidocaine (XYLOCAINE) 5 % ointment;  Apply topically as needed for mild pain    Neuralgia           Subjective:    HPI   Campos Alexander is a 80 y o  female  Patient initially presented 1 with complaints of diffuse paresthesias, EMG showed carpal tunnel, lab work was unrevealing   MRI of the brain was essentially normal   Her symptoms have been fairly well controlled on a combination of gabapentin Elavil   She does have chronic low back pain, MRI thoracic spine with mild degenerative changes T10-T11, lumbar MRI with mild to moderate bone and discogenic degenerative changes from L2-L5 with spondylosis   No stenosis or nerve root compression   Patient seen by pain management, subsequently underwent spinal cord stimulator placement in May of 2017   unfortunately, patient required a further intervention for her back, during surgery in September 29th 2019, during the procedure well breaking up a scar pocket, patient noted to have lost signal on the right, surgery was aborted  System was unfortunately removed           HPI   Patient was last seen in December 2020, that point, she noted ongoing complaints of chronic pain affecting her back and lower extremities, right greater than left  She was having difficulty standing or sitting for any length of time  It was suggested that she increase her gabapentin, she was continued on Robaxin from pain management  In the interim, she underwent updated MRI of the thoracic spine, evidence of progression in degenerative disc disease noted from T5-T7  Patient subsequently seen by Pain Management, had some ongoing tingling and pain in thoracic area, underwent injections  through Pain Management  Due to her ongoing pain, and issues with her stimulator she subsequently underwent repair and replacement on February 23rd  Patient last seen via telemedicine in March, at that time, she was doing better following her stimulator replacement  She did note ongoing issues with numbness of her toes on her right foot  Unfortunately in the interim, she sustained a fall back in June in which she sustained a closed fracture of proximal left fibula  Did not require surgery    She was sent for PT     Due to her fracture she lost ability to move her left big toe  Patient seen by pain management just yesterday, complaining of ongoing back pain, consistent with sacroiliac patient scheduled to undergo bilateral SI injection  She continues on a combination of gabapentin, Robaxin  As she describes having burning pain particularly on the 1st through 3rd toes of her left foot, most prominent at nighttime  Her gabapentin was increased by pain management to 4 times a day  Due to her ackward gait, her back discomfort once again has flared up, now limiting her overall activity  She is on able to extend her great toe on the left  Fortunately she has had no further falls  Patient following closely with ortho      The following portions of the patient's history were reviewed and updated as appropriate: allergies, current medications, past family history, past medical history, past social history, past surgical history and problem list          Objective:  Current Outpatient Medications   Medication Sig Dispense Refill    amitriptyline (ELAVIL) 50 mg tablet Take 1 tablet (50 mg total) by mouth daily at bedtime 90 tablet 3    aspirin (ECOTRIN) 325 mg EC tablet Take 1 tablet (325 mg total) by mouth 2 (two) times a day (Patient taking differently: Take 325 mg by mouth daily ) 60 tablet 0    Biotin 2500 MCG CAPS Take 1 capsule by mouth 2 (two) times a day       calcium carbonate (OS-ANTHONY) 600 MG tablet Take 1,200 mg by mouth 2 (two) times a day with meals      Cholecalciferol (VITAMIN D-3 PO) Take 1 tablet by mouth daily      clobetasol (TEMOVATE) 0 05 % cream Apply topically 2 (two) times a day 30 g 5    Cyanocobalamin (VITAMIN B-12 CR PO) Take 1 tablet by mouth daily      Diclofenac Sodium (VOLTAREN) 1 % Apply 2 g topically 4 (four) times a day 150 g 1    dicyclomine (BENTYL) 10 mg capsule Take 1 capsule (10 mg total) by mouth 2 (two) times a day 180 capsule 1    EPINEPHrine (EpiPen 2-Michel) 0 3 mg/0 3 mL SOAJ Inject 0 3 mL (0 3 mg total) into a muscle as needed for anaphylaxis 2 each 5    fexofenadine (ALLEGRA) 180 MG tablet Take 180 mg by mouth daily      gabapentin (NEURONTIN) 300 mg capsule Take 1 tablet in the morning, 1 tablet afternoon and 2 bedtime 90 capsule 2    hydrOXYzine HCL (ATARAX) 10 mg tablet Take 2 tablets (20 mg total) by mouth daily at bedtime Take 2 tablets at bedtime 180 tablet 1    losartan (COZAAR) 100 MG tablet Take 1 tablet (100 mg total) by mouth daily 90 tablet 0    methocarbamol (ROBAXIN) 750 mg tablet Take 1 tablet (750 mg total) by mouth every 8 (eight) hours 90 tablet 2    montelukast (SINGULAIR) 10 mg tablet Take 1 tablet (10 mg total) by mouth daily at bedtime 90 tablet 3    Omega-3 Fatty Acids (FISH OIL) 1,000 mg Take 1,000 mg by mouth 3 (three) times a day      omeprazole (PriLOSEC) 40 MG capsule Take 1 capsule (40 mg total) by mouth daily 90 capsule 3    rosuvastatin (CRESTOR) 10 MG tablet Take 1 tablet (10 mg total) by mouth every other day 45 tablet 3    lidocaine (XYLOCAINE) 5 % ointment Apply topically as needed for mild pain 30 g 1    oxyCODONE (ROXICODONE) 5 mg immediate release tablet Take 1 tablet (5 mg total) by mouth every 6 (six) hours as needed for severe painMax Daily Amount: 20 mg 20 tablet 0     No current facility-administered medications for this visit  Blood pressure 134/78, pulse 96, temperature (!) 96 7 °F (35 9 °C), temperature source Temporal, resp  rate 20, height 5' 3" (1 6 m), weight 92 8 kg (204 lb 9 6 oz)  Physical Exam  Constitutional:       Appearance: Normal appearance  HENT:      Head: Normocephalic  Eyes:      Extraocular Movements: Extraocular movements intact  Cardiovascular:      Rate and Rhythm: Normal rate  Pulses: Normal pulses  Pulmonary:      Effort: Pulmonary effort is normal    Musculoskeletal:         General: Swelling and tenderness present  Left ankle: Swelling present   Decreased range of motion  Skin:     General: Skin is warm  Neurological:      Deep Tendon Reflexes: Reflexes are normal and symmetric  Psychiatric:         Mood and Affect: Mood normal          Speech: Speech normal          Behavior: Behavior normal          Thought Content: Thought content normal          Neurological Exam  Mental Status  Awake, alert and oriented to person, place and time  Speech is normal  Language is fluent with no aphasia  Cranial Nerves  CN III, IV, VI: Extraocular movements intact bilaterally  CN V: Facial sensation is normal   CN VII: Full and symmetric facial movement  CN XI: Shoulder shrug strength is normal   CN XII: Tongue midline without atrophy or fasciculations  Motor  Normal muscle bulk throughout  Normal muscle tone  Strength is 5/5 in all four extremities except as noted  Right                     Left  Hip flexion                              5                          4+  Plantarflexion                         5                          5  Dorsiflexion                            5                          5-  Toe flexion                             5                          1  Toe extension                        5                          5-    Sensory  Temperature abnormality: Decreased temperature left lower extremity distal lateral aspect, in to the dorsal aspect of her left foot particularly 1st through 3rd phalanges  Vibration abnormality: Decreased vibratory perception lower extremities, 7 seconds maximal strike right great toe, 5 seconds left great toe, decreased left malleolus versus right,  Reflexes  Deep tendon reflexes are 2+ and symmetric in all four extremities with downgoing toes bilaterally  Coordination  Right: Finger-to-nose normal   Left: Finger-to-nose normal     Gait  Casual gait: Reduced stride length  Antalgic gait  Unable to rise from chair without using arms  Ambulates independently          ROS: Personally reviewed with patient    Review of Systems   Constitutional: Positive for activity change  Musculoskeletal: Positive for back pain and gait problem  Constitutional: Negative  Negative for appetite change and fever  HENT: Negative  Negative for hearing loss, tinnitus, trouble swallowing and voice change  Eyes: Negative  Negative for photophobia and pain  Respiratory: Negative  Negative for shortness of breath  Cardiovascular: Negative  Negative for palpitations  Gastrointestinal: Negative  Negative for nausea and vomiting  Endocrine: Negative  Negative for cold intolerance  Genitourinary: Negative  Negative for dysuria, frequency and urgency  Musculoskeletal: Positive for myalgias  Negative for neck pain  Skin: Negative  Negative for rash  Neurological: Negative  Negative for dizziness, tremors, seizures, syncope, facial asymmetry, speech difficulty, weakness, light-headedness, numbness and headaches  Hematological: Negative  Does not bruise/bleed easily  Psychiatric/Behavioral: Negative    Negative for confusion, hallucinations and sleep disturbance

## 2021-09-09 ENCOUNTER — OFFICE VISIT (OUTPATIENT)
Dept: NEUROLOGY | Facility: CLINIC | Age: 71
End: 2021-09-09
Payer: MEDICARE

## 2021-09-09 VITALS
HEART RATE: 96 BPM | SYSTOLIC BLOOD PRESSURE: 134 MMHG | BODY MASS INDEX: 36.25 KG/M2 | RESPIRATION RATE: 20 BRPM | HEIGHT: 63 IN | TEMPERATURE: 96.7 F | WEIGHT: 204.6 LBS | DIASTOLIC BLOOD PRESSURE: 78 MMHG

## 2021-09-09 DIAGNOSIS — G62.9 PERIPHERAL NEUROPATHY: Primary | ICD-10-CM

## 2021-09-09 DIAGNOSIS — M79.2 NEURALGIA: ICD-10-CM

## 2021-09-09 PROCEDURE — 99214 OFFICE O/P EST MOD 30 MIN: CPT | Performed by: NURSE PRACTITIONER

## 2021-09-09 RX ORDER — LIDOCAINE 50 MG/G
OINTMENT TOPICAL AS NEEDED
Qty: 30 G | Refills: 1 | Status: SHIPPED | OUTPATIENT
Start: 2021-09-09 | End: 2021-10-11

## 2021-09-09 NOTE — PATIENT INSTRUCTIONS
Continue current medication   following with PPM  Can try Lidocaine gel for left foot at bedtime   pt to call with any issues

## 2021-09-09 NOTE — PROGRESS NOTES
Review of Systems   Constitutional: Negative  Negative for appetite change and fever  HENT: Negative  Negative for hearing loss, tinnitus, trouble swallowing and voice change  Eyes: Negative  Negative for photophobia and pain  Respiratory: Negative  Negative for shortness of breath  Cardiovascular: Negative  Negative for palpitations  Gastrointestinal: Negative  Negative for nausea and vomiting  Endocrine: Negative  Negative for cold intolerance  Genitourinary: Negative  Negative for dysuria, frequency and urgency  Musculoskeletal: Positive for myalgias  Negative for neck pain  Skin: Negative  Negative for rash  Neurological: Negative  Negative for dizziness, tremors, seizures, syncope, facial asymmetry, speech difficulty, weakness, light-headedness, numbness and headaches  Hematological: Negative  Does not bruise/bleed easily  Psychiatric/Behavioral: Negative  Negative for confusion, hallucinations and sleep disturbance

## 2021-09-29 ENCOUNTER — HOSPITAL ENCOUNTER (OUTPATIENT)
Dept: RADIOLOGY | Facility: MEDICAL CENTER | Age: 71
Discharge: HOME | End: 2021-09-29
Attending: PHYSICAL MEDICINE & REHABILITATION | Admitting: PHYSICAL MEDICINE & REHABILITATION
Payer: MEDICARE

## 2021-09-29 VITALS
DIASTOLIC BLOOD PRESSURE: 75 MMHG | RESPIRATION RATE: 18 BRPM | SYSTOLIC BLOOD PRESSURE: 123 MMHG | HEART RATE: 71 BPM | OXYGEN SATURATION: 98 % | TEMPERATURE: 98.5 F

## 2021-09-29 DIAGNOSIS — M46.1 SACROILIITIS (HCC): ICD-10-CM

## 2021-09-29 PROCEDURE — 27096 INJECT SACROILIAC JOINT: CPT | Performed by: PHYSICAL MEDICINE & REHABILITATION

## 2021-09-29 PROCEDURE — A9585 GADOBUTROL INJECTION: HCPCS | Performed by: PHYSICAL MEDICINE & REHABILITATION

## 2021-09-29 RX ORDER — METHYLPREDNISOLONE ACETATE 40 MG/ML
80 INJECTION, SUSPENSION INTRA-ARTICULAR; INTRALESIONAL; INTRAMUSCULAR; PARENTERAL; SOFT TISSUE ONCE
Status: COMPLETED | OUTPATIENT
Start: 2021-09-29 | End: 2021-09-29

## 2021-09-29 RX ORDER — BUPIVACAINE HCL/PF 2.5 MG/ML
3 VIAL (ML) INJECTION ONCE
Status: COMPLETED | OUTPATIENT
Start: 2021-09-29 | End: 2021-09-29

## 2021-09-29 RX ADMIN — GADOBUTROL 1 ML: 604.72 INJECTION INTRAVENOUS at 13:45

## 2021-09-29 RX ADMIN — METHYLPREDNISOLONE ACETATE 80 MG: 40 INJECTION, SUSPENSION INTRA-ARTICULAR; INTRALESIONAL; INTRAMUSCULAR; SOFT TISSUE at 13:45

## 2021-09-29 RX ADMIN — Medication 3 ML: at 13:45

## 2021-10-06 ENCOUNTER — HOSPITAL ENCOUNTER (EMERGENCY)
Facility: HOSPITAL | Age: 71
Discharge: HOME | End: 2021-10-06
Attending: EMERGENCY MEDICINE | Admitting: EMERGENCY MEDICINE
Payer: COMMERCIAL

## 2021-10-06 ENCOUNTER — TELEPHONE (OUTPATIENT)
Dept: PAIN MEDICINE | Facility: CLINIC | Age: 71
End: 2021-10-06

## 2021-10-06 ENCOUNTER — APPOINTMENT (EMERGENCY)
Dept: RADIOLOGY | Facility: HOSPITAL | Age: 71
End: 2021-10-06
Payer: COMMERCIAL

## 2021-10-06 VITALS
WEIGHT: 203.48 LBS | TEMPERATURE: 97.1 F | DIASTOLIC BLOOD PRESSURE: 77 MMHG | SYSTOLIC BLOOD PRESSURE: 172 MMHG | HEART RATE: 85 BPM | BODY MASS INDEX: 36.05 KG/M2 | RESPIRATION RATE: 18 BRPM | OXYGEN SATURATION: 98 %

## 2021-10-06 DIAGNOSIS — S50.312A ABRASION OF LEFT ELBOW, INITIAL ENCOUNTER: ICD-10-CM

## 2021-10-06 DIAGNOSIS — S60.212A CONTUSION OF LEFT WRIST, INITIAL ENCOUNTER: ICD-10-CM

## 2021-10-06 DIAGNOSIS — S97.101A CRUSHING INJURY OF RIGHT FOOT AND TOE, INITIAL ENCOUNTER: Primary | ICD-10-CM

## 2021-10-06 DIAGNOSIS — S97.81XA CRUSHING INJURY OF RIGHT FOOT AND TOE, INITIAL ENCOUNTER: Primary | ICD-10-CM

## 2021-10-06 PROCEDURE — 73130 X-RAY EXAM OF HAND: CPT

## 2021-10-06 PROCEDURE — 73590 X-RAY EXAM OF LOWER LEG: CPT

## 2021-10-06 PROCEDURE — 73630 X-RAY EXAM OF FOOT: CPT

## 2021-10-06 PROCEDURE — 73080 X-RAY EXAM OF ELBOW: CPT

## 2021-10-06 PROCEDURE — 73110 X-RAY EXAM OF WRIST: CPT

## 2021-10-06 PROCEDURE — 99283 EMERGENCY DEPT VISIT LOW MDM: CPT

## 2021-10-06 PROCEDURE — 99284 EMERGENCY DEPT VISIT MOD MDM: CPT | Performed by: PHYSICIAN ASSISTANT

## 2021-10-06 RX ORDER — OXYCODONE HYDROCHLORIDE AND ACETAMINOPHEN 5; 325 MG/1; MG/1
1 TABLET ORAL EVERY 6 HOURS PRN
Qty: 6 TABLET | Refills: 0 | Status: SHIPPED | OUTPATIENT
Start: 2021-10-06 | End: 2021-12-10 | Stop reason: DRUGHIGH

## 2021-10-06 RX ORDER — OXYCODONE HYDROCHLORIDE AND ACETAMINOPHEN 5; 325 MG/1; MG/1
1 TABLET ORAL ONCE
Status: COMPLETED | OUTPATIENT
Start: 2021-10-06 | End: 2021-10-06

## 2021-10-06 RX ADMIN — OXYCODONE HYDROCHLORIDE AND ACETAMINOPHEN 1 TABLET: 5; 325 TABLET ORAL at 17:39

## 2021-10-06 NOTE — TELEPHONE ENCOUNTER
Pt returned call she has had 75-80 % improvement , pain level  2/10.        Please be advised thank you.  Pt can be reached @ [phone]

## 2021-10-11 ENCOUNTER — OFFICE VISIT (OUTPATIENT)
Dept: FAMILY MEDICINE CLINIC | Facility: CLINIC | Age: 71
End: 2021-10-11
Payer: COMMERCIAL

## 2021-10-11 VITALS
WEIGHT: 198 LBS | TEMPERATURE: 97.3 F | DIASTOLIC BLOOD PRESSURE: 78 MMHG | SYSTOLIC BLOOD PRESSURE: 126 MMHG | BODY MASS INDEX: 35.07 KG/M2

## 2021-10-11 DIAGNOSIS — R52 PAIN: ICD-10-CM

## 2021-10-11 DIAGNOSIS — E78.2 MIXED HYPERLIPIDEMIA: ICD-10-CM

## 2021-10-11 DIAGNOSIS — R05.9 COUGH: ICD-10-CM

## 2021-10-11 DIAGNOSIS — K21.9 GASTROESOPHAGEAL REFLUX DISEASE: ICD-10-CM
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
## 2021-10-11 DIAGNOSIS — S99.921D INJURY OF RIGHT FOOT, SUBSEQUENT ENCOUNTER: Primary | ICD-10-CM

## 2021-10-11 PROBLEM — M25.562 ACUTE PAIN OF LEFT KNEE: Status: RESOLVED | Noted: 2021-06-07 | Resolved: 2021-10-11

## 2021-10-11 PROBLEM — S99.921A INJURY OF RIGHT FOOT: Status: ACTIVE | Noted: 2021-10-11
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
## 2021-10-11 PROBLEM — R60.0 EDEMA OF LEFT LOWER EXTREMITY: Status: RESOLVED | Noted: 2021-06-06 | Resolved: 2021-10-11

## 2021-10-11 PROCEDURE — 99213 OFFICE O/P EST LOW 20 MIN: CPT | Performed by: INTERNAL MEDICINE

## 2021-10-11 RX ORDER — OMEPRAZOLE 40 MG/1
40 CAPSULE, DELAYED RELEASE ORAL DAILY
Qty: 90 CAPSULE | Refills: 3 | Status: SHIPPED | OUTPATIENT
Start: 2021-10-11

## 2021-10-11 RX ORDER — MONTELUKAST SODIUM 10 MG/1
10 TABLET ORAL
Qty: 90 TABLET | Refills: 3 | Status: SHIPPED | OUTPATIENT
Start: 2021-10-11 | End: 2022-06-21 | Stop reason: SDUPTHER

## 2021-10-11 RX ORDER — SIMVASTATIN 10 MG
10 TABLET ORAL
Qty: 30 TABLET | Refills: 5 | Status: SHIPPED | OUTPATIENT
Start: 2021-10-11 | End: 2022-01-22

## 2021-10-12 ENCOUNTER — APPOINTMENT (OUTPATIENT)
Dept: RADIOLOGY | Facility: MEDICAL CENTER | Age: 71
End: 2021-10-12
Payer: MEDICARE

## 2021-10-12 DIAGNOSIS — R52 PAIN: ICD-10-CM

## 2021-10-12 PROCEDURE — 72072 X-RAY EXAM THORAC SPINE 3VWS: CPT

## 2021-10-14 ENCOUNTER — ANNUAL EXAM (OUTPATIENT)
Dept: OBGYN CLINIC | Facility: MEDICAL CENTER | Age: 71
End: 2021-10-14
Payer: MEDICARE

## 2021-10-14 VITALS — BODY MASS INDEX: 34.72 KG/M2 | WEIGHT: 196 LBS | DIASTOLIC BLOOD PRESSURE: 74 MMHG | SYSTOLIC BLOOD PRESSURE: 120 MMHG

## 2021-10-14 DIAGNOSIS — Z12.31 ENCOUNTER FOR SCREENING MAMMOGRAM FOR BREAST CANCER: ICD-10-CM

## 2021-10-14 DIAGNOSIS — Z01.419 WELL WOMAN EXAM WITH ROUTINE GYNECOLOGICAL EXAM: Primary | ICD-10-CM

## 2021-10-14 DIAGNOSIS — Z12.11 COLON CANCER SCREENING: ICD-10-CM

## 2021-10-14 PROCEDURE — G0101 CA SCREEN;PELVIC/BREAST EXAM: HCPCS | Performed by: OBSTETRICS & GYNECOLOGY

## 2021-10-19 ENCOUNTER — OFFICE VISIT (OUTPATIENT)
Dept: OBGYN CLINIC | Facility: CLINIC | Age: 71
End: 2021-10-19
Payer: MEDICARE

## 2021-10-19 ENCOUNTER — APPOINTMENT (OUTPATIENT)
Dept: RADIOLOGY | Facility: MEDICAL CENTER | Age: 71
End: 2021-10-19
Payer: MEDICARE

## 2021-10-19 DIAGNOSIS — S82.832D CLOSED FRACTURE OF PROXIMAL END OF LEFT FIBULA WITH ROUTINE HEALING, UNSPECIFIED FRACTURE MORPHOLOGY, SUBSEQUENT ENCOUNTER: Primary | ICD-10-CM

## 2021-10-19 DIAGNOSIS — S82.832D CLOSED FRACTURE OF PROXIMAL END OF LEFT FIBULA WITH ROUTINE HEALING, UNSPECIFIED FRACTURE MORPHOLOGY, SUBSEQUENT ENCOUNTER: ICD-10-CM

## 2021-10-19 PROCEDURE — 99212 OFFICE O/P EST SF 10 MIN: CPT | Performed by: PHYSICIAN ASSISTANT

## 2021-10-19 PROCEDURE — 73590 X-RAY EXAM OF LOWER LEG: CPT

## 2021-11-03 ENCOUNTER — OFFICE VISIT (OUTPATIENT)
Dept: PAIN MEDICINE | Facility: MEDICAL CENTER | Age: 71
End: 2021-11-03
Payer: MEDICARE

## 2021-11-03 VITALS
TEMPERATURE: 97.8 F | HEIGHT: 63 IN | SYSTOLIC BLOOD PRESSURE: 120 MMHG | BODY MASS INDEX: 34.73 KG/M2 | HEART RATE: 70 BPM | DIASTOLIC BLOOD PRESSURE: 54 MMHG | WEIGHT: 196 LBS

## 2021-11-03 DIAGNOSIS — G60.9 IDIOPATHIC PERIPHERAL NEUROPATHY: Primary | ICD-10-CM

## 2021-11-03 DIAGNOSIS — M62.830 BACK MUSCLE SPASM: ICD-10-CM

## 2021-11-03 DIAGNOSIS — M46.1 SACROILIITIS (HCC): ICD-10-CM

## 2021-11-03 DIAGNOSIS — M54.16 LUMBAR RADICULOPATHY: ICD-10-CM

## 2021-11-03 DIAGNOSIS — M79.18 MYOFASCIAL PAIN SYNDROME: ICD-10-CM

## 2021-11-03 DIAGNOSIS — G89.4 CHRONIC PAIN SYNDROME: ICD-10-CM

## 2021-11-03 PROCEDURE — 99213 OFFICE O/P EST LOW 20 MIN: CPT | Performed by: NURSE PRACTITIONER

## 2021-11-03 RX ORDER — METHOCARBAMOL 750 MG/1
750 TABLET, FILM COATED ORAL EVERY 8 HOURS SCHEDULED
Qty: 270 TABLET | Refills: 0 | Status: SHIPPED | OUTPATIENT
Start: 2021-11-03 | End: 2022-02-23 | Stop reason: SDUPTHER

## 2021-11-03 RX ORDER — GABAPENTIN 300 MG/1
CAPSULE ORAL
Qty: 90 CAPSULE | Refills: 2 | Status: SHIPPED | OUTPATIENT
Start: 2021-11-03 | End: 2021-11-03 | Stop reason: SDUPTHER

## 2021-11-03 RX ORDER — GABAPENTIN 300 MG/1
CAPSULE ORAL
Qty: 360 CAPSULE | Refills: 0 | Status: SHIPPED | OUTPATIENT
Start: 2021-11-03 | End: 2022-02-23 | Stop reason: SDUPTHER

## 2021-11-03 RX ORDER — METHOCARBAMOL 750 MG/1
750 TABLET, FILM COATED ORAL EVERY 8 HOURS SCHEDULED
Qty: 90 TABLET | Refills: 2 | Status: SHIPPED | OUTPATIENT
Start: 2021-11-03 | End: 2021-11-03 | Stop reason: SDUPTHER

## 2021-11-18 ENCOUNTER — PROCEDURE VISIT (OUTPATIENT)
Dept: PAIN MEDICINE | Facility: MEDICAL CENTER | Age: 71
End: 2021-11-18
Payer: MEDICARE

## 2021-11-18 ENCOUNTER — TELEPHONE (OUTPATIENT)
Dept: FAMILY MEDICINE CLINIC | Facility: CLINIC | Age: 71
End: 2021-11-18

## 2021-11-18 DIAGNOSIS — N30.00 ACUTE CYSTITIS WITHOUT HEMATURIA: Primary | ICD-10-CM

## 2021-11-18 DIAGNOSIS — M70.62 GREATER TROCHANTERIC BURSITIS, LEFT: Primary | ICD-10-CM

## 2021-11-18 PROCEDURE — 20611 DRAIN/INJ JOINT/BURSA W/US: CPT | Performed by: PHYSICAL MEDICINE & REHABILITATION

## 2021-11-18 RX ORDER — CIPROFLOXACIN 500 MG/1
500 TABLET, FILM COATED ORAL EVERY 12 HOURS SCHEDULED
Qty: 10 TABLET | Refills: 0 | Status: SHIPPED | OUTPATIENT
Start: 2021-11-18 | End: 2021-11-23

## 2021-11-18 RX ORDER — BUPIVACAINE HYDROCHLORIDE 2.5 MG/ML
10 INJECTION, SOLUTION EPIDURAL; INFILTRATION; INTRACAUDAL ONCE
Status: COMPLETED | OUTPATIENT
Start: 2021-11-18 | End: 2021-11-18

## 2021-11-18 RX ORDER — METHYLPREDNISOLONE ACETATE 40 MG/ML
40 INJECTION, SUSPENSION INTRA-ARTICULAR; INTRALESIONAL; INTRAMUSCULAR; SOFT TISSUE ONCE
Status: COMPLETED | OUTPATIENT
Start: 2021-11-18 | End: 2021-11-18

## 2021-11-18 RX ADMIN — METHYLPREDNISOLONE ACETATE 40 MG: 40 INJECTION, SUSPENSION INTRA-ARTICULAR; INTRALESIONAL; INTRAMUSCULAR; SOFT TISSUE at 14:32

## 2021-11-18 RX ADMIN — BUPIVACAINE HYDROCHLORIDE 10 ML: 2.5 INJECTION, SOLUTION EPIDURAL; INFILTRATION; INTRACAUDAL at 14:31

## 2021-11-19 ENCOUNTER — APPOINTMENT (OUTPATIENT)
Dept: LAB | Facility: MEDICAL CENTER | Age: 71
End: 2021-11-19
Payer: MEDICARE

## 2021-11-19 DIAGNOSIS — N30.00 ACUTE CYSTITIS WITHOUT HEMATURIA: Primary | ICD-10-CM

## 2021-11-19 LAB
BACTERIA UR QL AUTO: ABNORMAL /HPF
BILIRUB UR QL STRIP: NEGATIVE
CLARITY UR: CLEAR
COLOR UR: YELLOW
GLUCOSE UR STRIP-MCNC: NEGATIVE MG/DL
HGB UR QL STRIP.AUTO: NEGATIVE
HYALINE CASTS #/AREA URNS LPF: ABNORMAL /LPF
KETONES UR STRIP-MCNC: NEGATIVE MG/DL
LEUKOCYTE ESTERASE UR QL STRIP: ABNORMAL
NITRITE UR QL STRIP: NEGATIVE
NON-SQ EPI CELLS URNS QL MICRO: ABNORMAL /HPF
PH UR STRIP.AUTO: 6.5 [PH]
PROT UR STRIP-MCNC: NEGATIVE MG/DL
RBC #/AREA URNS AUTO: ABNORMAL /HPF
SP GR UR STRIP.AUTO: 1.01 (ref 1–1.03)
UROBILINOGEN UR QL STRIP.AUTO: 0.2 E.U./DL
WBC #/AREA URNS AUTO: ABNORMAL /HPF

## 2021-11-19 PROCEDURE — 87086 URINE CULTURE/COLONY COUNT: CPT

## 2021-11-19 PROCEDURE — 81001 URINALYSIS AUTO W/SCOPE: CPT

## 2021-11-20 LAB — BACTERIA UR CULT: NORMAL

## 2021-11-22 ENCOUNTER — EVALUATION (OUTPATIENT)
Dept: PHYSICAL THERAPY | Facility: CLINIC | Age: 71
End: 2021-11-22
Payer: MEDICARE

## 2021-11-22 DIAGNOSIS — M54.16 LUMBAR RADICULOPATHY: ICD-10-CM

## 2021-11-22 DIAGNOSIS — G89.4 CHRONIC PAIN SYNDROME: ICD-10-CM

## 2021-11-22 DIAGNOSIS — G60.9 IDIOPATHIC PERIPHERAL NEUROPATHY: Primary | ICD-10-CM

## 2021-11-22 PROCEDURE — 97163 PT EVAL HIGH COMPLEX 45 MIN: CPT | Performed by: PHYSICAL THERAPIST

## 2021-11-22 PROCEDURE — 97535 SELF CARE MNGMENT TRAINING: CPT | Performed by: PHYSICAL THERAPIST

## 2021-11-22 PROCEDURE — 97112 NEUROMUSCULAR REEDUCATION: CPT | Performed by: PHYSICAL THERAPIST

## 2021-11-26 ENCOUNTER — TELEPHONE (OUTPATIENT)
Dept: PAIN MEDICINE | Facility: CLINIC | Age: 71
End: 2021-11-26

## 2021-11-30 ENCOUNTER — OFFICE VISIT (OUTPATIENT)
Dept: PHYSICAL THERAPY | Facility: CLINIC | Age: 71
End: 2021-11-30
Payer: MEDICARE

## 2021-11-30 DIAGNOSIS — G89.4 CHRONIC PAIN SYNDROME: ICD-10-CM

## 2021-11-30 DIAGNOSIS — G60.9 IDIOPATHIC PERIPHERAL NEUROPATHY: Primary | ICD-10-CM

## 2021-11-30 DIAGNOSIS — M54.16 LUMBAR RADICULOPATHY: ICD-10-CM

## 2021-11-30 PROCEDURE — 97110 THERAPEUTIC EXERCISES: CPT | Performed by: PHYSICAL THERAPIST

## 2021-11-30 PROCEDURE — 97140 MANUAL THERAPY 1/> REGIONS: CPT | Performed by: PHYSICAL THERAPIST

## 2021-12-07 ENCOUNTER — OFFICE VISIT (OUTPATIENT)
Dept: PHYSICAL THERAPY | Facility: CLINIC | Age: 71
End: 2021-12-07
Payer: MEDICARE

## 2021-12-07 DIAGNOSIS — M54.16 LUMBAR RADICULOPATHY: ICD-10-CM

## 2021-12-07 DIAGNOSIS — G60.9 IDIOPATHIC PERIPHERAL NEUROPATHY: Primary | ICD-10-CM

## 2021-12-07 DIAGNOSIS — G89.4 CHRONIC PAIN SYNDROME: ICD-10-CM

## 2021-12-07 PROCEDURE — 97535 SELF CARE MNGMENT TRAINING: CPT | Performed by: PHYSICAL THERAPIST

## 2021-12-07 PROCEDURE — 97140 MANUAL THERAPY 1/> REGIONS: CPT | Performed by: PHYSICAL THERAPIST

## 2021-12-10 ENCOUNTER — OFFICE VISIT (OUTPATIENT)
Dept: PAIN MEDICINE | Facility: MEDICAL CENTER | Age: 71
End: 2021-12-10
Payer: MEDICARE

## 2021-12-10 VITALS
TEMPERATURE: 98.5 F | HEART RATE: 86 BPM | SYSTOLIC BLOOD PRESSURE: 124 MMHG | HEIGHT: 63 IN | BODY MASS INDEX: 33.66 KG/M2 | DIASTOLIC BLOOD PRESSURE: 66 MMHG | WEIGHT: 190 LBS

## 2021-12-10 DIAGNOSIS — M62.838 SPASM OF LEFT PIRIFORMIS MUSCLE: ICD-10-CM

## 2021-12-10 DIAGNOSIS — M46.1 SACROILIITIS (HCC): Primary | ICD-10-CM

## 2021-12-10 PROCEDURE — 99214 OFFICE O/P EST MOD 30 MIN: CPT | Performed by: NURSE PRACTITIONER

## 2021-12-16 ENCOUNTER — APPOINTMENT (OUTPATIENT)
Dept: LAB | Facility: MEDICAL CENTER | Age: 71
End: 2021-12-16
Payer: MEDICARE

## 2021-12-16 DIAGNOSIS — E78.2 MIXED HYPERLIPIDEMIA: ICD-10-CM

## 2021-12-16 LAB
ALBUMIN SERPL BCP-MCNC: 4.1 G/DL (ref 3.5–5)
ALP SERPL-CCNC: 91 U/L (ref 46–116)
ALT SERPL W P-5'-P-CCNC: 50 U/L (ref 12–78)
ANION GAP SERPL CALCULATED.3IONS-SCNC: 3 MMOL/L (ref 4–13)
AST SERPL W P-5'-P-CCNC: 32 U/L (ref 5–45)
BILIRUB SERPL-MCNC: 0.65 MG/DL (ref 0.2–1)
BUN SERPL-MCNC: 14 MG/DL (ref 5–25)
CALCIUM SERPL-MCNC: 9.4 MG/DL (ref 8.3–10.1)
CHLORIDE SERPL-SCNC: 108 MMOL/L (ref 100–108)
CO2 SERPL-SCNC: 29 MMOL/L (ref 21–32)
CREAT SERPL-MCNC: 0.7 MG/DL (ref 0.6–1.3)
GFR SERPL CREATININE-BSD FRML MDRD: 88 ML/MIN/1.73SQ M
GLUCOSE P FAST SERPL-MCNC: 100 MG/DL (ref 65–99)
LDLC SERPL DIRECT ASSAY-MCNC: 181 MG/DL (ref 0–100)
POTASSIUM SERPL-SCNC: 4.2 MMOL/L (ref 3.5–5.3)
PROT SERPL-MCNC: 7.7 G/DL (ref 6.4–8.2)
SODIUM SERPL-SCNC: 140 MMOL/L (ref 136–145)
TRIGL SERPL-MCNC: 106 MG/DL

## 2021-12-16 PROCEDURE — 36415 COLL VENOUS BLD VENIPUNCTURE: CPT

## 2021-12-16 PROCEDURE — 83721 ASSAY OF BLOOD LIPOPROTEIN: CPT

## 2021-12-16 PROCEDURE — 80053 COMPREHEN METABOLIC PANEL: CPT

## 2021-12-16 PROCEDURE — 84478 ASSAY OF TRIGLYCERIDES: CPT

## 2021-12-20 NOTE — PROGRESS NOTES
PT Re-Evaluation     Today's date: 2021  Patient name: Carlotta March  : 1950  MRN: [id number]  Referring provider: EBONI Velazquez  Dx:   No diagnosis found  Assessment  Assessment details:   CURRENT FUNCTIONAL STATUS    Walking tolerance 3-4 blocks  Able to ride her bike for 5 minutes  Unable to wear a shoe  SHORT TERM GOALS (2 WEEKS)    Increase ankle AROM 5 degrees in all planes  Increase ankle strength 1/2 grade in all weak areas  Increase great toe extension 5 degrees  Decrease pain to 3-7/10  Walking tolerance 5-6 blocks  Able to ride her bike for 15 minutes  Able to wear a shoe for 30 minutes    LONG TERM GOALS (DISCHARGE)    Ankle AROM: DF=5 deg, PF=60 deg, IV=45 deg, EV=40 deg -partially met   Ankle Strength: 5/5 t/o -partially met    Great Toe AROM: F=WNL, E=10 deg -partially met   Great Toe Strength: F=5/5, E=3/5 -partially met    Decrease pain to 1-4/10 -partially met   Walking tolerance 1 mile -partially met   Able to ride her bike for 20-30 minutes  -partially met   Able to wear a shoe for 60 minutes  -partially met      Understanding of Dx/Px/POC: good   Prognosis: fair    Goals  See assessment details above  Plan  Plan details: The patient has shown improvement in PT demonstrating decreased pain, increased range of motion, increased strength, and increased tolerance to activity  The patient continues to present with pain, decreased ROM, decreased strength, and decreased tolerance to activity  The patient would benefit from continued skilled PT services to address these issues and to maximize function  The patient will also continue performing their HEP          Patient would benefit from: skilled physical therapy  Planned therapy interventions: manual therapy, self care, therapeutic activities, therapeutic exercise and neuromuscular re-education  Frequency: 1x week  Duration in weeks: 4        Subjective Evaluation    History of Present Illness  Mechanism of injury: Subjective: The patient's left ankle and foot pain  Pain  Current pain ratin  At best pain ratin  At worst pain ratin    Patient Goals  Patient goals for therapy: decreased pain, increased motion, decreased edema, increased strength and return to sport/leisure activities  Patient goal: Able to ride her stationary bike  Objective     Observations     Additional Observation Details  Gait is mildly antalgic on the left leg without an AD  She is wearing a sneaker  General Comments:      Lumbar Comments             Ankle/Foot Comments   CURRENT OBJECTIVE MEASUREMENTS    Ankle AROM: DF=0 deg, PF=55 deg, IV=35 deg, EV=20 deg  Ankle Strength: 4/5 t/o with severe pain during testing  Great Toe AROM: F=WNL, E=0 deg  Great Toe Strength: F=4/5 with pain, E=0/5  Positive left SLR with increased sensitivity to ankle inversion

## 2021-12-21 ENCOUNTER — APPOINTMENT (OUTPATIENT)
Dept: PHYSICAL THERAPY | Facility: CLINIC | Age: 71
End: 2021-12-21
Payer: MEDICARE

## 2021-12-21 ENCOUNTER — OFFICE VISIT (OUTPATIENT)
Dept: FAMILY MEDICINE CLINIC | Facility: CLINIC | Age: 71
End: 2021-12-21
Payer: MEDICARE

## 2021-12-21 VITALS
HEART RATE: 76 BPM | SYSTOLIC BLOOD PRESSURE: 124 MMHG | DIASTOLIC BLOOD PRESSURE: 78 MMHG | WEIGHT: 193.2 LBS | TEMPERATURE: 97.8 F | RESPIRATION RATE: 18 BRPM | BODY MASS INDEX: 34.23 KG/M2 | HEIGHT: 63 IN

## 2021-12-21 DIAGNOSIS — E78.5 HYPERLIPIDEMIA, UNSPECIFIED HYPERLIPIDEMIA TYPE: ICD-10-CM

## 2021-12-21 DIAGNOSIS — Z00.00 MEDICARE ANNUAL WELLNESS VISIT, SUBSEQUENT: Primary | ICD-10-CM

## 2021-12-21 PROBLEM — R52 PAIN: Status: RESOLVED | Noted: 2021-10-11 | Resolved: 2021-12-21

## 2021-12-21 PROCEDURE — G0439 PPPS, SUBSEQ VISIT: HCPCS | Performed by: INTERNAL MEDICINE

## 2021-12-23 ENCOUNTER — OFFICE VISIT (OUTPATIENT)
Dept: PHYSICAL THERAPY | Facility: CLINIC | Age: 71
End: 2021-12-23
Payer: MEDICARE

## 2021-12-23 DIAGNOSIS — G60.9 IDIOPATHIC PERIPHERAL NEUROPATHY: Primary | ICD-10-CM

## 2021-12-23 DIAGNOSIS — G89.4 CHRONIC PAIN SYNDROME: ICD-10-CM

## 2021-12-23 DIAGNOSIS — M54.16 LUMBAR RADICULOPATHY: ICD-10-CM

## 2021-12-23 PROCEDURE — 97535 SELF CARE MNGMENT TRAINING: CPT | Performed by: PHYSICAL THERAPIST

## 2021-12-23 PROCEDURE — 97140 MANUAL THERAPY 1/> REGIONS: CPT | Performed by: PHYSICAL THERAPIST

## 2021-12-28 ENCOUNTER — APPOINTMENT (OUTPATIENT)
Dept: PHYSICAL THERAPY | Facility: CLINIC | Age: 71
End: 2021-12-28
Payer: MEDICARE

## 2021-12-29 ENCOUNTER — EVALUATION (OUTPATIENT)
Dept: PHYSICAL THERAPY | Facility: CLINIC | Age: 71
End: 2021-12-29
Payer: MEDICARE

## 2021-12-29 DIAGNOSIS — M54.16 LUMBAR RADICULOPATHY: ICD-10-CM

## 2021-12-29 DIAGNOSIS — G60.9 IDIOPATHIC PERIPHERAL NEUROPATHY: Primary | ICD-10-CM

## 2021-12-29 DIAGNOSIS — G89.4 CHRONIC PAIN SYNDROME: ICD-10-CM

## 2021-12-29 PROCEDURE — 97140 MANUAL THERAPY 1/> REGIONS: CPT | Performed by: PHYSICAL THERAPIST

## 2021-12-29 PROCEDURE — 97535 SELF CARE MNGMENT TRAINING: CPT | Performed by: PHYSICAL THERAPIST

## 2022-01-05 ENCOUNTER — HOSPITAL ENCOUNTER (OUTPATIENT)
Dept: RADIOLOGY | Facility: MEDICAL CENTER | Age: 72
Discharge: HOME | End: 2022-01-05
Attending: PHYSICAL MEDICINE & REHABILITATION
Payer: MEDICARE

## 2022-01-05 VITALS
OXYGEN SATURATION: 96 % | SYSTOLIC BLOOD PRESSURE: 143 MMHG | HEART RATE: 70 BPM | DIASTOLIC BLOOD PRESSURE: 80 MMHG | TEMPERATURE: 97.4 F | RESPIRATION RATE: 20 BRPM

## 2022-01-05 DIAGNOSIS — M46.1 SACROILIITIS (HCC): ICD-10-CM

## 2022-01-05 PROCEDURE — A9585 GADOBUTROL INJECTION: HCPCS | Performed by: PHYSICAL MEDICINE & REHABILITATION

## 2022-01-05 PROCEDURE — 27096 INJECT SACROILIAC JOINT: CPT | Performed by: PHYSICAL MEDICINE & REHABILITATION

## 2022-01-05 RX ORDER — BUPIVACAINE HCL/PF 2.5 MG/ML
1.5 VIAL (ML) INJECTION ONCE
Status: COMPLETED | OUTPATIENT
Start: 2022-01-05 | End: 2022-01-05

## 2022-01-05 RX ORDER — METHYLPREDNISOLONE ACETATE 40 MG/ML
40 INJECTION, SUSPENSION INTRA-ARTICULAR; INTRALESIONAL; INTRAMUSCULAR; PARENTERAL; SOFT TISSUE ONCE
Status: COMPLETED | OUTPATIENT
Start: 2022-01-05 | End: 2022-01-05

## 2022-01-05 RX ADMIN — METHYLPREDNISOLONE ACETATE 40 MG: 40 INJECTION, SUSPENSION INTRA-ARTICULAR; INTRALESIONAL; INTRAMUSCULAR; PARENTERAL; SOFT TISSUE at 14:29

## 2022-01-05 RX ADMIN — GADOBUTROL 0.5 ML: 604.72 INJECTION INTRAVENOUS at 14:29

## 2022-01-05 RX ADMIN — Medication 1.5 ML: at 14:29

## 2022-01-05 NOTE — DISCHARGE INSTRUCTIONS
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
## 2022-01-12 ENCOUNTER — TELEPHONE (OUTPATIENT)
Dept: PAIN MEDICINE | Facility: CLINIC | Age: 72
End: 2022-01-12

## 2022-01-13 PROBLEM — Z80.0 FAMILY HISTORY OF COLON CANCER: Status: ACTIVE | Noted: 2022-01-13

## 2022-01-28 ENCOUNTER — HOSPITAL ENCOUNTER (OUTPATIENT)
Dept: RADIOLOGY | Facility: MEDICAL CENTER | Age: 72
Discharge: HOME | End: 2022-01-28
Attending: PHYSICAL MEDICINE & REHABILITATION | Admitting: PHYSICAL MEDICINE & REHABILITATION
Payer: MEDICARE

## 2022-01-28 VITALS
OXYGEN SATURATION: 100 % | HEART RATE: 70 BPM | SYSTOLIC BLOOD PRESSURE: 137 MMHG | TEMPERATURE: 97.2 F | RESPIRATION RATE: 20 BRPM | DIASTOLIC BLOOD PRESSURE: 75 MMHG

## 2022-01-28 DIAGNOSIS — M62.838 SPASM OF LEFT PIRIFORMIS MUSCLE: ICD-10-CM

## 2022-01-28 PROCEDURE — A9585 GADOBUTROL INJECTION: HCPCS | Performed by: PHYSICAL MEDICINE & REHABILITATION

## 2022-01-28 PROCEDURE — 20552 NJX 1/MLT TRIGGER POINT 1/2: CPT | Performed by: PHYSICAL MEDICINE & REHABILITATION

## 2022-01-28 PROCEDURE — 77002 NEEDLE LOCALIZATION BY XRAY: CPT | Performed by: PHYSICAL MEDICINE & REHABILITATION

## 2022-01-28 RX ORDER — METHYLPREDNISOLONE ACETATE 40 MG/ML
40 INJECTION, SUSPENSION INTRA-ARTICULAR; INTRALESIONAL; INTRAMUSCULAR; PARENTERAL; SOFT TISSUE ONCE
Status: COMPLETED | OUTPATIENT
Start: 2022-01-28 | End: 2022-01-28

## 2022-01-28 RX ORDER — BUPIVACAINE HCL/PF 2.5 MG/ML
1.5 VIAL (ML) INJECTION ONCE
Status: COMPLETED | OUTPATIENT
Start: 2022-01-28 | End: 2022-01-28

## 2022-01-28 RX ADMIN — METHYLPREDNISOLONE ACETATE 40 MG: 40 INJECTION, SUSPENSION INTRA-ARTICULAR; INTRALESIONAL; INTRAMUSCULAR; PARENTERAL; SOFT TISSUE at 08:19

## 2022-01-28 RX ADMIN — Medication 1.5 ML: at 08:19

## 2022-01-28 RX ADMIN — GADOBUTROL 2 ML: 604.72 INJECTION INTRAVENOUS at 08:19

## 2022-01-28 NOTE — DISCHARGE INSTRUCTIONS
1  Do not apply heat to any area that is numb  If you have discomfort or soreness at the injection site, you may apply ice today, 20 minutes on and 20 minutes off  Tomorrow you may use ice or warm, moist heat  Do not apply ice or heat directly to the skin  2  If you experience severe shortness of breath, go to the Emergency Room  3  You may have numbness for several hours from the local anesthetic  Please use caution and common sense, especially with weight-bearing activities  4  You may have an increase or change in the discomfort for 36-48 hours after your treatment  Apply ice and continue with any pain medicine you have been prescribed  5  Do not do anything strenuous today  You may shower, but no tub baths or hot tubs today  You may resume your normal activities tomorrow, but do not overdo it  Resume normal activities slowly when you are feeling better  6  If you experience redness, drainage or swelling at the injection site, or if you develop a fever above 100 degrees, please call [facility] at [phone] or go to the Emergency Room  7  Continue to take all routine medicines prescribed by your primary care physician unless otherwise instructed by our staff  Most blood thinners should be started again according to your regularly scheduled dosing  If you have any questions, please give our office a call  As no general anesthesia was used in today's procedure, you should not experience any side effects related to anesthesia  If you have a problem specifically related to your procedure, please call our office at [phone]  Problems not related to your procedure should be directed to your primary care physician
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
## 2022-02-04 ENCOUNTER — TELEPHONE (OUTPATIENT)
Dept: PAIN MEDICINE | Facility: CLINIC | Age: 72
End: 2022-02-04

## 2022-02-05 ENCOUNTER — NURSE TRIAGE (OUTPATIENT)
Dept: OTHER | Facility: OTHER | Age: 72
End: 2022-02-05

## 2022-02-05 NOTE — TELEPHONE ENCOUNTER
Regarding: Has a poss uti and is scheduled for a colonscopy on monday  ----- Message from Kendal Hernandez sent at 2/5/2022  2:15 PM EST -----  "I have a scheduled colonscopy tomorrow and I think Im starting to get a uti   Im unsure if I should start the bowel prep "

## 2022-02-06 ENCOUNTER — OFFICE VISIT (OUTPATIENT)
Dept: URGENT CARE | Facility: CLINIC | Age: 72
End: 2022-02-06
Payer: MEDICARE

## 2022-02-06 VITALS
SYSTOLIC BLOOD PRESSURE: 168 MMHG | TEMPERATURE: 97.6 F | DIASTOLIC BLOOD PRESSURE: 76 MMHG | HEART RATE: 86 BPM | RESPIRATION RATE: 18 BRPM | OXYGEN SATURATION: 96 %

## 2022-02-06 DIAGNOSIS — N39.0 URINARY TRACT INFECTION WITHOUT HEMATURIA, SITE UNSPECIFIED: Primary | ICD-10-CM

## 2022-02-06 LAB
SL AMB  POCT GLUCOSE, UA: ABNORMAL
SL AMB LEUKOCYTE ESTERASE,UA: ABNORMAL
SL AMB POCT BILIRUBIN,UA: ABNORMAL
SL AMB POCT BLOOD,UA: ABNORMAL
SL AMB POCT CLARITY,UA: CLEAR
SL AMB POCT COLOR,UA: YELLOW
SL AMB POCT KETONES,UA: ABNORMAL
SL AMB POCT NITRITE,UA: ABNORMAL
SL AMB POCT PH,UA: 8.5
SL AMB POCT SPECIFIC GRAVITY,UA: 1
SL AMB POCT URINE PROTEIN: ABNORMAL
SL AMB POCT UROBILINOGEN: 0.2

## 2022-02-06 PROCEDURE — 99213 OFFICE O/P EST LOW 20 MIN: CPT | Performed by: PHYSICIAN ASSISTANT

## 2022-02-06 PROCEDURE — 87086 URINE CULTURE/COLONY COUNT: CPT | Performed by: PHYSICIAN ASSISTANT

## 2022-02-06 PROCEDURE — G0463 HOSPITAL OUTPT CLINIC VISIT: HCPCS | Performed by: PHYSICIAN ASSISTANT

## 2022-02-06 PROCEDURE — 81002 URINALYSIS NONAUTO W/O SCOPE: CPT | Performed by: PHYSICIAN ASSISTANT

## 2022-02-06 RX ORDER — NITROFURANTOIN 25; 75 MG/1; MG/1
100 CAPSULE ORAL 2 TIMES DAILY
Qty: 14 CAPSULE | Refills: 0 | Status: SHIPPED | OUTPATIENT
Start: 2022-02-06 | End: 2022-02-13

## 2022-02-06 NOTE — TELEPHONE ENCOUNTER
Reason for Disposition   Urinating more frequently than usual (i e , frequency)    Answer Assessment - Initial Assessment Questions  1  SYMPTOM: "What's the main symptom you're concerned about?" (e g , frequency, incontinence)      Frequency  2  ONSET: "When did the  Frequency  start?"      Last night   3  PAIN: "Is there any pain?" If Yes, ask: "How bad is it?" (Scale: 1-10; mild, moderate, severe)      Burning ,moderate  4  CAUSE: "What do you think is causing the symptoms?"      UTI  5  OTHER SYMPTOMS: "Do you have any other symptoms?" (e g , fever, flank pain, blood in urine, pain with urination)      no  6   PREGNANCY: "Is there any chance you are pregnant?" "When was your last menstrual period?"      N/A    Protocols used: [facility]

## 2022-02-07 LAB — BACTERIA UR CULT: NORMAL

## 2022-02-14 ENCOUNTER — TELEPHONE (OUTPATIENT)
Dept: FAMILY MEDICINE CLINIC | Facility: CLINIC | Age: 72
End: 2022-02-14

## 2022-02-14 DIAGNOSIS — N39.0 URINARY TRACT INFECTION WITHOUT HEMATURIA, SITE UNSPECIFIED: Primary | ICD-10-CM

## 2022-02-14 RX ORDER — NITROFURANTOIN 25; 75 MG/1; MG/1
100 CAPSULE ORAL 2 TIMES DAILY
Qty: 10 CAPSULE | Refills: 0 | Status: SHIPPED | OUTPATIENT
Start: 2022-02-14 | End: 2022-02-19

## 2022-02-14 NOTE — TELEPHONE ENCOUNTER
Pt went to urgent care for UTI on 2/6/22 and they prescribed her macrobid  Pt said her sxs are better but she still has some discomfort, asking if she could get a refill on the macrobid to [facility]? Please advise

## 2022-02-23 ENCOUNTER — OFFICE VISIT (OUTPATIENT)
Dept: PAIN MEDICINE | Facility: MEDICAL CENTER | Age: 72
End: 2022-02-23
Payer: MEDICARE

## 2022-02-23 VITALS
DIASTOLIC BLOOD PRESSURE: 74 MMHG | HEART RATE: 80 BPM | HEIGHT: 63 IN | WEIGHT: 191 LBS | TEMPERATURE: 97.4 F | BODY MASS INDEX: 33.84 KG/M2 | SYSTOLIC BLOOD PRESSURE: 140 MMHG | OXYGEN SATURATION: 96 %

## 2022-02-23 DIAGNOSIS — M62.838 SPASM OF LEFT PIRIFORMIS MUSCLE: ICD-10-CM

## 2022-02-23 DIAGNOSIS — M54.41 CHRONIC RIGHT-SIDED LOW BACK PAIN WITH RIGHT-SIDED SCIATICA: ICD-10-CM

## 2022-02-23 DIAGNOSIS — M62.830 BACK MUSCLE SPASM: ICD-10-CM

## 2022-02-23 DIAGNOSIS — M79.18 MYOFASCIAL PAIN SYNDROME: ICD-10-CM

## 2022-02-23 DIAGNOSIS — M54.16 LUMBAR RADICULOPATHY: Primary | ICD-10-CM

## 2022-02-23 DIAGNOSIS — M46.1 SACROILIITIS (HCC): ICD-10-CM

## 2022-02-23 DIAGNOSIS — G60.9 IDIOPATHIC PERIPHERAL NEUROPATHY: ICD-10-CM

## 2022-02-23 DIAGNOSIS — G89.29 CHRONIC RIGHT-SIDED LOW BACK PAIN WITH RIGHT-SIDED SCIATICA: ICD-10-CM

## 2022-02-23 DIAGNOSIS — G89.4 CHRONIC PAIN SYNDROME: ICD-10-CM

## 2022-02-23 PROCEDURE — 99213 OFFICE O/P EST LOW 20 MIN: CPT | Performed by: NURSE PRACTITIONER

## 2022-02-23 RX ORDER — METHOCARBAMOL 750 MG/1
750 TABLET, FILM COATED ORAL EVERY 8 HOURS SCHEDULED
Qty: 270 TABLET | Refills: 0 | Status: SHIPPED | OUTPATIENT
Start: 2022-02-23 | End: 2022-06-21 | Stop reason: SDUPTHER

## 2022-02-23 RX ORDER — GABAPENTIN 300 MG/1
CAPSULE ORAL
Qty: 360 CAPSULE | Refills: 0 | Status: SHIPPED | OUTPATIENT
Start: 2022-02-23 | End: 2022-06-21 | Stop reason: SDUPTHER
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
## 2022-03-01 ENCOUNTER — TELEPHONE (OUTPATIENT)
Dept: FAMILY MEDICINE CLINIC | Facility: CLINIC | Age: 72
End: 2022-03-01

## 2022-03-01 ENCOUNTER — OFFICE VISIT (OUTPATIENT)
Dept: FAMILY MEDICINE CLINIC | Facility: CLINIC | Age: 72
End: 2022-03-01
Payer: MEDICARE

## 2022-03-01 VITALS
WEIGHT: 194.2 LBS | DIASTOLIC BLOOD PRESSURE: 70 MMHG | HEIGHT: 63 IN | HEART RATE: 72 BPM | SYSTOLIC BLOOD PRESSURE: 126 MMHG | RESPIRATION RATE: 18 BRPM | TEMPERATURE: 97 F | BODY MASS INDEX: 34.41 KG/M2

## 2022-03-01 DIAGNOSIS — Z80.0 FAMILY HISTORY OF COLON CANCER: ICD-10-CM

## 2022-03-01 DIAGNOSIS — M25.512 ACUTE PAIN OF LEFT SHOULDER: Primary | ICD-10-CM

## 2022-03-01 PROBLEM — F11.20 UNCOMPLICATED OPIOID DEPENDENCE (HCC): Status: ACTIVE | Noted: 2022-03-01

## 2022-03-01 PROCEDURE — 99213 OFFICE O/P EST LOW 20 MIN: CPT | Performed by: INTERNAL MEDICINE

## 2022-03-01 RX ORDER — PREDNISONE 10 MG/1
10 TABLET ORAL DAILY
Qty: 5 TABLET | Refills: 0 | Status: SHIPPED | OUTPATIENT
Start: 2022-03-01 | End: 2022-04-13 | Stop reason: ALTCHOICE

## 2022-03-01 NOTE — PROGRESS NOTES
Assessment/Plan:      Diagnoses and all orders for this visit:    Acute pain of left shoulder  -     XR shoulder 2+ vw left; Future  -     predniSONE 10 mg tablet; Take 1 tablet (10 mg total) by mouth daily  Xray if sxs persist  Start Prednisone with food daily for 5 days Limit Lifting  Voltaren gel BID, moist heat and limit lifting    Family hx of colon cancer  Pt had colonoscopy with Dr Wilber Schultz   Reviewed path report Due again in 2024                Patient ID: Dimas Clemente is a 71 y o  female  HPI   Pt has pain around left shoulder and upper left arm for about 2 weeks She recalls it started after pushing her great niece in a kids car Trouble lifting or changing position with left arm No numbness or tingling Pain is reproducible She did have colonscopy and told recheck 2 years due to polyps and family hx     Review of Systems   Constitutional: Positive for activity change  Negative for chills and fever  Respiratory: Negative for shortness of breath  Cardiovascular: Negative for chest pain  Musculoskeletal: Positive for arthralgias and joint swelling  Negative for neck pain  Skin: Negative for rash  Neurological: Negative for dizziness, light-headedness, numbness and headaches         Past Medical History:   Diagnosis Date    Abnormal findings on diagnostic imaging of breast     Abnormal Pap smear of cervix     Arthritis     Chronic pain disorder     Extremity pain     Fibrocystic breast disease     Foot pain     GERD (gastroesophageal reflux disease)     Hyperlipidemia     Hypertension     Interstitial cystitis     Joint pain     Low back pain     Osteoarthritis     Osteopenia      Past Surgical History:   Procedure Laterality Date    APPENDECTOMY      BACK SURGERY      BREAST EXCISIONAL BIOPSY Left 1996    benign    CARPAL BOSS EXCISION      CHOLECYSTECTOMY      DIAGNOSTIC LAPAROSCOPY      FOOT SURGERY      FRACTURE SURGERY      HYSTERECTOMY      age 33    HYSTEROSCOPY  JOINT REPLACEMENT      KIDNEY SURGERY Left     KNEE ARTHROSCOPY Bilateral     LAMINECTOMY      LAPAROSCOPY      hynecologic with adhesions    MYOMECTOMY      OOPHORECTOMY Bilateral     age 33    ORTHOPEDIC SURGERY      [place] SURG IMPLNT NEUROELECT,EPIDURAL N/A 5/18/2017    Procedure: PLACEMENT OF THORACIC SPINAL CORD STIMULATOR WITH LEFT BUTTOCK IMPLANTABLE PULSE GENERATOR (IMPULSE MONITORING-MOTORS (TCEMEP), EMG, SSEP); Surgeon: Liz Bhatti MD;  [facility];  Service: Neurosurgery    SPINAL CORD STIMULATOR IMPLANT Left 9/29/2020    Procedure: LAMINECTOMY THORACIC , REMOVAL OF SCS LEADS AND GENERATOR;  Surgeon: Lorne Palencia MD;  Location:  MAIN OR;  Service: Neurosurgery    SPINAL STIMULATOR PLACEMENT  05/2017    TONSILLECTOMY AND ADENOIDECTOMY      onset: unknown    TOTAL KNEE ARTHROPLASTY Right      Social History     Socioeconomic History    Marital status: Single     Spouse name: Not on file    Number of children: Not on file    Years of education: Not on file    Highest education level: Not on file   Occupational History    Occupation: Retired/ working part-time    Tobacco Use    Smoking status: Never Smoker    Smokeless tobacco: Never Used   Vaping Use    Vaping Use: Never used   Substance and Sexual Activity    Alcohol use:  Yes     Alcohol/week: 0 0 standard drinks     Comment: Drink socially, not too often    Drug use: No    Sexual activity: Not Currently     Partners: Male     Birth control/protection: None   Other Topics Concern    Not on file   Social History Narrative    No caffeine use    Always uses sunscreen    Always uses a seatbelt     Social Determinants of Health     Financial Resource Strain: Not on file   Food Insecurity: Not on file   Transportation Needs: Not on file   Physical Activity: Not on file   Stress: Not on file   Social Connections: Not on file   Intimate Partner Violence: Not on file   Housing Stability: Not on file     Allergies Allergen Reactions    Bee Venom Shortness Of Breath    Chlorhexidine Rash    Egg Yolk - Food Allergy Hives    Iodinated Diagnostic Agents Hives    Penicillins Hives    Lidocaine Other (See Comments)     cream    Bactrim [Sulfamethoxazole-Trimethoprim] Rash    Codeine Other (See Comments)     headaches    Latex Rash    Medical Tape Blisters, Hives, Itching and Rash    Other Rash     Adhesive tape    Oxybutynin Rash    Pentosan Polysulfate Rash    Povidone Iodine Rash     BMI Counseling: Body mass index is 34 4 kg/m²  The BMI is above normal  Nutrition recommendations include consuming healthier snacks, moderation in carbohydrate intake and increasing intake of lean protein  Rationale for BMI follow-up plan is due to patient being overweight or obese  Falls Plan of Care: balance, strength, and gait training instructions were provided  Visit Vitals  /70   Pulse 72   Temp (!) 97 °F (36 1 °C)   Resp 18   Ht 5' 3" (1 6 m)   Wt 88 1 kg (194 lb 3 2 oz)   LMP  (LMP Unknown)   BMI 34 40 kg/m²   OB Status Hysterectomy   Smoking Status Never Smoker   BSA 1 91 m²        Physical Exam  Vitals reviewed  Constitutional:       General: She is not in acute distress  Appearance: Normal appearance  She is not ill-appearing, toxic-appearing or diaphoretic  Eyes:      General: No scleral icterus  Cardiovascular:      Rate and Rhythm: Normal rate  Pulses: Normal pulses  Heart sounds: Normal heart sounds  Pulmonary:      Effort: Pulmonary effort is normal  No respiratory distress  Breath sounds: Normal breath sounds  No wheezing  Abdominal:      General: Bowel sounds are normal       Palpations: Abdomen is soft  Musculoskeletal:         General: Swelling and tenderness present  Cervical back: Normal range of motion and neck supple  No rigidity        Comments: boginess left bursa of shoulder ttp and tenderness along biceps course    Neurological:      General: No focal deficit present  Mental Status: She is alert and oriented to person, place, and time  Mental status is at baseline  Coordination: Coordination abnormal       Comments: Limited rom lue    Psychiatric:         Mood and Affect: Mood normal          Behavior: Behavior normal          Thought Content:  Thought content normal          Judgment: Judgment normal        difficulty with abduction lue NV intact No discoloration or rash

## 2022-03-14 DIAGNOSIS — K58.9 IRRITABLE BOWEL SYNDROME WITHOUT DIARRHEA: ICD-10-CM

## 2022-03-14 RX ORDER — DICYCLOMINE HYDROCHLORIDE 10 MG/1
10 CAPSULE ORAL 2 TIMES DAILY
Qty: 180 CAPSULE | Refills: 1 | Status: SHIPPED | OUTPATIENT
Start: 2022-03-14 | End: 2022-06-21 | Stop reason: SDUPTHER

## 2022-04-04 ENCOUNTER — HOSPITAL ENCOUNTER (OUTPATIENT)
Dept: NON INVASIVE DIAGNOSTICS | Facility: HOSPITAL | Age: 72
Discharge: HOME | End: 2022-04-04
Attending: ORTHOPAEDIC SURGERY
Payer: MEDICARE

## 2022-04-04 ENCOUNTER — APPOINTMENT (OUTPATIENT)
Dept: LAB | Facility: HOSPITAL | Age: 72
End: 2022-04-04
Attending: ORTHOPAEDIC SURGERY
Payer: MEDICARE

## 2022-04-04 DIAGNOSIS — Z01.818 PREOP EXAMINATION: ICD-10-CM

## 2022-04-04 LAB
ALBUMIN SERPL BCP-MCNC: 3.6 G/DL (ref 3.5–5)
ALP SERPL-CCNC: 85 U/L (ref 46–116)
ALT SERPL W P-5'-P-CCNC: 47 U/L (ref 12–78)
ANION GAP SERPL CALCULATED.3IONS-SCNC: 8 MMOL/L (ref 4–13)
APTT PPP: 28 SECONDS (ref 23–37)
AST SERPL W P-5'-P-CCNC: 26 U/L (ref 5–45)
ATRIAL RATE: 76 BPM
BASOPHILS # BLD AUTO: 0.07 THOUSANDS/ΜL (ref 0–0.1)
BASOPHILS NFR BLD AUTO: 1 % (ref 0–1)
BILIRUB SERPL-MCNC: 0.44 MG/DL (ref 0.2–1)
BUN SERPL-MCNC: 17 MG/DL (ref 5–25)
CALCIUM SERPL-MCNC: 8.7 MG/DL (ref 8.3–10.1)
CHLORIDE SERPL-SCNC: 106 MMOL/L (ref 100–108)
CO2 SERPL-SCNC: 26 MMOL/L (ref 21–32)
CREAT SERPL-MCNC: 0.79 MG/DL (ref 0.6–1.3)
EOSINOPHIL # BLD AUTO: 0.29 THOUSAND/ΜL (ref 0–0.61)
EOSINOPHIL NFR BLD AUTO: 3 % (ref 0–6)
ERYTHROCYTE [DISTWIDTH] IN BLOOD BY AUTOMATED COUNT: 12.3 % (ref 11.6–15.1)
GFR SERPL CREATININE-BSD FRML MDRD: 75 ML/MIN/1.73SQ M
GLUCOSE SERPL-MCNC: 111 MG/DL (ref 65–140)
HCT VFR BLD AUTO: 42.6 % (ref 34.8–46.1)
HGB BLD-MCNC: 14.3 G/DL (ref 11.5–15.4)
IMM GRANULOCYTES # BLD AUTO: 0.05 THOUSAND/UL (ref 0–0.2)
IMM GRANULOCYTES NFR BLD AUTO: 1 % (ref 0–2)
INR PPP: 0.96 (ref 0.84–1.19)
LYMPHOCYTES # BLD AUTO: 2.81 THOUSANDS/ΜL (ref 0.6–4.47)
LYMPHOCYTES NFR BLD AUTO: 30 % (ref 14–44)
MCH RBC QN AUTO: 29.7 PG (ref 26.8–34.3)
MCHC RBC AUTO-ENTMCNC: 33.6 G/DL (ref 31.4–37.4)
MCV RBC AUTO: 89 FL (ref 82–98)
MONOCYTES # BLD AUTO: 0.54 THOUSAND/ΜL (ref 0.17–1.22)
MONOCYTES NFR BLD AUTO: 6 % (ref 4–12)
NEUTROPHILS # BLD AUTO: 5.49 THOUSANDS/ΜL (ref 1.85–7.62)
NEUTS SEG NFR BLD AUTO: 59 % (ref 43–75)
NRBC BLD AUTO-RTO: 0 /100 WBCS
P AXIS: 68 DEGREES
PLATELET # BLD AUTO: 268 THOUSANDS/UL (ref 149–390)
PMV BLD AUTO: 9 FL (ref 8.9–12.7)
POTASSIUM SERPL-SCNC: 3.9 MMOL/L (ref 3.5–5.3)
PR INTERVAL: 146 MS
PROT SERPL-MCNC: 7.1 G/DL (ref 6.4–8.2)
PROTHROMBIN TIME: 12.3 SECONDS (ref 11.6–14.5)
QRS AXIS: 48 DEGREES
QRSD INTERVAL: 90 MS
QT INTERVAL: 392 MS
QTC INTERVAL: 441 MS
RBC # BLD AUTO: 4.81 MILLION/UL (ref 3.81–5.12)
SODIUM SERPL-SCNC: 140 MMOL/L (ref 136–145)
T WAVE AXIS: 50 DEGREES
VENTRICULAR RATE: 76 BPM
WBC # BLD AUTO: 9.25 THOUSAND/UL (ref 4.31–10.16)

## 2022-04-04 PROCEDURE — 93010 ELECTROCARDIOGRAM REPORT: CPT | Performed by: INTERNAL MEDICINE

## 2022-04-04 PROCEDURE — 85610 PROTHROMBIN TIME: CPT

## 2022-04-04 PROCEDURE — 93005 ELECTROCARDIOGRAM TRACING: CPT

## 2022-04-04 PROCEDURE — 36415 COLL VENOUS BLD VENIPUNCTURE: CPT

## 2022-04-04 PROCEDURE — 80053 COMPREHEN METABOLIC PANEL: CPT

## 2022-04-04 PROCEDURE — 85730 THROMBOPLASTIN TIME PARTIAL: CPT

## 2022-04-04 PROCEDURE — 87086 URINE CULTURE/COLONY COUNT: CPT

## 2022-04-04 PROCEDURE — 85025 COMPLETE CBC W/AUTO DIFF WBC: CPT

## 2022-04-05 LAB — BACTERIA UR CULT: NORMAL

## 2022-04-13 ENCOUNTER — CONSULT (OUTPATIENT)
Dept: FAMILY MEDICINE CLINIC | Facility: CLINIC | Age: 72
End: 2022-04-13
Payer: MEDICARE

## 2022-04-13 VITALS
HEIGHT: 63 IN | WEIGHT: 192.4 LBS | DIASTOLIC BLOOD PRESSURE: 72 MMHG | RESPIRATION RATE: 18 BRPM | BODY MASS INDEX: 34.09 KG/M2 | HEART RATE: 76 BPM | TEMPERATURE: 98 F | SYSTOLIC BLOOD PRESSURE: 130 MMHG

## 2022-04-13 DIAGNOSIS — I10 HYPERTENSION, UNSPECIFIED TYPE: ICD-10-CM

## 2022-04-13 DIAGNOSIS — F11.20 UNCOMPLICATED OPIOID DEPENDENCE (HCC): ICD-10-CM

## 2022-04-13 DIAGNOSIS — M17.12 OSTEOARTHRITIS OF LEFT KNEE, UNSPECIFIED OSTEOARTHRITIS TYPE: Primary | ICD-10-CM

## 2022-04-13 DIAGNOSIS — E78.2 MIXED HYPERLIPIDEMIA: ICD-10-CM

## 2022-04-13 DIAGNOSIS — E55.9 VITAMIN D DEFICIENCY: ICD-10-CM

## 2022-04-13 PROCEDURE — 99214 OFFICE O/P EST MOD 30 MIN: CPT | Performed by: INTERNAL MEDICINE

## 2022-04-13 RX ORDER — LOSARTAN POTASSIUM 100 MG/1
100 TABLET ORAL DAILY
Qty: 90 TABLET | Refills: 0 | Status: SHIPPED | OUTPATIENT
Start: 2022-04-13 | End: 2022-06-21 | Stop reason: SDUPTHER

## 2022-04-13 NOTE — PROGRESS NOTES
Assessment/Plan:         Diagnoses and all orders for this visit:    Osteoarthritis of left knee, unspecified osteoarthritis type  Pt cleared for planned knee replacement surgery by Dr Carole Shi reviewed and wnl   Has outpt Pt eval scheduled for postop and her sister will stay with her shortterm following surgery     Hypertension, unspecified type  -     losartan (COZAAR) 100 MG tablet; Take 1 tablet (100 mg total) by mouth daily  -     Lipid panel; Future  -     Comprehensive metabolic panel; Future  Stable Bp and would take Rx with sip of water AM of surgery    Mixed hyperlipidemia  -     Lipid panel; Future  Recheck prior to routine visit in JUne    Vitamin D deficiency  -     Vitamin D 25 hydroxy; Future  Labs prior to visit in June    Other orders  -     Multiple Vitamins-Minerals (PRESERVISION AREDS 2 PO); Take by mouth in the morning      Rto as scheduled June        Patient ID: Jenna Aguilar is a 71 y o  female  HPI   Pt having left knee surgery upcoming by Dr Carole Crocker She had PATs and has outpt PT already scheduled No chest pain or sob She has had progressive pain and difficulty weight bearing on left leg since her other knee procedure She will have off few weeks postop and her sister will stay with her initially to help No recent illnesses She did not have xray of left shoulder as of yet but still has pain and limited rom         Review of Systems   Constitutional: Positive for activity change  Negative for chills and fever  HENT: Negative  Eyes: Negative for visual disturbance  Respiratory: Negative for cough and shortness of breath  Cardiovascular: Negative for chest pain, palpitations and leg swelling  Gastrointestinal: Negative for abdominal distention, abdominal pain, constipation and diarrhea  Genitourinary: Negative  Musculoskeletal: Positive for arthralgias and gait problem  Pain left shoulder area    Skin: Negative for rash     Neurological: Negative for dizziness, light-headedness and headaches  Psychiatric/Behavioral: Negative for sleep disturbance  The patient is not nervous/anxious  Past Medical History:   Diagnosis Date    Abnormal findings on diagnostic imaging of breast     Abnormal Pap smear of cervix     Arthritis     Chronic pain disorder     Extremity pain     Fibrocystic breast disease     Foot pain     GERD (gastroesophageal reflux disease)     Hyperlipidemia     Hypertension     Interstitial cystitis     Joint pain     Low back pain     Osteoarthritis     Osteopenia     Uncomplicated opioid dependence ([facility] [address] ) 3/1/2022     Past Surgical History:   Procedure Laterality Date    APPENDECTOMY      BACK SURGERY      BREAST EXCISIONAL BIOPSY Left 1996    benign    CARPAL BOSS EXCISION      CHOLECYSTECTOMY      DIAGNOSTIC LAPAROSCOPY      FOOT SURGERY      FRACTURE SURGERY      HYSTERECTOMY      age 33    HYSTEROSCOPY      JOINT REPLACEMENT      KIDNEY SURGERY Left     KNEE ARTHROSCOPY Bilateral     LAMINECTOMY      LAPAROSCOPY      hynecologic with adhesions    MYOMECTOMY      OOPHORECTOMY Bilateral     age 33    ORTHOPEDIC SURGERY      [place] SURG IMPLNT [address] N/A 5/18/2017    Procedure: PLACEMENT OF THORACIC SPINAL CORD STIMULATOR WITH LEFT BUTTOCK IMPLANTABLE PULSE GENERATOR (IMPULSE MONITORING-MOTORS (TCEMEP), EMG, SSEP);   Surgeon: Janell Melara MD;  [facility];  Service: Neurosurgery    SPINAL CORD STIMULATOR IMPLANT Left 9/29/2020    Procedure: LAMINECTOMY THORACIC , REMOVAL OF SCS LEADS AND GENERATOR;  Surgeon: Shantell Krishnamurhty MD;  Location:  MAIN OR;  Service: Neurosurgery    SPINAL STIMULATOR PLACEMENT  05/2017    TONSILLECTOMY AND ADENOIDECTOMY      onset: unknown    TOTAL KNEE ARTHROPLASTY Right      Social History     Socioeconomic History    Marital status: Single     Spouse name: Not on file    Number of children: Not on file    Years of education: Not on file    Highest education level: Not on file   Occupational History    Occupation: Retired/ working part-time    Tobacco Use    Smoking status: Never Smoker    Smokeless tobacco: Never Used   Vaping Use    Vaping Use: Never used   Substance and Sexual Activity    Alcohol use: Yes     Alcohol/week: 0 0 standard drinks     Comment: Drink socially, not too often    Drug use: No    Sexual activity: Not Currently     Partners: Male     Birth control/protection: None   Other Topics Concern    Not on file   Social History Narrative    No caffeine use    Always uses sunscreen    Always uses a seatbelt     Social Determinants of Health     Financial Resource Strain: Not on file   Food Insecurity: Not on file   Transportation Needs: Not on file   Physical Activity: Not on file   Stress: Not on file   Social Connections: Not on file   Intimate Partner Violence: Not on file   Housing Stability: Not on file     Allergies   Allergen Reactions    Bee Venom Shortness Of Breath    Chlorhexidine Rash    Egg Yolk - Food Allergy Hives    Iodinated Diagnostic Agents Hives    Penicillins Hives    Lidocaine Other (See Comments)     cream    Bactrim [Sulfamethoxazole-Trimethoprim] Rash    Codeine Other (See Comments)     headaches    Latex Rash    Medical Tape Blisters, Hives, Itching and Rash    Other Rash     Adhesive tape    Oxybutynin Rash    Pentosan Polysulfate Rash    Povidone Iodine Rash            /72   Pulse 76   Temp 98 °F (36 7 °C) (Temporal)   Resp 18   Ht 5' 3" (1 6 m)   Wt 87 3 kg (192 lb 6 4 oz)   LMP  (LMP Unknown)   BMI 34 08 kg/m²          Physical Exam  Vitals reviewed  Constitutional:       General: She is not in acute distress  Appearance: Normal appearance  She is not ill-appearing, toxic-appearing or diaphoretic  HENT:      Head: Normocephalic and atraumatic        Right Ear: External ear normal       Left Ear: External ear normal       Nose: Nose normal       Mouth/Throat:      Mouth: Mucous membranes are dry  Eyes:      General: No scleral icterus  Extraocular Movements: Extraocular movements intact  Conjunctiva/sclera: Conjunctivae normal       Pupils: Pupils are equal, round, and reactive to light  Cardiovascular:      Rate and Rhythm: Normal rate and regular rhythm  Pulses: Normal pulses  Heart sounds: Normal heart sounds  Pulmonary:      Effort: Pulmonary effort is normal  No respiratory distress  Breath sounds: Normal breath sounds  No wheezing  Abdominal:      General: Bowel sounds are normal  There is no distension  Palpations: Abdomen is soft  Tenderness: There is no abdominal tenderness  Musculoskeletal:         General: Deformity present  Cervical back: Normal range of motion and neck supple  No rigidity  Right lower leg: No edema  Left lower leg: No edema  Comments: Degenerative changes left knee mild localized swelling    Skin:     General: Skin is dry  Coloration: Skin is not jaundiced or pale  Neurological:      General: No focal deficit present  Mental Status: She is alert and oriented to person, place, and time  Mental status is at baseline  Cranial Nerves: No cranial nerve deficit  Psychiatric:         Mood and Affect: Mood normal          Behavior: Behavior normal          Thought Content:  Thought content normal          Judgment: Judgment normal

## 2022-04-15 ENCOUNTER — TELEPHONE (OUTPATIENT)
Dept: NEUROLOGY | Facility: CLINIC | Age: 72
End: 2022-04-15

## 2022-04-15 NOTE — TELEPHONE ENCOUNTER
1ST ATTEMPT- Called patient and left message  Informed patient that Marcus Dull has been out of the office for an extended period of time and that we will have to reschedule her 05/05/22 appt w/ another provider  Advised patient to call back at her earliest convenience

## 2022-04-18 NOTE — TELEPHONE ENCOUNTER
Pt called to r/s her appointment  Request to schedule for month of June       Scheduled: 06/08/22 @ 3:30pm with Amber Thompson

## 2022-04-28 ENCOUNTER — EVALUATION (OUTPATIENT)
Dept: PHYSICAL THERAPY | Facility: CLINIC | Age: 72
End: 2022-04-28
Payer: MEDICARE

## 2022-04-28 DIAGNOSIS — M25.562 ACUTE PAIN OF LEFT KNEE: Primary | ICD-10-CM

## 2022-04-28 PROCEDURE — 97161 PT EVAL LOW COMPLEX 20 MIN: CPT | Performed by: PHYSICAL THERAPIST

## 2022-04-28 PROCEDURE — 97110 THERAPEUTIC EXERCISES: CPT | Performed by: PHYSICAL THERAPIST

## 2022-04-28 PROCEDURE — 97535 SELF CARE MNGMENT TRAINING: CPT | Performed by: PHYSICAL THERAPIST

## 2022-04-28 NOTE — LETTER
2022    Flora Joyerer  [address] [facility] [address]    Patient: Silverio Hernandez   YOB: 1950   Date of Visit: 2022     Encounter Diagnosis     ICD-10-CM    1  Acute pain of left knee  M25 562        Dear Dr Ricki Ventura: Thank you for your recent referral of Silverio Hernandez  Please review the attached evaluation summary from Ingrid's recent visit  Please verify that you agree with the plan of care by signing the attached order  If you have any questions or concerns, please do not hesitate to call  I sincerely appreciate the opportunity to share in the care of one of your patients and hope to have another opportunity to work with you in the near future  Sincerely,    Penny Bauer, PT      Referring Provider:      I certify that I have read the below Plan of Care and certify the need for these services furnished under this plan of treatment while under my care  Flora Joyerer  [address] [facility] [address]  Via Fax: [phone]          PT Evaluation     Today's date: 2022  Patient name: Silverio Hernandez  : 1950  MRN: [id number]  Referring provider: Martin Benitez  Dx:   Encounter Diagnosis     ICD-10-CM    1  Acute pain of left knee  M25 562                    Assessment  Assessment details: Pt presents to PT s/p L TKA   Good start with ext and fair quad  Flex to 70 deg  She has some tenderness in gastroc but does b/l and Shon's (-)  Will continue to monitor  Educated on proper swelling control (ice/elevation), frequency of walking, HEP  She has good understanding   Skilled PT warranted to progress through rehab program   Impairments: abnormal gait, abnormal or restricted ROM, impaired balance, impaired physical strength, pain with function and weight-bearing intolerance  Functional limitations: gait, stairs, transfers  Symptom irritability: highUnderstanding of Dx/Px/POC: good   Prognosis: good    Goals  ST-3 weeks  Good quad contraction and independent SLR to allow progression to [facility]  Full knee ext to 100 deg flex actively  Independent with swelling control and basic HEP for mobility/quad    LT-8 weeks  Knee ROM: 0-120 deg  Pain 3/10 or less with ADLs  Ambulate without AD normally and with minimal pain  SLS: 10 sec  or greater on surgical side  Knee ext and flex strength of 4/5 or greater  Independent with strengthening program  Complete stairs independently and safely  Complete typical house hold chores and community ambulation without AD and minimal pain    Plan  Plan details: TE, NMR, TA, MT, self education, and modalities as needed in order to progress through skilled PT focused on  ROM, strength, balance, coordination   Patient would benefit from: skilled physical therapy  Planned modality interventions: cryotherapy and thermotherapy: hydrocollator packs  Planned therapy interventions: manual therapy, neuromuscular re-education, self care, therapeutic activities, therapeutic exercise and home exercise program  Frequency: 2x week  Duration in weeks: 8  Treatment plan discussed with: patient        Subjective Evaluation    History of Present Illness  Mechanism of injury: 71year old female presenting to PT s/p L TKA by Dr Rosalinda Curtis on 22  Was having pain in the knee for years prior to surgery  Approx 1 year ago fell and broke fibula on left side - no ORIF required  Has significant hx of low back and nerve pain on left side  Does have nerve stimulator  Currently pain is approximately 9/10  Describes pain in knee and thigh and hip from nerve pain  Calf pain is minimal to none  She has been walking hallway a lot     Pain  Current pain ratin    Social Support  Stairs in house: yes     Patient Goals  Patient goals for therapy: decreased pain, improved balance, increased strength, independence with ADLs/IADLs, return to sport/leisure activities and increased motion          Objective     General Comments:      Knee Comments  Gait  Use of RW, step to gait pattern    Incision covered with dressing - she is to keep this on until follow-up with surgeon next Fri      ROM  L 5-70       Strength  L Quad set fair    Circumference/girth  Moderate swelling noted at knee and calf    Special tests  Shon's (-)  No warmth or redness gastroc  Did have tenderness medial and lateral gastroc but minimal firmness and had tenderness on R as well             Precautions: L TKA 4/25        Manuals 4-28        Joint work         -hip         -knee         ST work         Flexibility/PROM Gentle passive flex with education                          Neuro Re-Ed         balance                           Step-ups fwd         Step-ups lat                  lunge         Ther Ex         [employer]         [employer]        Heel slides instructed for HEP        SLR program 3x4 mod A        Ankle pumps reviewed        gastroc stretch Strap stretch reviewed        TKE x10        Standing HR x10        bridges         Leg press         Hamstring strength         Quad strength                  Ther Activity         Stair negotiation                  Gait Training                           Modalities Ice 8' 4-28: post-op program  Ice/elevation review, walking frequency reviewed  Quad sets, ankle pumps  Seated flex, TKE, standing HR

## 2022-04-28 NOTE — PROGRESS NOTES
PT Evaluation     Today's date: 2022  Patient name: Paty Reed  : 1950  MRN: [id number]  Referring provider: Devon Aguilar  Dx:   Encounter Diagnosis     ICD-10-CM    1  Acute pain of left knee  M25 562                    Assessment  Assessment details: Pt presents to PT s/p L TKA   Good start with ext and fair quad  Flex to 70 deg  She has some tenderness in gastroc but does b/l and Shon's (-)  Will continue to monitor  Educated on proper swelling control (ice/elevation), frequency of walking, HEP  She has good understanding   Skilled PT warranted to progress through rehab program   Impairments: abnormal gait, abnormal or restricted ROM, impaired balance, impaired physical strength, pain with function and weight-bearing intolerance  Functional limitations: gait, stairs, transfers  Symptom irritability: highUnderstanding of Dx/Px/POC: good   Prognosis: good    Goals  ST-3 weeks  Good quad contraction and independent SLR to allow progression to [facility]  Full knee ext to 100 deg flex actively  Independent with swelling control and basic HEP for mobility/quad    LT-8 weeks  Knee ROM: 0-120 deg  Pain 3/10 or less with ADLs  Ambulate without AD normally and with minimal pain  SLS: 10 sec  or greater on surgical side  Knee ext and flex strength of 4/5 or greater  Independent with strengthening program  Complete stairs independently and safely  Complete typical house hold chores and community ambulation without AD and minimal pain    Plan  Plan details: TE, NMR, TA, MT, self education, and modalities as needed in order to progress through skilled PT focused on  ROM, strength, balance, coordination   Patient would benefit from: skilled physical therapy  Planned modality interventions: cryotherapy and thermotherapy: hydrocollator packs  Planned therapy interventions: manual therapy, neuromuscular re-education, self care, therapeutic activities, therapeutic exercise and home exercise program  Frequency: 2x week  Duration in weeks: 8  Treatment plan discussed with: patient        Subjective Evaluation    History of Present Illness  Mechanism of injury: 71year old female presenting to PT s/p L TKA by Dr Raymundo Weaver on 22  Was having pain in the knee for years prior to surgery  Approx 1 year ago fell and broke fibula on left side - no ORIF required  Has significant hx of low back and nerve pain on left side  Does have nerve stimulator  Currently pain is approximately 9/10  Describes pain in knee and thigh and hip from nerve pain  Calf pain is minimal to none  She has been walking hallway a lot     Pain  Current pain ratin    Social Support  Stairs in house: yes     Patient Goals  Patient goals for therapy: decreased pain, improved balance, increased strength, independence with ADLs/IADLs, return to sport/leisure activities and increased motion          Objective     General Comments:      Knee Comments  Gait  Use of RW, step to gait pattern    Incision covered with dressing - she is to keep this on until follow-up with surgeon next Fri      ROM  L 5-70       Strength  L Quad set fair    Circumference/girth  Moderate swelling noted at knee and calf    Special tests  Shon's (-)  No warmth or redness gastroc  Did have tenderness medial and lateral gastroc but minimal firmness and had tenderness on R as well             Precautions: L TKA         Manuals 4-28        Joint work         -hip         -knee         ST work         Flexibility/PROM Gentle passive flex with education                          Neuro Re-Ed         balance                           Step-ups fwd         Step-ups lat                  lunge         Ther Ex         NuStep/bike         [employer]        Heel slides instructed for HEP        SLR program 3x4 mod A        Ankle pumps reviewed        gastroc stretch Strap stretch reviewed        TKE x10        Standing HR x10        bridges         Leg press Hamstring strength         Quad strength                  Ther Activity         Stair negotiation                  Gait Training                           Modalities Ice 8'                          4-28: post-op program  Ice/elevation review, walking frequency reviewed  Quad sets, ankle pumps  Seated flex, TKE, standing HR

## 2022-05-02 ENCOUNTER — OFFICE VISIT (OUTPATIENT)
Dept: PHYSICAL THERAPY | Facility: CLINIC | Age: 72
End: 2022-05-02
Payer: MEDICARE

## 2022-05-02 DIAGNOSIS — M25.562 ACUTE PAIN OF LEFT KNEE: Primary | ICD-10-CM

## 2022-05-02 PROCEDURE — 97140 MANUAL THERAPY 1/> REGIONS: CPT | Performed by: PHYSICAL THERAPIST

## 2022-05-02 PROCEDURE — 97110 THERAPEUTIC EXERCISES: CPT | Performed by: PHYSICAL THERAPIST

## 2022-05-02 NOTE — PROGRESS NOTES
Daily Note     Today's date: 2022  Patient name: Karin Montes  : 1950  MRN: [id number]  Referring provider: Michael Alicea  Dx:   Encounter Diagnosis     ICD-10-CM    1  Acute pain of left knee  M25 562                   Subjective: soreness is mainly quad and knee      Objective: See treatment diary below      Assessment: improved gait with better step-through pattern  Nearly full extension  Quad set better than last week and able to transfer independently but slowly  Less tenderness in gastroc, Shon's (-)  SLR req mod A         Plan: continue with current POC     Precautions: L TKA , lumbar nerve stimulator      Manuals  5-2       Joint work         -hip         -knee         ST work         Flexibility/PROM Gentle passive flex with education Gentle passive flex                         Neuro Re-Ed         balance                           Step-ups fwd         Step-ups lat                  lunge         Ther Ex         NuStep/bike  10' ROM focus       Quad sets x15 x25 6"       Heel slides instructed for HEP trialed self with strap       SLR program 3x4 mod A 3x4 mod A       Ankle pumps reviewed        gastroc stretch Strap stretch reviewed Strap 4x 30", wall 4x30"       TKE x10        Standing HR x10 2x10       bridges         Leg press         Hamstring strength         Quad strength                  Ther Activity         Stair negotiation                  Gait Training                           Modalities Ice 8' Ice 10'                           Ice/elevation review, walking frequency reviewed  Quad sets, ankle pumps  Seated flex, TKE, standing HR

## 2022-05-04 ENCOUNTER — OFFICE VISIT (OUTPATIENT)
Dept: PHYSICAL THERAPY | Facility: CLINIC | Age: 72
End: 2022-05-04
Payer: MEDICARE

## 2022-05-04 DIAGNOSIS — M25.562 ACUTE PAIN OF LEFT KNEE: Primary | ICD-10-CM

## 2022-05-04 PROCEDURE — 97110 THERAPEUTIC EXERCISES: CPT | Performed by: PHYSICAL THERAPIST

## 2022-05-04 NOTE — PROGRESS NOTES
Daily Note     Today's date: 2022  Patient name: Francis Knapp  : 1950  MRN: [id number]  Referring provider: Ashley Loja  Dx:   Encounter Diagnosis     ICD-10-CM    1  Acute pain of left knee  M25 562                   Subjective: CC is thigh and foot pain  Had issue with right foot pain when the right knee was replaced  Follow-up with surgeon Fri AM      Objective: See treatment diary below      Assessment: SLR completed with CGA and minimal lag  Pt doing well for 1 week out of surgery  Goal of transitioning to [facility] over next 1-2 visits        Plan: continue current POC     Precautions: L TKA , lumbar nerve stimulator      Manuals  5-2 5-4      Joint work         -hip         -knee         ST work         Flexibility/PROM Gentle passive flex with education Gentle passive flex Gentle passive flex                        Neuro Re-Ed         balance                           Step-ups fwd         Step-ups lat                  lunge         Ther Ex         NuStep/bike  10' ROM focus 10' ROM focus      Quad sets x15 x25 6" x20      Heel slides instructed for HEP trialed self with strap       SLR program 3x4 mod A 3x4 mod A 3x4 CGA      Ankle pumps reviewed        gastroc stretch Strap stretch reviewed Strap 4x 30", wall 4x30" Strap 4x 30", wall 5x20"      TKE x10  otb 2x10      Standing HR x10 2x10 x20      bridges         Leg press         Hamstring strength   otb 2x10      saq   3x5      laq         Ther Activity         Stair negotiation                  Gait Training                           Modalities Ice 8' Ice 10' Ice 10'                          Ice/elevation review, walking frequency reviewed  Quad sets, ankle pumps  Seated flex, TKE, standing HR

## 2022-05-06 ENCOUNTER — OFFICE VISIT (OUTPATIENT)
Dept: PHYSICAL THERAPY | Facility: CLINIC | Age: 72
End: 2022-05-06
Payer: MEDICARE

## 2022-05-06 DIAGNOSIS — M25.562 ACUTE PAIN OF LEFT KNEE: Primary | ICD-10-CM

## 2022-05-06 PROCEDURE — 97140 MANUAL THERAPY 1/> REGIONS: CPT

## 2022-05-06 PROCEDURE — 97110 THERAPEUTIC EXERCISES: CPT

## 2022-05-06 NOTE — PROGRESS NOTES
Daily Note     Today's date: 2022  Patient name: Brandon Hernandez  : 1950  MRN: [id number]  Referring provider: Kayli Guerra  Dx:   Encounter Diagnosis     ICD-10-CM    1  Acute pain of left knee  M25 562                   Subjective: Patient reports L foot and lateral aspect of her L knee have discomfort  Objective: See treatment diary below  Assessment: Tolerated treatment well  Patient demonstrated fatigue post treatment, exhibited good technique with therapeutic exercises and would benefit from continued PT to improve L knee ROM and strength  Extension ROM good for time period since therapy  Flexion is approx 90 degrees  Plan: Continue per plan of care        Precautions: L TKA , lumbar nerve stimulator      Manuals - 5-2 5-4 5-6     Joint work         -hip         -knee         ST work         Flexibility/PROM Gentle passive flex with education Gentle passive flex Gentle passive flex Gentle passive flex                       Neuro Re-Ed         balance                           Step-ups fwd         Step-ups lat                  lunge         Ther Ex         NuStep/bike  10' ROM focus 10' ROM focus 10' ROM focus     Quad sets x15 x25 6" x20 5"x20      Heel slides instructed for HEP trialed self with strap       SLR program 3x4 mod A 3x4 mod A 3x4 CGA 3x4 Leonarda     Ankle pumps reviewed        gastroc stretch Strap stretch reviewed Strap 4x 30", wall 4x30" Strap 4x 30", wall 5x20" Strap 4x 30", wall 5x20"     TKE x10  otb 2x10 otb 2x10     Standing HR x10 2x10 x20 2x10     bridges         Leg press         Hamstring strength   otb 2x10 otb 2x10     saq   3x5 4x5     laq    2x10     Ther Activity         Stair negotiation    [address] [facility] gait               Gait Training                           Modalities Ice 8' Ice 10' Ice 10' CP 10'

## 2022-05-09 ENCOUNTER — OFFICE VISIT (OUTPATIENT)
Dept: PHYSICAL THERAPY | Facility: CLINIC | Age: 72
End: 2022-05-09
Payer: MEDICARE

## 2022-05-09 DIAGNOSIS — M25.562 ACUTE PAIN OF LEFT KNEE: Primary | ICD-10-CM

## 2022-05-09 PROCEDURE — 97110 THERAPEUTIC EXERCISES: CPT | Performed by: PHYSICAL THERAPIST

## 2022-05-09 PROCEDURE — 97140 MANUAL THERAPY 1/> REGIONS: CPT | Performed by: PHYSICAL THERAPIST

## 2022-05-09 NOTE — PROGRESS NOTES
Daily Note     Today's date: 2022  Patient name: Yanni Cadet  : 1950  MRN: [id number]  Referring provider: Harley Srinivasan  Dx:   Encounter Diagnosis     ICD-10-CM    1  Acute pain of left knee  M25 562                   Subjective: walking in the house without AD at times      Objective: See treatment diary below      Assessment: independent with SLR but has small lag yet  ROM 2-95 deg  She is progressing nicely        Plan: continue current POC     Precautions: L TKA , lumbar nerve stimulator      Manuals  5-2 5-4 5-6 5-9    Joint work         -hip         -knee         ST work         Flexibility/PROM Gentle passive flex with education Gentle passive flex Gentle passive flex Gentle passive flex Passive flex                      Neuro Re-Ed         balance                           Step-ups fwd         Step-ups lat                  lunge         Ther Ex         NuStep/bike  10' ROM focus 10' ROM focus 10' ROM focus 10' ROM    Quad sets x15 x25 6" x20 5"x20  15x5"    Heel slides instructed for HEP trialed self with strap   Strap x20    SLR program 3x4 mod A 3x4 mod A 3x4 CGA 3x4 Leonarda 3x4 (I)    Ankle pumps reviewed        gastroc stretch Strap stretch reviewed Strap 4x 30", wall 4x30" Strap 4x 30", wall 5x20" Strap 4x 30", wall 5x20" Wall 5x30"    TKE x10  otb 2x10 otb 2x10     Standing HR x10 2x10 x20 2x10     bridges         Leg press         Hamstring strength   otb 2x10 otb 2x10 otb 2x12    saq   3x5 4x5 3x5    laq    2x10 3x8    Ther Activity         Stair negotiation    [facility] gait               Gait Training                           Modalities Ice 8' Ice 10' Ice 10' CP 10' CP 10'

## 2022-05-11 ENCOUNTER — OFFICE VISIT (OUTPATIENT)
Dept: PHYSICAL THERAPY | Facility: CLINIC | Age: 72
End: 2022-05-11
Payer: MEDICARE

## 2022-05-11 DIAGNOSIS — M25.562 ACUTE PAIN OF LEFT KNEE: Primary | ICD-10-CM

## 2022-05-11 PROCEDURE — 97140 MANUAL THERAPY 1/> REGIONS: CPT

## 2022-05-11 PROCEDURE — 97110 THERAPEUTIC EXERCISES: CPT

## 2022-05-11 NOTE — PROGRESS NOTES
Daily Note     Today's date: 2022  Patient name: Markos Ward  : 1950  MRN: [id number]  Referring provider: Deepthi Phillips  Dx:   Encounter Diagnosis     ICD-10-CM    1  Acute pain of left knee  M25 562                   Subjective: Patient reports that her lateral calf and foot pain since yesterday  Objective: See treatment diary below  Modified program today due to pain  Edema measured 10 cm distally from medial joint measured in Ryan LE R: 43 5 cm L: 45 cm       Assessment: Tolerated treatment well  Patient would benefit from continued PT to improve strength and ROM  PT checked for  Blood clots, tenderness on L medial joint line and lateral calf was noted but no hot or redness  Shon's sign was negative  Instructed patient to monitor things and call MD if things progress or she has a hard time breathing  Plan: Continue per plan of care        Precautions: L TKA , lumbar nerve stimulator      Manuals - 5-2 5-4 5-6 5-9 5-11   Joint work         -hip         -knee         ST work         Flexibility/PROM Gentle passive flex with education Gentle passive flex Gentle passive flex Gentle passive flex Passive flex Passive flex                     Neuro Re-Ed         balance                           Step-ups fwd         Step-ups lat                  lunge         Ther Ex         NuStep/bike  10' ROM focus 10' ROM focus 10' ROM focus 10' ROM    Quad sets x15 x25 6" x20 5"x20  15x5" 5"x20    Heel slides instructed for HEP trialed self with strap   Strap x20 Strap 5"x20   SLR program 3x4 mod A 3x4 mod A 3x4 CGA 3x4 Leonarda 3x4 (I)    Ankle pumps reviewed        gastroc stretch Strap stretch reviewed Strap 4x 30", wall 4x30" Strap 4x 30", wall 5x20" Strap 4x 30", wall 5x20" Wall 5x30" Strap 30"x4   TKE x10  otb 2x10 otb 2x10     Standing HR x10 2x10 x20 2x10     bridges         Leg press         Hamstring strength   otb 2x10 otb 2x10 otb 2x12 otb 2x10   saq   3x5 4x5 3x5 3x5   laq    2x10 3x8 3x10   Ther Activity         Stair negotiation    [facility] gait               Gait Training                           Modalities Ice 8' Ice 10' Ice 10' CP 10' CP 10'

## 2022-05-13 ENCOUNTER — OFFICE VISIT (OUTPATIENT)
Dept: PHYSICAL THERAPY | Facility: CLINIC | Age: 72
End: 2022-05-13
Payer: MEDICARE

## 2022-05-13 DIAGNOSIS — M25.562 ACUTE PAIN OF LEFT KNEE: Primary | ICD-10-CM

## 2022-05-13 PROCEDURE — 97140 MANUAL THERAPY 1/> REGIONS: CPT | Performed by: PHYSICAL THERAPIST

## 2022-05-13 PROCEDURE — 97112 NEUROMUSCULAR REEDUCATION: CPT | Performed by: PHYSICAL THERAPIST

## 2022-05-13 PROCEDURE — 97110 THERAPEUTIC EXERCISES: CPT | Performed by: PHYSICAL THERAPIST

## 2022-05-13 NOTE — PROGRESS NOTES
Daily Note     Today's date: 2022  Patient name: Radha Broussard  : 1950  MRN: [id number]  Referring provider: Yonas Velázquez  Dx:   Encounter Diagnosis     ICD-10-CM    1  Acute pain of left knee  M25 562                   Subjective: calf not as sore  Mainly just proximal thigh and foot/ankle  MD said at last follow-up thigh pain was from tourniquet       Objective: See treatment diary below      Assessment: patient has less tenderness in leg  Shon's (-)  Flex passively to 100 deg with harder end-feel  We reviewed step stretch for HEP   Goal of 110 deg by end of next week      Plan: ROM, strength     Precautions: L TKA , lumbar nerve stimulator      Manuals 5-13 5-2 5-4 5-6 5-9 5-11   Joint work         -hip         -knee         ST work         Flexibility/PROM passive flex Gentle passive flex Gentle passive flex Gentle passive flex Passive flex Passive flex                     Neuro Re-Ed         balance SLS 10x10"                          Step-ups fwd 6" 2x10        Step-ups lat                  lunge         Ther Ex         NuStep/bike NS 12' ROM focus 10' ROM focus 10' ROM focus 10' ROM focus 10' ROM    Step stretch 10x 6"        Quad sets  x25 6" x20 5"x20  15x5" 5"x20    Heel slides  trialed self with strap   Strap x20 Strap 5"x20   SLR program 3x4 3x4 mod A 3x4 CGA 3x4 Leonarda 3x4 (I)    Ankle pumps         gastroc stretch Wall stretch 5x 30", strap 4x30" Strap 4x 30", wall 4x30" Strap 4x 30", wall 5x20" Strap 4x 30", wall 5x20" Wall 5x30" Strap 30"x4   TKE   otb 2x10 otb 2x10     Standing HR  2x10 x20 2x10     bridges         Leg press         Hamstring strength   otb 2x10 otb 2x10 otb 2x12 otb 2x10   saq 3x10  3x5 4x5 3x5 3x5   laq 3x10   2x10 3x8 3x10   Ther Activity         Stair negotiation    [facility] gait               Gait Training                           Modalities Ice 10' Ice 10' Ice 10' CP 10' CP 10'

## 2022-05-16 ENCOUNTER — OFFICE VISIT (OUTPATIENT)
Dept: PHYSICAL THERAPY | Facility: CLINIC | Age: 72
End: 2022-05-16
Payer: MEDICARE

## 2022-05-16 DIAGNOSIS — M25.562 ACUTE PAIN OF LEFT KNEE: Primary | ICD-10-CM

## 2022-05-16 PROCEDURE — 97112 NEUROMUSCULAR REEDUCATION: CPT

## 2022-05-16 PROCEDURE — 97140 MANUAL THERAPY 1/> REGIONS: CPT

## 2022-05-16 PROCEDURE — 97110 THERAPEUTIC EXERCISES: CPT

## 2022-05-16 NOTE — PROGRESS NOTES
Daily Note     Today's date: 2022  Patient name: Anshu Clark  : 1950  MRN: [id number]  Referring provider: Cedrick Bella  Dx:   Encounter Diagnosis     ICD-10-CM    1  Acute pain of left knee  M25 562                   Subjective: Patient reports she still experiences L calf pain  She is allergic to tape that is covering incision  She has a blister and petechia in surrounding areas  She has already spoken to MD about this  Objective: See treatment diary below  L knee flexion 105 in seated position  Assessment: Tolerated treatment well  Patient exhibited good technique with therapeutic exercises and would benefit from continued PT to improve L knee ROM and strength  She is doing well and ROM is consistent to protocol  SLR was independent, quad facilitation looked adequate  Some residual edema in L knee joint and calf, but no redness or tenderness in palpation  Plan: Continue per plan of care        Precautions: L TKA , lumbar nerve stimulator      Manuals 5-13 5-16 5-4 5-6 5-9 5-11   Joint work         -hip         -knee         ST work         Flexibility/PROM passive flex Gentle passive flex Gentle passive flex Gentle passive flex Passive flex Passive flex                     Neuro Re-Ed         balance SLS 10x10" SLS 10"x10                          Step-ups fwd 6" 2x10 6" 2x10   8" 2x10       Step-ups lat                  lunge         Ther Ex         NuStep/bike NS 12' ROM focus NS L4 10' 10' ROM focus 10' ROM focus 10' ROM    Step stretch 10x 6" Flexion 6" 10x        Quad sets   x20 5"x20  15x5" 5"x20    Heel slides  Strap 5"x20   Strap x20 Strap 5"x20   SLR program 3x4 3x5  3x4 CGA 3x4 Leonarda 3x4 (I)    Ankle pumps         gastroc stretch Wall stretch 5x 30", strap 4x30" Wall/strap 30"x5 ea Strap 4x 30", wall 5x20" Strap 4x 30", wall 5x20" Wall 5x30" Strap 30"x4   TKE   otb 2x10 otb 2x10     Standing HR   x20 2x10     bridges         Leg press         Hamstring strength   otb 2x10 otb 2x10 otb 2x12 otb 2x10   saq 3x10 3x10 3x5 4x5 3x5 3x5   laq 3x10 3x10  2x10 3x8 3x10   Ther Activity         Stair negotiation    [facility] gait               Gait Training                           Modalities Ice 10'  Ice 10' CP 10' CP 10'

## 2022-05-17 ENCOUNTER — OFFICE VISIT (OUTPATIENT)
Dept: URGENT CARE | Facility: MEDICAL CENTER | Age: 72
End: 2022-05-17
Payer: MEDICARE

## 2022-05-17 VITALS
WEIGHT: 182 LBS | HEART RATE: 102 BPM | DIASTOLIC BLOOD PRESSURE: 79 MMHG | OXYGEN SATURATION: 97 % | HEIGHT: 62 IN | SYSTOLIC BLOOD PRESSURE: 137 MMHG | BODY MASS INDEX: 33.49 KG/M2 | TEMPERATURE: 98 F

## 2022-05-17 DIAGNOSIS — M25.562 ACUTE PAIN OF LEFT KNEE: Primary | ICD-10-CM

## 2022-05-17 DIAGNOSIS — Z51.89 VISIT FOR WOUND CHECK: ICD-10-CM

## 2022-05-17 PROCEDURE — G0463 HOSPITAL OUTPT CLINIC VISIT: HCPCS | Performed by: PHYSICIAN ASSISTANT

## 2022-05-17 PROCEDURE — 99212 OFFICE O/P EST SF 10 MIN: CPT | Performed by: PHYSICIAN ASSISTANT

## 2022-05-17 NOTE — PROGRESS NOTES
[facility]        NAME: Francis Knapp is a 71 y o  female  : 1950    MRN: [id number]  DATE: May 17, 2022  TIME: 8:43 AM    Assessment and Plan   Acute pain of left knee [M25 562]  1  Acute pain of left knee     2  Visit for wound check           Patient Instructions       Follow up with [facility] today  Proceed to  ER if symptoms worsen  Chief Complaint     Chief Complaint   Patient presents with    Knee Pain     BLISTER AND TAPE REMOVAL         History of Present Illness       Is status post left total knee replacement  Used adhesive tape for closure  Patient is having a blistering secondary to the adhesive  She contacted the surgeon who referred her to get specific type of solution which she cannot locate in any pharmacy  She is avoiding going to [facility] in [place] due to the drive  Review of Systems   Review of Systems   Constitutional: Negative for fever  Skin: Positive for wound  Hematological: Negative for adenopathy           Current Medications       Current Outpatient Medications:     amitriptyline (ELAVIL) 50 mg tablet, Take 1 tablet (50 mg total) by mouth daily at bedtime, Disp: 90 tablet, Rfl: 3    Biotin 2500 MCG CAPS, Take 1 capsule by mouth 2 (two) times a day , Disp: , Rfl:     calcium carbonate (OS-ANTHONY) 600 MG tablet, Take 1,200 mg by mouth 2 (two) times a day with meals, Disp: , Rfl:     Cholecalciferol (VITAMIN D-3 PO), Take 1 tablet by mouth daily, Disp: , Rfl:     clobetasol (TEMOVATE) 0 05 % cream, Apply topically 2 (two) times a day, Disp: 30 g, Rfl: 5    Cyanocobalamin (VITAMIN B-12 CR PO), Take 1 tablet by mouth daily, Disp: , Rfl:     Diclofenac Sodium (VOLTAREN) 1 %, Apply 2 g topically 4 (four) times a day, Disp: 150 g, Rfl: 1    dicyclomine (BENTYL) 10 mg capsule, Take 1 capsule (10 mg total) by mouth 2 (two) times a day, Disp: 180 capsule, Rfl: 1    EPINEPHrine (EpiPen 2-Michel) 0 3 mg/0 3 mL SOAJ, Inject 0 3 mL (0 3 mg total) into a muscle as needed for anaphylaxis (Patient taking differently: Inject 0 3 mg into a muscle as needed in the morning for anaphylaxis   WHEN NEEDED ), Disp: 2 each, Rfl: 5    fexofenadine (ALLEGRA) 180 MG tablet, Take 180 mg by mouth daily, Disp: , Rfl:     gabapentin (NEURONTIN) 300 mg capsule, Take 1 tablet in the morning, 1 tablet afternoon and 2 bedtime, Disp: 360 capsule, Rfl: 0    losartan (COZAAR) 100 MG tablet, Take 1 tablet (100 mg total) by mouth daily, Disp: 90 tablet, Rfl: 0    methocarbamol (ROBAXIN) 750 mg tablet, Take 1 tablet (750 mg total) by mouth every 8 (eight) hours, Disp: 270 tablet, Rfl: 0    montelukast (SINGULAIR) 10 mg tablet, Take 1 tablet (10 mg total) by mouth daily at bedtime, Disp: 90 tablet, Rfl: 3    Multiple Vitamins-Minerals (PRESERVISION AREDS 2 PO), Take by mouth in the morning, Disp: , Rfl:     Omega-3 Fatty Acids (FISH OIL) 1,000 mg, Take 1,000 mg by mouth 3 (three) times a day, Disp: , Rfl:     omeprazole (PriLOSEC) 40 MG capsule, Take 1 capsule (40 mg total) by mouth daily, Disp: 90 capsule, Rfl: 3    rosuvastatin (CRESTOR) 10 MG tablet, Take 10 mg by mouth every other day, Disp: , Rfl:     simvastatin (ZOCOR) 10 mg tablet, Take 1 tablet (10 mg total) by mouth daily at bedtime, Disp: 90 tablet, Rfl: 2    aspirin (ECOTRIN) 325 mg EC tablet, Take 1 tablet (325 mg total) by mouth 2 (two) times a day (Patient not taking: Reported on 5/17/2022), Disp: 60 tablet, Rfl: 0    hydrOXYzine HCL (ATARAX) 10 mg tablet, Take 2 tablets (20 mg total) by mouth daily at bedtime Take 2 tablets at bedtime, Disp: 180 tablet, Rfl: 1    rosuvastatin (CRESTOR) 10 MG tablet, Take 1 tablet (10 mg total) by mouth every other day, Disp: 45 tablet, Rfl: 3    Current Allergies     Allergies as of 05/17/2022 - Reviewed 05/17/2022   Allergen Reaction Noted    Bee venom Shortness Of Breath 08/11/2017    Chlorhexidine Rash 09/04/2020    Egg yolk - food allergy Hives 11/07/2013    Iodinated diagnostic agents Hives 04/02/2016    Penicillins Hives 04/02/2016    Lidocaine Other (See Comments) 10/14/2021    Bactrim [sulfamethoxazole-trimethoprim] Rash 03/08/2019    Codeine Other (See Comments) 04/02/2016    Latex Rash 12/08/2016    Medical tape Blisters, Hives, Itching, and Rash 05/12/2021    Other Rash 04/13/2017    Oxybutynin Rash 04/22/2019    Pentosan polysulfate Rash 11/05/2018    Povidone iodine Rash 09/29/2020            The following portions of the patient's history were reviewed and updated as appropriate: allergies, current medications, past family history, past medical history, past social history, past surgical history and problem list      Past Medical History:   Diagnosis Date    Abnormal findings on diagnostic imaging of breast     Abnormal Pap smear of cervix     Arthritis     Chronic pain disorder     Extremity pain     Fibrocystic breast disease     Foot pain     GERD (gastroesophageal reflux disease)     Hyperlipidemia     Hypertension     Interstitial cystitis     Joint pain     Low back pain     Osteoarthritis     Osteopenia     Uncomplicated opioid dependence ([address] ) 3/1/2022       Past Surgical History:   Procedure Laterality Date    APPENDECTOMY      BACK SURGERY      BREAST EXCISIONAL BIOPSY Left 1996    benign    CARPAL BOSS EXCISION      CHOLECYSTECTOMY      DIAGNOSTIC LAPAROSCOPY      FOOT SURGERY      FRACTURE SURGERY      HYSTERECTOMY      age 33    HYSTEROSCOPY      JOINT REPLACEMENT      KIDNEY SURGERY Left     KNEE ARTHROSCOPY Bilateral     LAMINECTOMY      LAPAROSCOPY      hynecologic with adhesions    MYOMECTOMY      OOPHORECTOMY Bilateral     age 33   [facility] [address]      [place] SURG IMPLNT [address] N/A 5/18/2017    Procedure: PLACEMENT OF THORACIC SPINAL CORD STIMULATOR WITH LEFT BUTTOCK IMPLANTABLE PULSE GENERATOR (IMPULSE MONITORING-MOTORS (TCEMEP), EMG, SSEP);   Surgeon: Rey Luis MD;  Location: [facility];  Service: Neurosurgery    SPINAL CORD STIMULATOR IMPLANT Left 9/29/2020    Procedure: LAMINECTOMY THORACIC , REMOVAL OF SCS LEADS AND GENERATOR;  Surgeon: Meera Cai MD;  Location:  MAIN OR;  Service: Neurosurgery    SPINAL STIMULATOR PLACEMENT  05/2017    TONSILLECTOMY AND ADENOIDECTOMY      onset: unknown    TOTAL KNEE ARTHROPLASTY Right        Family History   Problem Relation Age of Onset    Bone cancer Mother    Julissa Mullet Cancer Mother     Asthma Mother     Brain cancer Father     Stroke Father         stroke sydrome    Transient ischemic attack Father     Diabetes Paternal Grandmother     Breast cancer Paternal Grandmother 61    Breast cancer Maternal Aunt 79    Breast cancer additional onset Maternal Aunt 67    Depression Sister     Migraines Sister     Asthma Sister     No Known Problems Maternal Grandmother     No Known Problems Maternal Grandfather     No Known Problems Paternal Grandfather     Asthma Sister     Heart disease Brother     Diabetes Brother     Heart attack Brother     Colon cancer Brother 77    Diabetes Sister          Medications have been verified  Objective   /79   Pulse 102   Temp 98 °F (36 7 °C)   Ht 5' 2 21" (1 58 m)   Wt 82 6 kg (182 lb)   LMP  (LMP Unknown)   SpO2 97%   BMI 33 07 kg/m²   No LMP recorded (lmp unknown)  Patient has had a hysterectomy  Physical Exam     Physical Exam  Vitals and nursing note reviewed  Constitutional:       Appearance: Normal appearance  HENT:      Head: Normocephalic and atraumatic  Cardiovascular:      Rate and Rhythm: Normal rate and regular rhythm  Pulmonary:      Effort: Pulmonary effort is normal    Musculoskeletal:      Comments: Left knee incision healing nicely  There is 1 small blister to the right of the incision, no surrounding erythema  There is adhesive tape overlying the incision  Neurological:      Mental Status: She is alert

## 2022-05-18 ENCOUNTER — TELEPHONE (OUTPATIENT)
Dept: FAMILY MEDICINE CLINIC | Facility: CLINIC | Age: 72
End: 2022-05-18

## 2022-05-18 ENCOUNTER — APPOINTMENT (OUTPATIENT)
Dept: LAB | Facility: MEDICAL CENTER | Age: 72
End: 2022-05-18
Payer: MEDICARE

## 2022-05-18 ENCOUNTER — APPOINTMENT (OUTPATIENT)
Dept: PHYSICAL THERAPY | Facility: CLINIC | Age: 72
End: 2022-05-18
Payer: MEDICARE

## 2022-05-18 DIAGNOSIS — R30.0 DYSURIA: Primary | ICD-10-CM

## 2022-05-18 LAB
BACTERIA UR QL AUTO: ABNORMAL /HPF
BILIRUB UR QL STRIP: NEGATIVE
CAOX CRY URNS QL MICRO: ABNORMAL /HPF
CLARITY UR: ABNORMAL
COLOR UR: YELLOW
GLUCOSE UR STRIP-MCNC: NEGATIVE MG/DL
HGB UR QL STRIP.AUTO: NEGATIVE
KETONES UR STRIP-MCNC: NEGATIVE MG/DL
LEUKOCYTE ESTERASE UR QL STRIP: ABNORMAL
MUCOUS THREADS UR QL AUTO: ABNORMAL
NITRITE UR QL STRIP: NEGATIVE
NON-SQ EPI CELLS URNS QL MICRO: ABNORMAL /HPF
PH UR STRIP.AUTO: 6 [PH]
PROT UR STRIP-MCNC: ABNORMAL MG/DL
RBC #/AREA URNS AUTO: ABNORMAL /HPF
SP GR UR STRIP.AUTO: 1.03 (ref 1–1.03)
UROBILINOGEN UR STRIP-ACNC: <2 MG/DL
WBC #/AREA URNS AUTO: ABNORMAL /HPF

## 2022-05-18 PROCEDURE — 81001 URINALYSIS AUTO W/SCOPE: CPT

## 2022-05-18 PROCEDURE — 87086 URINE CULTURE/COLONY COUNT: CPT

## 2022-05-18 RX ORDER — CIPROFLOXACIN 500 MG/1
500 TABLET, FILM COATED ORAL EVERY 12 HOURS SCHEDULED
Qty: 14 TABLET | Refills: 0 | Status: SHIPPED | OUTPATIENT
Start: 2022-05-18 | End: 2022-05-25

## 2022-05-18 NOTE — TELEPHONE ENCOUNTER
CC: Possible UTI - A lot of burning pain & pressure starting this morning    Asking for an antibiotic    Pharm:  [facility]

## 2022-05-19 ENCOUNTER — OFFICE VISIT (OUTPATIENT)
Dept: PHYSICAL THERAPY | Facility: CLINIC | Age: 72
End: 2022-05-19
Payer: MEDICARE

## 2022-05-19 DIAGNOSIS — M25.562 ACUTE PAIN OF LEFT KNEE: Primary | ICD-10-CM

## 2022-05-19 PROCEDURE — 97140 MANUAL THERAPY 1/> REGIONS: CPT | Performed by: PHYSICAL THERAPIST

## 2022-05-19 PROCEDURE — 97112 NEUROMUSCULAR REEDUCATION: CPT | Performed by: PHYSICAL THERAPIST

## 2022-05-19 PROCEDURE — 97110 THERAPEUTIC EXERCISES: CPT | Performed by: PHYSICAL THERAPIST

## 2022-05-19 NOTE — PROGRESS NOTES
Daily Note     Today's date: 2022  Patient name: Heidy Florian  : 1950  MRN: [id number]  Referring provider: Auther Claude  Dx:   Encounter Diagnosis     ICD-10-CM    1  Acute pain of left knee  M25 562                   Subjective: doing pretty good - main complaint is foot  That is what happened when R replaced      Objective: See treatment diary below      Assessment: active flex 104 deg  Able to get around recumbant bike reverse  Ambulated into clinic nicely without AD today  SLS is challenged         Plan: progress ROM, strength, balance     Precautions: L TKA , lumbar nerve stimulator      Manuals 5-13 5-16 5-19 5-6 5-9 5-11   Joint work         -hip         -knee         ST work         Flexibility/PROM passive flex Gentle passive flex Passive flexion Gentle passive flex Passive flex Passive flex                     Neuro Re-Ed         balance SLS 10x10" SLS 10"x10  SLS 10x10"                        Step-ups fwd 6" 2x10 6" 2x10   8" 2x10 8" x20       Step-ups downs   4-6" x20 total               lunge         Ther Ex         NuStep/bike NS 12' ROM focus NS L4 10' 8' NS, 2' recumbant bike 10' ROM focus 10' ROM    Step stretch 10x 6" Flexion 6" 10x        Quad sets    5"x20  15x5" 5"x20    Heel slides  Strap 5"x20   Strap x20 Strap 5"x20   SLR program 3x4 3x5  3x8 3x4 Leonarda 3x4 (I)    Ankle pumps         gastroc stretch Wall stretch 5x 30", strap 4x30" Wall/strap 30"x5 ea  wall 5x20" Strap 4x 30", wall 5x20" Wall 5x30" Strap 30"x4   TKE    otb 2x10     Standing HR    2x10     bridges         Leg press         Hamstring strength    otb 2x10 otb 2x12 otb 2x10   saq 3x10 3x10 3x10 2# 4x5 3x5 3x5   laq 3x10 3x10 3x10 2# 2x10 3x8 3x10   Ther Activity         Stair negotiation    [facility] gait               Gait Training                           Modalities Ice 10'   CP 10' CP 10'

## 2022-05-20 LAB — BACTERIA UR CULT: NORMAL

## 2022-05-23 ENCOUNTER — EVALUATION (OUTPATIENT)
Dept: PHYSICAL THERAPY | Facility: CLINIC | Age: 72
End: 2022-05-23
Payer: MEDICARE

## 2022-05-23 DIAGNOSIS — M25.562 ACUTE PAIN OF LEFT KNEE: Primary | ICD-10-CM

## 2022-05-23 PROCEDURE — 97110 THERAPEUTIC EXERCISES: CPT | Performed by: PHYSICAL THERAPIST

## 2022-05-23 PROCEDURE — 97140 MANUAL THERAPY 1/> REGIONS: CPT | Performed by: PHYSICAL THERAPIST

## 2022-05-23 NOTE — PROGRESS NOTES
PT Re-Evaluation     Today's date: 2022  Patient name: Francis Knapp  : 1950  MRN: [id number]  Referring provider: Ashley Loja  Dx:   Encounter Diagnosis     ICD-10-CM    1  Acute pain of left knee  M25 562                    Assessment  Assessment details: Pt presents to PT s/p L TKA   Pt progressing appropriately with rehab  She does have some foot pain that limits her but overall knee mobility and strength progressing on track  Balance is challenged  Functionally seeing improvements  Continued skilled PT will benefit pt to allow progression to normal gait, strength, function  Impairments: abnormal gait, abnormal or restricted ROM, impaired balance, impaired physical strength, pain with function and weight-bearing intolerance  Functional limitations: gait, stairs, transfers  Symptom irritability: highUnderstanding of Dx/Px/POC: good   Prognosis: good    Goals  ST-3 weeks - MET  Good quad contraction and independent SLR to allow progression to [facility]  Full knee ext to 100 deg flex actively  Independent with swelling control and basic HEP for mobility/quad    LT-8 weeks - progressing towards  Knee ROM: 0-120 deg  Pain 3/10 or less with ADLs  Ambulate without AD normally and with minimal pain  SLS: 10 sec  or greater on surgical side  Knee ext and flex strength of 4/5 or greater  Independent with strengthening program  Complete stairs independently and safely  Complete typical house hold chores and community ambulation without AD and minimal pain    Plan  Plan details: Pt will benefit from continued PT to address noted limitations  Treatment may include TE, NMR, MT, TA, or modalities as appropriate    Patient would benefit from: skilled physical therapy  Planned modality interventions: cryotherapy and thermotherapy: hydrocollator packs  Planned therapy interventions: manual therapy, neuromuscular re-education, self care, therapeutic activities, therapeutic exercise and home exercise program  Frequency: 2x week  Duration in weeks: 4  Treatment plan discussed with: patient        Subjective Evaluation    History of Present Illness  Mechanism of injury: 71year old female presenting to PT s/p L TKA by Dr Joaquin Francis on 22  Was having pain in the knee for years prior to surgery  Approx 1 year ago fell and broke fibula on left side - no ORIF required  Has significant hx of low back and nerve pain on left side  Does have nerve stimulator  CC is foot pain  Had some low back and foot pain after last visit  Stairs going well  Pleased with progress so far of knee, just frustrated at foot pain    Pain  Current pain ratin    Social Support  Stairs in house: yes     Patient Goals  Patient goals for therapy: decreased pain, improved balance, increased strength, independence with ADLs/IADLs, return to sport/leisure activities and increased motion          Objective     General Comments:      Knee Comments  Gait  No AD, slight limp    Incision looks good      ROM  L 2-110      Strength  Knee ext: 4/5  Knee flex:4/5      Circumference/girth  minimal    Special tests  Shon's (-)  No warmth or redness gastroc    Stairs: completes step-up nicely 8", step down 6"             Precautions: L TKA         Manuals 5-13 5-16 5-19 5-23 5-9 5-11   Joint work               -hip               -knee               ST work               Flexibility/PROM passive flex Gentle passive flex Passive flexion Passive flex Passive flex Passive flex            assessment                       Neuro Re-Ed               balance SLS 10x10" SLS 10"x10  SLS 10x10"  SLS 10"x10                tandem 10x10" ea                       Step-ups fwd 6" 2x10 6" 2x10   8" 2x10 8" x20          Step-ups downs     4-6" x20 total                         lunge               Ther Ex               NuStep/bike NS 12' ROM focus NS L4 10' 8' NS, 2' recumbant bike recumbant 10' ROM focus 10' ROM     Step stretch 10x 6" Flexion 6" 10x            Quad sets        15x5" 5"x20    Heel slides   Strap 5"x20     Strap x20 Strap 5"x20   SLR program 3x4 3x5  3x8 held 3x4 (I)     Ankle pumps               gastroc stretch Wall stretch 5x 30", strap 4x30" Wall/strap 30"x5 ea  wall 5x20" Wall 5x30" Wall 5x30" Strap 30"x4   TKE              Standing HR              bridges               Leg press        P1 2x10       Hamstring strength       gtb 2x15 otb 2x12 otb 2x10   saq 3x10 3x10 3x10 2#  3x5 3x5   laq 3x10 3x10 3x10 2# 2x10 2# mod range 3x8 3x10   Ther Activity               Stair negotiation                              Gait Training                                               Modalities Ice 10'     CP 10' CP 10'

## 2022-05-23 NOTE — LETTER
May 24, 2022    Rosita Olsen  [address]    Patient: Misti Monae   YOB: 1950   Date of Visit: 2022     Encounter Diagnosis     ICD-10-CM    1  Acute pain of left knee  M25 562        Dear Dr Le Pair: Thank you for your recent referral of Misti Monae  Please review the attached evaluation summary from Ingrid's recent visit  Please verify that you agree with the plan of care by signing the attached order  If you have any questions or concerns, please do not hesitate to call  I sincerely appreciate the opportunity to share in the care of one of your patients and hope to have another opportunity to work with you in the near future  Sincerely,    Candelario España, PT      Referring Provider:      I certify that I have read the below Plan of Care and certify the need for these services furnished under this plan of treatment while under my care  Rosita Olsen  [address] [address]  Via Fax: [phone]          PT Re-Evaluation     Today's date: 2022  Patient name: Misti Monae  : 1950  MRN: [id number]  Referring provider: Rizwana Medrano  Dx:   Encounter Diagnosis     ICD-10-CM    1  Acute pain of left knee  M25 562                    Assessment  Assessment details: Pt presents to PT s/p L TKA   Pt progressing appropriately with rehab  She does have some foot pain that limits her but overall knee mobility and strength progressing on track  Balance is challenged  Functionally seeing improvements  Continued skilled PT will benefit pt to allow progression to normal gait, strength, function     Impairments: abnormal gait, abnormal or restricted ROM, impaired balance, impaired physical strength, pain with function and weight-bearing intolerance  Functional limitations: gait, stairs, transfers  Symptom irritability: highUnderstanding of Dx/Px/POC: good   Prognosis: good    Goals  ST-3 weeks - MET  Good quad contraction and independent SLR to allow progression to [facility]  Full knee ext to 100 deg flex actively  Independent with swelling control and basic HEP for mobility/quad    LT-8 weeks - progressing towards  Knee ROM: 0-120 deg  Pain 3/10 or less with ADLs  Ambulate without AD normally and with minimal pain  SLS: 10 sec  or greater on surgical side  Knee ext and flex strength of 4/5 or greater  Independent with strengthening program  Complete stairs independently and safely  Complete typical house hold chores and community ambulation without AD and minimal pain    Plan  Plan details: Pt will benefit from continued PT to address noted limitations  Treatment may include TE, NMR, MT, TA, or modalities as appropriate  Patient would benefit from: skilled physical therapy  Planned modality interventions: cryotherapy and thermotherapy: hydrocollator packs  Planned therapy interventions: manual therapy, neuromuscular re-education, self care, therapeutic activities, therapeutic exercise and home exercise program  Frequency: 2x week  Duration in weeks: 4  Treatment plan discussed with: patient        Subjective Evaluation    History of Present Illness  Mechanism of injury: 71year old female presenting to PT s/p L TKA by Dr Sondra Drew on 22  Was having pain in the knee for years prior to surgery  Approx 1 year ago fell and broke fibula on left side - no ORIF required  Has significant hx of low back and nerve pain on left side  Does have nerve stimulator  CC is foot pain  Had some low back and foot pain after last visit  Stairs going well  Pleased with progress so far of knee, just frustrated at foot pain    Pain  Current pain ratin    Social Support  Stairs in house: yes     Patient Goals  Patient goals for therapy: decreased pain, improved balance, increased strength, independence with ADLs/IADLs, return to sport/leisure activities and increased motion          Objective     General Comments:      Knee Comments  Gait  No AD, slight limp    Incision looks good      ROM  L 2-110      Strength  Knee ext: 4/5  Knee flex:4/5      Circumference/girth  minimal    Special tests  Shon's (-)  No warmth or redness gastroc    Stairs: completes step-up nicely 8", step down 6"             Precautions: L TKA 4/25        Manuals 5-13 5-16 5-19 5-23 5-9 5-11   Joint work               -hip               -knee               ST work               Flexibility/PROM passive flex Gentle passive flex Passive flexion Passive flex Passive flex Passive flex            assessment                       Neuro Re-Ed               balance SLS 10x10" SLS 10"x10  SLS 10x10"  SLS 10"x10                tandem 10x10" ea                       Step-ups fwd 6" 2x10 6" 2x10   8" 2x10 8" x20          Step-ups downs     4-6" x20 total                         lunge               Ther Ex               NuStep/bike NS 12' ROM focus NS L4 10' 8' NS, 2' recumbant bike recumbant 10' ROM focus 10' ROM     Step stretch 10x 6" Flexion 6" 10x            Quad sets        15x5" 5"x20    Heel slides   Strap 5"x20     Strap x20 Strap 5"x20   SLR program 3x4 3x5  3x8 held 3x4 (I)     Ankle pumps               gastroc stretch Wall stretch 5x 30", strap 4x30" Wall/strap 30"x5 ea  wall 5x20" Wall 5x30" Wall 5x30" Strap 30"x4   TKE              Standing HR              bridges               Leg press        P1 2x10       Hamstring strength       gtb 2x15 otb 2x12 otb 2x10   saq 3x10 3x10 3x10 2#  3x5 3x5   laq 3x10 3x10 3x10 2# 2x10 2# mod range 3x8 3x10   Ther Activity               Stair negotiation                              Gait Training                                               Modalities Ice 10'     CP 10' CP 10'

## 2022-05-25 ENCOUNTER — TELEPHONE (OUTPATIENT)
Dept: NEUROLOGY | Facility: CLINIC | Age: 72
End: 2022-05-25

## 2022-05-25 ENCOUNTER — OFFICE VISIT (OUTPATIENT)
Dept: PHYSICAL THERAPY | Facility: CLINIC | Age: 72
End: 2022-05-25
Payer: MEDICARE

## 2022-05-25 DIAGNOSIS — M25.562 ACUTE PAIN OF LEFT KNEE: Primary | ICD-10-CM

## 2022-05-25 PROCEDURE — 97112 NEUROMUSCULAR REEDUCATION: CPT

## 2022-05-25 PROCEDURE — 97140 MANUAL THERAPY 1/> REGIONS: CPT

## 2022-05-25 PROCEDURE — 97110 THERAPEUTIC EXERCISES: CPT

## 2022-05-25 NOTE — PROGRESS NOTES
Daily Note     Today's date: 2022  Patient name: Yanni Cadet  : 1950  MRN: [id number]  Referring provider: Harley Srinivasan  Dx:   Encounter Diagnosis     ICD-10-CM    1  Acute pain of left knee  M25 562                   Subjective: Patient reports that her L foot has pain today  This has been an issue from the start of therapy  Objective: See treatment diary below  Held ankle weight and SLS due to L foot pain today  Assessment: Tolerated treatment well  Patient demonstrated fatigue post treatment and would benefit from continued PT to improve L knee strength and stability  Will resume resistance with weights and SLS when able  Was able to do full revs on bike today  Plan: Continue per plan of care        Precautions: L TKA         Manuals 5-13 5-16 5-19 5- 5-11   Joint work               -hip               -knee               ST work               Flexibility/PROM passive flex Gentle passive flex Passive flexion Passive flex Passive Flex Passive flex            assessment                     Neuro Re-Ed              balance SLS 10x10" SLS 10"x10  SLS 10x10"  SLS 10"x10 held              tandem 10x10" ea 10"x10 ea                    Step-ups fwd 6" 2x10 6" 2x10   8" 2x10 8" x20         Step-ups downs     4-6" x20 total                       lunge              Ther Ex              NuStep/bike NS 12' ROM focus NS L4 10' 8' NS, 2' recumbant bike recumbant 10' ROM focus Recumbant full revs 5'     Step stretch 10x 6" Flexion 6" 10x           Quad sets         5"x20    Heel slides   Strap 5"x20      Strap 5"x20   SLR program 3x4 3x5  3x8 held 2x5     Ankle pumps              gastroc stretch Wall stretch 5x 30", strap 4x30" Wall/strap 30"x5 ea  wall 5x20" Wall 5x30" Wall 30"x5 Strap 30"x4   TKE             Standing HR             bridges              Leg press        P1 2x10 P1 3x10     Hamstring strength       gtb 2x15 HCM P3 3x10 otb 2x10   saq 3x10 3x10 3x10 2#  0# 4x10 3x5   laq 3x10 3x10 3x10 2# 2x10 2# mod range 0# 3x10 3x10   Ther Activity              Stair negotiation                            Gait Training                                            Modalities Ice 10'     CP 10' CP 10'

## 2022-05-31 ENCOUNTER — OFFICE VISIT (OUTPATIENT)
Dept: PHYSICAL THERAPY | Facility: CLINIC | Age: 72
End: 2022-05-31
Payer: MEDICARE

## 2022-05-31 DIAGNOSIS — M25.562 ACUTE PAIN OF LEFT KNEE: Primary | ICD-10-CM

## 2022-05-31 PROCEDURE — 97140 MANUAL THERAPY 1/> REGIONS: CPT

## 2022-05-31 PROCEDURE — 97110 THERAPEUTIC EXERCISES: CPT

## 2022-05-31 NOTE — PROGRESS NOTES
Daily Note     Today's date: 2022  Patient name: Brandon Hernandez  : 1950  MRN: [id number]  Referring provider: Kayli Guerra  Dx:   Encounter Diagnosis     ICD-10-CM    1  Acute pain of left knee  M25 562                   Subjective: Patient reports that she was on her feet all weekend, and she "over did it " She feels her L foot pain is what is holding her back  Objective: See treatment diary below  Did not resume ankle weights on L foot, but increased reps  L knee flexion is 110 degrees  Assessment: Tolerated treatment well  Patient demonstrated fatigue post treatment, exhibited good technique with therapeutic exercises and would benefit from continued PT to improve L knee ROM and strength  Patient is able to tolerate active ROM w/o ankle weights due to L foot pain  Her motion is improving consistently  Plan: Continue per plan of care        Precautions: L TKA       Manuals 5-13 5-16 5-19 5-- 5   Joint work               -hip               -knee               ST work               Flexibility/PROM passive flex Gentle passive flex Passive flexion Passive flex Passive Flex Passive flex            assessment                     Neuro Re-Ed              balance SLS 10x10" SLS 10"x10  SLS 10x10"  SLS 10"x10 held              tandem 10x10" ea 10"x10 ea  Tandem 10"x10 ea                  Step-ups fwd 6" 2x10 6" 2x10   8" 2x10 8" x20         Step-ups downs     4-6" x20 total                       lunge              Ther Ex              NuStep/bike NS 12' ROM focus NS L4 10' 8' NS, 2' recumbant bike recumbant 10' ROM focus Recumbant full revs 5'  Recumbant full revs 5'   Step stretch 10x 6" Flexion 6" 10x           Quad sets            Heel slides   Strap 5"x20         SLR program 3x4 3x5  3x8 held 2x5  2x5   Ankle pumps              gastroc stretch Wall stretch 5x 30", strap 4x30" Wall/strap 30"x5 ea  wall 5x20" Wall 5x30" Wall 30"x5 Wall 30"x5   TKE             Standing HR           bridges              Leg press        P1 2x10 P1 3x10  P1 3x10   Hamstring strength       gtb 2x15 HCM P3 3x10 HCM 3x10   saq 3x10 3x10 3x10 2#  0# 4x10 0# 3x20   laq 3x10 3x10 3x10 2# 2x10 2# mod range 0# 3x10 0# 3x15   Ther Activity              Stair negotiation                            Gait Training                                            Modalities Ice 10'     CP 10' CP 10'  CP 10'

## 2022-06-02 ENCOUNTER — OFFICE VISIT (OUTPATIENT)
Dept: PHYSICAL THERAPY | Facility: CLINIC | Age: 72
End: 2022-06-02
Payer: MEDICARE

## 2022-06-02 DIAGNOSIS — M25.562 ACUTE PAIN OF LEFT KNEE: Primary | ICD-10-CM

## 2022-06-02 PROCEDURE — 97110 THERAPEUTIC EXERCISES: CPT

## 2022-06-02 PROCEDURE — 97112 NEUROMUSCULAR REEDUCATION: CPT

## 2022-06-02 PROCEDURE — 97140 MANUAL THERAPY 1/> REGIONS: CPT

## 2022-06-02 NOTE — PROGRESS NOTES
Daily Note     Today's date: 2022  Patient name: Samira Millan  : 1950  MRN: [id number]  Referring provider: Marina Cerrato  Dx:   Encounter Diagnosis     ICD-10-CM    1  Acute pain of left knee  M25 562                   Subjective: Pt reports L ankle pain and swelling  Reports L knee cont to do well and is progressing  Objective: See treatment diary below  0-115 degrees L knee AROM  Assessment: Tolerated treatment well  Patient exhibited good technique with therapeutic exercises  Pt ocnt to make gains with program  Pt demonstrates overall improved AROM today  Cont to [facility] [facility] with min pain  Making progress toward goals  Plan: Continue per plan of care        Precautions: L TKA       Manuals  5-16 5   Joint work               -hip               -knee               ST work               Flexibility/PROM passive flex JV Gentle passive flex Passive flexion Passive flex Passive Flex Passive flex            assessment                     Neuro Re-Ed              balance  SLS 10"x10  SLS 10x10"  SLS 10"x10 held        tandem 10"/10 ea      tandem 10x10" ea 10"x10 ea  Tandem 10"x10 ea                  Step-ups fwd  6" 2x10   8" 2x10 8" x20         Step-ups downs     4-6" x20 total                       lunge              Ther Ex              NuStep/bike Recumbent 10' NS L4 10' 8' NS, 2' recumbant bike recumbant 10' ROM focus Recumbant full revs 5'  Recumbant full revs 5'   Step stretch  Flexion 6" 10x           Quad sets            Heel slides   Strap 5"x20         SLR program /5 3x5  3x8 held 2x5  2x5   Ankle pumps              gastroc stretch Wall stretch 5x 30" Wall/strap 30"x5 ea  wall 5x20" Wall 5x30" Wall 30"x5 Wall 30"x5   TKE             Standing HR             bridges              Leg press  P1 3/10      P1 2x10 P1 3x10  P1 3x10   Hamstring strength  P3 3/10 HCM     gtb 2x15 HCM P3 3x10 HCM 3x10   saq 3x10 3x10 3x10 2#  0# 4x10 0# 3x20   laq 3x10 3x10 3x10 2# 2x10 2# mod range 0# 3x10 0# 3x15   Ther Activity              Stair negotiation                            Gait Training                                            Modalities Ice 10'     CP 10' CP 10'  CP 10'

## 2022-06-06 ENCOUNTER — OFFICE VISIT (OUTPATIENT)
Dept: PHYSICAL THERAPY | Facility: CLINIC | Age: 72
End: 2022-06-06
Payer: MEDICARE

## 2022-06-06 DIAGNOSIS — F41.9 ANXIETY: ICD-10-CM

## 2022-06-06 DIAGNOSIS — M25.562 ACUTE PAIN OF LEFT KNEE: Primary | ICD-10-CM

## 2022-06-06 PROCEDURE — 97110 THERAPEUTIC EXERCISES: CPT

## 2022-06-06 PROCEDURE — 97140 MANUAL THERAPY 1/> REGIONS: CPT

## 2022-06-06 PROCEDURE — 97112 NEUROMUSCULAR REEDUCATION: CPT

## 2022-06-06 RX ORDER — AMITRIPTYLINE HYDROCHLORIDE 50 MG/1
50 TABLET, FILM COATED ORAL
Qty: 90 TABLET | Refills: 3 | Status: SHIPPED | OUTPATIENT
Start: 2022-06-06

## 2022-06-06 NOTE — PROGRESS NOTES
Daily Note     Today's date: 2022  Patient name: Minnie Loya  : 1950  MRN: [id number]  Referring provider: Tra Gonsalves  Dx:   Encounter Diagnosis     ICD-10-CM    1  Acute pain of left knee  M25 562                   Subjective: Pt reports she is doing well and pleased with progress  Pt reports seeing MD and will DC next visit  States MD pleased with progress  Objective: See treatment diary below  0-115 L knee AROM  Assessment: Tolerated treatment well  Patient exhibited good technique with therapeutic exercises  Pt making cont gains with strength and ROM  Pt will DC to HEP upcoming visit  Pt knowledgeable of HEP  Plan: Continue per plan of care  Poss DC next visit        Precautions: L TKA       Manuals  5 5 5   Joint work               -hip               -knee               ST work               Flexibility/PROM passive flex JV PROM JV Passive flexion Passive flex Passive Flex Passive flex            assessment                     Neuro Re-Ed              balance    SLS 10x10"  SLS 10"x10 held        tandem 10"/10 ea  tandem 10"/10 ea    tandem 10x10" ea 10"x10 ea  Tandem 10"x10 ea                  Step-ups fwd   8" x20         Step-ups downs     4-6" x20 total                       lunge              Ther Ex              NuStep/bike Recumbent 10'  L4 10' bike 8' NS, 2' recumbant bike recumbant 10' ROM focus Recumbant full revs 5'  Recumbant full revs 5'   Step stretch            Quad sets            Heel slides            SLR program  2/10 3x8 held 2x5  2x5   Ankle pumps              gastroc stretch Wall stretch 5x 30" Wall/strap 30"x5 ea  wall 5x20" Wall 5x30" Wall 30"x5 Wall 30"x5   TKE             Standing HR             bridges              Leg press  P1 3/10 P1 3/10    P1 2x10 P1 3x10  P1 3x10   Hamstring strength  P3 3/10 HCM P4 3/10   gtb 2x15 HCM P3 3x10 HCM 3x10   saq 3x10 3x10 3x10 2#  0# 4x10 0# 3x20   laq 3x10 3x10 3x10 2# 2x10 2# mod range 0# 3x10 0# 3x15   Ther Activity              Stair negotiation                            Gait Training                                            Modalities Ice 10'  ice 10'   CP 10' CP 10'  CP 10'

## 2022-06-07 ENCOUNTER — APPOINTMENT (OUTPATIENT)
Dept: PHYSICAL THERAPY | Facility: CLINIC | Age: 72
End: 2022-06-07
Payer: MEDICARE

## 2022-06-08 ENCOUNTER — OFFICE VISIT (OUTPATIENT)
Dept: NEUROLOGY | Facility: CLINIC | Age: 72
End: 2022-06-08
Payer: MEDICARE

## 2022-06-08 VITALS
RESPIRATION RATE: 18 BRPM | WEIGHT: 183.6 LBS | HEIGHT: 62 IN | DIASTOLIC BLOOD PRESSURE: 70 MMHG | BODY MASS INDEX: 33.79 KG/M2 | TEMPERATURE: 97.4 F | SYSTOLIC BLOOD PRESSURE: 135 MMHG | HEART RATE: 103 BPM

## 2022-06-08 DIAGNOSIS — G60.9 IDIOPATHIC PERIPHERAL NEUROPATHY: Primary | ICD-10-CM

## 2022-06-08 DIAGNOSIS — M54.16 LUMBAR RADICULOPATHY: ICD-10-CM

## 2022-06-08 PROCEDURE — 99214 OFFICE O/P EST MOD 30 MIN: CPT | Performed by: PHYSICIAN ASSISTANT

## 2022-06-08 NOTE — PROGRESS NOTES
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  She went to PT for this, and it recovered, however has been weak again after knee surgery  She is doing the home exercises she learned for this in PT last year, and I encouraged her to continue  Nerve was likely irritated again due to surgery on that leg in April  Neuro exam is stable today  Continue current doses of gabapentin and amitriptyline  Fall precautions discussed  Lumbar radiculopathy  Stable, following with pain management, s/p spinal cord stimulator  Patient will return in 6 months or sooner if needed  She was advised to call for any new or worsening symptoms  Diagnoses and all orders for this visit:    Idiopathic peripheral neuropathy    Lumbar radiculopathy           Subjective:    HPI    Patient is a 71year old female who presents for follow up, last seen by Zeyad Moseley in September 2021  Patient followed for neuropathy  Patient initially presented in 2015 with c/o diffuse paresthesias  Workup included EMG which showed carpal tunnel, lab work which was unrevealing  She also underwent MRI of the brain which was essentially normal  Fortunately her symptoms have been controlled with combo of gabapentin and Elavil   She was having issues with LBP, MRI thoracic spine was essentially normal, MRI Lumbar demonstrated mild to moderate bony and discogenic degenerative changes seen from L2-L3 through L4-L5, with mild spondylolisthesis of L3 on L4  No evidence of central spinal stenosis  No nerve root compression seen  Mild bony degenerative changes are also seen at T10-T11  Patient was seeing pain management and spinal cord stimulator placed in May 2017  This was very helpful for her  She has followed with pain management for her back pain  Today, patient reports she is overall doing ok from a neurologic standpoint  Her peripheral neuropathy symptoms are stable  She did have left TKR on 4/25/22, so recovering from that  She is completing PT  She has had pain in her foot and ankle following her knee surgery  She previously had right TKR, and says the same thing happened after that surgery as well  She is following with ortho  She does report that since her surgery she has had trouble with left great toe extension  This happened to her last year as well, she went to PT, and recovered  She is doing the home exercises she was given previously  She remains on gabapentin 300/300/600, and amitriptyline 50mg HS  She is not currently seeing pain management for her back, did have some injections early on in the year, which worked well for her  Also has SCS in place  The following portions of the patient's history were reviewed and updated as appropriate: current medications, past family history, past medical history, past social history, past surgical history and problem list          Objective:    Blood pressure 135/70, pulse 103, temperature (!) 97 4 °F (36 3 °C), temperature source Tympanic, resp  rate 18, height 5' 2" (1 575 m), weight 83 3 kg (183 lb 9 6 oz)  Physical Exam  Constitutional:       Appearance: Normal appearance  HENT:      Head: Normocephalic and atraumatic  Eyes:      Extraocular Movements: EOM normal       Pupils: Pupils are equal, round, and reactive to light  Neurological:      Mental Status: She is alert        Deep Tendon Reflexes:      Reflex Scores:       Bicep reflexes are 2+ on the right side and 2+ on the left side  Brachioradialis reflexes are 2+ on the right side and 2+ on the left side  Achilles reflexes are 1+ on the right side and 1+ on the left side  Psychiatric:         Mood and Affect: Mood normal          Speech: Speech normal          Behavior: Behavior normal          Neurological Exam  Mental Status  Alert  Oriented to person, place, time and situation  Speech is normal  Language is fluent with no aphasia  Attention and concentration are normal     Cranial Nerves  CN II: Visual fields full to confrontation  CN III, IV, VI: Extraocular movements intact bilaterally  Pupils equal round and reactive to light bilaterally  CN V: Facial sensation is normal   CN VII: Full and symmetric facial movement  CN VIII: Hearing is normal   CN IX, X: Palate elevates symmetrically  CN XI: Shoulder shrug strength is normal   CN XII: Tongue midline without atrophy or fasciculations  Motor   Normal muscle tone  Strength is 5/5 in all four extremities except as noted  Unable to extend the left great toe, flexion well preserved   Sensory  Light touch is normal in upper and lower extremities  Vibration abnormality: Decreased at the level of the toes bilaterally  Reflexes                                            Right                      Left  Brachioradialis                    2+                         2+  Biceps                                 2+                         2+  Achilles                                1+                         1+    Coordination  Right: Finger-to-nose normal Left: Finger-to-nose normal     Gait    Antalgic gait   ROS:    Review of Systems   Constitutional: Negative for chills and fever  HENT: Negative for ear pain and sore throat  Eyes: Negative for pain and visual disturbance  Respiratory: Negative for cough and shortness of breath  Cardiovascular: Negative for chest pain and palpitations     Gastrointestinal: Negative for abdominal pain and vomiting  Genitourinary: Negative for dysuria and hematuria  Musculoskeletal: Positive for back pain  Negative for arthralgias  Skin: Negative for color change and rash  Neurological: Negative for seizures and syncope  Psychiatric/Behavioral: Positive for sleep disturbance  All other systems reviewed and are negative      I personally reviewed and updated the ROS as appropriate

## 2022-06-08 NOTE — PATIENT INSTRUCTIONS
Continue current medications  Continue exercises for the foot/toes  Follow up in 6 months or sooner if needed

## 2022-06-09 ENCOUNTER — OFFICE VISIT (OUTPATIENT)
Dept: PHYSICAL THERAPY | Facility: CLINIC | Age: 72
End: 2022-06-09
Payer: MEDICARE

## 2022-06-09 DIAGNOSIS — M25.562 ACUTE PAIN OF LEFT KNEE: Primary | ICD-10-CM

## 2022-06-09 DIAGNOSIS — Z91.030 BEE STING ALLERGY: ICD-10-CM

## 2022-06-09 PROCEDURE — 97110 THERAPEUTIC EXERCISES: CPT | Performed by: PHYSICAL THERAPIST

## 2022-06-09 PROCEDURE — 97140 MANUAL THERAPY 1/> REGIONS: CPT | Performed by: PHYSICAL THERAPIST

## 2022-06-09 RX ORDER — EPINEPHRINE 0.3 MG/.3ML
0.3 INJECTION SUBCUTANEOUS AS NEEDED
Qty: 2 EACH | Refills: 0 | Status: SHIPPED | OUTPATIENT
Start: 2022-06-09

## 2022-06-09 NOTE — PROGRESS NOTES
PT Discharge and Daily Note    Today's date: 2022  Patient name: Silvana Kerns  : 1950  MRN: [id number]  Referring provider: Janelle Jewell:   Encounter Diagnosis     ICD-10-CM    1  Acute pain of left knee  M25 562                    Assessment  Assessment details: Pt presents to PT s/p L TKA   She has done well and is pleased with her progress  She has functional motion and strength  She has good understanding of the importance of continuing with strength and motion work  She is appropriate for discharge at this time  Impairments: impaired balance  Functional limitations: balance struggles involve her foot pain  Symptom irritability: lowUnderstanding of Dx/Px/POC: good   Prognosis: good    Goals  ST-3 weeks - MET  Good quad contraction and independent SLR to allow progression to [facility]  Full knee ext to 100 deg flex actively  Independent with swelling control and basic HEP for mobility/quad    LT-8 weeks - progressing towards or MET  Knee ROM: 0-120 deg  Pain 3/10 or less with ADLs  Ambulate without AD normally and with minimal pain  SLS: 10 sec  or greater on surgical side  Knee ext and flex strength of 4/5 or greater  Independent with strengthening program  Complete stairs independently and safely  Complete typical house hold chores and community ambulation without AD and minimal pain    Plan  Plan details: discharged  Treatment plan discussed with: patient        Subjective Evaluation    History of Present Illness  Mechanism of injury: 71year old female presenting to PT s/p L TKA by Dr Hosea Stephens on 22  Saw surgeon earlier today who discharged her and she is done with PT today  She is pleased with her progress and her knee is feeling pretty good  She has some achiness but CC remains her foot  She has appointment with podiatrist later today     Pain  Current pain ratin    Social Support  Stairs in house: yes     Patient Goals  Patient goals for therapy: decreased pain, improved balance, increased strength, independence with ADLs/IADLs, return to sport/leisure activities and increased motion          Objective     General Comments:      Knee Comments  Gait  No AD, small limp but may be from foot pain    ROM  L 2-115      Strength  Knee ext: 4+/5  Knee flex:4+/5    Stairs: completes step-up nicely 8"             Precautions: L TKA 4/25          Manuals 6/2 6/6 6-9 5-23 5-25 5-31   Joint work               -hip               -knee               ST work               Flexibility/PROM passive flex JV PROM JV  Passive flex Passive Flex Passive flex         assessment   assessment                     Neuro Re-Ed              balance    SLS 10x5"  SLS 10"x10 held        tandem 10"/10 ea  tandem 10"/10 ea  tandem gait 2x10'  tandem 10x10" ea 10"x10 ea  Tandem 10"x10 ea                  Step-ups fwd           Step-ups downs                            lunge              Ther Ex              NuStep/bike Recumbent 10'  L4 10' bike 10' recumbant bike recumbant 10' ROM focus Recumbant full revs 5'  Recumbant full revs 5'   Step stretch            Quad sets            Heel slides            SLR program 2/5 2/10  held 2x5  2x5   Ankle pumps              gastroc stretch Wall stretch 5x 30" Wall/strap 30"x5 ea  wall 5x20", hamstring box stretch 4x20" Wall 5x30" Wall 30"x5 Wall 30"x5   TKE             Standing HR             bridges      x20        Leg press  P1 3/10 P1 3/10  P1 3x10  P1 2x10 P1 3x10  P1 3x10   Hamstring strength  P3 3/10 HCM P4 3/10  P4 3x10 gtb 2x15 HCM P3 3x10 HCM 3x10   saq 3x10 3x10   0# 4x10 0# 3x20   Wall sit   6x5"      laq 3x10 3x10 P1 3x10 ROSALEE 2x10 2# mod range 0# 3x10 0# 3x15   Ther Activity              Stair negotiation                            Gait Training                                            Modalities Ice 10'  ice 10'   CP 10' CP 10'  CP 10'

## 2022-06-09 NOTE — ASSESSMENT & PLAN NOTE
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

## 2022-06-14 ENCOUNTER — APPOINTMENT (OUTPATIENT)
Dept: LAB | Facility: MEDICAL CENTER | Age: 72
End: 2022-06-14
Payer: MEDICARE

## 2022-06-14 DIAGNOSIS — E55.9 VITAMIN D DEFICIENCY: ICD-10-CM

## 2022-06-14 DIAGNOSIS — I10 HYPERTENSION, UNSPECIFIED TYPE: ICD-10-CM

## 2022-06-14 DIAGNOSIS — E78.2 MIXED HYPERLIPIDEMIA: ICD-10-CM

## 2022-06-14 LAB
25(OH)D3 SERPL-MCNC: 32.2 NG/ML (ref 30–100)
ALBUMIN SERPL BCP-MCNC: 3.9 G/DL (ref 3.5–5)
ALP SERPL-CCNC: 92 U/L (ref 46–116)
ALT SERPL W P-5'-P-CCNC: 28 U/L (ref 12–78)
ANION GAP SERPL CALCULATED.3IONS-SCNC: 5 MMOL/L (ref 4–13)
AST SERPL W P-5'-P-CCNC: 22 U/L (ref 5–45)
BILIRUB SERPL-MCNC: 0.55 MG/DL (ref 0.2–1)
BUN SERPL-MCNC: 12 MG/DL (ref 5–25)
CALCIUM SERPL-MCNC: 9.8 MG/DL (ref 8.3–10.1)
CHLORIDE SERPL-SCNC: 108 MMOL/L (ref 100–108)
CHOLEST SERPL-MCNC: 254 MG/DL
CO2 SERPL-SCNC: 27 MMOL/L (ref 21–32)
CREAT SERPL-MCNC: 0.66 MG/DL (ref 0.6–1.3)
GFR SERPL CREATININE-BSD FRML MDRD: 89 ML/MIN/1.73SQ M
GLUCOSE P FAST SERPL-MCNC: 111 MG/DL (ref 65–99)
HDLC SERPL-MCNC: 52 MG/DL
LDLC SERPL CALC-MCNC: 167 MG/DL (ref 0–100)
NONHDLC SERPL-MCNC: 202 MG/DL
POTASSIUM SERPL-SCNC: 4.3 MMOL/L (ref 3.5–5.3)
PROT SERPL-MCNC: 7.5 G/DL (ref 6.4–8.2)
SODIUM SERPL-SCNC: 140 MMOL/L (ref 136–145)
TRIGL SERPL-MCNC: 175 MG/DL

## 2022-06-14 PROCEDURE — 80053 COMPREHEN METABOLIC PANEL: CPT

## 2022-06-14 PROCEDURE — 36415 COLL VENOUS BLD VENIPUNCTURE: CPT

## 2022-06-14 PROCEDURE — 82306 VITAMIN D 25 HYDROXY: CPT

## 2022-06-14 PROCEDURE — 80061 LIPID PANEL: CPT

## 2022-06-21 ENCOUNTER — OFFICE VISIT (OUTPATIENT)
Dept: FAMILY MEDICINE CLINIC | Facility: CLINIC | Age: 72
End: 2022-06-21
Payer: MEDICARE

## 2022-06-21 VITALS
WEIGHT: 186.2 LBS | SYSTOLIC BLOOD PRESSURE: 124 MMHG | BODY MASS INDEX: 34.27 KG/M2 | DIASTOLIC BLOOD PRESSURE: 78 MMHG | RESPIRATION RATE: 18 BRPM | HEART RATE: 76 BPM | HEIGHT: 62 IN

## 2022-06-21 DIAGNOSIS — E78.2 MIXED HYPERLIPIDEMIA: ICD-10-CM

## 2022-06-21 DIAGNOSIS — M62.830 BACK MUSCLE SPASM: ICD-10-CM

## 2022-06-21 DIAGNOSIS — Z96.652 STATUS POST TOTAL LEFT KNEE REPLACEMENT: Primary | ICD-10-CM

## 2022-06-21 DIAGNOSIS — I10 HYPERTENSION, UNSPECIFIED TYPE: ICD-10-CM

## 2022-06-21 DIAGNOSIS — K58.9 IRRITABLE BOWEL SYNDROME WITHOUT DIARRHEA: ICD-10-CM

## 2022-06-21 DIAGNOSIS — M54.16 LUMBAR RADICULOPATHY: ICD-10-CM

## 2022-06-21 DIAGNOSIS — M79.18 MYOFASCIAL PAIN SYNDROME: ICD-10-CM

## 2022-06-21 DIAGNOSIS — R05.9 COUGH: ICD-10-CM

## 2022-06-21 PROBLEM — R26.89 ANTALGIC GAIT: Status: RESOLVED | Noted: 2022-05-06 | Resolved: 2022-06-21

## 2022-06-21 PROBLEM — R26.89 ANTALGIC GAIT: Status: ACTIVE | Noted: 2022-05-06

## 2022-06-21 PROBLEM — M25.512 ACUTE PAIN OF LEFT SHOULDER: Status: RESOLVED | Noted: 2022-03-01 | Resolved: 2022-06-21
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
## 2022-06-21 PROBLEM — S99.921A INJURY OF RIGHT FOOT: Status: RESOLVED | Noted: 2021-10-11 | Resolved: 2022-06-21

## 2022-06-21 PROCEDURE — 99214 OFFICE O/P EST MOD 30 MIN: CPT | Performed by: INTERNAL MEDICINE

## 2022-06-21 RX ORDER — LOSARTAN POTASSIUM 100 MG/1
100 TABLET ORAL DAILY
Qty: 90 TABLET | Refills: 3 | Status: SHIPPED | OUTPATIENT
Start: 2022-06-21 | End: 2022-09-19

## 2022-06-21 RX ORDER — GABAPENTIN 300 MG/1
CAPSULE ORAL
Qty: 360 CAPSULE | Refills: 3 | Status: SHIPPED | OUTPATIENT
Start: 2022-06-21

## 2022-06-21 RX ORDER — METHOCARBAMOL 750 MG/1
750 TABLET, FILM COATED ORAL EVERY 8 HOURS SCHEDULED
Qty: 270 TABLET | Refills: 3 | Status: SHIPPED | OUTPATIENT
Start: 2022-06-21 | End: 2022-09-19

## 2022-06-21 RX ORDER — ROSUVASTATIN CALCIUM 10 MG/1
10 TABLET, COATED ORAL DAILY
Qty: 90 TABLET | Refills: 3 | Status: SHIPPED | OUTPATIENT
Start: 2022-06-21

## 2022-06-21 RX ORDER — DICYCLOMINE HYDROCHLORIDE 10 MG/1
10 CAPSULE ORAL 2 TIMES DAILY
Qty: 180 CAPSULE | Refills: 3 | Status: SHIPPED | OUTPATIENT
Start: 2022-06-21

## 2022-06-21 RX ORDER — MONTELUKAST SODIUM 10 MG/1
10 TABLET ORAL
Qty: 90 TABLET | Refills: 3 | Status: SHIPPED | OUTPATIENT
Start: 2022-06-21

## 2022-06-21 NOTE — PROGRESS NOTES
Depression Screening and Follow-up Plan: Patient was screened for depression during today's encounter  They screened negative with a PHQ-2 score of 0  Assessment/Plan:         Diagnoses and all orders for this visit:    Status post total left knee replacement  Continue home exercises     Myofascial pain syndrome  -     methocarbamol (ROBAXIN) 750 mg tablet; Take 1 tablet (750 mg total) by mouth every 8 (eight) hours  Use prn She is getting back in touch with pain mgmt for device check/tuning    Back muscle spasm  -     methocarbamol (ROBAXIN) 750 mg tablet; Take 1 tablet (750 mg total) by mouth every 8 (eight) hours    Lumbar radiculopathy  -     gabapentin (NEURONTIN) 300 mg capsule; Take 1 tablet in the morning, 1 tablet afternoon and 2 bedtime  Pt will get back to pain mgmt for stimulator settings postop    Cough  -     montelukast (SINGULAIR) 10 mg tablet; Take 1 tablet (10 mg total) by mouth daily at bedtime    Irritable bowel syndrome without diarrhea  -     dicyclomine (BENTYL) 10 mg capsule; Take 1 capsule (10 mg total) by mouth 2 (two) times a day    Hypertension, unspecified type  -     losartan (COZAAR) 100 MG tablet; Take 1 tablet (100 mg total) by mouth daily  No added salt diet Continue current rx    Mixed hyperlipidemia  -     rosuvastatin (CRESTOR) 10 MG tablet; Take 1 tablet (10 mg total) by mouth daily  Low fat diet Continue crestor         Rto 6 months/AWV      Patient ID: Aaliyah Garcia is a 71 y o  female      HPI  Patient doing fairly well postop left tkr She is back to her usual routine now and doing well She is doing some exercise but her left foot pain flared and unable to get injection for another month or so She is back on cholesterol rx now   No chest pain or sob She has some allergy sxs and there was a glitch at the pharmacy so she cannot get her singulair for another month since too early to fill She will get back in touch with office regarding her stimulator as since knee surgery needs settings updated    Review of Systems   Constitutional: Negative for chills and fever  HENT: Negative  Eyes: Negative for visual disturbance  Respiratory: Negative for cough and shortness of breath  Cardiovascular: Negative for chest pain, palpitations and leg swelling  Gastrointestinal: Negative for abdominal distention and abdominal pain  Genitourinary: Negative  Musculoskeletal: Positive for arthralgias and gait problem  Skin: Negative for wound  Healed incision left knee     Neurological: Negative for dizziness, light-headedness and headaches  Psychiatric/Behavioral: Negative  Negative for sleep disturbance  The patient is not nervous/anxious  Past Medical History:   Diagnosis Date    Abnormal findings on diagnostic imaging of breast     Abnormal Pap smear of cervix     Arthritis     Chronic pain disorder     Extremity pain     Fibrocystic breast disease     Foot pain     GERD (gastroesophageal reflux disease)     Hyperlipidemia     Hypertension     Interstitial cystitis     Joint pain     Low back pain     Osteoarthritis     Osteopenia     Uncomplicated opioid dependence ([address] ) 3/1/2022     Past Surgical History:   Procedure Laterality Date    APPENDECTOMY      BACK SURGERY      BREAST EXCISIONAL BIOPSY Left 1996    benign    CARPAL BOSS EXCISION      CHOLECYSTECTOMY      DIAGNOSTIC LAPAROSCOPY      FOOT SURGERY      FRACTURE SURGERY      HYSTERECTOMY      age 33    HYSTEROSCOPY      JOINT REPLACEMENT      KIDNEY SURGERY Left     KNEE ARTHROSCOPY Bilateral     LAMINECTOMY      LAPAROSCOPY      hynecologic with adhesions    MYOMECTOMY      OOPHORECTOMY Bilateral     age 33    ORTHOPEDIC SURGERY      AL SURG IMPLNT [address] N/A 5/18/2017    Procedure: PLACEMENT OF THORACIC SPINAL CORD STIMULATOR WITH LEFT BUTTOCK IMPLANTABLE PULSE GENERATOR (IMPULSE MONITORING-MOTORS (TCEMEP), EMG, SSEP);   Surgeon: Martin Ceballos MD; Location: [facility];  Service: Neurosurgery    SPINAL CORD STIMULATOR IMPLANT Left 9/29/2020    Procedure: LAMINECTOMY THORACIC , REMOVAL OF SCS LEADS AND GENERATOR;  Surgeon: Alex Goodwin MD;  Location:  MAIN OR;  Service: Neurosurgery    SPINAL STIMULATOR PLACEMENT  05/2017    TONSILLECTOMY AND ADENOIDECTOMY      onset: unknown    TOTAL KNEE ARTHROPLASTY Right      Social History     Socioeconomic History    Marital status: Single     Spouse name: Not on file    Number of children: Not on file    Years of education: Not on file    Highest education level: Not on file   Occupational History    Occupation: Retired/ working part-time    Tobacco Use    Smoking status: Never Smoker    Smokeless tobacco: Never Used   Vaping Use    Vaping Use: Never used   Substance and Sexual Activity    Alcohol use:  Yes     Alcohol/week: 0 0 standard drinks     Comment: Drink socially, not too often    Drug use: No    Sexual activity: Not Currently     Partners: Male     Birth control/protection: None   Other Topics Concern    Not on file   Social History Narrative    No caffeine use    Always uses sunscreen    Always uses a seatbelt     Social Determinants of Health     Financial Resource Strain: Not on file   Food Insecurity: Not on file   Transportation Needs: Not on file   Physical Activity: Not on file   Stress: Not on file   Social Connections: Not on file   Intimate Partner Violence: Not on file   Housing Stability: Not on file     Allergies   Allergen Reactions    Bee Venom Shortness Of Breath    Chlorhexidine Rash    Egg Yolk - Food Allergy Hives    Iodinated Diagnostic Agents Hives    Penicillins Hives    Lidocaine Other (See Comments)     cream    Allevyn Adhesive [Wound Dressings] Hives and Rash    Bactrim [Sulfamethoxazole-Trimethoprim] Rash    Codeine Other (See Comments)     headaches    Latex Rash    Medical Tape Blisters, Hives, Itching and Rash    Other Rash     Adhesive tape  Oxybutynin Rash    Pentosan Polysulfate Rash    Povidone Iodine Rash           /78   Pulse 76   Resp 18   Ht 5' 2" (1 575 m)   Wt 84 5 kg (186 lb 3 2 oz)   LMP  (LMP Unknown)   BMI 34 06 kg/m²          Physical Exam  Vitals reviewed  Constitutional:       General: She is not in acute distress  Appearance: Normal appearance  She is not ill-appearing, toxic-appearing or diaphoretic  HENT:      Head: Normocephalic and atraumatic  Right Ear: External ear normal       Left Ear: External ear normal       Nose: Nose normal       Mouth/Throat:      Mouth: Mucous membranes are dry  Eyes:      General: No scleral icterus  Extraocular Movements: Extraocular movements intact  Conjunctiva/sclera: Conjunctivae normal       Pupils: Pupils are equal, round, and reactive to light  Cardiovascular:      Rate and Rhythm: Normal rate and regular rhythm  Pulses: Normal pulses  Heart sounds: Normal heart sounds  Pulmonary:      Effort: Pulmonary effort is normal       Breath sounds: Normal breath sounds  Abdominal:      General: Bowel sounds are normal  There is no distension  Palpations: Abdomen is soft  Tenderness: There is no abdominal tenderness  Musculoskeletal:      Cervical back: Normal range of motion and neck supple  No rigidity  Right lower leg: No edema  Left lower leg: No edema  Lymphadenopathy:      Cervical: No cervical adenopathy  Skin:     General: Skin is dry  Coloration: Skin is not jaundiced or pale  Neurological:      General: No focal deficit present  Mental Status: She is alert and oriented to person, place, and time  Mental status is at baseline  Cranial Nerves: No cranial nerve deficit  Sensory: No sensory deficit  Psychiatric:         Mood and Affect: Mood normal          Behavior: Behavior normal          Thought Content:  Thought content normal          Judgment: Judgment normal

## 2022-09-02 ENCOUNTER — TELEPHONE (OUTPATIENT)
Dept: FAMILY MEDICINE CLINIC | Facility: CLINIC | Age: 72
End: 2022-09-02

## 2022-09-02 DIAGNOSIS — N64.4 BREAST PAIN, LEFT: Primary | ICD-10-CM

## 2022-09-02 NOTE — TELEPHONE ENCOUNTER
Diagnostic left breast mammogram ordered to assess symptom  They do those on Thursdays at [facility] so not sure when appt would be as radiologist has to be there for that type of test

## 2022-09-02 NOTE — TELEPHONE ENCOUNTER
Called Pt with 's message she would also like order for Bilateral screening & the Dx on Left - She said she is due

## 2022-09-02 NOTE — TELEPHONE ENCOUNTER
Pt called stating pain L breast was wondering if we can place an order for rob gram sooner and cant get in to gyno till November

## 2022-09-02 NOTE — TELEPHONE ENCOUNTER
They will cxover diagnostic She can only get bilateral when it is due so based on dx that will be determined this is done due to her pain

## 2022-09-15 DIAGNOSIS — K58.9 IRRITABLE BOWEL SYNDROME WITHOUT DIARRHEA: ICD-10-CM

## 2022-09-15 RX ORDER — DICYCLOMINE HYDROCHLORIDE 10 MG/1
10 CAPSULE ORAL 2 TIMES DAILY
Qty: 180 CAPSULE | Refills: 3 | Status: SHIPPED | OUTPATIENT
Start: 2022-09-15

## 2022-10-06 ENCOUNTER — HOSPITAL ENCOUNTER (OUTPATIENT)
Dept: ULTRASOUND IMAGING | Facility: HOSPITAL | Age: 72
Discharge: HOME | End: 2022-10-06
Payer: MEDICARE

## 2022-10-06 ENCOUNTER — HOSPITAL ENCOUNTER (OUTPATIENT)
Dept: MAMMOGRAPHY | Facility: HOSPITAL | Age: 72
Discharge: HOME | End: 2022-10-06
Attending: INTERNAL MEDICINE
Payer: MEDICARE

## 2022-10-06 VITALS — WEIGHT: 186.29 LBS | BODY MASS INDEX: 34.28 KG/M2 | HEIGHT: 62 IN

## 2022-10-06 DIAGNOSIS — N64.4 BREAST PAIN, LEFT: ICD-10-CM

## 2022-10-06 PROCEDURE — 77066 DX MAMMO INCL CAD BI: CPT

## 2022-10-06 PROCEDURE — 76642 ULTRASOUND BREAST LIMITED: CPT

## 2022-10-06 PROCEDURE — G0279 TOMOSYNTHESIS, MAMMO: HCPCS

## 2022-10-25 ENCOUNTER — TELEPHONE (OUTPATIENT)
Dept: FAMILY MEDICINE CLINIC | Facility: CLINIC | Age: 72
End: 2022-10-25

## 2022-10-25 DIAGNOSIS — M79.605 LEFT LEG PAIN: Primary | ICD-10-CM

## 2022-10-25 NOTE — TELEPHONE ENCOUNTER
Pt made aware and has Gyn appt in Nov  Pain is between calf and ankle area but would like to have doppler done if you can place order I did give central scheduling phone number to her

## 2022-10-25 NOTE — TELEPHONE ENCOUNTER
If ongoing breast pain would setup GYN followup to evaluate further  If she is having pain in calf area needs to have doppler setup of lower leg - check symptoms and would order urgent doppler to get done sooner

## 2022-10-25 NOTE — TELEPHONE ENCOUNTER
Pt is having L breast pain had mammogram and US done both were normal but still having some issues  L leg seems to be having some swelling and pain that comes and goes  Takes tylenol and using Voltaren gel to help with swelling and pain helps ease the pain a little  Pt wondering when she can get in to get an appt

## 2022-10-31 ENCOUNTER — HOSPITAL ENCOUNTER (OUTPATIENT)
Dept: NON INVASIVE DIAGNOSTICS | Facility: CLINIC | Age: 72
Discharge: HOME | End: 2022-10-31
Attending: INTERNAL MEDICINE

## 2022-10-31 DIAGNOSIS — M79.605 LEFT LEG PAIN: ICD-10-CM

## 2022-11-01 DIAGNOSIS — K21.9 GASTROESOPHAGEAL REFLUX DISEASE: ICD-10-CM

## 2022-11-01 RX ORDER — OMEPRAZOLE 40 MG/1
40 CAPSULE, DELAYED RELEASE ORAL DAILY
Qty: 90 CAPSULE | Refills: 3 | Status: SHIPPED | OUTPATIENT
Start: 2022-11-01

## 2022-11-22 ENCOUNTER — TELEPHONE (OUTPATIENT)
Dept: NEUROLOGY | Facility: CLINIC | Age: 72
End: 2022-11-22

## 2022-11-29 ENCOUNTER — ANNUAL EXAM (OUTPATIENT)
Dept: OBGYN CLINIC | Facility: CLINIC | Age: 72
End: 2022-11-29

## 2022-11-29 VITALS
HEIGHT: 62 IN | SYSTOLIC BLOOD PRESSURE: 122 MMHG | DIASTOLIC BLOOD PRESSURE: 74 MMHG | WEIGHT: 185.8 LBS | BODY MASS INDEX: 34.19 KG/M2

## 2022-11-29 DIAGNOSIS — Z78.0 ASYMPTOMATIC MENOPAUSE: ICD-10-CM

## 2022-11-29 DIAGNOSIS — Z85.42 HISTORY OF UTERINE CANCER: ICD-10-CM

## 2022-11-29 DIAGNOSIS — Z01.419 WELL WOMAN EXAM WITH ROUTINE GYNECOLOGICAL EXAM: Primary | ICD-10-CM

## 2022-11-29 DIAGNOSIS — N64.4 MASTALGIA: ICD-10-CM

## 2022-11-29 DIAGNOSIS — Z12.31 BREAST CANCER SCREENING BY MAMMOGRAM: ICD-10-CM

## 2022-11-29 DIAGNOSIS — Z13.820 SCREENING FOR OSTEOPOROSIS: ICD-10-CM

## 2022-11-29 DIAGNOSIS — Z12.11 COLON CANCER SCREENING: ICD-10-CM

## 2022-11-29 NOTE — PROGRESS NOTES
Assessment   71 y o  postmenopausal female presenting for annual exam      Plan   Diagnoses and all orders for this visit:    Well woman exam with routine gynecological exam  -     Liquid-based pap, screening  - Mammo order printed  - Colonoscopy up to date  - Return in 1yr for yearly    History of uterine cancer  - Yearly surveillance indicated    Breast cancer screening by mammogram  - Mammo printed    Colon cancer screening   - Up to date    Screening for osteoporosis  -     DXA bone density spine hip and pelvis; Future    Mastalgia  -     Benign diagnostic testing  - Evening Primrose Oil 1000 MG CAPS; Take 1 capsule (1,000 mg total) by mouth in the morning  - Call office if unrelieved      __________________________________________________________________      Subjective     71 y o   postmenopausal female who is s/p hysterectomy with BSO (32yo, endometriosis with concern for cancer on the outside wall of uterus; benign pathology after surgery by patient report) presenting for annual exam      She has been dealing with breast pain lately  Left side, lateral surface  involves breast itself, not limited to chest wall  Aching in nature  Exacerbated by lifting and laying on it  Hx lumpectomy in this area       Also psb a bump on labia  Comes and goes, sometimes tender  GYN  Complaints: as above  Denies genital discharge, genital ulcers, pelvic pain and vulvar/vaginal symptoms  Menopause occurred at age 29  She has had no bleeding since this time  Menopausal symptoms: rare hot flash, did HRT but had hair loss  Sexually active: No  Hx STI: denies   Hx Abnormal pap: denies  Last pap:  - nilm  Discussed risk/benefit of pap collection, incl limitations for surveillance of an extrauterine ca   Feels more comfortable having pap      OB          Complaints: denies  Denies urinary frequency, hematuria, urinary incontinence and dysuria     BREAST  Complaints: as above  Denies: breast lump, changed mole, dryness, nipple discharge, pruritus, rash, skin color change and skin lesion(s)  Last mammogram: 2021 - birads2  Personal hx: breast bx  Family hx: denies fhx of uterine, ovarian cancers  Paternal grandmother and maternal aunt with breast ca  Brother with colon ca  Patient does do regular self-exams     GENERAL  PMH reviewed/updated and is as below  Patient does follow with a PCP  Works part time at her [employer]  Denies domestic violence         SCREENING  Cervical Ca: pap collected  Breast Ca: mammo ordered  Colon Ca: 2022 - colonoscopy   Metabolic: 9212 - DEXA - osteopenia      Past Medical History:   Diagnosis Date   • Abnormal findings on diagnostic imaging of breast    • Abnormal Pap smear of cervix    • Arthritis    • Chronic pain disorder    • Extremity pain    • Fibrocystic breast disease    • Foot pain    • GERD (gastroesophageal reflux disease)    • Hyperlipidemia    • Hypertension    • Interstitial cystitis    • Joint pain    • Low back pain    • Osteoarthritis    • Osteopenia    • Uncomplicated opioid dependence ([facility] [address] ) 3/1/2022       Past Surgical History:   Procedure Laterality Date   • APPENDECTOMY     • BACK SURGERY     • BREAST EXCISIONAL BIOPSY Left 1996    benign   • CARPAL BOSS EXCISION     • CHOLECYSTECTOMY     • DIAGNOSTIC LAPAROSCOPY     • FOOT SURGERY     • FRACTURE SURGERY     • HYSTERECTOMY      age 33   • HYSTEROSCOPY     • JOINT REPLACEMENT     • KIDNEY SURGERY Left    • KNEE ARTHROSCOPY Bilateral    • LAMINECTOMY     • LAPAROSCOPY      hynecologic with adhesions   • MYOMECTOMY     • OOPHORECTOMY Bilateral     age 33   • ORTHOPEDIC SURGERY     • NC SURG IMPLNT NEUROELECT,EPIDURAL N/A 5/18/2017    Procedure: PLACEMENT OF THORACIC SPINAL CORD STIMULATOR WITH LEFT BUTTOCK IMPLANTABLE PULSE GENERATOR (IMPULSE MONITORING-MOTORS (TCEMEP), EMG, SSEP);   Surgeon: Maritn Ceballos MD;  [facility];  Service: Neurosurgery   • SPINAL CORD STIMULATOR IMPLANT Left 9/29/2020    Procedure: LAMINECTOMY THORACIC , REMOVAL OF SCS LEADS AND GENERATOR;  Surgeon: Moe Carmichael MD;  Location: [facility];  Service: Neurosurgery   • SPINAL STIMULATOR PLACEMENT  05/2017   • TONSILLECTOMY AND ADENOIDECTOMY      onset: unknown   • TOTAL KNEE ARTHROPLASTY Right          Current Outpatient Medications:   •  amitriptyline (ELAVIL) 50 mg tablet, Take 1 tablet (50 mg total) by mouth daily at bedtime, Disp: 90 tablet, Rfl: 3  •  aspirin (ECOTRIN) 325 mg EC tablet, Take 1 tablet (325 mg total) by mouth 2 (two) times a day, Disp: 60 tablet, Rfl: 0  •  Biotin 2500 MCG CAPS, Take 1 capsule by mouth 2 (two) times a day , Disp: , Rfl:   •  calcium carbonate (OS-ANTHONY) 600 MG tablet, Take 1,200 mg by mouth 2 (two) times a day with meals, Disp: , Rfl:   •  Cholecalciferol (VITAMIN D-3 PO), Take 1 tablet by mouth daily, Disp: , Rfl:   •  clobetasol (TEMOVATE) 0 05 % cream, Apply topically 2 (two) times a day, Disp: 30 g, Rfl: 5  •  Cyanocobalamin (VITAMIN B-12 CR PO), Take 1 tablet by mouth daily, Disp: , Rfl:   •  Diclofenac Sodium (VOLTAREN) 1 %, Apply 2 g topically 4 (four) times a day, Disp: 150 g, Rfl: 1  •  dicyclomine (BENTYL) 10 mg capsule, Take 1 capsule (10 mg total) by mouth 2 (two) times a day, Disp: 180 capsule, Rfl: 3  •  EPINEPHrine (EPIPEN) 0 3 mg/0 3 mL SOAJ, Inject 0 3 mL (0 3 mg total) into a muscle as needed for anaphylaxis, Disp: 2 each, Rfl: 0  •  Evening Primrose Oil 1000 MG CAPS, Take 1 capsule (1,000 mg total) by mouth in the morning, Disp: , Rfl: 0  •  fexofenadine (ALLEGRA) 180 MG tablet, Take 180 mg by mouth daily, Disp: , Rfl:   •  gabapentin (NEURONTIN) 300 mg capsule, Take 1 tablet in the morning, 1 tablet afternoon and 2 bedtime, Disp: 360 capsule, Rfl: 3  •  hydrOXYzine HCL (ATARAX) 10 mg tablet, Take 2 tablets (20 mg total) by mouth daily at bedtime Take 2 tablets at bedtime, Disp: 180 tablet, Rfl: 1  •  losartan (COZAAR) 100 MG tablet, Take 1 tablet (100 mg total) by mouth daily, Disp: 90 tablet, Rfl: 3  •  methocarbamol (ROBAXIN) 750 mg tablet, Take 1 tablet (750 mg total) by mouth every 8 (eight) hours, Disp: 270 tablet, Rfl: 3  •  montelukast (SINGULAIR) 10 mg tablet, Take 1 tablet (10 mg total) by mouth daily at bedtime, Disp: 90 tablet, Rfl: 3  •  Multiple Vitamins-Minerals (PRESERVISION AREDS 2 PO), Take by mouth in the morning, Disp: , Rfl:   •  Omega-3 Fatty Acids (FISH OIL) 1,000 mg, Take 1,000 mg by mouth 3 (three) times a day, Disp: , Rfl:   •  omeprazole (PriLOSEC) 40 MG capsule, Take 1 capsule (40 mg total) by mouth daily, Disp: 90 capsule, Rfl: 3  •  rosuvastatin (CRESTOR) 10 MG tablet, Take 1 tablet (10 mg total) by mouth daily, Disp: 90 tablet, Rfl: 3  •  simvastatin (ZOCOR) 10 mg tablet, Take 1 tablet (10 mg total) by mouth daily at bedtime, Disp: 90 tablet, Rfl: 2    Allergies   Allergen Reactions   • Bee Venom Shortness Of Breath   • Chlorhexidine Rash   • Egg Yolk - Food Allergy Hives   • Iodinated Diagnostic Agents Hives   • Penicillins Hives   • Lidocaine Other (See Comments)     cream   • Allevyn Adhesive [Wound Dressings] Hives and Rash   • Bactrim [Sulfamethoxazole-Trimethoprim] Rash   • Codeine Other (See Comments)     headaches   • Latex Rash   • Medical Tape Blisters, Hives, Itching and Rash   • Other Rash     Adhesive tape   • Oxybutynin Rash   • Pentosan Polysulfate Rash   • Povidone Iodine Rash       Social History     Tobacco Use   • Smoking status: Never   • Smokeless tobacco: Never   Vaping Use   • Vaping Use: Never used   Substance Use Topics   • Alcohol use: Yes     Comment: Drink socially, not too often   • Drug use: No           Objective  /74   Ht 5' 2" (1 575 m)   Wt 84 3 kg (185 lb 12 8 oz)   LMP  (LMP Unknown)   BMI 33 98 kg/m²      Physical Exam:  Physical Exam  Exam conducted with a chaperone present  Constitutional:       General: She is not in acute distress  Appearance: Normal appearance  She is well-developed   She is not ill-appearing, toxic-appearing or diaphoretic  HENT:      Head: Normocephalic and atraumatic  Eyes:      General: No scleral icterus  Right eye: No discharge  Left eye: No discharge  Conjunctiva/sclera: Conjunctivae normal    Cardiovascular:      Rate and Rhythm: Normal rate  Pulmonary:      Effort: Pulmonary effort is normal  No accessory muscle usage or respiratory distress  Chest:   Breasts:     Breasts are symmetrical       Right: No inverted nipple, mass, nipple discharge, skin change or tenderness  Left: Tenderness present  No inverted nipple, mass, nipple discharge or skin change (faint scar s/p prior lumpectomy  upper inner quadrant with underlying deficit of tissue and no significant fibrosis)  Abdominal:      General: There is no distension  Palpations: Abdomen is soft  There is no mass  Tenderness: There is no abdominal tenderness  There is no guarding or rebound  Genitourinary:     General: Normal vulva  Exam position: Lithotomy position  Labia:         Right: No rash, tenderness or lesion  Left: Lesion (sub-millimeter vulvar skin cyst, middle of left labia majora ) present  No rash or tenderness  Urethra: No prolapse, urethral swelling or urethral lesion  Vagina: No signs of injury  No vaginal discharge, erythema (thin, pale epithelia with loss of rugae), tenderness, bleeding or lesions  Cervix: No cervical motion tenderness (surgically absent cervix  intact, normal appearing vaginal cuff)  Uterus: Absent  Adnexa:         Right: No mass, tenderness or fullness  Left: No mass, tenderness or fullness  Rectum: No external hemorrhoid  Normal anal tone  Lymphadenopathy:      Upper Body:      Right upper body: No axillary or pectoral adenopathy  Left upper body: No axillary or pectoral adenopathy  Skin:     General: Skin is warm and dry  Findings: No erythema or rash     Neurological:      Mental Status: She is alert  Psychiatric:         Mood and Affect: Mood normal          Behavior: Behavior normal          Thought Content:  Thought content normal          Judgment: Judgment normal

## 2022-12-01 LAB
HPV HR 12 DNA CVX QL NAA+PROBE: NEGATIVE
HPV16 DNA CVX QL NAA+PROBE: NEGATIVE
HPV18 DNA CVX QL NAA+PROBE: NEGATIVE

## 2022-12-02 DIAGNOSIS — M17.12 PRIMARY OSTEOARTHRITIS OF LEFT KNEE: ICD-10-CM

## 2022-12-05 LAB
LAB AP GYN PRIMARY INTERPRETATION: NORMAL
Lab: NORMAL

## 2022-12-08 ENCOUNTER — RA CDI HCC (OUTPATIENT)
Dept: OTHER | Facility: HOSPITAL | Age: 72
End: 2022-12-08

## 2022-12-08 ENCOUNTER — OFFICE VISIT (OUTPATIENT)
Dept: NEUROLOGY | Facility: CLINIC | Age: 72
End: 2022-12-08

## 2022-12-08 VITALS
TEMPERATURE: 96.8 F | DIASTOLIC BLOOD PRESSURE: 65 MMHG | WEIGHT: 185 LBS | SYSTOLIC BLOOD PRESSURE: 142 MMHG | HEART RATE: 75 BPM | BODY MASS INDEX: 34.04 KG/M2 | RESPIRATION RATE: 18 BRPM | HEIGHT: 62 IN

## 2022-12-08 DIAGNOSIS — M54.16 LUMBAR RADICULOPATHY: ICD-10-CM

## 2022-12-08 DIAGNOSIS — G60.9 IDIOPATHIC PERIPHERAL NEUROPATHY: Primary | ICD-10-CM

## 2022-12-08 NOTE — PROGRESS NOTES
Patient ID: Billy Chappell is a 71 y o  female  Assessment/Plan:    Peripheral neuropathy  Neuropathy is generally well controlled on gabapentin 300/300/600 and amitriptyline 50mg HS  She had L TKR on 4/25/22 and has had some more edema in the LLE, which is causing her neuropathy pain to flare up  She has been seeing PCP due to LE edema, had a doppler which was negative for DVT, advised elevation and compression stockings  She is not using compression stockings  I encouraged her to do so  She has ongoing left great toe extension weakness  She had an incidence of this last year after fracturing her left fibula  Likely compromise of the left deep fibular nerve innervating the EHL  She went to PT for this, and it recovered, however it has been weak again after knee surgery  She is doing the home exercises she learned for this in PT last year, and I encouraged her to continue  Nerve was likely irritated again due to surgery on that leg in April  Unknown how much recovery she will get, will monitor  Neuro exam is stable today  Continue current doses of gabapentin and amitriptyline  Fall precautions discussed  Lumbar radiculopathy  Stable, has followed with pain management, s/p SCS    Patient will follow up in 6 months or sooner if needed  Diagnoses and all orders for this visit:    Idiopathic peripheral neuropathy    Lumbar radiculopathy           Subjective:    HPI    Patient is a 71year old female who presents for follow up regarding neuropathy, last seen in June 2022  Patient initially presented in 2015 with c/o diffuse paresthesias  Workup included EMG which showed carpal tunnel, lab work which was unrevealing  She also underwent MRI of the brain which was essentially normal  Fortunately her symptoms have been controlled with combo of gabapentin and Elavil   She was having issues with LBP, MRI thoracic spine was essentially normal, MRI Lumbar demonstrated mild to moderate bony and discogenic degenerative changes seen from L2-L3 through L4-L5, with mild spondylolisthesis of L3 on L4  No evidence of central spinal stenosis  No nerve root compression seen  Mild bony degenerative changes are also seen at T10-T11  Patient was seeing pain management and spinal cord stimulator placed in May 2017  This was very helpful for her  She has followed with pain management for her back pain  She had left TKR on 4/25/22  At follow up in June she reported pain in her left foot and ankle following her knee surgery  She also reported having trouble with left great toe extension following this surgery  She had this issue a year prior, went to PT, and it recovered  It was felt to be due to nerve injury from a broken ankle  Today, she continues to c/o neuropathy symptoms in the feet  She reports more swelling in her lower legs and ankles and neuropathy pain is worse with increased swelling  Swelling is worse in the left leg, feels this is due to prior ankle fracture and more recent TKR  She does try and elevate  She does not use compression stockings  Her PCP ordered a doppler due to LE swelling, and there was no evidence of DVT  Her L great toe extension is still weak  She is doing her home exercises for this, which she learned in PT previously  She remains on gabapentin 300/300/600, and amitriptyline 50mg HS  The following portions of the patient's history were reviewed and updated as appropriate: current medications, past family history, past medical history, past social history, past surgical history and problem list          Objective:    Blood pressure 142/65, pulse 75, temperature (!) 96 8 °F (36 °C), temperature source Tympanic, resp  rate 18, height 5' 2" (1 575 m), weight 83 9 kg (185 lb)  Physical Exam  Constitutional:       Appearance: Normal appearance  HENT:      Head: Normocephalic and atraumatic     Eyes:      Extraocular Movements: EOM normal       Pupils: Pupils are equal, round, and reactive to light  Musculoskeletal:      Right lower leg: Edema present  Left lower leg: Edema present  Neurological:      Mental Status: She is alert  Deep Tendon Reflexes:      Reflex Scores:       Bicep reflexes are 2+ on the right side and 2+ on the left side  Brachioradialis reflexes are 2+ on the right side and 2+ on the left side  Achilles reflexes are 1+ on the right side and 1+ on the left side  Psychiatric:         Mood and Affect: Mood normal          Speech: Speech normal          Behavior: Behavior normal          Neurological Exam  Mental Status  Alert  Oriented to person, place, time and situation  Recent and remote memory are intact  Speech is normal  Language is fluent with no aphasia  Attention and concentration are normal     Cranial Nerves  CN II: Visual fields full to confrontation  CN III, IV, VI: Extraocular movements intact bilaterally  Pupils equal round and reactive to light bilaterally  CN V: Facial sensation is normal   CN VII: Full and symmetric facial movement  CN VIII: Hearing is normal   CN IX, X: Palate elevates symmetrically  CN XI: Shoulder shrug strength is normal   CN XII: Tongue midline without atrophy or fasciculations  Motor   Normal muscle tone  Strength is 5/5 in all four extremities except as noted  Unable to extend the left great toe, flexion well preserved  Sensory  Light touch is normal in upper and lower extremities  Vibration abnormality: Decreased at the level of the toes bilaterally   Reflexes                                            Right                      Left  Brachioradialis                    2+                         2+  Biceps                                 2+                         2+  Achilles                                1+                         1+    Coordination  Right: Finger-to-nose normal Left: Finger-to-nose normal     Gait    Wide based           ROS:    Review of Systems Constitutional: Negative for chills and fever  HENT: Negative for ear pain and sore throat  Eyes: Negative for pain and visual disturbance  Respiratory: Negative for cough and shortness of breath  Cardiovascular: Negative for chest pain and palpitations  Gastrointestinal: Negative for abdominal pain and vomiting  Genitourinary: Negative for dysuria and hematuria  Musculoskeletal: Negative for arthralgias and back pain  Skin: Negative for color change and rash  Neurological: Positive for headaches  Negative for seizures and syncope  Psychiatric/Behavioral: Positive for sleep disturbance  All other systems reviewed and are negative      I personally reviewed and updated the ROS as appropriate

## 2022-12-08 NOTE — PROGRESS NOTES
[address] STEFANIE ARACELY [facility] Neurology DOS 9/9/21    G62 9 documented/billed on DOS

## 2022-12-08 NOTE — PATIENT INSTRUCTIONS
Continue gabapentin at current dose  Can add an extra 300mg if needed for increase nerve pain  Would consider compression socks ([employer]) to help with the swelling    Elevate your feet at night  Would continue the exercises for your foot/toe  Follow up in 6 months or sooner if needed

## 2022-12-12 NOTE — ASSESSMENT & PLAN NOTE
Neuropathy is generally well controlled on gabapentin 300/300/600 and amitriptyline 50mg HS  She had L TKR on 4/25/22 and has had some more edema in the LLE, which is causing her neuropathy pain to flare up  She has been seeing PCP due to LE edema, had a doppler which was negative for DVT, advised elevation and compression stockings  She is not using compression stockings  I encouraged her to do so  She has ongoing left great toe extension weakness  She had an incidence of this last year after fracturing her left fibula  Likely compromise of the left deep fibular nerve innervating the EHL  She went to PT for this, and it recovered, however it has been weak again after knee surgery  She is doing the home exercises she learned for this in PT last year, and I encouraged her to continue  Nerve was likely irritated again due to surgery on that leg in April  Unknown how much recovery she will get, will monitor  Neuro exam is stable today  Continue current doses of gabapentin and amitriptyline  Fall precautions discussed

## 2022-12-14 ENCOUNTER — TELEPHONE (OUTPATIENT)
Dept: NEUROLOGY | Facility: CLINIC | Age: 72
End: 2022-12-14

## 2022-12-14 NOTE — TELEPHONE ENCOUNTER
Call the patient and rescheduled appointment with Bimal Man for appointment with  Ethel Schneider at [facility] for same date 06/08/2023 but was'  t able to offer the same time  Explain to patient that she can call and reschedule if that doesn't work  Appointment card and direction sent by mail

## 2022-12-17 ENCOUNTER — HOSPITAL ENCOUNTER (EMERGENCY)
Facility: HOSPITAL | Age: 72
Discharge: HOME | End: 2022-12-17
Attending: EMERGENCY MEDICINE

## 2022-12-17 ENCOUNTER — APPOINTMENT (EMERGENCY)
Dept: RADIOLOGY | Facility: HOSPITAL | Age: 72
End: 2022-12-17

## 2022-12-17 VITALS
RESPIRATION RATE: 20 BRPM | HEART RATE: 66 BPM | SYSTOLIC BLOOD PRESSURE: 138 MMHG | DIASTOLIC BLOOD PRESSURE: 64 MMHG | OXYGEN SATURATION: 95 % | TEMPERATURE: 97.2 F

## 2022-12-17 DIAGNOSIS — I10 HYPERTENSION: Primary | ICD-10-CM

## 2022-12-17 DIAGNOSIS — G89.29 CHRONIC LEFT SHOULDER PAIN: ICD-10-CM

## 2022-12-17 DIAGNOSIS — M25.512 CHRONIC LEFT SHOULDER PAIN: ICD-10-CM

## 2022-12-17 LAB
ALBUMIN SERPL BCP-MCNC: 3.4 G/DL (ref 3.5–5)
ALP SERPL-CCNC: 74 U/L (ref 46–116)
ALT SERPL W P-5'-P-CCNC: 24 U/L (ref 12–78)
ANION GAP SERPL CALCULATED.3IONS-SCNC: 9 MMOL/L (ref 4–13)
AST SERPL W P-5'-P-CCNC: 19 U/L (ref 5–45)
ATRIAL RATE: 86 BPM
BASOPHILS # BLD AUTO: 0.08 THOUSANDS/ÂΜL (ref 0–0.1)
BASOPHILS NFR BLD AUTO: 1 % (ref 0–1)
BILIRUB SERPL-MCNC: 0.39 MG/DL (ref 0.2–1)
BUN SERPL-MCNC: 14 MG/DL (ref 5–25)
CALCIUM ALBUM COR SERPL-MCNC: 9.1 MG/DL (ref 8.3–10.1)
CALCIUM SERPL-MCNC: 8.6 MG/DL (ref 8.3–10.1)
CARDIAC TROPONIN I PNL SERPL HS: <2 NG/L
CHLORIDE SERPL-SCNC: 106 MMOL/L (ref 96–108)
CO2 SERPL-SCNC: 26 MMOL/L (ref 21–32)
CREAT SERPL-MCNC: 0.63 MG/DL (ref 0.6–1.3)
EOSINOPHIL # BLD AUTO: 0.28 THOUSAND/ÂΜL (ref 0–0.61)
EOSINOPHIL NFR BLD AUTO: 4 % (ref 0–6)
ERYTHROCYTE [DISTWIDTH] IN BLOOD BY AUTOMATED COUNT: 12.1 % (ref 11.6–15.1)
GFR SERPL CREATININE-BSD FRML MDRD: 90 ML/MIN/1.73SQ M
GLUCOSE SERPL-MCNC: 113 MG/DL (ref 65–140)
HCT VFR BLD AUTO: 40.8 % (ref 34.8–46.1)
HGB BLD-MCNC: 13.6 G/DL (ref 11.5–15.4)
IMM GRANULOCYTES # BLD AUTO: 0.01 THOUSAND/UL (ref 0–0.2)
IMM GRANULOCYTES NFR BLD AUTO: 0 % (ref 0–2)
LIPASE SERPL-CCNC: 138 U/L (ref 73–393)
LYMPHOCYTES # BLD AUTO: 2.43 THOUSANDS/ÂΜL (ref 0.6–4.47)
LYMPHOCYTES NFR BLD AUTO: 32 % (ref 14–44)
MCH RBC QN AUTO: 29.6 PG (ref 26.8–34.3)
MCHC RBC AUTO-ENTMCNC: 33.3 G/DL (ref 31.4–37.4)
MCV RBC AUTO: 89 FL (ref 82–98)
MONOCYTES # BLD AUTO: 0.55 THOUSAND/ÂΜL (ref 0.17–1.22)
MONOCYTES NFR BLD AUTO: 7 % (ref 4–12)
NEUTROPHILS # BLD AUTO: 4.14 THOUSANDS/ÂΜL (ref 1.85–7.62)
NEUTS SEG NFR BLD AUTO: 56 % (ref 43–75)
NRBC BLD AUTO-RTO: 0 /100 WBCS
P AXIS: 61 DEGREES
PLATELET # BLD AUTO: 227 THOUSANDS/UL (ref 149–390)
PMV BLD AUTO: 8.8 FL (ref 8.9–12.7)
POTASSIUM SERPL-SCNC: 3.7 MMOL/L (ref 3.5–5.3)
PR INTERVAL: 150 MS
PROT SERPL-MCNC: 6.7 G/DL (ref 6.4–8.4)
QRS AXIS: 20 DEGREES
QRSD INTERVAL: 86 MS
QT INTERVAL: 372 MS
QTC INTERVAL: 445 MS
RBC # BLD AUTO: 4.59 MILLION/UL (ref 3.81–5.12)
SODIUM SERPL-SCNC: 141 MMOL/L (ref 135–147)
T WAVE AXIS: 49 DEGREES
VENTRICULAR RATE: 86 BPM
WBC # BLD AUTO: 7.49 THOUSAND/UL (ref 4.31–10.16)

## 2022-12-17 RX ORDER — SODIUM CHLORIDE 9 MG/ML
3 INJECTION INTRAVENOUS
Status: DISCONTINUED | OUTPATIENT
Start: 2022-12-17 | End: 2022-12-17 | Stop reason: HOSPADM

## 2022-12-17 RX ORDER — HYDROCHLOROTHIAZIDE 25 MG/1
25 TABLET ORAL DAILY
Qty: 20 TABLET | Refills: 0 | Status: SHIPPED | OUTPATIENT
Start: 2022-12-17

## 2022-12-17 NOTE — ED PROVIDER NOTES
History  Chief Complaint   Patient presents with   • Hypertension     Pt reports that her BP has been elevated  She c/o headache, shoulder pain , and chest discomfort that she describes as feeling like indigestion that burns     67-year-old female presenting with elevated blood pressure  Patient eats double pressure at home and was 174 systolic and she became concerned  Had slight headache  Patient has chronic left shoulder pain which he feels has been exacerbated by recent overexertion  Denies chest pain or shortness of breath  Patient states she has been under stress lately with a holidays  Ambulatory with out difficulty  No change in medications and patient has been compliant taking her losartan for high blood pressure  Does check her blood pressure at home and normally runs in the 660S systolic but states this morning it was in the 170s and became concerned prompting her to be evaluated emergency department  Prior to Admission Medications   Prescriptions Last Dose Informant Patient Reported? Taking?    Biotin 2500 MCG CAPS   Yes No   Sig: Take 1 capsule by mouth 2 (two) times a day    Cholecalciferol (VITAMIN D-3 PO)   Yes No   Sig: Take 1 tablet by mouth daily   Cyanocobalamin (VITAMIN B-12 CR PO)   Yes No   Sig: Take 1 tablet by mouth daily   Diclofenac Sodium (VOLTAREN) 1 %   No No   Sig: Apply 2 g topically 4 (four) times a day   EPINEPHrine (EPIPEN) 0 3 mg/0 3 mL SOAJ   No No   Sig: Inject 0 3 mL (0 3 mg total) into a muscle as needed for anaphylaxis   Evening Primrose Oil 1000 MG CAPS   No No   Sig: Take 1 capsule (1,000 mg total) by mouth in the morning   Multiple Vitamins-Minerals (PRESERVISION AREDS 2 PO)   Yes No   Sig: Take by mouth in the morning   Omega-3 Fatty Acids (FISH OIL) 1,000 mg   Yes No   Sig: Take 1,000 mg by mouth 3 (three) times a day   amitriptyline (ELAVIL) 50 mg tablet   No No   Sig: Take 1 tablet (50 mg total) by mouth daily at bedtime   aspirin (ECOTRIN) 325 mg EC tablet   No No   Sig: Take 1 tablet (325 mg total) by mouth 2 (two) times a day   calcium carbonate (OS-ANTHONY) 600 MG tablet   Yes No   Sig: Take 1,200 mg by mouth 2 (two) times a day with meals   clobetasol (TEMOVATE) 0 05 % cream   No No   Sig: Apply topically 2 (two) times a day   dicyclomine (BENTYL) 10 mg capsule   No No   Sig: Take 1 capsule (10 mg total) by mouth 2 (two) times a day   fexofenadine (ALLEGRA) 180 MG tablet   Yes No   Sig: Take 180 mg by mouth daily   gabapentin (NEURONTIN) 300 mg capsule   No No   Sig: Take 1 tablet in the morning, 1 tablet afternoon and 2 bedtime   hydrOXYzine HCL (ATARAX) 10 mg tablet   No No   Sig: Take 2 tablets (20 mg total) by mouth daily at bedtime Take 2 tablets at bedtime   losartan (COZAAR) 100 MG tablet   No No   Sig: Take 1 tablet (100 mg total) by mouth daily   methocarbamol (ROBAXIN) 750 mg tablet   No No   Sig: Take 1 tablet (750 mg total) by mouth every 8 (eight) hours   montelukast (SINGULAIR) 10 mg tablet   No No   Sig: Take 1 tablet (10 mg total) by mouth daily at bedtime   omeprazole (PriLOSEC) 40 MG capsule   No No   Sig: Take 1 capsule (40 mg total) by mouth daily   rosuvastatin (CRESTOR) 10 MG tablet   No No   Sig: Take 1 tablet (10 mg total) by mouth daily   simvastatin (ZOCOR) 10 mg tablet   No No   Sig: Take 1 tablet (10 mg total) by mouth daily at bedtime      Facility-Administered Medications: None       Past Medical History:   Diagnosis Date   • Abnormal findings on diagnostic imaging of breast    • Abnormal Pap smear of cervix    • Arthritis    • Chronic pain disorder    • Extremity pain    • Fibrocystic breast disease    • Foot pain    • GERD (gastroesophageal reflux disease)    • Hyperlipidemia    • Hypertension    • Interstitial cystitis    • Joint pain    • Low back pain    • Osteoarthritis    • Osteopenia    • Uncomplicated opioid dependence ([facility] [address] ) 3/1/2022       Past Surgical History:   Procedure Laterality Date   • APPENDECTOMY     • BACK SURGERY     • BREAST EXCISIONAL BIOPSY Left 1996    benign   • CARPAL BOSS EXCISION     • CHOLECYSTECTOMY     • DIAGNOSTIC LAPAROSCOPY     • FOOT SURGERY     • FRACTURE SURGERY     • HYSTERECTOMY      age 33   • HYSTEROSCOPY     • JOINT REPLACEMENT     • KIDNEY SURGERY Left    • KNEE ARTHROSCOPY Bilateral    • LAMINECTOMY     • LAPAROSCOPY      hynecologic with adhesions   • MYOMECTOMY     • OOPHORECTOMY Bilateral     age 33   • ORTHOPEDIC SURGERY     • UT SURG IMPLNT NEUROELECT,EPIDURAL N/A 5/18/2017    Procedure: PLACEMENT OF THORACIC SPINAL CORD STIMULATOR WITH LEFT BUTTOCK IMPLANTABLE PULSE GENERATOR (IMPULSE MONITORING-MOTORS (TCEMEP), EMG, SSEP); Surgeon: Umair Rubio MD;  Location: [facility];  Service: Neurosurgery   • SPINAL CORD STIMULATOR IMPLANT Left 9/29/2020    Procedure: LAMINECTOMY THORACIC , REMOVAL OF SCS LEADS AND GENERATOR;  Surgeon: Demarcus Montalvo MD;  Location: [facility];  Service: Neurosurgery   • SPINAL STIMULATOR PLACEMENT  05/2017   • TONSILLECTOMY AND ADENOIDECTOMY      onset: unknown   • TOTAL KNEE ARTHROPLASTY Right        Family History   Problem Relation Age of Onset   • Bone cancer Mother    • Cancer Mother    • Asthma Mother    • Brain cancer Father    • Stroke Father         stroke sydrome   • Transient ischemic attack Father    • Depression Sister    • Migraines Sister    • Asthma Sister    • Asthma Sister    • No Known Problems Maternal Grandmother    • No Known Problems Maternal Grandfather    • Diabetes Paternal Grandmother    • No Known Problems Paternal Grandfather    • Heart disease Brother    • Diabetes Brother    • Heart attack Brother    • Colon cancer Brother 77   • No Known Problems Brother    • Breast cancer Maternal Aunt 79   • Breast cancer additional onset Maternal Aunt 67   • Diabetes Sister      I have reviewed and agree with the history as documented      E-Cigarette/Vaping   • E-Cigarette Use Never User      E-Cigarette/Vaping Substances   • Nicotine No    • THC No    • CBD No    • Flavoring No    • Other No    • Unknown No      Social History     Tobacco Use   • Smoking status: Never   • Smokeless tobacco: Never   Vaping Use   • Vaping Use: Never used   Substance Use Topics   • Alcohol use: Yes     Comment: Drink socially, not too often   • Drug use: No       Review of Systems   Constitutional: Negative for appetite change, chills, fatigue, fever and unexpected weight change  HENT: Negative for congestion, ear pain, rhinorrhea and sore throat  Eyes: Negative for pain and visual disturbance  Respiratory: Negative for cough, chest tightness, shortness of breath and wheezing  Cardiovascular: Negative for chest pain, palpitations and leg swelling  Gastrointestinal: Negative for abdominal pain, constipation, diarrhea, nausea and vomiting  Genitourinary: Negative for difficulty urinating, dysuria, frequency, hematuria, menstrual problem, pelvic pain, vaginal bleeding and vaginal discharge  Musculoskeletal: Negative for arthralgias, back pain and neck pain  Left shoulder pain   Skin: Negative for color change and rash  Neurological: Negative for dizziness, seizures, syncope, light-headedness and headaches  Psychiatric/Behavioral: Negative for confusion and sleep disturbance  All other systems reviewed and are negative  Physical Exam  Physical Exam  Vitals and nursing note reviewed  Constitutional:       General: She is not in acute distress  Appearance: Normal appearance  She is well-developed and normal weight  She is not ill-appearing or toxic-appearing  HENT:      Head: Normocephalic and atraumatic  Nose: Nose normal       Mouth/Throat:      Mouth: Mucous membranes are moist    Eyes:      General: No scleral icterus  Extraocular Movements: Extraocular movements intact  Conjunctiva/sclera: Conjunctivae normal    Cardiovascular:      Rate and Rhythm: Normal rate and regular rhythm        Heart sounds: Normal heart sounds  No murmur heard  No friction rub  No gallop  Pulmonary:      Effort: Pulmonary effort is normal  No respiratory distress  Breath sounds: Normal breath sounds  No wheezing or rales  Abdominal:      Palpations: Abdomen is soft  There is no mass  Tenderness: There is no abdominal tenderness  There is no right CVA tenderness, left CVA tenderness, guarding or rebound  Hernia: No hernia is present  Musculoskeletal:         General: No swelling  Normal range of motion  Cervical back: Normal range of motion and neck supple  Right lower leg: Edema present  Left lower leg: Edema present  Comments: Trace bilateral pedal edema  No calf pain tenderness or asymmetry  Skin:     General: Skin is warm and dry  Capillary Refill: Capillary refill takes less than 2 seconds  Coloration: Skin is not pale  Neurological:      Mental Status: She is alert and oriented to person, place, and time     Psychiatric:         Mood and Affect: Mood normal          Behavior: Behavior normal          Vital Signs  ED Triage Vitals [12/17/22 0726]   Temperature Pulse Respirations Blood Pressure SpO2   (!) 97 2 °F (36 2 °C) 87 16 (!) 176/72 98 %      Temp src Heart Rate Source Patient Position - Orthostatic VS BP Location FiO2 (%)   -- Monitor Lying Right arm --      Pain Score       --           Vitals:    12/17/22 0726 12/17/22 0800 12/17/22 0830   BP: (!) 176/72 145/67 154/71   Pulse: 87 76 87   Patient Position - Orthostatic VS: Lying Lying Lying         Visual Acuity      ED Medications  Medications   sodium chloride (PF) 0 9 % injection 3 mL (has no administration in time range)       Diagnostic Studies  Results Reviewed     Procedure Component Value Units Date/Time    HS Troponin I 2hr [[id number]]     Lab Status: No result Specimen: Blood     HS Troponin I 4hr [[id number]]     Lab Status: No result Specimen: Blood     HS Troponin 0hr (reflex protocol) [[id number]]  (Normal) Collected: 12/17/22 0737    Lab Status: Final result Specimen: Blood from Arm, Left Updated: 12/17/22 0806     hs TnI 0hr <2 ng/L     Comprehensive metabolic panel [[id number]]  (Abnormal) Collected: 12/17/22 0737    Lab Status: Final result Specimen: Blood from Arm, Left Updated: 12/17/22 0759     Sodium 141 mmol/L      Potassium 3 7 mmol/L      Chloride 106 mmol/L      CO2 26 mmol/L      ANION GAP 9 mmol/L      BUN 14 mg/dL      Creatinine 0 63 mg/dL      Glucose 113 mg/dL      Calcium 8 6 mg/dL      Corrected Calcium 9 1 mg/dL      AST 19 U/L      ALT 24 U/L      Alkaline Phosphatase 74 U/L      Total Protein 6 7 g/dL      Albumin 3 4 g/dL      Total Bilirubin 0 39 mg/dL      eGFR 90 ml/min/1 73sq m     Narrative:      National Kidney Disease Foundation guidelines for Chronic Kidney Disease (CKD):   •  Stage 1 with normal or high GFR (GFR > 90 mL/min/1 73 square meters)  •  Stage 2 Mild CKD (GFR = 60-89 mL/min/1 73 square meters)  •  Stage 3A Moderate CKD (GFR = 45-59 mL/min/1 73 square meters)  •  Stage 3B Moderate CKD (GFR = 30-44 mL/min/1 73 square meters)  •  Stage 4 Severe CKD (GFR = 15-29 mL/min/1 73 square meters)  •  Stage 5 End Stage CKD (GFR <15 mL/min/1 73 square meters)  Note: GFR calculation is accurate only with a steady state creatinine    Lipase [[id number]]  (Normal) Collected: 12/17/22 0737    Lab Status: Final result Specimen: Blood from Arm, Left Updated: 12/17/22 0753     Lipase 138 u/L     CBC and differential [[id number]]  (Abnormal) Collected: 12/17/22 0737    Lab Status: Final result Specimen: Blood from Arm, Left Updated: 12/17/22 0743     WBC 7 49 Thousand/uL      RBC 4 59 Million/uL      Hemoglobin 13 6 g/dL      Hematocrit 40 8 %      MCV 89 fL      MCH 29 6 pg      MCHC 33 3 g/dL      RDW 12 1 %      MPV 8 8 fL      Platelets 046 Thousands/uL      nRBC 0 /100 WBCs      Neutrophils Relative 56 %      Immat GRANS % 0 %      Lymphocytes Relative 32 %      Monocytes Relative 7 %      Eosinophils Relative 4 %      Basophils Relative 1 %      Neutrophils Absolute 4 14 Thousands/µL      Immature Grans Absolute 0 01 Thousand/uL      Lymphocytes Absolute 2 43 Thousands/µL      Monocytes Absolute 0 55 Thousand/µL      Eosinophils Absolute 0 28 Thousand/µL      Basophils Absolute 0 08 Thousands/µL                  XR chest 1 view portable   Final Result by Facundo Archibald MD (12/17 7641)      No acute cardiopulmonary disease  Workstation performed: [id number]                    Procedures  ECG 12 Lead Documentation Only    Date/Time: 12/17/2022 9:06 AM  Performed by: Caterina Salgado DO  Authorized by: Caterina Salgado DO     Indications / Diagnosis:  HTN  ECG reviewed by me, the ED Provider: yes    Patient location:  ED  Rate:     ECG rate:  86    ECG rate assessment: normal    Rhythm:     Rhythm: sinus rhythm    Ectopy:     Ectopy: none    QRS:     QRS axis:  Normal    QRS intervals:  Normal  Conduction:     Conduction: normal    ST segments:     ST segments:  Normal  T waves:     T waves: normal    Comments:      No LVH or strain pattern             ED Course                                             MDM    Disposition  Final diagnoses:   Hypertension     Time reflects when diagnosis was documented in both MDM as applicable and the Disposition within this note     Time User Action Codes Description Comment    12/17/2022  9:08 AM Darrell Campos Add [I10] Hypertension       ED Disposition     ED Disposition   Discharge    Condition   Stable    Date/Time   Sat Dec 17, 2022  9:08 AM    Comment   Nathaly Wheatley discharge to home/self care                 Follow-up Information     Follow up With Specialties Details Why [facility],  Internal Medicine, Hospice Services Schedule an appointment as soon as possible for a visit   [address] [place] [id number]  [phone]            Patient's Medications   Discharge Prescriptions HYDROCHLOROTHIAZIDE (HYDRODIURIL) 25 MG TABLET    Take 1 tablet (25 mg total) by mouth daily       Start Date: 12/17/2022End Date: --       Order Dose: 25 mg       Quantity: 20 tablet    Refills: 0       No discharge procedures on file      PDMP Review       Value Time User    PDMP Reviewed  Yes 10/6/2021  5:34 PM Pramod Chavez PA-C          ED Provider  Electronically Signed by           Ncaho Carcamo DO  12/17/22 6293 total) by mouth every 8 (eight) hours, Starting Tue 6/21/2022, Until Thu 12/8/2022, Normal      montelukast (SINGULAIR) 10 mg tablet Take 1 tablet (10 mg total) by mouth daily at bedtime, Starting Tue 6/21/2022, Normal      Multiple Vitamins-Minerals (PRESERVISION AREDS 2 PO) Take by mouth in the morning, Starting Wed 3/30/2022, Historical Med      Omega-3 Fatty Acids (FISH OIL) 1,000 mg Take 1,000 mg by mouth 3 (three) times a day, Historical Med      omeprazole (PriLOSEC) 40 MG capsule Take 1 capsule (40 mg total) by mouth daily, Starting Tue 11/1/2022, Normal      rosuvastatin (CRESTOR) 10 MG tablet Take 1 tablet (10 mg total) by mouth daily, Starting Tue 6/21/2022, Normal      simvastatin (ZOCOR) 10 mg tablet Take 1 tablet (10 mg total) by mouth daily at bedtime, Starting Sat 1/22/2022, Normal             No discharge procedures on file      PDMP Review       Value Time User    PDMP Reviewed  Yes 10/6/2021  5:34 PM Selma Thornton PA-C          ED Provider  Electronically Signed by           Bandar Chen DO  12/17/22 [id number]       Bandar Chen DO  01/02/23 [id number]

## 2022-12-19 LAB
ATRIAL RATE: 86 BPM
P AXIS: 61 DEGREES
PR INTERVAL: 150 MS
QRS AXIS: 20 DEGREES
QRSD INTERVAL: 86 MS
QT INTERVAL: 372 MS
QTC INTERVAL: 445 MS
T WAVE AXIS: 49 DEGREES
VENTRICULAR RATE: 86 BPM

## 2022-12-21 ENCOUNTER — APPOINTMENT (OUTPATIENT)
Dept: RADIOLOGY | Facility: MEDICAL CENTER | Age: 72
End: 2022-12-21

## 2022-12-21 ENCOUNTER — OFFICE VISIT (OUTPATIENT)
Dept: FAMILY MEDICINE CLINIC | Facility: CLINIC | Age: 72
End: 2022-12-21

## 2022-12-21 ENCOUNTER — OFFICE VISIT (OUTPATIENT)
Dept: OBGYN CLINIC | Facility: CLINIC | Age: 72
End: 2022-12-21

## 2022-12-21 VITALS
SYSTOLIC BLOOD PRESSURE: 128 MMHG | BODY MASS INDEX: 33.6 KG/M2 | WEIGHT: 182.6 LBS | DIASTOLIC BLOOD PRESSURE: 78 MMHG | HEART RATE: 78 BPM | HEIGHT: 62 IN | TEMPERATURE: 97.6 F | RESPIRATION RATE: 18 BRPM

## 2022-12-21 VITALS
SYSTOLIC BLOOD PRESSURE: 151 MMHG | WEIGHT: 182 LBS | BODY MASS INDEX: 33.49 KG/M2 | HEIGHT: 62 IN | DIASTOLIC BLOOD PRESSURE: 81 MMHG | HEART RATE: 98 BPM

## 2022-12-21 DIAGNOSIS — M25.512 ACUTE PAIN OF LEFT SHOULDER: ICD-10-CM

## 2022-12-21 DIAGNOSIS — M25.512 LEFT SHOULDER PAIN, UNSPECIFIED CHRONICITY: ICD-10-CM

## 2022-12-21 DIAGNOSIS — I10 PRIMARY HYPERTENSION: Primary | ICD-10-CM

## 2022-12-21 DIAGNOSIS — M75.42 SUBACROMIAL IMPINGEMENT, LEFT: Primary | ICD-10-CM

## 2022-12-21 DIAGNOSIS — R05.9 COUGH: ICD-10-CM

## 2022-12-21 DIAGNOSIS — G89.29 CHRONIC LEFT SHOULDER PAIN: ICD-10-CM

## 2022-12-21 DIAGNOSIS — M25.512 CHRONIC LEFT SHOULDER PAIN: ICD-10-CM

## 2022-12-21 DIAGNOSIS — E78.2 MIXED HYPERLIPIDEMIA: ICD-10-CM

## 2022-12-21 RX ORDER — SIMVASTATIN 10 MG
10 TABLET ORAL
Qty: 90 TABLET | Refills: 2 | Status: SHIPPED | OUTPATIENT
Start: 2022-12-21

## 2022-12-21 RX ORDER — SIMVASTATIN 10 MG
10 TABLET ORAL
Qty: 90 TABLET | Refills: 2 | Status: SHIPPED | OUTPATIENT
Start: 2022-12-21 | End: 2022-12-21

## 2022-12-21 RX ORDER — TRIAMCINOLONE ACETONIDE 40 MG/ML
40 INJECTION, SUSPENSION INTRA-ARTICULAR; INTRAMUSCULAR
Status: COMPLETED | OUTPATIENT
Start: 2022-12-21 | End: 2022-12-21

## 2022-12-21 RX ORDER — BUPIVACAINE HYDROCHLORIDE 2.5 MG/ML
4 INJECTION, SOLUTION INFILTRATION; PERINEURAL
Status: COMPLETED | OUTPATIENT
Start: 2022-12-21 | End: 2022-12-21

## 2022-12-21 RX ORDER — MELOXICAM 15 MG/1
15 TABLET ORAL DAILY
Qty: 30 TABLET | Refills: 0 | Status: SHIPPED | OUTPATIENT
Start: 2022-12-21

## 2022-12-21 RX ADMIN — TRIAMCINOLONE ACETONIDE 40 MG: 40 INJECTION, SUSPENSION INTRA-ARTICULAR; INTRAMUSCULAR at 14:19

## 2022-12-21 RX ADMIN — BUPIVACAINE HYDROCHLORIDE 4 ML: 2.5 INJECTION, SOLUTION INFILTRATION; PERINEURAL at 14:19

## 2022-12-21 NOTE — PROGRESS NOTES
Name: Marylou Diaz      : 1950      MRN: [id number]  Encounter Provider: Eva Fernandez DO  Encounter Date: 2022   Encounter department: [address] [place] [address] Road     1  Primary hypertension  Start HCTZ daily  Increase potassium in diet Stay hydrated  Monitor BP   May change to losartan hct    2  Mixed hyperlipidemia  -     simvastatin (ZOCOR) 10 mg tablet; Take 1 tablet (10 mg total) by mouth daily at bedtime  Lo fat diet     Left shoulder pain referral to ortho placed ? Injection     Rto as scheduled       Depression Screening and Follow-up Plan: Patient was screened for depression during today's encounter  They screened negative with a PHQ-2 score of 0  Subjective      HPI   Patient went to ER due to elevated /100 range and was given rx for hctz which did not start yet BP remains elevated She has had a lot of stress   She has had increased heartburn and left shoulder pain which she thinks is bursitis and wants ortho referral   Review of Systems   Constitutional: Negative for chills and fever  HENT: Negative  Eyes: Negative for visual disturbance  Respiratory: Negative for cough and shortness of breath  Cardiovascular: Negative for chest pain, palpitations and leg swelling  Gastrointestinal: Negative for abdominal distention and abdominal pain  Genitourinary: Negative  Musculoskeletal: Positive for arthralgias and joint swelling  Neurological: Negative for dizziness, light-headedness and headaches  Psychiatric/Behavioral: Negative for sleep disturbance  The patient is not nervous/anxious          Current Outpatient Medications on File Prior to Visit   Medication Sig   • amitriptyline (ELAVIL) 50 mg tablet Take 1 tablet (50 mg total) by mouth daily at bedtime   • aspirin (ECOTRIN) 325 mg EC tablet Take 1 tablet (325 mg total) by mouth 2 (two) times a day   • Biotin 2500 MCG CAPS Take 1 capsule by mouth 2 (two) times a day    • calcium carbonate (OS-ANTHONY) 600 MG tablet Take 1,200 mg by mouth 2 (two) times a day with meals   • Cholecalciferol (VITAMIN D-3 PO) Take 1 tablet by mouth daily   • clobetasol (TEMOVATE) 0 05 % cream Apply topically 2 (two) times a day   • Cyanocobalamin (VITAMIN B-12 CR PO) Take 1 tablet by mouth daily   • Diclofenac Sodium (VOLTAREN) 1 % Apply 2 g topically 4 (four) times a day   • dicyclomine (BENTYL) 10 mg capsule Take 1 capsule (10 mg total) by mouth 2 (two) times a day   • EPINEPHrine (EPIPEN) 0 3 mg/0 3 mL SOAJ Inject 0 3 mL (0 3 mg total) into a muscle as needed for anaphylaxis   • fexofenadine (ALLEGRA) 180 MG tablet Take 180 mg by mouth daily   • gabapentin (NEURONTIN) 300 mg capsule Take 1 tablet in the morning, 1 tablet afternoon and 2 bedtime   • hydrochlorothiazide (HYDRODIURIL) 25 mg tablet Take 1 tablet (25 mg total) by mouth daily   • hydrOXYzine HCL (ATARAX) 10 mg tablet Take 2 tablets (20 mg total) by mouth daily at bedtime Take 2 tablets at bedtime   • losartan (COZAAR) 100 MG tablet Take 1 tablet (100 mg total) by mouth daily   • methocarbamol (ROBAXIN) 750 mg tablet Take 1 tablet (750 mg total) by mouth every 8 (eight) hours   • montelukast (SINGULAIR) 10 mg tablet Take 1 tablet (10 mg total) by mouth daily at bedtime   • Multiple Vitamins-Minerals (PRESERVISION AREDS 2 PO) Take by mouth in the morning   • Omega-3 Fatty Acids (FISH OIL) 1,000 mg Take 1,000 mg by mouth 3 (three) times a day   • omeprazole (PriLOSEC) 40 MG capsule Take 1 capsule (40 mg total) by mouth daily   • rosuvastatin (CRESTOR) 10 MG tablet Take 1 tablet (10 mg total) by mouth daily   • [DISCONTINUED] simvastatin (ZOCOR) 10 mg tablet Take 1 tablet (10 mg total) by mouth daily at bedtime   • Evening Primrose Oil 1000 MG CAPS Take 1 capsule (1,000 mg total) by mouth in the morning (Patient not taking: Reported on 12/21/2022)       Objective     /78   Pulse 78   Temp 97 6 °F (36 4 °C) (Temporal)   Resp 18   Ht 5' 2" (1 575 m)   Wt 82 8 kg (182 lb 9 6 oz)   LMP  (LMP Unknown)   BMI 33 40 kg/m²     Physical Exam  Vitals reviewed  Constitutional:       General: She is not in acute distress  Appearance: Normal appearance  She is not ill-appearing, toxic-appearing or diaphoretic  HENT:      Head: Normocephalic and atraumatic  Right Ear: External ear normal       Left Ear: External ear normal       Nose: Nose normal       Mouth/Throat:      Mouth: Mucous membranes are dry  Eyes:      General: No scleral icterus  Extraocular Movements: Extraocular movements intact  Conjunctiva/sclera: Conjunctivae normal       Pupils: Pupils are equal, round, and reactive to light  Cardiovascular:      Rate and Rhythm: Normal rate and regular rhythm  Pulses: Normal pulses  Pulmonary:      Effort: Pulmonary effort is normal  No respiratory distress  Breath sounds: Normal breath sounds  No wheezing  Abdominal:      General: Bowel sounds are normal  There is no distension  Palpations: Abdomen is soft  Tenderness: There is no abdominal tenderness  Musculoskeletal:         General: Tenderness and deformity present  Cervical back: Normal range of motion and neck supple  No rigidity  Comments: Restricted rom left shoulder ttp   Lymphadenopathy:      Cervical: No cervical adenopathy  Skin:     General: Skin is dry  Coloration: Skin is not jaundiced or pale  Neurological:      General: No focal deficit present  Mental Status: She is alert and oriented to person, place, and time  Mental status is at baseline  Psychiatric:         Mood and Affect: Mood normal          Behavior: Behavior normal          Thought Content:  Thought content normal          Judgment: Judgment normal        Roselie Aase, DO

## 2022-12-21 NOTE — PROGRESS NOTES
Subjective:  Chief Complaint   Patient presents with   • Left Shoulder - Pain       Brenden Waddell is a 71 y o  female complains of left shoulder pain  Onset of the symptoms was several days ago  Mechanism of injury: overuse of the shoulder  Aggravating factors: lifting overhead and behind the back  Treatment to date: avoidance of offending activity  Symptoms have gradually worsened  Patient seen and evaluated by Dr Arti Mondragon  primary care doctor in office today and referred for orthopedics evaluation for left shoulder pain  Presenting for initial evaluation    The following portions of the patient's history were reviewed and updated as appropriate: allergies, current medications, past family history, past medical history, past social history, past surgical history and problem list     Occupation:      Review of Systems   Constitutional: Negative for fever  HENT: Negative for dental problem and headaches  Eyes: Negative for vision loss  Respiratory: Negative for cough and shortness of breath  Cardiovascular: Negative for leg swelling and palpitations  Gastrointestinal: Negative for constipation and diarrhea  Genitourinary: Negative for bladder incontinence and difficulty urinating  Musculoskeletal: Negative for back pain and difficulty walking  Skin: Negative for rash and ulcer  Neurological: Negative for dizziness and headaches  Hem/Lymph/Immuno: Negative for blood clots  Does not bruise/bleed easily  Psychiatric/Behavioral: Negative for confusion           Objective:  /81 (BP Location: Left arm, Patient Position: Sitting, Cuff Size: Adult)   Pulse 98   Ht 5' 2" (1 575 m)   Wt 82 6 kg (182 lb)   LMP  (LMP Unknown)   BMI 33 29 kg/m²     Skin: no rashes, lesions, skin discolorations, lacerations  Vasculature: normal radial and ulnar pulse,  normal skin color, normal capillary refill in extremity, no upper extremity edema  Neurologic: Neurologic exam is normal throughout upper extremities, Awake, alert, and oriented x3, no apparent distress  Musculoskeletal:   left SHOULDER EXAM    Intact skin  No erythema, no induration, no signs of infection  No gross deformity    AROM FF: 150 degrees  AROM ER with arm at its side: 90 degrees  AROM IR: Lower lumbar        Supraspinatus strength testin/5  Infraspinatus strength testin/5    Belly press: negative  Bear Hug test: negative    Empty can: positive  Biceps TTP: positive  Arc test: positive  Carr positive        Tenderness to palpation of AC joint: negative  Pain with cross-body adduction: negative          Imaging:    See final report     Assessment/Plan:  1  Subacromial impingement, left    - XR shoulder 2+ vw left; Future  - meloxicam (Mobic) 15 mg tablet; Take 1 tablet (15 mg total) by mouth daily  Dispense: 30 tablet; Refill: 0    2  Chronic left shoulder pain    - Ambulatory Referral to Orthopedic Surgery  - meloxicam (Mobic) 15 mg tablet; Take 1 tablet (15 mg total) by mouth daily  Dispense: 30 tablet; Refill: 0        > 45 min devoted to review of previous, pertinent medical records, imaging, discussion of treatment options, counseling and documentation  Imaging independently reviewed and discussed with patient  Degenerative changes noted, no acute fractures appreciated  Follow-up official reading  We discussed the nature of subacromial impingement at length and detailed the treatment approach  Directed to ice the area daily for 20 minutes at a time using a barrier to protect the skin- stressed specifically icing after activity to address inflammation  Start meloxicam 15 mg PO daily for 10 days with food, then to be used as needed moving forward  They were instructed to discontinue this medication is they experienced any upset stomach    Recommending formal PT-patient declines after counseling would like to proceed with home exercise program  Discussed role for corticosteroid injection for subacromial bursa in office today-patient agreeable after counseling  We discussed a HEP and literature was provided for reference  It was explained that this does not supplement formal PT but should serve to bridge the gap, while they wait to get into PT  Advised to avoid any exercises which exacerbate their pain  Follow up in 6 weeks  Should sx's worsen or any concerns arise, they were advised to follow up sooner or seek more immediate medical attention  All of the patient's concerns were addressed and questions answered  They verbalized agreement with and understanding of the treatment plan  Large joint arthrocentesis: L subacromial bursa  Universal Protocol:  Consent: Verbal consent obtained  Risks and benefits: risks, benefits and alternatives were discussed  Consent given by: patient    Supporting Documentation  Indications: pain   Procedure Details  Location: shoulder - L subacromial bursa  Needle size: 22 G  Ultrasound guidance: no  Approach: posterior  Medications administered: 4 mL bupivacaine 0 25 %; 40 mg triamcinolone acetonide 40 mg/mL    Patient tolerance: patient tolerated the procedure well with no immediate complications    Risks and benefits of CSI were discussed with patient extensively  Risks were highlighted which included but were not limited to infection, pain, local site swelling, and chance that injection may not be effective  Patient was also counseled regarding glucose elevation days after receiving CSI and to be mindful of diet and check sugars daily  Patient agreeable to proceed with CSI after counseling  Patient reports prior corticosteroid injection with [facility] which gave her significant relief

## 2023-01-09 ENCOUNTER — APPOINTMENT (OUTPATIENT)
Dept: LAB | Facility: MEDICAL CENTER | Age: 73
End: 2023-01-09

## 2023-01-09 DIAGNOSIS — I10 PRIMARY HYPERTENSION: ICD-10-CM

## 2023-01-09 LAB
ANION GAP SERPL CALCULATED.3IONS-SCNC: 4 MMOL/L (ref 4–13)
BUN SERPL-MCNC: 18 MG/DL (ref 5–25)
CALCIUM SERPL-MCNC: 9.9 MG/DL (ref 8.3–10.1)
CHLORIDE SERPL-SCNC: 108 MMOL/L (ref 96–108)
CO2 SERPL-SCNC: 29 MMOL/L (ref 21–32)
CREAT SERPL-MCNC: 0.65 MG/DL (ref 0.6–1.3)
GFR SERPL CREATININE-BSD FRML MDRD: 89 ML/MIN/1.73SQ M
GLUCOSE P FAST SERPL-MCNC: 104 MG/DL (ref 65–99)
POTASSIUM SERPL-SCNC: 4.2 MMOL/L (ref 3.5–5.3)
SODIUM SERPL-SCNC: 141 MMOL/L (ref 135–147)

## 2023-01-11 ENCOUNTER — OFFICE VISIT (OUTPATIENT)
Dept: FAMILY MEDICINE CLINIC | Facility: CLINIC | Age: 73
End: 2023-01-11

## 2023-01-11 VITALS
SYSTOLIC BLOOD PRESSURE: 134 MMHG | TEMPERATURE: 97.3 F | HEIGHT: 62 IN | WEIGHT: 184.4 LBS | RESPIRATION RATE: 18 BRPM | HEART RATE: 76 BPM | BODY MASS INDEX: 33.93 KG/M2 | DIASTOLIC BLOOD PRESSURE: 78 MMHG

## 2023-01-11 DIAGNOSIS — E55.9 VITAMIN D DEFICIENCY: ICD-10-CM

## 2023-01-11 DIAGNOSIS — S29.019S THORACIC MYOFASCIAL STRAIN, SEQUELA: ICD-10-CM

## 2023-01-11 DIAGNOSIS — Z00.00 MEDICARE ANNUAL WELLNESS VISIT, SUBSEQUENT: Primary | ICD-10-CM

## 2023-01-11 DIAGNOSIS — E78.2 MIXED HYPERLIPIDEMIA: ICD-10-CM

## 2023-01-11 DIAGNOSIS — M46.1 SACROILIITIS (HCC): ICD-10-CM

## 2023-01-11 PROBLEM — S29.019A THORACIC MYOFASCIAL STRAIN: Status: ACTIVE | Noted: 2023-01-11

## 2023-01-11 NOTE — PROGRESS NOTES
Assessment and Plan:     Problem List Items Addressed This Visit        Musculoskeletal and Integument    Sacroiliitis ([address] )    Thoracic myofascial strain       Other    Hyperlipidemia    Relevant Orders    Lipid panel    Comprehensive metabolic panel    Medicare annual wellness visit, subsequent - Primary   Other Visit Diagnoses     Vitamin D deficiency        Relevant Orders    Vitamin D 25 hydroxy      Moist heat and topical patch or analgesic to area Limit lifting  Followup with ortho  She has neurosx followup for stimulator but stable currently  Labs stable Continue compression stockings  Discussed ACP documents and will discuss with family   Rto 6 months     BMI Counseling: Body mass index is 33 73 kg/m²  The BMI is above normal  Nutrition recommendations include consuming healthier snacks and increasing intake of lean protein  Exercise recommendations include exercising 3-5 times per week  Rationale for BMI follow-up plan is due to patient being overweight or obese  Depression Screening and Follow-up Plan: Patient was screened for depression during today's encounter  They screened negative with a PHQ-2 score of 0  Preventive health issues were discussed with patient, and age appropriate screening tests were ordered as noted in patient's After Visit Summary  Personalized health advice and appropriate referrals for health education or preventive services given if needed, as noted in patient's After Visit Summary       History of Present Illness:     Patient presents for a Medicare Wellness Visit    HPI   It ok has Right thoracic strain and ongoing left shoulder pain but saw ortho and had injection/has followup  Patient Care Team:  Isabel Urena DO as PCP - General  DO Lawanda Nieves MD Nella Lukes, MD Jonda Richmond, MD     Review of Systems:     Review of Systems     Problem List:     Patient Active Problem List   Diagnosis   • Hypertension   • Hyperlipidemia   • Chronic low back pain   • Degenerative joint disease of right lower extremity   • Irritable bowel syndrome   • Lumbar radiculopathy   • Neuralgia   • Peripheral neuropathy   • Chronic thoracic back pain   • Interstitial cystitis (chronic) with hematuria   • Medicare annual wellness visit, subsequent   • Status post total left knee replacement   • Osteopenia   • 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 ([facility])   • Chronic pain syndrome   • Osteoarthritis   • Closed fracture of proximal end of left fibula   • Transaminitis   • Sacroiliitis ([address] )   • Family history of colon cancer   • Spasm of left piriformis muscle   • Osteoarthritis of left knee   • Thoracic myofascial strain      Past Medical and Surgical History:     Past Medical History:   Diagnosis Date   • Abnormal findings on diagnostic imaging of breast    • Abnormal Pap smear of cervix    • Arthritis    • Chronic pain disorder    • Extremity pain    • Fibrocystic breast disease    • Foot pain    • GERD (gastroesophageal reflux disease)    • Hyperlipidemia    • Hypertension    • Interstitial cystitis    • Joint pain    • Low back pain    • Osteoarthritis    • Osteopenia    • Uncomplicated opioid dependence ([address] ) 3/1/2022     Past Surgical History:   Procedure Laterality Date   • APPENDECTOMY     • BACK SURGERY     • BREAST EXCISIONAL BIOPSY Left 1996    benign   • CARPAL BOSS EXCISION     • CHOLECYSTECTOMY     • DIAGNOSTIC LAPAROSCOPY     • FOOT SURGERY     • FRACTURE SURGERY     • HYSTERECTOMY      age 33   • HYSTEROSCOPY     • JOINT REPLACEMENT     • KIDNEY SURGERY Left    • KNEE ARTHROSCOPY Bilateral    • LAMINECTOMY     • LAPAROSCOPY      hynecologic with adhesions   • MYOMECTOMY     • OOPHORECTOMY Bilateral     age 33   • ORTHOPEDIC SURGERY     • NE CAR IMPLTJ NSTIM ELTRDS PLATE/PADDLE EDRL N/A 5/18/2017    Procedure: PLACEMENT OF THORACIC SPINAL CORD STIMULATOR WITH LEFT BUTTOCK IMPLANTABLE PULSE GENERATOR (IMPULSE MONITORING-MOTORS (TCEMEP), EMG, SSEP);   Surgeon: Paras Beatty MD;  Location: [facility];  Service: Neurosurgery   • SPINAL CORD STIMULATOR IMPLANT Left 9/29/2020    Procedure: LAMINECTOMY THORACIC , REMOVAL OF SCS LEADS AND GENERATOR;  Surgeon: Pura Gardiner MD;  Location:  MAIN OR;  Service: Neurosurgery   • SPINAL STIMULATOR PLACEMENT  05/2017   • TONSILLECTOMY AND ADENOIDECTOMY      onset: unknown   • TOTAL KNEE ARTHROPLASTY Right       Family History:     Family History   Problem Relation Age of Onset   • Bone cancer Mother    • Cancer Mother    • Asthma Mother    • Brain cancer Father    • Stroke Father         stroke sydrome   • Transient ischemic attack Father    • Depression Sister    • Migraines Sister    • Asthma Sister    • Asthma Sister    • No Known Problems Maternal Grandmother    • No Known Problems Maternal Grandfather    • Diabetes Paternal Grandmother    • No Known Problems Paternal Grandfather    • Heart disease Brother    • Diabetes Brother    • Heart attack Brother    • Colon cancer Brother 77   • No Known Problems Brother    • Breast cancer Maternal Aunt 79   • Breast cancer additional onset Maternal Aunt 67   • Diabetes Sister       Social History:     Social History     Socioeconomic History   • Marital status: Single     Spouse name: None   • Number of children: None   • Years of education: None   • Highest education level: None   Occupational History   • Occupation: Retired/ working part-time    Tobacco Use   • Smoking status: Never   • Smokeless tobacco: Never   Vaping Use   • Vaping Use: Never used   Substance and Sexual Activity   • Alcohol use: Yes     Comment: Drink socially, not too often   • Drug use: No   • Sexual activity: Not Currently     Partners: Male     Birth control/protection: None   Other Topics Concern   • None   Social History Narrative    No caffeine use    Always uses sunscreen    Always uses a seatbelt Social Determinants of Health     Financial Resource Strain: Low Risk    • Difficulty of Paying Living Expenses: Not hard at all   Food Insecurity: Not on file   Transportation Needs: No Transportation Needs   • Lack of Transportation (Medical): No   • Lack of Transportation (Non-Medical):  No   Physical Activity: Not on file   Stress: Not on file   Social Connections: Not on file   Intimate Partner Violence: Not on file   Housing Stability: Not on file      Medications and Allergies:     Current Outpatient Medications   Medication Sig Dispense Refill   • amitriptyline (ELAVIL) 50 mg tablet Take 1 tablet (50 mg total) by mouth daily at bedtime 90 tablet 3   • aspirin (ECOTRIN) 325 mg EC tablet Take 1 tablet (325 mg total) by mouth 2 (two) times a day 60 tablet 0   • Biotin 2500 MCG CAPS Take 1 capsule by mouth 2 (two) times a day      • calcium carbonate (OS-ANTHONY) 600 MG tablet Take 1,200 mg by mouth 2 (two) times a day with meals     • Cholecalciferol (VITAMIN D-3 PO) Take 1 tablet by mouth daily     • clobetasol (TEMOVATE) 0 05 % cream Apply topically 2 (two) times a day 30 g 5   • Cyanocobalamin (VITAMIN B-12 CR PO) Take 1 tablet by mouth daily     • Diclofenac Sodium (VOLTAREN) 1 % Apply 2 g topically 4 (four) times a day 150 g 1   • dicyclomine (BENTYL) 10 mg capsule Take 1 capsule (10 mg total) by mouth 2 (two) times a day 180 capsule 3   • EPINEPHrine (EPIPEN) 0 3 mg/0 3 mL SOAJ Inject 0 3 mL (0 3 mg total) into a muscle as needed for anaphylaxis 2 each 0   • Evening Primrose Oil 1000 MG CAPS Take 1 capsule (1,000 mg total) by mouth in the morning  0   • fexofenadine (ALLEGRA) 180 MG tablet Take 180 mg by mouth daily     • gabapentin (NEURONTIN) 300 mg capsule Take 1 tablet in the morning, 1 tablet afternoon and 2 bedtime 360 capsule 3   • hydrochlorothiazide (HYDRODIURIL) 25 mg tablet Take 1 tablet (25 mg total) by mouth daily 20 tablet 0   • hydrOXYzine HCL (ATARAX) 10 mg tablet Take 2 tablets (20 mg total) by mouth daily at bedtime Take 2 tablets at bedtime 180 tablet 1   • losartan (COZAAR) 100 MG tablet Take 1 tablet (100 mg total) by mouth daily 90 tablet 3   • methocarbamol (ROBAXIN) 750 mg tablet Take 1 tablet (750 mg total) by mouth every 8 (eight) hours 270 tablet 3   • montelukast (SINGULAIR) 10 mg tablet Take 1 tablet (10 mg total) by mouth daily at bedtime 90 tablet 3   • Multiple Vitamins-Minerals (PRESERVISION AREDS 2 PO) Take by mouth in the morning     • Omega-3 Fatty Acids (FISH OIL) 1,000 mg Take 1,000 mg by mouth 3 (three) times a day     • omeprazole (PriLOSEC) 40 MG capsule Take 1 capsule (40 mg total) by mouth daily 90 capsule 3   • rosuvastatin (CRESTOR) 10 MG tablet Take 1 tablet (10 mg total) by mouth daily 90 tablet 3   • simvastatin (ZOCOR) 10 mg tablet Take 1 tablet (10 mg total) by mouth daily at bedtime 90 tablet 2   • meloxicam (Mobic) 15 mg tablet Take 1 tablet (15 mg total) by mouth daily 30 tablet 0     No current facility-administered medications for this visit       Allergies   Allergen Reactions   • Bee Venom Shortness Of Breath   • Chlorhexidine Rash   • Egg Yolk - Food Allergy Hives   • Iodinated Contrast Media Hives   • Penicillins Hives   • Lidocaine Other (See Comments)     cream   • Allevyn Adhesive [Wound Dressings] Hives and Rash   • Bactrim [Sulfamethoxazole-Trimethoprim] Rash   • Codeine Other (See Comments)     headaches   • Latex Rash   • Medical Tape Blisters, Hives, Itching and Rash   • Other Rash     Adhesive tape   • Oxybutynin Rash   • Pentosan Polysulfate Rash   • Povidone Iodine Rash      Immunizations:     Immunization History   Administered Date(s) Administered   • Tdap 12/09/2016, 12/09/2016, 07/10/2019      Health Maintenance:         Topic Date Due   • DXA SCAN  06/24/2022   • Breast Cancer Screening: Mammogram  10/06/2023   • Colorectal Cancer Screening  02/07/2024   • Hepatitis C Screening  Completed         Topic Date Due   • Pneumococcal Vaccine: 65+ Years (1 - PCV) Never done   • COVID-19 Vaccine (3 - Booster for Pfizer series) 05/19/2021   • Influenza Vaccine (1) Never done      Medicare Screening Tests and Risk Assessments:     Last Medicare Wellness visit information reviewed, patient interviewed, no change since last AWV  Health Risk Assessment:   Patient rates overall health as very good  Patient feels that their physical health rating is much better  Patient is very satisfied with their life  Eyesight was rated as same  Hearing was rated as same  Patient feels that their emotional and mental health rating is much better  Patients states they are never, rarely angry  Patient states they are sometimes unusually tired/fatigued  Pain experienced in the last 7 days has been some  Patient's pain rating has been 5/10  Patient states that she has experienced no weight loss or gain in last 6 months  Depression Screening:   PHQ-2 Score: 0      Fall Risk Screening: In the past year, patient has experienced: no history of falling in past year      Urinary Incontinence Screening:   Patient has not leaked urine accidently in the last six months  Home Safety:  Patient does not have trouble with stairs inside or outside of their home  Patient has working smoke alarms and has working carbon monoxide detector  Home safety hazards include: none  Nutrition:   Current diet is Low Cholesterol and Low Saturated Fat  Medications:   Patient is currently taking over-the-counter supplements  OTC medications include: Vit B complex, Fish oil, Biotin, vitamin D, Calcium,  Patient is able to manage medications  Activities of Daily Living (ADLs)/Instrumental Activities of Daily Living (IADLs):   Walk and transfer into and out of bed and chair?: Yes  Dress and groom yourself?: Yes    Bathe or shower yourself?: Yes    Feed yourself?  Yes  Do your laundry/housekeeping?: Yes  Manage your money, pay your bills and track your expenses?: Yes  Make your own meals?: Yes Do your own shopping?: Yes    Previous Hospitalizations:   Any hospitalizations or ED visits within the last 12 months?: Yes    How many hospitalizations have you had in the last year?: 1-2    Advance Care Planning:   Living will: No    Durable POA for healthcare: No    Advanced directive: No    Advanced directive counseling given: Yes    End of Life Decisions reviewed with patient: Yes    Provider agrees with end of life decisions: Yes      Cognitive Screening:   Provider or family/friend/caregiver concerned regarding cognition?: No    PREVENTIVE SCREENINGS      Cardiovascular Screening:    General: History Lipid Disorder and Risks and Benefits Discussed    Due for: Lipid Panel      Diabetes Screening:     General: Screening Current      Colorectal Cancer Screening:     General: Screening Current      Breast Cancer Screening:     General: Screening Current      Cervical Cancer Screening:    General: Screening Not Indicated      Osteoporosis Screening:    General: Risks and Benefits Discussed    Due for: DXA Axial      Abdominal Aortic Aneurysm (AAA) Screening:        General: Screening Not Indicated      Lung Cancer Screening:     General: Screening Not Indicated      Hepatitis C Screening:    General: Screening Current    Screening, Brief Intervention, and Referral to Treatment (SBIRT)    Screening  Typical number of drinks in a day: 0  Typical number of drinks in a week: 0  Interpretation: Low risk drinking behavior  AUDIT-C Screenin) How often did you have a drink containing alcohol in the past year? monthly or less  2) How many drinks did you have on a typical day when you were drinking in the past year?  1 to 2  3) How often did you have 6 or more drinks on one occasion in the past year? never    AUDIT-C Score: 1  Interpretation: Score 0-2 (female): Negative screen for alcohol misuse    Single Item Drug Screening:  How often have you used an illegal drug (including marijuana) or a prescription medication for non-medical reasons in the past year? never    Single Item Drug Screen Score: 0  Interpretation: Negative screen for possible drug use disorder    Brief Intervention  Alcohol & drug use screenings were reviewed  No concerns regarding substance use disorder identified  Other Counseling Topics:   Calcium and vitamin D intake and regular weightbearing exercise  No results found  Physical Exam:     /78   Pulse 76   Temp (!) 97 3 °F (36 3 °C) (Temporal)   Resp 18   Ht 5' 2" (1 575 m)   Wt 83 6 kg (184 lb 6 4 oz)   LMP  (LMP Unknown)   BMI 33 73 kg/m²     Physical Exam  Vitals reviewed  Constitutional:       General: She is not in acute distress  Appearance: Normal appearance  She is not toxic-appearing  HENT:      Head: Normocephalic and atraumatic  Right Ear: External ear normal       Left Ear: External ear normal       Nose: Nose normal       Mouth/Throat:      Mouth: Mucous membranes are moist    Eyes:      General: No scleral icterus  Cardiovascular:      Rate and Rhythm: Normal rate  Pulses: Normal pulses  Pulmonary:      Effort: Pulmonary effort is normal  No respiratory distress  Breath sounds: Normal breath sounds  No wheezing  Abdominal:      General: Bowel sounds are normal  There is no distension  Palpations: Abdomen is soft  Tenderness: There is no abdominal tenderness  Musculoskeletal:         General: Tenderness present  Right lower leg: No edema  Left lower leg: Edema present  Comments: Compression stockings have helped   Skin:     General: Skin is dry  Coloration: Skin is not jaundiced or pale  Neurological:      General: No focal deficit present  Mental Status: She is alert and oriented to person, place, and time  Mental status is at baseline  Cranial Nerves: No cranial nerve deficit  Sensory: No sensory deficit     Psychiatric:         Mood and Affect: Mood normal          Behavior: Behavior normal          Thought Content:  Thought content normal          Judgment: Judgment normal           Peyman Crump, DO

## 2023-01-23 ENCOUNTER — TELEPHONE (OUTPATIENT)
Dept: OBGYN CLINIC | Facility: MEDICAL CENTER | Age: 73
End: 2023-01-23

## 2023-01-25 NOTE — TELEPHONE ENCOUNTER
Needed dx correction  Sent to physician billing for update and resubmission to insurance company  Left message for patient informing this information 
Pt called in would like to go over bill from visit on 11/29/2022, stating that medicare should cover the bill and should not receive one  Pt stated she called the billing department and they told her to call our office   Please advise thank you
- - -

## 2023-02-01 ENCOUNTER — OFFICE VISIT (OUTPATIENT)
Dept: OBGYN CLINIC | Facility: CLINIC | Age: 73
End: 2023-02-01

## 2023-02-01 VITALS
HEIGHT: 62 IN | SYSTOLIC BLOOD PRESSURE: 140 MMHG | HEART RATE: 80 BPM | BODY MASS INDEX: 34.08 KG/M2 | WEIGHT: 185.2 LBS | DIASTOLIC BLOOD PRESSURE: 81 MMHG

## 2023-02-01 DIAGNOSIS — M75.42 SUBACROMIAL IMPINGEMENT, LEFT: Primary | ICD-10-CM

## 2023-02-01 DIAGNOSIS — M17.12 PRIMARY OSTEOARTHRITIS OF LEFT KNEE: ICD-10-CM

## 2023-02-01 NOTE — PROGRESS NOTES
Subjective:  Chief Complaint   Patient presents with   • Left Shoulder - Follow-up       Marylou Diaz is a 68 y o  female presenting for follow-up visit in regards to left shoulder pain  Patient was diagnosed with subacromial bursitis and initial evaluation and provided with subacromial injection  Was advised the patient follow-up with physical therapy however she declined and preferred to proceed with home exercise program that was taught by PT in the past   She states the steroid injection had provided her approximately 3 to 4 weeks of relief and then during [place] time she went to  a large box and reaggravated the shoulder  Exhibits pain with overhead and behind back activities  The following portions of the patient's history were reviewed and updated as appropriate: allergies, current medications, past family history, past medical history, past social history, past surgical history and problem list     Occupation:      Review of Systems   Constitutional: Negative for fever  HENT: Negative for dental problem and headaches  Eyes: Negative for vision loss  Respiratory: Negative for cough and shortness of breath  Cardiovascular: Negative for leg swelling and palpitations  Gastrointestinal: Negative for constipation and diarrhea  Genitourinary: Negative for bladder incontinence and difficulty urinating  Musculoskeletal: Negative for back pain and difficulty walking  Skin: Negative for rash and ulcer  Neurological: Negative for dizziness and headaches  Hem/Lymph/Immuno: Negative for blood clots  Does not bruise/bleed easily  Psychiatric/Behavioral: Negative for confusion           Objective:  /81 (BP Location: Left arm, Patient Position: Sitting, Cuff Size: Adult)   Pulse 80   Ht 5' 2" (1 575 m)   Wt 84 kg (185 lb 3 2 oz)   LMP  (LMP Unknown)   BMI 33 87 kg/m²     Skin: no rashes, lesions, skin discolorations, lacerations  Vasculature: normal radial and ulnar pulse,  normal skin color, normal capillary refill in extremity, no upper extremity edema  Neurologic: Neurologic exam is normal throughout upper extremities, Awake, alert, and oriented x3, no apparent distress  Musculoskeletal:   left SHOULDER EXAM    Intact skin  No erythema, no induration, no signs of infection  No gross deformity    AROM FF: 180 degrees  AROM ER with arm at its side: 90 degrees  AROM IR: Lower thoracic        Supraspinatus strength testin/5  Infraspinatus strength testin/5    Belly press: negative  Bear Hug test: negative    Empty can: positive  Biceps TTP: positive  Arc test: positive  Carr positive        Tenderness to palpation of AC joint: negative  Pain with cross-body adduction: negative          Imaging:    See final report     Assessment/Plan:  1  Subacromial impingement, left  Clinical pression as the patient seems to have an exacerbation of her subacromial bursitis from lifting injury  Her exam in office today reveals 5 out of 5 strength with rotator cuff testing however reproduction of pain with impingement testing  We discussed treatment options and I advised patient that we can order MRI for further evaluation of the shoulder however I do not expect a large rotator cuff tear given her exam in office today  Did discuss that even if partial tear was observed treatment recommendations would be similar to what we are currently recommending  After discussion we opted to continue with home exercise program and follow-up in 1 month for repeat injection  Patient also exhibiting some neck tightness but no signs of radicular symptoms  I will provide her with home exercise program for the neck and she will continue for the next month as well

## 2023-02-27 ENCOUNTER — TELEPHONE (OUTPATIENT)
Dept: OBGYN CLINIC | Facility: MEDICAL CENTER | Age: 73
End: 2023-02-27

## 2023-02-27 NOTE — TELEPHONE ENCOUNTER
Patient called about a bill from 11/29/23 patient has medicare but has history of uterine cancer  Patient states she called her insurance and codes were incorrect  Please review when you get a chance   Thank you

## 2023-03-01 ENCOUNTER — OFFICE VISIT (OUTPATIENT)
Dept: OBGYN CLINIC | Facility: CLINIC | Age: 73
End: 2023-03-01

## 2023-03-01 ENCOUNTER — TELEPHONE (OUTPATIENT)
Dept: OBGYN CLINIC | Facility: HOSPITAL | Age: 73
End: 2023-03-01

## 2023-03-01 VITALS
HEART RATE: 71 BPM | BODY MASS INDEX: 34.16 KG/M2 | HEIGHT: 62 IN | SYSTOLIC BLOOD PRESSURE: 118 MMHG | WEIGHT: 185.6 LBS | DIASTOLIC BLOOD PRESSURE: 63 MMHG

## 2023-03-01 DIAGNOSIS — M75.42 SUBACROMIAL IMPINGEMENT, LEFT: Primary | ICD-10-CM

## 2023-03-01 DIAGNOSIS — M25.512 CHRONIC LEFT SHOULDER PAIN: ICD-10-CM

## 2023-03-01 DIAGNOSIS — G89.29 CHRONIC LEFT SHOULDER PAIN: ICD-10-CM

## 2023-03-01 RX ORDER — BUPIVACAINE HYDROCHLORIDE 2.5 MG/ML
4 INJECTION, SOLUTION INFILTRATION; PERINEURAL
Status: COMPLETED | OUTPATIENT
Start: 2023-03-01 | End: 2023-03-01

## 2023-03-01 RX ORDER — TRIAMCINOLONE ACETONIDE 40 MG/ML
40 INJECTION, SUSPENSION INTRA-ARTICULAR; INTRAMUSCULAR
Status: COMPLETED | OUTPATIENT
Start: 2023-03-01 | End: 2023-03-01

## 2023-03-01 RX ADMIN — TRIAMCINOLONE ACETONIDE 40 MG: 40 INJECTION, SUSPENSION INTRA-ARTICULAR; INTRAMUSCULAR at 13:32

## 2023-03-01 RX ADMIN — BUPIVACAINE HYDROCHLORIDE 4 ML: 2.5 INJECTION, SOLUTION INFILTRATION; PERINEURAL at 13:32

## 2023-03-01 NOTE — PROGRESS NOTES
Large joint arthrocentesis: L subacromial bursa  Universal Protocol:  Consent: Verbal consent obtained  Risks and benefits: risks, benefits and alternatives were discussed  Consent given by: patient    Supporting Documentation  Indications: pain   Procedure Details  Location: shoulder - L subacromial bursa  Needle size: 22 G  Ultrasound guidance: no  Approach: posterior  Medications administered: 4 mL bupivacaine 0 25 %; 40 mg triamcinolone acetonide 40 mg/mL    Patient tolerance: patient tolerated the procedure well with no immediate complications    Risks and benefits of CSI were discussed with patient extensively  Risks were highlighted which included but were not limited to infection, pain, local site swelling, and chance that injection may not be effective  Patient was also counseled regarding glucose elevation days after receiving CSI and to be mindful of diet and check sugars daily  Patient agreeable to proceed with CSI after counseling

## 2023-03-01 NOTE — TELEPHONE ENCOUNTER
Caller: Tin Radford     Doctor: Catherine Dunn     Reason for call: Patient called she is claustrophobic and was wondering if you can prescribe some valium before the MRI    [facility]     Call back#: [phone]

## 2023-03-01 NOTE — PROGRESS NOTES
Subjective:  Chief Complaint   Patient presents with   • Left Shoulder - Follow-up       Milena Galvez is a 68 y o  female presenting for follow-up visit in regards to left shoulder neck pain  Patient was provided with a home exercise program at last visit approximately 1 month ago  States that the home exercise program has provided her with significant relief in regards to neck pain  Her left shoulder continues to bother her  She would like a repeat corticosteroid injection in office today  Denies any limitation in regards to strength however continues with pain symptoms      The following portions of the patient's history were reviewed and updated as appropriate: allergies, current medications, past family history, past medical history, past social history, past surgical history and problem list     Occupation:      Review of Systems   Constitutional: Negative for fever  HENT: Negative for dental problem and headaches  Eyes: Negative for vision loss  Respiratory: Negative for cough and shortness of breath  Cardiovascular: Negative for leg swelling and palpitations  Gastrointestinal: Negative for constipation and diarrhea  Genitourinary: Negative for bladder incontinence and difficulty urinating  Musculoskeletal: Negative for back pain and difficulty walking  Skin: Negative for rash and ulcer  Neurological: Negative for dizziness and headaches  Hem/Lymph/Immuno: Negative for blood clots  Does not bruise/bleed easily  Psychiatric/Behavioral: Negative for confusion           Objective:  /63 (BP Location: Left arm, Patient Position: Sitting, Cuff Size: Adult)   Pulse 71   Ht 5' 2" (1 575 m)   Wt 84 2 kg (185 lb 9 6 oz)   LMP  (LMP Unknown)   BMI 33 95 kg/m²     Skin: no rashes, lesions, skin discolorations, lacerations  Vasculature: normal radial and ulnar pulse,  normal skin color, normal capillary refill in extremity, no upper extremity edema  Neurologic: Neurologic exam is normal throughout upper extremities, Awake, alert, and oriented x3, no apparent distress  Musculoskeletal:   left SHOULDER EXAM    Intact skin  No erythema, no induration, no signs of infection  No gross deformity    AROM FF: 180 degrees  AROM ER with arm at its side: 90 degrees  AROM IR: Lower thoracic        Supraspinatus strength testin/5  Infraspinatus strength testin/5    Belly press: negative  Bear Hug test: negative    Empty can: positive  Biceps TTP: positive  Arc test: positive  Carr positive    Is having some tenderness to palpation over the acromion posterior    Tenderness to palpation of AC joint: negative  Pain with cross-body adduction: negative          Imaging:    See final report     Assessment/Plan:  1  Subacromial impingement, left    - MRI shoulder left wo contrast; Future    2  Chronic left shoulder pain    - MRI shoulder left wo contrast; Future      Patient presenting today for continued pain in regards to left shoulder  Clinical suspicion is the patient has reaggravation of underlying subacromial bursitis  We discussed corticosteroid injection and repeat in office today which she is agreeable to  CSI performed without complication  Additionally I am recommending an MRI of the left shoulder as her pain symptoms have failed to completely resolve with corticosteroid injection and with home exercise program that was done for past 6 weeks    We will follow-up once MRI results return

## 2023-03-02 DIAGNOSIS — M75.42 SUBACROMIAL IMPINGEMENT, LEFT: Primary | ICD-10-CM

## 2023-03-02 RX ORDER — DIAZEPAM 2 MG/1
TABLET ORAL
Qty: 2 TABLET | Refills: 0 | Status: SHIPPED | OUTPATIENT
Start: 2023-03-02

## 2023-03-06 DIAGNOSIS — F41.9 ANXIETY: ICD-10-CM

## 2023-03-06 RX ORDER — AMITRIPTYLINE HYDROCHLORIDE 50 MG/1
50 TABLET, FILM COATED ORAL
Qty: 90 TABLET | Refills: 0 | Status: SHIPPED | OUTPATIENT
Start: 2023-03-06

## 2023-03-14 ENCOUNTER — TELEPHONE (OUTPATIENT)
Dept: OBGYN CLINIC | Facility: HOSPITAL | Age: 73
End: 2023-03-14

## 2023-03-14 NOTE — TELEPHONE ENCOUNTER
Caller: [facility]    Doctor/Office: fide    CB#: [phone]      What needs to be faxed: MRI script    ATTN to: Marshall Saldaña    Fax#: [phone]      Documents were successfully e-faxed

## 2023-03-15 NOTE — TELEPHONE ENCOUNTER
Caller: Gregorio([facility])    Doctor: Catherine Dunn    Reason for call: Patient was not able to go through the MRI procedure of her left shoulder due to her being claustrophobic  Patient was prescribed a medication to calm her which was the diazepam 2 mg but it did not work  Patient stated that she normally takes 10 mg  Patient did reschedule for March 29th but will need a new prescription for the medication she needs      Call back#: [phone]

## 2023-03-16 ENCOUNTER — TELEPHONE (OUTPATIENT)
Dept: OBGYN CLINIC | Facility: HOSPITAL | Age: 73
End: 2023-03-16

## 2023-03-16 DIAGNOSIS — G89.29 CHRONIC LEFT SHOULDER PAIN: Primary | ICD-10-CM

## 2023-03-16 DIAGNOSIS — M25.512 CHRONIC LEFT SHOULDER PAIN: Primary | ICD-10-CM

## 2023-03-16 RX ORDER — DIAZEPAM 10 MG/1
10 TABLET ORAL
Qty: 2 TABLET | Refills: 0 | Status: SHIPPED | OUTPATIENT
Start: 2023-03-16

## 2023-04-05 ENCOUNTER — OFFICE VISIT (OUTPATIENT)
Dept: OBGYN CLINIC | Facility: CLINIC | Age: 73
End: 2023-04-05

## 2023-04-05 VITALS
HEIGHT: 62 IN | DIASTOLIC BLOOD PRESSURE: 81 MMHG | HEART RATE: 90 BPM | BODY MASS INDEX: 34.41 KG/M2 | WEIGHT: 187 LBS | SYSTOLIC BLOOD PRESSURE: 136 MMHG

## 2023-04-05 DIAGNOSIS — M67.912 TENDINOPATHY OF ROTATOR CUFF, LEFT: ICD-10-CM

## 2023-04-05 DIAGNOSIS — M19.012 GLENOHUMERAL ARTHRITIS, LEFT: Primary | ICD-10-CM

## 2023-04-05 NOTE — PROGRESS NOTES
"     Subjective:  No chief complaint on file  Wilfrido Wild is a 68 y o  female presenting for follow-up visit in regards to left shoulder pain  Patient was referred for an MRI of the left shoulder at last visit which revealed degenerative changes in the glenohumeral joint with associated degenerative labral tear  Additionally MRI revealed rotator cuff tendinopathy with possible calcific tendinitis  Patient states that the previous injection had provided her more relief than the initial injection  She still notes that certain motions with hyperextension and flexion to exacerbate her pain symptoms  She has been continuing with a home exercise program however has been mindful of avoiding any activity that exacerbates the pain  She is interested in physical therapy    The following portions of the patient's history were reviewed and updated as appropriate: allergies, current medications, past family history, past medical history, past social history, past surgical history and problem list     Occupation:   [employer]    Review of Systems   Constitutional: Negative for fever  HENT: Negative for dental problem and headaches  Eyes: Negative for vision loss  Respiratory: Negative for cough and shortness of breath  Cardiovascular: Negative for leg swelling and palpitations  Gastrointestinal: Negative for constipation and diarrhea  Genitourinary: Negative for bladder incontinence and difficulty urinating  Musculoskeletal: Negative for back pain and difficulty walking  Skin: Negative for rash and ulcer  Neurological: Negative for dizziness and headaches  Hem/Lymph/Immuno: Negative for blood clots  Does not bruise/bleed easily  Psychiatric/Behavioral: Negative for confusion           Objective:  /81   Pulse 90   Ht 5' 2\" (1 575 m)   Wt 84 8 kg (187 lb)   LMP  (LMP Unknown)   BMI 34 20 kg/m²     Skin: no rashes, lesions, skin discolorations, lacerations  Vasculature: normal radial and " ulnar pulse,  normal skin color, normal capillary refill in extremity, no upper extremity edema  Neurologic: Neurologic exam is normal throughout upper extremities, Awake, alert, and oriented x3, no apparent distress  Musculoskeletal:   left SHOULDER EXAM    Intact skin  No erythema, no induration, no signs of infection  No gross deformity    AROM FF: 180 degrees  AROM ER with arm at its side: 90 degrees  AROM IR: Lower thoracic      There is some crepitus in the anterior joint line with the internal and external rotation and associated pain    Supraspinatus strength testin/5  Infraspinatus strength testin/5    Belly press: negative  Bear Hug test: negative    Empty can: positive  Biceps TTP: positive  Arc test: positive  Carr positive    Is having some tenderness to palpation over the acromion posterior    Tenderness to palpation of AC joint: negative  Pain with cross-body adduction: negative          Imaging:    See final report     Assessment/Plan:  1  Glenohumeral arthritis, left    - Ambulatory Referral to Physical Therapy; Future    2  Tendinopathy of rotator cuff, left    - Ambulatory Referral to Physical Therapy; Future        > 30 min devoted to review of previous, pertinent medical records, imaging, discussion of treatment options, counseling and documentation  We discussed the MRI results of the left shoulder  Clinical impression is that patient's left shoulder pain is multifactorial secondary to glenohumeral arthritis and rotator cuff tendinopathy  We discussed the nature of rotator cuff tendinopathy and glenohumeral arthritis at length and detailed the treatment approach  Recommending formal PT- Rx provided for PT  We discussed trialing a ultrasound-guided glenohumeral injection to see if this provides her any additional relief in regards to pain    Patient will be scheduled for follow-up appointment   Should sx's worsen or any concerns arise, they were advised to follow up sooner or seek more immediate medical attention  All of the patient's concerns were addressed and questions answered  They verbalized agreement with and understanding of the treatment plan

## 2023-04-24 ENCOUNTER — APPOINTMENT (OUTPATIENT)
Dept: PHYSICAL THERAPY | Facility: CLINIC | Age: 73
End: 2023-04-24

## 2023-04-27 ENCOUNTER — OFFICE VISIT (OUTPATIENT)
Dept: PHYSICAL THERAPY | Facility: CLINIC | Age: 73
End: 2023-04-27

## 2023-04-27 DIAGNOSIS — M19.012 GLENOHUMERAL ARTHRITIS, LEFT: Primary | ICD-10-CM

## 2023-04-27 DIAGNOSIS — M67.912 TENDINOPATHY OF ROTATOR CUFF, LEFT: ICD-10-CM

## 2023-04-27 NOTE — PROGRESS NOTES
Daily Note     Today's date: 2023  Patient name: Marilu Marie  : 1950  MRN: [id number]  Referring provider: Lee Ann Claire DO  Dx:   Encounter Diagnosis     ICD-10-CM    1  Glenohumeral arthritis, left  M19 012       2  Tendinopathy of rotator cuff, left  M67 912                      Subjective: Patient states that she is currently painfree  She will still get shoulder pain if she reaches overhead  Her HEP is going well  She wishes to be placed on hold with her HEP  Objective: Shoulder AROM is painful in all planes at end range  Abduction strength is increased to 22 lbs and it is painfree  Assessment: Tolerated treatment well  Patient exhibited good technique with therapeutic exercises      Plan: Patient will continue with her HEP  PT will reevaluate her on 23       Precautions: Chronic Pain     Daily Treatment Diary      Manual             PROM                STM               Exercise Diary                T-Band Mid Row L3 1/10  L4 1-2/10 TIW HEP           T-Band Low Row L2 1/10 L4 1-2/10 TIW HEP           T-Band Biceps L2 1/10  L4 1-2/10 TIW HEP           T-Band Triceps L1 1/10  L4 1-2/10 TIW HEP           T-Band SA Pulldowns               T-Band IR L2 1/10  L4 1-2/10 TIW HEP           T-Band ER L1 1/10  L4 1-2/10 TIW HEP           Forward/Lateral Raises               SL ABD  0# 1-2/10 TIW HEP  1# 1-2/10 TIW HEP           [place]               Ball Stabilization                               Modalities                CP after ex

## 2023-04-28 ENCOUNTER — OFFICE VISIT (OUTPATIENT)
Dept: URGENT CARE | Facility: MEDICAL CENTER | Age: 73
End: 2023-04-28

## 2023-04-28 VITALS
SYSTOLIC BLOOD PRESSURE: 147 MMHG | RESPIRATION RATE: 20 BRPM | TEMPERATURE: 97.5 F | BODY MASS INDEX: 34.42 KG/M2 | OXYGEN SATURATION: 97 % | HEART RATE: 110 BPM | DIASTOLIC BLOOD PRESSURE: 75 MMHG | WEIGHT: 188.2 LBS

## 2023-04-28 DIAGNOSIS — T14.8XXA BRUISE: Primary | ICD-10-CM

## 2023-04-28 NOTE — PATIENT INSTRUCTIONS
Ice/heat as needed  Monitor symptoms and bruise  Follow up with PCP in 3-5 days  Proceed to  ER if symptoms worsen     If numbness or tingling, color changes, loss of sensation, swelling, or warmth of hand/arm, go to ER

## 2023-04-28 NOTE — PROGRESS NOTES
[facility]        NAME: Natasha Carrasco is a 68 y o  female  : 1950    MRN: [id number]  DATE: 2023  TIME: 6:57 PM    Assessment and Plan   Bruise [T14  8XXA]  1  Bruise              Patient Instructions     Ice/heat as needed  Monitor symptoms and bruise  Follow up with PCP in 3-5 days  Proceed to  ER if symptoms worsen  If numbness or tingling, color changes, loss of sensation, swelling, or warmth of hand/arm, go to ER  Chief Complaint     Chief Complaint   Patient presents with   • Bleeding/Bruising     Right arm bruise  Noticed today  No injury  History of Present Illness       Patient, who is taking 325 mg of aspirin and on fish oil, noticed a bruise at her right elbow earlier today  She was cleaning today and unsure if she bumped it  She stated it is not very painful  It is not warm or swollen  She denies any numbness, tingling, decreased  strength, weakness, color changes, or loss of sensation  She has never had a blood clot before  Review of Systems   Review of Systems   Constitutional: Negative for activity change, chills, diaphoresis, fatigue and fever  Respiratory: Negative for chest tightness, shortness of breath and wheezing  Cardiovascular: Negative for chest pain, palpitations and leg swelling  Genitourinary: Negative  Skin: Negative  Neurological: Negative for dizziness, tremors, syncope, weakness, light-headedness and numbness  Hematological: Bruises/bleeds easily           Current Medications       Current Outpatient Medications:   •  amitriptyline (ELAVIL) 50 mg tablet, Take 1 tablet (50 mg total) by mouth daily at bedtime, Disp: 90 tablet, Rfl: 0  •  aspirin (ECOTRIN) 325 mg EC tablet, Take 1 tablet (325 mg total) by mouth 2 (two) times a day, Disp: 60 tablet, Rfl: 0  •  Biotin 2500 MCG CAPS, Take 1 capsule by mouth 2 (two) times a day , Disp: , Rfl:   •  calcium carbonate (OS-ANTHONY) 600 MG tablet, Take 1,200 mg by mouth 2 (two) times a day with meals, Disp: , Rfl:   •  Cholecalciferol (VITAMIN D-3 PO), Take 1 tablet by mouth daily, Disp: , Rfl:   •  clobetasol (TEMOVATE) 0 05 % cream, Apply topically 2 (two) times a day, Disp: 30 g, Rfl: 5  •  Cyanocobalamin (VITAMIN B-12 CR PO), Take 1 tablet by mouth daily, Disp: , Rfl:   •  Diclofenac Sodium (VOLTAREN) 1 %, Apply 2 g topically 4 (four) times a day, Disp: 200 g, Rfl: 0  •  dicyclomine (BENTYL) 10 mg capsule, Take 1 capsule (10 mg total) by mouth 2 (two) times a day, Disp: 180 capsule, Rfl: 3  •  EPINEPHrine (EPIPEN) 0 3 mg/0 3 mL SOAJ, Inject 0 3 mL (0 3 mg total) into a muscle as needed for anaphylaxis, Disp: 2 each, Rfl: 0  •  Evening Primrose Oil 1000 MG CAPS, Take 1 capsule (1,000 mg total) by mouth in the morning, Disp: , Rfl: 0  •  fexofenadine (ALLEGRA) 180 MG tablet, Take 180 mg by mouth daily, Disp: , Rfl:   •  gabapentin (NEURONTIN) 300 mg capsule, Take 1 tablet in the morning, 1 tablet afternoon and 2 bedtime, Disp: 360 capsule, Rfl: 3  •  hydrochlorothiazide (HYDRODIURIL) 25 mg tablet, Take 1 tablet (25 mg total) by mouth daily, Disp: 20 tablet, Rfl: 0  •  montelukast (SINGULAIR) 10 mg tablet, Take 1 tablet (10 mg total) by mouth daily at bedtime, Disp: 90 tablet, Rfl: 3  •  Multiple Vitamins-Minerals (PRESERVISION AREDS 2 PO), Take by mouth in the morning, Disp: , Rfl:   •  Omega-3 Fatty Acids (FISH OIL) 1,000 mg, Take 1,000 mg by mouth 3 (three) times a day, Disp: , Rfl:   •  omeprazole (PriLOSEC) 40 MG capsule, Take 1 capsule (40 mg total) by mouth daily, Disp: 90 capsule, Rfl: 3  •  rosuvastatin (CRESTOR) 10 MG tablet, Take 1 tablet (10 mg total) by mouth daily, Disp: 90 tablet, Rfl: 3  •  simvastatin (ZOCOR) 10 mg tablet, Take 1 tablet (10 mg total) by mouth daily at bedtime, Disp: 90 tablet, Rfl: 2  •  diazepam (VALIUM) 10 mg tablet, Take 1 tablet (10 mg total) by mouth once in imaging for anxiety for up to 1 dose, Disp: 2 tablet, Rfl: 0  •  diazepam (VALIUM) 2 mg tablet, 1 tablet to be taken 15-20 minutes before MRI study, Disp: 2 tablet, Rfl: 0  •  hydrOXYzine HCL (ATARAX) 10 mg tablet, Take 2 tablets (20 mg total) by mouth daily at bedtime Take 2 tablets at bedtime, Disp: 180 tablet, Rfl: 1  •  losartan (COZAAR) 100 MG tablet, Take 1 tablet (100 mg total) by mouth daily, Disp: 90 tablet, Rfl: 3  •  methocarbamol (ROBAXIN) 750 mg tablet, Take 1 tablet (750 mg total) by mouth every 8 (eight) hours, Disp: 270 tablet, Rfl: 3    Current Allergies     Allergies as of 04/28/2023 - Reviewed 04/28/2023   Allergen Reaction Noted   • Bee venom Shortness Of Breath 08/11/2017   • Chlorhexidine Rash 09/04/2020   • Egg yolk - food allergy Hives 11/07/2013   • Iodinated contrast media Hives 04/02/2016   • Penicillins Hives 04/02/2016   • Lidocaine Other (See Comments) 10/14/2021   • Allevyn adhesive [wound dressings] Hives and Rash 06/03/2022   • Bactrim [sulfamethoxazole-trimethoprim] Rash 03/08/2019   • Codeine Other (See Comments) 04/02/2016   • Latex Rash 12/08/2016   • Medical tape Blisters, Hives, Itching, and Rash 05/12/2021   • Other Rash 04/13/2017   • Oxybutynin Rash 04/22/2019   • Pentosan polysulfate Rash 11/05/2018   • Povidone iodine Rash 09/29/2020            The following portions of the patient's history were reviewed and updated as appropriate: allergies, current medications, past family history, past medical history, past social history, past surgical history and problem list      Past Medical History:   Diagnosis Date   • Abnormal findings on diagnostic imaging of breast    • Abnormal Pap smear of cervix    • Arthritis    • Chronic pain disorder    • Extremity pain    • Fibrocystic breast disease    • Foot pain    • GERD (gastroesophageal reflux disease)    • Hyperlipidemia    • Hypertension    • Interstitial cystitis    • Joint pain    • Low back pain    • Osteoarthritis    • Osteopenia    • Uncomplicated opioid dependence ([facility] [address] ) 3/1/2022       Past Surgical History: Procedure Laterality Date   • APPENDECTOMY     • BACK SURGERY     • BREAST EXCISIONAL BIOPSY Left 1996    benign   • CARPAL BOSS EXCISION     • CHOLECYSTECTOMY     • DIAGNOSTIC LAPAROSCOPY     • FOOT SURGERY     • FRACTURE SURGERY     • HAND SURGERY  5/2017   • HYSTERECTOMY      age 33   • HYSTEROSCOPY     • JOINT REPLACEMENT     • KIDNEY SURGERY Left    • KNEE ARTHROSCOPY Bilateral    • LAMINECTOMY     • LAPAROSCOPY      hynecologic with adhesions   • MYOMECTOMY     • OOPHORECTOMY Bilateral     age 33   • ORTHOPEDIC SURGERY     • NY CAR IMPLTJ NSTIM ELTRDS PLATE/PADDLE EDRL N/A 05/18/2017    Procedure: PLACEMENT OF THORACIC SPINAL CORD STIMULATOR WITH LEFT BUTTOCK IMPLANTABLE PULSE GENERATOR (IMPULSE MONITORING-MOTORS (TCEMEP), EMG, SSEP); Surgeon: Tre Almonte MD;  Location: [facility];  Service: Neurosurgery   • SPINAL CORD STIMULATOR IMPLANT Left 09/29/2020    Procedure: LAMINECTOMY THORACIC , REMOVAL OF SCS LEADS AND GENERATOR;  Surgeon: Hood Holguin MD;  Location: [facility];  Service: Neurosurgery   • SPINAL STIMULATOR PLACEMENT  05/2017   • TONSILLECTOMY AND ADENOIDECTOMY      onset: unknown   • TOTAL KNEE ARTHROPLASTY Right        Family History   Problem Relation Age of Onset   • Bone cancer Mother    • Cancer Mother    • Asthma Mother    • Brain cancer Father    • Stroke Father         stroke sydrome   • Transient ischemic attack Father    • Depression Sister    • Migraines Sister    • Asthma Sister    • Asthma Sister    • No Known Problems Maternal Grandmother    • No Known Problems Maternal Grandfather    • Diabetes Paternal Grandmother    • No Known Problems Paternal Grandfather    • Heart disease Brother    • Diabetes Brother    • Heart attack Brother    • Colon cancer Brother 77   • No Known Problems Brother    • Breast cancer Maternal Aunt 79   • Breast cancer additional onset Maternal Aunt 72   • Diabetes Sister          Medications have been verified          Objective   /75   Pulse (!) 110   Temp 97 5 °F (36 4 °C)   Resp 20   Wt 85 4 kg (188 lb 3 2 oz)   LMP  (LMP Unknown)   SpO2 97%   BMI 34 42 kg/m²        Physical Exam     Physical Exam  Constitutional:       Appearance: Normal appearance  HENT:      Head: Normocephalic  Eyes:      Extraocular Movements: Extraocular movements intact  Pupils: Pupils are equal, round, and reactive to light  Cardiovascular:      Rate and Rhythm: Normal rate and regular rhythm  Pulses: Normal pulses  Heart sounds: Normal heart sounds  Pulmonary:      Effort: Pulmonary effort is normal       Breath sounds: Normal breath sounds  Musculoskeletal:         General: No swelling, tenderness, deformity or signs of injury  Normal range of motion  Cervical back: Normal range of motion  No rigidity or tenderness  Skin:     General: Skin is warm and dry  Capillary Refill: Capillary refill takes less than 2 seconds  Findings: Bruising (right elbow  is purple/yellow, not warm, no swelling, not tender to palpation) present  No erythema or lesion  Neurological:      General: No focal deficit present  Mental Status: She is alert and oriented to person, place, and time  Mental status is at baseline  Psychiatric:         Mood and Affect: Mood normal          Behavior: Behavior normal          Thought Content:  Thought content normal          Judgment: Judgment normal

## 2023-05-01 ENCOUNTER — APPOINTMENT (OUTPATIENT)
Dept: PHYSICAL THERAPY | Facility: CLINIC | Age: 73
End: 2023-05-01
Payer: MEDICARE

## 2023-05-03 ENCOUNTER — TELEPHONE (OUTPATIENT)
Dept: FAMILY MEDICINE CLINIC | Facility: CLINIC | Age: 73
End: 2023-05-03

## 2023-05-03 NOTE — TELEPHONE ENCOUNTER
Pt stated she will not be able to make that appointment due to having work   Did not give me any dates or times that she would be available

## 2023-05-03 NOTE — TELEPHONE ENCOUNTER
Pt LM regarding needing an appt from UC visit    She was seen for discoloration of the R arm  Because of her work she is asking for an appt AFTER 1:30   Thank you    Please advise

## 2023-05-04 ENCOUNTER — APPOINTMENT (OUTPATIENT)
Dept: PHYSICAL THERAPY | Facility: CLINIC | Age: 73
End: 2023-05-04
Payer: MEDICARE

## 2023-05-05 ENCOUNTER — OFFICE VISIT (OUTPATIENT)
Dept: OBGYN CLINIC | Facility: CLINIC | Age: 73
End: 2023-05-05

## 2023-05-05 ENCOUNTER — OFFICE VISIT (OUTPATIENT)
Dept: FAMILY MEDICINE CLINIC | Facility: CLINIC | Age: 73
End: 2023-05-05

## 2023-05-05 VITALS
RESPIRATION RATE: 18 BRPM | TEMPERATURE: 98.6 F | SYSTOLIC BLOOD PRESSURE: 126 MMHG | HEIGHT: 62 IN | DIASTOLIC BLOOD PRESSURE: 70 MMHG | WEIGHT: 187.2 LBS | HEART RATE: 70 BPM | BODY MASS INDEX: 34.45 KG/M2

## 2023-05-05 VITALS
DIASTOLIC BLOOD PRESSURE: 83 MMHG | WEIGHT: 186.1 LBS | HEIGHT: 62 IN | SYSTOLIC BLOOD PRESSURE: 131 MMHG | HEART RATE: 84 BPM | BODY MASS INDEX: 34.24 KG/M2 | RESPIRATION RATE: 18 BRPM

## 2023-05-05 DIAGNOSIS — M19.012 GLENOHUMERAL ARTHRITIS, LEFT: Primary | ICD-10-CM

## 2023-05-05 DIAGNOSIS — G62.9 NEUROPATHY: ICD-10-CM

## 2023-05-05 DIAGNOSIS — M15.9 OSTEOARTHRITIS OF MULTIPLE JOINTS, UNSPECIFIED OSTEOARTHRITIS TYPE: ICD-10-CM

## 2023-05-05 DIAGNOSIS — T14.8XXA BRUISING: Primary | ICD-10-CM

## 2023-05-05 DIAGNOSIS — I10 PRIMARY HYPERTENSION: ICD-10-CM

## 2023-05-05 RX ORDER — LOSARTAN POTASSIUM AND HYDROCHLOROTHIAZIDE 25; 100 MG/1; MG/1
1 TABLET ORAL DAILY
Qty: 90 TABLET | Refills: 3 | Status: SHIPPED | OUTPATIENT
Start: 2023-05-05

## 2023-05-05 RX ORDER — TRIAMCINOLONE ACETONIDE 40 MG/ML
40 INJECTION, SUSPENSION INTRA-ARTICULAR; INTRAMUSCULAR
Status: COMPLETED | OUTPATIENT
Start: 2023-05-05 | End: 2023-05-05

## 2023-05-05 RX ORDER — AMITRIPTYLINE HYDROCHLORIDE 50 MG/1
50 TABLET, FILM COATED ORAL
Qty: 90 TABLET | Refills: 0 | Status: SHIPPED | OUTPATIENT
Start: 2023-05-05

## 2023-05-05 RX ADMIN — TRIAMCINOLONE ACETONIDE 40 MG: 40 INJECTION, SUSPENSION INTRA-ARTICULAR; INTRAMUSCULAR at 12:28

## 2023-05-05 NOTE — PROGRESS NOTES
Name: Florentino Keller      : 1950      MRN: [id number]  Encounter Provider: Denae Garcia DO  Encounter Date: 2023   Encounter department: [address] [place] [address] Road     1  Bruising  -     CBC and Platelet; Future  Stop aspirin  Check cbc with upcoming labs    2  Neuropathy  -     amitriptyline (ELAVIL) 50 mg tablet; Take 1 tablet (50 mg total) by mouth daily at bedtime  Pt has followup with neurology and is ok on current rx at [facility]    3  Osteoarthritis of multiple joints, unspecified osteoarthritis type  Has appt today for injection for shoulder which recently has been causing more issues     HTN  Change to Hyzaar since tolerating separate pills         Depression Screening and Follow-up Plan: Patient was screened for depression during today's encounter  They screened negative with a PHQ-2 score of 0  Rto July   Subjective      HPI   Pt was on [facility] for bruising of elbow area that developed for unknown reason Pt was put on BID aspirins awhile ago and has noted some tendency to bruise more easily NO trauma No pain Sxs have resolved since seen in [facility] No chest pain or sob No hx of known cardiac dz   Review of Systems   Constitutional: Negative for chills and fever  HENT: Negative  Eyes: Negative for visual disturbance  Respiratory: Negative for cough and shortness of breath  Cardiovascular: Negative for chest pain, palpitations and leg swelling  Gastrointestinal: Negative for abdominal distention and abdominal pain  Musculoskeletal: Positive for arthralgias  Neurological: Negative for dizziness, light-headedness and headaches  Psychiatric/Behavioral: Negative for sleep disturbance  The patient is not nervous/anxious          Current Outpatient Medications on File Prior to Visit   Medication Sig    Biotin 2500 MCG CAPS Take 1 capsule by mouth 2 (two) times a day     calcium carbonate (OS-ANTHONY) 600 MG tablet Take 1,200 mg by mouth 2 (two) times a day with meals    Cholecalciferol (VITAMIN D-3 PO) Take 1 tablet by mouth daily    Cyanocobalamin (VITAMIN B-12 CR PO) Take 1 tablet by mouth daily    Diclofenac Sodium (VOLTAREN) 1 % Apply 2 g topically 4 (four) times a day    dicyclomine (BENTYL) 10 mg capsule Take 1 capsule (10 mg total) by mouth 2 (two) times a day    Evening Primrose Oil 1000 MG CAPS Take 1 capsule (1,000 mg total) by mouth in the morning    fexofenadine (ALLEGRA) 180 MG tablet Take 180 mg by mouth daily    gabapentin (NEURONTIN) 300 mg capsule Take 1 tablet in the morning, 1 tablet afternoon and 2 bedtime    hydrOXYzine HCL (ATARAX) 10 mg tablet Take 2 tablets (20 mg total) by mouth daily at bedtime Take 2 tablets at bedtime    methocarbamol (ROBAXIN) 750 mg tablet Take 1 tablet (750 mg total) by mouth every 8 (eight) hours    montelukast (SINGULAIR) 10 mg tablet Take 1 tablet (10 mg total) by mouth daily at bedtime    Multiple Vitamins-Minerals (PRESERVISION AREDS 2 PO) Take by mouth in the morning    Omega-3 Fatty Acids (FISH OIL) 1,000 mg Take 1,000 mg by mouth 3 (three) times a day    omeprazole (PriLOSEC) 40 MG capsule Take 1 capsule (40 mg total) by mouth daily    rosuvastatin (CRESTOR) 10 MG tablet Take 1 tablet (10 mg total) by mouth daily    simvastatin (ZOCOR) 10 mg tablet Take 1 tablet (10 mg total) by mouth daily at bedtime    [DISCONTINUED] amitriptyline (ELAVIL) 50 mg tablet Take 1 tablet (50 mg total) by mouth daily at bedtime    [DISCONTINUED] aspirin (ECOTRIN) 325 mg EC tablet Take 1 tablet (325 mg total) by mouth 2 (two) times a day    [DISCONTINUED] hydrochlorothiazide (HYDRODIURIL) 25 mg tablet Take 1 tablet (25 mg total) by mouth daily    [DISCONTINUED] losartan (COZAAR) 100 MG tablet Take 1 tablet (100 mg total) by mouth daily    EPINEPHrine (EPIPEN) 0 3 mg/0 3 mL SOAJ Inject 0 3 mL (0 3 mg total) into a muscle as needed for anaphylaxis (Patient not taking: Reported on 5/5/2023)    [DISCONTINUED] clobetasol "(TEMOVATE) 0 05 % cream Apply topically 2 (two) times a day (Patient not taking: Reported on 5/5/2023)    [DISCONTINUED] diazepam (VALIUM) 10 mg tablet Take 1 tablet (10 mg total) by mouth once in imaging for anxiety for up to 1 dose    [DISCONTINUED] diazepam (VALIUM) 2 mg tablet 1 tablet to be taken 15-20 minutes before MRI study       Objective     /70   Pulse 70   Temp 98 6 °F (37 °C) (Temporal)   Resp 18   Ht 5' 2\" (1 575 m)   Wt 84 9 kg (187 lb 3 2 oz)   LMP  (LMP Unknown)   BMI 34 24 kg/m²     Physical Exam  Vitals reviewed  Constitutional:       General: She is not in acute distress  Appearance: Normal appearance  She is not ill-appearing, toxic-appearing or diaphoretic  HENT:      Head: Normocephalic and atraumatic  Right Ear: External ear normal       Left Ear: External ear normal       Nose: Nose normal       Mouth/Throat:      Mouth: Mucous membranes are dry  Cardiovascular:      Rate and Rhythm: Normal rate  Pulmonary:      Effort: Pulmonary effort is normal       Breath sounds: Normal breath sounds  Musculoskeletal:      Cervical back: Normal range of motion and neck supple  Skin:     General: Skin is dry  Coloration: Skin is not pale  Findings: No bruising  Neurological:      General: No focal deficit present  Mental Status: She is alert and oriented to person, place, and time  Mental status is at baseline  Cranial Nerves: No cranial nerve deficit  Psychiatric:         Mood and Affect: Mood normal          Behavior: Behavior normal          Thought Content:  Thought content normal          Judgment: Judgment normal        Austyn Muñoz DO  "

## 2023-05-05 NOTE — PROGRESS NOTES
Large joint arthrocentesis: L glenohumeral  Universal Protocol:  Consent: Verbal consent obtained  Risks and benefits: risks, benefits and alternatives were discussed  Consent given by: patient    Supporting Documentation  Indications: pain   Procedure Details  Location: shoulder - L glenohumeral  Needle size: 22 G  Ultrasound guidance: yes  Approach: posterior  Medications administered: 40 mg triamcinolone acetonide 40 mg/mL (4 ml ropivicaine)    Patient tolerance: patient tolerated the procedure well with no immediate complications    Risks and benefits of CSI were discussed with patient extensively  Risks were highlighted which included but were not limited to infection, pain, local site swelling, and chance that injection may not be effective  Patient was also counseled regarding glucose elevation days after receiving CSI and to be mindful of diet and check sugars daily  Patient agreeable to proceed with CSI after counseling       Ultrasound images were saved to the [employer] and will be uploaded to PACS system
I personally performed the service described in the documentation recorded by the scribe in my presence, and it accurately and completely records my words and actions.

## 2023-05-08 ENCOUNTER — APPOINTMENT (OUTPATIENT)
Dept: PHYSICAL THERAPY | Facility: CLINIC | Age: 73
End: 2023-05-08
Payer: MEDICARE

## 2023-05-11 ENCOUNTER — APPOINTMENT (OUTPATIENT)
Dept: PHYSICAL THERAPY | Facility: CLINIC | Age: 73
End: 2023-05-11
Payer: MEDICARE

## 2023-05-15 ENCOUNTER — APPOINTMENT (OUTPATIENT)
Dept: PHYSICAL THERAPY | Facility: CLINIC | Age: 73
End: 2023-05-15
Payer: MEDICARE

## 2023-05-18 ENCOUNTER — APPOINTMENT (OUTPATIENT)
Dept: PHYSICAL THERAPY | Facility: CLINIC | Age: 73
End: 2023-05-18
Payer: MEDICARE

## 2023-05-21 NOTE — PROGRESS NOTES
PT Re-Evaluation     Today's date: 2023  Patient name: Jillian Bermudez  : 1950  MRN: [id number]  Referring provider: Ezra Caba DO  Dx:   Encounter Diagnosis     ICD-10-CM    1  Tendinopathy of rotator cuff, left  M67 912       2  Glenohumeral arthritis, left  M19 012                      Assessment  Assessment details:   CURRENT FUNCTIONAL STATUS    Dressing tolerance: Mild pain doffing shirts  Able to reach overhead  Able to lift several lbs to shoulder level  LONG TERM GOALS (DISCHARGE)    Shoulder AROM: WNL in all planes  -met  Shoulder Strength: F=28 lbs, ABD=26 lbs, ER=16 lbs, IR=28 lbs  -met   Decrease pain to 0-3/10 -met   Dressing tolerance: Significant pain doffing shirts -partially met   Able to reach overhead -met    Able to lift 3-4 lbs to shoulder level -met    Understanding of Dx/Px/POC: good   Prognosis: good    Goals  See assessment details above  Plan  Plan details: The patient has shown good improvement in PT demonstrating decreased pain, increased range of motion, increased strength, and increased tolerance to activity  Goals have been met, the patient is satisfied with her current status, and she has been discharged to a home exercise program           Planned therapy interventions: therapeutic exercise, therapeutic activities, neuromuscular re-education, home exercise program and self care        Subjective Evaluation    History of Present Illness  Mechanism of injury: Subjective: The patient's left shoulder pain is mild and intermittent  She is pleased with her current status and requests discharge to a [facility]  Pain  Current pain ratin  At best pain ratin  At worst pain rating: 3    Hand dominance: right    Patient Goals  Patient goals for therapy: decreased pain  Patient goal: Improved ability to reach overhead, to lift, and to doff a shirt          Objective     General Comments:      Shoulder Comments   CURRENT OBJECTIVE MEASUREMENTS    Shoulder AROM: WNL and painfree  Shoulder Strength: F=34 lbs, ABD=26 lbs, ER=18 lbs, IR=28 lbs                Precautions: Chronic Pain     Daily Treatment Diary      Manual  4/20 4/27 5/22         PROM                STM               Exercise Diary                T-Band Mid Row L3 1/10  L4 1-2/10 TIW HEP L5 1-2/10 TIW HEP         T-Band Low Row L2 1/10 L4 1-2/10 TIW HEP  L5 1-2/10 TIW HEP         T-Band Biceps L2 1/10  L4 1-2/10 TIW HEP  L5 1-2/10 TIW HEP         T-Band Triceps L1 1/10  L4 1-2/10 TIW HEP  L5 1-2/10 TIW HEP         T-Band SA Pulldowns               T-Band IR L2 1/10  L4 1-2/10 TIW HEP  L5 1-2/10 TIW HEP         T-Band ER L1 1/10  L4 1-2/10 TIW HEP  L5 1-2/10 TIW HEP         Forward/Lateral Raises               SL ABD  0# 1-2/10 TIW HEP  1# 1-2/10 TIW HEP  1# 1-2/10 TIW HEP         [place]               Ball Stabilization                               Modalities                CP after ex

## 2023-05-22 ENCOUNTER — EVALUATION (OUTPATIENT)
Dept: PHYSICAL THERAPY | Facility: CLINIC | Age: 73
End: 2023-05-22

## 2023-05-22 DIAGNOSIS — G62.9 NEUROPATHY: ICD-10-CM

## 2023-05-22 DIAGNOSIS — M67.912 TENDINOPATHY OF ROTATOR CUFF, LEFT: Primary | ICD-10-CM

## 2023-05-22 DIAGNOSIS — M19.012 GLENOHUMERAL ARTHRITIS, LEFT: ICD-10-CM

## 2023-05-22 RX ORDER — AMITRIPTYLINE HYDROCHLORIDE 50 MG/1
50 TABLET, FILM COATED ORAL
Qty: 90 TABLET | Refills: 0 | Status: SHIPPED | OUTPATIENT
Start: 2023-05-22

## 2023-05-24 ENCOUNTER — TELEPHONE (OUTPATIENT)
Dept: OBGYN CLINIC | Facility: HOSPITAL | Age: 73
End: 2023-05-24

## 2023-05-24 NOTE — TELEPHONE ENCOUNTER
Pt contacted Call Center requested refill of their medication  Medication Name: Diclofenac Sodium (VOLTAREN)     Dosage of Med: 1%      Frequency of Med: apply 2g topically 4 times a day      Remaining Medication: close to out      Pharmacy and Location: [address] [place] [address]        Pt  Preferred Callback Phone Number:       Thank you  PLEASE ADVISE PATIENTS:    REFILL REQUESTS WILL BE PROCESSED WITHIN 24-48 HOURS

## 2023-05-25 ENCOUNTER — HOSPITAL ENCOUNTER (OUTPATIENT)
Dept: CT IMAGING | Facility: HOSPITAL | Age: 73
Discharge: HOME | End: 2023-05-25

## 2023-05-25 ENCOUNTER — APPOINTMENT (OUTPATIENT)
Dept: PHYSICAL THERAPY | Facility: CLINIC | Age: 73
End: 2023-05-25
Payer: MEDICARE

## 2023-05-25 DIAGNOSIS — M54.16 LUMBAR RADICULOPATHY: ICD-10-CM

## 2023-05-25 DIAGNOSIS — G89.4 CHRONIC PAIN SYNDROME: ICD-10-CM

## 2023-05-25 DIAGNOSIS — M79.604 LEG PAIN, BILATERAL: ICD-10-CM

## 2023-05-25 DIAGNOSIS — M79.605 LEG PAIN, BILATERAL: ICD-10-CM

## 2023-05-26 DIAGNOSIS — M17.12 PRIMARY OSTEOARTHRITIS OF LEFT KNEE: ICD-10-CM

## 2023-05-26 NOTE — TELEPHONE ENCOUNTER
Caller: Patient    Doctor: Saji Mail    Reason for call: Patient states that this will be covered by her insurance if we send in a Rx to the pharmacy even though it is OTC  Thank you!     Call back#: [phone]

## 2023-05-27 DIAGNOSIS — M17.12 PRIMARY OSTEOARTHRITIS OF LEFT KNEE: ICD-10-CM

## 2023-05-30 ENCOUNTER — APPOINTMENT (OUTPATIENT)
Dept: PHYSICAL THERAPY | Facility: CLINIC | Age: 73
End: 2023-05-30
Payer: MEDICARE

## 2023-05-30 DIAGNOSIS — M17.12 PRIMARY OSTEOARTHRITIS OF LEFT KNEE: ICD-10-CM

## 2023-06-01 ENCOUNTER — TELEPHONE (OUTPATIENT)
Dept: FAMILY MEDICINE CLINIC | Facility: CLINIC | Age: 73
End: 2023-06-01

## 2023-06-01 DIAGNOSIS — N28.89 RENAL MASS: Primary | ICD-10-CM

## 2023-06-01 NOTE — TELEPHONE ENCOUNTER
I looked at CT ?  Area on kidney so she should have Renal Ultrasound to start I put in order and once that is resulted may need further followup/workup from there

## 2023-06-01 NOTE — TELEPHONE ENCOUNTER
PT REQUESTING AN APPT EITHER NEXT MONDAY (6/5) OR NEXT FRIDAY (6/9) IN THE AFTERNOON TO DISCUSS CT RESULTS (ORDERED BY ANOTHER PHYSC)  FINDINGS SUGGEST MASS/CANCER  PLEASE ADVISE

## 2023-06-05 ENCOUNTER — TELEPHONE (OUTPATIENT)
Dept: FAMILY MEDICINE CLINIC | Facility: CLINIC | Age: 73
End: 2023-06-05

## 2023-06-05 ENCOUNTER — HOSPITAL ENCOUNTER (OUTPATIENT)
Dept: ULTRASOUND IMAGING | Facility: HOSPITAL | Age: 73
Discharge: HOME | End: 2023-06-05
Payer: MEDICARE

## 2023-06-05 DIAGNOSIS — N28.89 RENAL MASS: ICD-10-CM

## 2023-06-05 PROCEDURE — 76775 US EXAM ABDO BACK WALL LIM: CPT

## 2023-06-07 ENCOUNTER — OFFICE VISIT (OUTPATIENT)
Dept: OBGYN CLINIC | Facility: CLINIC | Age: 73
End: 2023-06-07
Payer: MEDICARE

## 2023-06-07 VITALS
RESPIRATION RATE: 16 BRPM | HEIGHT: 62 IN | SYSTOLIC BLOOD PRESSURE: 130 MMHG | BODY MASS INDEX: 34.41 KG/M2 | HEART RATE: 75 BPM | WEIGHT: 187 LBS | DIASTOLIC BLOOD PRESSURE: 76 MMHG

## 2023-06-07 DIAGNOSIS — M19.012 GLENOHUMERAL ARTHRITIS, LEFT: Primary | ICD-10-CM

## 2023-06-07 PROCEDURE — 99213 OFFICE O/P EST LOW 20 MIN: CPT | Performed by: FAMILY MEDICINE

## 2023-06-07 NOTE — PROGRESS NOTES
"Assessment/Plan:    No problem-specific Assessment & Plan notes found for this encounter  Diagnoses and all orders for this visit:    Glenohumeral arthritis, left     Advised to follow-up if symptoms worsen or fail to improve  We can repeat cortisone injection again in about 2 to 3 months  Return precautions were provided for patient    Subjective:      Patient ID: Nini Ballard is a 68 y o  female presenting for follow-up visit following ultrasound-guided injection of the glenohumeral joint left shoulder  Patient reports significant improvement in terms of pain symptoms following injection which was performed approximately 1 month ago  She currently rates the pain 2 out of 10 in severity and has been able to go out more in terms of activity    Overall she is satisfied with her improvement    HPI    The following portions of the patient's history were reviewed and updated as appropriate: allergies, current medications, past family history, past medical history, past social history, past surgical history and problem list     Review of Systems      Objective:      /76 (BP Location: Left arm, Patient Position: Sitting, Cuff Size: Adult)   Pulse 75   Resp 16   Ht 5' 2\" (1 575 m)   Wt 84 8 kg (187 lb)   LMP  (LMP Unknown)   BMI 34 20 kg/m²          Physical Exam         Empty can: positive  Biceps TTP: positive  Arc test: positive  Carr positive     Is having some tenderness to palpation over the acromion posterior     Tenderness to palpation of AC joint: negative  Pain with cross-body adduction: negative  "

## 2023-06-09 ENCOUNTER — TELEPHONE (OUTPATIENT)
Dept: FAMILY MEDICINE CLINIC | Facility: CLINIC | Age: 73
End: 2023-06-09

## 2023-06-09 DIAGNOSIS — F41.9 ANXIETY: Primary | ICD-10-CM

## 2023-06-09 RX ORDER — DIAZEPAM 10 MG/1
10 TABLET ORAL EVERY 6 HOURS PRN
Qty: 2 TABLET | Refills: 0 | Status: SHIPPED | OUTPATIENT
Start: 2023-06-09

## 2023-06-09 NOTE — TELEPHONE ENCOUNTER
Pt made aware  Pt called MRI they told her we need to send new script stating without contrast, because with or without was not acceptable

## 2023-06-20 ENCOUNTER — TELEPHONE (OUTPATIENT)
Dept: NEUROLOGY | Facility: CLINIC | Age: 73
End: 2023-06-20

## 2023-06-26 DIAGNOSIS — N28.89 LEFT RENAL MASS: Primary | ICD-10-CM

## 2023-06-29 ENCOUNTER — OFFICE VISIT (OUTPATIENT)
Dept: NEUROLOGY | Facility: CLINIC | Age: 73
End: 2023-06-29
Payer: MEDICARE

## 2023-06-29 VITALS
HEIGHT: 62 IN | DIASTOLIC BLOOD PRESSURE: 72 MMHG | SYSTOLIC BLOOD PRESSURE: 119 MMHG | BODY MASS INDEX: 34.19 KG/M2 | HEART RATE: 79 BPM | WEIGHT: 185.8 LBS

## 2023-06-29 DIAGNOSIS — M54.16 LUMBAR RADICULOPATHY: ICD-10-CM

## 2023-06-29 DIAGNOSIS — M54.41 CHRONIC RIGHT-SIDED LOW BACK PAIN WITH RIGHT-SIDED SCIATICA: ICD-10-CM

## 2023-06-29 DIAGNOSIS — G62.9 NEUROPATHY: Primary | ICD-10-CM

## 2023-06-29 DIAGNOSIS — G89.29 CHRONIC RIGHT-SIDED LOW BACK PAIN WITH RIGHT-SIDED SCIATICA: ICD-10-CM

## 2023-06-29 DIAGNOSIS — R27.8 OTHER LACK OF COORDINATION: ICD-10-CM

## 2023-06-29 NOTE — PROGRESS NOTES
Patient ID: Karolina Mcginnis is a 68 y o  female  Assessment/Plan:    Neuropathy  Patient has history of chronic lower back pain, status post lumbar spine surgery years ago, now has a spinal cord stimulator, which has been helpful  Continues to have intermittent pain and paresthesias in the lower extremities, left worse than the right, in addition takes gabapentin 300 mg, 600 mg and 300mg, takes an additional 300 mg as needed along with amitriptyline 50 mg at bedtime which she will continue  She is very satisfied with her pain control right now  Besides that, gets intermittent paresthesias in her whole body, which are usually triggered by stress that she acknowledges, symptoms improved as she tries to calm herself down  These are becoming less frequent  Her exam as noted below showed normal strength in both upper and lower extremities except left EHL, which has been weak since her fibula fracture  Sensory examination showed decreased sensation in the left lower extremity up to the ankle, however sensation was essentially normal on the right with normal proprioception  At this time, patient is doing very well, and has been stable over the last few years without any change in her clinical symptoms, she is pleased with her neuropathic pain control, I do not think we need to do any additional testing which she was agreeable  We decided to keep her follow-up with us on an as-needed basis, as she continues to follow with pain management as well as neurosurgery  She has our contact information for any questions or concerns  Diagnoses and all orders for this visit:    Neuropathy  -     Vitamin B12; Future  -     Methylmalonic acid, serum; Future  -     Protein electrophoresis, serum;  Future    Lumbar radiculopathy    Other lack of coordination  -     Vitamin B12; Future    Chronic right-sided low back pain with right-sided sciatica           Subjective:    HPI     I had the pleasure of seeing your "patient in neurology clinic for neuromuscular consultation  a s you know, she is a 68year old woman with chronic lumbar pain, s/p surgery years ago, s/p pain stimulation with neuropathic pain  She has been seeing Ms  Markos Dunbar in our practice, here for consultation in neuromuscular clinic  Symptoms started in 2015/16, at the time she had numbness and tingling in both upper and lower extremities, and   Had lower back pain, h/o Lumbar spine surgery at age 20-22, was recommended to see neurosurgery, was recommended against surgery, had pain stimulator, which was initially placed in 2015, revised in 2020  Does follow with pain management (Dr Rachelle Hager)  Left leg is worse, has been since her injury in 2021, also had knee replacement on left in 2022  Gets neuropathic pain in the left leg, right leg does not bother her as much  There are times when she feels her whole body feels on fire, on these days she takes an extra gabapentin  This is not a frequent occurrence, only when she is stressed  No muscle weakness, occasional muscle cramps  No consistent numbness in hands or feet  Besides this, she does not have DM, has HTN, DLD, osteoarthritis  DM, HTN, DLD runs in the family  Mom had bone cancer, maternal aunt had breast ca, brother just got diagnosed with colon ca  Not a smoker  No alcohol  On gabapentin [phone] and extra 300mg as needed  Also on amitriptyline 50mg at bedtime  The following portions of the patient's history were reviewed and updated as appropriate: allergies, current medications, past family history, past medical history, past social history, past surgical history and problem list          Objective:    Blood pressure 119/72, pulse 79, height 5' 2\" (1 575 m), weight 84 3 kg (185 lb 12 8 oz)  Physical Exam   General exam: Pt was awake, alert and oriented  HEENT: atraumatic, normocephalic  Normal oral mucosa, neck was supple, no lymphadenopathy    Normal peripheral " pulses  Extremities did not show any edema or cyanosis, does have scars from b/l knee surgeries  Neurological Exam  Neurologically, pt was awake and alert  Speech was normal, no dysarthria or aphasia  Cranial nerve exam showed normal extraocular movements, no nystagmus or diplopia  There was no ptosis at baseline or with sustained upward gaze  Strength of eye closure muscles was normal   Facial sensations were normal bilaterally  No facial weakness, able to blow out the cheeks and push the tongue in the cheeks well  No tongue atrophy or fasciculations  Motor exam revealed normal tone and muscle bulk  There was no atrophy, scapular winging, high arches, hammertoes, shortening of achilles tendons or any other features of neuromuscular disease  Muscle strength was normal in neck flexors and extensors, and all muscle groups in both upper and lower extremities, except left EHL which was 3  Reflexes were 2 in the UE's b/l, knees were 2+, ankles were 2 b/l, Toes were downgoing  There was no exaggerated jaw jerk or jacome's sign  No ankle clonus  Sensory exam revealed length dependent, decreased sensation to pin and temp up to above ankle on the left, normal on the right  Vibration was 15 sec at the toes  Proprioception was normal    Gait was slightly wide based, but casual          ROS:  I reviewed the below ROS and what is mentioned in HPI, the remainder of ROS was negative  Review of Systems   Constitutional: Negative for appetite change, fatigue and fever  HENT: Negative  Negative for hearing loss, tinnitus, trouble swallowing and voice change  Eyes: Negative  Negative for photophobia, pain and visual disturbance  Respiratory: Negative  Negative for shortness of breath  Cardiovascular: Negative  Negative for palpitations  Gastrointestinal: Negative  Negative for nausea and vomiting  Endocrine: Negative  Negative for cold intolerance  Genitourinary: Negative    Negative for dysuria, frequency and urgency  Musculoskeletal: Negative for back pain, gait problem, myalgias and neck pain  Skin: Negative  Negative for rash  Allergic/Immunologic: Negative  Neurological: Negative  Negative for dizziness, tremors, seizures, syncope, facial asymmetry, speech difficulty, weakness, light-headedness, numbness and headaches  Hematological: Negative  Does not bruise/bleed easily  Psychiatric/Behavioral: Negative  Negative for confusion, hallucinations and sleep disturbance

## 2023-06-30 NOTE — ASSESSMENT & PLAN NOTE
Patient has history of chronic lower back pain, status post lumbar spine surgery years ago, now has a spinal cord stimulator, which has been helpful  Continues to have intermittent pain and paresthesias in the lower extremities, left worse than the right, in addition takes gabapentin 300 mg, 600 mg and 300mg, takes an additional 300 mg as needed along with amitriptyline 50 mg at bedtime which she will continue  She is very satisfied with her pain control right now  Besides that, gets intermittent paresthesias in her whole body, which are usually triggered by stress that she acknowledges, symptoms improved as she tries to calm herself down  These are becoming less frequent  Her exam as noted below showed normal strength in both upper and lower extremities except left EHL, which has been weak since her fibula fracture  Sensory examination showed decreased sensation in the left lower extremity up to the ankle, however sensation was essentially normal on the right with normal proprioception  At this time, patient is doing very well, and has been stable over the last few years without any change in her clinical symptoms, she is pleased with her neuropathic pain control, I do not think we need to do any additional testing which she was agreeable  We decided to keep her follow-up with us on an as-needed basis, as she continues to follow with pain management as well as neurosurgery  She has our contact information for any questions or concerns

## 2023-07-05 ENCOUNTER — RA CDI HCC (OUTPATIENT)
Dept: OTHER | Facility: HOSPITAL | Age: 73
End: 2023-07-05

## 2023-07-05 ENCOUNTER — OFFICE VISIT (OUTPATIENT)
Dept: UROLOGY | Facility: CLINIC | Age: 73
End: 2023-07-05
Payer: MEDICARE

## 2023-07-05 ENCOUNTER — TELEPHONE (OUTPATIENT)
Dept: UROLOGY | Facility: CLINIC | Age: 73
End: 2023-07-05

## 2023-07-05 VITALS
HEART RATE: 68 BPM | HEIGHT: 62 IN | WEIGHT: 186.4 LBS | SYSTOLIC BLOOD PRESSURE: 122 MMHG | BODY MASS INDEX: 34.3 KG/M2 | DIASTOLIC BLOOD PRESSURE: 78 MMHG

## 2023-07-05 DIAGNOSIS — N28.89 LEFT RENAL MASS: ICD-10-CM

## 2023-07-05 PROCEDURE — 99203 OFFICE O/P NEW LOW 30 MIN: CPT

## 2023-07-05 NOTE — PROGRESS NOTES
[facility] [address] [facility] coding opportunities       Chart reviewed, no opportunity found: CHART REVIEWED, NO OPPORTUNITY FOUND        Patients Insurance     Medicare Insurance: Medicare

## 2023-07-05 NOTE — PROGRESS NOTES
7/5/2023    No chief complaint on file. Assessment and Plan    68 y.o. female new patient to office    1. Left Renal Mass  · Renal US (6/05/2023)  · 2.1 x 2.7 x 3.1 cm upper pole hyperechoic lesion containing 1.1 cm cystic component. The lesion is poorly visualized due to overlying bowel gas. It measured approximately 2.1 cm on 4/2/2016 noncontrast CT examination with interval increase in size and measurements of approximately 3.6 cm on recent CT lumbar spine examination 5/25/2023. It measures approximately 3.3 cm on prior renal ultrasound 11/11/2020  · MRI abdomen without (Outside imaging; 6/16/2023)  · Complex multilocular mass of the anterior upper pole of the left kidney measuring 3.2 cm. · Pancreatic cystic lesions that are mildly heterogenous and measuring 0.6 and 0.8 cm. · Creatinine 0.65 and eGFR 89 as of 1/09/2023  · She is asymptomatic and denies any personal or family history of bladder, ureteral, or renal cancer. Negative smoking history as well as chemical exposure history. · I had a brief discuss regarding typical treatment options regarding solid renal mass with concern for RCC. Radical nephrectomy is the gold standard for treating RCC. However, I did briefly review alternative options including partial nephrectomy versus renal sparing treatment and active surveillance. We will schedule her for a follow-up visit at our [place] and office with one of our robotic surgeons to discuss these in more detail. She did bring her MRI disc with her which we will have uploaded so that this can be reviewed at her follow-up appointment. History of Present Illness  Lavelle Naqvi is a 68 y.o. female new patient to office with PMHx significant for hypertension, hyperlipidemia, generative joint disease, osteoarthritis, osteopenia, neuropathy, lumbar radiculopathy, irritable bowel syndrome, and interstitial cystitis. Here for evaluation of renal mass.     Previously established patient who initially establish care with Dr. Haydee Boyle in June 2018 for possible interstitial cystitis. This was confirmed on cystoscopy evaluation in August 2018. He was last seen in July 2021 by Elicia Skinner and reported doing well with Elavil 10 mg at bedtime for management of her interstitial cystitis. She was referred today by her PCP Dr. Marie Huerta for evaluation of a left upper pole renal mass concerning for solid neoplasm. She has a history of chronic back pain and had a CT of the lumbar spine completed on 5/25/2023. This showed a nonspecific mass in the upper pole of the left kidney concerning for RCC. She would have a follow-up renal ultrasound completed on 6/5/2023 which showed a poorly visualized left renal upper pole 3.1 cm hypoechoic lesion, slowly increasing in size since 2016 and concerning for solid neoplasm. It measured approximately 2.1 cm on 4/2/2016 noncontrast CT examination with interval increase in size and measurements of approximately 3.6 cm on recent CT lumbar spine examination 5/25/2023. It measures approximately 3.3 cm on prior renal ultrasound 11/11/2020. There is also a 0.9 cm septated left lower pole cyst as well as a 1.8 cm hypoechoic lesion in the right upper renal pole likely representing a complicated cyst and less likely a solid renal neoplasm. Due to IV contrast allergy, a MRI of the abdomen without was completed through outside imaging on 6/16/2023 confirmed a complex multilocular mass of the anterior upper pole measuring 3.2 cm. There appears to be faint hyperintensity on the fat suppressed T1 weighted imaging suggesting there might be a minimal intrinsic hemorrhagic content. There are a few foci of diminished restricted diffusion as well. She denies any family history of bladder, ureteral, or renal cancer. She has a history of possible left UJP obstruction and reported pyeloplasty in 1971. She does have chronic back pain, but denies any flank pain or gross hematuria.  She denies smoking or chemical exposure history. Review of Systems   Constitutional: Negative for chills and fever. HENT: Negative for ear pain and sore throat. Eyes: Negative for pain and visual disturbance. Respiratory: Negative for cough and shortness of breath. Cardiovascular: Negative for chest pain and palpitations. Gastrointestinal: Negative for abdominal pain and vomiting. Genitourinary: Negative for dysuria and hematuria. Musculoskeletal: Negative for arthralgias and back pain. Skin: Negative for color change and rash. Neurological: Negative for seizures and syncope. All other systems reviewed and are negative. Vitals  Vitals:    07/05/23 0948   BP: 122/78   BP Location: Right arm   Patient Position: Sitting   Cuff Size: Large   Pulse: 68   Weight: 84.6 kg (186 lb 6.4 oz)   Height: 5' 2" (1.575 m)       Physical Exam  Vitals reviewed. Constitutional:       General: She is not in acute distress. Appearance: Normal appearance. She is normal weight. She is not ill-appearing or toxic-appearing. HENT:      Head: Normocephalic and atraumatic. Nose: Nose normal.   Eyes:      General: No scleral icterus. Conjunctiva/sclera: Conjunctivae normal.   Cardiovascular:      Rate and Rhythm: Normal rate. Pulses: Normal pulses. Pulmonary:      Effort: Pulmonary effort is normal. No respiratory distress. Abdominal:      General: Abdomen is flat. Palpations: Abdomen is soft. Tenderness: There is no abdominal tenderness. There is no right CVA tenderness or left CVA tenderness. Hernia: No hernia is present. Musculoskeletal:         General: Normal range of motion. Cervical back: Normal range of motion. Skin:     General: Skin is warm and dry. Neurological:      General: No focal deficit present. Mental Status: She is alert and oriented to person, place, and time. Mental status is at baseline.    Psychiatric:         Mood and Affect: Mood normal.         Behavior: Behavior normal.         Thought Content: Thought content normal.         Judgment: Judgment normal.         Past History  Past Medical History:   Diagnosis Date   • Abnormal findings on diagnostic imaging of breast    • Abnormal Pap smear of cervix    • Arthritis    • Chronic pain disorder    • Extremity pain    • Fibrocystic breast disease    • Foot pain    • GERD (gastroesophageal reflux disease)    • Hyperlipidemia    • Hypertension    • Interstitial cystitis    • Joint pain    • Low back pain    • Osteoarthritis    • Osteopenia    • Uncomplicated opioid dependence ([address]) 3/1/2022     Social History     Socioeconomic History   • Marital status: Single     Spouse name: None   • Number of children: None   • Years of education: None   • Highest education level: None   Occupational History   • Occupation: Retired/ working part-time    Tobacco Use   • Smoking status: Never   • Smokeless tobacco: Never   Vaping Use   • Vaping Use: Never used   Substance and Sexual Activity   • Alcohol use: Yes     Comment: Drink socially, not too often   • Drug use: No   • Sexual activity: Not Currently     Partners: Male     Birth control/protection: None   Other Topics Concern   • None   Social History Narrative    No caffeine use    Always uses sunscreen    Always uses a seatbelt     Social Determinants of Health     Financial Resource Strain: Low Risk  (1/10/2023)    Overall Financial Resource Strain (CARDIA)    • Difficulty of Paying Living Expenses: Not hard at all   Food Insecurity: Not on file   Transportation Needs: No Transportation Needs (1/10/2023)    PRAPARE - Transportation    • Lack of Transportation (Medical): No    • Lack of Transportation (Non-Medical):  No   Physical Activity: Not on file   Stress: Not on file   Social Connections: Not on file   Intimate Partner Violence: Not on file   Housing Stability: Not on file     Social History     Tobacco Use   Smoking Status Never   Smokeless Tobacco Never     Family History   Problem Relation Age of Onset   • Bone cancer Mother    • Cancer Mother    • Asthma Mother    • Brain cancer Father    • Stroke Father         stroke sydrome   • Transient ischemic attack Father    • Depression Sister    • Migraines Sister    • Asthma Sister    • Asthma Sister    • No Known Problems Maternal Grandmother    • No Known Problems Maternal Grandfather    • Diabetes Paternal Grandmother    • No Known Problems Paternal Grandfather    • Heart disease Brother    • Diabetes Brother    • Heart attack Brother    • Colon cancer Brother 77   • No Known Problems Brother    • Breast cancer Maternal Aunt 79   • Breast cancer additional onset Maternal Aunt 67   • Diabetes Sister        The following portions of the patient's history were reviewed and updated as appropriate allergies, current medications, past medical history, past social history, past surgical history and problem list    [email or web address]  RENAL ULTRASOUND     INDICATION:   N28.89: Other specified disorders of kidney and ureter.     COMPARISON: CT abdomen pelvis 2016, 2021, CT lumbar spine 2023, renal ultrasound 2020     TECHNIQUE:   Ultrasound of the retroperitoneum was performed with a curvilinear transducer utilizing volumetric sweeps and still imaging techniques.     FINDINGS:     KIDNEYS:  Symmetric and normal size. Right kidney:  10.5 x 6.4 x 7.0 cm. Volume 245.4 mL  Left kidney:  12.3 x 5.7 x 5.9 cm. Volume 217.9 mL     Right kidney  Normal echogenicity and contour. No mass is identified. There is a 1.4 x 1.6 x 1.8 cm hypoechoic lesion in the right renal upper pole, containing dependent echogenic density, likely representing focal calcification as seen on recent CT lumbar spine 2023. There is no associated   internal vascularity. No hydronephrosis. No shadowing calculi.   No perinephric fluid collections.     Left kidney  Normal echogenicity and contour. There is a 2.1 x 2.7 x 3.1 cm upper pole hyperechoic lesion containing 1.1 cm cystic component. There is a 0.9 x 0.7 x 0.8 cm septated lower pole cyst.. Evaluation for internal vascularity was not performed on this examination. The lesion is poorly   visualized due to overlying bowel gas. It measured approximately 2.1 cm on 4/2/2016 noncontrast CT examination with interval increase in size and measurements of approximately 3.6 cm on recent CT lumbar spine examination 5/25/2023. It measures   approximately 3.3 cm on prior renal ultrasound 11/11/2020     No hydronephrosis. No shadowing calculi. No perinephric fluid collections.     URETERS:  Nonvisualized.     BLADDER:  Normally distended. No focal thickening or mass lesions. Right ureteral jet is detected.           IMPRESSION:     Poorly visualized is a left renal upper pole 3.1 cm hypoechoic lesion, slowly increasing in size since 2016 and concerning for solid renal neoplasm. CT or MRI abdomen with and without contrast is recommended for better characterization. If the patient   cannot get intravenous contrast material, MRI abdomen without contrast can be obtained for better characterization.     A 1.8 cm right renal upper pole hypoechoic lesion without internal vascularity, favored to represent a complicated cyst and less likely a solid renal neoplasm. This can also be better characterized with CT or MRI abdomen          5/25/2023  CT LUMBAR SPINE     INDICATION:   G89.4: Chronic pain syndrome  M54.16: Radiculopathy, lumbar region  M79.604: Pain in right leg  M79.605: Pain in left leg.     COMPARISON: Relevant examinations including MRI lumbar spine dated 12/23/2020.     TECHNIQUE:  Contiguous axial images through the lumbar spine were obtained. Sagittal and coronal reconstructions were performed.     IV Contrast: None     Radiation dose length product (DLP) for this visit:  575.9 mGy-cm .   This examination, like all CT scans performed in the [place]. [address], was performed utilizing techniques to minimize radiation dose exposure, including the use of iterative   reconstruction and automated exposure control.     IMAGE QUALITY:  Diagnostic.     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     T12-L1 and L1-L2: No significant abnormality evident. Hypoplastic ribs associated with L1.     L2-L3: Evidence of mild  narrowing of the spinal canal and neural foraminal narrowing of a mild degree due to disc bulge, less than 3 mm retrolisthesis, in combination with hypertrophic degenerative change of the facet joints and ligamenta flava. Mild   disc degeneration. Findings worsened since the 12/23/2020 MRI.     L3-L4: Evidence of mild  narrowing of the spinal canal and neural foraminal narrowing of a mild degree due to disc bulge in combination with hypertrophic degenerative change of the facet joints and ligamenta flava. Mild disc degeneration. Findings   similar.     L4-L5: Evidence of mild  narrowing of the spinal canal and neural foraminal narrowing of a moderate degree on the left with possible neural encroachment upon exiting left L4 nerve root and mild to moderate degree on the right due to disc bulge, left   foraminal zone disc protrusion/osteophyte complex, grade 1 spondylolisthesis (not associated with spondylolysis), in combination with hypertrophic degenerative change of the facet joints and ligamenta flava. Mild to moderate disc degeneration. Findings   worsened.     L5-S1: Evidence of mild to moderate bilateral neural foraminal narrowing due to disc bulge and hypertrophic facet arthropathy. Appearance of postoperative changes dorsal decompression right laminotomy and medial right facetectomy with spinal canal   patent.  Findings similar.     S1-S2: Evidence of mild bilateral neural foraminal narrowing due to hypertrophic facet arthropathy. Complete lumbarization of S1.     SPINAL CORD: No spinal cord abnormality evident.     EXTRASPINAL STRUCTURES: Enlarging upper pole left renal cortical mass presently measuring 3.6 cm and measuring 2.7 cm on 12/17/2020 CT thoracic spine. Hounsfield density measurements greater than simple fluid. 1 cm renal cortical calcification within or   adjacent to a 17 mm upper pole right renal cortical mass. Mild to moderate calcific atherosclerosis of the abdominal aorta and branch vessels without abdominal aortic aneurysm. No other significant abnormality evident.     IMPRESSION:  Lumbar spondylosis associated with spinal canal and neural foraminal narrowing detailed level by level above and most notable at L4-L5. Tyler Pandy Findings have worsened at that level when compared to MRI lumbar spine 12/23/2020.     There is a nonspecific mass associated with the upper pole of each kidney with suspicious features and could be renal cell carcinoma. Recommend follow-up renal ultrasound for further assessment.     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 I recommend correlation of this examination to plain x-rays of the spine prior to any surgical intervention in order to assure the appropriate   level of surgery. Results  No results found for this or any previous visit (from the past 1 hour(s)). ]  No results found for: "PSA"  Lab Results   Component Value Date    GLUCOSE 118 12/08/2016    CALCIUM 9.9 01/09/2023     11/09/2015    K 4.2 01/09/2023    CO2 29 01/09/2023     01/09/2023    BUN 18 01/09/2023    CREATININE 0.65 01/09/2023     Lab Results   Component Value Date    WBC 7.49 12/17/2022    HGB 13.6 12/17/2022    HCT 40.8 12/17/2022    MCV 89 12/17/2022     12/17/2022       Please Note:  Voice dictation software has been used to create this document. There may be inadvertent transcriptions errors.      Anupama Ward 07/05/23

## 2023-07-06 ENCOUNTER — APPOINTMENT (OUTPATIENT)
Dept: LAB | Facility: MEDICAL CENTER | Age: 73
End: 2023-07-06
Payer: MEDICARE

## 2023-07-06 DIAGNOSIS — E78.2 MIXED HYPERLIPIDEMIA: ICD-10-CM

## 2023-07-06 DIAGNOSIS — T14.8XXA BRUISING: ICD-10-CM

## 2023-07-06 DIAGNOSIS — R27.8 OTHER LACK OF COORDINATION: ICD-10-CM

## 2023-07-06 DIAGNOSIS — E55.9 VITAMIN D DEFICIENCY: ICD-10-CM

## 2023-07-06 DIAGNOSIS — G62.9 NEUROPATHY: ICD-10-CM

## 2023-07-06 LAB
25(OH)D3 SERPL-MCNC: 30.4 NG/ML (ref 30–100)
ALBUMIN SERPL BCP-MCNC: 3.8 G/DL (ref 3.5–5)
ALP SERPL-CCNC: 72 U/L (ref 46–116)
ALT SERPL W P-5'-P-CCNC: 42 U/L (ref 12–78)
ANION GAP SERPL CALCULATED.3IONS-SCNC: 5 MMOL/L
AST SERPL W P-5'-P-CCNC: 35 U/L (ref 5–45)
BILIRUB SERPL-MCNC: 0.47 MG/DL (ref 0.2–1)
BUN SERPL-MCNC: 11 MG/DL (ref 5–25)
CALCIUM SERPL-MCNC: 9.5 MG/DL (ref 8.3–10.1)
CHLORIDE SERPL-SCNC: 107 MMOL/L (ref 96–108)
CHOLEST SERPL-MCNC: 249 MG/DL
CO2 SERPL-SCNC: 26 MMOL/L (ref 21–32)
CREAT SERPL-MCNC: 0.68 MG/DL (ref 0.6–1.3)
ERYTHROCYTE [DISTWIDTH] IN BLOOD BY AUTOMATED COUNT: 12.7 % (ref 11.6–15.1)
GFR SERPL CREATININE-BSD FRML MDRD: 87 ML/MIN/1.73SQ M
GLUCOSE P FAST SERPL-MCNC: 106 MG/DL (ref 65–99)
HCT VFR BLD AUTO: 44.4 % (ref 34.8–46.1)
HDLC SERPL-MCNC: 67 MG/DL
HGB BLD-MCNC: 14.8 G/DL (ref 11.5–15.4)
LDLC SERPL CALC-MCNC: 159 MG/DL (ref 0–100)
MCH RBC QN AUTO: 29.4 PG (ref 26.8–34.3)
MCHC RBC AUTO-ENTMCNC: 33.3 G/DL (ref 31.4–37.4)
MCV RBC AUTO: 88 FL (ref 82–98)
NONHDLC SERPL-MCNC: 182 MG/DL
PLATELET # BLD AUTO: 268 THOUSANDS/UL (ref 149–390)
PMV BLD AUTO: 9.8 FL (ref 8.9–12.7)
POTASSIUM SERPL-SCNC: 4.2 MMOL/L (ref 3.5–5.3)
PROT SERPL-MCNC: 7.3 G/DL (ref 6.4–8.4)
RBC # BLD AUTO: 5.03 MILLION/UL (ref 3.81–5.12)
SODIUM SERPL-SCNC: 138 MMOL/L (ref 135–147)
TRIGL SERPL-MCNC: 114 MG/DL
VIT B12 SERPL-MCNC: 853 PG/ML (ref 180–914)
WBC # BLD AUTO: 8.39 THOUSAND/UL (ref 4.31–10.16)

## 2023-07-06 PROCEDURE — 80053 COMPREHEN METABOLIC PANEL: CPT

## 2023-07-06 PROCEDURE — 82607 VITAMIN B-12: CPT

## 2023-07-06 PROCEDURE — 36415 COLL VENOUS BLD VENIPUNCTURE: CPT

## 2023-07-06 PROCEDURE — 85027 COMPLETE CBC AUTOMATED: CPT

## 2023-07-06 PROCEDURE — 82306 VITAMIN D 25 HYDROXY: CPT

## 2023-07-06 PROCEDURE — 80061 LIPID PANEL: CPT

## 2023-07-06 PROCEDURE — 83918 ORGANIC ACIDS TOTAL QUANT: CPT

## 2023-07-06 PROCEDURE — 84165 PROTEIN E-PHORESIS SERUM: CPT

## 2023-07-07 LAB
ALBUMIN SERPL ELPH-MCNC: 4.38 G/DL (ref 3.2–5.1)
ALBUMIN SERPL ELPH-MCNC: 61.7 % (ref 48–70)
ALPHA1 GLOB SERPL ELPH-MCNC: 0.3 G/DL (ref 0.15–0.47)
ALPHA1 GLOB SERPL ELPH-MCNC: 4.2 % (ref 1.8–7)
ALPHA2 GLOB SERPL ELPH-MCNC: 0.87 G/DL (ref 0.42–1.04)
ALPHA2 GLOB SERPL ELPH-MCNC: 12.3 % (ref 5.9–14.9)
BETA GLOB ABNORMAL SERPL ELPH-MCNC: 0.48 G/DL (ref 0.31–0.57)
BETA1 GLOB SERPL ELPH-MCNC: 6.7 % (ref 4.7–7.7)
BETA2 GLOB SERPL ELPH-MCNC: 5.7 % (ref 3.1–7.9)
BETA2+GAMMA GLOB SERPL ELPH-MCNC: 0.4 G/DL (ref 0.2–0.58)
GAMMA GLOB ABNORMAL SERPL ELPH-MCNC: 0.67 G/DL (ref 0.4–1.66)
GAMMA GLOB SERPL ELPH-MCNC: 9.4 % (ref 6.9–22.3)
IGG/ALB SER: 1.61 {RATIO} (ref 1.1–1.8)
PROT PATTERN SERPL ELPH-IMP: NORMAL
PROT SERPL-MCNC: 7.1 G/DL (ref 6.4–8.2)

## 2023-07-07 PROCEDURE — 84165 PROTEIN E-PHORESIS SERUM: CPT | Performed by: PATHOLOGY

## 2023-07-09 LAB — METHYLMALONATE SERPL-SCNC: 132 NMOL/L (ref 0–378)

## 2023-07-10 ENCOUNTER — OFFICE VISIT (OUTPATIENT)
Dept: PAIN MEDICINE | Facility: MEDICAL CENTER | Age: 73
End: 2023-07-10
Payer: MEDICARE

## 2023-07-10 VITALS
DIASTOLIC BLOOD PRESSURE: 92 MMHG | WEIGHT: 185.4 LBS | BODY MASS INDEX: 34.12 KG/M2 | SYSTOLIC BLOOD PRESSURE: 122 MMHG | HEIGHT: 62 IN | HEART RATE: 99 BPM

## 2023-07-10 DIAGNOSIS — G57.03 PIRIFORMIS SYNDROME OF BOTH SIDES: ICD-10-CM

## 2023-07-10 DIAGNOSIS — M79.18 MYOFASCIAL PAIN SYNDROME: Primary | ICD-10-CM

## 2023-07-10 PROCEDURE — 99214 OFFICE O/P EST MOD 30 MIN: CPT | Performed by: PHYSICAL MEDICINE & REHABILITATION

## 2023-07-10 NOTE — PROGRESS NOTES
Assessment:  1. Myofascial pain syndrome    2. Piriformis syndrome of both sides        Plan:  1. At this time we will schedule the patient for bilateral piriformis injections under fluoroscopic guidance. 2.  She will continue to follow with neurology regarding kidney issues. My impressions and treatment recommendations were discussed in detail with the patient who verbalized understanding and had no further questions. Discharge instructions were provided. I personally saw and examined the patient and I agree with the above discussed plan of care. Orders Placed This Encounter   Procedures   • FL spine and pain procedure     Standing Status:   Future     Standing Expiration Date:   7/10/2024     Order Specific Question:   Reason for Exam:     Answer:   (B) piriformis injections     Order Specific Question:   Anticoagulant hold needed? Answer:   no     No orders of the defined types were placed in this encounter. History of Present Illness:  Sola Powell is a 68 y.o. female who presents for a follow up office visit in regards to lower back pain. She continues to notice significant relief with the spinal cord stimulator but is experiencing pain in the lateral buttock region consistent with piriformis syndrome. She is interested in pursuing injections for that at this point. Currently rates the pain as a 6/10 which is worse at nighttime but intermittent in nature and described as burning dull aching and pins-and-needles like. She is describing some difficulty with walking as a result of this and some moderate to severe functional deficits as well. I have personally reviewed and/or updated the patient's past medical history, past surgical history, family history, social history, current medications, allergies, and vital signs today. Review of Systems   Respiratory: Negative for shortness of breath. Cardiovascular: Negative for chest pain.    Gastrointestinal: Negative for constipation, diarrhea, nausea and vomiting. Musculoskeletal: Positive for back pain, gait problem and joint swelling. Negative for arthralgias and myalgias. Skin: Negative for rash. Neurological: Negative for dizziness, seizures and weakness. All other systems reviewed and are negative.       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)   • Chronic pain syndrome   • Osteoarthritis   • Closed fracture of proximal end of left fibula   • Transaminitis   • Sacroiliitis ([address])   • Family history of colon cancer   • Spasm of left piriformis muscle   • Osteoarthritis of left knee   • Thoracic myofascial strain   • Bruising       Past Medical History:   Diagnosis Date   • Abnormal findings on diagnostic imaging of breast    • Abnormal Pap smear of cervix    • Arthritis    • Chronic pain disorder    • Extremity pain    • Fibrocystic breast disease    • Foot pain    • GERD (gastroesophageal reflux disease)    • Hyperlipidemia    • Hypertension    • Interstitial cystitis    • Joint pain    • Low back pain    • Osteoarthritis    • Osteopenia    • Uncomplicated opioid dependence ([address]) 3/1/2022       Past Surgical History:   Procedure Laterality Date   • APPENDECTOMY     • BACK SURGERY     • BREAST EXCISIONAL BIOPSY Left 1996    benign   • CARPAL BOSS EXCISION     • CHOLECYSTECTOMY     • DIAGNOSTIC LAPAROSCOPY     • FOOT SURGERY     • FRACTURE SURGERY     • HAND SURGERY  5/2017   • HYSTERECTOMY      age 33   • HYSTEROSCOPY     • JOINT REPLACEMENT     • KIDNEY SURGERY Left    • KNEE ARTHROSCOPY Bilateral    • LAMINECTOMY     • LAPAROSCOPY      hynecologic with adhesions   • MYOMECTOMY     • OOPHORECTOMY Bilateral     age 33   • ORTHOPEDIC SURGERY     • SC CAR IMPLTJ NSTIM ELTRDS PLATE/PADDLE EDRL N/A 05/18/2017    Procedure: PLACEMENT OF THORACIC SPINAL CORD STIMULATOR WITH LEFT BUTTOCK IMPLANTABLE PULSE GENERATOR (IMPULSE MONITORING-MOTORS (TCEMEP), EMG, SSEP); Surgeon: Catrina Irwin MD;  Location: [facility];  Service: Neurosurgery   • SPINAL CORD STIMULATOR IMPLANT Left 09/29/2020    Procedure: LAMINECTOMY THORACIC , REMOVAL OF SCS LEADS AND GENERATOR;  Surgeon: Merissa Ayala MD;  Location:  MAIN OR;  Service: Neurosurgery   • SPINAL STIMULATOR PLACEMENT  05/2017   • TONSILLECTOMY AND ADENOIDECTOMY      onset: unknown   • TOTAL KNEE ARTHROPLASTY Right        Family History   Problem Relation Age of Onset   • Bone cancer Mother    • Cancer Mother    • Asthma Mother    • Brain cancer Father    • Stroke Father         stroke sydrome   • Transient ischemic attack Father    • Depression Sister    • Migraines Sister    • Asthma Sister    • Asthma Sister    • No Known Problems Maternal Grandmother    • No Known Problems Maternal Grandfather    • Diabetes Paternal Grandmother    • No Known Problems Paternal Grandfather    • Heart disease Brother    • Diabetes Brother    • Heart attack Brother    • Colon cancer Brother 77   • Cancer Brother    • No Known Problems Brother    • Breast cancer Maternal Aunt 79   • Breast cancer additional onset Maternal Aunt 72   • Diabetes Sister    • Depression Sister        Social History     Occupational History   • Occupation: Retired/ working part-time    Tobacco Use   • Smoking status: Never   • Smokeless tobacco: Never   Vaping Use   • Vaping Use: Never used   Substance and Sexual Activity   • Alcohol use:  Yes     Alcohol/week: 1.0 standard drink of alcohol     Types: 1 Glasses of wine per week     Comment: Drink socially, not too often   • Drug use: No   • Sexual activity: Not Currently     Partners: Male     Birth control/protection: None       Current Outpatient Medications on File Prior to Visit   Medication Sig   • amitriptyline (ELAVIL) 50 mg tablet Take 1 tablet (50 mg total) by mouth daily at bedtime   • Biotin 2500 MCG CAPS Take 1 capsule by mouth 2 (two) times a day    • calcium carbonate (OS-ANTHONY) 600 MG tablet Take 1,200 mg by mouth 2 (two) times a day with meals   • Cholecalciferol (VITAMIN D-3 PO) Take 1 tablet by mouth daily   • Cyanocobalamin (VITAMIN B-12 CR PO) Take 1 tablet by mouth daily   • Diclofenac Sodium (VOLTAREN) 1 % Apply 2 g topically 4 (four) times a day   • dicyclomine (BENTYL) 10 mg capsule Take 1 capsule (10 mg total) by mouth 2 (two) times a day   • EPINEPHrine (EPIPEN) 0.3 mg/0.3 mL SOAJ Inject 0.3 mL (0.3 mg total) into a muscle as needed for anaphylaxis   • Evening Primrose Oil 1000 MG CAPS Take 1 capsule (1,000 mg total) by mouth in the morning   • fexofenadine (ALLEGRA) 180 MG tablet Take 180 mg by mouth daily   • gabapentin (NEURONTIN) 300 mg capsule Take 1 tablet in the morning, 1 tablet afternoon and 2 bedtime   • hydrOXYzine HCL (ATARAX) 10 mg tablet Take 2 tablets (20 mg total) by mouth daily at bedtime Take 2 tablets at bedtime   • losartan-hydrochlorothiazide (HYZAAR) 100-25 MG per tablet Take 1 tablet by mouth daily   • methocarbamol (ROBAXIN) 750 mg tablet Take 1 tablet (750 mg total) by mouth every 8 (eight) hours   • montelukast (SINGULAIR) 10 mg tablet Take 1 tablet (10 mg total) by mouth daily at bedtime   • Multiple Vitamins-Minerals (PRESERVISION AREDS 2 PO) Take by mouth in the morning   • Omega-3 Fatty Acids (FISH OIL) 1,000 mg Take 1,000 mg by mouth 3 (three) times a day   • omeprazole (PriLOSEC) 40 MG capsule Take 1 capsule (40 mg total) by mouth daily   • rosuvastatin (CRESTOR) 10 MG tablet Take 1 tablet (10 mg total) by mouth daily   • simvastatin (ZOCOR) 10 mg tablet Take 1 tablet (10 mg total) by mouth daily at bedtime     No current facility-administered medications on file prior to visit. Allergies   Allergen Reactions   • Bee Venom Shortness Of Breath   • Chlorhexidine Rash   • Egg Yolk - Food Allergy Hives   • Iodinated Contrast Media Hives   • Penicillins Hives   • Lidocaine Other (See Comments)     cream   • Allevyn Adhesive [Wound Dressings] Hives and Rash   • Bactrim [Sulfamethoxazole-Trimethoprim] Rash   • Codeine Other (See Comments)     headaches   • Latex Rash   • Medical Tape Blisters, Hives, Itching and Rash   • Other Rash     Adhesive tape   • Oxybutynin Rash   • Pentosan Polysulfate Rash   • Povidone Iodine Rash       Physical Exam:    /92   Pulse 99   Ht 5' 2" (1.575 m)   Wt 84.1 kg (185 lb 6.4 oz)   LMP  (LMP Unknown)   BMI 33.91 kg/m²     Constitutional:normal, well developed, well nourished, alert, in no distress and non-toxic and no overt pain behavior. Eyes:anicteric  HEENT:grossly intact  Neck: symmetric, trachea midline and no masses   Pulmonary:even and unlabored  Cardiovascular:No edema or pitting edema present  Psychiatric:Mood and affect appropriate  Neurologic:Cranial Nerves II-XII grossly intact  Musculoskeletal:normal, except for significant tenderness to palpation over the piriformis musculature bilaterally reproducing her pain complaint    Imaging  CT spine lumbar wo contrast  Status: Edited Result - FINAL     PACS Images     Show images for CT spine lumbar wo contrast  Study Result    Narrative & Impression   CT LUMBAR SPINE     INDICATION:   G89.4: Chronic pain syndrome  M54.16: Radiculopathy, lumbar region  M79.604: Pain in right leg  M79.605: Pain in left leg.     COMPARISON: Relevant examinations including MRI lumbar spine dated 12/23/2020.     TECHNIQUE:  Contiguous axial images through the lumbar spine were obtained.  Sagittal and coronal reconstructions were performed.     IV Contrast: None     Radiation dose length product (DLP) for this visit:  575.9 mGy-cm . This examination, like all CT scans performed in the [facility], was performed utilizing techniques to minimize radiation dose exposure, including the use of iterative   reconstruction and automated exposure control.     IMAGE QUALITY:  Diagnostic.     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     T12-L1 and L1-L2: No significant abnormality evident. Hypoplastic ribs associated with L1.     L2-L3: Evidence of mild  narrowing of the spinal canal and neural foraminal narrowing of a mild degree due to disc bulge, less than 3 mm retrolisthesis, in combination with hypertrophic degenerative change of the facet joints and ligamenta flava. Mild   disc degeneration. Findings worsened since the 12/23/2020 MRI.     L3-L4: Evidence of mild  narrowing of the spinal canal and neural foraminal narrowing of a mild degree due to disc bulge in combination with hypertrophic degenerative change of the facet joints and ligamenta flava. Mild disc degeneration. Findings   similar.     L4-L5: Evidence of mild  narrowing of the spinal canal and neural foraminal narrowing of a moderate degree on the left with possible neural encroachment upon exiting left L4 nerve root and mild to moderate degree on the right due to disc bulge, left   foraminal zone disc protrusion/osteophyte complex, grade 1 spondylolisthesis (not associated with spondylolysis), in combination with hypertrophic degenerative change of the facet joints and ligamenta flava. Mild to moderate disc degeneration. Findings   worsened.     L5-S1: Evidence of mild to moderate bilateral neural foraminal narrowing due to disc bulge and hypertrophic facet arthropathy. Appearance of postoperative changes dorsal decompression right laminotomy and medial right facetectomy with spinal canal   patent. Findings similar.     S1-S2: Evidence of mild bilateral neural foraminal narrowing due to hypertrophic facet arthropathy. Complete lumbarization of S1.     SPINAL CORD: No spinal cord abnormality evident.     EXTRASPINAL STRUCTURES: Enlarging upper pole left renal cortical mass presently measuring 3.6 cm and measuring 2.7 cm on 12/17/2020 CT thoracic spine. Hounsfield density measurements greater than simple fluid. 1 cm renal cortical calcification within or   adjacent to a 17 mm upper pole right renal cortical mass. Mild to moderate calcific atherosclerosis of the abdominal aorta and branch vessels without abdominal aortic aneurysm. No other significant abnormality evident.     IMPRESSION:  Lumbar spondylosis associated with spinal canal and neural foraminal narrowing detailed level by level above and most notable at L4-L5. Dave Agustin Findings have worsened at that level when compared to MRI lumbar spine 12/23/2020.     There is a nonspecific mass associated with the upper pole of each kidney with suspicious features and could be renal cell carcinoma. Recommend follow-up renal ultrasound for further assessment.     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 I recommend correlation of this examination to plain x-rays of the spine prior to any surgical intervention in order to assure the appropriate   level of surgery.     Findings on this examination flagged as significant with a follow-up recommendation on Epic.     Workstation performed:  [id number]

## 2023-07-12 ENCOUNTER — OFFICE VISIT (OUTPATIENT)
Dept: FAMILY MEDICINE CLINIC | Facility: CLINIC | Age: 73
End: 2023-07-12
Payer: MEDICARE

## 2023-07-12 VITALS
WEIGHT: 186.8 LBS | HEART RATE: 76 BPM | BODY MASS INDEX: 34.37 KG/M2 | TEMPERATURE: 97.3 F | SYSTOLIC BLOOD PRESSURE: 130 MMHG | HEIGHT: 62 IN | RESPIRATION RATE: 18 BRPM | DIASTOLIC BLOOD PRESSURE: 76 MMHG

## 2023-07-12 DIAGNOSIS — Z78.0 POSTMENOPAUSAL: ICD-10-CM

## 2023-07-12 DIAGNOSIS — L29.9 ITCHING: ICD-10-CM

## 2023-07-12 DIAGNOSIS — N28.89 RENAL MASS, LEFT: Primary | ICD-10-CM

## 2023-07-12 DIAGNOSIS — M17.12 PRIMARY OSTEOARTHRITIS OF LEFT KNEE: ICD-10-CM

## 2023-07-12 DIAGNOSIS — R05.9 COUGH: ICD-10-CM

## 2023-07-12 DIAGNOSIS — K58.9 IRRITABLE BOWEL SYNDROME WITHOUT DIARRHEA: ICD-10-CM

## 2023-07-12 DIAGNOSIS — G62.9 NEUROPATHY: ICD-10-CM

## 2023-07-12 PROCEDURE — 99214 OFFICE O/P EST MOD 30 MIN: CPT | Performed by: INTERNAL MEDICINE

## 2023-07-12 RX ORDER — DICYCLOMINE HYDROCHLORIDE 10 MG/1
10 CAPSULE ORAL 2 TIMES DAILY
Qty: 180 CAPSULE | Refills: 3 | Status: SHIPPED | OUTPATIENT
Start: 2023-07-12

## 2023-07-12 RX ORDER — AMITRIPTYLINE HYDROCHLORIDE 50 MG/1
50 TABLET, FILM COATED ORAL
Qty: 90 TABLET | Refills: 0 | Status: SHIPPED | OUTPATIENT
Start: 2023-07-12

## 2023-07-12 RX ORDER — MONTELUKAST SODIUM 10 MG/1
10 TABLET ORAL
Qty: 90 TABLET | Refills: 3 | Status: SHIPPED | OUTPATIENT
Start: 2023-07-12

## 2023-07-12 RX ORDER — HYDROXYZINE HYDROCHLORIDE 10 MG/1
20 TABLET, FILM COATED ORAL
Qty: 180 TABLET | Refills: 1 | Status: SHIPPED | OUTPATIENT
Start: 2023-07-12 | End: 2023-10-10

## 2023-07-12 NOTE — PROGRESS NOTES
Name: Jose Arcos      : 1950      MRN: [id number]  Encounter Provider: Martha Conn DO  Encounter Date: 2023   Encounter department: [address]     1. Renal mass, left  Pt has appt with Dr Saunders Sample options regarding options    2. Cough  -     montelukast (SINGULAIR) 10 mg tablet; Take 1 tablet (10 mg total) by mouth daily at bedtime  Continue singulair    3. Neuropathy  -     amitriptyline (ELAVIL) 50 mg tablet; Take 1 tablet (50 mg total) by mouth daily at bedtime    4. Irritable bowel syndrome without diarrhea  -     dicyclomine (BENTYL) 10 mg capsule; Take 1 capsule (10 mg total) by mouth 2 (two) times a day    5. Itching  -     hydrOXYzine HCL (ATARAX) 10 mg tablet; Take 2 tablets (20 mg total) by mouth daily at bedtime Take 2 tablets at bedtime    6. Primary osteoarthritis of left knee  -     Diclofenac Sodium (VOLTAREN) 1 %; Apply 2 g topically 4 (four) times a day    7. Postmenopausal  -     DXA bone density spine hip and pelvis; Future; Expected date: 2023  Pt will schedule         Depression Screening and Follow-up Plan: Patient was screened for depression during today's encounter. They screened negative with a PHQ-2 score of 0. Rto 6 months     Subjective      HPI   Pt has appt with Dr Austin Castro upcoming to discuss options for renal mass No hematuria No flank pain No urinary sxs No chest pain or sob No falls Less leg edema Drinking fluids regularly   Review of Systems   Constitutional: Negative for chills and fever. HENT: Negative. Eyes: Negative for visual disturbance. Respiratory: Negative for cough and shortness of breath. Cardiovascular: Negative for chest pain, palpitations and leg swelling. Gastrointestinal: Negative for abdominal distention and abdominal pain. Genitourinary: Negative for difficulty urinating, dysuria and flank pain. Musculoskeletal: Positive for arthralgias.    Neurological: Negative for dizziness, light-headedness and headaches. Psychiatric/Behavioral: Negative for sleep disturbance. The patient is not nervous/anxious.         Current Outpatient Medications on File Prior to Visit   Medication Sig   • Biotin 2500 MCG CAPS Take 1 capsule by mouth 2 (two) times a day    • calcium carbonate (OS-ANTHONY) 600 MG tablet Take 1,200 mg by mouth 2 (two) times a day with meals   • Cholecalciferol (VITAMIN D-3 PO) Take 1 tablet by mouth daily   • Cyanocobalamin (VITAMIN B-12 CR PO) Take 1 tablet by mouth daily   • EPINEPHrine (EPIPEN) 0.3 mg/0.3 mL SOAJ Inject 0.3 mL (0.3 mg total) into a muscle as needed for anaphylaxis   • Evening Primrose Oil 1000 MG CAPS Take 1 capsule (1,000 mg total) by mouth in the morning   • fexofenadine (ALLEGRA) 180 MG tablet Take 180 mg by mouth daily   • gabapentin (NEURONTIN) 300 mg capsule Take 1 tablet in the morning, 1 tablet afternoon and 2 bedtime   • losartan-hydrochlorothiazide (HYZAAR) 100-25 MG per tablet Take 1 tablet by mouth daily   • methocarbamol (ROBAXIN) 750 mg tablet Take 1 tablet (750 mg total) by mouth every 8 (eight) hours   • Multiple Vitamins-Minerals (PRESERVISION AREDS 2 PO) Take by mouth in the morning   • Omega-3 Fatty Acids (FISH OIL) 1,000 mg Take 1,000 mg by mouth 3 (three) times a day   • omeprazole (PriLOSEC) 40 MG capsule Take 1 capsule (40 mg total) by mouth daily   • rosuvastatin (CRESTOR) 10 MG tablet Take 1 tablet (10 mg total) by mouth daily   • simvastatin (ZOCOR) 10 mg tablet Take 1 tablet (10 mg total) by mouth daily at bedtime   • [DISCONTINUED] amitriptyline (ELAVIL) 50 mg tablet Take 1 tablet (50 mg total) by mouth daily at bedtime   • [DISCONTINUED] Diclofenac Sodium (VOLTAREN) 1 % Apply 2 g topically 4 (four) times a day   • [DISCONTINUED] dicyclomine (BENTYL) 10 mg capsule Take 1 capsule (10 mg total) by mouth 2 (two) times a day   • [DISCONTINUED] hydrOXYzine HCL (ATARAX) 10 mg tablet Take 2 tablets (20 mg total) by mouth daily at bedtime Take 2 tablets at bedtime   • [DISCONTINUED] montelukast (SINGULAIR) 10 mg tablet Take 1 tablet (10 mg total) by mouth daily at bedtime       Objective     /76   Pulse 76   Temp (!) 97.3 °F (36.3 °C) (Temporal)   Resp 18   Ht 5' 2" (1.575 m)   Wt 84.7 kg (186 lb 12.8 oz)   LMP  (LMP Unknown)   BMI 34.17 kg/m²     Physical Exam  Vitals reviewed. Constitutional:       General: She is not in acute distress. Appearance: Normal appearance. She is not ill-appearing, toxic-appearing or diaphoretic. HENT:      Head: Normocephalic and atraumatic. Right Ear: External ear normal.      Left Ear: External ear normal.      Nose: Nose normal.      Mouth/Throat:      Mouth: Mucous membranes are moist.   Eyes:      General: No scleral icterus. Extraocular Movements: Extraocular movements intact. Conjunctiva/sclera: Conjunctivae normal.      Pupils: Pupils are equal, round, and reactive to light. Cardiovascular:      Rate and Rhythm: Normal rate and regular rhythm. Pulses: Normal pulses. Pulmonary:      Effort: Pulmonary effort is normal. No respiratory distress. Breath sounds: Normal breath sounds. No wheezing. Abdominal:      General: Bowel sounds are normal. There is no distension. Palpations: Abdomen is soft. Tenderness: There is no abdominal tenderness. Musculoskeletal:      Cervical back: Normal range of motion and neck supple. Right lower leg: No edema. Left lower leg: No edema. Lymphadenopathy:      Cervical: No cervical adenopathy. Skin:     General: Skin is warm and dry. Coloration: Skin is not jaundiced or pale. Neurological:      General: No focal deficit present. Mental Status: She is alert and oriented to person, place, and time. Mental status is at baseline. Psychiatric:         Mood and Affect: Mood normal.         Behavior: Behavior normal.         Thought Content:  Thought content normal.         Judgment: Judgment normal.       Buck Milligan, DO

## 2023-07-31 ENCOUNTER — OFFICE VISIT (OUTPATIENT)
Dept: UROLOGY | Facility: CLINIC | Age: 73
End: 2023-07-31
Payer: MEDICARE

## 2023-07-31 VITALS
SYSTOLIC BLOOD PRESSURE: 120 MMHG | HEIGHT: 62 IN | OXYGEN SATURATION: 99 % | WEIGHT: 188.6 LBS | BODY MASS INDEX: 34.71 KG/M2 | HEART RATE: 77 BPM | DIASTOLIC BLOOD PRESSURE: 76 MMHG

## 2023-07-31 DIAGNOSIS — N28.89 LEFT RENAL MASS: Primary | ICD-10-CM

## 2023-07-31 PROCEDURE — 99215 OFFICE O/P EST HI 40 MIN: CPT | Performed by: UROLOGY

## 2023-07-31 RX ORDER — METHYLPREDNISOLONE 32 MG/1
32 TABLET ORAL ONCE
Qty: 2 TABLET | Refills: 0 | Status: SHIPPED | OUTPATIENT
Start: 2023-07-31 | End: 2023-07-31

## 2023-07-31 NOTE — PROGRESS NOTES
UROLOGY SURGICAL SECOND OPINION NOTE     CHIEF COMPLAINT   Ketan Bolaños is a 68 y.o. female with a complaint of   Chief Complaint   Patient presents with   • Follow-up     Left renal mass       History of Present Illness:   Ketan Bolaños is a 68 y.o. female here for evaluation of renal mass. Patient known to our service previously due to a diagnosis of cystoscopy proven interstitial cystitis, following with Dr. Hugo March. Patient symptoms are very well controlled at this time. Patient had lumbar spine imaging, follow-up renal bladder ultrasound and subsequent MRI of the abdomen without contrast.  This demonstrated a greater than 3 cm area of concern in the left upper pole kidney. Patient was referred today for discussion of management. Of note, patient has reported history of IV contrast allergy with reaction listed as rash.     Past Medical History:     Past Medical History:   Diagnosis Date   • Abnormal findings on diagnostic imaging of breast    • Abnormal Pap smear of cervix    • Arthritis    • Chronic pain disorder    • Extremity pain    • Fibrocystic breast disease    • Foot pain    • GERD (gastroesophageal reflux disease)    • Hyperlipidemia    • Hypertension    • Interstitial cystitis    • Joint pain    • Low back pain    • Osteoarthritis    • Osteopenia    • Uncomplicated opioid dependence ([address]) 3/1/2022       PAST SURGICAL HISTORY:     Past Surgical History:   Procedure Laterality Date   • APPENDECTOMY     • BACK SURGERY     • BREAST EXCISIONAL BIOPSY Left 1996    benign   • CARPAL BOSS EXCISION     • CHOLECYSTECTOMY     • DIAGNOSTIC LAPAROSCOPY     • FOOT SURGERY     • FRACTURE SURGERY     • HAND SURGERY  5/2017   • HYSTERECTOMY      age 33   • HYSTEROSCOPY     • JOINT REPLACEMENT     • KIDNEY SURGERY Left    • KNEE ARTHROSCOPY Bilateral    • LAMINECTOMY     • LAPAROSCOPY      hynecologic with adhesions   • MYOMECTOMY     • OOPHORECTOMY Bilateral     age 33   • ORTHOPEDIC SURGERY     • SC CAR IMPLTJ NSTIM ELTRDS PLATE/PADDLE EDRL N/A 05/18/2017    Procedure: PLACEMENT OF THORACIC SPINAL CORD STIMULATOR WITH LEFT BUTTOCK IMPLANTABLE PULSE GENERATOR (IMPULSE MONITORING-MOTORS (TCEMEP), EMG, SSEP);   Surgeon: Saurabh Reina MD;  Location: [facility];  Service: Neurosurgery   • SPINAL CORD STIMULATOR IMPLANT Left 09/29/2020    Procedure: LAMINECTOMY THORACIC , REMOVAL OF SCS LEADS AND GENERATOR;  Surgeon: Catalino Oneil MD;  Location:  MAIN OR;  Service: Neurosurgery   • SPINAL STIMULATOR PLACEMENT  05/2017   • TONSILLECTOMY AND ADENOIDECTOMY      onset: unknown   • TOTAL KNEE ARTHROPLASTY Right        CURRENT MEDICATIONS:     Current Outpatient Medications   Medication Sig Dispense Refill   • amitriptyline (ELAVIL) 50 mg tablet Take 1 tablet (50 mg total) by mouth daily at bedtime 90 tablet 0   • Biotin 2500 MCG CAPS Take 1 capsule by mouth 2 (two) times a day      • calcium carbonate (OS-ANTHONY) 600 MG tablet Take 1,200 mg by mouth 2 (two) times a day with meals     • Cholecalciferol (VITAMIN D-3 PO) Take 1 tablet by mouth daily     • Cyanocobalamin (VITAMIN B-12 CR PO) Take 1 tablet by mouth daily     • Diclofenac Sodium (VOLTAREN) 1 % Apply 2 g topically 4 (four) times a day 200 g 0   • dicyclomine (BENTYL) 10 mg capsule Take 1 capsule (10 mg total) by mouth 2 (two) times a day 180 capsule 3   • EPINEPHrine (EPIPEN) 0.3 mg/0.3 mL SOAJ Inject 0.3 mL (0.3 mg total) into a muscle as needed for anaphylaxis 2 each 0   • Evening Primrose Oil 1000 MG CAPS Take 1 capsule (1,000 mg total) by mouth in the morning  0   • fexofenadine (ALLEGRA) 180 MG tablet Take 180 mg by mouth daily     • gabapentin (NEURONTIN) 300 mg capsule Take 1 tablet in the morning, 1 tablet afternoon and 2 bedtime 360 capsule 3   • hydrOXYzine HCL (ATARAX) 10 mg tablet Take 2 tablets (20 mg total) by mouth daily at bedtime Take 2 tablets at bedtime 180 tablet 1   • losartan-hydrochlorothiazide (HYZAAR) 100-25 MG per tablet Take 1 tablet by mouth daily 90 tablet 3   • methocarbamol (ROBAXIN) 750 mg tablet Take 1 tablet (750 mg total) by mouth every 8 (eight) hours 270 tablet 3   • methylPREDNISolone (MEDROL) 32 MG tablet Take 1 tablet (32 mg total) by mouth once for 1 dose Take 1 tablet (32 mg total) by mouth once for 1 dose Take one pill 12 hours before and one pill 2 hours 2 tablet 0   • montelukast (SINGULAIR) 10 mg tablet Take 1 tablet (10 mg total) by mouth daily at bedtime 90 tablet 3   • Multiple Vitamins-Minerals (PRESERVISION AREDS 2 PO) Take by mouth in the morning     • Omega-3 Fatty Acids (FISH OIL) 1,000 mg Take 1,000 mg by mouth 3 (three) times a day     • omeprazole (PriLOSEC) 40 MG capsule Take 1 capsule (40 mg total) by mouth daily 90 capsule 3   • rosuvastatin (CRESTOR) 10 MG tablet Take 1 tablet (10 mg total) by mouth daily 90 tablet 3   • simvastatin (ZOCOR) 10 mg tablet Take 1 tablet (10 mg total) by mouth daily at bedtime 90 tablet 2     No current facility-administered medications for this visit.        ALLERGIES:     Allergies   Allergen Reactions   • Bee Venom Shortness Of Breath   • Chlorhexidine Rash   • Egg Yolk - Food Allergy Hives   • Iodinated Contrast Media Hives   • Penicillins Hives   • Lidocaine Other (See Comments)     cream   • Allevyn Adhesive [Wound Dressings] Hives and Rash   • Bactrim [Sulfamethoxazole-Trimethoprim] Rash   • Codeine Other (See Comments)     headaches   • Latex Rash   • Medical Tape Blisters, Hives, Itching and Rash   • Other Rash     Adhesive tape   • Oxybutynin Rash   • Pentosan Polysulfate Rash   • Povidone Iodine Rash       SOCIAL HISTORY:     Social History     Socioeconomic History   • Marital status: Single     Spouse name: None   • Number of children: None   • Years of education: None   • Highest education level: None   Occupational History   • Occupation: Retired/ working part-time    Tobacco Use   • Smoking status: Never   • Smokeless tobacco: Never   Vaping Use   • Vaping Use: Never used   Substance and Sexual Activity   • Alcohol use: Yes     Alcohol/week: 1.0 standard drink of alcohol     Types: 1 Glasses of wine per week     Comment: Drink socially, not too often   • Drug use: Never   • Sexual activity: Not Currently     Partners: Male     Birth control/protection: None   Other Topics Concern   • None   Social History Narrative    No caffeine use    Always uses sunscreen    Always uses a seatbelt     Social Determinants of Health     Financial Resource Strain: Low Risk  (1/10/2023)    Overall Financial Resource Strain (CARDIA)    • Difficulty of Paying Living Expenses: Not hard at all   Food Insecurity: Not on file   Transportation Needs: No Transportation Needs (1/10/2023)    PRAPARE - Transportation    • Lack of Transportation (Medical): No    • Lack of Transportation (Non-Medical): No   Physical Activity: Not on file   Stress: Not on file   Social Connections: Not on file   Intimate Partner Violence: Not on file   Housing Stability: Not on file       SOCIAL HISTORY:     Family History   Problem Relation Age of Onset   • Bone cancer Mother    • Cancer Mother    • Asthma Mother    • Brain cancer Father    • Stroke Father         stroke sydrome   • Transient ischemic attack Father    • Depression Sister    • Migraines Sister    • Asthma Sister    • Asthma Sister    • No Known Problems Maternal Grandmother    • No Known Problems Maternal Grandfather    • Diabetes Paternal Grandmother    • No Known Problems Paternal Grandfather    • Heart disease Brother    • Diabetes Brother    • Heart attack Brother    • Colon cancer Brother 77   • Cancer Brother    • No Known Problems Brother    • Breast cancer Maternal Aunt 79   • Breast cancer additional onset Maternal Aunt 72   • Diabetes Sister    • Depression Sister        REVIEW OF SYSTEMS:     Review of Systems   Constitutional: Negative for chills and fever. HENT: Negative for ear pain and sore throat.     Eyes: Negative for pain and visual disturbance. Respiratory: Negative for cough and shortness of breath. Cardiovascular: Negative for chest pain and palpitations. Gastrointestinal: Negative for abdominal pain and vomiting. Genitourinary: Negative for dysuria and hematuria. Musculoskeletal: Negative for arthralgias and back pain. Skin: Negative for color change and rash. Neurological: Negative for seizures and syncope. All other systems reviewed and are negative. PHYSICAL EXAM:     /76 (BP Location: Left arm, Patient Position: Sitting, Cuff Size: Adult)   Pulse 77   Ht 5' 2" (1.575 m)   Wt 85.5 kg (188 lb 9.6 oz)   LMP  (LMP Unknown)   SpO2 99%   BMI 34.50 kg/m²     Physical Exam  Vitals reviewed. Constitutional:       General: She is not in acute distress. Appearance: She is well-developed. HENT:      Head: Normocephalic and atraumatic. Eyes:      Pupils: Pupils are equal, round, and reactive to light. Cardiovascular:      Rate and Rhythm: Normal rate. Pulmonary:      Effort: Pulmonary effort is normal. No respiratory distress. Breath sounds: Normal breath sounds. Abdominal:      General: There is no distension. Palpations: Abdomen is soft. Tenderness: There is no abdominal tenderness. Musculoskeletal:         General: Normal range of motion. Cervical back: Normal range of motion and neck supple. Skin:     General: Skin is warm and dry. Neurological:      Mental Status: She is alert and oriented to person, place, and time.    Psychiatric:         Behavior: Behavior normal.         LABS:     CBC:   Lab Results   Component Value Date    WBC 8.39 07/06/2023    HGB 14.8 07/06/2023    HCT 44.4 07/06/2023    MCV 88 07/06/2023     07/06/2023       BMP:   Lab Results   Component Value Date    GLUCOSE 118 12/08/2016    CALCIUM 9.5 07/06/2023     11/09/2015    K 4.2 07/06/2023    CO2 26 07/06/2023     07/06/2023    BUN 11 07/06/2023    CREATININE 0.68 07/06/2023 IMAGING:             ASSESSMENT:     68 y.o. female  with LEFT renal mass on US and noncon MR    PLAN:     The patient needs appropriate with and without contrast imaging. Unfortunately MRI was done without contrast.  Patient has an iodine contrast allergy with CAT scans and she has had a slight rash with administration in the past.  I offered her 2 options: Either we can repeat the MRI of the abdomen with and without gadolinium based contrast or alternatively we can move forward with a CAT scan with and without contrast with pretreatment with Medrol and Benadryl. Patient is severely claustrophobic and is not interested in a closed MRI even with oral sedation. As such we discussed using Medrol and Benadryl prior to a CT scan. Patient does have some reflux which is exacerbated by steroids. I recommended doubling her Prilosec for the 24 hours prior to the film. She will see me back once the film is completed to discuss management strategies. We briefly discussed active surveillance, biopsy with ablative therapy and surgical intervention.

## 2023-08-03 ENCOUNTER — TELEPHONE (OUTPATIENT)
Dept: FAMILY MEDICINE CLINIC | Facility: CLINIC | Age: 73
End: 2023-08-03

## 2023-08-03 ENCOUNTER — HOSPITAL ENCOUNTER (OUTPATIENT)
Dept: CT IMAGING | Facility: HOSPITAL | Age: 73
Discharge: HOME | End: 2023-08-03
Attending: UROLOGY
Payer: MEDICARE

## 2023-08-03 ENCOUNTER — HOSPITAL ENCOUNTER (EMERGENCY)
Facility: HOSPITAL | Age: 73
Discharge: HOME | End: 2023-08-03
Attending: EMERGENCY MEDICINE
Payer: MEDICARE

## 2023-08-03 VITALS
RESPIRATION RATE: 16 BRPM | DIASTOLIC BLOOD PRESSURE: 60 MMHG | SYSTOLIC BLOOD PRESSURE: 124 MMHG | HEART RATE: 81 BPM | OXYGEN SATURATION: 96 % | TEMPERATURE: 98 F | WEIGHT: 188.71 LBS | BODY MASS INDEX: 34.52 KG/M2

## 2023-08-03 DIAGNOSIS — R45.0 JITTERY FEELING: ICD-10-CM

## 2023-08-03 DIAGNOSIS — T50.905A ADVERSE EFFECT OF DRUG, INITIAL ENCOUNTER: Primary | ICD-10-CM

## 2023-08-03 DIAGNOSIS — N28.89 LEFT RENAL MASS: ICD-10-CM

## 2023-08-03 DIAGNOSIS — R51.9 HEADACHE: ICD-10-CM

## 2023-08-03 LAB — GLUCOSE SERPL-MCNC: 121 MG/DL (ref 65–140)

## 2023-08-03 PROCEDURE — 71250 CT THORAX DX C-: CPT

## 2023-08-03 PROCEDURE — G1004 CDSM NDSC: HCPCS

## 2023-08-03 PROCEDURE — 74170 CT ABD WO CNTRST FLWD CNTRST: CPT

## 2023-08-03 PROCEDURE — 82948 REAGENT STRIP/BLOOD GLUCOSE: CPT

## 2023-08-03 PROCEDURE — 99285 EMERGENCY DEPT VISIT HI MDM: CPT

## 2023-08-03 PROCEDURE — 96375 TX/PRO/DX INJ NEW DRUG ADDON: CPT

## 2023-08-03 PROCEDURE — 93005 ELECTROCARDIOGRAM TRACING: CPT

## 2023-08-03 PROCEDURE — 96374 THER/PROPH/DIAG INJ IV PUSH: CPT

## 2023-08-03 PROCEDURE — 96361 HYDRATE IV INFUSION ADD-ON: CPT

## 2023-08-03 PROCEDURE — 99284 EMERGENCY DEPT VISIT MOD MDM: CPT | Performed by: PHYSICIAN ASSISTANT

## 2023-08-03 RX ORDER — FAMOTIDINE 10 MG/ML
20 INJECTION, SOLUTION INTRAVENOUS ONCE
Status: COMPLETED | OUTPATIENT
Start: 2023-08-03 | End: 2023-08-03

## 2023-08-03 RX ORDER — LORATADINE 10 MG/1
10 TABLET ORAL ONCE
Status: COMPLETED | OUTPATIENT
Start: 2023-08-03 | End: 2023-08-03

## 2023-08-03 RX ORDER — ACETAMINOPHEN 325 MG/1
975 TABLET ORAL ONCE
Status: COMPLETED | OUTPATIENT
Start: 2023-08-03 | End: 2023-08-03

## 2023-08-03 RX ORDER — ONDANSETRON 2 MG/ML
4 INJECTION INTRAMUSCULAR; INTRAVENOUS ONCE
Status: COMPLETED | OUTPATIENT
Start: 2023-08-03 | End: 2023-08-03

## 2023-08-03 RX ORDER — DIPHENHYDRAMINE HYDROCHLORIDE 50 MG/ML
25 INJECTION INTRAMUSCULAR; INTRAVENOUS ONCE
Status: COMPLETED | OUTPATIENT
Start: 2023-08-03 | End: 2023-08-03

## 2023-08-03 RX ADMIN — ACETAMINOPHEN 975 MG: 325 TABLET, FILM COATED ORAL at 11:06

## 2023-08-03 RX ADMIN — IOHEXOL 100 ML: 350 INJECTION, SOLUTION INTRAVENOUS at 09:47

## 2023-08-03 RX ADMIN — LORATADINE 10 MG: 10 TABLET ORAL at 10:01

## 2023-08-03 RX ADMIN — SODIUM CHLORIDE 1000 ML: 0.9 INJECTION, SOLUTION INTRAVENOUS at 10:03

## 2023-08-03 RX ADMIN — FAMOTIDINE 20 MG: 10 INJECTION, SOLUTION INTRAVENOUS at 10:02

## 2023-08-03 RX ADMIN — ONDANSETRON 4 MG: 2 INJECTION INTRAMUSCULAR; INTRAVENOUS at 10:02

## 2023-08-03 RX ADMIN — DIPHENHYDRAMINE HYDROCHLORIDE 25 MG: 50 INJECTION, SOLUTION INTRAMUSCULAR; INTRAVENOUS at 10:02

## 2023-08-03 NOTE — ED PROVIDER NOTES
History  Chief Complaint   Patient presents with   • Allergic Reaction     Pt came from ct scan this am with palpitations and not feeling well after being premedicated for a ct scan with contrast this am     68year old female presenting to the ER from our outpatient radiology for further evaluation. Pt reports she isn't feeling well since receiving the IV contrast.  Pt notes she has allergy to IV contrast which historically has caused hives and itching. Pt notes she had an MRI due to her back pain/issues. There was a mass noted on her kidneys. She states she was referred to specialist for this and had further testing ordered which was performed today at our radiology department. Due to her contrast allergy she states she was premedicated. She took the methylprednisolone as directed. She took a dose of benadryl PO an hour before her scan. She notes after getting her scan she started feeling unwell. She reports feeling shaky and jittery. She states she has some nausea as well as a headache. She reports feeling like her heart is beating fast.  Denies chest pain. Denies SOB but states she feels like she can't take a deep breath due to the palpitations. No reports rash, hives or itching. Denies any swelling of lips, tongue or throat. She has no difficulty speaking and denies any difficultly swallowing. Denies fever, chills. Denies cough, congestion or recent illness. Denies V/D, abdominal pain. Denies numbness, tingling, weakness. No reported visual disturbance. Denies dizziness, lightheadedness. No reported syncope. No history of fall. Pt does admit to feeling anxious in regards to her symptoms. No reported aggravating or alleviating factors. No specific treatments tried. History provided by:  Patient and medical records   used: No        Prior to Admission Medications   Prescriptions Last Dose Informant Patient Reported? Taking?    Biotin 2500 MCG CAPS  Self Yes No Sig: Take 1 capsule by mouth 2 (two) times a day    Cholecalciferol (VITAMIN D-3 PO)  Self Yes No   Sig: Take 1 tablet by mouth daily   Cyanocobalamin (VITAMIN B-12 CR PO)  Self Yes No   Sig: Take 1 tablet by mouth daily   Diclofenac Sodium (VOLTAREN) 1 %  Self No No   Sig: Apply 2 g topically 4 (four) times a day   EPINEPHrine (EPIPEN) 0.3 mg/0.3 mL SOAJ  Self No No   Sig: Inject 0.3 mL (0.3 mg total) into a muscle as needed for anaphylaxis   Evening Primrose Oil 1000 MG CAPS  Self No No   Sig: Take 1 capsule (1,000 mg total) by mouth in the morning   Multiple Vitamins-Minerals (PRESERVISION AREDS 2 PO)  Self Yes No   Sig: Take by mouth in the morning   Omega-3 Fatty Acids (FISH OIL) 1,000 mg  Self Yes No   Sig: Take 1,000 mg by mouth 3 (three) times a day   amitriptyline (ELAVIL) 50 mg tablet  Self No No   Sig: Take 1 tablet (50 mg total) by mouth daily at bedtime   calcium carbonate (OS-ANTHONY) 600 MG tablet  Self Yes No   Sig: Take 1,200 mg by mouth 2 (two) times a day with meals   dicyclomine (BENTYL) 10 mg capsule  Self No No   Sig: Take 1 capsule (10 mg total) by mouth 2 (two) times a day   fexofenadine (ALLEGRA) 180 MG tablet  Self Yes No   Sig: Take 180 mg by mouth daily   gabapentin (NEURONTIN) 300 mg capsule  Self No No   Sig: Take 1 tablet in the morning, 1 tablet afternoon and 2 bedtime   hydrOXYzine HCL (ATARAX) 10 mg tablet  Self No No   Sig: Take 2 tablets (20 mg total) by mouth daily at bedtime Take 2 tablets at bedtime   losartan-hydrochlorothiazide (HYZAAR) 100-25 MG per tablet  Self No No   Sig: Take 1 tablet by mouth daily   methocarbamol (ROBAXIN) 750 mg tablet  Self No No   Sig: Take 1 tablet (750 mg total) by mouth every 8 (eight) hours   montelukast (SINGULAIR) 10 mg tablet  Self No No   Sig: Take 1 tablet (10 mg total) by mouth daily at bedtime   omeprazole (PriLOSEC) 40 MG capsule  Self No No   Sig: Take 1 capsule (40 mg total) by mouth daily   rosuvastatin (CRESTOR) 10 MG tablet  Self No No   Sig: Take 1 tablet (10 mg total) by mouth daily   simvastatin (ZOCOR) 10 mg tablet  Self No No   Sig: Take 1 tablet (10 mg total) by mouth daily at bedtime      Facility-Administered Medications: None       Past Medical History:   Diagnosis Date   • Abnormal findings on diagnostic imaging of breast    • Abnormal Pap smear of cervix    • Arthritis    • Chronic pain disorder    • Extremity pain    • Fibrocystic breast disease    • Foot pain    • GERD (gastroesophageal reflux disease)    • Hyperlipidemia    • Hypertension    • Interstitial cystitis    • Joint pain    • Low back pain    • Osteoarthritis    • Osteopenia    • Uncomplicated opioid dependence ([address]) 3/1/2022       Past Surgical History:   Procedure Laterality Date   • APPENDECTOMY     • BACK SURGERY     • BREAST EXCISIONAL BIOPSY Left 1996    benign   • CARPAL BOSS EXCISION     • CHOLECYSTECTOMY     • DIAGNOSTIC LAPAROSCOPY     • FOOT SURGERY     • FRACTURE SURGERY     • HAND SURGERY  5/2017   • HYSTERECTOMY      age 33   • HYSTEROSCOPY     • JOINT REPLACEMENT     • KIDNEY SURGERY Left    • KNEE ARTHROSCOPY Bilateral    • LAMINECTOMY     • LAPAROSCOPY      hynecologic with adhesions   • MYOMECTOMY     • OOPHORECTOMY Bilateral     age 33   • ORTHOPEDIC SURGERY     • NJ CAR IMPLTJ NSTIM ELTRDS PLATE/PADDLE EDRL N/A 05/18/2017    Procedure: PLACEMENT OF THORACIC SPINAL CORD STIMULATOR WITH LEFT BUTTOCK IMPLANTABLE PULSE GENERATOR (IMPULSE MONITORING-MOTORS (TCEMEP), EMG, SSEP);   Surgeon: Rito Barragan MD;  Location:  MAIN OR;  Service: Neurosurgery   • SPINAL CORD STIMULATOR IMPLANT Left 09/29/2020    Procedure: LAMINECTOMY THORACIC , REMOVAL OF SCS LEADS AND GENERATOR;  Surgeon: Lorenzo Cheng MD;  Location:  MAIN OR;  Service: Neurosurgery   • SPINAL STIMULATOR PLACEMENT  05/2017   • TONSILLECTOMY AND ADENOIDECTOMY      onset: unknown   • TOTAL KNEE ARTHROPLASTY Right        Family History   Problem Relation Age of Onset   • Bone cancer Mother    • Cancer Mother    • Asthma Mother    • Brain cancer Father    • Stroke Father         stroke sydrome   • Transient ischemic attack Father    • Depression Sister    • Migraines Sister    • Asthma Sister    • Asthma Sister    • No Known Problems Maternal Grandmother    • No Known Problems Maternal Grandfather    • Diabetes Paternal Grandmother    • No Known Problems Paternal Grandfather    • Heart disease Brother    • Diabetes Brother    • Heart attack Brother    • Colon cancer Brother 77   • Cancer Brother    • No Known Problems Brother    • Breast cancer Maternal Aunt 79   • Breast cancer additional onset Maternal Aunt 72   • Diabetes Sister    • Depression Sister      I have reviewed and agree with the history as documented. E-Cigarette/Vaping   • E-Cigarette Use Never User      E-Cigarette/Vaping Substances   • Nicotine No    • THC No    • CBD No    • Flavoring No    • Other No    • Unknown No      Social History     Tobacco Use   • Smoking status: Never   • Smokeless tobacco: Never   Vaping Use   • Vaping Use: Never used   Substance Use Topics   • Alcohol use: Yes     Alcohol/week: 1.0 standard drink of alcohol     Types: 1 Glasses of wine per week     Comment: Drink socially, not too often   • Drug use: Never       Review of Systems   Constitutional: Negative. Negative for chills, fatigue and fever. HENT: Negative. Negative for congestion, facial swelling, rhinorrhea, sore throat, trouble swallowing and voice change. Eyes: Negative. Negative for visual disturbance. Respiratory: Negative. Negative for cough, shortness of breath and wheezing. Cardiovascular: Positive for palpitations. Negative for chest pain and leg swelling. Gastrointestinal: Positive for nausea. Negative for abdominal pain, constipation, diarrhea and vomiting. Genitourinary: Negative. Negative for dysuria, flank pain, frequency and hematuria. Musculoskeletal: Negative. Negative for myalgias. Skin: Negative.   Negative for rash. Neurological: Positive for tremors and headaches. Negative for dizziness, syncope, speech difficulty, light-headedness and numbness. Psychiatric/Behavioral: Negative. All other systems reviewed and are negative. Physical Exam  Physical Exam  Vitals and nursing note reviewed. Constitutional:       General: She is awake. She is not in acute distress. Appearance: She is well-developed and overweight. She is not toxic-appearing or diaphoretic. HENT:      Head: Normocephalic and atraumatic. Right Ear: Hearing and external ear normal.      Left Ear: Hearing and external ear normal.      Nose: Nose normal.      Mouth/Throat:      Lips: Pink. Mouth: Mucous membranes are moist. No angioedema. Tongue: Tongue does not deviate from midline. Pharynx: Oropharynx is clear. Uvula midline. No pharyngeal swelling, oropharyngeal exudate or uvula swelling. Eyes:      General: Lids are normal. No scleral icterus. Extraocular Movements: Extraocular movements intact. Conjunctiva/sclera: Conjunctivae normal.      Pupils: Pupils are equal, round, and reactive to light. Comments: +glasses   Neck:      Trachea: Trachea and phonation normal. No tracheal deviation. Cardiovascular:      Rate and Rhythm: Normal rate and regular rhythm. Pulses: Normal pulses. Radial pulses are 2+ on the right side and 2+ on the left side. Heart sounds: Normal heart sounds, S1 normal and S2 normal. No murmur heard. Pulmonary:      Effort: Pulmonary effort is normal. No tachypnea or respiratory distress. Breath sounds: Normal breath sounds. No stridor. No wheezing, rhonchi or rales. Comments: No conversational dyspnea. Speech is clear and fluent and speaks in full sentences. O2 sat 98% on room air. Abdominal:      General: Bowel sounds are normal. There is no distension. Palpations: Abdomen is soft. Tenderness: There is no abdominal tenderness.  There is no guarding or rebound. Musculoskeletal:         General: Normal range of motion. Cervical back: Normal range of motion and neck supple. Right lower leg: No edema. Left lower leg: No edema. Skin:     General: Skin is warm and dry. Capillary Refill: Capillary refill takes less than 2 seconds. Findings: No erythema or rash. Comments: There are no noted hives or skin changes. No reported itching. Neurological:      General: No focal deficit present. Mental Status: She is alert and oriented to person, place, and time. GCS: GCS eye subscore is 4. GCS verbal subscore is 5. GCS motor subscore is 6. Cranial Nerves: No cranial nerve deficit. Sensory: No sensory deficit. Motor: No abnormal muscle tone.    Psychiatric:         Mood and Affect: Mood normal.         Speech: Speech normal.         Behavior: Behavior normal.         Vital Signs  ED Triage Vitals [08/03/23 0952]   Temperature Pulse Respirations Blood Pressure SpO2   98 °F (36.7 °C) 88 20 165/76 97 %      Temp Source Heart Rate Source Patient Position - Orthostatic VS BP Location FiO2 (%)   Temporal Monitor Sitting Left arm --      Pain Score       No Pain           Vitals:    08/03/23 0952 08/03/23 1000 08/03/23 1030 08/03/23 1130   BP: 165/76 139/62 137/61 124/60   Pulse: 88 87 86 81   Patient Position - Orthostatic VS: Sitting Lying Lying Lying         Visual Acuity      ED Medications  Medications   sodium chloride 0.9 % bolus 1,000 mL (0 mL Intravenous Stopped 8/3/23 1156)   Famotidine (PF) (PEPCID) injection 20 mg (20 mg Intravenous Given 8/3/23 1002)   diphenhydrAMINE (BENADRYL) injection 25 mg (25 mg Intravenous Given 8/3/23 1002)   ondansetron (ZOFRAN) injection 4 mg (4 mg Intravenous Given 8/3/23 1002)   loratadine (CLARITIN) tablet 10 mg (10 mg Oral Given 8/3/23 1001)   acetaminophen (TYLENOL) tablet 975 mg (975 mg Oral Given 8/3/23 1106)       Diagnostic Studies  Results Reviewed     Procedure Component Value Units Date/Time    Fingerstick Glucose (POCT) [[id number]]  (Normal) Collected: 08/03/23 1000    Lab Status: Final result Updated: 08/03/23 1000     POC Glucose 121 mg/dl                  No orders to display              Procedures  ECG 12 Lead Documentation Only    Date/Time: 8/3/2023 9:50 AM    Performed by: Tamiko Ravi PA-C  Authorized by: Tamiko Ravi PA-C    Indications / Diagnosis:  Palpitations  ECG reviewed by me, the ED Provider: yes    Patient location:  ED  Previous ECG:     Previous ECG:  Compared to current    Comparison ECG info:  12/17/22    Similarity:  No change    Comparison to cardiac monitor: Yes    Interpretation:     Interpretation: non-specific    Rate:     ECG rate:  88    ECG rate assessment: normal    Rhythm:     Rhythm: sinus rhythm    Ectopy:     Ectopy: none    QRS:     QRS axis:  Normal    QRS intervals:  Normal  Conduction:     Conduction: normal    ST segments:     ST segments:  Normal  T waves:     T waves: normal    Comments:      , QRS 82, QT/QTc 390/471; no acute ischemic changes. ED Course  ED Course as of 08/03/23 1301   Thu Aug 03, 2023   0940 Reviewed prior records. Pt was here today for outpatient imaging (CT chest wo contrast, CT abd w wo contrast). Was premedicated with medrol pack and benadryl. Imaging was performed for follow up of a left renal mass seen on outpatient non contrast MRI. Reviewed OV from urology 7/31/23.   1002 POC Glucose: 121   1032 Pt reassessed. She reports feeling improved and states, "I feel much better than I did". Still c/o headache and requesting some tylenol which she normally takes for headaches. She denies taking any tylenol today. Neuro exam is non focal.  Will provide tylenol and continue to monitor. Anticipate discharge home with outpatient follow up. 1150 Pt reassessed. Reports feeling improved. Headache has improved. BP improved on recheck. Pt would like to go home.   Will discharge with strict return precautions. 1151 Pt ambulatory to bathroom, steady gait. Strict return precautions outlined. Advised outpatient follow up with PCP or return to ER for change in condition as outlined. Pt verbalized understanding and had no further questions. SBIRT 22yo+    Flowsheet Row Most Recent Value   Initial Alcohol Screen: US AUDIT-C     1. How often do you have a drink containing alcohol? 0 Filed at: 08/03/2023 0954   Audit-C Score 0 Filed at: 08/03/2023 1948                    Medical Decision Making  67 yo female presenting for evaluation of symptoms after receiving IV contrast dye received during an outpatient CT scan. Vitals are stable. She is well appearing. No appreciated findings on exam.  There are no hives, no angioedema, lungs are clear. Her reaction has historically been hives and itching, neither of which she is experiencing. Will provide symptom management and some fluids and monitor here. Reports palpitations but HR is acceptable and she is in a normal sinus rhythm. EKG non ischemic. No noted arrhythmias on telemetry monitoring. This does not appear to be anaphylaxis. I suspect some of her symptoms are related to the steroids she received. Work obtained as noted above. No acute changes on EKG. Glucose within normal limits. Pt was treated symptomatically with improvement of her symptoms. Her CT chest was resulted while in the emergency room and without acute findings. Pt's vitals remains stable and BP was mildly elevated upon arrival although did improve to a normal range while monitored in ED. Pt felt improved and comfortable with discharge and outpatient follow up. I have recommended f/u with PCP as well as urology to follow up in regards to CT scans. She voiced understanding and appreciation and had no further questions. Please refer to above ER course for further details/discussion.       Adverse effect of drug, initial encounter: acute illness or injury  Headache: acute illness or injury  Jittery feeling: acute illness or injury  Amount and/or Complexity of Data Reviewed  External Data Reviewed: labs, radiology, ECG and notes. Labs: ordered. Decision-making details documented in ED Course. ECG/medicine tests: ordered and independent interpretation performed. Decision-making details documented in ED Course. Discussion of management or test interpretation with external provider(s): Attending; I did speak with radiologist Dr. Rosa Weir who evaluated pt following the scan. Risk  OTC drugs. Prescription drug management. Disposition  Final diagnoses: Adverse effect of drug, initial encounter   Jittery feeling   Headache     Time reflects when diagnosis was documented in both MDM as applicable and the Disposition within this note     Time User Action Codes Description Comment    8/3/2023 11:57 AM Claryce Purple Add [T50.905A] Adverse effect of drug, initial encounter     8/3/2023 11:57 AM Claryce Purple Add [R45.0] Jittery feeling     8/3/2023 11:57 AM Claryce Purple Add [R51.9] Headache       ED Disposition     ED Disposition   Discharge    Condition   Stable    Date/Time   Thu Aug 3, 2023 11:55 AM    Comment   Caity Wheatley discharge to home/self care.                Follow-up Information     Follow up With Specialties Details Why Contact Info Additional Information    Leigha Kyle,  Internal Medicine, Hospice Services Schedule an appointment as soon as possible for a visit   [id number] [facility] Emergency Department Emergency Medicine  As needed [address] [facility] [id number]  [address] [place] [address] Emergency Department, [address] [place] [address], [id number]          Discharge Medication List as of 8/3/2023 11:58 AM      CONTINUE these medications which have NOT CHANGED    Details   amitriptyline (ELAVIL) 50 mg tablet Take 1 tablet (50 mg total) by mouth daily at bedtime, Starting Wed 7/12/2023, Normal      Biotin 2500 MCG CAPS Take 1 capsule by mouth 2 (two) times a day , Historical Med      calcium carbonate (OS-ANTHONY) 600 MG tablet Take 1,200 mg by mouth 2 (two) times a day with meals, Historical Med      Cholecalciferol (VITAMIN D-3 PO) Take 1 tablet by mouth daily, Historical Med      Cyanocobalamin (VITAMIN B-12 CR PO) Take 1 tablet by mouth daily, Historical Med      Diclofenac Sodium (VOLTAREN) 1 % Apply 2 g topically 4 (four) times a day, Starting Wed 7/12/2023, Normal      dicyclomine (BENTYL) 10 mg capsule Take 1 capsule (10 mg total) by mouth 2 (two) times a day, Starting Wed 7/12/2023, Normal      EPINEPHrine (EPIPEN) 0.3 mg/0.3 mL SOAJ Inject 0.3 mL (0.3 mg total) into a muscle as needed for anaphylaxis, Starting Mon 4/17/2023, Normal      Evening Primrose Oil 1000 MG CAPS Take 1 capsule (1,000 mg total) by mouth in the morning, Starting Tue 11/29/2022, No Print      fexofenadine (ALLEGRA) 180 MG tablet Take 180 mg by mouth daily, Historical Med      gabapentin (NEURONTIN) 300 mg capsule Take 1 tablet in the morning, 1 tablet afternoon and 2 bedtime, Normal      hydrOXYzine HCL (ATARAX) 10 mg tablet Take 2 tablets (20 mg total) by mouth daily at bedtime Take 2 tablets at bedtime, Starting Wed 7/12/2023, Until Tue 10/10/2023, Normal      losartan-hydrochlorothiazide (HYZAAR) 100-25 MG per tablet Take 1 tablet by mouth daily, Starting Fri 5/5/2023, Normal      methocarbamol (ROBAXIN) 750 mg tablet Take 1 tablet (750 mg total) by mouth every 8 (eight) hours, Starting Tue 6/21/2022, Until Mon 7/31/2023, Normal      montelukast (SINGULAIR) 10 mg tablet Take 1 tablet (10 mg total) by mouth daily at bedtime, Starting Wed 7/12/2023, Normal      Multiple Vitamins-Minerals (PRESERVISION AREDS 2 PO) Take by mouth in the morning, Starting Wed 3/30/2022, Historical Med      Omega-3 Fatty Acids (FISH OIL) 1,000 mg Take 1,000 mg by mouth 3 (three) times a day, Historical Med      omeprazole (PriLOSEC) 40 MG capsule Take 1 capsule (40 mg total) by mouth daily, Starting Tue 11/1/2022, Normal      rosuvastatin (CRESTOR) 10 MG tablet Take 1 tablet (10 mg total) by mouth daily, Starting Tue 6/21/2022, Normal      simvastatin (ZOCOR) 10 mg tablet Take 1 tablet (10 mg total) by mouth daily at bedtime, Starting Wed 12/21/2022, Normal             No discharge procedures on file.     PDMP Review       Value Time User    PDMP Reviewed  Yes 6/9/2023  6:57 AM Annette Moeller DO          ED Provider  Electronically Signed by           Marlon Anders PA-C  08/03/23 4820

## 2023-08-03 NOTE — DISCHARGE INSTRUCTIONS
Rest, fluids. Continue OTC medications as needed for symptoms. Follow up with PCP or return to ER as needed.

## 2023-08-03 NOTE — TELEPHONE ENCOUNTER
Pt had allergic reaction to dye during CAT scan, needs ER F-up. . please advise on appt date. .. she is off work tomorrow,

## 2023-08-04 ENCOUNTER — OFFICE VISIT (OUTPATIENT)
Dept: FAMILY MEDICINE CLINIC | Facility: CLINIC | Age: 73
End: 2023-08-04
Payer: MEDICARE

## 2023-08-04 VITALS
WEIGHT: 186.2 LBS | HEIGHT: 62 IN | OXYGEN SATURATION: 98 % | SYSTOLIC BLOOD PRESSURE: 124 MMHG | TEMPERATURE: 97.8 F | BODY MASS INDEX: 34.27 KG/M2 | RESPIRATION RATE: 18 BRPM | HEART RATE: 84 BPM | DIASTOLIC BLOOD PRESSURE: 70 MMHG

## 2023-08-04 DIAGNOSIS — N28.89 LEFT RENAL MASS: ICD-10-CM

## 2023-08-04 DIAGNOSIS — T50.8X5D ALLERGIC REACTION TO CONTRAST MATERIAL, SUBSEQUENT ENCOUNTER: Primary | ICD-10-CM

## 2023-08-04 PROBLEM — T50.8X5A ALLERGIC REACTION TO CONTRAST DYE: Status: ACTIVE | Noted: 2023-08-04

## 2023-08-04 LAB
ATRIAL RATE: 88 BPM
ATRIAL RATE: 90 BPM
P AXIS: 60 DEGREES
P AXIS: 69 DEGREES
PR INTERVAL: 154 MS
PR INTERVAL: 158 MS
QRS AXIS: 16 DEGREES
QRS AXIS: 5 DEGREES
QRSD INTERVAL: 82 MS
QRSD INTERVAL: 84 MS
QT INTERVAL: 386 MS
QT INTERVAL: 390 MS
QTC INTERVAL: 471 MS
QTC INTERVAL: 472 MS
T WAVE AXIS: 42 DEGREES
T WAVE AXIS: 44 DEGREES
VENTRICULAR RATE: 88 BPM
VENTRICULAR RATE: 90 BPM

## 2023-08-04 PROCEDURE — 93010 ELECTROCARDIOGRAM REPORT: CPT | Performed by: INTERNAL MEDICINE

## 2023-08-04 PROCEDURE — 99214 OFFICE O/P EST MOD 30 MIN: CPT | Performed by: INTERNAL MEDICINE

## 2023-08-04 NOTE — PROGRESS NOTES
Name: Otis King      : 1950      MRN: [id number]  Encounter Provider: Portia Collins DO  Encounter Date: 2023   Encounter department: [address]     1. Allergic reaction to contrast material, subsequent encounter  Pt feeling better and CT results are complete  She will increase fluids and rest thru the weekend  Has Urology followup in September to discuss tx options    2. Left renal mass  Ct is consistent with mass contained to kidney without evidence of spread   Pt has Urology appt Sept and will discuss options for tx         Depression Screening and Follow-up Plan: Patient was screened for depression during today's encounter. They screened negative with a PHQ-2 score of 0. Will call if procedure planned/has routine in January     Subjective      HPI   Pt had CT with contrast to assess renal mass and had reaction She has hx of reaction but was prepped with steroids prior She felt heart racing and not right towards end of CT studies and was taken to ER HR was elevated along with BP She did not have rash as she had in past She is feeling better today just tired Breathing stable N chest pain No palpitations She is staying hydrated No rash She has Urology f/u in September and is aware CT results do not show any spread of disease outside of single renal lesion   Review of Systems   Constitutional: Negative for chills and fever. HENT: Negative. Eyes: Negative for visual disturbance. Respiratory: Negative for cough, shortness of breath and wheezing. Cardiovascular: Negative for chest pain, palpitations and leg swelling. Gastrointestinal: Negative for abdominal distention and abdominal pain. Genitourinary: Negative. Musculoskeletal: Positive for arthralgias and back pain. Neurological: Negative for dizziness, light-headedness and headaches. Psychiatric/Behavioral: Negative for sleep disturbance. The patient is not nervous/anxious.         Current Outpatient Medications on File Prior to Visit   Medication Sig   • amitriptyline (ELAVIL) 50 mg tablet Take 1 tablet (50 mg total) by mouth daily at bedtime   • Biotin 2500 MCG CAPS Take 1 capsule by mouth 2 (two) times a day    • calcium carbonate (OS-ANTHONY) 600 MG tablet Take 1,200 mg by mouth 2 (two) times a day with meals   • Cholecalciferol (VITAMIN D-3 PO) Take 1 tablet by mouth daily   • Cyanocobalamin (VITAMIN B-12 CR PO) Take 1 tablet by mouth daily   • Diclofenac Sodium (VOLTAREN) 1 % Apply 2 g topically 4 (four) times a day   • dicyclomine (BENTYL) 10 mg capsule Take 1 capsule (10 mg total) by mouth 2 (two) times a day   • EPINEPHrine (EPIPEN) 0.3 mg/0.3 mL SOAJ Inject 0.3 mL (0.3 mg total) into a muscle as needed for anaphylaxis   • Evening Primrose Oil 1000 MG CAPS Take 1 capsule (1,000 mg total) by mouth in the morning   • fexofenadine (ALLEGRA) 180 MG tablet Take 180 mg by mouth daily   • gabapentin (NEURONTIN) 300 mg capsule Take 1 tablet in the morning, 1 tablet afternoon and 2 bedtime   • hydrOXYzine HCL (ATARAX) 10 mg tablet Take 2 tablets (20 mg total) by mouth daily at bedtime Take 2 tablets at bedtime   • losartan-hydrochlorothiazide (HYZAAR) 100-25 MG per tablet Take 1 tablet by mouth daily   • methocarbamol (ROBAXIN) 750 mg tablet Take 1 tablet (750 mg total) by mouth every 8 (eight) hours   • montelukast (SINGULAIR) 10 mg tablet Take 1 tablet (10 mg total) by mouth daily at bedtime   • Multiple Vitamins-Minerals (PRESERVISION AREDS 2 PO) Take by mouth in the morning   • Omega-3 Fatty Acids (FISH OIL) 1,000 mg Take 1,000 mg by mouth 3 (three) times a day   • omeprazole (PriLOSEC) 40 MG capsule Take 1 capsule (40 mg total) by mouth daily   • rosuvastatin (CRESTOR) 10 MG tablet Take 1 tablet (10 mg total) by mouth daily   • simvastatin (ZOCOR) 10 mg tablet Take 1 tablet (10 mg total) by mouth daily at bedtime       Objective     /70   Pulse 84   Temp 97.8 °F (36.6 °C) (Temporal)   Resp 18   Ht 5' 2" (1.575 m)   Wt 84.5 kg (186 lb 3.2 oz)   LMP  (LMP Unknown)   SpO2 98%   BMI 34.06 kg/m²     Physical Exam  Vitals reviewed. Constitutional:       General: She is not in acute distress. Appearance: Normal appearance. She is not ill-appearing, toxic-appearing or diaphoretic. HENT:      Head: Normocephalic and atraumatic. Right Ear: External ear normal.      Left Ear: External ear normal.      Nose: Nose normal.      Mouth/Throat:      Mouth: Mucous membranes are moist.   Eyes:      General: No scleral icterus. Extraocular Movements: Extraocular movements intact. Conjunctiva/sclera: Conjunctivae normal.      Pupils: Pupils are equal, round, and reactive to light. Cardiovascular:      Rate and Rhythm: Normal rate and regular rhythm. Pulses: Normal pulses. Heart sounds: Normal heart sounds. Pulmonary:      Effort: Pulmonary effort is normal. No respiratory distress. Breath sounds: Normal breath sounds. No wheezing. Abdominal:      General: Bowel sounds are normal. There is no distension. Palpations: Abdomen is soft. Tenderness: There is no abdominal tenderness. Musculoskeletal:      Cervical back: Normal range of motion and neck supple. Right lower leg: No edema. Left lower leg: No edema. Lymphadenopathy:      Cervical: No cervical adenopathy. Skin:     General: Skin is warm and dry. Coloration: Skin is not jaundiced or pale. Neurological:      General: No focal deficit present. Mental Status: She is alert and oriented to person, place, and time. Mental status is at baseline. Cranial Nerves: No cranial nerve deficit. Sensory: No sensory deficit. Psychiatric:         Mood and Affect: Mood normal.         Behavior: Behavior normal.         Thought Content:  Thought content normal.         Judgment: Judgment normal.       Lilia Carlos DO

## 2023-08-11 ENCOUNTER — HOSPITAL ENCOUNTER (OUTPATIENT)
Dept: RADIOLOGY | Facility: MEDICAL CENTER | Age: 73
Discharge: HOME | End: 2023-08-11
Payer: MEDICARE

## 2023-08-11 VITALS
DIASTOLIC BLOOD PRESSURE: 79 MMHG | RESPIRATION RATE: 18 BRPM | HEART RATE: 75 BPM | SYSTOLIC BLOOD PRESSURE: 140 MMHG | TEMPERATURE: 97.5 F | OXYGEN SATURATION: 98 %

## 2023-08-11 DIAGNOSIS — M79.18 MYOFASCIAL PAIN SYNDROME: ICD-10-CM

## 2023-08-11 PROCEDURE — 20552 NJX 1/MLT TRIGGER POINT 1/2: CPT | Performed by: PHYSICAL MEDICINE & REHABILITATION

## 2023-08-11 PROCEDURE — 77002 NEEDLE LOCALIZATION BY XRAY: CPT | Performed by: PHYSICAL MEDICINE & REHABILITATION

## 2023-08-11 PROCEDURE — A9585 GADOBUTROL INJECTION: HCPCS | Performed by: PHYSICAL MEDICINE & REHABILITATION

## 2023-08-11 RX ORDER — BUPIVACAINE HCL/PF 2.5 MG/ML
3 VIAL (ML) INJECTION ONCE
Status: COMPLETED | OUTPATIENT
Start: 2023-08-11 | End: 2023-08-11

## 2023-08-11 RX ORDER — METHYLPREDNISOLONE ACETATE 40 MG/ML
80 INJECTION, SUSPENSION INTRA-ARTICULAR; INTRALESIONAL; INTRAMUSCULAR; PARENTERAL; SOFT TISSUE ONCE
Status: COMPLETED | OUTPATIENT
Start: 2023-08-11 | End: 2023-08-11

## 2023-08-11 RX ORDER — GADOBUTROL 604.72 MG/ML
2 INJECTION INTRAVENOUS ONCE
Status: COMPLETED | OUTPATIENT
Start: 2023-08-11 | End: 2023-08-11

## 2023-08-11 RX ADMIN — GADOBUTROL 2 ML: 604.72 INJECTION INTRAVENOUS at 09:44

## 2023-08-11 RX ADMIN — Medication 3 ML: at 09:44

## 2023-08-11 RX ADMIN — METHYLPREDNISOLONE ACETATE 80 MG: 40 INJECTION, SUSPENSION INTRA-ARTICULAR; INTRALESIONAL; INTRAMUSCULAR; SOFT TISSUE at 09:44

## 2023-08-11 NOTE — H&P
History of Present Illness: The patient is a 68 y.o. female who presents with complaints of bilateral buttock pain    Past Medical History:   Diagnosis Date   • Abnormal findings on diagnostic imaging of breast    • Abnormal Pap smear of cervix    • Arthritis    • Chronic pain disorder    • Extremity pain    • Fibrocystic breast disease    • Foot pain    • GERD (gastroesophageal reflux disease)    • Hyperlipidemia    • Hypertension    • Interstitial cystitis    • Joint pain    • Low back pain    • Osteoarthritis    • Osteopenia    • Uncomplicated opioid dependence ([address]) 3/1/2022       Past Surgical History:   Procedure Laterality Date   • APPENDECTOMY     • BACK SURGERY     • BREAST EXCISIONAL BIOPSY Left 1996    benign   • CARPAL BOSS EXCISION     • CHOLECYSTECTOMY     • DIAGNOSTIC LAPAROSCOPY     • FOOT SURGERY     • FRACTURE SURGERY     • HAND SURGERY  5/2017   • HYSTERECTOMY      age 33   • HYSTEROSCOPY     • JOINT REPLACEMENT     • KIDNEY SURGERY Left    • KNEE ARTHROSCOPY Bilateral    • LAMINECTOMY     • LAPAROSCOPY      hynecologic with adhesions   • MYOMECTOMY     • OOPHORECTOMY Bilateral     age 33   • ORTHOPEDIC SURGERY     • ND CAR IMPLTJ NSTIM ELTRDS PLATE/PADDLE EDRL N/A 05/18/2017    Procedure: PLACEMENT OF THORACIC SPINAL CORD STIMULATOR WITH LEFT BUTTOCK IMPLANTABLE PULSE GENERATOR (IMPULSE MONITORING-MOTORS (TCEMEP), EMG, SSEP);   Surgeon: Ines Allen MD;  Location: [facility];  Service: Neurosurgery   • SPINAL CORD STIMULATOR IMPLANT Left 09/29/2020    Procedure: LAMINECTOMY THORACIC , REMOVAL OF SCS LEADS AND GENERATOR;  Surgeon: Maranda Hogan MD;  Location:  MAIN OR;  Service: Neurosurgery   • SPINAL STIMULATOR PLACEMENT  05/2017   • TONSILLECTOMY AND ADENOIDECTOMY      onset: unknown   • TOTAL KNEE ARTHROPLASTY Right          Current Outpatient Medications:   •  amitriptyline (ELAVIL) 50 mg tablet, Take 1 tablet (50 mg total) by mouth daily at bedtime, Disp: 90 tablet, Rfl: 0  •  Biotin 2500 MCG CAPS, Take 1 capsule by mouth 2 (two) times a day , Disp: , Rfl:   •  calcium carbonate (OS-ANTHONY) 600 MG tablet, Take 1,200 mg by mouth 2 (two) times a day with meals, Disp: , Rfl:   •  Cholecalciferol (VITAMIN D-3 PO), Take 1 tablet by mouth daily, Disp: , Rfl:   •  Cyanocobalamin (VITAMIN B-12 CR PO), Take 1 tablet by mouth daily, Disp: , Rfl:   •  Diclofenac Sodium (VOLTAREN) 1 %, Apply 2 g topically 4 (four) times a day, Disp: 200 g, Rfl: 0  •  dicyclomine (BENTYL) 10 mg capsule, Take 1 capsule (10 mg total) by mouth 2 (two) times a day, Disp: 180 capsule, Rfl: 3  •  EPINEPHrine (EPIPEN) 0.3 mg/0.3 mL SOAJ, Inject 0.3 mL (0.3 mg total) into a muscle as needed for anaphylaxis, Disp: 2 each, Rfl: 0  •  Evening Primrose Oil 1000 MG CAPS, Take 1 capsule (1,000 mg total) by mouth in the morning, Disp: , Rfl: 0  •  fexofenadine (ALLEGRA) 180 MG tablet, Take 180 mg by mouth daily, Disp: , Rfl:   •  gabapentin (NEURONTIN) 300 mg capsule, Take 1 tablet in the morning, 1 tablet afternoon and 2 bedtime, Disp: 360 capsule, Rfl: 3  •  hydrOXYzine HCL (ATARAX) 10 mg tablet, Take 2 tablets (20 mg total) by mouth daily at bedtime Take 2 tablets at bedtime, Disp: 180 tablet, Rfl: 1  •  losartan-hydrochlorothiazide (HYZAAR) 100-25 MG per tablet, Take 1 tablet by mouth daily, Disp: 90 tablet, Rfl: 3  •  methocarbamol (ROBAXIN) 750 mg tablet, Take 1 tablet (750 mg total) by mouth every 8 (eight) hours, Disp: 270 tablet, Rfl: 3  •  montelukast (SINGULAIR) 10 mg tablet, Take 1 tablet (10 mg total) by mouth daily at bedtime, Disp: 90 tablet, Rfl: 3  •  Multiple Vitamins-Minerals (PRESERVISION AREDS 2 PO), Take by mouth in the morning, Disp: , Rfl:   •  Omega-3 Fatty Acids (FISH OIL) 1,000 mg, Take 1,000 mg by mouth 3 (three) times a day, Disp: , Rfl:   •  omeprazole (PriLOSEC) 40 MG capsule, Take 1 capsule (40 mg total) by mouth daily, Disp: 90 capsule, Rfl: 3  •  rosuvastatin (CRESTOR) 10 MG tablet, Take 1 tablet (10 mg total) by mouth daily, Disp: 90 tablet, Rfl: 3  •  simvastatin (ZOCOR) 10 mg tablet, Take 1 tablet (10 mg total) by mouth daily at bedtime, Disp: 90 tablet, Rfl: 2    Allergies   Allergen Reactions   • Bee Venom Shortness Of Breath   • Chlorhexidine Rash   • Egg Yolk - Food Allergy Hives   • Iodinated Contrast Media Hives   • Penicillins Hives   • Lidocaine Other (See Comments)     cream   • Allevyn Adhesive [Wound Dressings] Hives and Rash   • Bactrim [Sulfamethoxazole-Trimethoprim] Rash   • Codeine Other (See Comments)     headaches   • Latex Rash   • Medical Tape Blisters, Hives, Itching and Rash   • Other Rash     Adhesive tape   • Oxybutynin Rash   • Pentosan Polysulfate Rash   • Povidone Iodine Rash       Physical Exam:   Vitals:    08/11/23 0922   BP: 116/78   Pulse: 76   Resp: 20   Temp: 97.5 °F (36.4 °C)   SpO2: 97%     General: Awake, Alert, Oriented x 3, Mood and affect appropriate  Respiratory: Respirations even and unlabored  Cardiovascular: Peripheral pulses intact; no edema  Musculoskeletal Exam: bilateral piriformis pain    ASA Score: 3    Patient/Chart Verification  Patient ID Verified: Verbal  ID Band Applied: No  Consents Confirmed: Procedural, To be obtained in the Pre-Procedure area  H&P( within 30 days) Verified: To be obtained in the Pre-Procedure area  Interval H&P(within 24 hr) Complete (required for Outpatients and Surgery Admit only): To be obtained in the Pre-Procedure area  Allergies Reviewed: Yes (latex and contrast dye)  Anticoag/NSAID held?: NA  Currently on antibiotics?: No    Assessment:   1.  Myofascial pain syndrome        Plan: (B) piriformis injections

## 2023-08-11 NOTE — DISCHARGE INSTRUCTIONS
Do not apply heat to any area that is numb. If you have discomfort or soreness at the injection site, you may apply ice today, 20 minutes on and 20 minutes off. Tomorrow you may use ice or warm, moist heat. Do not apply ice or heat directly to the skin. If you experience severe shortness of breath, go to the Emergency Room. You may have numbness for several hours from the local anesthetic. Please use caution and common sense, especially with weight-bearing activities. You may have an increase or change in the discomfort for 36-48 hours after your treatment. Apply ice and continue with any pain medicine you have been prescribed. Do not do anything strenuous today. You may shower, but no tub baths or hot tubs today. You may resume your normal activities tomorrow, but do not “overdo it”. Resume normal activities slowly when you are feeling better. If you experience redness, drainage or swelling at the injection site, or if you develop a fever above 100 degrees, please call [facility] at [phone] or go to the Emergency Room. Continue to take all routine medicines prescribed by your primary care physician unless otherwise instructed by our staff. Most blood thinners should be started again according to your regularly scheduled dosing. If you have any questions, please give our office a call. As no general anesthesia was used in today's procedure, you should not experience any side effects related to anesthesia. If you have a problem specifically related to your procedure, please call our office at [phone]. Problems not related to your procedure should be directed to your primary care physician.

## 2023-08-18 ENCOUNTER — APPOINTMENT (OUTPATIENT)
Dept: LAB | Facility: MEDICAL CENTER | Age: 73
End: 2023-08-18
Payer: MEDICARE

## 2023-08-18 ENCOUNTER — TELEPHONE (OUTPATIENT)
Dept: FAMILY MEDICINE CLINIC | Facility: CLINIC | Age: 73
End: 2023-08-18

## 2023-08-18 ENCOUNTER — TELEPHONE (OUTPATIENT)
Dept: PAIN MEDICINE | Facility: CLINIC | Age: 73
End: 2023-08-18

## 2023-08-18 DIAGNOSIS — N28.89 LEFT RENAL MASS: ICD-10-CM

## 2023-08-18 DIAGNOSIS — N30.00 ACUTE CYSTITIS WITHOUT HEMATURIA: Primary | ICD-10-CM

## 2023-08-18 LAB
BACTERIA UR QL AUTO: ABNORMAL /HPF
BILIRUB UR QL STRIP: NEGATIVE
CLARITY UR: ABNORMAL
COLOR UR: ABNORMAL
GLUCOSE UR STRIP-MCNC: NEGATIVE MG/DL
HGB UR QL STRIP.AUTO: ABNORMAL
KETONES UR STRIP-MCNC: NEGATIVE MG/DL
LEUKOCYTE ESTERASE UR QL STRIP: ABNORMAL
MUCOUS THREADS UR QL AUTO: ABNORMAL
NITRITE UR QL STRIP: NEGATIVE
NON-SQ EPI CELLS URNS QL MICRO: ABNORMAL /HPF
PH UR STRIP.AUTO: 6 [PH]
PROT UR STRIP-MCNC: ABNORMAL MG/DL
RBC #/AREA URNS AUTO: ABNORMAL /HPF
RENAL EPI CELLS #/AREA URNS HPF: PRESENT /[HPF]
SP GR UR STRIP.AUTO: 1.03 (ref 1–1.03)
TRANS CELLS #/AREA URNS HPF: PRESENT /[HPF]
UROBILINOGEN UR STRIP-ACNC: <2 MG/DL
WBC #/AREA URNS AUTO: ABNORMAL /HPF

## 2023-08-18 PROCEDURE — 87086 URINE CULTURE/COLONY COUNT: CPT

## 2023-08-18 PROCEDURE — 87186 SC STD MICRODIL/AGAR DIL: CPT

## 2023-08-18 PROCEDURE — 87077 CULTURE AEROBIC IDENTIFY: CPT

## 2023-08-18 PROCEDURE — 81001 URINALYSIS AUTO W/SCOPE: CPT

## 2023-08-18 RX ORDER — CIPROFLOXACIN 500 MG/1
500 TABLET, FILM COATED ORAL EVERY 12 HOURS SCHEDULED
Qty: 14 TABLET | Refills: 0 | Status: SHIPPED | OUTPATIENT
Start: 2023-08-18 | End: 2023-08-25

## 2023-08-18 NOTE — TELEPHONE ENCOUNTER
Pt c/o pressure and frequentcy with urination. She thinks she has a UTI. Please advise. Uses [facility] for pharmacy.

## 2023-08-20 LAB
BACTERIA UR CULT: ABNORMAL
BACTERIA UR CULT: ABNORMAL

## 2023-08-21 ENCOUNTER — TELEPHONE (OUTPATIENT)
Dept: FAMILY MEDICINE CLINIC | Facility: CLINIC | Age: 73
End: 2023-08-21

## 2023-08-21 LAB
BACTERIA UR CULT: ABNORMAL
BACTERIA UR CULT: ABNORMAL

## 2023-08-28 ENCOUNTER — TELEPHONE (OUTPATIENT)
Dept: UROLOGY | Facility: CLINIC | Age: 73
End: 2023-08-28

## 2023-08-28 DIAGNOSIS — G62.9 NEUROPATHY: ICD-10-CM

## 2023-08-28 RX ORDER — AMITRIPTYLINE HYDROCHLORIDE 50 MG/1
50 TABLET, FILM COATED ORAL
Qty: 90 TABLET | Refills: 0 | Status: SHIPPED | OUTPATIENT
Start: 2023-08-28

## 2023-08-28 NOTE — TELEPHONE ENCOUNTER
Spoke with patient in regards to her upcoming appointment with Dr. Jasmine Pennington 9/7/23 at 2:00 needing to canceled due to provider out of office. Per Dr. Jasmine Pennington follow up can be with P[facility]. Patient scheduled with Felton Bagley in [facility] on 9/7/23 at 1:00. Patient appreciated that call.

## 2023-08-30 ENCOUNTER — OFFICE VISIT (OUTPATIENT)
Dept: OBGYN CLINIC | Facility: CLINIC | Age: 73
End: 2023-08-30
Payer: MEDICARE

## 2023-08-30 ENCOUNTER — APPOINTMENT (OUTPATIENT)
Dept: RADIOLOGY | Facility: MEDICAL CENTER | Age: 73
End: 2023-08-30
Payer: MEDICARE

## 2023-08-30 VITALS
BODY MASS INDEX: 33.49 KG/M2 | HEART RATE: 122 BPM | HEIGHT: 62 IN | SYSTOLIC BLOOD PRESSURE: 106 MMHG | WEIGHT: 182 LBS | DIASTOLIC BLOOD PRESSURE: 59 MMHG

## 2023-08-30 DIAGNOSIS — M25.511 RIGHT SHOULDER PAIN, UNSPECIFIED CHRONICITY: ICD-10-CM

## 2023-08-30 DIAGNOSIS — M75.51 SUBACROMIAL BURSITIS OF RIGHT SHOULDER JOINT: Primary | ICD-10-CM

## 2023-08-30 DIAGNOSIS — M19.011 ARTHRITIS OF RIGHT ACROMIOCLAVICULAR JOINT: ICD-10-CM

## 2023-08-30 PROCEDURE — 20610 DRAIN/INJ JOINT/BURSA W/O US: CPT | Performed by: FAMILY MEDICINE

## 2023-08-30 PROCEDURE — 73030 X-RAY EXAM OF SHOULDER: CPT

## 2023-08-30 PROCEDURE — 99214 OFFICE O/P EST MOD 30 MIN: CPT | Performed by: FAMILY MEDICINE

## 2023-08-30 RX ORDER — TRIAMCINOLONE ACETONIDE 40 MG/ML
40 INJECTION, SUSPENSION INTRA-ARTICULAR; INTRAMUSCULAR
Status: COMPLETED | OUTPATIENT
Start: 2023-08-30 | End: 2023-08-30

## 2023-08-30 RX ADMIN — TRIAMCINOLONE ACETONIDE 40 MG: 40 INJECTION, SUSPENSION INTRA-ARTICULAR; INTRAMUSCULAR at 11:00

## 2023-08-30 NOTE — PROGRESS NOTES
Subjective:  Chief Complaint   Patient presents with   • Right Shoulder - Pain       Stephanie Sanchez is a 68 y.o. female presenting for evaluation of right shoulder pain. Patient was previously seen for left shoulder glenohumeral arthritis and provided with a cortisone injection which provided significant relief. She states that the left shoulder has been doing well however states because of compensation to the right shoulder she has been developing some pain over the past several weeks. She does state that when she does perform swimming and exercises for the shoulder it does improve. She is unable to take anti-inflammatory medications due to exacerbation of reflux. Denies any traumatic injury. No weakness numbness or tingling reported    The following portions of the patient's history were reviewed and updated as appropriate: allergies, current medications, past family history, past medical history, past social history, past surgical history and problem list.    Occupation:      Review of Systems   Constitutional: Negative for fever. HENT: Negative for dental problem and headaches. Eyes: Negative for vision loss. Respiratory: Negative for cough and shortness of breath. Cardiovascular: Negative for leg swelling and palpitations. Gastrointestinal: Negative for constipation and diarrhea. Genitourinary: Negative for bladder incontinence and difficulty urinating. Musculoskeletal: Negative for back pain and difficulty walking. Skin: Negative for rash and ulcer. Neurological: Negative for dizziness and headaches. Hem/Lymph/Immuno: Negative for blood clots. Does not bruise/bleed easily. Psychiatric/Behavioral: Negative for confusion.          Objective:  /59   Pulse (!) 122   Ht 5' 2" (1.575 m)   Wt 82.6 kg (182 lb)   LMP  (LMP Unknown)   BMI 33.29 kg/m²     Skin: no rashes, lesions, skin discolorations, lacerations  Vasculature: normal radial and ulnar pulse,  normal skin color, normal capillary refill in extremity, no upper extremity edema  Neurologic: Neurologic exam is normal throughout upper extremities, Awake, alert, and oriented x3, no apparent distress. Musculoskeletal:   right SHOULDER EXAM    Intact skin. No erythema, no induration, no signs of infection  No gross deformity    AROM FF: 180 degrees  AROM ER with arm at its side: 90 degrees  AROM IR: 80 degrees    Grind test: negative    Supraspinatus strength testin/5  Infraspinatus strength testin/5    Belly press: negative  Bear Hug test: negative    Positive Carr positive Neer's positive empty can    Imaging:         Assessment/Plan:  1. Subacromial bursitis of right shoulder joint    - XR shoulder 2+ vw right; Future    2. Arthritis of right acromioclavicular joint      Clinical impressions the patient's right shoulder pain is multifactorial in nature. X-rays were reviewed independently and reveal degenerative changes at the [facility] joint. No acute fractures noted. Follow-up on official radiology report. Primary pain generator seems to be arising from subacromial bursitis. Secondary pain generator arising from [facility] joint arthritis. Recommending treatment for subacromial bursitis including home exercise program,  Tylenol for pain relief, icing the affected area, activity modification, and consideration for cortisone injection. After discussion patient is agreeable for CSI which was performed in office today without complication.   She will return again in about 2 months for reevaluation

## 2023-08-30 NOTE — PROGRESS NOTES
Large joint arthrocentesis: R subacromial bursa  Universal Protocol:  Consent: Verbal consent obtained. Risks and benefits: risks, benefits and alternatives were discussed  Consent given by: patient    Supporting Documentation  Indications: pain   Procedure Details  Location: shoulder - R subacromial bursa  Preparation: Patient was prepped and draped in the usual sterile fashion  Needle size: 22 G  Ultrasound guidance: no  Approach: posterior  Medications administered: 40 mg triamcinolone acetonide 40 mg/mL (4 ml ropivicaine)    Patient tolerance: patient tolerated the procedure well with no immediate complications    Risks and benefits of CSI were discussed with patient extensively. Risks were highlighted which included but were not limited to infection, pain, local site swelling, and chance that injection may not be effective. Patient was also counseled regarding glucose elevation days after receiving CSI and to be mindful of diet and check sugars daily. Patient agreeable to proceed with CSI after counseling.

## 2023-08-31 DIAGNOSIS — M17.12 PRIMARY OSTEOARTHRITIS OF LEFT KNEE: ICD-10-CM

## 2023-08-31 NOTE — TELEPHONE ENCOUNTER
Patient called rx line was seen in the office yesterday and forgot to ask for a refill of the voltaren gel

## 2023-09-07 ENCOUNTER — OFFICE VISIT (OUTPATIENT)
Dept: UROLOGY | Facility: CLINIC | Age: 73
End: 2023-09-07
Payer: MEDICARE

## 2023-09-07 ENCOUNTER — TELEPHONE (OUTPATIENT)
Dept: UROLOGY | Facility: CLINIC | Age: 73
End: 2023-09-07

## 2023-09-07 VITALS
SYSTOLIC BLOOD PRESSURE: 110 MMHG | BODY MASS INDEX: 32.37 KG/M2 | WEIGHT: 177 LBS | DIASTOLIC BLOOD PRESSURE: 76 MMHG | HEART RATE: 76 BPM

## 2023-09-07 DIAGNOSIS — N28.89 LEFT RENAL MASS: Primary | ICD-10-CM

## 2023-09-07 PROCEDURE — 99213 OFFICE O/P EST LOW 20 MIN: CPT

## 2023-09-07 NOTE — TELEPHONE ENCOUNTER
----- Message from [address] sent at 9/7/2023  2:25 PM EDT -----  Please call patient to schedule 3 month follow-up with Dr. Bettina Delgado with Renal US prior. I did also discuss with Dr. Bettina Delgado regarding if cryablation can be done at [facility] and he is unsure as well. IR will be able to confirm this for her.

## 2023-09-07 NOTE — PROGRESS NOTES
9/7/2023    No chief complaint on file. Assessment and Plan    68 y.o. female manage by Dr. Ike Tony    1. Left Renal Mass  · CT abdomen w/wo from 8/03/2023 shows enhancing lesion in the left upper pole likely representing neoplasm, though demonstrating slow growth since at least 2014. No evidence of lymphadenopathy or metastatic disease in he abdomen. CT chest without from same day was also unremarkable. · Unfortunately she did develop an allergic reaction again to IV contrast despite premedicating with Medrol and Benadryl. She was evaluated in the ER following her allergic reaction. · See discussion note          Discussion:    I had a lengthy discussion with her today regarding the results of her follow-up CT. As there is no evidence of metastatic spread and there appears to be slow growth of this lesion since 2014, I did discuss options of continued surveillance. We also talked about alternative treatment options of either cryoablation versus surgical intervention. She is concerned about active surveillance and the need for frequent follow-up imaging given her allergy to IV contrast with CAT scan and claustrophobia with MRI. She was made aware that even if she elects to proceed with cryoablation or surgical intervention she will still require frequency surveillance imaging as well. I did discuss her imaging findings with Dr. Washington Woods prior to her office visit and after reviewing them, Dr. Washington Woods felt that she would likely benefit from cryoablation. She was made aware of Dr. Dacia Daly recommendations as well. After reviewing risk and benefits of each option, she has elected to consider cryoablation and I have placed a referral to Interventional Radiology for further discussion of this. She will then need follow-up in 3 months following cryoablation with Renal US prior to that visit.           Subjective:    History of Present Illness  Landy Guzman is a 68 y.o. female here for follow-up to review CT imaging. Previously established patient who initially establish care with Dr. Obi Rodriguez in June 2018 for possible interstitial cystitis. This was confirmed on cystoscopy evaluation in August 2018. He was last seen in July 2021 by Kimberly Ratliff and reported doing well with Elavil 10 mg at bedtime for management of her interstitial cystitis.     She was referred back to Urology by her PCP Dr. Sancho Carter for evaluation of a left upper pole renal mass concerning for solid neoplasm. She has a history of chronic back pain and had a CT of the lumbar spine completed on 5/25/2023. This showed a nonspecific mass in the upper pole of the left kidney concerning for RCC. She would have a follow-up renal ultrasound completed on 6/5/2023 which showed a poorly visualized left renal upper pole 3.1 cm hypoechoic lesion, slowly increasing in size since 2016 and concerning for solid neoplasm. It measured approximately 2.1 cm on 4/2/2016 noncontrast CT examination with interval increase in size and measurements of approximately 3.6 cm on recent CT lumbar spine examination 5/25/2023. It measures approximately 3.3 cm on prior renal ultrasound 11/11/2020. There is also a 0.9 cm septated left lower pole cyst as well as a 1.8 cm hypoechoic lesion in the right upper renal pole likely representing a complicated cyst and less likely a solid renal neoplasm.       Due to IV contrast allergy, a MRI of the abdomen without was completed through outside imaging on 6/16/2023 confirmed a complex multilocular mass of the anterior upper pole measuring 3.2 cm. There appears to be faint hyperintensity on the fat suppressed T1 weighted imaging suggesting there might be a minimal intrinsic hemorrhagic content. There are a few foci of diminished restricted diffusion as well.      She denies any family history of bladder, ureteral, or renal cancer. She has a history of possible left UJP obstruction and reported pyeloplasty in 1971.  She does have chronic back pain, but denies any flank pain or gross hematuria. She denies smoking or chemical exposure history. She was seen in consultation by Dr. Swapnil Gibson on 7/31/2023 for further discussion and evaluation of the renal mass. Dr. Swapnil Gibson recommended she have repeat imaging with or without contrast with either MRI or CT. She has a known Iodine contrast allergy with CAT scans in the past but elected for repeat CT imaging over MRI and was pretreated with Medrol and Benadryl. She would have follow-up CT imaging with and without contrast on 8/03/2023 as well as CT of the chest without. CT of abdomen showed enhancing lesion in the left upper pole likely representing neoplasm, though slow growth since at least 2014. No evidence of lymphadenopathy or metastatic disease in the abdomen. Small peripherally calcified cyst in the right kidney, Bosniak 2. CT of chest was unremarkable and showed no evidence of metastatic disease. Review of Systems   Constitutional: Negative for chills and fever. HENT: Negative for ear pain and sore throat. Eyes: Negative for pain and visual disturbance. Respiratory: Negative for cough and shortness of breath. Cardiovascular: Negative for chest pain and palpitations. Gastrointestinal: Negative for abdominal pain and vomiting. Genitourinary: Negative for dysuria and hematuria. Musculoskeletal: Negative for arthralgias and back pain. Skin: Negative for color change and rash. Neurological: Negative for seizures and syncope. All other systems reviewed and are negative. Vitals  There were no vitals filed for this visit. Physical Exam  Vitals reviewed. Constitutional:       General: She is not in acute distress. Appearance: Normal appearance. She is normal weight. She is not ill-appearing or toxic-appearing. HENT:      Head: Normocephalic and atraumatic. Nose: Nose normal.   Eyes:      General: No scleral icterus. Conjunctiva/sclera: Conjunctivae normal.   Cardiovascular:      Rate and Rhythm: Normal rate. Pulses: Normal pulses. Pulmonary:      Effort: Pulmonary effort is normal. No respiratory distress. Abdominal:      General: Abdomen is flat. Palpations: Abdomen is soft. Tenderness: There is no abdominal tenderness. There is no right CVA tenderness or left CVA tenderness. Hernia: No hernia is present. Musculoskeletal:         General: Normal range of motion. Cervical back: Normal range of motion. Skin:     General: Skin is warm and dry. Neurological:      General: No focal deficit present. Mental Status: She is alert and oriented to person, place, and time. Mental status is at baseline. Psychiatric:         Mood and Affect: Mood normal.         Behavior: Behavior normal.         Thought Content: Thought content normal.         Judgment: Judgment normal.         Past History  Past Medical History:   Diagnosis Date   • Abnormal findings on diagnostic imaging of breast    • Abnormal Pap smear of cervix    • Arthritis    • Chronic pain disorder    • Extremity pain    • Fibrocystic breast disease    • Foot pain    • GERD (gastroesophageal reflux disease)    • Hyperlipidemia    • Hypertension    • Interstitial cystitis    • Joint pain    • Low back pain    • Osteoarthritis    • Osteopenia    • Uncomplicated opioid dependence ([address]) 3/1/2022     Social History     Socioeconomic History   • Marital status: Single     Spouse name: Not on file   • Number of children: Not on file   • Years of education: Not on file   • Highest education level: Not on file   Occupational History   • Occupation: Retired/ working part-time    Tobacco Use   • Smoking status: Never   • Smokeless tobacco: Never   Vaping Use   • Vaping Use: Never used   Substance and Sexual Activity   • Alcohol use:  Yes     Alcohol/week: 1.0 standard drink of alcohol     Types: 1 Glasses of wine per week     Comment: Drink socially, not too often   • Drug use: Never   • Sexual activity: Not Currently     Partners: Male     Birth control/protection: None   Other Topics Concern   • Not on file   Social History Narrative    No caffeine use    Always uses sunscreen    Always uses a seatbelt     Social Determinants of Health     Financial Resource Strain: Low Risk  (1/10/2023)    Overall Financial Resource Strain (CARDIA)    • Difficulty of Paying Living Expenses: Not hard at all   Food Insecurity: Not on file   Transportation Needs: No Transportation Needs (1/10/2023)    PRAPARE - Transportation    • Lack of Transportation (Medical): No    • Lack of Transportation (Non-Medical):  No   Physical Activity: Not on file   Stress: Not on file   Social Connections: Not on file   Intimate Partner Violence: Not on file   Housing Stability: Not on file     Social History     Tobacco Use   Smoking Status Never   Smokeless Tobacco Never     Family History   Problem Relation Age of Onset   • Bone cancer Mother    • Cancer Mother    • Asthma Mother    • Brain cancer Father    • Stroke Father         stroke sydrome   • Transient ischemic attack Father    • Depression Sister    • Migraines Sister    • Asthma Sister    • Asthma Sister    • No Known Problems Maternal Grandmother    • No Known Problems Maternal Grandfather    • Diabetes Paternal Grandmother    • No Known Problems Paternal Grandfather    • Heart disease Brother    • Diabetes Brother    • Heart attack Brother    • Colon cancer Brother 77   • Cancer Brother    • No Known Problems Brother    • Breast cancer Maternal Aunt 79   • Breast cancer additional onset Maternal Aunt 72   • Diabetes Sister    • Depression Sister        The following portions of the patient's history were reviewed and updated as appropriate allergies, current medications, past medical history, past social history, past surgical history and problem list    [id number]  CT - ABDOMEN WITHOUT AND WITH IV CONTRAST     INDICATION:   N28.89: Other specified disorders of kidney and ureter.     COMPARISON: None.     TECHNIQUE: Initial CT of the abdomen and pelvis was performed without intravenous contrast.  Subsequent dynamic CT evaluation of the abdomen was performed after the administration of intravenous contrast in both nephrographic and delayed phases after the   administration of intravenous contrast.   Multiplanar 2D reformatted images were created from the source data.     This examination, like all CT scans performed in the [facility], was performed utilizing techniques to minimize radiation dose exposure, including the use of iterative reconstruction and automated exposure control. Radiation dose length   product (DLP) for this visit:  1623.97 mGy-cm     IV Contrast:  100 mL of iohexol (OMNIPAQUE)  Enteric Contrast:  Enteric contrast was not administered.     FINDINGS:     LOWER CHEST: See dedicated chest CT.     KIDNEYS/URETERS:     There is a partially exophytic enhancing left renal mass measuring 3.2 x 3.2 cm, likely mildly increased from the prior study though difficult to discretely visualize given lack of intravenous contrast. While difficult to visualize on earlier CTs, the   lesion has likely been present since at least 2014 with mild interval growth. The mass abuts the renal sinus though does not appear to extend into the sinus itself. There is no renal hilar involvement. There is a too small to characterize hypodense   lesion in the lower pole, likely a small cyst.     There is a small cyst with peripheral calcification in the right kidney. No suspicious enhancement.     LIVER/BILIARY TREE:  Unremarkable.     GALLBLADDER: Gallbladder is surgically absent.     SPLEEN:  Unremarkable.     PANCREAS:  Unremarkable.     ADRENAL GLANDS:  Unremarkable.     VISUALIZED STOMACH AND BOWEL:  Unremarkable.     ABDOMINAL CAVITY:  No ascites. No pneumoperitoneum.  No lymphadenopathy.     VESSELS: Unremarkable for patient's age.     ABDOMINAL WALL:  Unremarkable.     OSSEOUS STRUCTURES:  No acute fracture or destructive osseous lesion.     IMPRESSION:     1.  Enhancing lesion in the left upper pole likely representing neoplasm, though demonstrating slow growth since at least 2014. No evidence of lymphadenopathy or metastatic disease in the abdomen.     2.  Small peripherally calcified cyst in the right kidney, Bosniak 2.        8/03/2023  CT CHEST WITHOUT IV CONTRAST     INDICATION:   Left renal mass.     COMPARISON: 12/8/2016.     TECHNIQUE: CT examination of the chest was performed without intravenous contrast. Multiplanar 2D reformatted images were created from the source data.        This examination, like all CT scans performed in the [facility], was performed utilizing techniques to minimize radiation dose exposure, including the use of iterative reconstruction and automated exposure control. Radiation dose length   product (DLP) for this visit:  310.2 mGy-cm     FINDINGS:     LUNGS:  Lungs are clear. There is no tracheal or endobronchial lesion.     PLEURA:  Unremarkable.     HEART/GREAT VESSELS: Heart is unremarkable for patient's age. No thoracic aortic aneurysm.     MEDIASTINUM AND DONA:  Unremarkable.     CHEST WALL AND LOWER NECK: Stable asymmetric glandular density in the right breast.     VISUALIZED STRUCTURES IN THE UPPER ABDOMEN: See dedicated CT.     OSSEOUS STRUCTURES:  No acute fracture or destructive osseous lesion.     IMPRESSION:     Unremarkable CT of the chest.                Results  No results found for this or any previous visit (from the past 1 hour(s)). ]  No results found for: "PSA"  Lab Results   Component Value Date    GLUCOSE 118 12/08/2016    CALCIUM 9.5 07/06/2023     11/09/2015    K 4.2 07/06/2023    CO2 26 07/06/2023     07/06/2023    BUN 11 07/06/2023    CREATININE 0.68 07/06/2023     Lab Results   Component Value Date    WBC 8.39 07/06/2023 HGB 14.8 07/06/2023    HCT 44.4 07/06/2023    MCV 88 07/06/2023     07/06/2023       Please Note:  Voice dictation software has been used to create this document. There may be inadvertent transcriptions errors.      EBONI Brown 09/07/23

## 2023-09-07 NOTE — Clinical Note
Please call patient to schedule 3 month follow-up with Dr. Adrian Daly with Renal US prior. I did also discuss with Dr. Adrian Daly regarding if cryablation can be done at [facility] and he is unsure as well. IR will be able to confirm this for her.

## 2023-09-07 NOTE — TELEPHONE ENCOUNTER
Called and spoke with Virgil Coyle. She was provided with Interventional Radiology phone number and will contact them tomorrow to schedule consult. Patient advised she will need 3 month follow up with Dr. Mer Sales with renal US prior after cryoablation. Patient will contact the office after cryoablation is scheduled to schedule 3 month follow up with US prior. Will keep encounter to monitor for IR scheduling so patient does not get lost to follow up.

## 2023-09-18 DIAGNOSIS — K58.9 IRRITABLE BOWEL SYNDROME WITHOUT DIARRHEA: ICD-10-CM

## 2023-09-18 RX ORDER — DICYCLOMINE HYDROCHLORIDE 10 MG/1
10 CAPSULE ORAL 2 TIMES DAILY
Qty: 180 CAPSULE | Refills: 3 | Status: SHIPPED | OUTPATIENT
Start: 2023-09-18

## 2023-09-25 ENCOUNTER — TELEMEDICINE (OUTPATIENT)
Dept: INTERVENTIONAL RADIOLOGY/VASCULAR | Facility: CLINIC | Age: 73
End: 2023-09-25
Payer: MEDICARE

## 2023-09-25 DIAGNOSIS — N28.89 LEFT RENAL MASS: Primary | ICD-10-CM

## 2023-09-25 PROCEDURE — 99204 OFFICE O/P NEW MOD 45 MIN: CPT | Performed by: RADIOLOGY

## 2023-09-25 RX ORDER — SODIUM CHLORIDE 9 MG/ML
75 INJECTION, SOLUTION INTRAVENOUS CONTINUOUS
OUTPATIENT
Start: 2023-09-25

## 2023-09-25 RX ORDER — DIPHENHYDRAMINE HCL 25 MG
50 TABLET ORAL
Qty: 2 TABLET | Refills: 0 | Status: SHIPPED | OUTPATIENT
Start: 2023-09-25

## 2023-09-25 RX ORDER — METHYLPREDNISOLONE 16 MG/1
32 TABLET ORAL
Qty: 4 TABLET | Refills: 0 | Status: SHIPPED | OUTPATIENT
Start: 2023-09-25 | End: 2023-09-26

## 2023-09-25 NOTE — PROGRESS NOTES
Virtual Regular Visit    Verification of patient location:    Patient is located at Home in the following state in which I hold an active license [place]      Assessment/Plan:  Left renal mass suspected of being renal cell carcinoma for cryoablation    Problem List Items Addressed This Visit        Other    Left renal mass            Reason for visit is   Chief Complaint   Patient presents with   • Virtual Regular Visit        Encounter provider Fernandez Atkins MD    Provider located at [address] [address] [phone]      Recent Visits  No visits were found meeting these conditions. Showing recent visits within past 7 days and meeting all other requirements  Today's Visits  Date Type Provider Dept   09/25/23 Telemedicine Fernandez Atkins MD [facility]   Showing today's visits and meeting all other requirements  Future Appointments  No visits were found meeting these conditions. Showing future appointments within next 150 days and meeting all other requirements       The patient was identified by name and date of birth. Emy Valdovinos was informed that this is a telemedicine visit and that the visit is being conducted through the [address] [place] [address] [employer] platform. She agrees to proceed. .  My office door was closed. No one else was in the room. She acknowledged consent and understanding of privacy and security of the video platform. The patient has agreed to participate and understands they can discontinue the visit at any time. Patient is aware this is a billable service. Subjective  Emy Valdovinos is a 68 y.o. female with a slowly growing left renal mass which now measures 3.2cm. It is located in the medial left upper pole and is exophytic and adjacent to the left renal artery.   This location adjacent to the left renal artery is challenging for cryoablation and will require hydro-disection to move the renal artery away from the mass. I explained this to the patient. I also explained the risks and benefits of the procedure to the patient and she wishes to proceed. We will coordinate this with anesthesia. She has an allergy to contrast so she will be intubated for anesthesia and pre-medicated. HPI     Past Medical History:   Diagnosis Date   • Abnormal findings on diagnostic imaging of breast    • Abnormal Pap smear of cervix    • Arthritis    • Chronic pain disorder    • Extremity pain    • Fibrocystic breast disease    • Foot pain    • GERD (gastroesophageal reflux disease)    • Hyperlipidemia    • Hypertension    • Interstitial cystitis    • Joint pain    • Low back pain    • Osteoarthritis    • Osteopenia    • Uncomplicated opioid dependence ([address]) 3/1/2022       Past Surgical History:   Procedure Laterality Date   • APPENDECTOMY     • BACK SURGERY     • BREAST EXCISIONAL BIOPSY Left 1996    benign   • CARPAL BOSS EXCISION     • CHOLECYSTECTOMY     • DIAGNOSTIC LAPAROSCOPY     • FOOT SURGERY     • FRACTURE SURGERY     • HAND SURGERY  5/2017   • HYSTERECTOMY      age 33   • HYSTEROSCOPY     • JOINT REPLACEMENT     • KIDNEY SURGERY Left    • KNEE ARTHROSCOPY Bilateral    • LAMINECTOMY     • LAPAROSCOPY      hynecologic with adhesions   • MYOMECTOMY     • OOPHORECTOMY Bilateral     age 33   • ORTHOPEDIC SURGERY     • AL CAR IMPLTJ NSTIM ELTRDS PLATE/PADDLE EDRL N/A 05/18/2017    Procedure: PLACEMENT OF THORACIC SPINAL CORD STIMULATOR WITH LEFT BUTTOCK IMPLANTABLE PULSE GENERATOR (IMPULSE MONITORING-MOTORS (TCEMEP), EMG, SSEP);   Surgeon: Serena Lopez MD;  Location: [facility];  Service: Neurosurgery   • SPINAL CORD STIMULATOR IMPLANT Left 09/29/2020    Procedure: LAMINECTOMY THORACIC , REMOVAL OF SCS LEADS AND GENERATOR;  Surgeon: Kalen Mark MD;  Location:  MAIN OR;  Service: Neurosurgery   • SPINAL STIMULATOR PLACEMENT  05/2017   • TONSILLECTOMY AND ADENOIDECTOMY      onset: unknown   • TOTAL KNEE ARTHROPLASTY Right Current Outpatient Medications   Medication Sig Dispense Refill   • amitriptyline (ELAVIL) 50 mg tablet Take 1 tablet (50 mg total) by mouth daily at bedtime 90 tablet 0   • Biotin 2500 MCG CAPS Take 1 capsule by mouth 2 (two) times a day      • calcium carbonate (OS-ANTHONY) 600 MG tablet Take 1,200 mg by mouth 2 (two) times a day with meals     • Cholecalciferol (VITAMIN D-3 PO) Take 1 tablet by mouth daily     • Cyanocobalamin (VITAMIN B-12 CR PO) Take 1 tablet by mouth daily     • Diclofenac Sodium (VOLTAREN) 1 % Apply 2 g topically 4 (four) times a day 200 g 0   • dicyclomine (BENTYL) 10 mg capsule Take 1 capsule (10 mg total) by mouth 2 (two) times a day 180 capsule 3   • EPINEPHrine (EPIPEN) 0.3 mg/0.3 mL SOAJ Inject 0.3 mL (0.3 mg total) into a muscle as needed for anaphylaxis 2 each 0   • Evening Primrose Oil 1000 MG CAPS Take 1 capsule (1,000 mg total) by mouth in the morning  0   • fexofenadine (ALLEGRA) 180 MG tablet Take 180 mg by mouth daily     • gabapentin (NEURONTIN) 300 mg capsule Take 1 tablet in the morning, 1 tablet afternoon and 2 bedtime 360 capsule 3   • hydrOXYzine HCL (ATARAX) 10 mg tablet Take 2 tablets (20 mg total) by mouth daily at bedtime Take 2 tablets at bedtime 180 tablet 1   • losartan-hydrochlorothiazide (HYZAAR) 100-25 MG per tablet Take 1 tablet by mouth daily 90 tablet 3   • methocarbamol (ROBAXIN) 750 mg tablet Take 1 tablet (750 mg total) by mouth every 8 (eight) hours 270 tablet 3   • montelukast (SINGULAIR) 10 mg tablet Take 1 tablet (10 mg total) by mouth daily at bedtime 90 tablet 3   • Multiple Vitamins-Minerals (PRESERVISION AREDS 2 PO) Take by mouth in the morning     • Omega-3 Fatty Acids (FISH OIL) 1,000 mg Take 1,000 mg by mouth 3 (three) times a day     • omeprazole (PriLOSEC) 40 MG capsule Take 1 capsule (40 mg total) by mouth daily 90 capsule 3   • rosuvastatin (CRESTOR) 10 MG tablet Take 1 tablet (10 mg total) by mouth daily 90 tablet 3   • simvastatin (ZOCOR) 10 mg tablet Take 1 tablet (10 mg total) by mouth daily at bedtime 90 tablet 2     No current facility-administered medications for this visit. Allergies   Allergen Reactions   • Bee Venom Shortness Of Breath   • Chlorhexidine Rash   • Egg Yolk - Food Allergy Hives   • Iodinated Contrast Media Hives and Other (See Comments)   • Penicillins Hives   • Lidocaine Other (See Comments)     cream   • Allevyn Adhesive [Wound Dressings] Hives and Rash   • Bactrim [Sulfamethoxazole-Trimethoprim] Rash   • Codeine Other (See Comments)     headaches   • Latex Rash and Other (See Comments)   • Medical Tape Blisters, Hives, Itching and Rash   • Other Rash     Adhesive tape   • Oxybutynin Rash   • Pentosan Polysulfate Rash   • Povidone Iodine Rash       Review of Systems    Video Exam    There were no vitals filed for this visit.     Physical Exam     Visit Time  Total Visit Duration: 30 minutes

## 2023-09-25 NOTE — LETTER
September 25, 2023     Pao Shay DO  [address]    Patient: Ruby Esparza   YOB: 1950   Date of Visit: 9/25/2023       Dear Dr. Avril Tenorio: Thank you for referring Kaitlin Hayes to me for evaluation. Below are my notes for this consultation. If you have questions, please do not hesitate to call me. I look forward to following your patient along with you. Sincerely,        Yvone Kawasaki, MD        CC: Cher Albright, CRNP Carney Scott, MD Birder Powers, MD Yvone Kawasaki, MD  9/25/2023  9:01 AM  Sign when Signing Visit  Virtual Regular Visit    Verification of patient location:    Patient is located at Home in the following state in which I hold an active license [place]      Assessment/Plan:    Problem List Items Addressed This Visit          Other    Left renal mass            Reason for visit is   Chief Complaint   Patient presents with   • Virtual Regular Visit        Encounter provider Yvone Kawasaki, MD    Provider located at [address] [address] [phone]      Recent Visits  No visits were found meeting these conditions. Showing recent visits within past 7 days and meeting all other requirements  Today's Visits  Date Type Provider Dept   09/25/23 Telemedicine Yvone Kawasaki, MD [facility]   Showing today's visits and meeting all other requirements  Future Appointments  No visits were found meeting these conditions. Showing future appointments within next 150 days and meeting all other requirements       The patient was identified by name and date of birth. Ruby Esparza was informed that this is a telemedicine visit and that the visit is being conducted through the [address] [place] [address] platform. She agrees to proceed. .  My office door was closed. No one else was in the room.   She acknowledged consent and understanding of privacy and security of lvm for pt to let her know meds have been filled for ten days. the video platform. The patient has agreed to participate and understands they can discontinue the visit at any time. Patient is aware this is a billable service. Sabrina Peterson is a 68 y.o. female with a slowly growing left renal mass which now measures 3.2cm. It is located in the medial left upper pole and is exophytic and adjacent to the left renal artery. This location adjacent to the left renal artery is challenging for cryoablation and will require hydro-disection to move the renal artery away from the mass. I explained this to the patient. I also explained the risks and benefits of the procedure to the patient and she wishes to proceed. We will coordinate this with anesthesia. She has an allergy to contrast so she will be intubated for anesthesia and pre-medicated.     HPI     Past Medical History:   Diagnosis Date   • Abnormal findings on diagnostic imaging of breast    • Abnormal Pap smear of cervix    • Arthritis    • Chronic pain disorder    • Extremity pain    • Fibrocystic breast disease    • Foot pain    • GERD (gastroesophageal reflux disease)    • Hyperlipidemia    • Hypertension    • Interstitial cystitis    • Joint pain    • Low back pain    • Osteoarthritis    • Osteopenia    • Uncomplicated opioid dependence ([address]) 3/1/2022       Past Surgical History:   Procedure Laterality Date   • APPENDECTOMY     • BACK SURGERY     • BREAST EXCISIONAL BIOPSY Left 1996    benign   • CARPAL BOSS EXCISION     • CHOLECYSTECTOMY     • DIAGNOSTIC LAPAROSCOPY     • FOOT SURGERY     • FRACTURE SURGERY     • HAND SURGERY  5/2017   • HYSTERECTOMY      age 33   • HYSTEROSCOPY     • JOINT REPLACEMENT     • KIDNEY SURGERY Left    • KNEE ARTHROSCOPY Bilateral    • LAMINECTOMY     • LAPAROSCOPY      hynecologic with adhesions   • MYOMECTOMY     • OOPHORECTOMY Bilateral     age 33   • ORTHOPEDIC SURGERY     • HI CAR IMPLTJ NSTIM ELTRDS PLATE/PADDLE EDRL N/A 05/18/2017    Procedure: PLACEMENT OF THORACIC SPINAL CORD STIMULATOR WITH LEFT BUTTOCK IMPLANTABLE PULSE GENERATOR (IMPULSE MONITORING-MOTORS (TCEMEP), EMG, SSEP);   Surgeon: Isabella Hernandez MD;  [facility];  Service: Neurosurgery   • SPINAL CORD STIMULATOR IMPLANT Left 09/29/2020    Procedure: LAMINECTOMY THORACIC , REMOVAL OF SCS LEADS AND GENERATOR;  Surgeon: Jamal Arce MD;  Location:  MAIN OR;  Service: Neurosurgery   • SPINAL STIMULATOR PLACEMENT  05/2017   • TONSILLECTOMY AND ADENOIDECTOMY      onset: unknown   • TOTAL KNEE ARTHROPLASTY Right        Current Outpatient Medications   Medication Sig Dispense Refill   • amitriptyline (ELAVIL) 50 mg tablet Take 1 tablet (50 mg total) by mouth daily at bedtime 90 tablet 0   • Biotin 2500 MCG CAPS Take 1 capsule by mouth 2 (two) times a day      • calcium carbonate (OS-ANTHONY) 600 MG tablet Take 1,200 mg by mouth 2 (two) times a day with meals     • Cholecalciferol (VITAMIN D-3 PO) Take 1 tablet by mouth daily     • Cyanocobalamin (VITAMIN B-12 CR PO) Take 1 tablet by mouth daily     • Diclofenac Sodium (VOLTAREN) 1 % Apply 2 g topically 4 (four) times a day 200 g 0   • dicyclomine (BENTYL) 10 mg capsule Take 1 capsule (10 mg total) by mouth 2 (two) times a day 180 capsule 3   • EPINEPHrine (EPIPEN) 0.3 mg/0.3 mL SOAJ Inject 0.3 mL (0.3 mg total) into a muscle as needed for anaphylaxis 2 each 0   • Evening Primrose Oil 1000 MG CAPS Take 1 capsule (1,000 mg total) by mouth in the morning  0   • fexofenadine (ALLEGRA) 180 MG tablet Take 180 mg by mouth daily     • gabapentin (NEURONTIN) 300 mg capsule Take 1 tablet in the morning, 1 tablet afternoon and 2 bedtime 360 capsule 3   • hydrOXYzine HCL (ATARAX) 10 mg tablet Take 2 tablets (20 mg total) by mouth daily at bedtime Take 2 tablets at bedtime 180 tablet 1   • losartan-hydrochlorothiazide (HYZAAR) 100-25 MG per tablet Take 1 tablet by mouth daily 90 tablet 3   • methocarbamol (ROBAXIN) 750 mg tablet Take 1 tablet (750 mg total) by mouth every 8 (eight) hours 270 tablet 3   • montelukast (SINGULAIR) 10 mg tablet Take 1 tablet (10 mg total) by mouth daily at bedtime 90 tablet 3   • Multiple Vitamins-Minerals (PRESERVISION AREDS 2 PO) Take by mouth in the morning     • Omega-3 Fatty Acids (FISH OIL) 1,000 mg Take 1,000 mg by mouth 3 (three) times a day     • omeprazole (PriLOSEC) 40 MG capsule Take 1 capsule (40 mg total) by mouth daily 90 capsule 3   • rosuvastatin (CRESTOR) 10 MG tablet Take 1 tablet (10 mg total) by mouth daily 90 tablet 3   • simvastatin (ZOCOR) 10 mg tablet Take 1 tablet (10 mg total) by mouth daily at bedtime 90 tablet 2     No current facility-administered medications for this visit. Allergies   Allergen Reactions   • Bee Venom Shortness Of Breath   • Chlorhexidine Rash   • Egg Yolk - Food Allergy Hives   • Iodinated Contrast Media Hives and Other (See Comments)   • Penicillins Hives   • Lidocaine Other (See Comments)     cream   • Allevyn Adhesive [Wound Dressings] Hives and Rash   • Bactrim [Sulfamethoxazole-Trimethoprim] Rash   • Codeine Other (See Comments)     headaches   • Latex Rash and Other (See Comments)   • Medical Tape Blisters, Hives, Itching and Rash   • Other Rash     Adhesive tape   • Oxybutynin Rash   • Pentosan Polysulfate Rash   • Povidone Iodine Rash       Review of Systems    Video Exam    There were no vitals filed for this visit.     Physical Exam     Visit Time  Total Visit Duration: 30 minutes

## 2023-10-04 ENCOUNTER — TELEPHONE (OUTPATIENT)
Dept: FAMILY MEDICINE CLINIC | Facility: CLINIC | Age: 73
End: 2023-10-04

## 2023-10-04 DIAGNOSIS — N30.00 ACUTE CYSTITIS WITHOUT HEMATURIA: Primary | ICD-10-CM

## 2023-10-04 NOTE — TELEPHONE ENCOUNTER
Pt called stated she feels as if she has UTI and has upcoming surgery on 10/17 so wanted to address her issue. Pt is having burning while urinating, pressure, frequency and lower back pain. Please advise.

## 2023-10-05 ENCOUNTER — APPOINTMENT (OUTPATIENT)
Dept: LAB | Facility: MEDICAL CENTER | Age: 73
End: 2023-10-05
Payer: MEDICARE

## 2023-10-05 DIAGNOSIS — N30.00 ACUTE CYSTITIS WITHOUT HEMATURIA: Primary | ICD-10-CM

## 2023-10-05 LAB
BACTERIA UR QL AUTO: ABNORMAL /HPF
BILIRUB UR QL STRIP: NEGATIVE
CAOX CRY URNS QL MICRO: ABNORMAL /HPF
CLARITY UR: ABNORMAL
COLOR UR: ABNORMAL
GLUCOSE UR STRIP-MCNC: NEGATIVE MG/DL
HGB UR QL STRIP.AUTO: NEGATIVE
KETONES UR STRIP-MCNC: NEGATIVE MG/DL
LEUKOCYTE ESTERASE UR QL STRIP: ABNORMAL
MUCOUS THREADS UR QL AUTO: ABNORMAL
NITRITE UR QL STRIP: NEGATIVE
NON-SQ EPI CELLS URNS QL MICRO: ABNORMAL /HPF
PH UR STRIP.AUTO: 6.5 [PH]
PROT UR STRIP-MCNC: ABNORMAL MG/DL
RBC #/AREA URNS AUTO: ABNORMAL /HPF
SP GR UR STRIP.AUTO: 1.02 (ref 1–1.03)
UROBILINOGEN UR STRIP-ACNC: <2 MG/DL
WBC #/AREA URNS AUTO: ABNORMAL /HPF

## 2023-10-05 PROCEDURE — 87077 CULTURE AEROBIC IDENTIFY: CPT

## 2023-10-05 PROCEDURE — 87147 CULTURE TYPE IMMUNOLOGIC: CPT

## 2023-10-05 PROCEDURE — 81001 URINALYSIS AUTO W/SCOPE: CPT

## 2023-10-05 PROCEDURE — 87086 URINE CULTURE/COLONY COUNT: CPT

## 2023-10-05 PROCEDURE — 87186 SC STD MICRODIL/AGAR DIL: CPT

## 2023-10-05 RX ORDER — CIPROFLOXACIN 500 MG/1
500 TABLET, FILM COATED ORAL EVERY 12 HOURS SCHEDULED
Qty: 14 TABLET | Refills: 0 | Status: SHIPPED | OUTPATIENT
Start: 2023-10-05 | End: 2023-10-12

## 2023-10-08 LAB — BACTERIA UR CULT: ABNORMAL

## 2023-10-09 DIAGNOSIS — F41.9 ANXIETY: Primary | ICD-10-CM

## 2023-10-09 RX ORDER — ALPRAZOLAM 0.25 MG/1
0.25 TABLET ORAL
Qty: 10 TABLET | Refills: 0 | Status: SHIPPED | OUTPATIENT
Start: 2023-10-09

## 2023-10-09 NOTE — PRE-PROCEDURE INSTRUCTIONS
Pre-procedure Instructions for Interventional Radiology  [address]  [address] [phone]    You are scheduled for a/an cryoablation  On Tuesday 10-17-23. Your tentative arrival time is 0715. Short stay will notify you the day before your procedure with the exact arrival time and the location to arrive. To prepare for your procedure:  1. Please arrange for someone to drive you home after the procedure and stay with you until the next morning if you are instructed to do so. This is typically for patients receiving some type of sedative or anesthetic for the procedure. 2. DO NOT EAT OR DRINK ANYTHING after midnight on the evening before your procedure including candy & gum.  3. ONLY SIPS OF WATER with your medications are allowed on the morning of your procedure. 4. TAKE ALL OF YOUR REGULAR MEDICATIONS THE MORNING OF YOUR PROCEDURE with sips of water! We may call you to stop some of your blood sugar, blood pressure and blood thinning medications depending on the procedure. Please take all of these medications unless we instruct you to stop them. 5. If you have an allergy to x-ray dye, please contact Interventional Radiology for an x-ray dye preparation which usually consists of an oral steroid and Benadryl. The day of your procedure:  1. Bring a list of the medications you take at home. 2. Bring medications you take for breathing problems (such as inhalers), medications for chest pain, or both. 3. Bring a case for your glasses or contacts. 4. Bring your insurance card and a form of photo ID.  5. Please leave all valuables such as credit cards and jewelry at home. 6. Report to the registration desk in the main lobby [facility], Sissy [facility]. Ask to be directed to [facility]. 7. While your procedure is being performed, your family may wait in the Radiology Waiting Room on the 1st floor in Radiology.   if they need to leave, they may provide a number to be called following the procedure. 8. Be prepared to stay overnight just in case. Sometimes procedures will indicate the need for further observation or treatment. 9. If you are scheduled for a follow-up visit with the Interventional Radiologist after your procedure, you will be called with a date and time.     Special Instructions (Medications to stop taking before your procedure etc.):  N/A

## 2023-10-12 LAB — BACTERIA UR CULT: ABNORMAL

## 2023-10-16 ENCOUNTER — ANESTHESIA EVENT (OUTPATIENT)
Dept: RADIOLOGY | Facility: HOSPITAL | Age: 73
End: 2023-10-16

## 2023-10-17 ENCOUNTER — HOSPITAL ENCOUNTER (OUTPATIENT)
Dept: RADIOLOGY | Facility: HOSPITAL | Age: 73
Discharge: HOME | End: 2023-10-17
Attending: RADIOLOGY
Payer: MEDICARE

## 2023-10-17 ENCOUNTER — ANESTHESIA (OUTPATIENT)
Dept: RADIOLOGY | Facility: HOSPITAL | Age: 73
End: 2023-10-17

## 2023-10-17 VITALS
TEMPERATURE: 97.1 F | OXYGEN SATURATION: 95 % | HEART RATE: 81 BPM | WEIGHT: 178 LBS | SYSTOLIC BLOOD PRESSURE: 107 MMHG | HEIGHT: 63 IN | BODY MASS INDEX: 31.54 KG/M2 | DIASTOLIC BLOOD PRESSURE: 53 MMHG | RESPIRATION RATE: 16 BRPM

## 2023-10-17 DIAGNOSIS — N28.89 LEFT RENAL MASS: Primary | ICD-10-CM

## 2023-10-17 PROBLEM — R11.2 PONV (POSTOPERATIVE NAUSEA AND VOMITING): Status: ACTIVE | Noted: 2023-10-17

## 2023-10-17 PROBLEM — Z98.890 PONV (POSTOPERATIVE NAUSEA AND VOMITING): Status: ACTIVE | Noted: 2023-10-17

## 2023-10-17 LAB
ANION GAP SERPL CALCULATED.3IONS-SCNC: 7 MMOL/L
BUN SERPL-MCNC: 17 MG/DL (ref 5–25)
CALCIUM SERPL-MCNC: 10.2 MG/DL (ref 8.4–10.2)
CHLORIDE SERPL-SCNC: 102 MMOL/L (ref 96–108)
CO2 SERPL-SCNC: 28 MMOL/L (ref 21–32)
CREAT SERPL-MCNC: 0.46 MG/DL (ref 0.6–1.3)
ERYTHROCYTE [DISTWIDTH] IN BLOOD BY AUTOMATED COUNT: 13 % (ref 11.6–15.1)
GFR SERPL CREATININE-BSD FRML MDRD: 99 ML/MIN/1.73SQ M
GLUCOSE P FAST SERPL-MCNC: 123 MG/DL (ref 65–99)
GLUCOSE SERPL-MCNC: 123 MG/DL (ref 65–140)
HCT VFR BLD AUTO: 43.9 % (ref 34.8–46.1)
HGB BLD-MCNC: 15.1 G/DL (ref 11.5–15.4)
INR PPP: 0.96 (ref 0.84–1.19)
MCH RBC QN AUTO: 30.1 PG (ref 26.8–34.3)
MCHC RBC AUTO-ENTMCNC: 34.4 G/DL (ref 31.4–37.4)
MCV RBC AUTO: 88 FL (ref 82–98)
PLATELET # BLD AUTO: 400 THOUSANDS/UL (ref 149–390)
PMV BLD AUTO: 8.5 FL (ref 8.9–12.7)
POTASSIUM SERPL-SCNC: 3.9 MMOL/L (ref 3.5–5.3)
PROTHROMBIN TIME: 12.7 SECONDS (ref 11.6–14.5)
RBC # BLD AUTO: 5.02 MILLION/UL (ref 3.81–5.12)
SODIUM SERPL-SCNC: 137 MMOL/L (ref 135–147)
WBC # BLD AUTO: 7.13 THOUSAND/UL (ref 4.31–10.16)

## 2023-10-17 PROCEDURE — C2618 PROBE/NEEDLE, CRYO: HCPCS

## 2023-10-17 PROCEDURE — 85027 COMPLETE CBC AUTOMATED: CPT | Performed by: RADIOLOGY

## 2023-10-17 PROCEDURE — 50200 RENAL BIOPSY PERQ: CPT

## 2023-10-17 PROCEDURE — 85610 PROTHROMBIN TIME: CPT | Performed by: RADIOLOGY

## 2023-10-17 PROCEDURE — 88305 TISSUE EXAM BY PATHOLOGIST: CPT | Performed by: PATHOLOGY

## 2023-10-17 PROCEDURE — 88342 IMHCHEM/IMCYTCHM 1ST ANTB: CPT | Performed by: PATHOLOGY

## 2023-10-17 PROCEDURE — 88341 IMHCHEM/IMCYTCHM EA ADD ANTB: CPT | Performed by: PATHOLOGY

## 2023-10-17 PROCEDURE — 80048 BASIC METABOLIC PNL TOTAL CA: CPT | Performed by: RADIOLOGY

## 2023-10-17 PROCEDURE — 77013 CT GUIDE FOR TISSUE ABLATION: CPT

## 2023-10-17 PROCEDURE — 50593 PERC CRYO ABLATE RENAL TUM: CPT

## 2023-10-17 RX ORDER — FENTANYL CITRATE/PF 50 MCG/ML
25 SYRINGE (ML) INJECTION
Status: DISCONTINUED | OUTPATIENT
Start: 2023-10-17 | End: 2023-10-17 | Stop reason: HOSPADM

## 2023-10-17 RX ORDER — ONDANSETRON 2 MG/ML
INJECTION INTRAMUSCULAR; INTRAVENOUS AS NEEDED
Status: DISCONTINUED | OUTPATIENT
Start: 2023-10-17 | End: 2023-10-17

## 2023-10-17 RX ORDER — HYDROMORPHONE HCL/PF 1 MG/ML
0.5 SYRINGE (ML) INJECTION EVERY 2 HOUR PRN
Status: DISCONTINUED | OUTPATIENT
Start: 2023-10-17 | End: 2023-10-18 | Stop reason: HOSPADM

## 2023-10-17 RX ORDER — SUCCINYLCHOLINE/SOD CL,ISO/PF 100 MG/5ML
SYRINGE (ML) INTRAVENOUS AS NEEDED
Status: DISCONTINUED | OUTPATIENT
Start: 2023-10-17 | End: 2023-10-17

## 2023-10-17 RX ORDER — LIDOCAINE HYDROCHLORIDE 10 MG/ML
INJECTION, SOLUTION EPIDURAL; INFILTRATION; INTRACAUDAL; PERINEURAL AS NEEDED
Status: COMPLETED | OUTPATIENT
Start: 2023-10-17 | End: 2023-10-17

## 2023-10-17 RX ORDER — ROCURONIUM BROMIDE 10 MG/ML
INJECTION, SOLUTION INTRAVENOUS AS NEEDED
Status: DISCONTINUED | OUTPATIENT
Start: 2023-10-17 | End: 2023-10-17

## 2023-10-17 RX ORDER — OXYCODONE HYDROCHLORIDE 5 MG/1
5 TABLET ORAL EVERY 4 HOURS PRN
Qty: 30 TABLET | Refills: 0 | Status: SHIPPED | OUTPATIENT
Start: 2023-10-17 | End: 2023-10-27

## 2023-10-17 RX ORDER — DEXAMETHASONE SODIUM PHOSPHATE 10 MG/ML
INJECTION, SOLUTION INTRAMUSCULAR; INTRAVENOUS AS NEEDED
Status: DISCONTINUED | OUTPATIENT
Start: 2023-10-17 | End: 2023-10-17

## 2023-10-17 RX ORDER — IODIXANOL 320 MG/ML
200 INJECTION, SOLUTION INTRAVASCULAR
Status: COMPLETED | OUTPATIENT
Start: 2023-10-17 | End: 2023-10-17

## 2023-10-17 RX ORDER — ACETAMINOPHEN 325 MG/1
650 TABLET ORAL EVERY 4 HOURS PRN
Status: DISCONTINUED | OUTPATIENT
Start: 2023-10-17 | End: 2023-10-18 | Stop reason: HOSPADM

## 2023-10-17 RX ORDER — LIDOCAINE HCL/PF 100 MG/5ML
SYRINGE (ML) INJECTION AS NEEDED
Status: DISCONTINUED | OUTPATIENT
Start: 2023-10-17 | End: 2023-10-17

## 2023-10-17 RX ORDER — METHYLPREDNISOLONE 4 MG/1
4 TABLET ORAL 2 TIMES DAILY
COMMUNITY

## 2023-10-17 RX ORDER — ONDANSETRON 2 MG/ML
4 INJECTION INTRAMUSCULAR; INTRAVENOUS ONCE AS NEEDED
Status: DISCONTINUED | OUTPATIENT
Start: 2023-10-17 | End: 2023-10-17 | Stop reason: HOSPADM

## 2023-10-17 RX ORDER — PROPOFOL 10 MG/ML
INJECTION, EMULSION INTRAVENOUS AS NEEDED
Status: DISCONTINUED | OUTPATIENT
Start: 2023-10-17 | End: 2023-10-17

## 2023-10-17 RX ORDER — SODIUM CHLORIDE, SODIUM LACTATE, POTASSIUM CHLORIDE, CALCIUM CHLORIDE 600; 310; 30; 20 MG/100ML; MG/100ML; MG/100ML; MG/100ML
INJECTION, SOLUTION INTRAVENOUS CONTINUOUS PRN
Status: DISCONTINUED | OUTPATIENT
Start: 2023-10-17 | End: 2023-10-17

## 2023-10-17 RX ORDER — OXYCODONE HYDROCHLORIDE 5 MG/1
5 TABLET ORAL EVERY 4 HOURS PRN
Status: DISCONTINUED | OUTPATIENT
Start: 2023-10-17 | End: 2023-10-18 | Stop reason: HOSPADM

## 2023-10-17 RX ORDER — HYDROMORPHONE HCL IN WATER/PF 6 MG/30 ML
0.2 PATIENT CONTROLLED ANALGESIA SYRINGE INTRAVENOUS
Status: DISCONTINUED | OUTPATIENT
Start: 2023-10-17 | End: 2023-10-17 | Stop reason: HOSPADM

## 2023-10-17 RX ORDER — PHENYLEPHRINE HCL IN 0.9% NACL 1 MG/10 ML
SYRINGE (ML) INTRAVENOUS AS NEEDED
Status: DISCONTINUED | OUTPATIENT
Start: 2023-10-17 | End: 2023-10-17

## 2023-10-17 RX ORDER — SODIUM CHLORIDE 9 MG/ML
75 INJECTION, SOLUTION INTRAVENOUS CONTINUOUS
Status: DISCONTINUED | OUTPATIENT
Start: 2023-10-17 | End: 2023-10-18 | Stop reason: HOSPADM

## 2023-10-17 RX ORDER — OXYCODONE HYDROCHLORIDE 5 MG/1
10 TABLET ORAL EVERY 4 HOURS PRN
Status: DISCONTINUED | OUTPATIENT
Start: 2023-10-17 | End: 2023-10-18 | Stop reason: HOSPADM

## 2023-10-17 RX ADMIN — Medication 100 MG: at 09:16

## 2023-10-17 RX ADMIN — LIDOCAINE HYDROCHLORIDE 20 ML: 10 INJECTION, SOLUTION EPIDURAL; INFILTRATION; INTRACAUDAL; PERINEURAL at 10:11

## 2023-10-17 RX ADMIN — ROCURONIUM BROMIDE 30 MG: 10 INJECTION, SOLUTION INTRAVENOUS at 09:40

## 2023-10-17 RX ADMIN — IODIXANOL 200 ML: 320 INJECTION, SOLUTION INTRAVASCULAR at 12:32

## 2023-10-17 RX ADMIN — Medication 200 MCG: at 10:34

## 2023-10-17 RX ADMIN — SODIUM CHLORIDE, SODIUM LACTATE, POTASSIUM CHLORIDE, AND CALCIUM CHLORIDE: .6; .31; .03; .02 INJECTION, SOLUTION INTRAVENOUS at 09:03

## 2023-10-17 RX ADMIN — Medication 200 MCG: at 09:23

## 2023-10-17 RX ADMIN — Medication 100 MCG: at 09:28

## 2023-10-17 RX ADMIN — LIDOCAINE HYDROCHLORIDE 40 MG: 20 INJECTION INTRAVENOUS at 09:16

## 2023-10-17 RX ADMIN — Medication 200 MCG: at 11:10

## 2023-10-17 RX ADMIN — FENTANYL CITRATE 25 MCG: 50 INJECTION INTRAMUSCULAR; INTRAVENOUS at 12:40

## 2023-10-17 RX ADMIN — DEXAMETHASONE SODIUM PHOSPHATE 10 MG: 10 INJECTION, SOLUTION INTRAMUSCULAR; INTRAVENOUS at 11:48

## 2023-10-17 RX ADMIN — FENTANYL CITRATE 25 MCG: 50 INJECTION INTRAMUSCULAR; INTRAVENOUS at 12:45

## 2023-10-17 RX ADMIN — OXYCODONE HYDROCHLORIDE 10 MG: 5 TABLET ORAL at 13:38

## 2023-10-17 RX ADMIN — SODIUM CHLORIDE 75 ML/HR: 0.9 INJECTION, SOLUTION INTRAVENOUS at 07:49

## 2023-10-17 RX ADMIN — ONDANSETRON 4 MG: 2 INJECTION INTRAMUSCULAR; INTRAVENOUS at 12:35

## 2023-10-17 RX ADMIN — ROCURONIUM BROMIDE 30 MG: 10 INJECTION, SOLUTION INTRAVENOUS at 11:10

## 2023-10-17 RX ADMIN — ROCURONIUM BROMIDE 20 MG: 10 INJECTION, SOLUTION INTRAVENOUS at 09:23

## 2023-10-17 RX ADMIN — ONDANSETRON 4 MG: 2 INJECTION INTRAMUSCULAR; INTRAVENOUS at 11:48

## 2023-10-17 RX ADMIN — Medication 200 MCG: at 09:41

## 2023-10-17 RX ADMIN — PROPOFOL 150 MG: 10 INJECTION, EMULSION INTRAVENOUS at 09:16

## 2023-10-17 NOTE — DISCHARGE INSTRUCTIONS
Kidney Tumor Ablation     WHAT YOU NEED TO KNOW:                                                                   Kidney tumor ablation is a procedure to destroy cancer cells in your kidney without removing them. Using image guidance, a probe is inserted into the tumor. Ablations can be done using high energy radio waves (RFA). Or using microwave energy. Heat destroys the abnormal tissue. A cyroablation destroys abnormal tissue by using cold temperatures. DISCHARGE INSTRUCTIONS:         You may resume your normal diet and medications. Small sips of flat soda will help with nausea. Follow up with your healthcare provider as directed: Write down your questions so you remember to ask them during your visits. Rest as needed: Slowly start to do more each day. Return to your daily activities as directed by your healthcare provider. Contact your healthcare provider if:  Favio Davis and [facility]  Patients Contact Interventional  Radiology at [phone] PATIENTS: Contact Interventional Radiology at [phone])      [place] PATIENTS: Contact Interventional Radiology at [phone]) if any of the following occur: You have severe pain that does not get better with medicine. Difficulty breathing, nausea or vomiting. Chills or fever above 101 degrees F. Develop any redness, swelling, heat, unusual drainage, heavy bruising or                          bleeding from the probe sites. You continue to have pain a week after your procedure. The above information is an  only. It is not intended as medical advice for individual conditions or treatments. Talk to your doctor, nurse or pharmacist before following any medical regimen to see if it is safe and effective for you.

## 2023-10-17 NOTE — SEDATION DOCUMENTATION
Cryoablation with biopsy successfully completed by Dr. Michaela Mahan without complications. Tolerated well. To transport to PACU via stretcher by CRNA and IR RN with bedside report. Bedrest start at 12:20 for 4hrs.

## 2023-10-17 NOTE — ANESTHESIA POSTPROCEDURE EVALUATION
Post-Op Assessment Note    CV Status:  Stable    Pain management: adequate     Mental Status:  Alert and awake   Hydration Status:  Euvolemic   PONV Controlled:  Controlled   Airway Patency:  Patent      Post Op Vitals Reviewed: Yes      Staff: CRNA, Anesthesiologist         No notable events documented.     BP   124/74   Temp 98   Pulse 81   Resp 12   SpO2 100

## 2023-10-17 NOTE — ANESTHESIA PREPROCEDURE EVALUATION
Procedure:  IR CRYOABLATION    Relevant Problems   ANESTHESIA   (+) PONV (postoperative nausea and vomiting)      CARDIO   (+) Hyperlipidemia   (+) Hypertension   (-) TRUJILLO (dyspnea on exertion)      PULMONARY   (-) Shortness of breath   (-) URI (upper respiratory infection)     Contrast allergy, pretreated with steroids and benadryl       Physical Exam    Airway    Mallampati score: II  TM Distance: <3 FB  Neck ROM: full     Dental   Comment: Denies loose     Cardiovascular      Pulmonary      Other Findings        Anesthesia Plan  ASA Score- 2     Anesthesia Type- general with ASA Monitors. Additional Monitors:     Airway Plan: ETT. Plan Factors-Exercise tolerance (METS): >4 METS. Chart reviewed. EKG reviewed. Existing labs reviewed. Patient summary reviewed. Induction- intravenous. Postoperative Plan-     Informed Consent- Anesthetic plan and risks discussed with patient. I personally reviewed this patient with the CRNA. Discussed and agreed on the Anesthesia Plan with the CRNA. Perry Cowan

## 2023-10-19 DIAGNOSIS — F41.9 ANXIETY: Primary | ICD-10-CM

## 2023-10-19 PROCEDURE — 88341 IMHCHEM/IMCYTCHM EA ADD ANTB: CPT | Performed by: PATHOLOGY

## 2023-10-19 PROCEDURE — 88305 TISSUE EXAM BY PATHOLOGIST: CPT | Performed by: PATHOLOGY

## 2023-10-19 PROCEDURE — 88342 IMHCHEM/IMCYTCHM 1ST ANTB: CPT | Performed by: PATHOLOGY

## 2023-10-19 RX ORDER — DIAZEPAM 10 MG/1
10 TABLET ORAL EVERY 6 HOURS PRN
Qty: 2 TABLET | Refills: 0 | Status: SHIPPED | OUTPATIENT
Start: 2023-10-19

## 2023-10-22 DIAGNOSIS — M62.830 BACK MUSCLE SPASM: ICD-10-CM

## 2023-10-22 DIAGNOSIS — M79.18 MYOFASCIAL PAIN SYNDROME: ICD-10-CM

## 2023-10-22 DIAGNOSIS — K21.9 GASTROESOPHAGEAL REFLUX DISEASE: ICD-10-CM

## 2023-10-22 DIAGNOSIS — M17.12 PRIMARY OSTEOARTHRITIS OF LEFT KNEE: ICD-10-CM

## 2023-10-22 DIAGNOSIS — M54.16 LUMBAR RADICULOPATHY: ICD-10-CM

## 2023-10-22 RX ORDER — GABAPENTIN 300 MG/1
CAPSULE ORAL
Qty: 360 CAPSULE | Refills: 0 | Status: SHIPPED | OUTPATIENT
Start: 2023-10-22

## 2023-10-22 RX ORDER — OMEPRAZOLE 40 MG/1
40 CAPSULE, DELAYED RELEASE ORAL DAILY
Qty: 90 CAPSULE | Refills: 0 | Status: SHIPPED | OUTPATIENT
Start: 2023-10-22

## 2023-10-22 RX ORDER — METHOCARBAMOL 750 MG/1
750 TABLET, FILM COATED ORAL EVERY 8 HOURS SCHEDULED
Qty: 270 TABLET | Refills: 0 | Status: SHIPPED | OUTPATIENT
Start: 2023-10-22 | End: 2024-01-20

## 2023-10-23 ENCOUNTER — TELEMEDICINE (OUTPATIENT)
Dept: INTERVENTIONAL RADIOLOGY/VASCULAR | Facility: CLINIC | Age: 73
End: 2023-10-23

## 2023-10-23 DIAGNOSIS — N28.89 LEFT RENAL MASS: Primary | ICD-10-CM

## 2023-10-23 PROCEDURE — 99024 POSTOP FOLLOW-UP VISIT: CPT | Performed by: RADIOLOGY

## 2023-10-23 NOTE — LETTER
October 23, 2023     Eddy Gonzales   [address] [facility]    Patient: Phoenix Escobar   YOB: 1950   Date of Visit: 10/23/2023       Dear Dr. Nilda Gao: Thank you for referring Reynaldo Sandhu to me for evaluation. Below are my notes for this consultation. If you have questions, please do not hesitate to call me. I look forward to following your patient along with you. Sincerely,        Willard Garduno MD        CC: MD Willard Licona MD  10/23/2023  9:32 AM  Sign when Signing Visit  Virtual Regular Visit    Verification of patient location:    Patient is located at Home in the following state in which I hold an active license [place]      Assessment/Plan:    Problem List Items Addressed This Visit          Other    Left renal mass - Primary            Reason for visit is   Chief Complaint   Patient presents with   • Virtual Regular Visit          Encounter provider Willard Garduno MD    Provider located at [facility] [address]  [phone]      Recent Visits  Date Type Provider Dept   10/17/23 Telephone Willard Garduno MD [address] [facility] recent visits within past 7 days and meeting all other requirements  Today's Visits  Date Type Provider Dept   10/23/23 Telephone Willard Garduno  [facility]   10/23/23 Telemedicine Willard Garduno MD [address] [facility] today's visits and meeting all other requirements  Future Appointments  No visits were found meeting these conditions. Showing future appointments within next 150 days and meeting all other requirements       The patient was identified by name and date of birth. Phoenix Escobar was informed that this is a telemedicine visit and that the visit is being conducted through the [employer]. She agrees to proceed. .  My office door was closed. No one else was in the room. She acknowledged consent and understanding of privacy and security of the video platform. The patient has agreed to participate and understands they can discontinue the visit at any time. Patient is aware this is a billable service. Sabrina Mccurdy is a 68 y.o. female s/p left renal cryoablation last week for a 1 week follow-up. Patient complains of mild left back/flank pain from the procedure. She complains more of lethargy and difficulty walking due to "stiff legs". She does have back issues and has a nerve stimulator for her pain so this may be due to those issues. The lethargy could be due to the anesthesia and steroid premedication. I will follow-up with her in 4 days to see if she feels better. If not, we can perform some blood work and a possible non-contrast CT scan to make sure everything looks okay in the area of the cryoablation.     HPI     Past Medical History:   Diagnosis Date   • Abnormal findings on diagnostic imaging of breast    • Abnormal Pap smear of cervix    • Arthritis    • Chronic pain disorder    • Extremity pain    • Fibrocystic breast disease    • Foot pain    • GERD (gastroesophageal reflux disease)    • Hyperlipidemia    • Hypertension    • Interstitial cystitis    • Joint pain    • Low back pain    • Osteoarthritis    • Osteopenia    • Uncomplicated opioid dependence ([address]) 3/1/2022       Past Surgical History:   Procedure Laterality Date   • APPENDECTOMY     • BACK SURGERY     • BREAST EXCISIONAL BIOPSY Left 1996    benign   • CARPAL BOSS EXCISION     • CHOLECYSTECTOMY     • DIAGNOSTIC LAPAROSCOPY     • FOOT SURGERY     • FRACTURE SURGERY     • HAND SURGERY  5/2017   • HYSTERECTOMY      age 33   • HYSTEROSCOPY     • IR CRYOABLATION  10/17/2023   • JOINT REPLACEMENT     • KIDNEY SURGERY Left    • KNEE ARTHROSCOPY Bilateral    • LAMINECTOMY     • LAPAROSCOPY      hynecologic with adhesions   • MYOMECTOMY     • OOPHORECTOMY Bilateral     age 33   • ORTHOPEDIC SURGERY     • IA CAR IMPLTJ NSTIM ELTRDS PLATE/PADDLE EDRL N/A 05/18/2017    Procedure: PLACEMENT OF THORACIC SPINAL CORD STIMULATOR WITH LEFT BUTTOCK IMPLANTABLE PULSE GENERATOR (IMPULSE MONITORING-MOTORS (TCEMEP), EMG, SSEP); Surgeon: Noa Baltazar MD;  Location[facility];  Service: Neurosurgery   • SPINAL CORD STIMULATOR IMPLANT Left 09/29/2020    Procedure: LAMINECTOMY THORACIC , REMOVAL OF SCS LEADS AND GENERATOR;  Surgeon: Radha Shipley MD;  [facility];  Service: Neurosurgery   • SPINAL STIMULATOR PLACEMENT  05/2017   • TONSILLECTOMY AND ADENOIDECTOMY      onset: unknown   • TOTAL KNEE ARTHROPLASTY Right        Current Outpatient Medications   Medication Sig Dispense Refill   • ALPRAZolam (XANAX) 0.25 mg tablet Take 1 tablet (0.25 mg total) by mouth daily at bedtime as needed for anxiety 10 tablet 0   • amitriptyline (ELAVIL) 50 mg tablet Take 1 tablet (50 mg total) by mouth daily at bedtime 90 tablet 0   • Biotin 2500 MCG CAPS Take 1 capsule by mouth 2 (two) times a day      • calcium carbonate (OS-ANTHONY) 600 MG tablet Take 1,200 mg by mouth 2 (two) times a day with meals     • Cholecalciferol (VITAMIN D-3 PO) Take 1 tablet by mouth daily     • Cyanocobalamin (VITAMIN B-12 CR PO) Take 1 tablet by mouth daily     • diazepam (VALIUM) 10 mg tablet Take 1 tablet (10 mg total) by mouth every 6 (six) hours as needed for anxiety 2 tablet 0   • Diclofenac Sodium (VOLTAREN) 1 % Apply 2 g topically 4 (four) times a day 200 g 0   • dicyclomine (BENTYL) 10 mg capsule Take 1 capsule (10 mg total) by mouth 2 (two) times a day 180 capsule 3   • diphenhydrAMINE (BENADRYL) 25 mg tablet Take 2 tablets (50 mg total) by mouth 60 minutes pre-procedure for 1 dose Administer 1 hour before contrast administration. (Patient taking differently: Take 50 mg by mouth 60 minutes pre-procedure Administer 1 hour before contrast administration. ,  take 50mg total 60min pre procedure) 2 tablet 0   • EPINEPHrine (EPIPEN) 0.3 mg/0.3 mL SOAJ Inject 0.3 mL (0.3 mg total) into a muscle as needed for anaphylaxis 2 each 0   • Evening Primrose Oil 1000 MG CAPS Take 1 capsule (1,000 mg total) by mouth in the morning  0   • fexofenadine (ALLEGRA) 180 MG tablet Take 180 mg by mouth daily     • gabapentin (NEURONTIN) 300 mg capsule Take 1 tablet in the morning, 1 tablet afternoon and 2 bedtime 360 capsule 0   • hydrOXYzine HCL (ATARAX) 10 mg tablet Take 2 tablets (20 mg total) by mouth daily at bedtime Take 2 tablets at bedtime 180 tablet 1   • losartan-hydrochlorothiazide (HYZAAR) 100-25 MG per tablet Take 1 tablet by mouth daily 90 tablet 3   • methocarbamol (ROBAXIN) 750 mg tablet Take 1 tablet (750 mg total) by mouth every 8 (eight) hours 270 tablet 0   • methylprednisolone (MEDROL) 4 mg tablet Take 4 mg by mouth 2 (two) times a day 32mg total took 10/16 9pm and 10/17  0740am     • montelukast (SINGULAIR) 10 mg tablet Take 1 tablet (10 mg total) by mouth daily at bedtime 90 tablet 3   • Multiple Vitamins-Minerals (PRESERVISION AREDS 2 PO) Take by mouth in the morning     • Omega-3 Fatty Acids (FISH OIL) 1,000 mg Take 1,000 mg by mouth 3 (three) times a day     • omeprazole (PriLOSEC) 40 MG capsule Take 1 capsule (40 mg total) by mouth daily 90 capsule 0   • oxyCODONE (ROXICODONE) 5 immediate release tablet Take 1 tablet (5 mg total) by mouth every 4 (four) hours as needed for moderate pain for up to 10 days Max Daily Amount: 30 mg 30 tablet 0   • rosuvastatin (CRESTOR) 10 MG tablet Take 1 tablet (10 mg total) by mouth daily 90 tablet 3   • simvastatin (ZOCOR) 10 mg tablet Take 1 tablet (10 mg total) by mouth daily at bedtime 90 tablet 2     No current facility-administered medications for this visit.         Allergies   Allergen Reactions   • Bee Venom Shortness Of Breath   • Chlorhexidine Rash   • Egg Yolk - Food Allergy Hives   • Iodinated Contrast Media Hives and Other (See Comments)   • Penicillins Hives   • Lidocaine Other (See Comments) cream   • Allevyn Adhesive [Wound Dressings] Hives and Rash   • Bactrim [Sulfamethoxazole-Trimethoprim] Rash   • Codeine Other (See Comments)     headaches   • Latex Rash and Other (See Comments)   • Medical Tape Blisters, Hives, Itching and Rash   • Other Rash     Adhesive tape   • Oxybutynin Rash   • Pentosan Polysulfate Rash   • Povidone Iodine Rash       Review of Systems    Video Exam    There were no vitals filed for this visit.     Physical Exam     Visit Time  Total Visit Duration: 25 minutes

## 2023-10-23 NOTE — LETTER
October 23, 2023     Patient: Radha Liu   YOB: 1950   Date of Visit: 10/23/2023       To Whom It May Concern: It is my medical opinion that [employer]. {Work release (duty restriction):27515}. If you have any questions or concerns, please don't hesitate to call.          Sincerely,        Velma Simpson MD    CC:   No Recipients

## 2023-10-23 NOTE — PROGRESS NOTES
Virtual Regular Visit    Verification of patient location:    Patient is located at Home in the following state in which I hold an active license [place]      Assessment/Plan:    Problem List Items Addressed This Visit          Other    Left renal mass - Primary            Reason for visit is   Chief Complaint   Patient presents with    Virtual Regular Visit          Encounter provider Velma Simpson MD    Provider located at [address]  [place] [address] [place]  [phone]      Recent Visits  Date Type Provider Dept   10/17/23 Telephone Velma Simpson MD [address] [facility] recent visits within past 7 days and meeting all other requirements  Today's Visits  Date Type Provider Dept   10/23/23 Telephone Velma Simpson  [place]   10/23/23 Telemedicine Velma Simpson MD [address] today's visits and meeting all other requirements  Future Appointments  No visits were found meeting these conditions. Showing future appointments within next 150 days and meeting all other requirements       The patient was identified by name and date of birth. Radha Liu was informed that this is a telemedicine visit and that the visit is being conducted through the [employer]. She agrees to proceed. .  My office door was closed. No one else was in the room. She acknowledged consent and understanding of privacy and security of the video platform. The patient has agreed to participate and understands they can discontinue the visit at any time. Patient is aware this is a billable service. Subjective  Radha Liu is a 68 y.o. female s/p left renal cryoablation last week for a 1 week follow-up. Patient complains of mild left back/flank pain from the procedure. She complains more of lethargy and difficulty walking due to "stiff legs".   She does have back issues and has a nerve stimulator for her pain so this may be due to those issues. The lethargy could be due to the anesthesia and steroid premedication. I will follow-up with her in 4 days to see if she feels better. If not, we can perform some blood work and a possible non-contrast CT scan to make sure everything looks okay in the area of the cryoablation. HPI     Past Medical History:   Diagnosis Date    Abnormal findings on diagnostic imaging of breast     Abnormal Pap smear of cervix     Arthritis     Chronic pain disorder     Extremity pain     Fibrocystic breast disease     Foot pain     GERD (gastroesophageal reflux disease)     Hyperlipidemia     Hypertension     Interstitial cystitis     Joint pain     Low back pain     Osteoarthritis     Osteopenia     Uncomplicated opioid dependence ([address]) 3/1/2022       Past Surgical History:   Procedure Laterality Date    APPENDECTOMY      BACK SURGERY      BREAST EXCISIONAL BIOPSY Left 1996    benign    CARPAL BOSS EXCISION      CHOLECYSTECTOMY      DIAGNOSTIC LAPAROSCOPY      FOOT SURGERY      FRACTURE SURGERY      HAND SURGERY  5/2017    HYSTERECTOMY      age 33    HYSTEROSCOPY      IR CRYOABLATION  10/17/2023    JOINT REPLACEMENT      KIDNEY SURGERY Left     KNEE ARTHROSCOPY Bilateral     LAMINECTOMY      LAPAROSCOPY      hynecologic with adhesions    MYOMECTOMY      OOPHORECTOMY Bilateral     age 33    ORTHOPEDIC SURGERY      [place] CAR IMPLTJ NSTIM ELTRDS PLATE/PADDLE EDRL N/A 05/18/2017    Procedure: PLACEMENT OF THORACIC SPINAL CORD STIMULATOR WITH LEFT BUTTOCK IMPLANTABLE PULSE GENERATOR (IMPULSE MONITORING-MOTORS (Corey Faden), EMG, SSEP);   Surgeon: Beau Hurtado MD;  Location: [facility];  Service: Neurosurgery    SPINAL CORD STIMULATOR IMPLANT Left 09/29/2020    Procedure: LAMINECTOMY THORACIC , REMOVAL OF SCS LEADS AND GENERATOR;  Surgeon: Gracie Bates MD;  Location:  MAIN OR;  Service: Neurosurgery    SPINAL STIMULATOR PLACEMENT  05/2017    TONSILLECTOMY AND ADENOIDECTOMY      onset: unknown    TOTAL KNEE ARTHROPLASTY Right        Current Outpatient Medications   Medication Sig Dispense Refill    ALPRAZolam (XANAX) 0.25 mg tablet Take 1 tablet (0.25 mg total) by mouth daily at bedtime as needed for anxiety 10 tablet 0    amitriptyline (ELAVIL) 50 mg tablet Take 1 tablet (50 mg total) by mouth daily at bedtime 90 tablet 0    Biotin 2500 MCG CAPS Take 1 capsule by mouth 2 (two) times a day       calcium carbonate (OS-ANTHONY) 600 MG tablet Take 1,200 mg by mouth 2 (two) times a day with meals      Cholecalciferol (VITAMIN D-3 PO) Take 1 tablet by mouth daily      Cyanocobalamin (VITAMIN B-12 CR PO) Take 1 tablet by mouth daily      diazepam (VALIUM) 10 mg tablet Take 1 tablet (10 mg total) by mouth every 6 (six) hours as needed for anxiety 2 tablet 0    Diclofenac Sodium (VOLTAREN) 1 % Apply 2 g topically 4 (four) times a day 200 g 0    dicyclomine (BENTYL) 10 mg capsule Take 1 capsule (10 mg total) by mouth 2 (two) times a day 180 capsule 3    diphenhydrAMINE (BENADRYL) 25 mg tablet Take 2 tablets (50 mg total) by mouth 60 minutes pre-procedure for 1 dose Administer 1 hour before contrast administration. (Patient taking differently: Take 50 mg by mouth 60 minutes pre-procedure Administer 1 hour before contrast administration. ,  take 50mg total 60min pre procedure) 2 tablet 0    EPINEPHrine (EPIPEN) 0.3 mg/0.3 mL SOAJ Inject 0.3 mL (0.3 mg total) into a muscle as needed for anaphylaxis 2 each 0    Evening Primrose Oil 1000 MG CAPS Take 1 capsule (1,000 mg total) by mouth in the morning  0    fexofenadine (ALLEGRA) 180 MG tablet Take 180 mg by mouth daily      gabapentin (NEURONTIN) 300 mg capsule Take 1 tablet in the morning, 1 tablet afternoon and 2 bedtime 360 capsule 0    hydrOXYzine HCL (ATARAX) 10 mg tablet Take 2 tablets (20 mg total) by mouth daily at bedtime Take 2 tablets at bedtime 180 tablet 1    losartan-hydrochlorothiazide (HYZAAR) 100-25 MG per tablet Take 1 tablet by mouth daily 90 tablet 3 methocarbamol (ROBAXIN) 750 mg tablet Take 1 tablet (750 mg total) by mouth every 8 (eight) hours 270 tablet 0    methylprednisolone (MEDROL) 4 mg tablet Take 4 mg by mouth 2 (two) times a day 32mg total took 10/16 9pm and 10/17  0740am      montelukast (SINGULAIR) 10 mg tablet Take 1 tablet (10 mg total) by mouth daily at bedtime 90 tablet 3    Multiple Vitamins-Minerals (PRESERVISION AREDS 2 PO) Take by mouth in the morning      Omega-3 Fatty Acids (FISH OIL) 1,000 mg Take 1,000 mg by mouth 3 (three) times a day      omeprazole (PriLOSEC) 40 MG capsule Take 1 capsule (40 mg total) by mouth daily 90 capsule 0    oxyCODONE (ROXICODONE) 5 immediate release tablet Take 1 tablet (5 mg total) by mouth every 4 (four) hours as needed for moderate pain for up to 10 days Max Daily Amount: 30 mg 30 tablet 0    rosuvastatin (CRESTOR) 10 MG tablet Take 1 tablet (10 mg total) by mouth daily 90 tablet 3    simvastatin (ZOCOR) 10 mg tablet Take 1 tablet (10 mg total) by mouth daily at bedtime 90 tablet 2     No current facility-administered medications for this visit. Allergies   Allergen Reactions    Bee Venom Shortness Of Breath    Chlorhexidine Rash    Egg Yolk - Food Allergy Hives    Iodinated Contrast Media Hives and Other (See Comments)    Penicillins Hives    Lidocaine Other (See Comments)     cream    Allevyn Adhesive [Wound Dressings] Hives and Rash    Bactrim [Sulfamethoxazole-Trimethoprim] Rash    Codeine Other (See Comments)     headaches    Latex Rash and Other (See Comments)    Medical Tape Blisters, Hives, Itching and Rash    Other Rash     Adhesive tape    Oxybutynin Rash    Pentosan Polysulfate Rash    Povidone Iodine Rash       Review of Systems    Video Exam    There were no vitals filed for this visit.     Physical Exam     Visit Time  Total Visit Duration: 25 minutes

## 2023-10-27 ENCOUNTER — TELEMEDICINE (OUTPATIENT)
Dept: INTERVENTIONAL RADIOLOGY/VASCULAR | Facility: CLINIC | Age: 73
End: 2023-10-27

## 2023-10-27 DIAGNOSIS — N28.89 LEFT RENAL MASS: Primary | ICD-10-CM

## 2023-10-27 PROCEDURE — 99024 POSTOP FOLLOW-UP VISIT: CPT | Performed by: RADIOLOGY

## 2023-10-27 RX ORDER — ONDANSETRON 2 MG/ML
4 INJECTION INTRAMUSCULAR; INTRAVENOUS EVERY 6 HOURS PRN
Status: SHIPPED | OUTPATIENT
Start: 2023-10-27

## 2023-10-27 NOTE — PROGRESS NOTES
Virtual Regular Visit    Verification of patient location:    Patient is located at Home in the following state in which I hold an active license [place]      Assessment/Plan:    Problem List Items Addressed This Visit          Other    Left renal mass - Primary    Relevant Medications    ondansetron (ZOFRAN) injection 4 mg            Reason for visit is   Chief Complaint   Patient presents with    Virtual Regular Visit          Encounter provider Yvone Kawasaki, MD    Provider located at [address] [place] [address] Street  [address] [facility] [address] [facility]. [address] [place] [address]  [phone]      Recent Visits  Date Type Provider Dept   10/23/23 Telephone Yvone Kawasaki,  [facility]   10/23/23 Telemedicine Yvone Kawasaki, MD [facility] [address] recent visits within past 7 days and meeting all other requirements  Today's Visits  Date Type Provider Dept   10/27/23 Telemedicine Yvone Kawasaki, MD [facility] [facility] today's visits and meeting all other requirements  Future Appointments  No visits were found meeting these conditions. Showing future appointments within next 150 days and meeting all other requirements       The patient was identified by name and date of birth. Ruby Esparza was informed that this is a telemedicine visit and that the visit is being conducted through the [employer]. She agrees to proceed. .  My office door was closed. No one else was in the room. She acknowledged consent and understanding of privacy and security of the video platform. The patient has agreed to participate and understands they can discontinue the visit at any time. Patient is aware this is a billable service. Subjective  Ruby Esparza is a 68 y.o. female s/p left renal cryoablation 10 days ago for follow-up. She still has some nausea so I will order Zofran. She no longer complains of any pain at this time. Her leg stiffness has improved.   She filled out her original pain prescription and needs an exemption since she was charged full price. I will ask my office to fill out this paperwork. She will follow-up with Dr. Adelina Gracia from now on. Overall, she is doing well after her cryoablation. HPI     Past Medical History:   Diagnosis Date    Abnormal findings on diagnostic imaging of breast     Abnormal Pap smear of cervix     Arthritis     Chronic pain disorder     Extremity pain     Fibrocystic breast disease     Foot pain     GERD (gastroesophageal reflux disease)     Hyperlipidemia     Hypertension     Interstitial cystitis     Joint pain     Low back pain     Osteoarthritis     Osteopenia     Uncomplicated opioid dependence ([address]) 3/1/2022       Past Surgical History:   Procedure Laterality Date    APPENDECTOMY      BACK SURGERY      BREAST EXCISIONAL BIOPSY Left 1996    benign    CARPAL BOSS EXCISION      CHOLECYSTECTOMY      DIAGNOSTIC LAPAROSCOPY      FOOT SURGERY      FRACTURE SURGERY      HAND SURGERY  5/2017    HYSTERECTOMY      age 33    HYSTEROSCOPY      IR CRYOABLATION  10/17/2023    JOINT REPLACEMENT      KIDNEY SURGERY Left     KNEE ARTHROSCOPY Bilateral     LAMINECTOMY      LAPAROSCOPY      hynecologic with adhesions    MYOMECTOMY      OOPHORECTOMY Bilateral     age 33    ORTHOPEDIC SURGERY      [place] CAR IMPLTJ NSTIM ELTRDS PLATE/PADDLE EDRL N/A 05/18/2017    Procedure: PLACEMENT OF THORACIC SPINAL CORD STIMULATOR WITH LEFT BUTTOCK IMPLANTABLE PULSE GENERATOR (IMPULSE MONITORING-MOTORS (Isidro Maral), EMG, SSEP);   Surgeon: Elena Hines MD;  [facility];  Service: Neurosurgery    SPINAL CORD STIMULATOR IMPLANT Left 09/29/2020    Procedure: LAMINECTOMY THORACIC , REMOVAL OF SCS LEADS AND GENERATOR;  Surgeon: Tra Peres MD;  Location:  MAIN OR;  Service: Neurosurgery    SPINAL STIMULATOR PLACEMENT  05/2017    TONSILLECTOMY AND ADENOIDECTOMY      onset: unknown    TOTAL KNEE ARTHROPLASTY Right        Current Outpatient Medications   Medication Sig Dispense Refill    ALPRAZolam (XANAX) 0.25 mg tablet Take 1 tablet (0.25 mg total) by mouth daily at bedtime as needed for anxiety 10 tablet 0    amitriptyline (ELAVIL) 50 mg tablet Take 1 tablet (50 mg total) by mouth daily at bedtime 90 tablet 0    Biotin 2500 MCG CAPS Take 1 capsule by mouth 2 (two) times a day       calcium carbonate (OS-ANTHONY) 600 MG tablet Take 1,200 mg by mouth 2 (two) times a day with meals      Cholecalciferol (VITAMIN D-3 PO) Take 1 tablet by mouth daily      Cyanocobalamin (VITAMIN B-12 CR PO) Take 1 tablet by mouth daily      diazepam (VALIUM) 10 mg tablet Take 1 tablet (10 mg total) by mouth every 6 (six) hours as needed for anxiety 2 tablet 0    Diclofenac Sodium (VOLTAREN) 1 % Apply 2 g topically 4 (four) times a day 200 g 1    dicyclomine (BENTYL) 10 mg capsule Take 1 capsule (10 mg total) by mouth 2 (two) times a day 180 capsule 3    diphenhydrAMINE (BENADRYL) 25 mg tablet Take 2 tablets (50 mg total) by mouth 60 minutes pre-procedure for 1 dose Administer 1 hour before contrast administration. (Patient taking differently: Take 50 mg by mouth 60 minutes pre-procedure Administer 1 hour before contrast administration. ,  take 50mg total 60min pre procedure) 2 tablet 0    EPINEPHrine (EPIPEN) 0.3 mg/0.3 mL SOAJ Inject 0.3 mL (0.3 mg total) into a muscle as needed for anaphylaxis 2 each 0    Evening Primrose Oil 1000 MG CAPS Take 1 capsule (1,000 mg total) by mouth in the morning  0    fexofenadine (ALLEGRA) 180 MG tablet Take 180 mg by mouth daily      gabapentin (NEURONTIN) 300 mg capsule Take 1 tablet in the morning, 1 tablet afternoon and 2 bedtime 360 capsule 0    hydrOXYzine HCL (ATARAX) 10 mg tablet Take 2 tablets (20 mg total) by mouth daily at bedtime Take 2 tablets at bedtime 180 tablet 1    losartan-hydrochlorothiazide (HYZAAR) 100-25 MG per tablet Take 1 tablet by mouth daily 90 tablet 3    methocarbamol (ROBAXIN) 750 mg tablet Take 1 tablet (750 mg total) by mouth every 8 (eight) hours 270 tablet 0    methylprednisolone (MEDROL) 4 mg tablet Take 4 mg by mouth 2 (two) times a day 32mg total took 10/16 9pm and 10/17  0740am      montelukast (SINGULAIR) 10 mg tablet Take 1 tablet (10 mg total) by mouth daily at bedtime 90 tablet 3    Multiple Vitamins-Minerals (PRESERVISION AREDS 2 PO) Take by mouth in the morning      Omega-3 Fatty Acids (FISH OIL) 1,000 mg Take 1,000 mg by mouth 3 (three) times a day      omeprazole (PriLOSEC) 40 MG capsule Take 1 capsule (40 mg total) by mouth daily 90 capsule 0    oxyCODONE (ROXICODONE) 5 immediate release tablet Take 1 tablet (5 mg total) by mouth every 4 (four) hours as needed for moderate pain for up to 10 days Max Daily Amount: 30 mg 30 tablet 0    rosuvastatin (CRESTOR) 10 MG tablet Take 1 tablet (10 mg total) by mouth daily 90 tablet 3    simvastatin (ZOCOR) 10 mg tablet Take 1 tablet (10 mg total) by mouth daily at bedtime 90 tablet 2     Current Facility-Administered Medications   Medication Dose Route Frequency Provider Last Rate Last Admin    ondansetron (ZOFRAN) injection 4 mg  4 mg Intravenous Q6H PRN Marcela Maurer MD            Allergies   Allergen Reactions    Bee Venom Shortness Of Breath    Chlorhexidine Rash    Egg Yolk - Food Allergy Hives    Iodinated Contrast Media Hives and Other (See Comments)    Penicillins Hives    Lidocaine Other (See Comments)     cream    Allevyn Adhesive [Wound Dressings] Hives and Rash    Bactrim [Sulfamethoxazole-Trimethoprim] Rash    Codeine Other (See Comments)     headaches    Latex Rash and Other (See Comments)    Medical Tape Blisters, Hives, Itching and Rash    Other Rash     Adhesive tape    Oxybutynin Rash    Pentosan Polysulfate Rash    Povidone Iodine Rash       Review of Systems    Video Exam    There were no vitals filed for this visit. Physical Exam     Visit Time  Total Visit Duration: 25 minutes.

## 2023-10-30 NOTE — PROGRESS NOTES
PT Evaluation     Today's date: 2023  Patient name: Phoenix Escobar  : 1950  MRN: [id number]  Referring provider: Vandana Escobar MD  Dx: No diagnosis found. Assessment  Assessment details:   CURRENT FUNCTIONAL STATUS    Sleep tolerance hours. Sitting tolerance minutes. Standing/ADL tolerance minutes. Walking tolerance feet. Ability to bend forward:     Ability to dress lower extremities:    Ability to arise from sitting:     Ability to enter/exit a vehicle:     Lifting tolerance lbs. Able to tolerate housework. Work status:     SHORT TERM GOALS (2 WEEKS)    Increase lumbar spine AROM % in    Increase lower extremity strength in all weak areas. Improve sitting posture and body mechanics. Decrease pain to  Independent with HEP. LONG TERM GOALS (DISCHARGE)    Lumbar Spine AROM: F= %, EXT= %, R SB= %, L SB= %. Lower Extremity Strength:    Sitting posture: Body mechanics:  Decrease pain to  Independent with HEP. Understanding of Dx/Px/POC: good   Prognosis: good    Goals  See assessment details above. Plan  Plan details: Phoenix Escobar is a 68 y.o. female presenting to PT with pain, decreased range of motion, decreased strength, and decreased tolerance to activity. This patient would benefit from skilled PT services to address these issues and to maximize function. A home exercise program was provided and all questions were answered.  Thank you for the referral.    Patient would benefit from: skilled physical therapy  Planned modality interventions: cryotherapy and thermotherapy: hydrocollator packs  Planned therapy interventions: manual therapy, neuromuscular re-education, therapeutic activities, therapeutic exercise, self care and home exercise program  Frequency: 2x week  Duration in weeks: 4        Subjective Evaluation    History of Present Illness  Mechanism of injury: CC: Low back pain  HPI: The patient has had chronic low back pain  Patient Goals  Patient goals for therapy: decreased pain and increased motion          Objective     Postural Observations    Additional Postural Observation Details  Gait    General Comments:      Lumbar Comments  CURRENT OBJECTIVE MEASUREMENTS    Lumbar Spine AROM: F= %, EXT= %, R SB= %, L SB= %. Lower Extremity Strength:    Lower Extremity Reflexes:   Lower Extremity Sensation:  Babinski Reflex:  Clonus:   Sitting Posture:    Body mechanics:                              Precautions: None      Manuals 11/6        STM         P/A Mob         Flex/Rot Mob         Hamstring Stretch         Piriformis Stretch                  Neuro Re-Ed         Quadruped Arm Raises         Quadruped Leg Raises         Quadruped Arm and Leg Raises         AB Supine         AB with Arm Raises Supine         AB with Leg Raises Supine         AB with Arm and Leg Raises Supine         Prone TB ROT         Prone Ball Squeeze         Prone Hip EXT         Prone Planks         Squats with Arm Raises         Centralization Concepts         Body Mechanics Instruction for Bending and Lifting         Disc Mechanics Instruction         Sitting Posture Correction with Roll         Sleeping Posture Correction with Roll         Prophylaxis of Recurrence                  Ther Ex         Prone Lying         Elbow Props         Press Ups         Standing Back Bends         Side Pelkie         LTR         SKTC         DKTC         Flexion in Sitting         Flexion in Standing         Curl Ups Straight         Curl Ups Oblique         BACK EXT:  F , P , C         PYR AB:  S , P         Rotary Torso:  S , P , C         Hoist Row: S         LAT Pulldown         SA Pulldown         Oblique Press         Multi Hip: Flex  F              EXT  P              ABD                  ADD         PYR QUAD:  S , P , C         NuStep:   S , A                  Ther Activity         Squatting         Lifting         Carrying         Lunging                  Modalities         HP Traction

## 2023-11-01 ENCOUNTER — DOCUMENTATION (OUTPATIENT)
Dept: INTERVENTIONAL RADIOLOGY/VASCULAR | Facility: CLINIC | Age: 73
End: 2023-11-01

## 2023-11-01 ENCOUNTER — OFFICE VISIT (OUTPATIENT)
Dept: OBGYN CLINIC | Facility: CLINIC | Age: 73
End: 2023-11-01
Payer: MEDICARE

## 2023-11-01 VITALS — WEIGHT: 180 LBS | HEIGHT: 63 IN | BODY MASS INDEX: 31.89 KG/M2 | RESPIRATION RATE: 16 BRPM

## 2023-11-01 DIAGNOSIS — M75.51 SUBACROMIAL BURSITIS OF RIGHT SHOULDER JOINT: Primary | ICD-10-CM

## 2023-11-01 PROCEDURE — 99213 OFFICE O/P EST LOW 20 MIN: CPT | Performed by: FAMILY MEDICINE

## 2023-11-01 NOTE — PROGRESS NOTES
Subjective:  Chief Complaint   Patient presents with    Right Shoulder - Follow-up    Left Shoulder - Pain       Ruyb Esparza is a 68 y.o. female is presenting today for follow-up visit in regards to right shoulder pain. She was diagnosed with subacromial impingement of the right shoulder at initial evaluation and was provided with a corticosteroid injection for subacromial bursa. She reports that injection had helped tremendously however has recently undergone surgery for renal mass and states that after the surgery right shoulder pain symptoms have exacerbated. She does states that the position during surgery required her to keep her arms elevated above her head which she feels has caused a recurrence of pain symptoms. She denies any numbness or tingling in the upper extremity      The following portions of the patient's history were reviewed and updated as appropriate: allergies, current medications, past family history, past medical history, past social history, past surgical history and problem list.    Occupation:      Review of Systems   Constitutional: Negative for fever. HENT: Negative for dental problem and headaches. Eyes: Negative for vision loss. Respiratory: Negative for cough and shortness of breath. Cardiovascular: Negative for leg swelling and palpitations. Gastrointestinal: Negative for constipation and diarrhea. Genitourinary: Negative for bladder incontinence and difficulty urinating. Musculoskeletal: Negative for back pain and difficulty walking. Skin: Negative for rash and ulcer. Neurological: Negative for dizziness and headaches. Hem/Lymph/Immuno: Negative for blood clots. Does not bruise/bleed easily. Psychiatric/Behavioral: Negative for confusion.          Objective:  Resp 16   Ht 5' 3" (1.6 m)   Wt 81.6 kg (180 lb)   LMP  (LMP Unknown)   BMI 31.89 kg/m²     Skin: no rashes, lesions, skin discolorations, lacerations  Vasculature: normal radial and ulnar pulse,  normal skin color, normal capillary refill in extremity, no upper extremity edema  Neurologic: Neurologic exam is normal throughout upper extremities, Awake, alert, and oriented x3, no apparent distress. Musculoskeletal:   right SHOULDER EXAM    Intact skin. No erythema, no induration, no signs of infection  No gross deformity    AROM FF: 180 degrees  AROM ER with arm at its side: 90 degrees  AROM IR: 80 degrees    Grind test: negative    Supraspinatus strength testin/5  Infraspinatus strength testin/5    Belly press: negative  Bear Hug test: negative    Positive Carr positive Neer's positive empty can    Imaging:         Assessment/Plan:  1. Subacromial bursitis of right shoulder joint    - XR shoulder 2+ vw right; Future    2. Arthritis of right acromioclavicular joint    Patient's symptoms  seem to be related to exacerbation of underlying subacromial bursitis/impingement. She did have positive response to subacromial injection at the initial evaluation however I believe due to positioning during surgery this did aggravate underlying bursitis. She will return in about 1 month's time for repeat corticosteroid injection for the subacromial bursa and advised her to continue with a home exercise program in the interim.   Return precautions were provided

## 2023-11-06 ENCOUNTER — EVALUATION (OUTPATIENT)
Dept: PHYSICAL THERAPY | Facility: CLINIC | Age: 73
End: 2023-11-06
Payer: MEDICARE

## 2023-11-06 DIAGNOSIS — M51.36 LUMBAR DEGENERATIVE DISC DISEASE: Primary | ICD-10-CM

## 2023-11-06 DIAGNOSIS — G89.4 CHRONIC PAIN SYNDROME: ICD-10-CM

## 2023-11-06 PROCEDURE — 97535 SELF CARE MNGMENT TRAINING: CPT | Performed by: PHYSICAL THERAPIST

## 2023-11-06 PROCEDURE — 97163 PT EVAL HIGH COMPLEX 45 MIN: CPT | Performed by: PHYSICAL THERAPIST

## 2023-11-06 NOTE — PROGRESS NOTES
PT Evaluation     Today's date: 2023  Patient name: Olman Liu  : 1950  MRN: [id number]  Referring provider: Ike Zendejas MD  Dx:   Encounter Diagnosis     ICD-10-CM    1. Lumbar degenerative disc disease  M51.36       2. Chronic pain syndrome  G89.4                      Assessment  Assessment details:   CURRENT FUNCTIONAL STATUS    Standing/ADL tolerance 30 minutes. Walking tolerance 2 blocks. Ability to bend forward: Poor  Unable to ride her exercise bike. SHORT TERM GOALS (2 WEEKS)    Lumbar spine AROM 10 % in all planes. Improve abdominal muscle control. Decrease pain to 4-8/10. Standing/ADL tolerance 45 minutes. Walking tolerance 3 blocks. Ability to bend forward: fair  Able to ride her exercise bike. LONG TERM GOALS (DISCHARGE)    Lumbar Spine AROM: F=90 %, EXT=50 %, R SB=75 %, L SB=75 %. Lower Extremity Strength: Grossly 5/5 t/o. Good control of abdominal muscles during abdominal bracing. Decrease pain to 1-4/10. Standing/ADL tolerance 60 minutes. Walking tolerance 4 blocks. Ability to bend forward: Good  Able to ride her exercise bike. Understanding of Dx/Px/POC: good   Prognosis: good    Goals  See assessment details above. Plan  Plan details: Olman Liu is a 80 y.o. female presenting to PT with pain, decreased range of motion, and decreased tolerance to activity. This patient would benefit from skilled PT services to address these issues and to maximize function. A home exercise program was provided and all questions were answered. Thank you for the referral.    Patient would benefit from: skilled physical therapy  Planned therapy interventions: self care, therapeutic activities, therapeutic exercise, neuromuscular re-education and home exercise program  Frequency: 2x week  Duration in weeks: 4        Subjective Evaluation    History of Present Illness  Mechanism of injury: CC:  Bilateral low back, buttock, and thigh pain, worse on the left side. Tingling in the back of both calves and bottoms of both feet. Denies having any weakness. HPI: The patient has had chronic back pain since having a herniated disc when she was in her twenties. She had a laminectomy and had good relief for years. She then developed chronic back pain for which she had a pain stimulator implanted in May 2016. She had the pain stimulator reimplanted on 2021. She also takes medication for relief. She may be having spinal fusion surgery in the near future, and will see her surgeon on 2024. 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 Patient Goals  Patient goals for therapy: decreased pain and increased motion  Patient goal: Improved tolerance for bending, standing, and walking. Pain  Current pain ratin  At best pain ratin  At worst pain rating: 10          Objective     Postural Observations    Additional Postural Observation Details  Gait and transfers mildly guarded, no lateral shift. General Comments:      Lumbar Comments  CURRENT OBJECTIVE MEASUREMENTS    Lumbar Spine AROM: F=50 %, EXT=25 % with low back pain, R SB=50 %, L SB=50 %. Lower Extremity Strength: Grossly 5/5 t/o. Fair control of abdominal muscles during abdominal bracing.                       Precautions: None given        Manuals                        Neuro Re-Ed            AB Supine 10x BID HEP          AB with Single Leg Raises Supine            AB with Double Leg Raises and ball squeeze Raises Supine            AB with SLR            Double leg bridging            Bridging with ball squeeze            Bridging with TB ABD            Bridging with marching                         Ther Ex            TB Row            TB SA Pulldown            TB Oblique Press                         Ther Activity                         Modalities

## 2023-11-06 NOTE — LETTER
2023    Bozena Weeks MD  [address] [place] [id number]    Patient: Autumn Velarde   YOB: 1950   Date of Visit: 2023     Encounter Diagnosis     ICD-10-CM    1. Lumbar degenerative disc disease  M51.36       2. Chronic pain syndrome  G89.4           Dear Dr. Sajan Busby:    Thank you for your recent referral of [facility]. Please review the attached evaluation summary from Ingrid's recent visit. Please verify that you agree with the plan of care by signing the attached order. If you have any questions or concerns, please do not hesitate to call. I sincerely appreciate the opportunity to share in the care of one of your patients and hope to have another opportunity to work with you in the near future. Sincerely,    Sarah Jett, PT      Referring Provider:      I certify that I have read the below Plan of Care and certify the need for these services furnished under this plan of treatment while under my care. Bozena Weeks MD  [address] [place] [id number]  Via Fax: [phone]          PT Evaluation     Today's date: 2023  Patient name: Autumn Velarde  : 1950  MRN: [id number]  Referring provider: Bozena Weeks MD  Dx:   Encounter Diagnosis     ICD-10-CM    1. Lumbar degenerative disc disease  M51.36       2. Chronic pain syndrome  G89.4                      Assessment  Assessment details:   CURRENT FUNCTIONAL STATUS    Standing/ADL tolerance 30 minutes. Walking tolerance 2 blocks. Ability to bend forward: Poor  Unable to ride her exercise bike. SHORT TERM GOALS (2 WEEKS)    Lumbar spine AROM 10 % in all planes. Improve abdominal muscle control. Decrease pain to 4-8/10. Standing/ADL tolerance 45 minutes. Walking tolerance 3 blocks. Ability to bend forward: fair  Able to ride her exercise bike.        LONG TERM GOALS (DISCHARGE)    Lumbar Spine AROM: F=90 %, EXT=50 %, R SB=75 %, L SB=75 %. Lower Extremity Strength: Grossly 5/5 t/o. Good control of abdominal muscles during abdominal bracing. Decrease pain to 1-4/10. Standing/ADL tolerance 60 minutes. Walking tolerance 4 blocks. Ability to bend forward: Good  Able to ride her exercise bike. Understanding of Dx/Px/POC: good   Prognosis: good    Goals  See assessment details above. Plan  Plan details: Dustin Giron is a 80 y.o. female presenting to PT with pain, decreased range of motion, and decreased tolerance to activity. This patient would benefit from skilled PT services to address these issues and to maximize function. A home exercise program was provided and all questions were answered. Thank you for the referral.    Patient would benefit from: skilled physical therapy  Planned therapy interventions: self care, therapeutic activities, therapeutic exercise, neuromuscular re-education and home exercise program  Frequency: 2x week  Duration in weeks: 4        Subjective Evaluation    History of Present Illness  Mechanism of injury: CC: Bilateral low back, buttock, and thigh pain, worse on the left side. Tingling in the back of both calves and bottoms of both feet. Denies having any weakness. HPI: The patient has had chronic back pain since having a herniated disc when she was in her twenties. She had a laminectomy and had good relief for years. She then developed chronic back pain for which she had a pain stimulator implanted in May 2016. She had the pain stimulator reimplanted on 2021. She also takes medication for relief. She may be having spinal fusion surgery in the near future, and will see her surgeon on 2024. 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 Patient Goals  Patient goals for therapy: decreased pain and increased motion  Patient goal: Improved tolerance for bending, standing, and walking.   Pain  Current pain ratin  At best pain ratin  At worst pain rating: 10          Objective     Postural Observations    Additional Postural Observation Details  Gait and transfers mildly guarded, no lateral shift. General Comments:      Lumbar Comments  CURRENT OBJECTIVE MEASUREMENTS    Lumbar Spine AROM: F=50 %, EXT=25 % with low back pain, R SB=50 %, L SB=50 %. Lower Extremity Strength: Grossly 5/5 t/o. Fair control of abdominal muscles during abdominal bracing.                       Precautions: None given        Manuals                        Neuro Re-Ed            AB Supine 10x BID HEP          AB with Single Leg Raises Supine            AB with Double Leg Raises and ball squeeze Raises Supine            AB with SLR            Double leg bridging            Bridging with ball squeeze            Bridging with TB ABD            Bridging with marching                         Ther Ex            TB Row            TB SA Pulldown            TB Oblique Press                         Ther Activity                         Modalities

## 2023-11-09 ENCOUNTER — OFFICE VISIT (OUTPATIENT)
Dept: PHYSICAL THERAPY | Facility: CLINIC | Age: 73
End: 2023-11-09
Payer: MEDICARE

## 2023-11-09 DIAGNOSIS — M51.36 LUMBAR DEGENERATIVE DISC DISEASE: Primary | ICD-10-CM

## 2023-11-09 DIAGNOSIS — G89.4 CHRONIC PAIN SYNDROME: ICD-10-CM

## 2023-11-09 PROCEDURE — 97535 SELF CARE MNGMENT TRAINING: CPT | Performed by: PHYSICAL THERAPIST

## 2023-11-09 PROCEDURE — 97110 THERAPEUTIC EXERCISES: CPT | Performed by: PHYSICAL THERAPIST

## 2023-11-09 NOTE — PROGRESS NOTES
Daily Note     Today's date: 2023  Patient name: Shon Garcia  : 1950  MRN: [id number]  Referring provider: Georgina Menjivar MD  Dx:   Encounter Diagnosis     ICD-10-CM    1. Lumbar degenerative disc disease  M51.36       2. Chronic pain syndrome  G89.4                      Subjective: Patient states that she had severe low back pain radiating into both thighs while driving recently. She turned off her pain stimulator the evening after her last session, and her stimulator technician now has her trying a new program. Because of pain, she did not try her HEP yet. Objective: Lumbar extension AROM has increased since the last session. Right side bending is the lost painful lumbar spine movement. Assessment: Tolerated treatment well. Patient demonstrated fatigue post treatment, exhibited good technique with therapeutic exercise progressions, and had less pain in her right leg afterward. Plan: Continue per plan of care.          Precautions: None given        Manuals                       Neuro Re-Ed            AB Supine 10x BID HEP 10x BID HEP         AB with Single Leg Raises Supine   10x BID HEP         AB with Double Leg Raises and ball squeeze Raises Supine            AB with SLR            LTR Bilateral  10x BID HEP       Double leg bridging   10x BID HEP         Bridging with ball squeeze            Bridging with TB ABD            Bridging with marching                         Ther Ex            TB Row            TB SA Pulldown            TB Oblique Press             NuStep for ROM: S7, A 11   L3 12 min         Ther Activity                         Modalities

## 2023-11-13 ENCOUNTER — OFFICE VISIT (OUTPATIENT)
Dept: PHYSICAL THERAPY | Facility: CLINIC | Age: 73
End: 2023-11-13
Payer: MEDICARE

## 2023-11-13 DIAGNOSIS — M51.36 LUMBAR DEGENERATIVE DISC DISEASE: Primary | ICD-10-CM

## 2023-11-13 DIAGNOSIS — G89.4 CHRONIC PAIN SYNDROME: ICD-10-CM

## 2023-11-13 PROCEDURE — 97535 SELF CARE MNGMENT TRAINING: CPT | Performed by: PHYSICAL THERAPIST

## 2023-11-13 PROCEDURE — 97112 NEUROMUSCULAR REEDUCATION: CPT | Performed by: PHYSICAL THERAPIST

## 2023-11-13 PROCEDURE — 97110 THERAPEUTIC EXERCISES: CPT | Performed by: PHYSICAL THERAPIST

## 2023-11-15 ENCOUNTER — APPOINTMENT (OUTPATIENT)
Dept: RADIOLOGY | Facility: MEDICAL CENTER | Age: 73
End: 2023-11-15
Payer: MEDICARE

## 2023-11-15 ENCOUNTER — APPOINTMENT (OUTPATIENT)
Dept: LAB | Facility: MEDICAL CENTER | Age: 73
End: 2023-11-15
Payer: MEDICARE

## 2023-11-15 ENCOUNTER — OFFICE VISIT (OUTPATIENT)
Dept: FAMILY MEDICINE CLINIC | Facility: CLINIC | Age: 73
End: 2023-11-15
Payer: MEDICARE

## 2023-11-15 VITALS
SYSTOLIC BLOOD PRESSURE: 126 MMHG | HEART RATE: 103 BPM | BODY MASS INDEX: 30.58 KG/M2 | RESPIRATION RATE: 18 BRPM | OXYGEN SATURATION: 97 % | DIASTOLIC BLOOD PRESSURE: 78 MMHG | HEIGHT: 63 IN | TEMPERATURE: 97.7 F | WEIGHT: 172.6 LBS

## 2023-11-15 DIAGNOSIS — M54.2 CERVICALGIA: Primary | ICD-10-CM

## 2023-11-15 DIAGNOSIS — E78.2 MIXED HYPERLIPIDEMIA: ICD-10-CM

## 2023-11-15 DIAGNOSIS — M54.2 CERVICALGIA: ICD-10-CM

## 2023-11-15 DIAGNOSIS — M79.10 MYALGIA: ICD-10-CM

## 2023-11-15 LAB
ALBUMIN SERPL BCP-MCNC: 4.1 G/DL (ref 3.5–5)
ALP SERPL-CCNC: 76 U/L (ref 34–104)
ALT SERPL W P-5'-P-CCNC: 17 U/L (ref 7–52)
ANION GAP SERPL CALCULATED.3IONS-SCNC: 10 MMOL/L
AST SERPL W P-5'-P-CCNC: 21 U/L (ref 13–39)
BASOPHILS # BLD AUTO: 0.06 THOUSANDS/ÂΜL (ref 0–0.1)
BASOPHILS NFR BLD AUTO: 1 % (ref 0–1)
BILIRUB SERPL-MCNC: 0.32 MG/DL (ref 0.2–1)
BUN SERPL-MCNC: 13 MG/DL (ref 5–25)
CALCIUM SERPL-MCNC: 10.4 MG/DL (ref 8.4–10.2)
CHLORIDE SERPL-SCNC: 99 MMOL/L (ref 96–108)
CK SERPL-CCNC: 36 U/L (ref 26–192)
CO2 SERPL-SCNC: 30 MMOL/L (ref 21–32)
CREAT SERPL-MCNC: 0.47 MG/DL (ref 0.6–1.3)
CRP SERPL QL: 42.7 MG/L
EOSINOPHIL # BLD AUTO: 0.14 THOUSAND/ÂΜL (ref 0–0.61)
EOSINOPHIL NFR BLD AUTO: 1 % (ref 0–6)
ERYTHROCYTE [DISTWIDTH] IN BLOOD BY AUTOMATED COUNT: 12.4 % (ref 11.6–15.1)
ERYTHROCYTE [SEDIMENTATION RATE] IN BLOOD: 36 MM/HOUR (ref 0–29)
GFR SERPL CREATININE-BSD FRML MDRD: 98 ML/MIN/1.73SQ M
GLUCOSE SERPL-MCNC: 98 MG/DL (ref 65–140)
HCT VFR BLD AUTO: 41.4 % (ref 34.8–46.1)
HGB BLD-MCNC: 13.6 G/DL (ref 11.5–15.4)
IMM GRANULOCYTES # BLD AUTO: 0.04 THOUSAND/UL (ref 0–0.2)
IMM GRANULOCYTES NFR BLD AUTO: 0 % (ref 0–2)
LYMPHOCYTES # BLD AUTO: 1.83 THOUSANDS/ÂΜL (ref 0.6–4.47)
LYMPHOCYTES NFR BLD AUTO: 19 % (ref 14–44)
MCH RBC QN AUTO: 29.5 PG (ref 26.8–34.3)
MCHC RBC AUTO-ENTMCNC: 32.9 G/DL (ref 31.4–37.4)
MCV RBC AUTO: 90 FL (ref 82–98)
MONOCYTES # BLD AUTO: 0.77 THOUSAND/ÂΜL (ref 0.17–1.22)
MONOCYTES NFR BLD AUTO: 8 % (ref 4–12)
NEUTROPHILS # BLD AUTO: 7.07 THOUSANDS/ÂΜL (ref 1.85–7.62)
NEUTS SEG NFR BLD AUTO: 71 % (ref 43–75)
NRBC BLD AUTO-RTO: 0 /100 WBCS
PLATELET # BLD AUTO: 366 THOUSANDS/UL (ref 149–390)
PMV BLD AUTO: 9.2 FL (ref 8.9–12.7)
POTASSIUM SERPL-SCNC: 4.2 MMOL/L (ref 3.5–5.3)
PROT SERPL-MCNC: 7.3 G/DL (ref 6.4–8.4)
RBC # BLD AUTO: 4.61 MILLION/UL (ref 3.81–5.12)
SODIUM SERPL-SCNC: 139 MMOL/L (ref 135–147)
WBC # BLD AUTO: 9.91 THOUSAND/UL (ref 4.31–10.16)

## 2023-11-15 PROCEDURE — 82550 ASSAY OF CK (CPK): CPT

## 2023-11-15 PROCEDURE — 72050 X-RAY EXAM NECK SPINE 4/5VWS: CPT

## 2023-11-15 PROCEDURE — 86140 C-REACTIVE PROTEIN: CPT

## 2023-11-15 PROCEDURE — 80053 COMPREHEN METABOLIC PANEL: CPT

## 2023-11-15 PROCEDURE — 85025 COMPLETE CBC W/AUTO DIFF WBC: CPT

## 2023-11-15 PROCEDURE — 36415 COLL VENOUS BLD VENIPUNCTURE: CPT

## 2023-11-15 PROCEDURE — 85652 RBC SED RATE AUTOMATED: CPT

## 2023-11-15 PROCEDURE — 99214 OFFICE O/P EST MOD 30 MIN: CPT | Performed by: INTERNAL MEDICINE

## 2023-11-15 RX ORDER — SIMVASTATIN 10 MG
10 TABLET ORAL
Qty: 90 TABLET | Refills: 2 | Status: CANCELLED | OUTPATIENT
Start: 2023-11-15

## 2023-11-15 NOTE — PROGRESS NOTES
Name: Selma Randall      : 1950      MRN: [id number]  Encounter Provider: Jomar Olivier DO  Encounter Date: 11/15/2023   Encounter department: [address]     1. Cervicalgia  -     CK; Future  -     C-reactive protein; Future  -     Comprehensive metabolic panel; Future  -     CBC and differential; Future  -     XR spine cervical complete 4 or 5 vw non injury; Future; Expected date: 11/15/2023  -     Sedimentation rate, automated; Future; Expected date: 11/15/2023  Xray and labs today  Continue Pt for lumbar area as scheduled  Hold statin for 2 weeks ? Related to current sxs post procedure     2. Mixed hyperlipidemia  Hold statin given current sxs Stay hydrated Lo fat diet     3. Myalgia  -     CK; Future  -     C-reactive protein; Future  -     Comprehensive metabolic panel; Future  -     CBC and differential; Future  -     Sedimentation rate, automated; Future; Expected date: 11/15/2023       Rto as scheduled    Subjective      HPI  Pt has hand stiffness, shoulder pain ongoing and leg pain muscle pain per pt since she had procedure on her left kidney (cryoablation) Pt has known lumbar issues and is going to PT in prep for possible procedure but these sxs she feels started after the procedure and are ongoing No numbness tingling more described as muscle pain in thighs and shoulders and stiffness of her hands She took 2 days of prednisone prep for the IR procedure She has some nausea and just has not felt right since the procedure   Review of Systems   Constitutional:  Negative for chills and fever. HENT:  Positive for congestion. Eyes:  Negative for visual disturbance. Respiratory:  Negative for cough and shortness of breath. Cardiovascular:  Negative for chest pain. Gastrointestinal:  Positive for nausea. Genitourinary: Negative. Neurological:  Negative for dizziness, light-headedness and headaches.    Psychiatric/Behavioral:  Negative for sleep disturbance. The patient is not nervous/anxious.         Current Outpatient Medications on File Prior to Visit   Medication Sig   • amitriptyline (ELAVIL) 50 mg tablet Take 1 tablet (50 mg total) by mouth daily at bedtime   • Biotin 2500 MCG CAPS Take 1 capsule by mouth 2 (two) times a day    • calcium carbonate (OS-ANTHONY) 600 MG tablet Take 1,200 mg by mouth 2 (two) times a day with meals   • Cholecalciferol (VITAMIN D-3 PO) Take 1 tablet by mouth daily   • Cyanocobalamin (VITAMIN B-12 CR PO) Take 1 tablet by mouth daily   • Diclofenac Sodium (VOLTAREN) 1 % Apply 2 g topically 4 (four) times a day   • dicyclomine (BENTYL) 10 mg capsule Take 1 capsule (10 mg total) by mouth 2 (two) times a day   • EPINEPHrine (EPIPEN) 0.3 mg/0.3 mL SOAJ Inject 0.3 mL (0.3 mg total) into a muscle as needed for anaphylaxis   • Evening Primrose Oil 1000 MG CAPS Take 1 capsule (1,000 mg total) by mouth in the morning   • fexofenadine (ALLEGRA) 180 MG tablet Take 180 mg by mouth daily   • gabapentin (NEURONTIN) 300 mg capsule Take 1 tablet in the morning, 1 tablet afternoon and 2 bedtime   • hydrOXYzine HCL (ATARAX) 10 mg tablet Take 2 tablets (20 mg total) by mouth daily at bedtime Take 2 tablets at bedtime   • losartan-hydrochlorothiazide (HYZAAR) 100-25 MG per tablet Take 1 tablet by mouth daily   • methocarbamol (ROBAXIN) 750 mg tablet Take 1 tablet (750 mg total) by mouth every 8 (eight) hours   • montelukast (SINGULAIR) 10 mg tablet Take 1 tablet (10 mg total) by mouth daily at bedtime   • Multiple Vitamins-Minerals (PRESERVISION AREDS 2 PO) Take by mouth in the morning   • omeprazole (PriLOSEC) 40 MG capsule Take 1 capsule (40 mg total) by mouth daily   • rosuvastatin (CRESTOR) 10 MG tablet Take 1 tablet (10 mg total) by mouth daily   • simvastatin (ZOCOR) 10 mg tablet Take 1 tablet (10 mg total) by mouth daily at bedtime   • [DISCONTINUED] diphenhydrAMINE (BENADRYL) 25 mg tablet Take 2 tablets (50 mg total) by mouth 60 minutes pre-procedure for 1 dose Administer 1 hour before contrast administration. • methylprednisolone (MEDROL) 4 mg tablet Take 4 mg by mouth 2 (two) times a day 32mg total took 10/16 9pm and 10/17  0740am (Patient not taking: Reported on 11/15/2023)   • Omega-3 Fatty Acids (FISH OIL) 1,000 mg Take 1,000 mg by mouth 3 (three) times a day (Patient not taking: Reported on 11/15/2023)   • [DISCONTINUED] ALPRAZolam (XANAX) 0.25 mg tablet Take 1 tablet (0.25 mg total) by mouth daily at bedtime as needed for anxiety (Patient not taking: Reported on 11/15/2023)   • [DISCONTINUED] diazepam (VALIUM) 10 mg tablet Take 1 tablet (10 mg total) by mouth every 6 (six) hours as needed for anxiety (Patient not taking: Reported on 11/15/2023)       Objective     /78   Pulse 103   Temp 97.7 °F (36.5 °C) (Temporal)   Resp 18   Ht 5' 3" (1.6 m)   Wt 78.3 kg (172 lb 9.6 oz)   LMP  (LMP Unknown)   SpO2 97%   BMI 30.57 kg/m²     Physical Exam  Vitals reviewed. Constitutional:       General: She is not in acute distress. Appearance: Normal appearance. She is not ill-appearing, toxic-appearing or diaphoretic. HENT:      Head: Normocephalic and atraumatic. Eyes:      General: No scleral icterus. Cardiovascular:      Rate and Rhythm: Normal rate. Pulses: Normal pulses. Pulmonary:      Effort: Pulmonary effort is normal. No respiratory distress. Breath sounds: Normal breath sounds. No wheezing. Comments: Thoracic kyphosis  Abdominal:      General: There is no distension. Tenderness: There is no abdominal tenderness. Musculoskeletal:         General: No swelling. Cervical back: Neck supple. Tenderness present. No rigidity. Right lower leg: No edema. Left lower leg: No edema. Lymphadenopathy:      Cervical: No cervical adenopathy. Skin:     General: Skin is warm and dry. Coloration: Skin is not jaundiced or pale. Findings: No erythema or rash.    Neurological:      General: No focal deficit present. Mental Status: She is alert and oriented to person, place, and time. Mental status is at baseline. Cranial Nerves: No cranial nerve deficit. Motor: No weakness. Coordination: Coordination normal.      Gait: Gait normal.   Psychiatric:         Mood and Affect: Mood normal.         Behavior: Behavior normal.         Thought Content:  Thought content normal.         Judgment: Judgment normal.     Arina Burr, DO

## 2023-11-16 ENCOUNTER — OFFICE VISIT (OUTPATIENT)
Dept: PHYSICAL THERAPY | Facility: CLINIC | Age: 73
End: 2023-11-16
Payer: MEDICARE

## 2023-11-16 DIAGNOSIS — G89.4 CHRONIC PAIN SYNDROME: ICD-10-CM

## 2023-11-16 DIAGNOSIS — M51.36 LUMBAR DEGENERATIVE DISC DISEASE: Primary | ICD-10-CM

## 2023-11-16 PROCEDURE — 97112 NEUROMUSCULAR REEDUCATION: CPT | Performed by: PHYSICAL THERAPIST

## 2023-11-16 PROCEDURE — 97110 THERAPEUTIC EXERCISES: CPT | Performed by: PHYSICAL THERAPIST

## 2023-11-16 NOTE — PROGRESS NOTES
Daily Note     Today's date: 2023  Patient name: Stephanie Sanchez  : 1950  MRN: [id number]  Referring provider: Suraj Small MD  Dx:   Encounter Diagnosis     ICD-10-CM    1. Lumbar degenerative disc disease  M51.36       2. Chronic pain syndrome  G89.4                      Subjective: Patient states that she couldn't do ball squeeze or TB exercise at home due to back spasms. Objective: See treatment diary below      Assessment: Tolerated treatment fair. She was able to progress supine stabilization exercises, but could not do standing TB exercises due to shoulder pain. Patient demonstrated fatigue post treatment      Plan: Progress treatment as tolerated.        Precautions: None given        Manuals                     Neuro Re-Ed            AB Supine 10x BID HEP 10x BID HEP 10x5" 20x5"       AB with Single Leg Raises Supine   10x BID HEP 10x5" 15x5"       AB with Double Leg Raises and ball squeeze Raises Supine            AB with SLR            LTR Bilateral  10x BID HEP DC      Double leg bridging   10x BID HEP 10x5" 20x5"       Bridging with ball squeeze    10x BID HEP DC       Bridging with TB ABD    10xL3 BID HEP DC       Bridging with marching                         Ther Ex            Back ext:  F4, P4, C0     P4 2/10       TB SA Pulldown     DC       TB Oblique Press             NuStep for ROM: S7, A9   L3 12 min L3 14 min L3 15 min       Bilat SKTC with strap   10x2" BID HEP 10x2" ea     Ther Activity                         Modalities

## 2023-11-19 ENCOUNTER — HOSPITAL ENCOUNTER (EMERGENCY)
Facility: HOSPITAL | Age: 73
Discharge: HOME | End: 2023-11-19
Attending: EMERGENCY MEDICINE
Payer: MEDICARE

## 2023-11-19 VITALS
RESPIRATION RATE: 19 BRPM | TEMPERATURE: 97.7 F | OXYGEN SATURATION: 97 % | HEART RATE: 81 BPM | DIASTOLIC BLOOD PRESSURE: 65 MMHG | SYSTOLIC BLOOD PRESSURE: 140 MMHG

## 2023-11-19 DIAGNOSIS — M79.10 MYALGIA: Primary | ICD-10-CM

## 2023-11-19 DIAGNOSIS — Z87.19 HISTORY OF ESOPHAGEAL REFLUX: ICD-10-CM

## 2023-11-19 PROCEDURE — 99282 EMERGENCY DEPT VISIT SF MDM: CPT

## 2023-11-19 PROCEDURE — 99284 EMERGENCY DEPT VISIT MOD MDM: CPT | Performed by: EMERGENCY MEDICINE

## 2023-11-19 RX ORDER — FAMOTIDINE 20 MG/1
20 TABLET, FILM COATED ORAL ONCE
Status: COMPLETED | OUTPATIENT
Start: 2023-11-19 | End: 2023-11-19

## 2023-11-19 RX ORDER — FAMOTIDINE 20 MG/1
20 TABLET, FILM COATED ORAL 2 TIMES DAILY
Qty: 10 TABLET | Refills: 0 | Status: SHIPPED | OUTPATIENT
Start: 2023-11-19 | End: 2023-11-25 | Stop reason: SDUPTHER

## 2023-11-19 RX ORDER — PREDNISONE 20 MG/1
40 TABLET ORAL ONCE
Status: COMPLETED | OUTPATIENT
Start: 2023-11-19 | End: 2023-11-19

## 2023-11-19 RX ORDER — PREDNISONE 20 MG/1
20 TABLET ORAL DAILY
Qty: 5 TABLET | Refills: 0 | Status: SHIPPED | OUTPATIENT
Start: 2023-11-19 | End: 2023-11-25 | Stop reason: SDUPTHER

## 2023-11-19 RX ADMIN — FAMOTIDINE 20 MG: 20 TABLET, FILM COATED ORAL at 14:49

## 2023-11-19 RX ADMIN — PREDNISONE 40 MG: 20 TABLET ORAL at 14:49

## 2023-11-19 NOTE — ED ATTENDING ATTESTATION
11/19/2023  IJanis MD, saw and evaluated the patient. I have discussed the patient with the resident/non-physician practitioner and agree with the resident's/non-physician practitioner's findings, Plan of Care, and MDM as documented in the resident's/non-physician practitioner's note, except where noted. All available labs and Radiology studies were reviewed. I was present for key portions of any procedure(s) performed by the resident/non-physician practitioner and I was immediately available to provide assistance. At this point I agree with the current assessment done in the Emergency Department.   I have conducted an independent evaluation of this patient a history and physical is as follows:  Kidney mass removed in October  of this year    Ever since procedure has had muscle aches in arms and legs   No fever no cp or sob no   Patient had been on a statin however this was stopped last week  Patient had an extensive lab work-up on November 15  CMP unremarkable both CRP and sed rate were mildly elevated  CPK was normal  EXAM:   Const:   well appearing   NAD     HEENT:  NCAT    sclera anicteric conjunctiva pink   throat clear, MMM    Neck:   supple  no meningismus  no jvd   no bruits  no  midline tenderness   Lungs:   clear  CW non-tender   No creiptation  Heart:   RRR no m/g/r  Normal pulses  Abd:   soft nt nd pos bs   Ext:    normal nontender  No edema  Neruo:   CN 2 -12 intact  motor intact 5/5 sensory intact cerebellar intact       Gait normal    IMPRESSION: Generalized weakness  PLAN: Reassurance referral to rheumatology    ED Course         Critical Care Time  Procedures

## 2023-11-19 NOTE — ED PROVIDER NOTES
History  Chief Complaint   Patient presents with    Muscle Pain     Muscle stiffness in both hands and arms, believes it started when she received surgery on October 17 for kidney. Patient is a 52-year-old female with a significant past medical history of hypertension, hyperlipidemia, arthritis, renal cell carcinoma status post excision in October 2023, presenting for evaluation of muscle pain. She reports that since her procedure on her renal cell carcinoma she has been having some generalized muscle aching. She says that this is in her bilateral hands as well as her bilateral legs. She saw her primary care physician regarding this who nay lab tests which upon my review included a CBC, CMP, CK, ESR, CRP. This was remarkable for a mildly elevated ESR and CRP. She was directed to discontinue her statin which she has since done. She has continued to have these myalgias. She denies any weakness. She denies any fevers. She denies any rashes. She otherwise denies acute complaints. Prior to Admission Medications   Prescriptions Last Dose Informant Patient Reported? Taking?    Biotin 2500 MCG CAPS  Self Yes No   Sig: Take 1 capsule by mouth 2 (two) times a day    Cholecalciferol (VITAMIN D-3 PO)  Self Yes No   Sig: Take 1 tablet by mouth daily   Cyanocobalamin (VITAMIN B-12 CR PO)  Self Yes No   Sig: Take 1 tablet by mouth daily   Diclofenac Sodium (VOLTAREN) 1 %  Self No No   Sig: Apply 2 g topically 4 (four) times a day   EPINEPHrine (EPIPEN) 0.3 mg/0.3 mL SOAJ  Self No No   Sig: Inject 0.3 mL (0.3 mg total) into a muscle as needed for anaphylaxis   Evening Primrose Oil 1000 MG CAPS  Self No No   Sig: Take 1 capsule (1,000 mg total) by mouth in the morning   Multiple Vitamins-Minerals (PRESERVISION AREDS 2 PO)  Self Yes No   Sig: Take by mouth in the morning   Omega-3 Fatty Acids (FISH OIL) 1,000 mg  Self Yes No   Sig: Take 1,000 mg by mouth 3 (three) times a day   Patient not taking: Reported on 11/15/2023   amitriptyline (ELAVIL) 50 mg tablet  Self No No   Sig: Take 1 tablet (50 mg total) by mouth daily at bedtime   calcium carbonate (OS-ANTHONY) 600 MG tablet  Self Yes No   Sig: Take 1,200 mg by mouth 2 (two) times a day with meals   dicyclomine (BENTYL) 10 mg capsule  Self No No   Sig: Take 1 capsule (10 mg total) by mouth 2 (two) times a day   fexofenadine (ALLEGRA) 180 MG tablet  Self Yes No   Sig: Take 180 mg by mouth daily   gabapentin (NEURONTIN) 300 mg capsule  Self No No   Sig: Take 1 tablet in the morning, 1 tablet afternoon and 2 bedtime   hydrOXYzine HCL (ATARAX) 10 mg tablet  Self No No   Sig: Take 2 tablets (20 mg total) by mouth daily at bedtime Take 2 tablets at bedtime   losartan-hydrochlorothiazide (HYZAAR) 100-25 MG per tablet  Self No No   Sig: Take 1 tablet by mouth daily   methocarbamol (ROBAXIN) 750 mg tablet  Self No No   Sig: Take 1 tablet (750 mg total) by mouth every 8 (eight) hours   methylprednisolone (MEDROL) 4 mg tablet  Self Yes No   Sig: Take 4 mg by mouth 2 (two) times a day 32mg total took 10/16 9pm and 10/17  0740am   Patient not taking: Reported on 11/15/2023   montelukast (SINGULAIR) 10 mg tablet  Self No No   Sig: Take 1 tablet (10 mg total) by mouth daily at bedtime   omeprazole (PriLOSEC) 40 MG capsule  Self No No   Sig: Take 1 capsule (40 mg total) by mouth daily   rosuvastatin (CRESTOR) 10 MG tablet  Self No No   Sig: Take 1 tablet (10 mg total) by mouth daily   simvastatin (ZOCOR) 10 mg tablet  Self No No   Sig: Take 1 tablet (10 mg total) by mouth daily at bedtime      Facility-Administered Medications Last Administration Doses Remaining   ondansetron (ZOFRAN) injection 4 mg None recorded 30          Past Medical History:   Diagnosis Date    Abnormal findings on diagnostic imaging of breast     Abnormal Pap smear of cervix     Arthritis     Chronic pain disorder     Extremity pain     Fibrocystic breast disease     Foot pain     GERD (gastroesophageal reflux disease)     Hyperlipidemia     Hypertension     Interstitial cystitis     Joint pain     Low back pain     Osteoarthritis     Osteopenia     Uncomplicated opioid dependence ([address]) 3/1/2022       Past Surgical History:   Procedure Laterality Date    APPENDECTOMY      BACK SURGERY      BREAST EXCISIONAL BIOPSY Left 1996    benign    CARPAL BOSS EXCISION      CHOLECYSTECTOMY      DIAGNOSTIC LAPAROSCOPY      FOOT SURGERY      FRACTURE SURGERY      HAND SURGERY  5/2017    HYSTERECTOMY      age 33    HYSTEROSCOPY      IR CRYOABLATION  10/17/2023    JOINT REPLACEMENT      KIDNEY SURGERY Left     KNEE ARTHROSCOPY Bilateral     LAMINECTOMY      LAPAROSCOPY      hynecologic with adhesions    MYOMECTOMY      OOPHORECTOMY Bilateral     age 33    ORTHOPEDIC SURGERY      IL CAR IMPLTJ NSTIM ELTRDS PLATE/PADDLE EDRL N/A 05/18/2017    Procedure: PLACEMENT OF THORACIC SPINAL CORD STIMULATOR WITH LEFT BUTTOCK IMPLANTABLE PULSE GENERATOR (IMPULSE MONITORING-MOTORS (TCEMEP), EMG, SSEP);   Surgeon: Jaems Sharif MD;  Location: [facility];  Service: Neurosurgery    SPINAL CORD STIMULATOR IMPLANT Left 09/29/2020    Procedure: LAMINECTOMY THORACIC , REMOVAL OF SCS LEADS AND GENERATOR;  Surgeon: Karol Arredondo MD;  Location: [facility];  Service: Neurosurgery    SPINAL STIMULATOR PLACEMENT  05/2017    TONSILLECTOMY AND ADENOIDECTOMY      onset: unknown    TOTAL KNEE ARTHROPLASTY Right        Family History   Problem Relation Age of Onset    Bone cancer Mother     Cancer Mother     Asthma Mother     Brain cancer Father     Stroke Father         stroke sydrome    Transient ischemic attack Father     Depression Sister     Migraines Sister     Asthma Sister     Asthma Sister     No Known Problems Maternal Grandmother     No Known Problems Maternal Grandfather     Diabetes Paternal Grandmother     No Known Problems Paternal Grandfather     Heart disease Brother     Diabetes Brother     Heart attack Brother     Colon cancer Brother 77    Cancer Brother     No Known Problems Brother     Breast cancer Maternal Aunt 79    Breast cancer additional onset Maternal Aunt 67    Diabetes Sister     Depression Sister      I have reviewed and agree with the history as documented. E-Cigarette/Vaping    E-Cigarette Use Never User      E-Cigarette/Vaping Substances    Nicotine No     THC No     CBD No     Flavoring No     Other No     Unknown No      Social History     Tobacco Use    Smoking status: Never    Smokeless tobacco: Never   Vaping Use    Vaping Use: Never used   Substance Use Topics    Alcohol use: Yes     Alcohol/week: 1.0 standard drink of alcohol     Types: 1 Glasses of wine per week     Comment: Drink socially, not too often    Drug use: Never        Review of Systems   Constitutional:  Negative for fever. Respiratory:  Negative for shortness of breath. Cardiovascular:  Negative for chest pain. Gastrointestinal:  Negative for abdominal pain. Musculoskeletal:  Positive for myalgias. Skin:  Negative for rash. All other systems reviewed and are negative. Physical Exam  ED Triage Vitals   Temperature Pulse Respirations Blood Pressure SpO2   11/19/23 1320 11/19/23 1320 11/19/23 1320 11/19/23 1320 11/19/23 1320   97.7 °F (36.5 °C) 104 18 121/79 97 %      Temp Source Heart Rate Source Patient Position - Orthostatic VS BP Location FiO2 (%)   11/19/23 1320 11/19/23 1320 11/19/23 1452 11/19/23 1320 --   Temporal Monitor Lying Right arm       Pain Score       11/19/23 1321       10 - Worst Possible Pain             Orthostatic Vital Signs  Vitals:    11/19/23 1320 11/19/23 1452   BP: 121/79 140/65   Pulse: 104 81   Patient Position - Orthostatic VS:  Lying       Physical Exam  Vitals and nursing note reviewed. Constitutional:       General: She is not in acute distress. Appearance: Normal appearance. She is not ill-appearing or toxic-appearing. HENT:      Head: Normocephalic and atraumatic.       Right Ear: External ear normal.      Left Ear: External ear normal.      Nose: Nose normal.   Eyes:      General: No scleral icterus. Right eye: No discharge. Left eye: No discharge. Extraocular Movements: Extraocular movements intact. Conjunctiva/sclera: Conjunctivae normal.   Cardiovascular:      Rate and Rhythm: Normal rate. Heart sounds: Normal heart sounds. No murmur heard. No friction rub. No gallop. Pulmonary:      Effort: Pulmonary effort is normal. No respiratory distress. Breath sounds: Normal breath sounds. Abdominal:      General: Abdomen is flat. There is no distension. Palpations: Abdomen is soft. There is no mass. Tenderness: There is no abdominal tenderness. Genitourinary:     Comments: Deferred  Musculoskeletal:      Comments: Minimal diffuse tenderness to palpation over the bilateral arms as well as bilateral legs. No crepitus. No pain out of proportion. No skin changes. Skin:     General: Skin is warm and dry. Neurological:      General: No focal deficit present. Mental Status: She is alert. ED Medications  Medications   famotidine (PEPCID) tablet 20 mg (20 mg Oral Given 11/19/23 1449)   predniSONE tablet 40 mg (40 mg Oral Given 11/19/23 1449)       Diagnostic Studies  Results Reviewed       None                   No orders to display         Procedures  Procedures      ED Course                                       Medical Decision Making  Patient is a 23-year-old female presenting for evaluation of myalgias. Based on history and evaluation differential diagnosis includes but is not limited to: Myopathy related to statin use, autoimmune myopathy. Plan: I reviewed all of the patient's labs from 11/15/2023 and did not see any indication on repeating these today. I think it is reasonable to initiate a steroid and see if this improves some of her symptoms.   I also instructed her to see if her symptoms improve after a longer period of time off of her statin. On reassessment, patient continues to be well-appearing. Additional prednisone sent to patient pharmacy. Patient given ambulatory referral to rheumatology and instructed to follow-up with her primary care physician as well. Patient seems understand this plan and is agreeable. All questions answered. Patient discharged home with return precautions. Risk  Prescription drug management. Disposition  Final diagnoses:   Myalgia   History of esophageal reflux     Time reflects when diagnosis was documented in both MDM as applicable and the Disposition within this note       Time User Action Codes Description Comment    11/19/2023  2:38 PM Meño Loomis Add [B10.24] Myalgia     11/19/2023  2:40 PM Meño Loomis Add [Z87.19] History of esophageal reflux           ED Disposition       ED Disposition   Discharge    Condition   Stable    Date/Time   Sun Nov 19, 2023  2:38 PM    Comment   Otilia Duff [facility] discharge to home/self care.                    Follow-up Information    None         Discharge Medication List as of 11/19/2023  2:55 PM        START taking these medications    Details   famotidine (PEPCID) 20 mg tablet Take 1 tablet (20 mg total) by mouth 2 (two) times a day for 5 days, Starting Sun 11/19/2023, Until Fri 11/24/2023, Normal      predniSONE 20 mg tablet Take 1 tablet (20 mg total) by mouth daily for 5 days, Starting Sun 11/19/2023, Until Fri 11/24/2023, Normal           CONTINUE these medications which have NOT CHANGED    Details   amitriptyline (ELAVIL) 50 mg tablet Take 1 tablet (50 mg total) by mouth daily at bedtime, Starting Mon 8/28/2023, Normal      Biotin 2500 MCG CAPS Take 1 capsule by mouth 2 (two) times a day , Historical Med      calcium carbonate (OS-ANTHONY) 600 MG tablet Take 1,200 mg by mouth 2 (two) times a day with meals, Historical Med      Cholecalciferol (VITAMIN D-3 PO) Take 1 tablet by mouth daily, Historical Med      Cyanocobalamin (VITAMIN B-12 CR PO) Take 1 tablet by mouth daily, Historical Med      Diclofenac Sodium (VOLTAREN) 1 % Apply 2 g topically 4 (four) times a day, Starting Mon 10/23/2023, Normal      dicyclomine (BENTYL) 10 mg capsule Take 1 capsule (10 mg total) by mouth 2 (two) times a day, Starting Mon 9/18/2023, Normal      EPINEPHrine (EPIPEN) 0.3 mg/0.3 mL SOAJ Inject 0.3 mL (0.3 mg total) into a muscle as needed for anaphylaxis, Starting Mon 4/17/2023, Normal      Evening Primrose Oil 1000 MG CAPS Take 1 capsule (1,000 mg total) by mouth in the morning, Starting Tue 11/29/2022, No Print      fexofenadine (ALLEGRA) 180 MG tablet Take 180 mg by mouth daily, Historical Med      gabapentin (NEURONTIN) 300 mg capsule Take 1 tablet in the morning, 1 tablet afternoon and 2 bedtime, Normal      hydrOXYzine HCL (ATARAX) 10 mg tablet Take 2 tablets (20 mg total) by mouth daily at bedtime Take 2 tablets at bedtime, Starting Wed 7/12/2023, Until Wed 11/15/2023, Normal      losartan-hydrochlorothiazide (HYZAAR) 100-25 MG per tablet Take 1 tablet by mouth daily, Starting Fri 5/5/2023, Normal      methocarbamol (ROBAXIN) 750 mg tablet Take 1 tablet (750 mg total) by mouth every 8 (eight) hours, Starting Sun 10/22/2023, Until Sat 1/20/2024, Normal      methylprednisolone (MEDROL) 4 mg tablet Take 4 mg by mouth 2 (two) times a day 32mg total took 10/16 9pm and 10/17  0740am, Historical Med      montelukast (SINGULAIR) 10 mg tablet Take 1 tablet (10 mg total) by mouth daily at bedtime, Starting Wed 7/12/2023, Normal      Multiple Vitamins-Minerals (PRESERVISION AREDS 2 PO) Take by mouth in the morning, Starting Wed 3/30/2022, Historical Med      Omega-3 Fatty Acids (FISH OIL) 1,000 mg Take 1,000 mg by mouth 3 (three) times a day, Historical Med      omeprazole (PriLOSEC) 40 MG capsule Take 1 capsule (40 mg total) by mouth daily, Starting Sun 10/22/2023, Normal      rosuvastatin (CRESTOR) 10 MG tablet Take 1 tablet (10 mg total) by mouth daily, Starting Tue 6/21/2022, Normal      simvastatin (ZOCOR) 10 mg tablet Take 1 tablet (10 mg total) by mouth daily at bedtime, Starting Wed 12/21/2022, Normal               PDMP Review         Value Time User    PDMP Reviewed  Yes 10/19/2023 10:32 AM Skip Hawk [place]             ED Provider  Attending physically available and evaluated Chris Rasmussen. I managed the patient along with the ED Attending.     Electronically Signed by           Hong Nolan,   11/20/23 [id number]

## 2023-11-19 NOTE — DISCHARGE INSTRUCTIONS
Your evaluation suggests that your symptoms are due to a non emergent cause of muscle aches. I prescribed prednisone. Take as prescribed. I also prescribed pepcid for reflux. Please follow up with your primary care physician within two days. Return to the Emergency Department if you experience worsening or concerning symptoms. Thank you for choosing us for your care.

## 2023-11-20 ENCOUNTER — OFFICE VISIT (OUTPATIENT)
Dept: PHYSICAL THERAPY | Facility: CLINIC | Age: 73
End: 2023-11-20
Payer: MEDICARE

## 2023-11-20 DIAGNOSIS — M51.36 LUMBAR DEGENERATIVE DISC DISEASE: Primary | ICD-10-CM

## 2023-11-20 DIAGNOSIS — G89.4 CHRONIC PAIN SYNDROME: ICD-10-CM

## 2023-11-20 PROCEDURE — 97112 NEUROMUSCULAR REEDUCATION: CPT | Performed by: PHYSICAL THERAPIST

## 2023-11-20 PROCEDURE — 97110 THERAPEUTIC EXERCISES: CPT | Performed by: PHYSICAL THERAPIST

## 2023-11-20 NOTE — PROGRESS NOTES
Daily Note     Today's date: 2023  Patient name: Jeyson Gregorio  : 1950  MRN: [id number]  Referring provider: Lopez Rosenbaum MD  Dx:   Encounter Diagnosis     ICD-10-CM    1. Lumbar degenerative disc disease  M51.36       2. Chronic pain syndrome  G89.4                      Subjective: Patient is very sore all over. She went to the ED because of it, and she was told to stop her statins. She is also to see a rheumatologist.      Objective: Gait is slow and guarded. Assessment: Exercise tolerance was limited by general muscle soreness. Plan: Continue per plan of care.       Precautions: None given        Manuals                   Neuro Re-Ed            AB Supine 10x BID HEP 10x BID HEP 10x5" 20x5"  20x5"     AB with Single Leg Raises Supine   10x BID HEP 10x5" 15x5"  15x5"     AB with Double Leg Raises and ball squeeze Raises Supine            AB with SLR            LTR Bilateral  10x BID HEP DC      Double leg bridging   10x BID HEP 10x5" 20x5"  20x5"     Bridging with ball squeeze    10x BID HEP DC       Bridging with TB ABD    10xL3 BID HEP DC       Bridging with marching                         Ther Ex            Back ext:  F4, P4, C0     P4 2/10  P4 3/10     TB SA Pulldown     DC       TB Oblique Press             NuStep for ROM: S7, A9   L3 12 min L3 14 min L3 15 min  L3 23 min     Bilat SKTC with strap   10x2" BID HEP 10x2" ea     Ther Activity                         Modalities

## 2023-11-22 ENCOUNTER — OFFICE VISIT (OUTPATIENT)
Dept: PHYSICAL THERAPY | Facility: CLINIC | Age: 73
End: 2023-11-22
Payer: MEDICARE

## 2023-11-22 DIAGNOSIS — M51.36 LUMBAR DEGENERATIVE DISC DISEASE: Primary | ICD-10-CM

## 2023-11-22 DIAGNOSIS — G89.4 CHRONIC PAIN SYNDROME: ICD-10-CM

## 2023-11-22 PROCEDURE — 97112 NEUROMUSCULAR REEDUCATION: CPT | Performed by: PHYSICAL THERAPIST

## 2023-11-22 PROCEDURE — 97110 THERAPEUTIC EXERCISES: CPT | Performed by: PHYSICAL THERAPIST

## 2023-11-22 NOTE — PROGRESS NOTES
Daily Note     Today's date: 2023  Patient name: Don Harrison  : 1950  MRN: [id number]  Referring provider: Isabella Hernandez MD  Dx:   Encounter Diagnosis     ICD-10-CM    1. Lumbar degenerative disc disease  M51.36       2. Chronic pain syndrome  G89.4                      Subjective: Patient states that she did well after the last session. Her general soreness has diminished since the last session. Objective: See treatment diary below      Assessment: Tolerated treatment well. Patient demonstrated fatigue post treatment, exhibited good technique with therapeutic exercise progressions, and would benefit from continued PT      Plan: Progress treatment as tolerated.        Precautions: None given        Manuals                 Neuro Re-Ed            AB Supine 10x BID HEP 10x BID HEP 10x5" 20x5"  20x5"  30x5"   AB with Single Leg Raises Supine   10x BID HEP 10x5" 15x5"  15x5"  30x5"   AB with Double Leg Raises              AB with SLR         5x2" ea   LTR Bilateral  10x BID HEP DC      Double leg bridging   10x BID HEP 10x5" 20x5"  20x5"  30x5"   Bridging with ball squeeze    10x BID HEP DC       Bridging with TB ABD    10xL3 BID HEP DC       Bridging with marching                         Ther Ex            Back ext:  F4, P4, C0     P4 2/10  P4 3/10 P4 3/10   PYR AB: S5      P4 3/10   TB SA Pulldown     DC       TB Oblique Press            NuStep for ROM: S7, A9   L3 12 min L3 14 min L3 15 min  L3 23 min  L3 10 min   Bilat SKTC with strap   10x2" BID HEP 10x2" ea  10x2" ea   Ther Activity                         Modalities

## 2023-11-25 DIAGNOSIS — Z87.19 HISTORY OF ESOPHAGEAL REFLUX: ICD-10-CM

## 2023-11-25 DIAGNOSIS — M79.10 MYALGIA: ICD-10-CM

## 2023-11-25 RX ORDER — FAMOTIDINE 20 MG/1
20 TABLET, FILM COATED ORAL 2 TIMES DAILY
Qty: 10 TABLET | Refills: 0 | Status: SHIPPED | OUTPATIENT
Start: 2023-11-25 | End: 2023-11-28 | Stop reason: SDUPTHER

## 2023-11-25 RX ORDER — PREDNISONE 20 MG/1
20 TABLET ORAL DAILY
Qty: 5 TABLET | Refills: 0 | Status: SHIPPED | OUTPATIENT
Start: 2023-11-25 | End: 2023-11-30

## 2023-11-27 ENCOUNTER — OFFICE VISIT (OUTPATIENT)
Dept: PHYSICAL THERAPY | Facility: CLINIC | Age: 73
End: 2023-11-27
Payer: MEDICARE

## 2023-11-27 DIAGNOSIS — G89.4 CHRONIC PAIN SYNDROME: ICD-10-CM

## 2023-11-27 DIAGNOSIS — M51.36 LUMBAR DEGENERATIVE DISC DISEASE: Primary | ICD-10-CM

## 2023-11-27 PROCEDURE — 97110 THERAPEUTIC EXERCISES: CPT | Performed by: PHYSICAL THERAPIST

## 2023-11-27 PROCEDURE — 97112 NEUROMUSCULAR REEDUCATION: CPT | Performed by: PHYSICAL THERAPIST

## 2023-11-27 NOTE — PROGRESS NOTES
Daily Note     Today's date: 2023  Patient name: Johnathan Weinstein  : 1950  MRN: [id number]  Referring provider: Serena Lopez MD  Dx:   Encounter Diagnosis     ICD-10-CM    1. Lumbar degenerative disc disease  M51.36       2. Chronic pain syndrome  G89.4                      Subjective: Patient continues to have pain and stiffness in multiple body parts. Her back was sore after the last session, so she does not want to do the abdominal strengthening exercise machine. Objective: See treatment diary below      Assessment: Tolerated treatment well, having less difficulty with SLR exercise. Patient exhibited good technique with therapeutic exercises and had no worsening of pain afterward. Plan: Continue per plan of care.       Precautions: None given        Manuals                 Neuro Re-Ed            AB Supine 30x5" 10x BID HEP 10x5" 20x5"  20x5"  30x5"   AB with Single Leg Raises Supine 30x5" 10x BID HEP 10x5" 15x5"  15x5"  30x5"   AB with Double Leg Raises              AB with SLR 10x2" ea       5x2" ea   LTR Bilateral  10x BID HEP DC      Double leg bridging 30x5" 10x BID HEP 10x5" 20x5"  20x5"  30x5"   Bridging with ball squeeze    10x BID HEP DC       Bridging with TB ABD    10xL3 BID HEP DC       Bridging with marching                         Ther Ex            Back ext:  F4, P4, C0  P4 3/10   P4 2/10  P4 3/10 P4 3/10   PYR AB: S5      P4 3/10   TB SA Pulldown     DC       TB Oblique Press            NuStep for ROM: S7, A9  L3 15 min L3 12 min L3 14 min L3 15 min  L3 23 min  L3 10 min   Bilat SKTC with strap 10x2" ea  10x2" BID HEP 10x2" ea  10x2" ea   Ther Activity                         Modalities

## 2023-11-28 ENCOUNTER — APPOINTMENT (OUTPATIENT)
Dept: LAB | Facility: MEDICAL CENTER | Age: 73
End: 2023-11-28
Payer: MEDICARE

## 2023-11-28 ENCOUNTER — OFFICE VISIT (OUTPATIENT)
Dept: FAMILY MEDICINE CLINIC | Facility: CLINIC | Age: 73
End: 2023-11-28
Payer: MEDICARE

## 2023-11-28 VITALS
SYSTOLIC BLOOD PRESSURE: 134 MMHG | WEIGHT: 171.6 LBS | TEMPERATURE: 97.2 F | BODY MASS INDEX: 30.41 KG/M2 | DIASTOLIC BLOOD PRESSURE: 80 MMHG | RESPIRATION RATE: 18 BRPM | HEART RATE: 94 BPM | HEIGHT: 63 IN | OXYGEN SATURATION: 98 %

## 2023-11-28 DIAGNOSIS — M19.90 INFLAMMATORY ARTHRITIS: Primary | ICD-10-CM

## 2023-11-28 DIAGNOSIS — M43.6 STIFFNESS OF CERVICAL SPINE: ICD-10-CM

## 2023-11-28 DIAGNOSIS — Z87.19 HISTORY OF ESOPHAGEAL REFLUX: ICD-10-CM

## 2023-11-28 DIAGNOSIS — G62.9 NEUROPATHY: ICD-10-CM

## 2023-11-28 DIAGNOSIS — M19.90 INFLAMMATORY ARTHRITIS: ICD-10-CM

## 2023-11-28 LAB
CRP SERPL QL: 17.1 MG/L
ERYTHROCYTE [DISTWIDTH] IN BLOOD BY AUTOMATED COUNT: 12.9 % (ref 11.6–15.1)
ERYTHROCYTE [SEDIMENTATION RATE] IN BLOOD: 26 MM/HOUR (ref 0–29)
HCT VFR BLD AUTO: 43.1 % (ref 34.8–46.1)
HGB BLD-MCNC: 13.8 G/DL (ref 11.5–15.4)
MCH RBC QN AUTO: 29.2 PG (ref 26.8–34.3)
MCHC RBC AUTO-ENTMCNC: 32 G/DL (ref 31.4–37.4)
MCV RBC AUTO: 91 FL (ref 82–98)
PLATELET # BLD AUTO: 404 THOUSANDS/UL (ref 149–390)
PMV BLD AUTO: 9 FL (ref 8.9–12.7)
RBC # BLD AUTO: 4.73 MILLION/UL (ref 3.81–5.12)
WBC # BLD AUTO: 15.36 THOUSAND/UL (ref 4.31–10.16)

## 2023-11-28 PROCEDURE — 36415 COLL VENOUS BLD VENIPUNCTURE: CPT

## 2023-11-28 PROCEDURE — 86140 C-REACTIVE PROTEIN: CPT

## 2023-11-28 PROCEDURE — 99214 OFFICE O/P EST MOD 30 MIN: CPT | Performed by: INTERNAL MEDICINE

## 2023-11-28 PROCEDURE — 85027 COMPLETE CBC AUTOMATED: CPT

## 2023-11-28 PROCEDURE — 85652 RBC SED RATE AUTOMATED: CPT

## 2023-11-28 RX ORDER — AMITRIPTYLINE HYDROCHLORIDE 50 MG/1
50 TABLET, FILM COATED ORAL
Qty: 90 TABLET | Refills: 0 | Status: SHIPPED | OUTPATIENT
Start: 2023-11-28

## 2023-11-28 RX ORDER — FAMOTIDINE 20 MG/1
20 TABLET, FILM COATED ORAL 2 TIMES DAILY
Qty: 60 TABLET | Refills: 0 | Status: SHIPPED | OUTPATIENT
Start: 2023-11-28 | End: 2023-12-28

## 2023-11-28 RX ORDER — PREDNISONE 10 MG/1
10 TABLET ORAL DAILY
Qty: 30 TABLET | Refills: 0 | Status: SHIPPED | OUTPATIENT
Start: 2023-11-28

## 2023-11-28 NOTE — PROGRESS NOTES
Name: Emy Valdovinos      : 1950      MRN: [id number]  Encounter Provider: Santy Hendrickson DO  Encounter Date: 2023   Encounter department: [address]     1. Inflammatory arthritis  -     C-reactive protein; Future  -     Sedimentation rate, automated; Future; Expected date: 2023  -     predniSONE 10 mg tablet; Take 1 tablet (10 mg total) by mouth daily  -     CBC and Platelet; Future  Recheck labs today Clinically responding to steroids so anticipate improved labs  Taper prednisone to 10mg daily and stay on pepcid   Continue PT and they are working on mid back as well   She likely can get seen thru [facility] sooner and hopefully within the next few weeks for eval as would like to taper steroid as able  Labs today and will fax to rheumatology once available   Stay hydrated Take pepcid while on steroid     2. Neuropathy  -     amitriptyline (ELAVIL) 50 mg tablet; Take 1 tablet (50 mg total) by mouth daily at bedtime    3. History of esophageal reflux  -     famotidine (PEPCID) 20 mg tablet; Take 1 tablet (20 mg total) by mouth 2 (two) times a day    4. Stiffness of cervical spine  -     CBC and Platelet; Future        Depression Screening and Follow-up Plan: Patient was screened for depression during today's encounter. They screened negative with a PHQ-2 score of 0.    Keep appt as scheduled     Subjective      HPI  Pt still struggling with cervical stiffness and joint pain since procedure She asked for refill steroid and does seem to respond to the prednisone She is going to PT for her lower back currently As of now she has rheumatology appt in the spring but she called [facility] and they are trying to get her in sooner She feels fairly stable today and does not have as much stiffness She needs to stay on the pepcid with the prednisone since her stomach is sensitive  Review of Systems   Constitutional:  Positive for activity change. Negative for chills and fever.    HENT: Negative. Eyes:  Negative for visual disturbance. Respiratory:  Negative for cough and shortness of breath. Cardiovascular:  Negative for chest pain, palpitations and leg swelling. Gastrointestinal:  Negative for abdominal distention and abdominal pain. Genitourinary: Negative. Negative for frequency. Musculoskeletal:  Positive for myalgias, neck pain and neck stiffness. Neurological:  Positive for weakness. Negative for dizziness, light-headedness, numbness and headaches. Psychiatric/Behavioral:  Positive for sleep disturbance. The patient is not nervous/anxious.         Current Outpatient Medications on File Prior to Visit   Medication Sig    Biotin 2500 MCG CAPS Take 1 capsule by mouth 2 (two) times a day     calcium carbonate (OS-ANTHONY) 600 MG tablet Take 1,200 mg by mouth 2 (two) times a day with meals    Cholecalciferol (VITAMIN D-3 PO) Take 1 tablet by mouth daily    Cyanocobalamin (VITAMIN B-12 CR PO) Take 1 tablet by mouth daily    Diclofenac Sodium (VOLTAREN) 1 % Apply 2 g topically 4 (four) times a day    dicyclomine (BENTYL) 10 mg capsule Take 1 capsule (10 mg total) by mouth 2 (two) times a day    EPINEPHrine (EPIPEN) 0.3 mg/0.3 mL SOAJ Inject 0.3 mL (0.3 mg total) into a muscle as needed for anaphylaxis    Evening Primrose Oil 1000 MG CAPS Take 1 capsule (1,000 mg total) by mouth in the morning    fexofenadine (ALLEGRA) 180 MG tablet Take 180 mg by mouth daily    gabapentin (NEURONTIN) 300 mg capsule Take 1 tablet in the morning, 1 tablet afternoon and 2 bedtime    hydrOXYzine HCL (ATARAX) 10 mg tablet Take 2 tablets (20 mg total) by mouth daily at bedtime Take 2 tablets at bedtime    losartan-hydrochlorothiazide (HYZAAR) 100-25 MG per tablet Take 1 tablet by mouth daily    methocarbamol (ROBAXIN) 750 mg tablet Take 1 tablet (750 mg total) by mouth every 8 (eight) hours    montelukast (SINGULAIR) 10 mg tablet Take 1 tablet (10 mg total) by mouth daily at bedtime    Multiple Vitamins-Minerals (PRESERVISION AREDS 2 PO) Take by mouth in the morning    omeprazole (PriLOSEC) 40 MG capsule Take 1 capsule (40 mg total) by mouth daily    predniSONE 20 mg tablet Take 1 tablet (20 mg total) by mouth daily for 5 days    [DISCONTINUED] amitriptyline (ELAVIL) 50 mg tablet Take 1 tablet (50 mg total) by mouth daily at bedtime    [DISCONTINUED] famotidine (PEPCID) 20 mg tablet Take 1 tablet (20 mg total) by mouth 2 (two) times a day for 5 days    Omega-3 Fatty Acids (FISH OIL) 1,000 mg Take 1,000 mg by mouth 3 (three) times a day (Patient not taking: Reported on 11/15/2023)    rosuvastatin (CRESTOR) 10 MG tablet Take 1 tablet (10 mg total) by mouth daily (Patient not taking: Reported on 11/28/2023)    simvastatin (ZOCOR) 10 mg tablet Take 1 tablet (10 mg total) by mouth daily at bedtime (Patient not taking: Reported on 11/28/2023)       Objective     /80   Pulse 94   Temp (!) 97.2 °F (36.2 °C) (Temporal)   Resp 18   Ht 5' 3" (1.6 m)   Wt 77.8 kg (171 lb 9.6 oz)   LMP  (LMP Unknown)   SpO2 98%   BMI 30.40 kg/m²     Physical Exam  Vitals reviewed. Constitutional:       General: She is not in acute distress. Appearance: Normal appearance. She is not ill-appearing, toxic-appearing or diaphoretic. HENT:      Head: Normocephalic and atraumatic. Right Ear: External ear normal.      Left Ear: External ear normal.      Nose: Nose normal.      Mouth/Throat:      Mouth: Mucous membranes are moist.   Eyes:      General: No scleral icterus. Cardiovascular:      Rate and Rhythm: Normal rate and regular rhythm. Pulses: Normal pulses. Pulmonary:      Effort: Pulmonary effort is normal. No respiratory distress. Breath sounds: No wheezing. Abdominal:      General: There is no distension. Musculoskeletal:      Cervical back: Neck supple. No rigidity. Right lower leg: No edema. Left lower leg: No edema. Lymphadenopathy:      Cervical: No cervical adenopathy. Skin:     General: Skin is dry. Coloration: Skin is not jaundiced or pale. Neurological:      General: No focal deficit present. Mental Status: She is alert and oriented to person, place, and time. Mental status is at baseline. Cranial Nerves: No cranial nerve deficit. Psychiatric:         Mood and Affect: Mood normal.         Behavior: Behavior normal.         Thought Content:  Thought content normal.         Judgment: Judgment normal.       Nguyen Martínez,

## 2023-11-29 ENCOUNTER — TELEPHONE (OUTPATIENT)
Dept: FAMILY MEDICINE CLINIC | Facility: CLINIC | Age: 73
End: 2023-11-29

## 2023-11-29 NOTE — TELEPHONE ENCOUNTER
Per patient request, chart notes, ER report, and labs since 10/19 were all faxed to [address] [facility] at [phone]

## 2023-11-30 ENCOUNTER — OFFICE VISIT (OUTPATIENT)
Dept: PHYSICAL THERAPY | Facility: CLINIC | Age: 73
End: 2023-11-30
Payer: MEDICARE

## 2023-11-30 DIAGNOSIS — G89.4 CHRONIC PAIN SYNDROME: ICD-10-CM

## 2023-11-30 DIAGNOSIS — M51.36 LUMBAR DEGENERATIVE DISC DISEASE: Primary | ICD-10-CM

## 2023-11-30 PROCEDURE — 97112 NEUROMUSCULAR REEDUCATION: CPT | Performed by: PHYSICAL THERAPIST

## 2023-11-30 PROCEDURE — 97110 THERAPEUTIC EXERCISES: CPT | Performed by: PHYSICAL THERAPIST

## 2023-11-30 NOTE — PROGRESS NOTES
Daily Note     Today's date: 2023  Patient name: Nia Ramirez  : 1950  MRN: [id number]  Referring provider: Milad Palafox MD  Dx:   Encounter Diagnosis     ICD-10-CM    1. Lumbar degenerative disc disease  M51.36       2. Chronic pain syndrome  G89.4                      Subjective: Patient still has overall discomfort and stiffness, but not as severe as last time. Objective: See treatment diary below      Assessment: Tolerated treatment well. Patient exhibited good technique with therapeutic exercise progressions and would benefit from continued PT      Plan: Continue per plan of care.       Precautions: None given        Manuals                 Neuro Re-Ed            AB Supine 30x5" 30x5" 10x5" 20x5"  20x5"  30x5"   AB with Single Leg Raises Supine 30x5" 30x5" 10x5" 15x5"  15x5"  30x5"   AB with Double Leg Raises              AB with SLR 10x2" ea 15x2" ea      5x2" ea   LTR Bilateral   DC      Double leg bridging 30x5" 30x5" 10x5" 20x5"  20x5"  30x5"   Bridging with ball squeeze    10x BID HEP DC       Bridging with TB ABD    10xL3 BID HEP DC       Bridging with marching                         Ther Ex            Back ext:  F4, P4, C0  P4 3/10 P4 3/10  P4 2/10  P4 3/10 P4 3/10   PYR AB: S5      P4 3/10   TB SA Pulldown     DC       TB Oblique Press            NuStep for ROM: S7, A9  L3 15 min L3 15 min L3 14 min L3 15 min  L3 23 min  L3 10 min   Bilat SKTC with strap 10x2" ea 15x2" ea 10x2" BID HEP 10x2" ea  10x2" ea   Ther Activity                         Modalities

## 2023-12-04 ENCOUNTER — OFFICE VISIT (OUTPATIENT)
Dept: PHYSICAL THERAPY | Facility: CLINIC | Age: 73
End: 2023-12-04
Payer: MEDICARE

## 2023-12-04 DIAGNOSIS — M51.36 LUMBAR DEGENERATIVE DISC DISEASE: Primary | ICD-10-CM

## 2023-12-04 DIAGNOSIS — G89.4 CHRONIC PAIN SYNDROME: ICD-10-CM

## 2023-12-04 PROCEDURE — 97112 NEUROMUSCULAR REEDUCATION: CPT | Performed by: PHYSICAL THERAPIST

## 2023-12-04 PROCEDURE — 97110 THERAPEUTIC EXERCISES: CPT | Performed by: PHYSICAL THERAPIST

## 2023-12-04 NOTE — PROGRESS NOTES
Daily Note     Today's date: 2023  Patient name: Jaylan Estrella  : 1950  MRN: [id number]  Referring provider: Joss Marcano MD  Dx:   Encounter Diagnosis     ICD-10-CM    1. Lumbar degenerative disc disease  M51.36       2. Chronic pain syndrome  G89.4                      Subjective: Patient states that she was recently diagnosed with PMR, and is taking steroids. Her pain and stiffness have improved somewhat. Objective: See treatment diary below      Assessment: Tolerated treatment well. Patient would benefit from continued PT      Plan: Progress note during next visit.       Precautions: None given        Manuals                 Neuro Re-Ed            AB Supine 30x5" 30x5" 30x5" 20x5"  20x5"  30x5"   AB with Single Leg Raises Supine 30x5" 30x5" 30x5" 15x5"  15x5"  30x5"   AB with Double Leg Raises              AB with SLR 10x2" ea 15x2" ea 15x2" ea     5x2" ea   LTR Bilateral         Double leg bridging 30x5" 30x5" 30x5" 20x5"  20x5"  30x5"   Bridging with ball squeeze     DC       Bridging with TB ABD     DC       Bridging with marching                         Ther Ex            Back ext:  F4, P4, C0  P4 3/10 P4 3/10 P4 35x P4 2/10  P4 3/10 P4 3/10   PYR AB: S5      P4 3/10   TB SA Pulldown     DC       TB Oblique Press            NuStep for ROM: S7, A9  L3 15 min L3 15 min L3 15 min L3 15 min  L3 23 min  L3 10 min   Bilat SKTC with strap 10x2" ea 15x2" ea 15x2"  10x2" ea  10x2" ea   Ther Activity                         Modalities

## 2023-12-06 RX ORDER — CYANOCOBALAMIN (VITAMIN B-12) 500 MCG
1 TABLET ORAL DAILY
COMMUNITY

## 2023-12-06 RX ORDER — PREDNISONE 5 MG/1
TABLET ORAL
COMMUNITY
Start: 2023-12-01

## 2023-12-06 NOTE — PROGRESS NOTES
PT Re-Evaluation     Today's date: 2023  Patient name: Abel Somers  : 1950  MRN: [id number]  Referring provider: Melina White MD  Dx:   Encounter Diagnosis     ICD-10-CM    1. Lumbar degenerative disc disease  M51.36       2. Chronic pain syndrome  G89.4                      Assessment  Assessment details:   CURRENT FUNCTIONAL STATUS    Standing/ADL tolerance 30 minutes. Walking tolerance 3 blocks. Ability to bend forward: Fair  Unable to ride her exercise bike. SHORT TERM GOALS (2 WEEKS)    Lumbar spine AROM 10 % in all planes. Improve abdominal muscle control. Decrease pain to 3-5/10. Standing/ADL tolerance 45 minutes. Walking tolerance 4 blocks. Ability to bend forward: Fair/Good  Able to ride her exercise bike. LONG TERM GOALS (DISCHARGE)    Lumbar Spine AROM: F=90 %, EXT=50 %, R SB=75 %, L SB=75 %.-partially met  Lower Extremity Strength: Grossly 5/5 t/o.-partially met  Good control of abdominal muscles during abdominal bracing.-partially met  Decrease pain to 1-4/10.-partially met  Standing/ADL tolerance 60 minutes. -partially met   Walking tolerance 4 blocks.-partially met   Ability to bend forward: Good-partially met  Able to ride her exercise bike.-not met  Understanding of Dx/Px/POC: good   Prognosis: good    Goals  See assessment details above. Plan  Plan details: The patient has shown improvement in PT demonstrating decreased pain, increased range of motion, increased strength, and increased tolerance to activity. The patient continues to present with pain, decreased ROM, decreased strength, and decreased tolerance to activity. The patient would benefit from continued skilled PT services to address these issues and to maximize function. The patient will also continue performing their HEP.       Patient would benefit from: skilled physical therapy  Planned therapy interventions: self care, therapeutic activities, therapeutic exercise, neuromuscular re-education and home exercise program  Frequency: 2x week  Duration in weeks: 1        Subjective Evaluation    History of Present Illness  Mechanism of injury: Subjective: The patient's low back pain remains constant and variable. Sitting and driving for prolonged periods will cause the pain will radiate to the knee in both legs, more consistently on the right leg. Patient Goals  Patient goals for therapy: decreased pain and increased motion  Patient goal: Improved tolerance for bending, standing, and walking. Pain  Current pain ratin  At best pain ratin  At worst pain ratin          Objective     Postural Observations    Additional Postural Observation Details  Gait and transfers are not guarded, no lateral shift. General Comments:      Lumbar Comments  CURRENT OBJECTIVE MEASUREMENTS    Lumbar Spine AROM: F=75 %, EXT=50 % with low back pain, R SB=90 %, L SB=90 %. Lower Extremity Strength: Grossly 5/5 t/o. Fair/Good control of abdominal muscles during abdominal bracing.                  Precautions: None given        Manuals                    Neuro Re-Ed               AB Supine 30x5" 30x5" 30x5" 30x5"  20x5"  30x5"   AB with Single Leg Raises Supine 30x5" 30x5" 30x5" 20x5"  15x5"  30x5"   AB with Double Leg Raises                 AB with SLR 10x2" ea 15x2" ea 15x2" ea  15x2" ea    5x2" ea   LTR Bilateral               Double leg bridging 30x5" 30x5" 30x5" 30x5"  20x5"  30x5"   Bridging with ball squeeze              Bridging with TB ABD              Bridging with marching                               Ther Ex               Back ext:  F4, P4, C0  P4 3/10 P4 3/10 P4 35x P4 35x  P4 3/10 P4 3/10   PYR AB: S5           P4 3/10   TB SA Pulldown              TB Oblique Press               NuStep for ROM: S7, A9  L3 15 min L3 15 min L3 15 min L3 15 min  L3 23 min  L3 10 min   Bilat SKTC with strap 10x2" ea 15x2" ea 15x2"  15x2" ea   10x2" ea   Ther Activity Modalities

## 2023-12-07 ENCOUNTER — EVALUATION (OUTPATIENT)
Dept: PHYSICAL THERAPY | Facility: CLINIC | Age: 73
End: 2023-12-07
Payer: MEDICARE

## 2023-12-07 DIAGNOSIS — G89.4 CHRONIC PAIN SYNDROME: ICD-10-CM

## 2023-12-07 DIAGNOSIS — M51.36 LUMBAR DEGENERATIVE DISC DISEASE: Primary | ICD-10-CM

## 2023-12-07 PROCEDURE — 97110 THERAPEUTIC EXERCISES: CPT | Performed by: PHYSICAL THERAPIST

## 2023-12-07 PROCEDURE — 97112 NEUROMUSCULAR REEDUCATION: CPT | Performed by: PHYSICAL THERAPIST

## 2023-12-07 NOTE — LETTER
2023    Campos Farrar MD  [address] [place] [id number]    Patient: Aaron Lares   YOB: 1950   Date of Visit: 2023     Encounter Diagnosis     ICD-10-CM    1. Lumbar degenerative disc disease  M51.36       2. Chronic pain syndrome  G89.4           Dear Dr. Valero Mas:    Thank you for your recent referral of Aaron Lares. Please review the attached evaluation summary from Ingrid's recent visit. Please verify that you agree with the plan of care by signing the attached order. If you have any questions or concerns, please do not hesitate to call. I sincerely appreciate the opportunity to share in the care of one of your patients and hope to have another opportunity to work with you in the near future. Sincerely,    Shaun Pascual, PT      Referring Provider:      I certify that I have read the below Plan of Care and certify the need for these services furnished under this plan of treatment while under my care. Campos Farrar MD  [address] [place] [id number]  Via Fax: [phone]          PT Re-Evaluation     Today's date: 2023  Patient name: Aaron Lares  : 1950  MRN: [id number]  Referring provider: Campos Farrar MD  Dx:   Encounter Diagnosis     ICD-10-CM    1. Lumbar degenerative disc disease  M51.36       2. Chronic pain syndrome  G89.4                      Assessment  Assessment details:   CURRENT FUNCTIONAL STATUS    Standing/ADL tolerance 30 minutes. Walking tolerance 3 blocks. Ability to bend forward: Fair  Unable to ride her exercise bike. SHORT TERM GOALS (2 WEEKS)    Lumbar spine AROM 10 % in all planes. Improve abdominal muscle control. Decrease pain to 3-5/10. Standing/ADL tolerance 45 minutes. Walking tolerance 4 blocks. Ability to bend forward: Fair/Good  Able to ride her exercise bike.        LONG TERM GOALS (DISCHARGE)    Lumbar Spine AROM: F=90 %, EXT=50 %, R SB=75 %, L SB=75 %.-partially met  Lower Extremity Strength: Grossly 5/5 t/o.-partially met  Good control of abdominal muscles during abdominal bracing.-partially met  Decrease pain to 1-4/10.-partially met  Standing/ADL tolerance 60 minutes. -partially met   Walking tolerance 4 blocks.-partially met   Ability to bend forward: Good-partially met  Able to ride her exercise bike.-not met  Understanding of Dx/Px/POC: good   Prognosis: good    Goals  See assessment details above. Plan  Plan details: The patient has shown improvement in PT demonstrating decreased pain, increased range of motion, increased strength, and increased tolerance to activity. The patient continues to present with pain, decreased ROM, decreased strength, and decreased tolerance to activity. The patient would benefit from continued skilled PT services to address these issues and to maximize function. The patient will also continue performing their HEP. Patient would benefit from: skilled physical therapy  Planned therapy interventions: self care, therapeutic activities, therapeutic exercise, neuromuscular re-education and home exercise program  Frequency: 2x week  Duration in weeks: 1        Subjective Evaluation    History of Present Illness  Mechanism of injury: Subjective: The patient's low back pain remains constant and variable. Sitting and driving for prolonged periods will cause the pain will radiate to the knee in both legs, more consistently on the right leg. Patient Goals  Patient goals for therapy: decreased pain and increased motion  Patient goal: Improved tolerance for bending, standing, and walking. Pain  Current pain ratin  At best pain ratin  At worst pain ratin          Objective     Postural Observations    Additional Postural Observation Details  Gait and transfers are not guarded, no lateral shift.     General Comments:      Lumbar Comments  CURRENT OBJECTIVE MEASUREMENTS    Lumbar Spine AROM: F=75 %, EXT=50 % with low back pain, R SB=90 %, L SB=90 %. Lower Extremity Strength: Grossly 5/5 t/o. Fair/Good control of abdominal muscles during abdominal bracing.                  Precautions: None given        Manuals 11/27 11/30 12/4 12/7 11/20 11/22                   Neuro Re-Ed               AB Supine 30x5" 30x5" 30x5" 30x5"  20x5"  30x5"   AB with Single Leg Raises Supine 30x5" 30x5" 30x5" 20x5"  15x5"  30x5"   AB with Double Leg Raises                 AB with SLR 10x2" ea 15x2" ea 15x2" ea  15x2" ea    5x2" ea   LTR Bilateral               Double leg bridging 30x5" 30x5" 30x5" 30x5"  20x5"  30x5"   Bridging with ball squeeze              Bridging with TB ABD              Bridging with marching                               Ther Ex               Back ext:  F4, P4, C0  P4 3/10 P4 3/10 P4 35x P4 35x  P4 3/10 P4 3/10   PYR AB: S5           P4 3/10   TB SA Pulldown              TB Oblique Press               NuStep for ROM: S7, A9  L3 15 min L3 15 min L3 15 min L3 15 min  L3 23 min  L3 10 min   Bilat SKTC with strap 10x2" ea 15x2" ea 15x2"  15x2" ea   10x2" ea   Ther Activity                               Modalities

## 2023-12-08 ENCOUNTER — OFFICE VISIT (OUTPATIENT)
Dept: OBGYN CLINIC | Facility: CLINIC | Age: 73
End: 2023-12-08
Payer: MEDICARE

## 2023-12-08 VITALS
BODY MASS INDEX: 30.55 KG/M2 | RESPIRATION RATE: 16 BRPM | SYSTOLIC BLOOD PRESSURE: 137 MMHG | DIASTOLIC BLOOD PRESSURE: 82 MMHG | HEIGHT: 63 IN | WEIGHT: 172.4 LBS | HEART RATE: 92 BPM

## 2023-12-08 DIAGNOSIS — M67.912 TENDINOPATHY OF ROTATOR CUFF, LEFT: ICD-10-CM

## 2023-12-08 DIAGNOSIS — M19.012 GLENOHUMERAL ARTHRITIS, LEFT: Primary | ICD-10-CM

## 2023-12-08 DIAGNOSIS — M75.42 SUBACROMIAL IMPINGEMENT, LEFT: ICD-10-CM

## 2023-12-08 DIAGNOSIS — M25.512 TRIGGER POINT OF SHOULDER REGION, LEFT: ICD-10-CM

## 2023-12-08 PROCEDURE — 99214 OFFICE O/P EST MOD 30 MIN: CPT | Performed by: FAMILY MEDICINE

## 2023-12-08 PROCEDURE — 20611 DRAIN/INJ JOINT/BURSA W/US: CPT | Performed by: FAMILY MEDICINE

## 2023-12-08 RX ORDER — TRIAMCINOLONE ACETONIDE 40 MG/ML
40 INJECTION, SUSPENSION INTRA-ARTICULAR; INTRAMUSCULAR
Status: COMPLETED | OUTPATIENT
Start: 2023-12-08 | End: 2023-12-08

## 2023-12-08 RX ADMIN — TRIAMCINOLONE ACETONIDE 40 MG: 40 INJECTION, SUSPENSION INTRA-ARTICULAR; INTRAMUSCULAR at 09:15

## 2023-12-08 NOTE — PROGRESS NOTES
Subjective:  Chief Complaint   Patient presents with    Left Shoulder - Pain       Tirso Mccurdy is a 68 y.o. female presenting for follow-up visit in regards to bilateral shoulder pain. Patient was seen approximately 1 month ago for right shoulder pain exacerbation of underlying symptoms with bursitis. She reports her shoulder has essentially resolved however is persisting with left shoulder pain symptoms. She had an MRI of the left shoulder performed in the past which revealed degenerative labral tear and associated tendinosis. She had been treated in the past with both corticosteroid injection for glenohumeral joint and subacromial bursa. She states that the joint injection was providing more relief in regards to pain symptoms. The following portions of the patient's history were reviewed and updated as appropriate: allergies, current medications, past family history, past medical history, past social history, past surgical history and problem list.    Occupation:   [employer]    Review of Systems   Constitutional: Negative for fever. HENT: Negative for dental problem and headaches. Eyes: Negative for vision loss. Respiratory: Negative for cough and shortness of breath. Cardiovascular: Negative for leg swelling and palpitations. Gastrointestinal: Negative for constipation and diarrhea. Genitourinary: Negative for bladder incontinence and difficulty urinating. Musculoskeletal: Negative for back pain and difficulty walking. Skin: Negative for rash and ulcer. Neurological: Negative for dizziness and headaches. Hem/Lymph/Immuno: Negative for blood clots. Does not bruise/bleed easily. Psychiatric/Behavioral: Negative for confusion.          Objective:  /82   Pulse 92   Resp 16   Ht 5' 3" (1.6 m)   Wt 78.2 kg (172 lb 6.4 oz)   LMP  (LMP Unknown)   BMI 30.54 kg/m²     Skin: no rashes, lesions, skin discolorations, lacerations  Vasculature: normal radial and ulnar pulse, normal skin color, normal capillary refill in extremity, no upper extremity edema  Neurologic: Neurologic exam is normal throughout upper extremities, Awake, alert, and oriented x3, no apparent distress. Musculoskeletal:   left SHOULDER EXAM    Intact skin. No erythema, no induration, no signs of infection  No gross deformity    AROM FF: 180 degrees  AROM ER with arm at its side: 90 degrees  AROM IR: Lower thoracic      There is some crepitus in the anterior joint line with the internal and external rotation and associated pain    Supraspinatus strength testin/5  Infraspinatus strength testin/5    Belly press: negative  Bear Hug test: negative    Empty can: positive  Biceps TTP: positive  Arc test: positive  Carr positive    Is having some tenderness to palpation over the acromion posterior    Tenderness to palpation of AC joint: negative  Pain with cross-body adduction: negative    She has tenderness to palpation over the  Left scapular scan consistent with trigger point          Imaging:    See final report     Assessment/Plan:]  1. Subacromial impingement, left      2. Tendinopathy of rotator cuff, left      3. Trigger point of shoulder region, left      4. Glenohumeral arthritis, left    Patient has a component of degenerative labral tear and associated tendinosis of the left shoulder. She had received the most positive response with glenohumeral injection in the past and we discussed repeating corticosteroid injection for this area to provide pain relief. Additionally her examination does reveal tenderness to palpation over the scapular spine which seems to be consistent with trigger point. I discussed that the overseer injection may not be helpful for alleviating this related pain and I would recommend continued work with physical therapy.   Trigger point injection can be considered in the future if no improvement

## 2023-12-08 NOTE — PROGRESS NOTES
Large joint arthrocentesis: L glenohumeral  Universal Protocol:  Consent: Verbal consent obtained. Risks and benefits: risks, benefits and alternatives were discussed  Consent given by: patient  Supporting Documentation  Indications: pain   Procedure Details  Location: shoulder - L glenohumeral  Preparation: Patient was prepped and draped in the usual sterile fashion  Needle size: 22 G  Ultrasound guidance: yes  Approach: posterior  Medications administered: 40 mg triamcinolone acetonide 40 mg/mL (4 ml ropivcaine)    Patient tolerance: patient tolerated the procedure well with no immediate complications    Risks and benefits of CSI were discussed with patient extensively. Risks were highlighted which included but were not limited to infection, pain, local site swelling, and chance that injection may not be effective. Patient was also counseled regarding glucose elevation days after receiving CSI and to be mindful of diet and check sugars daily. Patient agreeable to proceed with CSI after counseling. Us images saved to the [email or web address] machine.  Out of plane approach utilized

## 2023-12-11 ENCOUNTER — NURSE TRIAGE (OUTPATIENT)
Age: 73
End: 2023-12-11

## 2023-12-11 ENCOUNTER — OFFICE VISIT (OUTPATIENT)
Dept: PHYSICAL THERAPY | Facility: CLINIC | Age: 73
End: 2023-12-11
Payer: MEDICARE

## 2023-12-11 DIAGNOSIS — Z91.041 ALLERGIC TO IV CONTRAST: Primary | ICD-10-CM

## 2023-12-11 DIAGNOSIS — G89.4 CHRONIC PAIN SYNDROME: ICD-10-CM

## 2023-12-11 DIAGNOSIS — M51.36 LUMBAR DEGENERATIVE DISC DISEASE: Primary | ICD-10-CM

## 2023-12-11 PROCEDURE — 97110 THERAPEUTIC EXERCISES: CPT | Performed by: PHYSICAL THERAPIST

## 2023-12-11 PROCEDURE — 97112 NEUROMUSCULAR REEDUCATION: CPT | Performed by: PHYSICAL THERAPIST

## 2023-12-11 RX ORDER — METHYLPREDNISOLONE 32 MG/1
TABLET ORAL
Qty: 2 TABLET | Refills: 0 | Status: SHIPPED | OUTPATIENT
Start: 2023-12-11

## 2023-12-11 RX ORDER — DIPHENHYDRAMINE HCL 25 MG
25 TABLET ORAL AS NEEDED
Qty: 2 TABLET | Refills: 0 | Status: SHIPPED | OUTPATIENT
Start: 2023-12-11

## 2023-12-11 NOTE — TELEPHONE ENCOUNTER
Answer Assessment - Initial Assessment Questions  1. REASON FOR CALL or QUESTION: "What is your reason for calling today?" or "How can I best help you?" or "What question do you have that I can help answer?"           Patient calling to have prep for MRI, sent over to pharmacy. Patient is having MRI  of abdomen w wo contrast ordered 11/15. She usually has reaction to dye and our office sends steroid over  to take before testing. Could we please send? MRI is scheduled . Protocols used:  Information Only Call - No Triage-ADULT-[place]

## 2023-12-11 NOTE — PROGRESS NOTES
Daily Note     Today's date: 2023  Patient name: Selma Randall  : 1950  MRN: [id number]  Referring provider: Nadeen Alves MD  Dx:   Encounter Diagnosis     ICD-10-CM    1. Lumbar degenerative disc disease  M51.36       2. Chronic pain syndrome  G89.4                      Subjective: Patient states that her pain increased with the rainy weather yesterday. Objective: See treatment diary below      Assessment: Tolerated treatment well. Patient demonstrated fatigue post treatment      Plan: Potential discharge next visit.      Precautions: None given        Manuals                    Neuro Re-Ed               AB Supine 30x5" 30x5" 30x5" 30x5" 30x5"  30x5"   AB with Single Leg Raises Supine 30x5" 30x5" 30x5" 20x5" 20x5"  30x5"   AB with Double Leg Raises                 AB with SLR 10x2" ea 15x2" ea 15x2" ea  15x2" ea 15x2" ea  5x2" ea   LTR Bilateral               Double leg bridging 30x5" 30x5" 30x5" 30x5"  30x5"  30x5"   Bridging with ball squeeze              Bridging with TB ABD              Bridging with marching                               Ther Ex               Back ext:  F4, P4, C0  P4 3/10 P4 3/10 P4 35x P4 35x  P4 35x P4 3/10   PYR AB: S5           P4 3/10   TB SA Pulldown              TB Oblique Press               NuStep for ROM: S7, A9  L3 15 min L3 15 min L3 15 min L3 15 min  L4   15 min  L3 10 min   Bilat SKTC with strap 10x2" ea 15x2" ea 15x2"  15x2" ea 15x2" ea 10x2" ea   Ther Activity                               Modalities

## 2023-12-14 ENCOUNTER — TELEPHONE (OUTPATIENT)
Dept: FAMILY MEDICINE CLINIC | Facility: CLINIC | Age: 73
End: 2023-12-14

## 2023-12-14 ENCOUNTER — APPOINTMENT (OUTPATIENT)
Dept: LAB | Facility: MEDICAL CENTER | Age: 73
End: 2023-12-14
Payer: MEDICARE

## 2023-12-14 ENCOUNTER — OFFICE VISIT (OUTPATIENT)
Dept: PHYSICAL THERAPY | Facility: CLINIC | Age: 73
End: 2023-12-14
Payer: MEDICARE

## 2023-12-14 DIAGNOSIS — G89.4 CHRONIC PAIN SYNDROME: ICD-10-CM

## 2023-12-14 DIAGNOSIS — M51.36 LUMBAR DEGENERATIVE DISC DISEASE: Primary | ICD-10-CM

## 2023-12-14 DIAGNOSIS — R30.0 DYSURIA: Primary | ICD-10-CM

## 2023-12-14 LAB
BACTERIA UR QL AUTO: ABNORMAL /HPF
BILIRUB UR QL STRIP: NEGATIVE
CLARITY UR: CLEAR
COLOR UR: ABNORMAL
GLUCOSE UR STRIP-MCNC: NEGATIVE MG/DL
HGB UR QL STRIP.AUTO: NEGATIVE
KETONES UR STRIP-MCNC: NEGATIVE MG/DL
LEUKOCYTE ESTERASE UR QL STRIP: ABNORMAL
MUCOUS THREADS UR QL AUTO: ABNORMAL
NITRITE UR QL STRIP: NEGATIVE
NON-SQ EPI CELLS URNS QL MICRO: ABNORMAL /HPF
PH UR STRIP.AUTO: 8 [PH]
PROT UR STRIP-MCNC: NEGATIVE MG/DL
RBC #/AREA URNS AUTO: ABNORMAL /HPF
SP GR UR STRIP.AUTO: 1.01 (ref 1–1.03)
UROBILINOGEN UR STRIP-ACNC: <2 MG/DL
WBC #/AREA URNS AUTO: ABNORMAL /HPF

## 2023-12-14 PROCEDURE — 87077 CULTURE AEROBIC IDENTIFY: CPT

## 2023-12-14 PROCEDURE — 87086 URINE CULTURE/COLONY COUNT: CPT

## 2023-12-14 PROCEDURE — 81001 URINALYSIS AUTO W/SCOPE: CPT

## 2023-12-14 PROCEDURE — 87186 SC STD MICRODIL/AGAR DIL: CPT

## 2023-12-14 PROCEDURE — 97112 NEUROMUSCULAR REEDUCATION: CPT | Performed by: PHYSICAL THERAPIST

## 2023-12-14 PROCEDURE — 97110 THERAPEUTIC EXERCISES: CPT | Performed by: PHYSICAL THERAPIST

## 2023-12-14 RX ORDER — CIPROFLOXACIN 500 MG/1
500 TABLET, FILM COATED ORAL EVERY 12 HOURS SCHEDULED
Qty: 10 TABLET | Refills: 0 | Status: SHIPPED | OUTPATIENT
Start: 2023-12-14 | End: 2023-12-19

## 2023-12-14 NOTE — TELEPHONE ENCOUNTER
Lvm with recommendations conducted a detailed discussion... I had a detailed discussion with the patient and/or guardian regarding the historical points, exam findings, and any diagnostic results supporting the discharge/admit diagnosis.

## 2023-12-14 NOTE — PROGRESS NOTES
PT Discharge    Today's date: 2023  Patient name: Nia Ramirez  : 1950  MRN: [id number]  Referring provider: Milad Palafox MD  Dx:   Encounter Diagnosis     ICD-10-CM    1. Lumbar degenerative disc disease  M51.36       2. Chronic pain syndrome  G89.4                        Assessment  Assessment details:   CURRENT FUNCTIONAL STATUS    Standing/ADL tolerance 30 minutes. Walking tolerance 4 blocks. Ability to bend forward: Fair  Unable to ride her exercise bike. LONG TERM GOALS (DISCHARGE)    Lumbar Spine AROM: F=90 %, EXT=50 %, R SB=75 %, L SB=75 %.-met  Lower Extremity Strength: Grossly 5/5 t/o.-met  Good control of abdominal muscles during abdominal bracing.-met  Decrease pain to 1-4/10.-partially met  Standing/ADL tolerance 60 minutes. -partially met   Walking tolerance 4 blocks.-met   Ability to bend forward: Good-partially met  Able to ride her exercise bike.-not met  Understanding of Dx/Px/POC: good   Prognosis: good    Goals  See assessment details above. Plan  Plan details: The patient has shown improvement in PT demonstrating decreased pain, increased range of motion, increased core muscle control, and increased tolerance to activity. The patient has completed the prescribed duration of care, and she has been discharged to a home exercise program.        Planned therapy interventions: self care, therapeutic activities, therapeutic exercise, neuromuscular re-education and home exercise program        Subjective Evaluation    History of Present Illness  Mechanism of injury: Subjective: The patient's low back pain remains constant and variable. Sitting and driving for prolonged periods will cause the pain will radiate to the knee in both legs, more consistently on the right leg. Prolonged standing and walking are also painful in the low back.   Patient Goals  Patient goals for therapy: decreased pain and increased motion  Patient goal: Improved tolerance for bending, standing, and walking. Pain  Current pain ratin  At best pain ratin  At worst pain ratin          Objective     Postural Observations    Additional Postural Observation Details  Gait and transfers are not guarded, no lateral shift. General Comments:      Lumbar Comments  CURRENT OBJECTIVE MEASUREMENTS    Lumbar Spine AROM: F=90 %, EXT=50 %, R SB=90 %, L SB=90 %. Lower Extremity Strength: Grossly 5/5 t/o. Good control of abdominal muscles during abdominal bracing.                  Precautions: None given        Manuals                    Neuro Re-Ed               AB Supine 30x5" 30x5" 30x5" 30x5" 30x5"  30x5"   AB with Single Leg Raises Supine 30x5" 30x5" 30x5" 20x5" 20x5"  30x5"   AB with Double Leg Raises                 AB with SLR 10x2" ea 15x2" ea 15x2" ea  15x2" ea 15x2" ea  15x2" ea   LTR Bilateral               Double leg bridging 30x5" 30x5" 30x5" 30x5"  30x5"  30x5"   Bridging with ball squeeze              Bridging with TB ABD              Bridging with marching                               Ther Ex               Back ext:  F4, P4, C0  P4 3/10 P4 3/10 P4 35x P4 35x  P4 35x P4 35x   PYR AB: S5              TB SA Pulldown              TB Oblique Press               NuStep for ROM: S7, A9  L3 15 min L3 15 min L3 15 min L3 15 min  L4   15 min  L4 15 min   Bilat SKTC with strap 10x2" ea 15x2" ea 15x2"  15x2" ea 15x2" ea 20x2" ea   Ther Activity                               Modalities

## 2023-12-14 NOTE — TELEPHONE ENCOUNTER
Submit urine today  Cipro sent but please submit urine prior to assess if true infection present and I see has urology f/u in January

## 2023-12-16 LAB — BACTERIA UR CULT: ABNORMAL

## 2023-12-18 ENCOUNTER — ANNUAL EXAM (OUTPATIENT)
Dept: OBGYN CLINIC | Facility: CLINIC | Age: 73
End: 2023-12-18
Payer: MEDICARE

## 2023-12-18 ENCOUNTER — TELEPHONE (OUTPATIENT)
Age: 73
End: 2023-12-18

## 2023-12-18 ENCOUNTER — TELEPHONE (OUTPATIENT)
Dept: FAMILY MEDICINE CLINIC | Facility: CLINIC | Age: 73
End: 2023-12-18

## 2023-12-18 VITALS
SYSTOLIC BLOOD PRESSURE: 110 MMHG | BODY MASS INDEX: 31.33 KG/M2 | HEIGHT: 63 IN | DIASTOLIC BLOOD PRESSURE: 72 MMHG | WEIGHT: 176.8 LBS

## 2023-12-18 DIAGNOSIS — E78.2 MIXED HYPERLIPIDEMIA: Primary | ICD-10-CM

## 2023-12-18 DIAGNOSIS — Z12.11 COLON CANCER SCREENING: ICD-10-CM

## 2023-12-18 DIAGNOSIS — Z01.419 WELL WOMAN EXAM WITH ROUTINE GYNECOLOGICAL EXAM: Primary | ICD-10-CM

## 2023-12-18 DIAGNOSIS — K58.9 IRRITABLE BOWEL SYNDROME WITHOUT DIARRHEA: ICD-10-CM

## 2023-12-18 DIAGNOSIS — N28.89 LEFT RENAL MASS: Primary | ICD-10-CM

## 2023-12-18 DIAGNOSIS — Z85.42 HISTORY OF UTERINE CANCER: ICD-10-CM

## 2023-12-18 DIAGNOSIS — Z12.31 BREAST CANCER SCREENING BY MAMMOGRAM: ICD-10-CM

## 2023-12-18 PROCEDURE — G0101 CA SCREEN;PELVIC/BREAST EXAM: HCPCS | Performed by: OBSTETRICS & GYNECOLOGY

## 2023-12-18 RX ORDER — DICYCLOMINE HYDROCHLORIDE 10 MG/1
10 CAPSULE ORAL 2 TIMES DAILY
Qty: 180 CAPSULE | Refills: 3 | Status: SHIPPED | OUTPATIENT
Start: 2023-12-18

## 2023-12-18 NOTE — TELEPHONE ENCOUNTER
Patient called today because she has an open MRI scheduled outside of [facility] for 1/4/24.    Pt is calling because she was informed by Radiology that she will need a BUN and Creatinine ordered/done prior to that visit.    Please place order in the system and the patient will have it done in a [facility].    Pt is also wondering if the ultrasound order can be removed from her chart? It was decided that she wouldn't go through with the US and instead would have the MRI.    Pt states she gets calls re: scheduling the Ultrasound and she'd like that to stop.    Call back [phone]

## 2023-12-18 NOTE — TELEPHONE ENCOUNTER
Returned call to patient and advised that orders were placed for a BMP to be completed prior to upcoming MRI. Advised will forward message to provider to cancel renal US order per request.

## 2023-12-18 NOTE — PROGRESS NOTES
Assessment   73 y.o. postmenopausal female presenting for annual exam.     Plan   Diagnoses and all orders for this visit:    Well woman exam with routine gynecological exam  - Pap up to date  - Mammo up to date; next scheduled  - Colon ca screening current  - Return in 1yr for yearly    History of uterine cancer  - Benign surveillance exam    Breast cancer screening by mammogram  - Scheduled    Colon cancer screening  - Up to date  __________________________________________________________________      Subjective     73 y.o. postmenopausal female who is s/p hysterectomy with BSO (34yo, endometriosis with concern for cancer on the outside wall of uterus; benign pathology after surgery by patient report) presenting for annual exam.        GYN  Complaints: denies  Denies genital discharge, genital ulcers, pelvic pain and vulvar/vaginal symptoms  Menopause occurred at age 33. She has had no bleeding since this time.  Menopausal symptoms: rare hot flash, did HRT but had hair loss  Sexually active: No  Hx STI: denies   Hx Abnormal pap: denies  Last pap:  - nilm, hpv neg     OB          Complaints: denies  Denies urinary frequency, hematuria, urinary incontinence and dysuria     BREAST  Complaints: as above  Denies: breast lump, changed mole, dryness, nipple discharge, pruritus, rash, skin color change and skin lesion(s)  Last mammogram:  - birads2  Personal hx: breast bx  Family hx: denies fhx of uterine, ovarian cancers  Paternal grandmother and maternal aunt with breast ca  Brother with colon ca  Patient does do regular self-exams     GENERAL  PMH reviewed/updated and is as below.   Patient does follow with a PCP.  Works part time at her [employer].  Denies domestic violence.        SCREENING  Cervical Ca: pap up to date  Breast Ca: mammo scheduled  Colon Ca:  - colonoscopy - repeat 2yr  Metabolic:  - DEXA - osteopenia      Past Medical History:   Diagnosis Date    Abnormal findings on diagnostic  imaging of breast     Abnormal Pap smear of cervix     Arthritis     Chronic pain disorder     Extremity pain     Fibrocystic breast disease     Foot pain     GERD (gastroesophageal reflux disease)     Hyperlipidemia     Hypertension     Interstitial cystitis     Joint pain     Low back pain     Osteoarthritis     Osteopenia     Uncomplicated opioid dependence (HCC) 3/1/2022       Past Surgical History:   Procedure Laterality Date    APPENDECTOMY      BACK SURGERY      BREAST EXCISIONAL BIOPSY Left 1996    benign    CARPAL BOSS EXCISION      CHOLECYSTECTOMY      DIAGNOSTIC LAPAROSCOPY      FOOT SURGERY      FRACTURE SURGERY      HAND SURGERY  5/2017    HYSTERECTOMY      age 32    HYSTEROSCOPY      IR CRYOABLATION  10/17/2023    JOINT REPLACEMENT      KIDNEY SURGERY Left     KNEE ARTHROSCOPY Bilateral     LAMINECTOMY      LAPAROSCOPY      hynecologic with adhesions    MYOMECTOMY      OOPHORECTOMY Bilateral     age 32    ORTHOPEDIC SURGERY      ND CAR IMPLTJ NSTIM ELTRDS PLATE/PADDLE EDRL N/A 05/18/2017    Procedure: PLACEMENT OF THORACIC SPINAL CORD STIMULATOR WITH LEFT BUTTOCK IMPLANTABLE PULSE GENERATOR (IMPULSE MONITORING-MOTORS (TCEMEP), EMG, SSEP);  Surgeon: Julius Damon MD;  Location: [facility];  Service: Neurosurgery    SPINAL CORD STIMULATOR IMPLANT Left 09/29/2020    Procedure: LAMINECTOMY THORACIC , REMOVAL OF SCS LEADS AND GENERATOR;  Surgeon: Oswald Whitt MD;  Location: [facility];  Service: Neurosurgery    SPINAL STIMULATOR PLACEMENT  05/2017    TONSILLECTOMY AND ADENOIDECTOMY      onset: unknown    TOTAL KNEE ARTHROPLASTY Right          Current Outpatient Medications:     amitriptyline (ELAVIL) 50 mg tablet, Take 1 tablet (50 mg total) by mouth daily at bedtime, Disp: 90 tablet, Rfl: 0    Biotin 2500 MCG CAPS, Take 1 capsule by mouth 2 (two) times a day , Disp: , Rfl:     calcium carbonate (OS-ANTHONY) 600 MG tablet, Take 1,200 mg by mouth 2 (two) times a day with meals, Disp: , Rfl:      Cholecalciferol (VITAMIN D-3 PO), Take 1 tablet by mouth daily, Disp: , Rfl:     ciprofloxacin (CIPRO) 500 mg tablet, Take 1 tablet (500 mg total) by mouth every 12 (twelve) hours for 5 days, Disp: 10 tablet, Rfl: 0    Cyanocobalamin (VITAMIN B-12 CR PO), Take 1 tablet by mouth daily, Disp: , Rfl:     Diclofenac Sodium (VOLTAREN) 1 %, Apply 2 g topically 4 (four) times a day, Disp: 200 g, Rfl: 1    dicyclomine (BENTYL) 10 mg capsule, Take 1 capsule (10 mg total) by mouth 2 (two) times a day, Disp: 180 capsule, Rfl: 3    diphenhydrAMINE (BENADRYL) 25 mg tablet, Take 1 tablet (25 mg total) by mouth if needed for itching or allergies Take 2 tablets or 50 mg 1 hour before your schedule MRI with contrast., Disp: 2 tablet, Rfl: 0    EPINEPHrine (EPIPEN) 0.3 mg/0.3 mL SOAJ, Inject 0.3 mL (0.3 mg total) into a muscle as needed for anaphylaxis, Disp: 2 each, Rfl: 0    Evening Primrose Oil 1000 MG CAPS, Take 1 capsule (1,000 mg total) by mouth in the morning, Disp: , Rfl: 0    famotidine (PEPCID) 20 mg tablet, Take 1 tablet (20 mg total) by mouth 2 (two) times a day, Disp: 60 tablet, Rfl: 0    fexofenadine (ALLEGRA) 180 MG tablet, Take 180 mg by mouth daily, Disp: , Rfl:     gabapentin (NEURONTIN) 300 mg capsule, Take 1 tablet in the morning, 1 tablet afternoon and 2 bedtime, Disp: 360 capsule, Rfl: 0    hydrOXYzine HCL (ATARAX) 10 mg tablet, Take 2 tablets (20 mg total) by mouth daily at bedtime Take 2 tablets at bedtime, Disp: 180 tablet, Rfl: 1    losartan-hydrochlorothiazide (HYZAAR) 100-25 MG per tablet, Take 1 tablet by mouth daily, Disp: 90 tablet, Rfl: 3    methocarbamol (ROBAXIN) 750 mg tablet, Take 1 tablet (750 mg total) by mouth every 8 (eight) hours, Disp: 270 tablet, Rfl: 0    methylPREDNISolone (MEDROL) 32 MG tablet, Take one tablet 12 hours before, and one tablet 2 hours before CT scan, Disp: 2 tablet, Rfl: 0    montelukast (SINGULAIR) 10 mg tablet, Take 1 tablet (10 mg total) by mouth daily at bedtime, Disp:  90 tablet, Rfl: 3    Multiple Vitamins-Minerals (PRESERVISION AREDS 2 PO), Take by mouth in the morning, Disp: , Rfl:     Omega-3 Fatty Acids (Fish Oil) 300 MG CAPS, Take 1 g by mouth daily, Disp: , Rfl:     omeprazole (PriLOSEC) 40 MG capsule, Take 1 capsule (40 mg total) by mouth daily, Disp: 90 capsule, Rfl: 0    predniSONE 10 mg tablet, Take 1 tablet (10 mg total) by mouth daily (Patient taking differently: Take 20 mg by mouth daily), Disp: 30 tablet, Rfl: 0    Omega-3 Fatty Acids (FISH OIL) 1,000 mg, Take 1,000 mg by mouth 3 (three) times a day (Patient not taking: Reported on 11/15/2023), Disp: , Rfl:     predniSONE 5 mg tablet, , Disp: , Rfl:     rosuvastatin (CRESTOR) 10 MG tablet, Take 1 tablet (10 mg total) by mouth daily (Patient not taking: Reported on 11/28/2023), Disp: 90 tablet, Rfl: 3    simvastatin (ZOCOR) 10 mg tablet, Take 1 tablet (10 mg total) by mouth daily at bedtime (Patient not taking: Reported on 11/28/2023), Disp: 90 tablet, Rfl: 2    Current Facility-Administered Medications:     ondansetron (ZOFRAN) injection 4 mg, 4 mg, Intravenous, Q6H PRN, Guille Perdomo MD    Allergies   Allergen Reactions    Bee Venom Shortness Of Breath    Chlorhexidine Rash    Other Rash, Hives, Palpitations and Shortness Of Breath     Adhesive tape    Egg Yolk - Food Allergy Hives    Iodinated Contrast Media Hives and Other (See Comments)    Penicillins Hives    Lidocaine Other (See Comments)     cream    Allevyn Adhesive [Wound Dressings] Hives and Rash    Bactrim [Sulfamethoxazole-Trimethoprim] Rash    Codeine Other (See Comments)     headaches    Latex Rash and Other (See Comments)    Medical Tape Blisters, Hives, Itching and Rash    Oxybutynin Rash    Pentosan Polysulfate Rash    Povidone Iodine Rash       Social History     Tobacco Use    Smoking status: Never    Smokeless tobacco: Never   Vaping Use    Vaping status: Never Used   Substance Use Topics    Alcohol use: Yes     Alcohol/week: 1.0 standard  "drink of alcohol     Comment: Drink socially, not too often    Drug use: No             Objective  /72   Ht 5' 3\" (1.6 m)   Wt 80.2 kg (176 lb 12.8 oz)   LMP  (LMP Unknown)   BMI 31.32 kg/m²      Physical Exam:  Physical Exam  Exam conducted with a chaperone present.   Constitutional:       General: She is not in acute distress.     Appearance: Normal appearance. She is well-developed. She is not ill-appearing, toxic-appearing or diaphoretic.   HENT:      Head: Normocephalic and atraumatic.   Eyes:      General: No scleral icterus.        Right eye: No discharge.         Left eye: No discharge.      Conjunctiva/sclera: Conjunctivae normal.   Cardiovascular:      Rate and Rhythm: Normal rate.   Pulmonary:      Effort: Pulmonary effort is normal. No accessory muscle usage or respiratory distress.   Chest:   Breasts:     Breasts are symmetrical.      Right: No inverted nipple, mass, nipple discharge, skin change or tenderness.      Left: No inverted nipple, mass, nipple discharge, skin change or tenderness.   Abdominal:      General: There is no distension.      Palpations: Abdomen is soft. There is no mass.      Tenderness: There is no abdominal tenderness. There is no guarding or rebound.   Genitourinary:     General: Normal vulva.      Exam position: Lithotomy position.      Labia:         Right: No rash, tenderness or lesion.         Left: No rash, tenderness or lesion.       Urethra: No prolapse, urethral swelling or urethral lesion.      Vagina: No signs of injury. No vaginal discharge, erythema, tenderness, bleeding or lesions.      Cervix: No cervical motion tenderness (surgically absent cervix. intact, normal appearing vaginal cuff).      Uterus: Absent.       Adnexa:         Right: No mass, tenderness or fullness.          Left: No mass, tenderness or fullness.        Rectum: No external hemorrhoid. Normal anal tone.   Lymphadenopathy:      Upper Body:      Right upper body: No axillary or pectoral " adenopathy.      Left upper body: No axillary or pectoral adenopathy.   Skin:     General: Skin is warm and dry.      Findings: No erythema or rash.   Neurological:      Mental Status: She is alert.   Psychiatric:         Mood and Affect: Mood normal.         Behavior: Behavior normal.         Thought Content: Thought content normal.         Judgment: Judgment normal.

## 2023-12-18 NOTE — TELEPHONE ENCOUNTER
Asking if you would order routine blood work for her to have done prior to her appointment with you in January?

## 2023-12-21 ENCOUNTER — TELEPHONE (OUTPATIENT)
Dept: FAMILY MEDICINE CLINIC | Facility: CLINIC | Age: 73
End: 2023-12-21

## 2023-12-21 DIAGNOSIS — N30.00 ACUTE CYSTITIS WITHOUT HEMATURIA: Primary | ICD-10-CM

## 2023-12-21 RX ORDER — CIPROFLOXACIN 500 MG/1
500 TABLET, FILM COATED ORAL EVERY 12 HOURS SCHEDULED
Qty: 10 TABLET | Refills: 0 | Status: SHIPPED | OUTPATIENT
Start: 2023-12-21 | End: 2023-12-26

## 2023-12-21 NOTE — TELEPHONE ENCOUNTER
Pt recently on Cipro for 5 days for UTI, finished on Monday. Said she's still having the abdominal pressure, slight burning, and frequency. Asking if she should be on another round of antibx especially with the holidays coming up? Uses [facility]. Please advise.

## 2023-12-22 ENCOUNTER — APPOINTMENT (OUTPATIENT)
Dept: RADIOLOGY | Facility: MEDICAL CENTER | Age: 73
End: 2023-12-22
Payer: MEDICARE

## 2023-12-22 DIAGNOSIS — M51.36 DEGENERATION OF LUMBAR INTERVERTEBRAL DISC: ICD-10-CM

## 2023-12-22 PROCEDURE — 72110 X-RAY EXAM L-2 SPINE 4/>VWS: CPT

## 2023-12-27 ENCOUNTER — APPOINTMENT (OUTPATIENT)
Dept: LAB | Facility: MEDICAL CENTER | Age: 73
End: 2023-12-27
Payer: MEDICARE

## 2023-12-27 DIAGNOSIS — E78.2 MIXED HYPERLIPIDEMIA: ICD-10-CM

## 2023-12-27 LAB
ANION GAP SERPL CALCULATED.3IONS-SCNC: 11 MMOL/L
BUN SERPL-MCNC: 17 MG/DL (ref 5–25)
CALCIUM SERPL-MCNC: 10 MG/DL (ref 8.4–10.2)
CHLORIDE SERPL-SCNC: 105 MMOL/L (ref 96–108)
CO2 SERPL-SCNC: 25 MMOL/L (ref 21–32)
CREAT SERPL-MCNC: 0.58 MG/DL (ref 0.6–1.3)
GFR SERPL CREATININE-BSD FRML MDRD: 92 ML/MIN/1.73SQ M
GLUCOSE SERPL-MCNC: 110 MG/DL (ref 65–140)
POTASSIUM SERPL-SCNC: 3.7 MMOL/L (ref 3.5–5.3)
SODIUM SERPL-SCNC: 141 MMOL/L (ref 135–147)

## 2023-12-27 PROCEDURE — 80048 BASIC METABOLIC PNL TOTAL CA: CPT

## 2023-12-27 PROCEDURE — 36415 COLL VENOUS BLD VENIPUNCTURE: CPT

## 2023-12-27 PROCEDURE — 80061 LIPID PANEL: CPT

## 2023-12-29 ENCOUNTER — TELEPHONE (OUTPATIENT)
Dept: FAMILY MEDICINE CLINIC | Facility: CLINIC | Age: 73
End: 2023-12-29

## 2023-12-29 ENCOUNTER — HOSPITAL ENCOUNTER (OUTPATIENT)
Dept: MAMMOGRAPHY | Facility: HOSPITAL | Age: 73
Discharge: HOME | End: 2023-12-29
Attending: OBSTETRICS & GYNECOLOGY
Payer: MEDICARE

## 2023-12-29 ENCOUNTER — HOSPITAL ENCOUNTER (OUTPATIENT)
Dept: BONE DENSITY | Facility: HOSPITAL | Age: 73
Discharge: HOME | End: 2023-12-29
Attending: OBSTETRICS & GYNECOLOGY
Payer: MEDICARE

## 2023-12-29 VITALS — BODY MASS INDEX: 32.66 KG/M2 | HEIGHT: 61 IN | WEIGHT: 173 LBS

## 2023-12-29 VITALS — HEIGHT: 63 IN | WEIGHT: 176 LBS | BODY MASS INDEX: 31.18 KG/M2

## 2023-12-29 DIAGNOSIS — Z78.0 ASYMPTOMATIC MENOPAUSE: ICD-10-CM

## 2023-12-29 DIAGNOSIS — Z12.31 ENCOUNTER FOR SCREENING MAMMOGRAM FOR BREAST CANCER: ICD-10-CM

## 2023-12-29 LAB
ALBUMIN SERPL BCP-MCNC: 4 G/DL (ref 3.5–5)
ALP SERPL-CCNC: 58 U/L (ref 34–104)
ALT SERPL W P-5'-P-CCNC: 20 U/L (ref 7–52)
AST SERPL W P-5'-P-CCNC: 20 U/L (ref 13–39)
BILIRUB DIRECT SERPL-MCNC: 0.04 MG/DL (ref 0–0.2)
BILIRUB SERPL-MCNC: 0.27 MG/DL (ref 0.2–1)
CHOLEST SERPL-MCNC: 278 MG/DL
HDLC SERPL-MCNC: 80 MG/DL
LDLC SERPL CALC-MCNC: 161 MG/DL (ref 0–100)
NONHDLC SERPL-MCNC: 198 MG/DL
PROT SERPL-MCNC: 6.6 G/DL (ref 6.4–8.4)
TRIGL SERPL-MCNC: 187 MG/DL

## 2023-12-29 PROCEDURE — 77080 DXA BONE DENSITY AXIAL: CPT

## 2023-12-29 PROCEDURE — 77063 BREAST TOMOSYNTHESIS BI: CPT

## 2023-12-29 PROCEDURE — 77067 SCR MAMMO BI INCL CAD: CPT

## 2023-12-29 NOTE — TELEPHONE ENCOUNTER
Regarding labs that were collected on her on the 27th and the orders for Doctor Aidan for the lipid panel and comprehensive panel that were not.     Those orders were not pulled and I'm calling to find out if it is time for Ingrid to have her lipid panel done. We are not sure if she was fasting for the labs that we did collect.    We can capture the lipid panel and CMP on the sample that was collected if you would like. I do need to hear back from you by the end of today, Friday the 29th. Our number is [phone], option one Thank you.

## 2024-01-03 ENCOUNTER — TELEPHONE (OUTPATIENT)
Age: 74
End: 2024-01-03

## 2024-01-03 NOTE — TELEPHONE ENCOUNTER
[facility] called asking that the patient's MRI order be faxed to them.    I was able to fax the order to the fax number provided.

## 2024-01-03 NOTE — TELEPHONE ENCOUNTER
[facility] calling to have the order for the MRI faxed again. They did not receive it from this morning. Sent again to the fax number below.     Faxed to [phone]

## 2024-01-07 DIAGNOSIS — E78.2 MIXED HYPERLIPIDEMIA: ICD-10-CM

## 2024-01-07 DIAGNOSIS — Z87.19 HISTORY OF ESOPHAGEAL REFLUX: ICD-10-CM

## 2024-01-08 DIAGNOSIS — E78.2 MIXED HYPERLIPIDEMIA: ICD-10-CM

## 2024-01-08 RX ORDER — SIMVASTATIN 10 MG
10 TABLET ORAL
Qty: 90 TABLET | Refills: 3 | Status: SHIPPED | OUTPATIENT
Start: 2024-01-08 | End: 2024-01-08 | Stop reason: SDUPTHER

## 2024-01-08 RX ORDER — FAMOTIDINE 20 MG/1
20 TABLET, FILM COATED ORAL 2 TIMES DAILY
Qty: 60 TABLET | Refills: 3 | Status: SHIPPED | OUTPATIENT
Start: 2024-01-08 | End: 2024-05-07

## 2024-01-08 RX ORDER — ROSUVASTATIN CALCIUM 10 MG/1
10 TABLET, COATED ORAL DAILY
Qty: 90 TABLET | Refills: 3 | Status: SHIPPED | OUTPATIENT
Start: 2024-01-08 | End: 2024-01-08 | Stop reason: SDUPTHER

## 2024-01-08 RX ORDER — SIMVASTATIN 10 MG
TABLET ORAL
Qty: 48 TABLET | Refills: 3 | Status: SHIPPED | OUTPATIENT
Start: 2024-01-08

## 2024-01-08 RX ORDER — ROSUVASTATIN CALCIUM 10 MG/1
10 TABLET, COATED ORAL DAILY
Qty: 36 TABLET | Refills: 3 | Status: SHIPPED | OUTPATIENT
Start: 2024-01-08

## 2024-01-08 NOTE — TELEPHONE ENCOUNTER
They got 2 orders , one for simvastatin and one for rosuvastatin? What one do you want, and re-send to [facility]

## 2024-01-08 NOTE — TELEPHONE ENCOUNTER
Confirm with patient but last visit I think it was the crestor and she had stopped the simvastatin- update our list once confirmed and pharmacy as well

## 2024-01-09 NOTE — TELEPHONE ENCOUNTER
[facility]  Disk sent via  to Radiology Reading Room to be uploaded to [facility].

## 2024-01-16 ENCOUNTER — TELEPHONE (OUTPATIENT)
Age: 74
End: 2024-01-16

## 2024-01-16 NOTE — TELEPHONE ENCOUNTER
Patient was returning a call to the urology number. There is no documentation on her chart that she was called. Reviewed her upcoming appt. And that the office received the MRI disk on 1/9/2024

## 2024-01-17 ENCOUNTER — OFFICE VISIT (OUTPATIENT)
Dept: FAMILY MEDICINE CLINIC | Facility: CLINIC | Age: 74
End: 2024-01-17
Payer: MEDICARE

## 2024-01-17 VITALS
WEIGHT: 178 LBS | TEMPERATURE: 97.8 F | HEIGHT: 61 IN | OXYGEN SATURATION: 98 % | BODY MASS INDEX: 33.61 KG/M2 | HEART RATE: 96 BPM

## 2024-01-17 DIAGNOSIS — C64.2 MALIGNANT NEOPLASM OF LEFT KIDNEY, EXCEPT RENAL PELVIS (HCC): ICD-10-CM

## 2024-01-17 DIAGNOSIS — K21.9 GASTROESOPHAGEAL REFLUX DISEASE: ICD-10-CM

## 2024-01-17 DIAGNOSIS — E78.5 HYPERLIPIDEMIA, UNSPECIFIED HYPERLIPIDEMIA TYPE: ICD-10-CM

## 2024-01-17 DIAGNOSIS — M46.1 SACROILIITIS (HCC): ICD-10-CM

## 2024-01-17 DIAGNOSIS — Z00.00 MEDICARE ANNUAL WELLNESS VISIT, SUBSEQUENT: Primary | ICD-10-CM

## 2024-01-17 PROCEDURE — G0439 PPPS, SUBSEQ VISIT: HCPCS | Performed by: INTERNAL MEDICINE

## 2024-01-17 RX ORDER — OMEPRAZOLE 40 MG/1
40 CAPSULE, DELAYED RELEASE ORAL DAILY
Qty: 90 CAPSULE | Refills: 0 | Status: SHIPPED | OUTPATIENT
Start: 2024-01-17

## 2024-01-17 NOTE — PROGRESS NOTES
Assessment and Plan:     Problem List Items Addressed This Visit          Musculoskeletal and Integument    Sacroiliitis (HCC)       Genitourinary    Malignant neoplasm of left kidney, except renal pelvis (HCC)       Other    Hyperlipidemia    Relevant Orders    LDL cholesterol, direct    Medicare annual wellness visit, subsequent - Primary     Other Visit Diagnoses       Gastroesophageal reflux disease        Relevant Medications    omeprazole (PriLOSEC) 40 MG capsule        Pt will recheck ldl next visit - she was not fasting this recent test and also had been off rx  She is following with Rheumatology and tapering steroid dose - sxs managed   She has Urology followup tomorrow -recent imaging appears stable   Stay hydrated /activity as tolerated   Rto 4months although she may have back procedure prior and will call for appt sooner if scheduled prior    Depression Screening and Follow-up Plan: Patient was screened for depression during today's encounter. They screened negative with a PHQ-2 score of 0.    Preventive health issues were discussed with patient, and age appropriate screening tests were ordered as noted in patient's After Visit Summary.  Personalized health advice and appropriate referrals for health education or preventive services given if needed, as noted in patient's After Visit Summary.     History of Present Illness:     Patient presents for a Medicare Wellness Visit    HPI   Pt generally better She is on steroid taper thru Rheumatology and sxs are improving She has Urology followup tomorrow and overall sxs improved No urinary issues now She had recent labs but was not fasting that day She is back on cholesterol regimen now as she was off for few months   Patient Care Team:  Leslie Bermudez DO as PCP - General  DO Jefferson Arenas DO Chandra Reddy, MD Richard C Miller, MD Steven Falowski, MD Kartik Rai, DO (Orthopedics)     Review of Systems:     Review of Systems    Constitutional:  Negative for chills and fever.   HENT: Negative.     Eyes:  Negative for visual disturbance.   Respiratory:  Negative for cough and shortness of breath.    Cardiovascular:  Negative for chest pain, palpitations and leg swelling.   Gastrointestinal:  Negative for abdominal distention and abdominal pain.   Genitourinary:  Negative for difficulty urinating, dysuria, flank pain and urgency.   Musculoskeletal:  Positive for arthralgias. Negative for joint swelling.   Neurological:  Negative for dizziness, light-headedness and headaches.   Psychiatric/Behavioral:  Negative for sleep disturbance. The patient is not nervous/anxious.         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 ([facility])   • Chronic pain syndrome   • Osteoarthritis   • Closed fracture of proximal end of left fibula   • Transaminitis   • Sacroiliitis ([facility])   • Family history of colon cancer   • Spasm of left piriformis muscle   • Osteoarthritis of left knee   • Thoracic myofascial strain   • Bruising   • Allergic reaction to contrast dye   • Left renal mass   • Myofascial pain syndrome   • PONV (postoperative nausea and vomiting)   • Malignant neoplasm of left kidney, except renal pelvis (HCC)      Past Medical and Surgical History:     Past Medical History:   Diagnosis Date   • Abnormal findings on diagnostic imaging of breast    • Abnormal Pap smear of cervix    • Arthritis    • Chronic pain disorder    • Extremity pain    • Fibrocystic breast disease     • Foot pain    • GERD (gastroesophageal reflux disease)    • Hyperlipidemia    • Hypertension    • Interstitial cystitis    • Joint pain    • Low back pain    • Osteoarthritis    • Osteopenia    • Uncomplicated opioid dependence (HCC) 3/1/2022     Past Surgical History:   Procedure Laterality Date   • APPENDECTOMY     • BACK SURGERY     • BREAST EXCISIONAL BIOPSY Left 1996    benign   • CARPAL BOSS EXCISION     • CHOLECYSTECTOMY     • DIAGNOSTIC LAPAROSCOPY     • FOOT SURGERY     • FRACTURE SURGERY     • HAND SURGERY  5/2017   • HYSTERECTOMY      age 32   • HYSTEROSCOPY     • IR CRYOABLATION  10/17/2023   • JOINT REPLACEMENT     • KIDNEY SURGERY Left    • KNEE ARTHROSCOPY Bilateral    • LAMINECTOMY     • LAPAROSCOPY      hynecologic with adhesions   • MYOMECTOMY     • OOPHORECTOMY Bilateral     age 32   • ORTHOPEDIC SURGERY     • HI CAR IMPLTJ NSTIM ELTRDS PLATE/PADDLE EDRL N/A 05/18/2017    Procedure: PLACEMENT OF THORACIC SPINAL CORD STIMULATOR WITH LEFT BUTTOCK IMPLANTABLE PULSE GENERATOR (IMPULSE MONITORING-MOTORS (TCEMEP), EMG, SSEP);  Surgeon: Julius Damon MD;  Location: [facility];  Service: Neurosurgery   • SPINAL CORD STIMULATOR IMPLANT Left 09/29/2020    Procedure: LAMINECTOMY THORACIC , REMOVAL OF SCS LEADS AND GENERATOR;  Surgeon: Oswald Whitt MD;  Location: [facility];  Service: Neurosurgery   • SPINAL STIMULATOR PLACEMENT  05/2017   • TONSILLECTOMY AND ADENOIDECTOMY      onset: unknown   • TOTAL KNEE ARTHROPLASTY Right       Family History:     Family History   Problem Relation Age of Onset   • Bone cancer Mother    • Cancer Mother    • Asthma Mother    • Brain cancer Father    • Stroke Father         stroke sydrome   • Transient ischemic attack Father    • Depression Sister    • Migraines Sister    • Asthma Sister    • Asthma Sister    • No Known Problems Maternal Grandmother    • No Known Problems Maternal Grandfather    • Diabetes Paternal Grandmother    • No Known Problems Paternal  Grandfather    • Heart disease Brother    • Diabetes Brother    • Heart attack Brother    • Colon cancer Brother 66   • Cancer Brother    • No Known Problems Brother    • Breast cancer Maternal Aunt 70   • Breast cancer additional onset Maternal Aunt 72   • Diabetes Sister    • Depression Sister       Social History:     Social History     Socioeconomic History   • Marital status: Single     Spouse name: None   • Number of children: None   • Years of education: None   • Highest education level: None   Occupational History   • Occupation: Retired/ working part-time    Tobacco Use   • Smoking status: Never   • Smokeless tobacco: Never   Vaping Use   • Vaping status: Never Used   Substance and Sexual Activity   • Alcohol use: Yes     Alcohol/week: 1.0 standard drink of alcohol     Comment: Drink socially, not too often   • Drug use: No   • Sexual activity: Not Currently     Partners: Male     Birth control/protection: None   Other Topics Concern   • None   Social History Narrative    No caffeine use    Always uses sunscreen    Always uses a seatbelt     Social Determinants of Health     Financial Resource Strain: Low Risk  (1/10/2024)    Overall Financial Resource Strain (CARDIA)    • Difficulty of Paying Living Expenses: Not hard at all   Food Insecurity: Not on file   Transportation Needs: No Transportation Needs (1/10/2024)    PRAPARE - Transportation    • Lack of Transportation (Medical): No    • Lack of Transportation (Non-Medical): No   Physical Activity: Not on file   Stress: Not on file   Social Connections: Not on file   Intimate Partner Violence: Unknown (1/3/2024)    Received from [facility]    Intimate Partner Violence    • Within the last year, have you been afraid of your partner, ex-partner or family member?: Not on file    • Within the last year, have you been humiliated or emotionally abused in other ways by your partner, ex-partner or family member?: Not on file    • Within the  last year, have you been kicked, hit, slapped, or otherwise physically hurt by your partner, ex-partner or family member?: Not on file    • Within the last year, have you been raped or forced to have any kind of sexual activity by your partner, ex-partner or family member?: Not on file   Housing Stability: Not on file      Medications and Allergies:     Current Outpatient Medications   Medication Sig Dispense Refill   • amitriptyline (ELAVIL) 50 mg tablet Take 1 tablet (50 mg total) by mouth daily at bedtime 90 tablet 0   • Biotin 2500 MCG CAPS Take 1 capsule by mouth 2 (two) times a day      • calcium carbonate (OS-ANTHONY) 600 MG tablet Take 1,200 mg by mouth 2 (two) times a day with meals     • Cholecalciferol (VITAMIN D-3 PO) Take 1 tablet by mouth daily     • Cyanocobalamin (VITAMIN B-12 CR PO) Take 1 tablet by mouth daily     • Diclofenac Sodium (VOLTAREN) 1 % Apply 2 g topically 4 (four) times a day 200 g 1   • dicyclomine (BENTYL) 10 mg capsule Take 1 capsule (10 mg total) by mouth 2 (two) times a day 180 capsule 3   • diphenhydrAMINE (BENADRYL) 25 mg tablet Take 1 tablet (25 mg total) by mouth if needed for itching or allergies Take 2 tablets or 50 mg 1 hour before your schedule MRI with contrast. 2 tablet 0   • EPINEPHrine (EPIPEN) 0.3 mg/0.3 mL SOAJ Inject 0.3 mL (0.3 mg total) into a muscle as needed for anaphylaxis 2 each 0   • Evening Primrose Oil 1000 MG CAPS Take 1 capsule (1,000 mg total) by mouth in the morning  0   • famotidine (PEPCID) 20 mg tablet Take 1 tablet (20 mg total) by mouth 2 (two) times a day 60 tablet 3   • fexofenadine (ALLEGRA) 180 MG tablet Take 180 mg by mouth daily     • gabapentin (NEURONTIN) 300 mg capsule Take 1 tablet in the morning, 1 tablet afternoon and 2 bedtime 360 capsule 0   • hydrOXYzine HCL (ATARAX) 10 mg tablet Take 2 tablets (20 mg total) by mouth daily at bedtime Take 2 tablets at bedtime 180 tablet 1   • losartan-hydrochlorothiazide (HYZAAR) 100-25 MG per tablet  Take 1 tablet by mouth daily 90 tablet 3   • methocarbamol (ROBAXIN) 750 mg tablet Take 1 tablet (750 mg total) by mouth every 8 (eight) hours 270 tablet 0   • montelukast (SINGULAIR) 10 mg tablet Take 1 tablet (10 mg total) by mouth daily at bedtime 90 tablet 3   • Multiple Vitamins-Minerals (PRESERVISION AREDS 2 PO) Take by mouth in the morning     • Omega-3 Fatty Acids (FISH OIL) 1,000 mg Take 1,000 mg by mouth 3 (three) times a day     • omeprazole (PriLOSEC) 40 MG capsule Take 1 capsule (40 mg total) by mouth daily 90 capsule 0   • predniSONE 10 mg tablet Take 1 tablet (10 mg total) by mouth daily (Patient taking differently: Take 20 mg by mouth daily) 30 tablet 0   • rosuvastatin (CRESTOR) 10 MG tablet Take 1 tablet (10 mg total) by mouth daily Take Monday, Wednesday and Friday alternate with simvastatin 36 tablet 3   • simvastatin (ZOCOR) 10 mg tablet Take Tuesday, Thursday' Saturday and Sunday, to be alternated with rosuvastatin 48 tablet 3   • methylPREDNISolone (MEDROL) 32 MG tablet Take one tablet 12 hours before, and one tablet 2 hours before CT scan (Patient not taking: Reported on 1/17/2024) 2 tablet 0   • Omega-3 Fatty Acids (Fish Oil) 300 MG CAPS Take 1 g by mouth daily (Patient not taking: Reported on 1/17/2024)     • predniSONE 5 mg tablet  (Patient not taking: Reported on 12/8/2023)       Current Facility-Administered Medications   Medication Dose Route Frequency Provider Last Rate Last Admin   • ondansetron (ZOFRAN) injection 4 mg  4 mg Intravenous Q6H PRN Guille Perdomo MD         Allergies   Allergen Reactions   • Bee Venom Shortness Of Breath   • Chlorhexidine Rash   • Other Rash, Hives, Palpitations and Shortness Of Breath     Adhesive tape   • Egg Yolk - Food Allergy Hives   • Iodinated Contrast Media Hives and Other (See Comments)   • Penicillins Hives   • Lidocaine Other (See Comments)     cream   • Allevyn Adhesive [Wound Dressings] Hives and Rash   • Bactrim  [Sulfamethoxazole-Trimethoprim] Rash   • Codeine Other (See Comments)     headaches   • Latex Rash and Other (See Comments)   • Medical Tape Blisters, Hives, Itching and Rash   • Oxybutynin Rash   • Pentosan Polysulfate Rash   • Povidone Iodine Rash      Immunizations:     Immunization History   Administered Date(s) Administered   • COVID-19 J&J (Jose) vaccine 0.5 mL 03/03/2021, 03/24/2021   • Tdap 12/09/2016, 12/09/2016, 07/10/2019   • Tuberculin Skin Test 06/08/2021      Health Maintenance:         Topic Date Due   • Colorectal Cancer Screening  02/07/2024   • Breast Cancer Screening: Mammogram  12/29/2024   • DXA SCAN  12/29/2025   • Hepatitis C Screening  Completed         Topic Date Due   • Pneumococcal Vaccine: 65+ Years (1 - PCV) Never done   • Influenza Vaccine (1) Never done   • COVID-19 Vaccine (3 - 2023-24 season) 09/01/2023      Medicare Screening Tests and Risk Assessments:     Ingrid is here for her Subsequent Wellness visit. Last Medicare Wellness visit information reviewed, patient interviewed, no change since last AWV.     Health Risk Assessment:   Patient rates overall health as very good. Patient feels that their physical health rating is much better. Patient is very satisfied with their life. Eyesight was rated as same. Hearing was rated as same. Patient feels that their emotional and mental health rating is much better. Patients states they are never, rarely angry. Patient states they are sometimes unusually tired/fatigued. Pain experienced in the last 7 days has been some. Patient's pain rating has been 4/10. Patient states that she has experienced weight loss or gain in last 6 months. From surgery left renal mass    Depression Screening:   PHQ-2 Score: 0      Fall Risk Screening:   In the past year, patient has experienced: no history of falling in past year      Urinary Incontinence Screening:   Patient has not leaked urine accidently in the last six months.     Home Safety:  Patient does  not have trouble with stairs inside or outside of their home. Patient has working smoke alarms and has working carbon monoxide detector. Home safety hazards include: none.     Nutrition:   Current diet is Regular.     Medications:   Patient is currently taking over-the-counter supplements. OTC medications include: see medication list. Patient is able to manage medications.     Activities of Daily Living (ADLs)/Instrumental Activities of Daily Living (IADLs):   Walk and transfer into and out of bed and chair?: Yes  Dress and groom yourself?: Yes    Bathe or shower yourself?: Yes    Feed yourself? Yes  Do your laundry/housekeeping?: Yes  Manage your money, pay your bills and track your expenses?: Yes  Make your own meals?: Yes    Do your own shopping?: Yes    Previous Hospitalizations:   Any hospitalizations or ED visits within the last 12 months?: Yes    How many hospitalizations have you had in the last year?: 1-2    Hospitalization Comments: Surgery, left Renal Mass    Advance Care Planning:   Living will: No    Durable POA for healthcare: No    Advanced directive: No    Advanced directive counseling given: Yes    ACP document given: Yes    End of Life Decisions reviewed with patient: Yes    Provider agrees with end of life decisions: Yes      Cognitive Screening:   Provider or family/friend/caregiver concerned regarding cognition?: No    PREVENTIVE SCREENINGS      Cardiovascular Screening:    General: History Lipid Disorder and Risks and Benefits Discussed    Due for: Lipid Panel      Diabetes Screening:     General: Screening Current      Colorectal Cancer Screening:     General: Screening Current      Breast Cancer Screening:     General: Screening Current      Cervical Cancer Screening:    General: Screening Not Indicated      Osteoporosis Screening:    General: Screening Current      Abdominal Aortic Aneurysm (AAA) Screening:        General: Screening Current      Lung Cancer Screening:     General: Screening  "Not Indicated      Hepatitis C Screening:    General: Screening Current    Screening, Brief Intervention, and Referral to Treatment (SBIRT)    Screening  Typical number of drinks in a day: 0  Typical number of drinks in a week: 0  Interpretation: Low risk drinking behavior.    AUDIT-C Screenin) How often did you have a drink containing alcohol in the past year? monthly or less  2) How many drinks did you have on a typical day when you were drinking in the past year? 0  3) How often did you have 6 or more drinks on one occasion in the past year? never    AUDIT-C Score: 1  Interpretation: Score 0-2 (female): Negative screen for alcohol misuse    Single Item Drug Screening:  How often have you used an illegal drug (including marijuana) or a prescription medication for non-medical reasons in the past year? never    Single Item Drug Screen Score: 0  Interpretation: Negative screen for possible drug use disorder    Brief Intervention  Alcohol & drug use screenings were reviewed. No concerns regarding substance use disorder identified.     Other Counseling Topics:   Regular weightbearing exercise and calcium and vitamin D intake.     No results found.     Physical Exam:     Pulse 96   Temp 97.8 °F (36.6 °C) (Temporal)   Ht 5' 1\" (1.549 m)   Wt 80.7 kg (178 lb)   LMP  (LMP Unknown)   SpO2 98%   BMI 33.63 kg/m²     Physical Exam  Vitals and nursing note reviewed.   Constitutional:       General: She is not in acute distress.     Appearance: Normal appearance. She is not ill-appearing, toxic-appearing or diaphoretic.   HENT:      Head: Normocephalic and atraumatic.      Right Ear: External ear normal.      Left Ear: External ear normal.      Nose: Nose normal.      Mouth/Throat:      Mouth: Mucous membranes are moist.   Eyes:      General: No scleral icterus.  Cardiovascular:      Rate and Rhythm: Normal rate and regular rhythm.      Pulses: Normal pulses.   Pulmonary:      Effort: Pulmonary effort is normal. No " respiratory distress.      Breath sounds: Normal breath sounds. No wheezing.   Abdominal:      General: Bowel sounds are normal. There is no distension.      Palpations: Abdomen is soft.      Tenderness: There is no abdominal tenderness.   Musculoskeletal:         General: No swelling.      Cervical back: Normal range of motion and neck supple.      Right lower leg: No edema.      Left lower leg: No edema.   Lymphadenopathy:      Cervical: No cervical adenopathy.   Skin:     General: Skin is warm and dry.      Coloration: Skin is not jaundiced or pale.   Neurological:      General: No focal deficit present.      Mental Status: She is alert and oriented to person, place, and time. Mental status is at baseline.      Cranial Nerves: No cranial nerve deficit.   Psychiatric:         Mood and Affect: Mood normal.         Behavior: Behavior normal.         Thought Content: Thought content normal.         Judgment: Judgment normal.        Leslie Bermudez,

## 2024-01-17 NOTE — PATIENT INSTRUCTIONS
Medicare Preventive Visit Patient Instructions  Thank you for completing your Welcome to Medicare Visit or Medicare Annual Wellness Visit today. Your next wellness visit will be due in one year (1/17/2025).  The screening/preventive services that you may require over the next 5-10 years are detailed below. Some tests may not apply to you based off risk factors and/or age. Screening tests ordered at today's visit but not completed yet may show as past due. Also, please note that scanned in results may not display below.  Preventive Screenings:  Service Recommendations Previous Testing/Comments   Colorectal Cancer Screening  * Colonoscopy    * Fecal Occult Blood Test (FOBT)/Fecal Immunochemical Test (FIT)  * Fecal DNA/Cologuard Test  * Flexible Sigmoidoscopy Age: 45-75 years old   Colonoscopy: every 10 years (may be performed more frequently if at higher risk)  OR  FOBT/FIT: every 1 year  OR  Cologuard: every 3 years  OR  Sigmoidoscopy: every 5 years  Screening may be recommended earlier than age 45 if at higher risk for colorectal cancer. Also, an individualized decision between you and your healthcare provider will decide whether screening between the ages of 76-85 would be appropriate. Colonoscopy: 02/07/2022  FOBT/FIT: Not on file  Cologuard: Not on file  Sigmoidoscopy: Not on file          Breast Cancer Screening Age: 40+ years old  Frequency: every 1-2 years  Not required if history of left and right mastectomy Mammogram: 12/29/2023        Cervical Cancer Screening Between the ages of 21-29, pap smear recommended once every 3 years.   Between the ages of 30-65, can perform pap smear with HPV co-testing every 5 years.   Recommendations may differ for women with a history of total hysterectomy, cervical cancer, or abnormal pap smears in past. Pap Smear: 12/18/2023        Hepatitis C Screening Once for adults born between 1945 and 1965  More frequently in patients at high risk for Hepatitis C Hep C Antibody:  06/06/2021        Diabetes Screening 1-2 times per year if you're at risk for diabetes or have pre-diabetes Fasting glucose: 123 mg/dL (10/17/2023)  A1C: 5.5 % (9/14/2020)      Cholesterol Screening Once every 5 years if you don't have a lipid disorder. May order more often based on risk factors. Lipid panel: 12/27/2023          Other Preventive Screenings Covered by Medicare:  Abdominal Aortic Aneurysm (AAA) Screening: covered once if your at risk. You're considered to be at risk if you have a family history of AAA.  Lung Cancer Screening: covers low dose CT scan once per year if you meet all of the following conditions: (1) Age 55-77; (2) No signs or symptoms of lung cancer; (3) Current smoker or have quit smoking within the last 15 years; (4) You have a tobacco smoking history of at least 20 pack years (packs per day multiplied by number of years you smoked); (5) You get a written order from a healthcare provider.  Glaucoma Screening: covered annually if you're considered high risk: (1) You have diabetes OR (2) Family history of glaucoma OR (3)  aged 50 and older OR (4)  American aged 65 and older  Osteoporosis Screening: covered every 2 years if you meet one of the following conditions: (1) You're estrogen deficient and at risk for osteoporosis based off medical history and other findings; (2) Have a vertebral abnormality; (3) On glucocorticoid therapy for more than 3 months; (4) Have primary hyperparathyroidism; (5) On osteoporosis medications and need to assess response to drug therapy.   Last bone density test (DXA Scan): 12/29/2023.  HIV Screening: covered annually if you're between the age of 15-65. Also covered annually if you are younger than 15 and older than 65 with risk factors for HIV infection. For pregnant patients, it is covered up to 3 times per pregnancy.    Immunizations:  Immunization Recommendations   Influenza Vaccine Annual influenza vaccination during flu season is  recommended for all persons aged >= 6 months who do not have contraindications   Pneumococcal Vaccine   * Pneumococcal conjugate vaccine = PCV13 (Prevnar 13), PCV15 (Vaxneuvance), PCV20 (Prevnar 20)  * Pneumococcal polysaccharide vaccine = PPSV23 (Pneumovax) Adults 19-63 yo with certain risk factors or if 65+ yo  If never received any pneumonia vaccine: recommend Prevnar 20 (PCV20)  Give PCV20 if previously received 1 dose of PCV13 or PPSV23   Hepatitis B Vaccine 3 dose series if at intermediate or high risk (ex: diabetes, end stage renal disease, liver disease)   Respiratory syncytial virus (RSV) Vaccine - COVERED BY MEDICARE PART D  * RSVPreF3 (Arexvy) CDC recommends that adults 60 years of age and older may receive a single dose of RSV vaccine using shared clinical decision-making (SCDM)   Tetanus (Td) Vaccine - COST NOT COVERED BY MEDICARE PART B Following completion of primary series, a booster dose should be given every 10 years to maintain immunity against tetanus. Td may also be given as tetanus wound prophylaxis.   Tdap Vaccine - COST NOT COVERED BY MEDICARE PART B Recommended at least once for all adults. For pregnant patients, recommended with each pregnancy.   Shingles Vaccine (Shingrix) - COST NOT COVERED BY MEDICARE PART B  2 shot series recommended in those 19 years and older who have or will have weakened immune systems or those 50 years and older     Health Maintenance Due:      Topic Date Due   • Colorectal Cancer Screening  02/07/2024   • Breast Cancer Screening: Mammogram  12/29/2024   • DXA SCAN  12/29/2025   • Hepatitis C Screening  Completed     Immunizations Due:      Topic Date Due   • Pneumococcal Vaccine: 65+ Years (1 - PCV) Never done   • Influenza Vaccine (1) Never done   • COVID-19 Vaccine (3 - 2023-24 season) 09/01/2023     Advance Directives   What are advance directives?  Advance directives are legal documents that state your wishes and plans for medical care. These plans are made  ahead of time in case you lose your ability to make decisions for yourself. Advance directives can apply to any medical decision, such as the treatments you want, and if you want to donate organs.   What are the types of advance directives?  There are many types of advance directives, and each state has rules about how to use them. You may choose a combination of any of the following:  Living will:  This is a written record of the treatment you want. You can also choose which treatments you do not want, which to limit, and which to stop at a certain time. This includes surgery, medicine, IV fluid, and tube feedings.   Durable power of  for healthcare (DPAHC):  This is a written record that states who you want to make healthcare choices for you when you are unable to make them for yourself. This person, called a proxy, is usually a family member or a friend. You may choose more than 1 proxy.  Do not resuscitate (DNR) order:  A DNR order is used in case your heart stops beating or you stop breathing. It is a request not to have certain forms of treatment, such as CPR. A DNR order may be included in other types of advance directives.  Medical directive:  This covers the care that you want if you are in a coma, near death, or unable to make decisions for yourself. You can list the treatments you want for each condition. Treatment may include pain medicine, surgery, blood transfusions, dialysis, IV or tube feedings, and a ventilator (breathing machine).  Values history:  This document has questions about your views, beliefs, and how you feel and think about life. This information can help others choose the care that you would choose.  Why are advance directives important?  An advance directive helps you control your care. Although spoken wishes may be used, it is better to have your wishes written down. Spoken wishes can be misunderstood, or not followed. Treatments may be given even if you do not want them. An  advance directive may make it easier for your family to make difficult choices about your care.   Weight Management   Why it is important to manage your weight:  Being overweight increases your risk of health conditions such as heart disease, high blood pressure, type 2 diabetes, and certain types of cancer. It can also increase your risk for osteoarthritis, sleep apnea, and other respiratory problems. Aim for a slow, steady weight loss. Even a small amount of weight loss can lower your risk of health problems.  How to lose weight safely:  A safe and healthy way to lose weight is to eat fewer calories and get regular exercise. You can lose up about 1 pound a week by decreasing the number of calories you eat by 500 calories each day.   Healthy meal plan for weight management:  A healthy meal plan includes a variety of foods, contains fewer calories, and helps you stay healthy. A healthy meal plan includes the following:  Eat whole-grain foods more often.  A healthy meal plan should contain fiber. Fiber is the part of grains, fruits, and vegetables that is not broken down by your body. Whole-grain foods are healthy and provide extra fiber in your diet. Some examples of whole-grain foods are whole-wheat breads and pastas, oatmeal, brown rice, and bulgur.  Eat a variety of vegetables every day.  Include dark, leafy greens such as spinach, kale, raudel greens, and mustard greens. Eat yellow and orange vegetables such as carrots, sweet potatoes, and winter squash.   Eat a variety of fruits every day.  Choose fresh or canned fruit (canned in its own juice or light syrup) instead of juice. Fruit juice has very little or no fiber.  Eat low-fat dairy foods.  Drink fat-free (skim) milk or 1% milk. Eat fat-free yogurt and low-fat cottage cheese. Try low-fat cheeses such as mozzarella and other reduced-fat cheeses.  Choose meat and other protein foods that are low in fat.  Choose beans or other legumes such as split peas or  lentils. Choose fish, skinless poultry (chicken or turkey), or lean cuts of red meat (beef or pork). Before you cook meat or poultry, cut off any visible fat.   Use less fat and oil.  Try baking foods instead of frying them. Add less fat, such as margarine, sour cream, regular salad dressing and mayonnaise to foods. Eat fewer high-fat foods. Some examples of high-fat foods include french fries, doughnuts, ice cream, and cakes.  Eat fewer sweets.  Limit foods and drinks that are high in sugar. This includes candy, cookies, regular soda, and sweetened drinks.  Exercise:  Exercise at least 30 minutes per day on most days of the week. Some examples of exercise include walking, biking, dancing, and swimming. You can also fit in more physical activity by taking the stairs instead of the elevator or parking farther away from stores. Ask your healthcare provider about the best exercise plan for you.      © Copyright [employer] 2018 Information is for End User's use only and may not be sold, redistributed or otherwise used for commercial purposes. All illustrations and images included in CareNotes® are the copyrighted property of [employer] or [employer]

## 2024-01-18 ENCOUNTER — OFFICE VISIT (OUTPATIENT)
Dept: UROLOGY | Facility: CLINIC | Age: 74
End: 2024-01-18
Payer: MEDICARE

## 2024-01-18 VITALS
OXYGEN SATURATION: 98 % | DIASTOLIC BLOOD PRESSURE: 82 MMHG | BODY MASS INDEX: 33.42 KG/M2 | WEIGHT: 177 LBS | SYSTOLIC BLOOD PRESSURE: 124 MMHG | HEART RATE: 86 BPM | HEIGHT: 61 IN

## 2024-01-18 DIAGNOSIS — C64.2 RENAL CELL CARCINOMA OF LEFT KIDNEY (HCC): Primary | ICD-10-CM

## 2024-01-18 PROCEDURE — 99214 OFFICE O/P EST MOD 30 MIN: CPT | Performed by: UROLOGY

## 2024-01-18 RX ORDER — DIAZEPAM 10 MG/1
10 TABLET ORAL
Qty: 2 TABLET | Refills: 0 | Status: SHIPPED | OUTPATIENT
Start: 2024-01-18

## 2024-01-18 NOTE — PATIENT INSTRUCTIONS
Good fluid hydration, control of bowel habits with avoidance of constipation, 100% cranberry juice or cranberry supplement tabs, pro- or prebiotics, and D-mannose (a supplement available OTC which reduces bacterial binding to the bladder wall) have all been shown to improve recurrent urinary infection    Com[employer]s [employer] (Azo or Cystex)

## 2024-01-18 NOTE — PROGRESS NOTES
ASSESSMENT:     73 y.o. female s/p cryoablation    PLAN:     Outside MRI seems to demonstrate nonenhancement of the cryoablated renal lesion.  Pathology did demonstrate clear-cell renal cell carcinoma.  I recommended repeat MRI in 6 months time.  Patient had severe reaction to iodinated contrast so CAT scans would require aggressive preparation.  Patient does well with open MRIs with gadolinium.  No prep required.  Patient does require Valium given claustrophobia and does well with 20 mg.    She has been having some rheumatologic issues after her biopsy and is on steroids.    Patient has had some recurrent infections over the last several months however reports a prior history of malignant cells after hysterectomy.  I would be reticent to use Estrace cream given this history without clearance from gynecology.  Recommended cranberry supplementation, d-mannose and probiotics with aggressive fluid hydration.      ---        UROLOGY FOLLOWUP NOTE     CHIEF COMPLAINT   Ingrid Wheatley is a 73 y.o. female with a complaint of   Chief Complaint   Patient presents with    Follow-up     3 month f/u, review MRI        History of Present Illness:   Ingrid Wheatley is a 73 y.o. female here for evaluation of renal mass.  Patient known to our service previously due to a diagnosis of cystoscopy proven interstitial cystitis, following with Dr. Giordano.  Patient symptoms are very well controlled at this time.  Patient had lumbar spine imaging, follow-up renal bladder ultrasound and subsequent MRI of the abdomen without contrast.  This demonstrated a greater than 3 cm area of concern in the left upper pole kidney.  Patient was referred today for discussion of management.  Of note, patient has reported history of IV contrast allergy with reaction listed as rash.    After our initial visit, the patient agreed to proceed with cryoablation of the left renal lesion.  This procedure was undertaken without difficulty.  Unfortunate, the  patient developed some rheumatologic issues subsequent and has been on steroids directed by rheumatologist.  She is also had some recurrent urinary tract infections with culture positive studies in August, October and December.    Past Medical History:     Past Medical History:   Diagnosis Date    Abnormal findings on diagnostic imaging of breast     Abnormal Pap smear of cervix     Arthritis     Chronic pain disorder     Extremity pain     Fibrocystic breast disease     Foot pain     GERD (gastroesophageal reflux disease)     Hyperlipidemia     Hypertension     Interstitial cystitis     Joint pain     Low back pain     Osteoarthritis     Osteopenia     Uncomplicated opioid dependence (HCC) 3/1/2022       PAST SURGICAL HISTORY:     Past Surgical History:   Procedure Laterality Date    APPENDECTOMY      BACK SURGERY      BREAST EXCISIONAL BIOPSY Left 1996    benign    CARPAL BOSS EXCISION      CHOLECYSTECTOMY      DIAGNOSTIC LAPAROSCOPY      FOOT SURGERY      FRACTURE SURGERY      HAND SURGERY  5/2017    HYSTERECTOMY      age 32    HYSTEROSCOPY      IR CRYOABLATION  10/17/2023    JOINT REPLACEMENT      KIDNEY SURGERY Left     KNEE ARTHROSCOPY Bilateral     LAMINECTOMY      LAPAROSCOPY      hynecologic with adhesions    MYOMECTOMY      OOPHORECTOMY Bilateral     age 32    ORTHOPEDIC SURGERY      IN CAR IMPLTJ NSTIM ELTRDS PLATE/PADDLE EDRL N/A 05/18/2017    Procedure: PLACEMENT OF THORACIC SPINAL CORD STIMULATOR WITH LEFT BUTTOCK IMPLANTABLE PULSE GENERATOR (IMPULSE MONITORING-MOTORS (TCEMEP), EMG, SSEP);  Surgeon: Julius Damon MD;  Location: [facility];  Service: Neurosurgery    SPINAL CORD STIMULATOR IMPLANT Left 09/29/2020    Procedure: LAMINECTOMY THORACIC , REMOVAL OF SCS LEADS AND GENERATOR;  Surgeon: Oswald Whitt MD;  Location:  MAIN OR;  Service: Neurosurgery    SPINAL STIMULATOR PLACEMENT  05/2017    TONSILLECTOMY AND ADENOIDECTOMY      onset: unknown    TOTAL KNEE ARTHROPLASTY Right        CURRENT  MEDICATIONS:     Current Outpatient Medications   Medication Sig Dispense Refill    amitriptyline (ELAVIL) 50 mg tablet Take 1 tablet (50 mg total) by mouth daily at bedtime 90 tablet 0    Biotin 2500 MCG CAPS Take 1 capsule by mouth 2 (two) times a day       calcium carbonate (OS-ANTHONY) 600 MG tablet Take 1,200 mg by mouth 2 (two) times a day with meals      Cholecalciferol (VITAMIN D-3 PO) Take 1 tablet by mouth daily      Cyanocobalamin (VITAMIN B-12 CR PO) Take 1 tablet by mouth daily      Diclofenac Sodium (VOLTAREN) 1 % Apply 2 g topically 4 (four) times a day 200 g 1    dicyclomine (BENTYL) 10 mg capsule Take 1 capsule (10 mg total) by mouth 2 (two) times a day 180 capsule 3    diphenhydrAMINE (BENADRYL) 25 mg tablet Take 1 tablet (25 mg total) by mouth if needed for itching or allergies Take 2 tablets or 50 mg 1 hour before your schedule MRI with contrast. 2 tablet 0    EPINEPHrine (EPIPEN) 0.3 mg/0.3 mL SOAJ Inject 0.3 mL (0.3 mg total) into a muscle as needed for anaphylaxis 2 each 0    Evening Primrose Oil 1000 MG CAPS Take 1 capsule (1,000 mg total) by mouth in the morning  0    famotidine (PEPCID) 20 mg tablet Take 1 tablet (20 mg total) by mouth 2 (two) times a day 60 tablet 3    fexofenadine (ALLEGRA) 180 MG tablet Take 180 mg by mouth daily      gabapentin (NEURONTIN) 300 mg capsule Take 1 tablet in the morning, 1 tablet afternoon and 2 bedtime 360 capsule 0    hydrOXYzine HCL (ATARAX) 10 mg tablet Take 2 tablets (20 mg total) by mouth daily at bedtime Take 2 tablets at bedtime 180 tablet 1    losartan-hydrochlorothiazide (HYZAAR) 100-25 MG per tablet Take 1 tablet by mouth daily 90 tablet 3    methocarbamol (ROBAXIN) 750 mg tablet Take 1 tablet (750 mg total) by mouth every 8 (eight) hours 270 tablet 0    montelukast (SINGULAIR) 10 mg tablet Take 1 tablet (10 mg total) by mouth daily at bedtime 90 tablet 3    Multiple Vitamins-Minerals (PRESERVISION AREDS 2 PO) Take by mouth in the morning       Omega-3 Fatty Acids (FISH OIL) 1,000 mg Take 1,000 mg by mouth 3 (three) times a day      omeprazole (PriLOSEC) 40 MG capsule Take 1 capsule (40 mg total) by mouth daily 90 capsule 0    predniSONE 10 mg tablet Take 1 tablet (10 mg total) by mouth daily (Patient taking differently: Take 15.5 mg by mouth daily) 30 tablet 0    rosuvastatin (CRESTOR) 10 MG tablet Take 1 tablet (10 mg total) by mouth daily Take Monday, Wednesday and Friday alternate with simvastatin 36 tablet 3    simvastatin (ZOCOR) 10 mg tablet Take Tuesday, Thursday' Saturday and Sunday, to be alternated with rosuvastatin 48 tablet 3    methylPREDNISolone (MEDROL) 32 MG tablet Take one tablet 12 hours before, and one tablet 2 hours before CT scan (Patient not taking: Reported on 1/17/2024) 2 tablet 0    Omega-3 Fatty Acids (Fish Oil) 300 MG CAPS Take 1 g by mouth daily (Patient not taking: Reported on 1/17/2024)      predniSONE 5 mg tablet  (Patient not taking: Reported on 12/8/2023)       Current Facility-Administered Medications   Medication Dose Route Frequency Provider Last Rate Last Admin    ondansetron (ZOFRAN) injection 4 mg  4 mg Intravenous Q6H PRN Guille Perdomo MD           ALLERGIES:     Allergies   Allergen Reactions    Bee Venom Shortness Of Breath    Chlorhexidine Rash    Other Rash, Hives, Palpitations and Shortness Of Breath     Adhesive tape    Egg Yolk - Food Allergy Hives    Iodinated Contrast Media Hives and Other (See Comments)    Penicillins Hives    Lidocaine Other (See Comments)     cream    Allevyn Adhesive [Wound Dressings] Hives and Rash    Bactrim [Sulfamethoxazole-Trimethoprim] Rash    Codeine Other (See Comments)     headaches    Latex Rash and Other (See Comments)    Medical Tape Blisters, Hives, Itching and Rash    Oxybutynin Rash    Pentosan Polysulfate Rash    Povidone Iodine Rash       SOCIAL HISTORY:     Social History     Socioeconomic History    Marital status: Single     Spouse name: None    Number of  children: None    Years of education: None    Highest education level: None   Occupational History    Occupation: Retired/ working part-time    Tobacco Use    Smoking status: Never    Smokeless tobacco: Never   Vaping Use    Vaping status: Never Used   Substance and Sexual Activity    Alcohol use: Yes     Alcohol/week: 1.0 standard drink of alcohol     Comment: Drink socially, not too often    Drug use: Never    Sexual activity: Not Currently     Partners: Male     Birth control/protection: None   Other Topics Concern    None   Social History Narrative    No caffeine use    Always uses sunscreen    Always uses a seatbelt     Social Determinants of Health     Financial Resource Strain: Low Risk  (1/10/2024)    Overall Financial Resource Strain (CARDIA)     Difficulty of Paying Living Expenses: Not hard at all   Food Insecurity: Not on file   Transportation Needs: No Transportation Needs (1/10/2024)    PRAPARE - Transportation     Lack of Transportation (Medical): No     Lack of Transportation (Non-Medical): No   Physical Activity: Not on file   Stress: Not on file   Social Connections: Not on file   Intimate Partner Violence: Unknown (1/3/2024)    Received from [facility]    Intimate Partner Violence     Within the last year, have you been afraid of your partner, ex-partner or family member?: Not on file     Within the last year, have you been humiliated or emotionally abused in other ways by your partner, ex-partner or family member?: Not on file     Within the last year, have you been kicked, hit, slapped, or otherwise physically hurt by your partner, ex-partner or family member?: Not on file     Within the last year, have you been raped or forced to have any kind of sexual activity by your partner, ex-partner or family member?: Not on file   Housing Stability: Not on file       SOCIAL HISTORY:     Family History   Problem Relation Age of Onset    Bone cancer Mother     Cancer Mother      "Asthma Mother     Brain cancer Father     Stroke Father         stroke sydrome    Transient ischemic attack Father     Depression Sister     Migraines Sister     Asthma Sister     Asthma Sister     No Known Problems Maternal Grandmother     No Known Problems Maternal Grandfather     Diabetes Paternal Grandmother     No Known Problems Paternal Grandfather     Heart disease Brother     Diabetes Brother     Heart attack Brother     Colon cancer Brother 66    Cancer Brother     No Known Problems Brother     Breast cancer Maternal Aunt 70    Breast cancer additional onset Maternal Aunt 72    Diabetes Sister     Depression Sister        REVIEW OF SYSTEMS:     Review of Systems   Constitutional:  Negative for chills and fever.   HENT:  Negative for ear pain and sore throat.    Eyes:  Negative for pain and visual disturbance.   Respiratory:  Negative for cough and shortness of breath.    Cardiovascular:  Negative for chest pain and palpitations.   Gastrointestinal:  Negative for abdominal pain and vomiting.   Genitourinary:  Negative for dysuria, frequency, hematuria and urgency.   Musculoskeletal:  Positive for arthralgias and myalgias. Negative for back pain.   Skin:  Negative for color change and rash.   Neurological:  Negative for seizures and syncope.   All other systems reviewed and are negative.        PHYSICAL EXAM:     /82 (BP Location: Left arm, Patient Position: Sitting, Cuff Size: Adult)   Pulse 86   Ht 5' 1\" (1.549 m)   Wt 80.3 kg (177 lb)   LMP  (LMP Unknown)   SpO2 98%   BMI 33.44 kg/m²     Physical Exam  Vitals reviewed.   Constitutional:       General: She is not in acute distress.     Appearance: She is well-developed.   HENT:      Head: Normocephalic and atraumatic.   Eyes:      Pupils: Pupils are equal, round, and reactive to light.   Cardiovascular:      Rate and Rhythm: Normal rate.   Pulmonary:      Effort: Pulmonary effort is normal. No respiratory distress.      Breath sounds: Normal " breath sounds.   Abdominal:      General: There is no distension.      Palpations: Abdomen is soft.      Tenderness: There is no abdominal tenderness.   Musculoskeletal:         General: Normal range of motion.      Cervical back: Normal range of motion and neck supple.   Skin:     General: Skin is warm and dry.   Neurological:      Mental Status: She is alert and oriented to person, place, and time.   Psychiatric:         Behavior: Behavior normal.         LABS:     CBC:   Lab Results   Component Value Date    WBC 15.36 (H) 2023    HGB 13.8 2023    HCT 43.1 2023    MCV 91 2023     (H) 2023       BMP:   Lab Results   Component Value Date    GLUCOSE 118 2016    CALCIUM 10.0 2023     2015    K 3.7 2023    CO2 25 2023     2023    BUN 17 2023    CREATININE 0.58 (L) 2023         IMAGIN/4/24              PATHOLOGY:     10/17/23  Final Diagnosis   A. Kidney, Left, mass:  - Clear cell renal cell carcinoma, nuclear grade 1.  See comment.     Comment: Immunohistochemistry is positive in the tumor cells for carbonic anhydrase IX, with non-specific CK7 and negative P504S.

## 2024-02-02 ENCOUNTER — APPOINTMENT (OUTPATIENT)
Dept: LAB | Facility: MEDICAL CENTER | Age: 74
End: 2024-02-02
Payer: MEDICARE

## 2024-02-02 DIAGNOSIS — E78.5 HYPERLIPIDEMIA, UNSPECIFIED HYPERLIPIDEMIA TYPE: ICD-10-CM

## 2024-02-02 DIAGNOSIS — R93.89 ABNORMAL RADIOLOGICAL FINDINGS IN SKIN AND SUBCUTANEOUS TISSUE: ICD-10-CM

## 2024-02-02 DIAGNOSIS — M25.50 ARTHRALGIA, UNSPECIFIED JOINT: Primary | ICD-10-CM

## 2024-02-02 DIAGNOSIS — C64.2 RENAL CELL CARCINOMA OF LEFT KIDNEY (HCC): ICD-10-CM

## 2024-02-02 LAB
CRP SERPL QL: 11 MG/L
ERYTHROCYTE [SEDIMENTATION RATE] IN BLOOD: 16 MM/HOUR (ref 0–29)

## 2024-02-02 PROCEDURE — 84443 ASSAY THYROID STIM HORMONE: CPT

## 2024-02-02 PROCEDURE — 86430 RHEUMATOID FACTOR TEST QUAL: CPT

## 2024-02-02 PROCEDURE — 86140 C-REACTIVE PROTEIN: CPT

## 2024-02-02 PROCEDURE — 36415 COLL VENOUS BLD VENIPUNCTURE: CPT

## 2024-02-02 PROCEDURE — 85652 RBC SED RATE AUTOMATED: CPT

## 2024-02-02 PROCEDURE — 86200 CCP ANTIBODY: CPT

## 2024-02-03 LAB — TSH SERPL DL<=0.05 MIU/L-ACNC: 1.29 UIU/ML (ref 0.45–4.5)

## 2024-02-05 LAB — RHEUMATOID FACT SER QL LA: NEGATIVE

## 2024-02-10 LAB — CCP AB SER IA-ACNC: 0.8

## 2024-02-11 DIAGNOSIS — M54.16 LUMBAR RADICULOPATHY: ICD-10-CM

## 2024-02-11 DIAGNOSIS — M17.12 PRIMARY OSTEOARTHRITIS OF LEFT KNEE: ICD-10-CM

## 2024-02-11 RX ORDER — GABAPENTIN 300 MG/1
CAPSULE ORAL
Qty: 360 CAPSULE | Refills: 0 | Status: SHIPPED | OUTPATIENT
Start: 2024-02-11

## 2024-02-21 PROBLEM — Z00.00 MEDICARE ANNUAL WELLNESS VISIT, SUBSEQUENT: Status: RESOLVED | Noted: 2018-12-06 | Resolved: 2024-02-21

## 2024-03-03 DIAGNOSIS — G62.9 NEUROPATHY: ICD-10-CM

## 2024-03-03 RX ORDER — AMITRIPTYLINE HYDROCHLORIDE 50 MG/1
50 TABLET, FILM COATED ORAL
Qty: 90 TABLET | Refills: 0 | Status: SHIPPED | OUTPATIENT
Start: 2024-03-03

## 2024-03-19 NOTE — PROGRESS NOTES
Assessment/Plan:      Diagnoses and all orders for this visit:    Subacute maxillary sinusitis  Doxycycline 100mg BID   Increase water intake   Rest, tylenol prn    Irritable bowel syndrome without diarrhea  -     dicyclomine (BENTYL) 10 mg capsule; Take 1 capsule (10 mg total) by mouth 2 (two) times a day for 90 days         Patient ID: Mireille Campos is a 80 y o  female  HPI   Pt has had congestion and some cough  Had sore throat that resolved  Had fever the other night  She has had increased nerve discomfort and not sure if this is related  She feels more tired  Review of Systems   Constitutional: Negative  HENT: Positive for congestion and postnasal drip  Eyes: Negative  Respiratory: Negative  Cardiovascular: Negative  Gastrointestinal: Negative  Endocrine: Negative  Genitourinary: Negative  Musculoskeletal: Positive for arthralgias  Skin: Negative  Allergic/Immunologic: Negative  Neurological: Negative for headaches  Hematological: Negative  Psychiatric/Behavioral: Negative  Past Medical History:   Diagnosis Date    Abnormal findings on diagnostic imaging of breast     Anxiety     Arthritis     Fibrocystic breast disease     GERD (gastroesophageal reflux disease)     Hyperlipidemia     Hypertension     Osteopenia      Past Surgical History:   Procedure Laterality Date    APPENDECTOMY      BACK SURGERY      BREAST LUMPECTOMY Left     CARPAL BOSS EXCISION      CHOLECYSTECTOMY      DIAGNOSTIC LAPAROSCOPY      FOOT SURGERY      HYSTERECTOMY      HYSTEROSCOPY      INCISIONAL BREAST BIOPSY      KIDNEY SURGERY Left     KNEE ARTHROSCOPY Bilateral     LAPAROSCOPY      hynecologic with adhesions    VT SURG IMPLNT NEUROELECT,EPIDURAL N/A 5/18/2017    Procedure: PLACEMENT OF THORACIC SPINAL CORD STIMULATOR WITH LEFT BUTTOCK IMPLANTABLE PULSE GENERATOR (IMPULSE MONITORING-MOTORS (TCEMEP), EMG, SSEP);   Surgeon: Diana Hernandez MD;  Location: [facility] Detail Level: Zone OR;  Service: Neurosurgery    SPINAL STIMULATOR PLACEMENT  05/2017    TONSILLECTOMY AND ADENOIDECTOMY      onset: unknown      Asher Rather  Allergies   Allergen Reactions    Bee Venom     Codeine Other (See Comments)     headaches    Egg Yolk     Iodinated Diagnostic Agents Hives    Other      Adhesive tape    Penicillins Hives    Elmiron [Pentosan Polysulfate] Rash    Latex Rash     Vitals:    11/05/18 1415 11/05/18 1433   BP:  122/70   Pulse: 82    Temp: (!) 97 4 °F (36 3 °C)    TempSrc: Tympanic    SpO2: 95%    Weight: 89 5 kg (197 lb 4 oz)    Height: 5' 4" (1 626 m)             Physical Exam   Constitutional: She is oriented to person, place, and time  She appears well-developed and well-nourished  No distress  HENT:   Head: Normocephalic and atraumatic  Right Ear: External ear normal    Left Ear: External ear normal    Nose: Nose normal    Mouth/Throat: Oropharyngeal exudate present  Eyes: Pupils are equal, round, and reactive to light  Conjunctivae and EOM are normal  No scleral icterus  Neck: Normal range of motion  Neck supple  No JVD present  Cardiovascular: Normal rate, regular rhythm, normal heart sounds and intact distal pulses  No murmur heard  Pulmonary/Chest: Effort normal and breath sounds normal    Abdominal: Soft  Bowel sounds are normal    Musculoskeletal: Normal range of motion  Lymphadenopathy:     She has no cervical adenopathy  Neurological: She is alert and oriented to person, place, and time  She has normal reflexes  She displays normal reflexes  No cranial nerve deficit  She exhibits normal muscle tone  Coordination normal    Skin: Skin is warm and dry  She is not diaphoretic  Psychiatric: She has a normal mood and affect  Her behavior is normal  Judgment and thought content normal    Nursing note and vitals reviewed

## 2024-03-27 ENCOUNTER — APPOINTMENT (OUTPATIENT)
Dept: LAB | Facility: HOSPITAL | Age: 74
End: 2024-03-27
Payer: MEDICARE

## 2024-03-27 ENCOUNTER — HOSPITAL ENCOUNTER (OUTPATIENT)
Dept: RADIOLOGY | Facility: HOSPITAL | Age: 74
Discharge: HOME | End: 2024-03-27
Payer: MEDICARE

## 2024-03-27 DIAGNOSIS — Z01.818 PREOP EXAMINATION: ICD-10-CM

## 2024-03-27 LAB
ALBUMIN SERPL BCP-MCNC: 4.3 G/DL (ref 3.5–5)
ALP SERPL-CCNC: 67 U/L (ref 34–104)
ALT SERPL W P-5'-P-CCNC: 18 U/L (ref 7–52)
ANION GAP SERPL CALCULATED.3IONS-SCNC: 7 MMOL/L (ref 4–13)
AST SERPL W P-5'-P-CCNC: 19 U/L (ref 13–39)
BASOPHILS # BLD AUTO: 0.08 THOUSANDS/ÂΜL (ref 0–0.1)
BASOPHILS NFR BLD AUTO: 1 % (ref 0–1)
BILIRUB SERPL-MCNC: 0.33 MG/DL (ref 0.2–1)
BUN SERPL-MCNC: 17 MG/DL (ref 5–25)
CALCIUM SERPL-MCNC: 10 MG/DL (ref 8.4–10.2)
CHLORIDE SERPL-SCNC: 102 MMOL/L (ref 96–108)
CO2 SERPL-SCNC: 29 MMOL/L (ref 21–32)
CREAT SERPL-MCNC: 0.61 MG/DL (ref 0.6–1.3)
EOSINOPHIL # BLD AUTO: 0.1 THOUSAND/ÂΜL (ref 0–0.61)
EOSINOPHIL NFR BLD AUTO: 1 % (ref 0–6)
ERYTHROCYTE [DISTWIDTH] IN BLOOD BY AUTOMATED COUNT: 12.7 % (ref 11.6–15.1)
GFR SERPL CREATININE-BSD FRML MDRD: 90 ML/MIN/1.73SQ M
GLUCOSE SERPL-MCNC: 96 MG/DL (ref 65–140)
HCT VFR BLD AUTO: 43.4 % (ref 34.8–46.1)
HGB BLD-MCNC: 14.1 G/DL (ref 11.5–15.4)
IMM GRANULOCYTES # BLD AUTO: 0.03 THOUSAND/UL (ref 0–0.2)
IMM GRANULOCYTES NFR BLD AUTO: 0 % (ref 0–2)
INR PPP: 0.93 (ref 0.84–1.19)
LYMPHOCYTES # BLD AUTO: 1.87 THOUSANDS/ÂΜL (ref 0.6–4.47)
LYMPHOCYTES NFR BLD AUTO: 16 % (ref 14–44)
MCH RBC QN AUTO: 28.8 PG (ref 26.8–34.3)
MCHC RBC AUTO-ENTMCNC: 32.5 G/DL (ref 31.4–37.4)
MCV RBC AUTO: 89 FL (ref 82–98)
MONOCYTES # BLD AUTO: 0.51 THOUSAND/ÂΜL (ref 0.17–1.22)
MONOCYTES NFR BLD AUTO: 5 % (ref 4–12)
NEUTROPHILS # BLD AUTO: 8.86 THOUSANDS/ÂΜL (ref 1.85–7.62)
NEUTS SEG NFR BLD AUTO: 77 % (ref 43–75)
NRBC BLD AUTO-RTO: 0 /100 WBCS
PLATELET # BLD AUTO: 308 THOUSANDS/UL (ref 149–390)
PMV BLD AUTO: 8.6 FL (ref 8.9–12.7)
POTASSIUM SERPL-SCNC: 4.3 MMOL/L (ref 3.5–5.3)
PROT SERPL-MCNC: 6.9 G/DL (ref 6.4–8.4)
PROTHROMBIN TIME: 12.4 SECONDS (ref 11.6–14.5)
RBC # BLD AUTO: 4.89 MILLION/UL (ref 3.81–5.12)
SODIUM SERPL-SCNC: 138 MMOL/L (ref 135–147)
WBC # BLD AUTO: 11.45 THOUSAND/UL (ref 4.31–10.16)

## 2024-03-27 PROCEDURE — 93005 ELECTROCARDIOGRAM TRACING: CPT

## 2024-03-27 PROCEDURE — 80053 COMPREHEN METABOLIC PANEL: CPT

## 2024-03-27 PROCEDURE — 71046 X-RAY EXAM CHEST 2 VIEWS: CPT

## 2024-03-27 PROCEDURE — 85025 COMPLETE CBC W/AUTO DIFF WBC: CPT

## 2024-03-27 PROCEDURE — 36415 COLL VENOUS BLD VENIPUNCTURE: CPT

## 2024-03-27 PROCEDURE — 85610 PROTHROMBIN TIME: CPT

## 2024-03-28 LAB
ATRIAL RATE: 86 BPM
P AXIS: 42 DEGREES
PR INTERVAL: 144 MS
QRS AXIS: 0 DEGREES
QRSD INTERVAL: 90 MS
QT INTERVAL: 384 MS
QTC INTERVAL: 459 MS
T WAVE AXIS: 18 DEGREES
VENTRICULAR RATE: 86 BPM

## 2024-03-28 PROCEDURE — 93010 ELECTROCARDIOGRAM REPORT: CPT | Performed by: INTERNAL MEDICINE

## 2024-03-29 ENCOUNTER — TELEPHONE (OUTPATIENT)
Dept: FAMILY MEDICINE CLINIC | Facility: CLINIC | Age: 74
End: 2024-03-29

## 2024-03-29 DIAGNOSIS — J02.9 PHARYNGITIS, UNSPECIFIED ETIOLOGY: Primary | ICD-10-CM

## 2024-03-29 RX ORDER — AZITHROMYCIN 250 MG/1
TABLET, FILM COATED ORAL
Qty: 6 TABLET | Refills: 0 | Status: SHIPPED | OUTPATIENT
Start: 2024-03-29 | End: 2024-04-02

## 2024-03-29 NOTE — TELEPHONE ENCOUNTER
"Pt called lvm stating     \"I have a sore throat and I'm just wondering if you know she can prescribe something or check it because I'm having surgery in the 23rd and I just want to make sure that not coming down with something or getting sick. So if you could call me back at [phone], I'd really appreciate it. Thanks a lot and have a great day.\"    Please advise    "

## 2024-03-29 NOTE — TELEPHONE ENCOUNTER
Zpack sent if worsens thru the weekend would get checked  If she did not check for covid would do prior to starting rx

## 2024-04-11 ENCOUNTER — CONSULT (OUTPATIENT)
Dept: FAMILY MEDICINE CLINIC | Facility: CLINIC | Age: 74
End: 2024-04-11
Payer: MEDICARE

## 2024-04-11 VITALS
DIASTOLIC BLOOD PRESSURE: 74 MMHG | TEMPERATURE: 97.2 F | SYSTOLIC BLOOD PRESSURE: 132 MMHG | RESPIRATION RATE: 18 BRPM | BODY MASS INDEX: 33.95 KG/M2 | OXYGEN SATURATION: 97 % | HEART RATE: 86 BPM | WEIGHT: 179.8 LBS | HEIGHT: 61 IN

## 2024-04-11 DIAGNOSIS — G62.9 NEUROPATHY: ICD-10-CM

## 2024-04-11 DIAGNOSIS — M48.062 LUMBAR STENOSIS WITH NEUROGENIC CLAUDICATION: Primary | ICD-10-CM

## 2024-04-11 DIAGNOSIS — I10 PRIMARY HYPERTENSION: ICD-10-CM

## 2024-04-11 PROBLEM — T14.8XXA BRUISING: Status: RESOLVED | Noted: 2023-05-05 | Resolved: 2024-04-11

## 2024-04-11 PROBLEM — S82.832A CLOSED FRACTURE OF PROXIMAL END OF LEFT FIBULA: Status: RESOLVED | Noted: 2021-06-04 | Resolved: 2024-04-11

## 2024-04-11 PROBLEM — M81.0 AGE RELATED OSTEOPOROSIS: Status: ACTIVE | Noted: 2024-02-12

## 2024-04-11 PROCEDURE — G2211 COMPLEX E/M VISIT ADD ON: HCPCS | Performed by: INTERNAL MEDICINE

## 2024-04-11 PROCEDURE — 99214 OFFICE O/P EST MOD 30 MIN: CPT | Performed by: INTERNAL MEDICINE

## 2024-04-11 NOTE — PROGRESS NOTES
Name: Ingrid Wheatley      : 1950      MRN: [id number]  Encounter Provider: Leslie Bermudez DO  Encounter Date: 2024   Encounter department: [facility]    Assessment & Plan     1. Lumbar stenosis with neurogenic claudication  PATs reviewed Patient cleared for upcoming surgery - low risk     2. Primary hypertension  Lo sodium diet Continue current rx     3. Neuropathy  Pt will continue followup with pain mgmt postop         Rto as scheduled     Subjective      HPI  Pt scheduled for surgery by Dr Damon on  for lumbar L3-L4 fusion She had PATs She has had progressive back and pain and leg weakness and surgery planned to help manage sxs   She has felt otherwise well No recent illness No chest pain or sob No bowel or bladder incontinence She is followed by pain mgmt as outpt and will continue followup after surgery   Review of Systems   Constitutional:  Negative for chills and fever.   HENT: Negative.     Eyes:  Negative for visual disturbance.   Respiratory:  Negative for cough and shortness of breath.    Cardiovascular:  Negative for chest pain, palpitations and leg swelling.   Gastrointestinal:  Negative for abdominal distention, abdominal pain, constipation and diarrhea.   Genitourinary: Negative.    Musculoskeletal:  Positive for arthralgias, back pain and gait problem.   Neurological:  Positive for numbness. Negative for dizziness, light-headedness and headaches.   Psychiatric/Behavioral:  Positive for sleep disturbance. The patient is not nervous/anxious.        Current Outpatient Medications on File Prior to Visit   Medication Sig   • amitriptyline (ELAVIL) 50 mg tablet Take 1 tablet (50 mg total) by mouth daily at bedtime   • Biotin 2500 MCG CAPS Take 1 capsule by mouth 2 (two) times a day    • calcium carbonate (OS-ANTHONY) 600 MG tablet Take 1,200 mg by mouth 2 (two) times a day with meals   • Cholecalciferol (VITAMIN D-3 PO) Take 1 tablet by mouth daily   • Cyanocobalamin  (VITAMIN B-12 CR PO) Take 1 tablet by mouth daily   • diazepam (VALIUM) 10 mg tablet Take 1 tablet (10 mg total) by mouth once in imaging for anxiety for up to 2 doses   • Diclofenac Sodium (VOLTAREN) 1 % Apply 2 g topically 4 (four) times a day   • dicyclomine (BENTYL) 10 mg capsule Take 1 capsule (10 mg total) by mouth 2 (two) times a day   • EPINEPHrine (EPIPEN) 0.3 mg/0.3 mL SOAJ Inject 0.3 mL (0.3 mg total) into a muscle as needed for anaphylaxis   • Evening Primrose Oil 1000 MG CAPS Take 1 capsule (1,000 mg total) by mouth in the morning   • famotidine (PEPCID) 20 mg tablet Take 1 tablet (20 mg total) by mouth 2 (two) times a day   • fexofenadine (ALLEGRA) 180 MG tablet Take 180 mg by mouth daily   • gabapentin (NEURONTIN) 300 mg capsule Take 1 tablet in the morning, 1 tablet afternoon and 2 bedtime   • hydrOXYzine HCL (ATARAX) 10 mg tablet Take 2 tablets (20 mg total) by mouth daily at bedtime Take 2 tablets at bedtime   • losartan-hydrochlorothiazide (HYZAAR) 100-25 MG per tablet Take 1 tablet by mouth daily   • methocarbamol (ROBAXIN) 750 mg tablet Take 1 tablet (750 mg total) by mouth every 8 (eight) hours   • montelukast (SINGULAIR) 10 mg tablet Take 1 tablet (10 mg total) by mouth daily at bedtime   • Multiple Vitamins-Minerals (PRESERVISION AREDS 2 PO) Take by mouth in the morning   • Omega-3 Fatty Acids (FISH OIL) 1,000 mg Take 1,000 mg by mouth 3 (three) times a day   • omeprazole (PriLOSEC) 40 MG capsule Take 1 capsule (40 mg total) by mouth daily   • rosuvastatin (CRESTOR) 10 MG tablet Take 1 tablet (10 mg total) by mouth daily Take Monday, Wednesday and Friday alternate with simvastatin   • simvastatin (ZOCOR) 10 mg tablet Take Tuesday, Thursday' Saturday and Sunday, to be alternated with rosuvastatin   • [DISCONTINUED] diphenhydrAMINE (BENADRYL) 25 mg tablet Take 1 tablet (25 mg total) by mouth if needed for itching or allergies Take 2 tablets or 50 mg 1 hour before your schedule MRI with  "contrast. (Patient not taking: Reported on 4/11/2024)   • [DISCONTINUED] methylPREDNISolone (MEDROL) 32 MG tablet Take one tablet 12 hours before, and one tablet 2 hours before CT scan (Patient not taking: Reported on 1/17/2024)   • [DISCONTINUED] Omega-3 Fatty Acids (Fish Oil) 300 MG CAPS Take 1 g by mouth daily (Patient not taking: Reported on 1/17/2024)   • [DISCONTINUED] predniSONE 10 mg tablet Take 1 tablet (10 mg total) by mouth daily (Patient not taking: Reported on 4/11/2024)   • [DISCONTINUED] predniSONE 5 mg tablet  (Patient not taking: Reported on 12/8/2023)       Objective     /74   Pulse 86   Temp (!) 97.2 °F (36.2 °C) (Temporal)   Resp 18   Ht 5' 1\" (1.549 m)   Wt 81.6 kg (179 lb 12.8 oz)   LMP  (LMP Unknown)   SpO2 97%   BMI 33.97 kg/m²     Physical Exam  Vitals and nursing note reviewed.   Constitutional:       General: She is not in acute distress.     Appearance: Normal appearance. She is not ill-appearing, toxic-appearing or diaphoretic.   HENT:      Head: Normocephalic and atraumatic.      Right Ear: External ear normal.      Left Ear: External ear normal.      Mouth/Throat:      Mouth: Mucous membranes are moist.   Eyes:      General: No scleral icterus.     Extraocular Movements: Extraocular movements intact.      Conjunctiva/sclera: Conjunctivae normal.      Pupils: Pupils are equal, round, and reactive to light.   Cardiovascular:      Rate and Rhythm: Normal rate and regular rhythm.      Pulses: Normal pulses.      Heart sounds: Normal heart sounds. No murmur heard.  Pulmonary:      Effort: Pulmonary effort is normal. No respiratory distress.      Breath sounds: Normal breath sounds. No wheezing.   Abdominal:      General: Bowel sounds are normal. There is no distension.      Palpations: Abdomen is soft.      Tenderness: There is no abdominal tenderness.   Musculoskeletal:      Cervical back: Normal range of motion and neck supple.      Right lower leg: No edema.      Left lower " leg: No edema.   Lymphadenopathy:      Cervical: No cervical adenopathy.   Skin:     General: Skin is warm and dry.   Neurological:      General: No focal deficit present.      Mental Status: She is alert and oriented to person, place, and time. Mental status is at baseline.      Cranial Nerves: No cranial nerve deficit.   Psychiatric:         Mood and Affect: Mood normal.         Behavior: Behavior normal.         Thought Content: Thought content normal.         Judgment: Judgment normal.     Leslie Bermudez, DO

## 2024-04-14 DIAGNOSIS — K21.9 GASTROESOPHAGEAL REFLUX DISEASE: ICD-10-CM

## 2024-04-15 RX ORDER — OMEPRAZOLE 40 MG/1
40 CAPSULE, DELAYED RELEASE ORAL DAILY
Qty: 90 CAPSULE | Refills: 0 | Status: SHIPPED | OUTPATIENT
Start: 2024-04-15

## 2024-04-24 ENCOUNTER — TELEPHONE (OUTPATIENT)
Age: 74
End: 2024-04-24

## 2024-04-24 DIAGNOSIS — M62.830 BACK MUSCLE SPASM: ICD-10-CM

## 2024-04-24 DIAGNOSIS — M79.18 MYOFASCIAL PAIN SYNDROME: ICD-10-CM

## 2024-04-24 RX ORDER — METHOCARBAMOL 750 MG/1
750 TABLET, FILM COATED ORAL EVERY 8 HOURS SCHEDULED
Qty: 270 TABLET | Refills: 1 | Status: SHIPPED | OUTPATIENT
Start: 2024-04-24 | End: 2024-10-21

## 2024-04-24 NOTE — TELEPHONE ENCOUNTER
Reason for call:   [x] Refill   [] Prior Auth  [] Other:     Office:   [x] PCP/Provider - [facility]OL[facility] / Aidan  [] Specialty/Provider -     Medication: methocarbamol    Dose/Frequency: 750mg q8    Quantity: 270    Pharmacy: [facility] - [facility] [id number]     Does the patient have enough for 3 days?   [x] Yes   [] No - Send as HP to POD

## 2024-04-24 NOTE — TELEPHONE ENCOUNTER
Patient called the RX Refill Line. Message is being forwarded to the office.     Patient wants Dr. Bermudez to know that she had her back surgery yesterday, 04/23/24, and everything went well.

## 2024-05-07 ENCOUNTER — APPOINTMENT (OUTPATIENT)
Dept: LAB | Facility: MEDICAL CENTER | Age: 74
End: 2024-05-07
Payer: MEDICARE

## 2024-05-07 DIAGNOSIS — M81.0 AGE RELATED OSTEOPOROSIS, UNSPECIFIED PATHOLOGICAL FRACTURE PRESENCE: ICD-10-CM

## 2024-05-07 DIAGNOSIS — M35.3 PMR (POLYMYALGIA RHEUMATICA) (HCC): Primary | ICD-10-CM

## 2024-05-07 LAB
25(OH)D3 SERPL-MCNC: 25 NG/ML (ref 30–100)
ANION GAP SERPL CALCULATED.3IONS-SCNC: 12 MMOL/L (ref 4–13)
BUN SERPL-MCNC: 15 MG/DL (ref 5–25)
CALCIUM SERPL-MCNC: 9.6 MG/DL (ref 8.4–10.2)
CHLORIDE SERPL-SCNC: 103 MMOL/L (ref 96–108)
CO2 SERPL-SCNC: 27 MMOL/L (ref 21–32)
CREAT SERPL-MCNC: 0.57 MG/DL (ref 0.6–1.3)
GFR SERPL CREATININE-BSD FRML MDRD: 92 ML/MIN/1.73SQ M
GLUCOSE P FAST SERPL-MCNC: 101 MG/DL (ref 65–99)
LDLC SERPL DIRECT ASSAY-MCNC: 179 MG/DL (ref 0–100)
POTASSIUM SERPL-SCNC: 4 MMOL/L (ref 3.5–5.3)
SODIUM SERPL-SCNC: 142 MMOL/L (ref 135–147)

## 2024-05-07 PROCEDURE — 82306 VITAMIN D 25 HYDROXY: CPT

## 2024-05-07 PROCEDURE — 36415 COLL VENOUS BLD VENIPUNCTURE: CPT

## 2024-05-07 PROCEDURE — 80048 BASIC METABOLIC PNL TOTAL CA: CPT

## 2024-05-10 ENCOUNTER — TELEPHONE (OUTPATIENT)
Dept: FAMILY MEDICINE CLINIC | Facility: CLINIC | Age: 74
End: 2024-05-10

## 2024-05-10 DIAGNOSIS — K58.9 IRRITABLE BOWEL SYNDROME WITHOUT DIARRHEA: ICD-10-CM

## 2024-05-10 DIAGNOSIS — E78.2 MIXED HYPERLIPIDEMIA: ICD-10-CM

## 2024-05-10 DIAGNOSIS — M54.16 LUMBAR RADICULOPATHY: ICD-10-CM

## 2024-05-10 DIAGNOSIS — Z87.19 HISTORY OF ESOPHAGEAL REFLUX: ICD-10-CM

## 2024-05-10 DIAGNOSIS — K21.9 GASTROESOPHAGEAL REFLUX DISEASE: ICD-10-CM

## 2024-05-10 DIAGNOSIS — I10 PRIMARY HYPERTENSION: ICD-10-CM

## 2024-05-10 DIAGNOSIS — G62.9 NEUROPATHY: ICD-10-CM

## 2024-05-10 DIAGNOSIS — R05.9 COUGH: ICD-10-CM

## 2024-05-10 RX ORDER — OMEPRAZOLE 40 MG/1
40 CAPSULE, DELAYED RELEASE ORAL DAILY
Qty: 90 CAPSULE | Refills: 3 | Status: SHIPPED | OUTPATIENT
Start: 2024-05-10

## 2024-05-10 RX ORDER — SIMVASTATIN 10 MG
TABLET ORAL
Qty: 48 TABLET | Refills: 3 | Status: SHIPPED | OUTPATIENT
Start: 2024-05-10

## 2024-05-10 RX ORDER — MONTELUKAST SODIUM 10 MG/1
10 TABLET ORAL
Qty: 90 TABLET | Refills: 3 | Status: SHIPPED | OUTPATIENT
Start: 2024-05-10

## 2024-05-10 RX ORDER — LOSARTAN POTASSIUM AND HYDROCHLOROTHIAZIDE 25; 100 MG/1; MG/1
1 TABLET ORAL DAILY
Qty: 90 TABLET | Refills: 3 | Status: SHIPPED | OUTPATIENT
Start: 2024-05-10

## 2024-05-10 RX ORDER — AMITRIPTYLINE HYDROCHLORIDE 50 MG/1
50 TABLET, FILM COATED ORAL
Qty: 90 TABLET | Refills: 3 | Status: SHIPPED | OUTPATIENT
Start: 2024-05-10

## 2024-05-10 RX ORDER — FAMOTIDINE 20 MG/1
20 TABLET, FILM COATED ORAL 2 TIMES DAILY
Qty: 180 TABLET | Refills: 3 | Status: SHIPPED | OUTPATIENT
Start: 2024-05-10 | End: 2025-05-05

## 2024-05-10 RX ORDER — GABAPENTIN 300 MG/1
CAPSULE ORAL
Qty: 360 CAPSULE | Refills: 3 | Status: SHIPPED | OUTPATIENT
Start: 2024-05-10

## 2024-05-10 RX ORDER — ROSUVASTATIN CALCIUM 10 MG/1
10 TABLET, COATED ORAL DAILY
Qty: 36 TABLET | Refills: 3 | Status: SHIPPED | OUTPATIENT
Start: 2024-05-10

## 2024-05-10 RX ORDER — DICYCLOMINE HYDROCHLORIDE 10 MG/1
10 CAPSULE ORAL 2 TIMES DAILY
Qty: 180 CAPSULE | Refills: 3 | Status: SHIPPED | OUTPATIENT
Start: 2024-05-10

## 2024-05-10 NOTE — TELEPHONE ENCOUNTER
Received fax from [facility] pharmacy for med refills but both rosuvastatin and simvastatin are listed to fill and they are both on pt's med list. Is she to be taking both? Please advise.

## 2024-05-15 ENCOUNTER — TELEPHONE (OUTPATIENT)
Dept: FAMILY MEDICINE CLINIC | Facility: CLINIC | Age: 74
End: 2024-05-15

## 2024-05-15 ENCOUNTER — OFFICE VISIT (OUTPATIENT)
Dept: FAMILY MEDICINE CLINIC | Facility: CLINIC | Age: 74
End: 2024-05-15
Payer: MEDICARE

## 2024-05-15 ENCOUNTER — TELEPHONE (OUTPATIENT)
Age: 74
End: 2024-05-15

## 2024-05-15 VITALS
HEIGHT: 61 IN | HEART RATE: 91 BPM | WEIGHT: 185.6 LBS | SYSTOLIC BLOOD PRESSURE: 126 MMHG | OXYGEN SATURATION: 96 % | RESPIRATION RATE: 18 BRPM | BODY MASS INDEX: 35.04 KG/M2 | DIASTOLIC BLOOD PRESSURE: 78 MMHG | TEMPERATURE: 97.9 F

## 2024-05-15 DIAGNOSIS — I10 PRIMARY HYPERTENSION: ICD-10-CM

## 2024-05-15 DIAGNOSIS — Z91.041 ALLERGIC TO IV CONTRAST: Primary | ICD-10-CM

## 2024-05-15 DIAGNOSIS — Z80.0 FAMILY HISTORY OF COLON CANCER: ICD-10-CM

## 2024-05-15 DIAGNOSIS — C64.2 RENAL CELL CARCINOMA OF LEFT KIDNEY (HCC): ICD-10-CM

## 2024-05-15 DIAGNOSIS — E55.9 VITAMIN D DEFICIENCY: ICD-10-CM

## 2024-05-15 DIAGNOSIS — E78.2 MIXED HYPERLIPIDEMIA: ICD-10-CM

## 2024-05-15 DIAGNOSIS — M48.062 LUMBAR STENOSIS WITH NEUROGENIC CLAUDICATION: Primary | ICD-10-CM

## 2024-05-15 PROCEDURE — 99214 OFFICE O/P EST MOD 30 MIN: CPT | Performed by: INTERNAL MEDICINE

## 2024-05-15 PROCEDURE — G2211 COMPLEX E/M VISIT ADD ON: HCPCS | Performed by: INTERNAL MEDICINE

## 2024-05-15 RX ORDER — NALOXONE HYDROCHLORIDE 4 MG/.1ML
SPRAY NASAL
Qty: 1 EACH | Refills: 1 | Status: SHIPPED | OUTPATIENT
Start: 2024-05-15 | End: 2025-05-15

## 2024-05-15 RX ORDER — DIPHENHYDRAMINE HCL 25 MG
TABLET ORAL
Qty: 2 TABLET | Refills: 0 | Status: SHIPPED | OUTPATIENT
Start: 2024-05-15

## 2024-05-15 RX ORDER — OXYCODONE HYDROCHLORIDE AND ACETAMINOPHEN 5; 325 MG/1; MG/1
1 TABLET ORAL EVERY 6 HOURS PRN
COMMUNITY
Start: 2024-05-08

## 2024-05-15 RX ORDER — METHYLPREDNISOLONE 32 MG/1
TABLET ORAL
Qty: 2 TABLET | Refills: 0 | Status: SHIPPED | OUTPATIENT
Start: 2024-05-15

## 2024-05-15 RX ORDER — DIAZEPAM 10 MG/1
10 TABLET ORAL ONCE
Qty: 1 TABLET | Refills: 0 | Status: SHIPPED | OUTPATIENT
Start: 2024-05-15 | End: 2024-05-15

## 2024-05-15 NOTE — TELEPHONE ENCOUNTER
Called and left VM for patient to return call to the office to schedule appointment with MD post MRI and discuss contrast allergy.

## 2024-05-15 NOTE — TELEPHONE ENCOUNTER
Patient of Dr. Jensen at     Patient called stating she is having her MRI done on 07/11/24  [facility] and she needs to know who can she follow up with after she gets her MRI since DR. Jensen will not be in the office anymore.  She does not want to follow up in [place].  She wants to stay at  . She also mentioned she is allergic to the dye and she usually get the allergy prep send it to the pharmacy.  She takes day before and one prior the mri.     [facility] in [place]     She would like a call back at [phone]

## 2024-05-15 NOTE — PROGRESS NOTES
Ambulatory Visit  Name: Ingrid Wheatley      : 1950      MRN: [id number]  Encounter Provider: Leslie Bermudez DO  Encounter Date: 5/15/2024   Encounter department: [facility]    Assessment & Plan   1. Lumbar stenosis with neurogenic claudication  Pt recovering as expected from recent surgery and has followup in   She is limited with activity at this time but feels the surgery was beneficial already  2. Mixed hyperlipidemia  Recent labs improved and pt is continuing on alternate statin dosing   3. Family history of colon cancer  Pt recently had back surgery so will address once recovered   4. Primary hypertension  Lo sodium diet [place] stable on current rx        History of Present Illness     HPI  Pt had back surgery and is recovering as expected She does feel it has helped already Still has some leg sxs but told that is expected at this point from the surgery She is back to work No chest pain or sob No acute sxs She did have recent labs done and happy cholesterol has improved She is limited in activity since back surgery but hoping to get back to exercise once seen by the surgeon in     Review of Systems   Constitutional:  Positive for activity change. Negative for chills and fever.   HENT: Negative.     Eyes:  Negative for visual disturbance.   Respiratory:  Negative for cough and shortness of breath.    Cardiovascular: Negative.    Gastrointestinal: Negative.  Negative for abdominal distention, abdominal pain and constipation.   Genitourinary: Negative.    Musculoskeletal:  Positive for arthralgias and back pain.   Neurological:  Negative for dizziness, light-headedness and headaches.   Psychiatric/Behavioral:  Negative for sleep disturbance. The patient is not nervous/anxious.      Past Medical History:   Diagnosis Date    Abnormal findings on diagnostic imaging of breast     Abnormal Pap smear of cervix     Arthritis     Chronic pain disorder     Closed fracture of proximal end of  "left fibula 06/04/2021    Extremity pain     Fibrocystic breast disease     Foot pain     GERD (gastroesophageal reflux disease)     Hyperlipidemia     Hypertension     Interstitial cystitis     Joint pain     Low back pain     Osteoarthritis     Osteopenia     Uncomplicated opioid dependence ([facility]) 03/01/2022           Objective     /78   Pulse 91   Temp 97.9 °F (36.6 °C) (Temporal)   Resp 18   Ht 5' 1\" (1.549 m)   Wt 84.2 kg (185 lb 9.6 oz)   LMP  (LMP Unknown)   SpO2 96%   BMI 35.07 kg/m²     Physical Exam  Vitals and nursing note reviewed.   Constitutional:       General: She is not in acute distress.     Appearance: Normal appearance. She is not ill-appearing, toxic-appearing or diaphoretic.   HENT:      Head: Normocephalic and atraumatic.      Right Ear: External ear normal.      Left Ear: External ear normal.      Nose: Nose normal.      Mouth/Throat:      Mouth: Mucous membranes are moist.   Eyes:      General: No scleral icterus.     Extraocular Movements: Extraocular movements intact.      Conjunctiva/sclera: Conjunctivae normal.      Pupils: Pupils are equal, round, and reactive to light.   Cardiovascular:      Rate and Rhythm: Normal rate and regular rhythm.      Pulses: Normal pulses.      Heart sounds: Normal heart sounds.   Pulmonary:      Effort: Pulmonary effort is normal. No respiratory distress.      Breath sounds: Normal breath sounds. No wheezing.   Abdominal:      General: Bowel sounds are normal. There is no distension.      Palpations: Abdomen is soft.      Tenderness: There is no abdominal tenderness.   Musculoskeletal:         General: No swelling.      Cervical back: Normal range of motion and neck supple.      Right lower leg: No edema.      Left lower leg: No edema.   Skin:     General: Skin is warm and dry.      Coloration: Skin is not jaundiced or pale.   Neurological:      General: No focal deficit present.      Mental Status: She is alert and oriented to person, place, " and time. Mental status is at baseline.      Cranial Nerves: No cranial nerve deficit.      Gait: Gait normal.   Psychiatric:         Mood and Affect: Mood normal.         Behavior: Behavior normal.         Thought Content: Thought content normal.         Judgment: Judgment normal.       Administrative Statements

## 2024-05-15 NOTE — TELEPHONE ENCOUNTER
Received transferred call from patient. She is scheduled for MRI on 7/11 at an outside facility which uses a semi-open air MRI machine. Patient still has issues with semi open MRI and is requesting script for Valium be sent to [facility] to take prior to MRI. Aware she will need a  if taking Valium. Also discussed contrast allergy with patient as contrast allergy listed in chart is for Iodinated Contrast Media which is used for CT imaging. Explained to patient that MRI contrast is different, however patient reports allergy prep was still called in for her with last MRI and she would feel more comfortable with prep. She requests this be sent to [facility] as well. Patient scheduled for an appointment with Dr. Garcia in [place] on 7/19/24. She will have MRI results faxed to the office for review prior to appointment.

## 2024-05-15 NOTE — TELEPHONE ENCOUNTER
PCP office received fax from [facility] stating pt needs a prior auth on her Dicyclomine 10mg caps. When submitted through CMM, gave me key of: [id number]. Please advise when insurance responds.

## 2024-05-17 ENCOUNTER — PATIENT MESSAGE (OUTPATIENT)
Dept: FAMILY MEDICINE CLINIC | Facility: CLINIC | Age: 74
End: 2024-05-17

## 2024-05-17 ENCOUNTER — TELEPHONE (OUTPATIENT)
Age: 74
End: 2024-05-17

## 2024-05-20 NOTE — TELEPHONE ENCOUNTER
PA for dicyclomine (BENTYL) 10 mg capsule     Submitted via    [x]CMM-KEY [id number]  []Surescripts-Case ID #   []Faxed to plan   []Other website   []Phone call Case ID #     Office notes sent, clinical questions answered. Awaiting determination    Turnaround time for your insurance to make a decision on your Prior Authorization can take 7-21 business days.

## 2024-05-20 NOTE — TELEPHONE ENCOUNTER
PA for dicyclomine (BENTYL) 10 mg capsule  Approved   Date(s) approved until 12/31/24  Case #    Patient advised by [x] [employer] Message                      [x] Phone call       Pharmacy advised by [x]Fax                                     []Phone call    Approval letter scanned into [employer] Yes

## 2024-05-21 PROBLEM — Z98.890 PONV (POSTOPERATIVE NAUSEA AND VOMITING): Status: RESOLVED | Noted: 2023-10-17 | Resolved: 2024-05-21

## 2024-05-21 PROBLEM — R11.2 PONV (POSTOPERATIVE NAUSEA AND VOMITING): Status: RESOLVED | Noted: 2023-10-17 | Resolved: 2024-05-21

## 2024-05-22 NOTE — TELEPHONE ENCOUNTER
PA for EPINEPHrine (EPIPEN) 0.3 mg/0.3 mL     Submitted via    [x]CMM-KEY [id number]  []Surescripts-Case ID #   []Faxed to plan   []Other website   []Phone call Case ID #     Office notes sent, clinical questions answered. Awaiting determination    Turnaround time for your insurance to make a decision on your Prior Authorization can take 7-21 business days.

## 2024-05-30 DIAGNOSIS — Z91.030 BEE STING ALLERGY: ICD-10-CM

## 2024-05-30 NOTE — TELEPHONE ENCOUNTER
Patient called to request a refill for their EPINEPHrine (EPIPEN) 0.3 mg/0.3 mL SOAJ be sent to [facility] in [place]. Advised a refill was requested by the pharmacy earlier today and is pending approval. Patient verbalized understanding and is in agreement.

## 2024-05-31 RX ORDER — EPINEPHRINE 0.3 MG/.3ML
0.3 INJECTION SUBCUTANEOUS AS NEEDED
Qty: 2 EACH | Refills: 1 | Status: SHIPPED | OUTPATIENT
Start: 2024-05-31

## 2024-06-05 NOTE — PROGRESS NOTES
PT Evaluation     Today's date: 2024  Patient name: Ingrid Wheatley  : 1950  MRN: [id number]  Referring provider: Julius Damon MD  Dx:   Encounter Diagnosis     ICD-10-CM    1. 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 Chronic pain syndrome  G89.4                        Assessment    Assessment details:   CURRENT FUNCTIONAL STATUS    Standing/ADL tolerance 15-20 minutes.    Walking tolerance 2 blocks.   Ability to bend forward: Fair  Ability to reach overhead: Poor  Lifting Tolerance: 10 lbs  Ability to vacuum: Fair  Unable to ride her exercise bike.       SHORT TERM GOALS (2 WEEKS)    Lumbar spine AROM 10 % in all planes.  Improve abdominal muscle control.   Decrease pain to 2-6/10.  Standing/ADL tolerance 30-45 minutes.    Walking tolerance 4 blocks.   Ability to bend forward: Fair/Good  Ability to reach overhead: Fair  Lifting Tolerance: 15 lbs  Ability to vacuum: Fair/Good  Able to ride her exercise bike.       LONG TERM GOALS (DISCHARGE)    Lumbar Spine AROM: F=90 %, EXT=50 %, R SB=75 %, L SB=75 %.  Lower Extremity Strength: Grossly 5/5 t/o.  Good control of abdominal muscles during abdominal bracing.  Decrease pain to 1-4/10.  Standing/ADL tolerance 60 minutes.    Walking tolerance 6-8 blocks.   Ability to bend forward: Good  Ability to reach overhead: Good  Lifting Tolerance: 20 lbs  Ability to vacuum: Good  Able to ride her exercise bike.   Understanding of Dx/Px/POC: good     Prognosis: good    Goals  See assessment details above.      Plan  Patient would benefit from: skilled physical therapy    Planned therapy interventions: self care, therapeutic activities, therapeutic exercise, neuromuscular re-education and home exercise program    Frequency: 2x week  Duration in weeks: 4  Plan details: Ingrid Wheatley is a 74 y.o. female presenting to PT with pain, decreased range of motion, and decreased tolerance to activity. This patient would benefit from skilled PT services to address these issues  "and to maximize function. A home exercise program was provided and all questions were answered. Thank you for the referral.        Subjective Evaluation    History of Present Illness  Mechanism of injury: CC: Bilateral low back and thigh pain. Denies having any weakness or parasthesias.  HPI: The patient has had chronic back pain since having a herniated disc when she was in her twenties. She had a laminectomy and had good relief for years. She then developed chronic back pain for which she had a pain stimulator implanted in May 2016. She had the pain stimulator reimplanted on 2021. She had a posterior fusion of L3-L4 on 2024. 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 Her current restrictions are bending and twisting as tolerated, and no lifting greater than 20 lbs.  Patient Goals  Patient goals for therapy: decreased pain and increased motion  Patient goal: Improved tolerance for bending, standing, walking, and lifting.  Pain  Current pain ratin  At best pain rating: 3  At worst pain rating: 10  Location: Low back 3-5/10, bilateral thighs 5-10/10.        Objective     Postural Observations    Additional Postural Observation Details  Gait and transfers are normal, no lateral shift.    General Comments:      Lumbar Comments  CURRENT OBJECTIVE MEASUREMENTS    Lumbar Spine AROM: F=75 %, EXT=25 % with low back pain, R SB=50 %, L SB=75 %.  Lower Extremity Strength: Grossly 5/5 t/o.  Bilateral Lower Extremity reflexes: +2  Sensation Intact to light touch bilateral lower extremities.  Fair control of abdominal muscles during abdominal bracing.                    Precautions: No lifting > 20 lbs, bending and twisting to tolerance.        Manuals                        Neuro Re-Ed            AB Supine 10x5\" BID HEP          AB with Single Leg Raises Supine            AB with Double Leg Raises and ball squeeze Raises Supine            AB with SLR          " "  Double leg bridging  10x5\" BID HEP          Bridging with ball squeeze            Bridging with TB ABD            Bridging with marching                         Ther Ex            TB Row            TB SA Pulldown            TB Oblique Press             NuStep for endurance:  S 7, A 9  L3   10 min          Ther Activity                         Modalities                                               "

## 2024-06-06 ENCOUNTER — EVALUATION (OUTPATIENT)
Dept: PHYSICAL THERAPY | Facility: CLINIC | Age: 74
End: 2024-06-06
Payer: MEDICARE

## 2024-06-06 DIAGNOSIS — C64.9 RENAL CELL CARCINOMA, UNSPECIFIED LATERALITY (HCC): Primary | ICD-10-CM

## 2024-06-06 DIAGNOSIS — G89.4 CHRONIC PAIN SYNDROME: ICD-10-CM

## 2024-06-06 DIAGNOSIS — M43.26 FUSION OF LUMBAR SPINE: Primary | ICD-10-CM

## 2024-06-06 PROCEDURE — 97162 PT EVAL MOD COMPLEX 30 MIN: CPT | Performed by: PHYSICAL THERAPIST

## 2024-06-06 PROCEDURE — 97110 THERAPEUTIC EXERCISES: CPT | Performed by: PHYSICAL THERAPIST

## 2024-06-06 PROCEDURE — 97535 SELF CARE MNGMENT TRAINING: CPT | Performed by: PHYSICAL THERAPIST

## 2024-06-06 NOTE — LETTER
2024    Julius Damon MD  [address] [facility]  [address]  [place] [address]    Patient: Ingrid Wheatley   YOB: 1950   Date of Visit: 2024     Encounter Diagnosis     ICD-10-CM    1. 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 Chronic pain syndrome  G89.4           Dear Dr. Damon:    Thank you for your recent referral of Ingrid Wheatley. Please review the attached evaluation summary from Ingrid's recent visit.     Please verify that you agree with the plan of care by signing the attached order.     If you have any questions or concerns, please do not hesitate to call.     I sincerely appreciate the opportunity to share in the care of one of your patients and hope to have another opportunity to work with you in the near future.       Sincerely,    Gus Sinclair, PT      Referring Provider:      I certify that I have read the below Plan of Care and certify the need for these services furnished under this plan of treatment while under my care.                    Julius Damon MD  [address] [facility]  [address]  [place] [address]  Via Fax: [phone]          PT Evaluation     Today's date: 2024  Patient name: Ingrid Wheatley  : 1950  MRN: [id number]  Referring provider: Julius Damon MD  Dx:   Encounter Diagnosis     ICD-10-CM    1. 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 Chronic pain syndrome  G89.4                        Assessment    Assessment details:   CURRENT FUNCTIONAL STATUS    Standing/ADL tolerance 15-20 minutes.    Walking tolerance 2 blocks.   Ability to bend forward: Fair  Ability to reach overhead: Poor  Lifting Tolerance: 10 lbs  Ability to vacuum: Fair  Unable to ride her exercise bike.       SHORT TERM GOALS (2 WEEKS)    Lumbar spine AROM 10 % in all planes.  Improve abdominal muscle control.   Decrease pain to 2-6/10.  Standing/ADL tolerance 30-45 minutes.    Walking tolerance 4 blocks.   Ability to bend forward: Fair/Good  Ability to reach overhead:  Fair  Lifting Tolerance: 15 lbs  Ability to vacuum: Fair/Good  Able to ride her exercise bike.       LONG TERM GOALS (DISCHARGE)    Lumbar Spine AROM: F=90 %, EXT=50 %, R SB=75 %, L SB=75 %.  Lower Extremity Strength: Grossly 5/5 t/o.  Good control of abdominal muscles during abdominal bracing.  Decrease pain to 1-4/10.  Standing/ADL tolerance 60 minutes.    Walking tolerance 6-8 blocks.   Ability to bend forward: Good  Ability to reach overhead: Good  Lifting Tolerance: 20 lbs  Ability to vacuum: Good  Able to ride her exercise bike.   Understanding of Dx/Px/POC: good     Prognosis: good    Goals  See assessment details above.      Plan  Patient would benefit from: skilled physical therapy    Planned therapy interventions: self care, therapeutic activities, therapeutic exercise, neuromuscular re-education and home exercise program    Frequency: 2x week  Duration in weeks: 4  Plan details: Ingrid Wheatley is a 74 y.o. female presenting to PT with pain, decreased range of motion, and decreased tolerance to activity. This patient would benefit from skilled PT services to address these issues and to maximize function. A home exercise program was provided and all questions were answered. Thank you for the referral.        Subjective Evaluation    History of Present Illness  Mechanism of injury: CC: Bilateral low back and thigh pain. Denies having any weakness or parasthesias.  HPI: The patient has had chronic back pain since having a herniated disc when she was in her twenties. She had a laminectomy and had good relief for years. She then developed chronic back pain for which she had a pain stimulator implanted in May 2016. She had the pain stimulator reimplanted on 2/23/2021. She had a posterior fusion of L3-L4 on 4/23/2024. 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 Her current restrictions are bending and twisting as tolerated, and no lifting greater than 20  "lbs.  Patient Goals  Patient goals for therapy: decreased pain and increased motion  Patient goal: Improved tolerance for bending, standing, walking, and lifting.  Pain  Current pain ratin  At best pain rating: 3  At worst pain rating: 10  Location: Low back 3-5/10, bilateral thighs 5-10/10.        Objective     Postural Observations    Additional Postural Observation Details  Gait and transfers are normal, no lateral shift.    General Comments:      Lumbar Comments  CURRENT OBJECTIVE MEASUREMENTS    Lumbar Spine AROM: F=75 %, EXT=25 % with low back pain, R SB=50 %, L SB=75 %.  Lower Extremity Strength: Grossly 5/5 t/o.  Bilateral Lower Extremity reflexes: +2  Sensation Intact to light touch bilateral lower extremities.  Fair control of abdominal muscles during abdominal bracing.                    Precautions: No lifting > 20 lbs, bending and twisting to tolerance.        Manuals                        Neuro Re-Ed            AB Supine 10x5\" BID HEP          AB with Single Leg Raises Supine            AB with Double Leg Raises and ball squeeze Raises Supine            AB with SLR            Double leg bridging  10x5\" BID HEP          Bridging with ball squeeze            Bridging with TB ABD            Bridging with marching                         Ther Ex            TB Row            TB SA Pulldown            TB Oblique Press             NuStep for endurance:  S 7, A 9  L3   10 min          Ther Activity                         Modalities                                                               "

## 2024-06-13 ENCOUNTER — OFFICE VISIT (OUTPATIENT)
Dept: PHYSICAL THERAPY | Facility: CLINIC | Age: 74
End: 2024-06-13
Payer: MEDICARE

## 2024-06-13 DIAGNOSIS — G89.4 CHRONIC PAIN SYNDROME: ICD-10-CM

## 2024-06-13 DIAGNOSIS — M43.26 FUSION OF LUMBAR SPINE: Primary | ICD-10-CM

## 2024-06-13 PROCEDURE — 97112 NEUROMUSCULAR REEDUCATION: CPT | Performed by: PHYSICAL THERAPIST

## 2024-06-13 PROCEDURE — 97110 THERAPEUTIC EXERCISES: CPT | Performed by: PHYSICAL THERAPIST

## 2024-06-13 PROCEDURE — 97535 SELF CARE MNGMENT TRAINING: CPT | Performed by: PHYSICAL THERAPIST

## 2024-06-13 NOTE — PROGRESS NOTES
"Daily Note     Today's date: 2024  Patient name: Ingrid Wheatley  : 1950  MRN: [id number]  Referring provider: Julius Damon MD  Dx:   Encounter Diagnosis     ICD-10-CM    1. 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 Chronic pain syndrome  G89.4                      Subjective: Patient is sore after being on her feet for 6 hours yesterday. She also has some cramping in the back of the left thigh.      Objective: Significant left hamstring tightness.      Assessment: Tolerated treatment well. Patient exhibited good technique with therapeutic exercises      Plan: Continue per plan of care.         Precautions: No lifting > 20 lbs, bending and twisting to tolerance.        Manuals                       Neuro Re-Ed            AB Supine 10x5\" BID HEP 10x5\"         AB with Single Leg Raises Supine            AB with Double Leg Raises             AB with SLR            Double leg bridging  10x5\" BID HEP 10x5\"         Bridging with ball squeeze   10x         Bridging with TB ABD   L3 10x         Bridging with marching                           Ther Ex            TB Mid Row   L2 10x         TB Low Row   L2 10x         TB Oblique Press            TB SA Pulldown  L1 10x       Left hamstring stretch with strap  30\"x3 BID HEP       Back Ext:  F4, P4, C0          NuStep for endurance:  S 7, A 9  L3   10 min L3 10 min         Ther Activity                         Modalities                                    "

## 2024-06-17 ENCOUNTER — OFFICE VISIT (OUTPATIENT)
Dept: PHYSICAL THERAPY | Facility: CLINIC | Age: 74
End: 2024-06-17
Payer: MEDICARE

## 2024-06-17 DIAGNOSIS — M43.26 FUSION OF LUMBAR SPINE: Primary | ICD-10-CM

## 2024-06-17 DIAGNOSIS — G89.4 CHRONIC PAIN SYNDROME: ICD-10-CM

## 2024-06-17 PROCEDURE — 97110 THERAPEUTIC EXERCISES: CPT | Performed by: PHYSICAL THERAPIST

## 2024-06-17 PROCEDURE — 97112 NEUROMUSCULAR REEDUCATION: CPT | Performed by: PHYSICAL THERAPIST

## 2024-06-17 NOTE — PROGRESS NOTES
"Daily Note     Today's date: 2024  Patient name: Ingrid Wheatley  : 1950  MRN: [id number]  Referring provider: Julius Damon MD  Dx:   Encounter Diagnosis     ICD-10-CM    1. 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 Chronic pain syndrome  G89.4                      Subjective: Patient states that she was sore in her low back and legs after the last session. She thinks that the hamstring stretch with the strap may have been too much stretching since it was so tight. Now it is only in the low back. She has been swimming with good relief noted afterward.      Objective: See treatment diary below      Assessment: Tolerated treatment well. Patient exhibited good technique with therapeutic exercises      Plan: Progress treatment as tolerated.       Precautions: No lifting > 20 lbs, bending and twisting to tolerance.        Manuals                      Neuro Re-Ed            AB Supine 10x5\" BID HEP 10x5\" 10x5\"        AB with Single Leg Raises Supine            AB with Double Leg Raises             AB with SLR            Double leg bridging  10x5\" BID HEP 10x5\" 10x5\"        Bridging with ball squeeze   10x 10x        Bridging with TB ABD   L3 10x L3 10x        Bridging with marching                           Ther Ex            TB Mid Row   L2 10x L2 2/10        TB Low Row   L2 10x L2 2/10        TB Oblique Press            TB SA Pulldown  L1 10x L1 2/10      Left hamstring stretch with strap  30\"x3 BID HEP 30\"x3      Back Ext:  F4, P4, C0   P5 3/10       NuStep for endurance:  S 7, A 9  L3   10 min L3 10 min L3 10 min        Ther Activity                         Modalities                                      "

## 2024-06-20 ENCOUNTER — OFFICE VISIT (OUTPATIENT)
Dept: PHYSICAL THERAPY | Facility: CLINIC | Age: 74
End: 2024-06-20
Payer: MEDICARE

## 2024-06-20 DIAGNOSIS — M43.26 FUSION OF LUMBAR SPINE: Primary | ICD-10-CM

## 2024-06-20 DIAGNOSIS — G89.4 CHRONIC PAIN SYNDROME: ICD-10-CM

## 2024-06-20 PROCEDURE — 97110 THERAPEUTIC EXERCISES: CPT | Performed by: PHYSICAL THERAPIST

## 2024-06-20 PROCEDURE — 97112 NEUROMUSCULAR REEDUCATION: CPT | Performed by: PHYSICAL THERAPIST

## 2024-06-20 NOTE — PROGRESS NOTES
"Daily Note     Today's date: 2024  Patient name: Ingrid Wheatley  : 1950  MRN: [id number]  Referring provider: Julius Damon MD  Dx:   Encounter Diagnosis     ICD-10-CM    1. 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 Chronic pain syndrome  G89.4                      Subjective: Patient is sore in the left low back and left thigh. Her left thigh is also cramping. She has been very active.       Objective: See treatment diary below      Assessment: Tolerated treatment well. Patient exhibited good technique with therapeutic exercises      Plan: Progress treatment as tolerated.       Precautions: No lifting > 20 lbs, bending and twisting to tolerance.        Manuals                     Neuro Re-Ed            AB Supine 10x5\" BID HEP 10x5\" 10x5\" 10x5\"       AB with Single Leg Raises Supine            AB with Double Leg Raises             AB with SLR            Double leg bridging  10x5\" BID HEP 10x5\" 10x5\" 10x5\"       Bridging with ball squeeze   10x 10x 10x       Bridging with TB ABD   L3 10x L3 10x L3 10x       Bridging with marching                           Ther Ex            TB Mid Row   L2 10x L2 2/10 L2 2/10       TB Low Row   L2 10x L2 2/10 L2 2/10       TB Oblique Press            TB SA Pulldown  L1 10x L1 2/10 L1 2/10     Left hamstring stretch with strap  30\"x3 BID HEP 30\"x3 30\"x3     Back Ext:  F4, P4, C0   P5 3/10 P5 3/10      NuStep for endurance:  S 7, A 9  L3   10 min L3 10 min L3 10 min L3 10 min       Ther Activity                         Modalities                                        "

## 2024-06-24 ENCOUNTER — OFFICE VISIT (OUTPATIENT)
Dept: PHYSICAL THERAPY | Facility: CLINIC | Age: 74
End: 2024-06-24
Payer: MEDICARE

## 2024-06-24 DIAGNOSIS — M43.26 FUSION OF LUMBAR SPINE: Primary | ICD-10-CM

## 2024-06-24 DIAGNOSIS — G89.4 CHRONIC PAIN SYNDROME: ICD-10-CM

## 2024-06-24 PROCEDURE — 97112 NEUROMUSCULAR REEDUCATION: CPT | Performed by: PHYSICAL THERAPIST

## 2024-06-24 PROCEDURE — 97110 THERAPEUTIC EXERCISES: CPT | Performed by: PHYSICAL THERAPIST

## 2024-06-24 NOTE — PROGRESS NOTES
"Daily Note     Today's date: 2024  Patient name: Ingrid Wheatley  : 1950  MRN: [id number]  Referring provider: Julius Damon MD  Dx:   Encounter Diagnosis     ICD-10-CM    1. 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 Chronic pain syndrome  G89.4                      Subjective: Patient is sore in the left low back and buttock today. Her left shoulder is also sore.   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      Objective: Extension and right sidebending are mildly painful in the left low back.      Assessment: Tolerated treatment well. Lumbar extension and right sidebending AROM was less painful after treatment.      Plan: Continue per plan of care.      Precautions: No lifting > 20 lbs, bending and twisting to tolerance.        Manuals                   Neuro Re-Ed            AB Supine 10x5\" BID HEP 10x5\" 10x5\" 10x5\"  30x5\"     AB with Single Leg Raises Supine            AB with Double Leg Raises             AB with SLR            Double leg bridging  10x5\" BID HEP 10x5\" 10x5\" 10x5\"  30x5\"     Bridging with ball squeeze   10x 10x 10x  30x     Bridging with TB ABD   L3 10x L3 10x L3 10x  L4 30x     Bridging with marching                           Ther Ex            TB Mid Row   L2 10x L2 2/10 L2 2/10 NV     TB Low Row   L2 10x L2 2/10 L2 2/10 NV     TB Oblique Press           TB SA Pulldown  L1 10x L1 2/10 L1 2/10 NV    Left hamstring stretch with strap  30\"x3 BID HEP 30\"x3 30\"x3 5\"x15    Back Ext:  F4, P4, C0   P5 3/10 P5 3/10 P5 3/10     NuStep for endurance:  S 7, A 9  L3   10 min L3 10 min L3 10 min L3 10 min  L3 10 min     Ther Activity                         Modalities                                          "

## 2024-06-25 ENCOUNTER — APPOINTMENT (OUTPATIENT)
Dept: LAB | Facility: MEDICAL CENTER | Age: 74
End: 2024-06-25
Payer: MEDICARE

## 2024-06-25 DIAGNOSIS — Z01.812 PRE-OPERATIVE LABORATORY EXAMINATION: Primary | ICD-10-CM

## 2024-06-25 LAB
ALBUMIN SERPL BCG-MCNC: 4.2 G/DL (ref 3.5–5)
ALP SERPL-CCNC: 61 U/L (ref 34–104)
ALT SERPL W P-5'-P-CCNC: 19 U/L (ref 7–52)
ANION GAP SERPL CALCULATED.3IONS-SCNC: 8 MMOL/L (ref 4–13)
AST SERPL W P-5'-P-CCNC: 20 U/L (ref 13–39)
BILIRUB SERPL-MCNC: 0.41 MG/DL (ref 0.2–1)
BUN SERPL-MCNC: 13 MG/DL (ref 5–25)
CALCIUM SERPL-MCNC: 9.4 MG/DL (ref 8.4–10.2)
CHLORIDE SERPL-SCNC: 105 MMOL/L (ref 96–108)
CO2 SERPL-SCNC: 28 MMOL/L (ref 21–32)
CREAT SERPL-MCNC: 0.57 MG/DL (ref 0.6–1.3)
GFR SERPL CREATININE-BSD FRML MDRD: 92 ML/MIN/1.73SQ M
GLUCOSE P FAST SERPL-MCNC: 128 MG/DL (ref 65–99)
POTASSIUM SERPL-SCNC: 3.7 MMOL/L (ref 3.5–5.3)
PROT SERPL-MCNC: 6.8 G/DL (ref 6.4–8.4)
SODIUM SERPL-SCNC: 141 MMOL/L (ref 135–147)

## 2024-06-25 PROCEDURE — 80053 COMPREHEN METABOLIC PANEL: CPT

## 2024-06-25 PROCEDURE — 36415 COLL VENOUS BLD VENIPUNCTURE: CPT

## 2024-06-27 ENCOUNTER — OFFICE VISIT (OUTPATIENT)
Dept: PHYSICAL THERAPY | Facility: CLINIC | Age: 74
End: 2024-06-27
Payer: MEDICARE

## 2024-06-27 DIAGNOSIS — M43.26 FUSION OF LUMBAR SPINE: Primary | ICD-10-CM

## 2024-06-27 DIAGNOSIS — G89.4 CHRONIC PAIN SYNDROME: ICD-10-CM

## 2024-06-27 PROCEDURE — 97110 THERAPEUTIC EXERCISES: CPT | Performed by: PHYSICAL THERAPIST

## 2024-06-27 PROCEDURE — 97112 NEUROMUSCULAR REEDUCATION: CPT | Performed by: PHYSICAL THERAPIST

## 2024-06-27 NOTE — PROGRESS NOTES
"Daily Note     Today's date: 2024  Patient name: Ingrid Wheatley  : 1950  MRN: [id number]  Referring provider: Julius Damon MD  Dx:   Encounter Diagnosis     ICD-10-CM    1. 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 Chronic pain syndrome  G89.4                      Subjective: Patient has a mild soreness and pulling sensation in her low back, worse on the left side, mostly in the evening. It       Objective: Lumbar extension is moderately limited by left L-S discomfort and tightness.      Assessment: Tolerated treatment well. Patient exhibited good technique with therapeutic exercises and would benefit from continued PT      Plan: Progress treatment as tolerated.       Precautions: No lifting > 20 lbs, bending and twisting to tolerance.        Manuals                 Neuro Re-Ed            AB Supine 10x5\" BID HEP 10x5\" 10x5\" 10x5\"  30x5\"  30x5\"   AB with Single Leg Raises Supine            AB with Double Leg Raises             AB with SLR            Double leg bridging  10x5\" BID HEP 10x5\" 10x5\" 10x5\"  30x5\"  30x5\"   Bridging with ball squeeze   10x 10x 10x  30x  30x   Bridging with TB ABD   L3 10x L3 10x L3 10x  L4 30x  L4 30x   Bridging with marching                           Ther Ex            TB Mid Row   L2 10x L2 2/10 L2 2/10 NV  L2 2/10   TB Low Row   L2 10x L2 2/10 L2 2/10 NV  L2 2/10   TB Oblique Press           TB SA Pulldown  L1 10x L1 2/10 L1 2/10 NV L1 2/10   Left hamstring stretch with strap  30\"x3 BID HEP 30\"x3 30\"x3 5\"x15 5\"x15   Back Ext:  F4, P4, C0   P5 3/10 P5 3/10 P5 3/10 P5 3/10    NuStep for endurance:  S 7, A 9  L3   10 min L3 10 min L3 10 min L3 10 min  L3 10 min  L3 10 min   Standing back bends      10x2\" TID HEP   Ther Activity                         Modalities                                            "

## 2024-07-01 ENCOUNTER — OFFICE VISIT (OUTPATIENT)
Dept: PHYSICAL THERAPY | Facility: CLINIC | Age: 74
End: 2024-07-01
Payer: MEDICARE

## 2024-07-01 DIAGNOSIS — M43.26 FUSION OF LUMBAR SPINE: Primary | ICD-10-CM

## 2024-07-01 DIAGNOSIS — G89.4 CHRONIC PAIN SYNDROME: ICD-10-CM

## 2024-07-01 PROCEDURE — 97110 THERAPEUTIC EXERCISES: CPT | Performed by: PHYSICAL THERAPIST

## 2024-07-01 PROCEDURE — 97112 NEUROMUSCULAR REEDUCATION: CPT | Performed by: PHYSICAL THERAPIST

## 2024-07-01 NOTE — PROGRESS NOTES
PT Re-Evaluation     Today's date: 2024  Patient name: Ingrid Wheatley  : 1950  MRN: [id number]  Referring provider: Julius Damon MD  Dx:   Encounter Diagnosis     ICD-10-CM    1. 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 Chronic pain syndrome  G89.4                          Assessment    Assessment details:   CURRENT FUNCTIONAL STATUS    Standing/ADL tolerance 60 minutes.    Walking tolerance 8 blocks.   Ability to bend forward: Good  Ability to reach overhead: Good  Lifting Tolerance: 20 lbs  Ability to vacuum: Good  Unable to ride her exercise bike.       SHORT TERM GOALS (2 WEEKS)    Lumbar spine AROM 10 % in all planes.  Improve abdominal muscle control.   Decrease pain to 1-4/10.  Standing/ADL tolerance 90 minutes.    Walking tolerance 10 blocks.   Ability to bend forward: Good  Ability to reach overhead: Good  Ability to vacuum: Good  Able to ride her exercise bike.       LONG TERM GOALS (DISCHARGE)    Lumbar Spine AROM: F=90 %, EXT=50 %, R SB=75 %, L SB=75 %.-partially met  Lower Extremity Strength: Grossly 5/5 t/o.-met  Good control of abdominal muscles during abdominal bracing.-partially met  Decrease pain to 0-3/10.-partially met  Standing/ADL tolerance 2 hours.-partially met    Walking tolerance 12 blocks.-partially met   Ability to bend forward: Good-met  Ability to reach overhead: Good-met  Lifting Tolerance: 20 lbs-met  Ability to vacuum: Good-met  Able to ride her exercise bike.-not met  Understanding of Dx/Px/POC: good     Prognosis: good    Goals  See assessment details above.      Plan  Patient would benefit from: skilled physical therapy    Planned therapy interventions: self care, therapeutic activities, therapeutic exercise, neuromuscular re-education and home exercise program    Frequency: 2x week  Duration in weeks: 2  Plan details: The patient has shown improvement in PT demonstrating decreased pain, increased range of motion, increased strength, and increased tolerance to  "activity. The patient continues to present with pain, decreased ROM, decreased strength, and decreased tolerance to activity. The patient would benefit from continued skilled PT services to address these issues and to maximize function. The patient will also continue performing their HEP.            Subjective Evaluation    History of Present Illness  Mechanism of injury: Subjective: The patient's low back pain has decreased in intensity, and her bilateral thigh pain has resolved. She has a much improved tolerance for daily activities. After one hour she must rest for a period of time.  Patient Goals  Patient goals for therapy: decreased pain and increased motion  Patient goal: Improved tolerance for bending, standing, walking, and lifting.  Pain  Current pain rating: 3  At best pain ratin  At worst pain ratin  Location: Low back 2-5/10, bilateral thighs 0/10.          Objective     Postural Observations    Additional Postural Observation Details  Gait and transfers are normal, no lateral shift.    General Comments:      Lumbar Comments  CURRENT OBJECTIVE MEASUREMENTS    Lumbar Spine AROM: F=75 %, EXT=50 % with low back pain, R SB=75 %, L SB=90 %.  Lower Extremity Strength: Grossly 5/5 t/o.  Fair/Good control of abdominal muscles during abdominal bracing.                 Precautions: No lifting > 20 lbs, bending and twisting to tolerance.        Manuals 7/1 7/3 7/8 6/20  6/24  6/27                   Neuro Re-Ed               AB Supine 30x5\" 30x5\" 30x5\" 10x5\"  30x5\"  30x5\"   AB with Single Leg Raises Supine               AB with Double Leg Raises                AB with SLR               Double leg bridging  30x5\" 30x5\" 30x5\" 10x5\"  30x5\"  30x5\"   Bridging with ball squeeze  30x 30x 30x 10x  30x  30x   Bridging with TB ABD  L4 30x L4 30x L4 30x L3 10x  L4 30x  L4 30x   Bridging with marching                                  Ther Ex               TB Mid Row  L2 2/10 L2 20x L2 2/10 L2 2/10 NV  L2 2/10   TB Low " "Row  L2 2/10 L2 20x L2 2/10 L2 2/10 NV  L2 2/10   TB Oblique Press               TB SA Pulldown L1 2/10 L1 20x L1 2/10 L1 2/10 NV L1 2/10   Left hamstring stretch with strap 5\"x15 5\"x15 15x5\" 30\"x3 5\"x15 5\"x15   Back Ext:  F4, P4, C0 P5 3/10 P5 3/10 P5 3/10 P5 3/10 P5 3/10 P5 3/10    NuStep for endurance:  S 7, A 9  L3   10 min L3 13 min L3 13 min L3 10 min  L3 10 min  L3 10 min   Standing back bends 10x2\" 10x2\"  10x2\"     10x2\" TID HEP   Ther Activity                               Modalities                                                          "

## 2024-07-01 NOTE — PROGRESS NOTES
"Daily Note     Today's date: 2024  Patient name: Ingrid Wheatley  : 1950  MRN: [id number]  Referring provider: Julius Damon MD  Dx:   Encounter Diagnosis     ICD-10-CM    1. 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 Chronic pain syndrome  G89.4                      Subjective: Patient's left sided low back pain has diminished since the last visit.      Objective: See treatment diary below      Assessment: Tolerated treatment well. Patient would benefit from continued PT      Plan: Continue per plan of care.      Precautions: No lifting > 20 lbs, bending and twisting to tolerance.        Manuals                 Neuro Re-Ed            AB Supine 30x5\" 10x5\" 10x5\" 10x5\"  30x5\"  30x5\"   AB with Single Leg Raises Supine            AB with Double Leg Raises             AB with SLR            Double leg bridging  30x5\" 10x5\" 10x5\" 10x5\"  30x5\"  30x5\"   Bridging with ball squeeze  30x 10x 10x 10x  30x  30x   Bridging with TB ABD  L4 30x L3 10x L3 10x L3 10x  L4 30x  L4 30x   Bridging with marching                           Ther Ex            TB Mid Row  L2 2/10 L2 10x L2 2/10 L2 2/10 NV  L2 2/10   TB Low Row  L2 2/10 L2 10x L2 2/10 L2 2/10 NV  L2 2/10   TB Oblique Press           TB SA Pulldown L1 2/10 L1 10x L1 2/10 L1 2/10 NV L1 2/10   Left hamstring stretch with strap 5\"x15 30\"x3 BID HEP 30\"x3 30\"x3 5\"x15 5\"x15   Back Ext:  F4, P4, C0 P5 3/10  P5 3/10 P5 3/10 P5 3/10 P5 3/10    NuStep for endurance:  S 7, A 9  L3   10 min L3 10 min L3 10 min L3 10 min  L3 10 min  L3 10 min   Standing back bends 10x2\"     10x2\" TID HEP   Ther Activity                         Modalities                                              "

## 2024-07-03 ENCOUNTER — OFFICE VISIT (OUTPATIENT)
Dept: PHYSICAL THERAPY | Facility: CLINIC | Age: 74
End: 2024-07-03
Payer: MEDICARE

## 2024-07-03 DIAGNOSIS — G89.4 CHRONIC PAIN SYNDROME: ICD-10-CM

## 2024-07-03 DIAGNOSIS — M43.26 FUSION OF LUMBAR SPINE: Primary | ICD-10-CM

## 2024-07-03 PROCEDURE — 97110 THERAPEUTIC EXERCISES: CPT | Performed by: PHYSICAL THERAPIST

## 2024-07-03 PROCEDURE — 97112 NEUROMUSCULAR REEDUCATION: CPT | Performed by: PHYSICAL THERAPIST

## 2024-07-03 NOTE — PROGRESS NOTES
"Daily Note     Today's date: 7/3/2024  Patient name: Ingrid Wheatley  : 1950  MRN: [id number]  Referring provider: Julius Damon MD  Dx:   Encounter Diagnosis     ICD-10-CM    1. 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 Chronic pain syndrome  G89.4                      Subjective: Patient has just a little soreness in the low back today. She has been very active during the day.      Objective: See treatment diary below      Assessment: Tolerated treatment well. Patient demonstrated fatigue post treatment, exhibited good technique with therapeutic exercises, and would benefit from continued PT      Plan: Continue per plan of care.      Precautions: No lifting > 20 lbs, bending and twisting to tolerance.        Manuals 7/1 7/3 6/17 6/20  6/24  6/27                Neuro Re-Ed            AB Supine 30x5\" 30x5\" 10x5\" 10x5\"  30x5\"  30x5\"   AB with Single Leg Raises Supine            AB with Double Leg Raises             AB with SLR            Double leg bridging  30x5\" 30x5\" 10x5\" 10x5\"  30x5\"  30x5\"   Bridging with ball squeeze  30x 30x 10x 10x  30x  30x   Bridging with TB ABD  L4 30x L4 30x L3 10x L3 10x  L4 30x  L4 30x   Bridging with marching                           Ther Ex            TB Mid Row  L2 2/10 L2 20x L2 2/10 L2 2/10 NV  L2 2/10   TB Low Row  L2 2/10 L2 20x L2 2/10 L2 2/10 NV  L2 2/10   TB Oblique Press           TB SA Pulldown L1 2/10 L1 20x L1 2/10 L1 2/10 NV L1 2/10   Left hamstring stretch with strap 5\"x15 5\"x15 30\"x3 30\"x3 5\"x15 5\"x15   Back Ext:  F4, P4, C0 P5 3/10 P5 3/10 P5 3/10 P5 3/10 P5 3/10 P5 3/10    NuStep for endurance:  S 7, A 9  L3   10 min L3 13 min L3 10 min L3 10 min  L3 10 min  L3 10 min   Standing back bends 10x2\" 10x2\"    10x2\" TID HEP   Ther Activity                         Modalities                                                "

## 2024-07-08 ENCOUNTER — EVALUATION (OUTPATIENT)
Dept: PHYSICAL THERAPY | Facility: CLINIC | Age: 74
End: 2024-07-08
Payer: MEDICARE

## 2024-07-08 DIAGNOSIS — G89.4 CHRONIC PAIN SYNDROME: ICD-10-CM

## 2024-07-08 DIAGNOSIS — M43.26 FUSION OF LUMBAR SPINE: Primary | ICD-10-CM

## 2024-07-08 PROCEDURE — 97112 NEUROMUSCULAR REEDUCATION: CPT | Performed by: PHYSICAL THERAPIST

## 2024-07-08 PROCEDURE — 97110 THERAPEUTIC EXERCISES: CPT | Performed by: PHYSICAL THERAPIST

## 2024-07-08 NOTE — LETTER
2024    Julius Damon MD  [address]  [id number]  [place] [address]    Patient: Ingrid Wheatley   YOB: 1950   Date of Visit: 2024     Encounter Diagnosis     ICD-10-CM    1. 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 Chronic pain syndrome  G89.4           Dear Dr. Damon:    Thank you for your recent referral of Ingrid Wheatley. Please review the attached evaluation summary from Ingrdi's recent visit.     Please verify that you agree with the plan of care by signing the attached order.     If you have any questions or concerns, please do not hesitate to call.     I sincerely appreciate the opportunity to share in the care of one of your patients and hope to have another opportunity to work with you in the near future.       Sincerely,    Gus Sinclair, PT      Referring Provider:      I certify that I have read the below Plan of Care and certify the need for these services furnished under this plan of treatment while under my care.                    Julius Damon MD  [address] [facility]  [address]  [place] [address]  Via Fax: [phone]          PT Re-Evaluation     Today's date: 2024  Patient name: Ingrid Wheatley  : 1950  MRN: [id number]  Referring provider: Julius Damon MD  Dx:   Encounter Diagnosis     ICD-10-CM    1. 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 Chronic pain syndrome  G89.4                          Assessment    Assessment details:   CURRENT FUNCTIONAL STATUS    Standing/ADL tolerance 60 minutes.    Walking tolerance 8 blocks.   Ability to bend forward: Good  Ability to reach overhead: Good  Lifting Tolerance: 20 lbs  Ability to vacuum: Good  Unable to ride her exercise bike.       SHORT TERM GOALS (2 WEEKS)    Lumbar spine AROM 10 % in all planes.  Improve abdominal muscle control.   Decrease pain to 1-4/10.  Standing/ADL tolerance 90 minutes.    Walking tolerance 10 blocks.   Ability to bend forward: Good  Ability to reach overhead: Good  Ability  to vacuum: Good  Able to ride her exercise bike.       LONG TERM GOALS (DISCHARGE)    Lumbar Spine AROM: F=90 %, EXT=50 %, R SB=75 %, L SB=75 %.-partially met  Lower Extremity Strength: Grossly 5/5 t/o.-met  Good control of abdominal muscles during abdominal bracing.-partially met  Decrease pain to 0-3/10.-partially met  Standing/ADL tolerance 2 hours.-partially met    Walking tolerance 12 blocks.-partially met   Ability to bend forward: Good-met  Ability to reach overhead: Good-met  Lifting Tolerance: 20 lbs-met  Ability to vacuum: Good-met  Able to ride her exercise bike.-not met  Understanding of Dx/Px/POC: good     Prognosis: good    Goals  See assessment details above.      Plan  Patient would benefit from: skilled physical therapy    Planned therapy interventions: self care, therapeutic activities, therapeutic exercise, neuromuscular re-education and home exercise program    Frequency: 2x week  Duration in weeks: 2  Plan details: The patient has shown improvement in PT demonstrating decreased pain, increased range of motion, increased strength, and increased tolerance to activity. The patient continues to present with pain, decreased ROM, decreased strength, and decreased tolerance to activity. The patient would benefit from continued skilled PT services to address these issues and to maximize function. The patient will also continue performing their HEP.            Subjective Evaluation    History of Present Illness  Mechanism of injury: Subjective: The patient's low back pain has decreased in intensity, and her bilateral thigh pain has resolved. She has a much improved tolerance for daily activities. After one hour she must rest for a period of time.  Patient Goals  Patient goals for therapy: decreased pain and increased motion  Patient goal: Improved tolerance for bending, standing, walking, and lifting.  Pain  Current pain rating: 3  At best pain ratin  At worst pain ratin  Location: Low back  "2-5/10, bilateral thighs 0/10.          Objective     Postural Observations    Additional Postural Observation Details  Gait and transfers are normal, no lateral shift.    General Comments:      Lumbar Comments  CURRENT OBJECTIVE MEASUREMENTS    Lumbar Spine AROM: F=75 %, EXT=50 % with low back pain, R SB=75 %, L SB=90 %.  Lower Extremity Strength: Grossly 5/5 t/o.  Fair/Good control of abdominal muscles during abdominal bracing.                 Precautions: No lifting > 20 lbs, bending and twisting to tolerance.        Manuals 7/1 7/3 7/8 6/20  6/24  6/27                   Neuro Re-Ed               AB Supine 30x5\" 30x5\" 30x5\" 10x5\"  30x5\"  30x5\"   AB with Single Leg Raises Supine               AB with Double Leg Raises                AB with SLR               Double leg bridging  30x5\" 30x5\" 30x5\" 10x5\"  30x5\"  30x5\"   Bridging with ball squeeze  30x 30x 30x 10x  30x  30x   Bridging with TB ABD  L4 30x L4 30x L4 30x L3 10x  L4 30x  L4 30x   Bridging with marching                                  Ther Ex               TB Mid Row  L2 2/10 L2 20x L2 2/10 L2 2/10 NV  L2 2/10   TB Low Row  L2 2/10 L2 20x L2 2/10 L2 2/10 NV  L2 2/10   TB Oblique Press               TB SA Pulldown L1 2/10 L1 20x L1 2/10 L1 2/10 NV L1 2/10   Left hamstring stretch with strap 5\"x15 5\"x15 15x5\" 30\"x3 5\"x15 5\"x15   Back Ext:  F4, P4, C0 P5 3/10 P5 3/10 P5 3/10 P5 3/10 P5 3/10 P5 3/10    NuStep for endurance:  S 7, A 9  L3   10 min L3 13 min L3 13 min L3 10 min  L3 10 min  L3 10 min   Standing back bends 10x2\" 10x2\"  10x2\"     10x2\" TID HEP   Ther Activity                               Modalities                                                                          "

## 2024-07-11 NOTE — PROGRESS NOTES
PT Discharge    Today's date: 7/15/2024  Patient name: Ingrid Wheatley  : 1950  MRN: [id number]  Referring provider: Julius Damon MD  Dx:   Encounter Diagnosis     ICD-10-CM    1. 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 Chronic pain syndrome  G89.4                    Assessment    Assessment details:   CURRENT FUNCTIONAL STATUS    Standing/ADL tolerance 60 minutes.    Walking tolerance 8 blocks.   Ability to bend forward: Good  Ability to reach overhead: Good  Lifting Tolerance: 20 lbs  Ability to vacuum: Good  Unable to ride her exercise bike.       LONG TERM GOALS (DISCHARGE)    Lumbar Spine AROM: F=90 %, EXT=50 %, R SB=75 %, L SB=75 %.-partially met  Lower Extremity Strength: Grossly 5/5 t/o.-met  Good control of abdominal muscles during abdominal bracing.-met  Decrease pain to 0-3/10.-partially met  Standing/ADL tolerance 2 hours.-partially met    Walking tolerance 12 blocks.-partially met   Ability to bend forward: Good-met  Ability to reach overhead: Good-met  Lifting Tolerance: 20 lbs-met  Ability to vacuum: Good-met  Able to ride her exercise bike.-not met  Understanding of Dx/Px/POC: good     Prognosis: good    Goals  See assessment details above.      Plan    Planned therapy interventions: self care, therapeutic activities, therapeutic exercise, neuromuscular re-education and home exercise program    Plan details: The patient has shown good improvement in PT demonstrating decreased pain, increased range of motion, increased strength, and increased tolerance to activity. Goals have been met, the patient is satisfied with her current status, and she has been discharged to a home exercise program.              Subjective Evaluation    History of Present Illness  Mechanism of injury: Subjective: The patient's low back pain has decreased in intensity, and her bilateral thigh pain has resolved. She has a much improved tolerance  "for daily activities. She has completed the prescribed duration of treatment and has been discharged to a [facility].  Patient Goals  Patient goals for therapy: decreased pain and increased motion  Patient goal: Improved tolerance for bending, standing, walking, and lifting.  Pain  Current pain rating: 3  At best pain ratin  At worst pain ratin  Location: Low back 2-5/10, bilateral thighs 0/10.          Objective     Postural Observations    Additional Postural Observation Details  Gait and transfers are normal, no lateral shift.    General Comments:      Lumbar Comments  CURRENT OBJECTIVE MEASUREMENTS    Lumbar Spine AROM: F=75 %, EXT=50 % with low back pain, R SB=75 %, L SB=90 %.  Lower Extremity Strength: Grossly 5/5 t/o.  Good control of abdominal muscles during abdominal bracing.                 Precautions: No lifting > 20 lbs, bending and twisting to tolerance.        Manuals 7/1 7/3 7/8 7/15  6/24  6/27                   Neuro Re-Ed               AB Supine 30x5\" 30x5\" 30x5\" 30x5\"  30x5\"  30x5\"   AB with Single Leg Raises Supine               AB with Double Leg Raises                AB with SLR               Double leg bridging  30x5\" 30x5\" 30x5\" 30x5\"  30x5\"  30x5\"   Bridging with ball squeeze  30x 30x 30x 30x  30x  30x   Bridging with TB ABD  L4 30x L4 30x L4 30x L4 30x  L4 30x  L4 30x   Bridging with marching                                  Ther Ex               TB Mid Row  L2 2/10 L2 20x L2 2/10 L2 2/10 NV  L2 2/10   TB Low Row  L2 2/10 L2 20x L2 2/10 L2 2/10 NV  L2 2/10   TB Oblique Press               TB SA Pulldown L1 2/10 L1 20x L1 2/10 L1 2/10 NV L1 2/10   Left hamstring stretch with strap 5\"x15 5\"x15 15x5\" 15x5\" 5\"x15 5\"x15   Back Ext:  F4, P4, C0 P5 3/10 P5 3/10 P5 3/10 P5 3/10 P5 3/10 P5 3/10    NuStep for endurance:  S 7, A 9  L3   10 min L3 13 min L3 13 min L3 13 min  L3 10 min  L3 10 min   Standing back bends 10x2\" 10x2\"  10x2\"  10x2\"   10x2\" TID HEP   Ther Activity                            "    Modalities

## 2024-07-15 ENCOUNTER — TELEPHONE (OUTPATIENT)
Age: 74
End: 2024-07-15

## 2024-07-15 ENCOUNTER — OFFICE VISIT (OUTPATIENT)
Dept: PHYSICAL THERAPY | Facility: CLINIC | Age: 74
End: 2024-07-15
Payer: MEDICARE

## 2024-07-15 ENCOUNTER — OFFICE VISIT (OUTPATIENT)
Dept: URGENT CARE | Facility: MEDICAL CENTER | Age: 74
End: 2024-07-15
Payer: MEDICARE

## 2024-07-15 VITALS
BODY MASS INDEX: 33.45 KG/M2 | TEMPERATURE: 97.3 F | RESPIRATION RATE: 20 BRPM | HEIGHT: 62 IN | HEART RATE: 118 BPM | OXYGEN SATURATION: 98 % | WEIGHT: 181.8 LBS | SYSTOLIC BLOOD PRESSURE: 138 MMHG | DIASTOLIC BLOOD PRESSURE: 80 MMHG

## 2024-07-15 DIAGNOSIS — G89.4 CHRONIC PAIN SYNDROME: ICD-10-CM

## 2024-07-15 DIAGNOSIS — M43.26 FUSION OF LUMBAR SPINE: Primary | ICD-10-CM

## 2024-07-15 DIAGNOSIS — T63.441A LOCAL REACTION TO BEE STING, ACCIDENTAL OR UNINTENTIONAL, INITIAL ENCOUNTER: Primary | ICD-10-CM

## 2024-07-15 PROCEDURE — G0463 HOSPITAL OUTPT CLINIC VISIT: HCPCS | Performed by: STUDENT IN AN ORGANIZED HEALTH CARE EDUCATION/TRAINING PROGRAM

## 2024-07-15 PROCEDURE — 99213 OFFICE O/P EST LOW 20 MIN: CPT | Performed by: STUDENT IN AN ORGANIZED HEALTH CARE EDUCATION/TRAINING PROGRAM

## 2024-07-15 PROCEDURE — 97112 NEUROMUSCULAR REEDUCATION: CPT | Performed by: PHYSICAL THERAPIST

## 2024-07-15 PROCEDURE — 97110 THERAPEUTIC EXERCISES: CPT | Performed by: PHYSICAL THERAPIST

## 2024-07-15 NOTE — TELEPHONE ENCOUNTER
Pt called due to having an upcomiing 6 month appt. Pt is asking if she needs to complete another BMP due to she did complete 1 in 5/204. If this is needed for upcoming appt pt will need another script placed in chart    PT call back-[phone]

## 2024-07-15 NOTE — TELEPHONE ENCOUNTER
Call placed to patient and spoke with her. Pt just had BMP completed in May and this will be enough to review at her upcoming appointment on Friday.     Pt also stated that she sent MRI disc to the office. This was mailed.     Will forward to clerical team to monitor for when disc arrives,

## 2024-07-19 ENCOUNTER — TELEPHONE (OUTPATIENT)
Dept: UROLOGY | Facility: MEDICAL CENTER | Age: 74
End: 2024-07-19

## 2024-07-19 ENCOUNTER — OFFICE VISIT (OUTPATIENT)
Dept: UROLOGY | Facility: MEDICAL CENTER | Age: 74
End: 2024-07-19
Payer: MEDICARE

## 2024-07-19 VITALS
BODY MASS INDEX: 33.31 KG/M2 | WEIGHT: 181 LBS | OXYGEN SATURATION: 98 % | DIASTOLIC BLOOD PRESSURE: 70 MMHG | HEIGHT: 62 IN | HEART RATE: 82 BPM | SYSTOLIC BLOOD PRESSURE: 120 MMHG

## 2024-07-19 DIAGNOSIS — C64.2 MALIGNANT NEOPLASM OF LEFT KIDNEY, EXCEPT RENAL PELVIS (HCC): ICD-10-CM

## 2024-07-19 DIAGNOSIS — C64.2 RENAL CELL CARCINOMA OF LEFT KIDNEY (HCC): Primary | ICD-10-CM

## 2024-07-19 PROCEDURE — 99213 OFFICE O/P EST LOW 20 MIN: CPT | Performed by: UROLOGY

## 2024-07-19 RX ORDER — DIAZEPAM 10 MG/1
20 TABLET ORAL ONCE
Qty: 2 TABLET | Refills: 0 | Status: SHIPPED | OUTPATIENT
Start: 2024-07-19 | End: 2024-07-19

## 2024-07-19 RX ORDER — HYDROXYCHLOROQUINE SULFATE 200 MG/1
200 TABLET, FILM COATED ORAL
COMMUNITY
Start: 2024-07-09

## 2024-07-19 NOTE — TELEPHONE ENCOUNTER
Mailed script to patient for Valium for her to take prior to her MRI which is being scheduled by patient at an outside facility.

## 2024-07-19 NOTE — ASSESSMENT & PLAN NOTE
3.2 cm left renal cell carcinoma s/p cryoablation on 10/17/2023  MRI (7/2023) shows regression of lesion post cryo to 2.3 cm without clear soft tissue enhancement  - continue to monitor  - MRI abdomen w/wo IV contrast ordered for 6 months from now

## 2024-07-19 NOTE — PROGRESS NOTES
7/19/2024    Ingrid Wheatley  1950  [id number]    1. Renal cell carcinoma of left kidney (HCC)  -     MRI abdomen w wo contrast; Future; Expected date: 01/19/2025  -     diazepam (VALIUM) 10 mg tablet; Take 2 tablets (20 mg total) by mouth 1 (one) time for 1 dose Take 1 tablet 45 minutes prior to your schedule MRI  2. Malignant neoplasm of left kidney, except renal pelvis (HCC)  Assessment & Plan:  3.2 cm left renal cell carcinoma s/p cryoablation on 10/17/2023  MRI (7/2023) shows regression of lesion post cryo to 2.3 cm without clear soft tissue enhancement  - continue to monitor  - MRI abdomen w/wo IV contrast ordered for 6 months from now       History of Present Illness  74 y.o. female with a history of 3.2 cm left renal cell carcinoma s/p cryoablation on 10/17/2023    MRI images from 7/2024 reviewed showing regression in size of mass with no clear enhancement (diminished soft tissue characteristics as compared to prior)        AUA Symptom Score      Review of Systems   All other systems reviewed and are negative.      Past Medical History  Past Medical History:   Diagnosis Date    Abnormal findings on diagnostic imaging of breast     Abnormal Pap smear of cervix     Arthritis     Chronic pain disorder     Closed fracture of proximal end of left fibula 06/04/2021    Extremity pain     Fibrocystic breast disease     Foot pain     GERD (gastroesophageal reflux disease)     Hyperlipidemia     Hypertension     Interstitial cystitis     Joint pain     Low back pain     Osteoarthritis     Osteopenia     PONV (postoperative nausea and vomiting) 10/17/2023    Uncomplicated opioid dependence (HCC) 03/01/2022       Past Social History  Past Surgical History:   Procedure Laterality Date    APPENDECTOMY      BACK SURGERY      BREAST EXCISIONAL BIOPSY Left 1996    benign    CARPAL BOSS EXCISION      CHOLECYSTECTOMY      DIAGNOSTIC LAPAROSCOPY      FOOT SURGERY      FRACTURE SURGERY      HAND SURGERY  5/2017     HYSTERECTOMY      age 32    HYSTEROSCOPY      IR CRYOABLATION  10/17/2023    JOINT REPLACEMENT      KIDNEY SURGERY Left     KNEE ARTHROSCOPY Bilateral     LAMINECTOMY      LAPAROSCOPY      hynecologic with adhesions    MYOMECTOMY      OOPHORECTOMY Bilateral     age 32    ORTHOPEDIC SURGERY      RI CAR IMPLTJ NSTIM ELTRDS PLATE/PADDLE EDRL N/A 05/18/2017    Procedure: PLACEMENT OF THORACIC SPINAL CORD STIMULATOR WITH LEFT BUTTOCK IMPLANTABLE PULSE GENERATOR (IMPULSE MONITORING-MOTORS (TCEMEP), EMG, SSEP);  Surgeon: Julius Damon MD;  Location[facility];  Service: Neurosurgery    SPINAL CORD STIMULATOR IMPLANT Left 09/29/2020    Procedure: LAMINECTOMY THORACIC , REMOVAL OF SCS LEADS AND GENERATOR;  Surgeon: Oswald Whitt MD;  Location: [facility];  Service: Neurosurgery    SPINAL STIMULATOR PLACEMENT  05/2017    TONSILLECTOMY AND ADENOIDECTOMY      onset: unknown    TOTAL KNEE ARTHROPLASTY Right        Past Family History  Family History   Problem Relation Age of Onset    Bone cancer Mother     Cancer Mother     Asthma Mother     Brain cancer Father     Stroke Father         stroke sydrome    Transient ischemic attack Father     Depression Sister     Migraines Sister     Asthma Sister     Asthma Sister     No Known Problems Maternal Grandmother     No Known Problems Maternal Grandfather     Diabetes Paternal Grandmother     No Known Problems Paternal Grandfather     Heart disease Brother     Diabetes Brother     Heart attack Brother     Colon cancer Brother 66    Cancer Brother     No Known Problems Brother     Breast cancer Maternal Aunt 70    Breast cancer additional onset Maternal Aunt 72    Diabetes Sister     Depression Sister        Past Social history  Social History     Socioeconomic History    Marital status: Single     Spouse name: Not on file    Number of children: Not on file    Years of education: Not on file    Highest education level: Not on file   Occupational History    Occupation: Retired/  working part-time    Tobacco Use    Smoking status: Never    Smokeless tobacco: Never   Vaping Use    Vaping status: Never Used   Substance and Sexual Activity    Alcohol use: Yes     Alcohol/week: 1.0 standard drink of alcohol     Comment: Drink socially, not too often    Drug use: Never    Sexual activity: Not Currently     Partners: Male     Birth control/protection: None   Other Topics Concern    Not on file   Social History Narrative    No caffeine use    Always uses sunscreen    Always uses a seatbelt     Social Determinants of Health     Financial Resource Strain: Low Risk  (1/10/2024)    Overall Financial Resource Strain (CARDIA)     Difficulty of Paying Living Expenses: Not hard at all   Food Insecurity: Not on file   Transportation Needs: No Transportation Needs (1/10/2024)    PRAPARE - Transportation     Lack of Transportation (Medical): No     Lack of Transportation (Non-Medical): No   Physical Activity: Not on file   Stress: Not on file   Social Connections: Not on file   Intimate Partner Violence: Unknown (6/3/2024)    [facility]    Intimate Partner Violence     Within the last year, have you been afraid of your partner, ex-partner or family member?: Not on file     Within the last year, have you been humiliated or emotionally abused in other ways by your partner, ex-partner or family member?: Not on file     Within the last year, have you been kicked, hit, slapped, or otherwise physically hurt by your partner, ex-partner or family member?: Not on file     Within the last year, have you been raped or forced to have any kind of sexual activity by your partner, ex-partner or family member?: Not on file   Housing Stability: Not on file       Current Medications  Current Outpatient Medications   Medication Sig Dispense Refill    amitriptyline (ELAVIL) 50 mg tablet Take 1 tablet (50 mg total) by mouth daily at bedtime 90 tablet 3    Biotin 2500 MCG CAPS Take 1 capsule by  mouth 2 (two) times a day       calcium carbonate (OS-ANTHONY) 600 MG tablet Take 1,200 mg by mouth 2 (two) times a day with meals      Cholecalciferol (VITAMIN D-3 PO) Take 1 tablet by mouth daily      Cyanocobalamin (VITAMIN B-12 CR PO) Take 1 tablet by mouth daily      diazepam (VALIUM) 10 mg tablet Take 2 tablets (20 mg total) by mouth 1 (one) time for 1 dose Take 1 tablet 45 minutes prior to your schedule MRI 2 tablet 0    Diclofenac Sodium (VOLTAREN) 1 % Apply 2 g topically 4 (four) times a day 200 g 1    dicyclomine (BENTYL) 10 mg capsule Take 1 capsule (10 mg total) by mouth 2 (two) times a day 180 capsule 3    Evening Primrose Oil 1000 MG CAPS Take 1 capsule (1,000 mg total) by mouth in the morning  0    famotidine (PEPCID) 20 mg tablet Take 1 tablet (20 mg total) by mouth 2 (two) times a day 180 tablet 3    fexofenadine (ALLEGRA) 180 MG tablet Take 180 mg by mouth daily      gabapentin (NEURONTIN) 300 mg capsule Take 1 tablet in the morning, 1 tablet afternoon and 2 bedtime 360 capsule 3    hydroxychloroquine (PLAQUENIL) 200 mg tablet Take 200 mg by mouth daily with breakfast      losartan-hydrochlorothiazide (HYZAAR) 100-25 MG per tablet Take 1 tablet by mouth daily 90 tablet 3    methocarbamol (ROBAXIN) 750 mg tablet Take 1 tablet (750 mg total) by mouth every 8 (eight) hours 270 tablet 1    montelukast (SINGULAIR) 10 mg tablet Take 1 tablet (10 mg total) by mouth daily at bedtime 90 tablet 3    Multiple Vitamins-Minerals (PRESERVISION AREDS 2 PO) Take by mouth in the morning      Omega-3 Fatty Acids (FISH OIL) 1,000 mg Take 1,000 mg by mouth 3 (three) times a day      omeprazole (PriLOSEC) 40 MG capsule Take 1 capsule (40 mg total) by mouth daily 90 capsule 3    rosuvastatin (CRESTOR) 10 MG tablet Take 1 tablet (10 mg total) by mouth daily Take Monday, Wednesday and Friday alternate with simvastatin 36 tablet 3    simvastatin (ZOCOR) 10 mg tablet Take Tuesday, Thursday' Saturday and Sunday, to be alternated  "with rosuvastatin 48 tablet 3    diphenhydrAMINE (BENADRYL) 25 mg tablet Take 2 tablets one hour before CT scan 2 tablet 0    EPINEPHrine (EPIPEN) 0.3 mg/0.3 mL SOAJ INJECT 0.3 ML (0.3 MG TOTAL) INTO A MUSCLE AS NEEDED FOR ANAPHYLAXIS (Patient not taking: Reported on 7/19/2024) 2 each 1    hydrOXYzine HCL (ATARAX) 10 mg tablet Take 2 tablets (20 mg total) by mouth daily at bedtime Take 2 tablets at bedtime (Patient not taking: Reported on 7/19/2024) 180 tablet 1    methylPREDNISolone (MEDROL) 32 MG tablet Take one tablet 12 hours before, and one tablet 2 hours before CT scan 2 tablet 0     Current Facility-Administered Medications   Medication Dose Route Frequency Provider Last Rate Last Admin    ondansetron (ZOFRAN) injection 4 mg  4 mg Intravenous Q6H PRN Guille Perdomo MD           Allergies  Allergies   Allergen Reactions    Bee Venom Shortness Of Breath    Chlorhexidine Rash    Other Rash, Hives, Palpitations and Shortness Of Breath     Adhesive tape    Egg Yolk - Food Allergy Hives    Iodinated Contrast Media Hives and Other (See Comments)    Penicillins Hives    Lidocaine Other (See Comments)     cream    Allevyn Adhesive [Wound Dressings] Hives and Rash    Bactrim [Sulfamethoxazole-Trimethoprim] Rash    Codeine Other (See Comments)     headaches    Latex Rash and Other (See Comments)    Medical Tape Blisters, Hives, Itching and Rash    Oxybutynin Rash    Pentosan Polysulfate Rash    Povidone Iodine Rash       Past Medical History, Social History, Family History, medications and allergies were reviewed.    Vitals  Vitals:    07/19/24 1055   BP: 120/70   BP Location: Left arm   Patient Position: Sitting   Cuff Size: Standard   Pulse: 82   SpO2: 98%   Weight: 82.1 kg (181 lb)   Height: 5' 2\" (1.575 m)       Physical Exam  Vitals reviewed.   Constitutional:       Appearance: Normal appearance. She is obese.   HENT:      Head: Normocephalic.      Nose: Nose normal. No congestion.   Eyes:      " "Conjunctiva/sclera: Conjunctivae normal.   Cardiovascular:      Rate and Rhythm: Normal rate.   Pulmonary:      Effort: Pulmonary effort is normal. No respiratory distress.   Abdominal:      General: Abdomen is flat. There is no distension.      Palpations: Abdomen is soft.      Tenderness: There is no right CVA tenderness or left CVA tenderness.   Musculoskeletal:         General: No swelling. Normal range of motion.      Comments: Normal gait   Skin:     General: Skin is warm and dry.   Neurological:      General: No focal deficit present.      Mental Status: She is alert and oriented to person, place, and time.   Psychiatric:         Mood and Affect: Mood normal.         Results  No results found for: \"PSA\"  Lab Results   Component Value Date    GLUCOSE 118 12/08/2016    CALCIUM 9.4 06/25/2024     11/09/2015    K 3.7 06/25/2024    CO2 28 06/25/2024     06/25/2024    BUN 13 06/25/2024    CREATININE 0.57 (L) 06/25/2024     Lab Results   Component Value Date    WBC 11.45 (H) 03/27/2024    HGB 14.1 03/27/2024    HCT 43.4 03/27/2024    MCV 89 03/27/2024     03/27/2024       Office Urine Dip  No results found for this or any previous visit (from the past 1 hour(s)).]    "

## 2024-07-19 NOTE — PROGRESS NOTES
[facility]        NAME: Ingrid Wheatley is a 74 y.o. female  : 1950    MRN: [id number]  DATE: July 15, 2024  TIME: 7:56 PM    Assessment and Plan   Local reaction to bee sting, accidental or unintentional, initial encounter [T63.441A]  1. Local reaction to bee sting, accidental or unintentional, initial encounter              Patient Instructions   Prednisone, benadryl, ice    Follow up with PCP in 3-5 days.  Proceed to  ER if symptoms worsen.    If tests have been performed at [facility], our office will contact you with results if changes need to be made to the care plan discussed with you at the visit.  [facility]    Chief Complaint     Chief Complaint   Patient presents with    Insect Bite     STUNG BY BEE ON BOTTOM LATERAL FOOT. HAS ALLERGY TO BEE STINGS. IMMEDIATELY INJECTED EPIPEN. DENIES DIFFICULTY BREATHING OR SWALLOWING.         History of Present Illness       Insect Bite  This is a new (she has allergy) problem. The current episode started today. The problem has been unchanged. Pertinent negatives include no chest pain, fever, numbness, rash or sore throat. Exacerbated by: pressure. Treatments tried: epipen.       Review of Systems   Review of Systems   Constitutional:  Negative for fever.   HENT:  Negative for sore throat.    Respiratory:  Negative for shortness of breath.    Cardiovascular:  Negative for chest pain.   Skin:  Negative for rash.   Neurological:  Negative for numbness.         Current Medications       Current Outpatient Medications:     amitriptyline (ELAVIL) 50 mg tablet, Take 1 tablet (50 mg total) by mouth daily at bedtime, Disp: 90 tablet, Rfl: 3    Biotin 2500 MCG CAPS, Take 1 capsule by mouth 2 (two) times a day , Disp: , Rfl:     calcium carbonate (OS-ANTHONY) 600 MG tablet, Take 1,200 mg by mouth 2 (two) times a day with meals, Disp: , Rfl:     Cholecalciferol (VITAMIN D-3 PO), Take 1 tablet by mouth daily, Disp: , Rfl:      Cyanocobalamin (VITAMIN B-12 CR PO), Take 1 tablet by mouth daily, Disp: , Rfl:     Diclofenac Sodium (VOLTAREN) 1 %, Apply 2 g topically 4 (four) times a day, Disp: 200 g, Rfl: 1    dicyclomine (BENTYL) 10 mg capsule, Take 1 capsule (10 mg total) by mouth 2 (two) times a day, Disp: 180 capsule, Rfl: 3    diphenhydrAMINE (BENADRYL) 25 mg tablet, Take 2 tablets one hour before CT scan, Disp: 2 tablet, Rfl: 0    EPINEPHrine (EPIPEN) 0.3 mg/0.3 mL SOAJ, INJECT 0.3 ML (0.3 MG TOTAL) INTO A MUSCLE AS NEEDED FOR ANAPHYLAXIS (Patient not taking: Reported on 7/19/2024), Disp: 2 each, Rfl: 1    Evening Primrose Oil 1000 MG CAPS, Take 1 capsule (1,000 mg total) by mouth in the morning, Disp: , Rfl: 0    famotidine (PEPCID) 20 mg tablet, Take 1 tablet (20 mg total) by mouth 2 (two) times a day, Disp: 180 tablet, Rfl: 3    fexofenadine (ALLEGRA) 180 MG tablet, Take 180 mg by mouth daily, Disp: , Rfl:     gabapentin (NEURONTIN) 300 mg capsule, Take 1 tablet in the morning, 1 tablet afternoon and 2 bedtime, Disp: 360 capsule, Rfl: 3    losartan-hydrochlorothiazide (HYZAAR) 100-25 MG per tablet, Take 1 tablet by mouth daily, Disp: 90 tablet, Rfl: 3    methocarbamol (ROBAXIN) 750 mg tablet, Take 1 tablet (750 mg total) by mouth every 8 (eight) hours, Disp: 270 tablet, Rfl: 1    montelukast (SINGULAIR) 10 mg tablet, Take 1 tablet (10 mg total) by mouth daily at bedtime, Disp: 90 tablet, Rfl: 3    Multiple Vitamins-Minerals (PRESERVISION AREDS 2 PO), Take by mouth in the morning, Disp: , Rfl:     Omega-3 Fatty Acids (FISH OIL) 1,000 mg, Take 1,000 mg by mouth 3 (three) times a day, Disp: , Rfl:     omeprazole (PriLOSEC) 40 MG capsule, Take 1 capsule (40 mg total) by mouth daily, Disp: 90 capsule, Rfl: 3    rosuvastatin (CRESTOR) 10 MG tablet, Take 1 tablet (10 mg total) by mouth daily Take Monday, Wednesday and Friday alternate with simvastatin, Disp: 36 tablet, Rfl: 3    simvastatin (ZOCOR) 10 mg tablet, Take Tuesday, Thursday'  Saturday and Sunday, to be alternated with rosuvastatin, Disp: 48 tablet, Rfl: 3    diazepam (VALIUM) 10 mg tablet, Take 2 tablets (20 mg total) by mouth 1 (one) time for 1 dose Take 1 tablet 45 minutes prior to your schedule MRI, Disp: 2 tablet, Rfl: 0    hydroxychloroquine (PLAQUENIL) 200 mg tablet, Take 200 mg by mouth daily with breakfast, Disp: , Rfl:     hydrOXYzine HCL (ATARAX) 10 mg tablet, Take 2 tablets (20 mg total) by mouth daily at bedtime Take 2 tablets at bedtime (Patient not taking: Reported on 7/19/2024), Disp: 180 tablet, Rfl: 1    methylPREDNISolone (MEDROL) 32 MG tablet, Take one tablet 12 hours before, and one tablet 2 hours before CT scan, Disp: 2 tablet, Rfl: 0    Current Facility-Administered Medications:     ondansetron (ZOFRAN) injection 4 mg, 4 mg, Intravenous, Q6H PRN, Guille Perdomo MD    Current Allergies     Allergies as of 07/15/2024 - Reviewed 07/15/2024   Allergen Reaction Noted    Bee venom Shortness Of Breath 08/11/2017    Chlorhexidine Rash 09/04/2020    Other Rash, Hives, Palpitations, and Shortness Of Breath 04/13/2017    Egg yolk - food allergy Hives 11/07/2013    Iodinated contrast media Hives and Other (See Comments) 04/02/2016    Penicillins Hives 04/02/2016    Lidocaine Other (See Comments) 10/14/2021    Allevyn adhesive [wound dressings] Hives and Rash 06/03/2022    Bactrim [sulfamethoxazole-trimethoprim] Rash 03/08/2019    Codeine Other (See Comments) 04/02/2016    Latex Rash and Other (See Comments) 12/08/2016    Medical tape Blisters, Hives, Itching, and Rash 05/12/2021    Oxybutynin Rash 04/22/2019    Pentosan polysulfate Rash 11/05/2018    Povidone iodine Rash 09/29/2020            The following portions of the patient's history were reviewed and updated as appropriate: allergies, current medications, past family history, past medical history, past social history, past surgical history and problem list.     Past Medical History:   Diagnosis Date    Abnormal  findings on diagnostic imaging of breast     Abnormal Pap smear of cervix     Arthritis     Chronic pain disorder     Closed fracture of proximal end of left fibula 06/04/2021    Extremity pain     Fibrocystic breast disease     Foot pain     GERD (gastroesophageal reflux disease)     Hyperlipidemia     Hypertension     Interstitial cystitis     Joint pain     Low back pain     Osteoarthritis     Osteopenia     PONV (postoperative nausea and vomiting) 10/17/2023    Uncomplicated opioid dependence (HCC) 03/01/2022       Past Surgical History:   Procedure Laterality Date    APPENDECTOMY      BACK SURGERY      BREAST EXCISIONAL BIOPSY Left 1996    benign    CARPAL BOSS EXCISION      CHOLECYSTECTOMY      DIAGNOSTIC LAPAROSCOPY      FOOT SURGERY      FRACTURE SURGERY      HAND SURGERY  5/2017    HYSTERECTOMY      age 32    HYSTEROSCOPY      IR CRYOABLATION  10/17/2023    JOINT REPLACEMENT      KIDNEY SURGERY Left     KNEE ARTHROSCOPY Bilateral     LAMINECTOMY      LAPAROSCOPY      hynecologic with adhesions    MYOMECTOMY      OOPHORECTOMY Bilateral     age 32    ORTHOPEDIC SURGERY      WY CAR IMPLTJ NSTIM ELTRDS PLATE/PADDLE EDRL N/A 05/18/2017    Procedure: PLACEMENT OF THORACIC SPINAL CORD STIMULATOR WITH LEFT BUTTOCK IMPLANTABLE PULSE GENERATOR (IMPULSE MONITORING-MOTORS (TCEMEP), EMG, SSEP);  Surgeon: Julius Damon MD;  Location: [facility];  Service: Neurosurgery    SPINAL CORD STIMULATOR IMPLANT Left 09/29/2020    Procedure: LAMINECTOMY THORACIC , REMOVAL OF SCS LEADS AND GENERATOR;  Surgeon: Oswald Whitt MD;  Location: [facility];  Service: Neurosurgery    SPINAL STIMULATOR PLACEMENT  05/2017    TONSILLECTOMY AND ADENOIDECTOMY      onset: unknown    TOTAL KNEE ARTHROPLASTY Right        Family History   Problem Relation Age of Onset    Bone cancer Mother     Cancer Mother     Asthma Mother     Brain cancer Father     Stroke Father         stroke sydrome    Transient ischemic attack Father     Depression Sister   "   Migraines Sister     Asthma Sister     Asthma Sister     No Known Problems Maternal Grandmother     No Known Problems Maternal Grandfather     Diabetes Paternal Grandmother     No Known Problems Paternal Grandfather     Heart disease Brother     Diabetes Brother     Heart attack Brother     Colon cancer Brother 66    Cancer Brother     No Known Problems Brother     Breast cancer Maternal Aunt 70    Breast cancer additional onset Maternal Aunt 72    Diabetes Sister     Depression Sister          Medications have been verified.        Objective   /80   Pulse (!) 118   Temp (!) 97.3 °F (36.3 °C)   Resp 20   Ht 5' 2\" (1.575 m)   Wt 82.5 kg (181 lb 12.8 oz)   LMP  (LMP Unknown)   SpO2 98%   BMI 33.25 kg/m²   No LMP recorded (lmp unknown). Patient has had a hysterectomy.       Physical Exam     Physical Exam  Constitutional:       General: She is not in acute distress.     Appearance: She is well-developed.   HENT:      Head: Normocephalic and atraumatic.      Right Ear: External ear normal.      Left Ear: External ear normal.      Mouth/Throat:      Comments: No angioedema  Cardiovascular:      Rate and Rhythm: Normal rate.   Pulmonary:      Effort: Pulmonary effort is normal.   Skin:     Comments: Left 2nd toe erythematous and mildly swollen   Neurological:      General: No focal deficit present.      Mental Status: She is alert and oriented to person, place, and time.   Psychiatric:         Speech: Speech normal.         Behavior: Behavior normal.                   "

## 2024-07-31 ENCOUNTER — TELEPHONE (OUTPATIENT)
Age: 74
End: 2024-07-31

## 2024-07-31 DIAGNOSIS — Z12.31 BREAST CANCER SCREENING BY MAMMOGRAM: Primary | ICD-10-CM

## 2024-08-22 ENCOUNTER — APPOINTMENT (OUTPATIENT)
Dept: RADIOLOGY | Facility: MEDICAL CENTER | Age: 74
End: 2024-08-22
Payer: MEDICARE

## 2024-08-22 DIAGNOSIS — M54.16 LUMBAR RADICULOPATHY: ICD-10-CM

## 2024-08-22 DIAGNOSIS — M51.36 LUMBAR DEGENERATIVE DISC DISEASE: ICD-10-CM

## 2024-08-22 DIAGNOSIS — G89.4 CHRONIC PAIN SYNDROME: ICD-10-CM

## 2024-08-22 DIAGNOSIS — M96.1 POST LAMINECTOMY SYNDROME: ICD-10-CM

## 2024-08-22 DIAGNOSIS — M43.26 FUSION OF LUMBAR SPINE: ICD-10-CM

## 2024-08-22 PROCEDURE — 72110 X-RAY EXAM L-2 SPINE 4/>VWS: CPT

## 2024-09-14 NOTE — PROGRESS NOTES
Assessment  1  Status post insertion of spinal cord stimulator (V45 89) (Z98 890)   2  Sacroiliitis (720 2) (M46 1)    Discussion/Summary    1  The patient will undergo reprogramming of her spinal cord stimulatorI have asked her to evaluate the response after about a week and if she still has pain to call and set up a left sacroiliac joint injectionShe will otherwise follow-up as needed  Chief Complaint  1  Back Pain    History of Present Illness  Ms Bobo Guzman returns in followup related to left sacroiliac region pain  She was having some adequate relief of this with the spinal cord stimulator previously but had some adjustments and the pain has worsened  She states that she will be undergoing reprogramming tomorrow  I reviewed with her that if that takes care of the problem and we will need to do anything else  If it does continue to bother her however I would recommend repeating the left sacroiliac joint injection as that did provide some improvement in the past   rating her pain as a 5/10 which is intermittent and worse in the evening described as a sharp pain in the sacroiliac region on the left     have personally reviewed and/or updated the patient's past medical history, past surgical history, family history, social history, current medications, allergies, and vital signs today  Daniel Nielsen presents with complaints of gradual onset of intermittent episodes of moderate left lower back pain, described as sharp, non-radiating  On a scale of 1 to 10, the patient rates the pain as 5  Symptoms are worsening  Review of Systems    Constitutional: no fever,-no recent weight gain-and-no recent weight loss  Eyes: no double vision-and-no blurry vision  Cardiovascular: no chest pain,-no palpitations-and-no lower extremity edema  Respiratory: no complaints of shortness of breath-and-no wheezing     Musculoskeletal: difficulty walking, but-no muscle weakness,-no joint stiffness,-no joint swelling,-no limb swelling,-no pain in extremity-and-no decreased range of motion  Neurological: no dizziness,-no difficulty swallowing,-no memory loss,-no loss of consciousness-and-no seizures  Gastrointestinal: no nausea,-no vomiting,-no constipation-and-no diarrhea  Genitourinary: no difficulty initiating urine stream,-no genital pain-and-no frequent urination  Integumentary: no complaints of skin rash  Psychiatric: no depression  Endocrine: no excessive thirst,-no adrenal disease,-no hypothyroidism-and-no hyperthyroidism  Hematologic/Lymphatic: no tendency for easy bruising-and-no tendency for easy bleeding  Active Problems  1  Acid reflux disease (530 81) (K21 9)   2  Advance directive discussed with patient (V65 49) (Z71 89)   3  Aftercare following surgery (V58 89) (Z48 89)   4  Anxiety (300 00) (F41 9)   5  Back pain (724 5) (M54 9)   6  Bacteriuria (791 9) (R82 71)   7  BRBPR (bright red blood per rectum) (569 3) (K62 5)   8  Carpal tunnel syndrome, unspecified laterality (354 0) (G56 00)   9  Chronic low back pain (724 2,338 29) (M54 5,G89 29)   10  Chronic pain syndrome (338 4) (G89 4)   11  Degenerative joint disease of right lower extremity (715 98) (M19 90)   12  Encounter for long-term use of opiate analgesic (V58 69) (Z79 891)   13  Encounter for screening mammogram for malignant neoplasm of breast (V76 12)    (Z12 31)   14  Encounter for staple removal (V58 32) (Z48 02)   15  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)   16  Fatigue (780 79) (R53 83)   17  History of Fibrocystic breast disease, unspecified laterality (610 1) (N60 19)   18  Greater trochanteric bursitis of left hip (726 5) (M70 62)   19  Head trauma (959 01) (S09 90XA)   20  Hematoma of abdominal wall (922 2) (S30 1XXA)   21  Hemorrhoids (455 6) (K64 9)   22  History of recent fall (V15 88) (Z91 81)   23  Hyperlipidemia (272 4) (E78 5)   24  Hypertension (401 9) (I10)   25   Irritable bowel syndrome (564 1) (K58 9) 26  Left shoulder pain (719 41) (M25 512)   27  Lumbar degenerative disc disease (722 52) (M51 36)   28  Lumbar radiculopathy, chronic (724 4) (M54 16)   29  Lumbar spondylosis (721 3) (M47 816)   30  Neuralgia (729 2) (M79 2)   31  Pain of lower extremity, unspecified laterality (729 5) (M79 606)   32  Pain, joint, shoulder (719 41) (M25 519)   33  Paresthesias (782 0) (R20 2)   34  Peripheral neuropathy (356 9) (G62 9)   35  Post laminectomy syndrome (722 80) (M96 1)   36  Pre-procedural examination (V72 84) (Z01 818)   37  Pre-procedural laboratory examination (V72 63) (Z01 812)   38  Refused pneumococcal vaccination (V64 06) (Z28 21)   39  Sacroiliitis (720 2) (M46 1)   40  Status post insertion of spinal cord stimulator (V45 89) (Z98 890)   41  Status post surgery (V45 89) (Z98 890)   42  Thoracic back pain (724 1) (M54 6)   43  Thyroid dysfunction (246 9) (E07 9)   44  Uncomplicated opioid dependence (304 00) (F11 20)   45  Vitamin D deficiency (268 9) (E55 9)    Past Medical History  1  History of Abnormal findings on diagnostic imaging of breast (793 89) (R92 8)   2  History of Chest congestion (786 9) (R09 89)   3  History of Contusion of leg (924 5) (S80 10XA)   4  History of Encounter for routine gynecological examination (V72 31) (Z01 419)   5  History of Encounter for screening colonoscopy (V76 51) (Z12 11)   6  History of Encounter for screening colonoscopy (V76 51) (Z12 11)   7  History of Fibrocystic breast disease, unspecified laterality (610 1) (N60 19)   8  History of High cholesterol (272 0) (E78 00)   9  History of arthritis (V13 4) (Z87 39)   10  History of osteopenia (V13 59) (Z87 39)   11  History of Influenza (487 1) (J11 1)   12  History of Nausea (787 02) (R11 0)   13  History of Sinusitis (473 9) (J32 9)   14  History of Skin rash (782 1) (R21)   15  History of Sore throat (462) (J02 9)    Surgical History  1  History of Appendectomy   2  History of Cholecystectomy   3   History of Foot Surgery   4  History of Gynecologic Laparoscopy With Adhesiolysis   5  History of Hysteroscopy   6  History of Incisional Breast Biopsy   7  History of Knee Arthroscopy (Therapeutic)   8  History of Laparoscopy (Diagnostic)   9  History of Tonsillectomy With Adenoidectomy    Family History  Mother    1  Family history of Bone Cancer  Father    2  Family history of Brain Cancer (V16 8)   3  Family history of Stroke Syndrome (V17 1)  Paternal Grandmother    4  Family history of Diabetes Mellitus (V18 0)  Maternal Aunt    5  Family history of Breast Cancer (V16 3)  Other    6  Family history of Back disorder   7  Family history of cardiac disorder (V17 49) (Z82 49)   8  Family history of cerebrovascular accident (V17 1) (Z82 3)   9  Family history of diabetes mellitus (V18 0) (Z83 3)   10  Family history of hypertension (V17 49) (Z82 49)   11  Family history of malignant neoplasm (V16 9) (Z80 9)  Family History    12  Denied: Family history of Colon Cancer   13  Denied: Family history of Osteoporosis   14  Denied: Family history of Ovarian Cancer   15  Denied: Family history of Uterine Cancer    Social History   · Dental care, regularly   · Good dental hygiene   · Never a smoker   · No caffeine use   · No drug use   · Single   · Social alcohol use (Z78 9)   · Sun Protection Sunscreen   · Uses Safety Equipment - Seatbelts    Current Meds   1  Amitriptyline HCl - 50 MG Oral Tablet; TAKE 1 TABLET AT BEDTIME; Therapy: 99IRD4810 to (Evaluate:19Rwj1708)  Requested for: 12Gsy6084; Last   Rx:56Viv0669 Ordered   2  Azelastine HCl - 0 1 % Nasal Solution; USE 1 SPRAY IN EACH NOSTRIL TWICE DAILY; Therapy: 92OCY2952 to (Last Rx:63Kru4180)  Requested for: 96Buq6687 Ordered   3  Cyclobenzaprine HCl - 5 MG Oral Tablet; TAKE 1 TABLET AT BEDTIME AS NEEDED; Therapy: 53VLT6130 to (Evaluate:49Ntl1794)  Requested for: 69LWS2933; Last   Rx:99Ngs9154 Ordered   4  Diclofenac Sodium 1 % Transdermal Gel; apply BID;    Therapy: 46ZAD4459 to (Last Rx:11Aug2017)  Requested for: 11Aug2017 Ordered   5  EpiPen 2-Michel 0 3 MG/0 3ML Injection Solution Auto-injector; use as directed; Therapy: 68NYB2652 to (Last OH:15CQU7544)  Requested for: 48KJY9088 Ordered   6  Gabapentin 300 MG Oral Capsule; TAKE 1 CAPSULE 3 times daily; Therapy: 79GDC9157 to (Evaluate:11Jan2018)  Requested for: 14TSE7601; Last   Rx:15Jun2017 Ordered   7  HydrOXYzine HCl - 10 MG Oral Tablet; TAKE TWO TABLETS BY MOUTH AT BEDTIME; Therapy: 26PEU3316 to (Evaluate:66Kgd4761)  Requested for: 42QSK5294; Last   Rx:15Jun2017 Ordered   8  Losartan Potassium 50 MG Oral Tablet; TAKE 1 TABLET DAILY; Therapy: 99UQU1592 to (77 873 135)  Requested for: 90DVB4310; Last   YX:73PIC3413 Ordered   9  Omeprazole 40 MG Oral Capsule Delayed Release; TAKE ONE (1) TABLET DAILY; Therapy: 10VAS9636 to (Last Rx:01Cjt7243)  Requested for: 79Wxc7444 Ordered   10  Simvastatin 10 MG Oral Tablet; take one tablet by mouth every other day; Therapy: 54BOB6644 to (Evaluate:09Pzi9702)  Requested for: 11Aug2017; Last    Rx:11Aug2017 Ordered   11  Xyzal Allergy 24HR 5 MG Oral Tablet; Take 1 tablet daily; Therapy: 92FUU9193 to Recorded    Allergies  1  Codeine Derivatives   2  Iodinated Contrast Media   3  Latex Exam Gloves MISC   4  Penicillins  5  Adhesive Tape   6  Bee sting   7  Eggs    Vitals  Vital Signs    Recorded: 72TBF9320 01:05PM   Heart Rate 76   Respiration 14   Systolic 849   Diastolic 68   Height 5 ft 2 in   Weight 214 lb    BMI Calculated 39 14   BSA Calculated 1 97   Pain Scale 5     Physical Exam    Constitutional   General appearance: Well developed, well nourished, alert, in no distress, non-toxic and no overt pain behavior  Eyes   Sclera: anicteric   HEENT   Hearing grossly intact  Pulmonary   Respiratory effort: Even and unlabored  Psychiatric   Mood and affect: Mood and affect appropriate  Neurologic   Cranial nerves: Cranial nerves II-XII grossly intact  Musculoskeletal   Gait and station: Normal     Lumbar/Sacral Spine examination demonstrates Lumbosacral Spine:   Tenderness: the left sacroiliac joint        Future Appointments    Date/Time Provider Specialty Site   11/21/2017 08:00 AM Misa Leone DO Internal Medicine [address]   12/21/2017 01:00 PM [facility]     Signatures   Electronically signed by : Mandeep Jane DO; Oct  5 2017  1:19PM EST                       (Author) 1 Principal Discharge DX:	Atypical chest pain

## 2024-09-18 DIAGNOSIS — M62.830 BACK MUSCLE SPASM: ICD-10-CM

## 2024-09-18 DIAGNOSIS — M79.18 MYOFASCIAL PAIN SYNDROME: ICD-10-CM

## 2024-09-18 RX ORDER — METHOCARBAMOL 750 MG/1
750 TABLET, FILM COATED ORAL EVERY 8 HOURS
Qty: 270 TABLET | Refills: 3 | Status: SHIPPED | OUTPATIENT
Start: 2024-09-18

## 2024-09-28 ENCOUNTER — OFFICE VISIT (OUTPATIENT)
Dept: URGENT CARE | Facility: MEDICAL CENTER | Age: 74
End: 2024-09-28
Payer: MEDICARE

## 2024-09-28 VITALS
SYSTOLIC BLOOD PRESSURE: 137 MMHG | WEIGHT: 182 LBS | HEART RATE: 80 BPM | TEMPERATURE: 97.7 F | OXYGEN SATURATION: 95 % | DIASTOLIC BLOOD PRESSURE: 84 MMHG | HEIGHT: 62 IN | BODY MASS INDEX: 33.49 KG/M2 | RESPIRATION RATE: 18 BRPM

## 2024-09-28 DIAGNOSIS — R39.9 UTI SYMPTOMS: Primary | ICD-10-CM

## 2024-09-28 LAB
SL AMB  POCT GLUCOSE, UA: NEGATIVE
SL AMB LEUKOCYTE ESTERASE,UA: ABNORMAL
SL AMB POCT BILIRUBIN,UA: NEGATIVE
SL AMB POCT BLOOD,UA: ABNORMAL
SL AMB POCT CLARITY,UA: ABNORMAL
SL AMB POCT COLOR,UA: YELLOW
SL AMB POCT KETONES,UA: NEGATIVE
SL AMB POCT NITRITE,UA: NEGATIVE
SL AMB POCT PH,UA: 6
SL AMB POCT SPECIFIC GRAVITY,UA: 1.01
SL AMB POCT URINE PROTEIN: ABNORMAL
SL AMB POCT UROBILINOGEN: 0.2

## 2024-09-28 PROCEDURE — 87086 URINE CULTURE/COLONY COUNT: CPT | Performed by: PHYSICIAN ASSISTANT

## 2024-09-28 PROCEDURE — 81002 URINALYSIS NONAUTO W/O SCOPE: CPT | Performed by: PHYSICIAN ASSISTANT

## 2024-09-28 PROCEDURE — G0463 HOSPITAL OUTPT CLINIC VISIT: HCPCS | Performed by: PHYSICIAN ASSISTANT

## 2024-09-28 PROCEDURE — 87186 SC STD MICRODIL/AGAR DIL: CPT | Performed by: PHYSICIAN ASSISTANT

## 2024-09-28 PROCEDURE — 99213 OFFICE O/P EST LOW 20 MIN: CPT | Performed by: PHYSICIAN ASSISTANT

## 2024-09-28 PROCEDURE — 87077 CULTURE AEROBIC IDENTIFY: CPT | Performed by: PHYSICIAN ASSISTANT

## 2024-09-28 RX ORDER — CIPROFLOXACIN 500 MG/1
500 TABLET, FILM COATED ORAL EVERY 12 HOURS SCHEDULED
Qty: 10 TABLET | Refills: 0 | Status: SHIPPED | OUTPATIENT
Start: 2024-09-28 | End: 2024-10-03

## 2024-09-28 NOTE — PROGRESS NOTES
[facility]        NAME: Ingrid Wheatley is a 74 y.o. female  : 1950    MRN: [id number]  DATE: 2024  TIME: 10:37 AM    Assessment and Plan   UTI symptoms [R39.9]  1. UTI symptoms  POCT urine dip    Urine culture            Patient Instructions     Take medicine as prescribed- will confirm presence of infection with culture  Increase fluid intake  Follow up with PCP in 3-5 days.  Proceed to  ER if symptoms worsen.    If tests have been performed at [facility], our office will contact you with results if changes need to be made to the care plan discussed with you at the visit.  You can review your full results on [facility] MyChart.    Chief Complaint     Chief Complaint   Patient presents with    Possible UTI         History of Present Illness       Urinary Tract Infection   This is a recurrent problem. The current episode started yesterday. The problem occurs every urination. The problem has been gradually worsening. Associated symptoms include frequency. Pertinent negatives include no chills, discharge, flank pain, hesitancy, nausea, sweats, urgency or vomiting. Associated symptoms comments: Left low back pain  .   Patient has hx of left RCC.     Review of Systems   Review of Systems   Constitutional:  Negative for chills.   Gastrointestinal:  Negative for nausea and vomiting.   Genitourinary:  Positive for dysuria and frequency. Negative for flank pain, hesitancy, urgency and vaginal discharge.         Current Medications       Current Outpatient Medications:     amitriptyline (ELAVIL) 50 mg tablet, Take 1 tablet (50 mg total) by mouth daily at bedtime, Disp: 90 tablet, Rfl: 3    Biotin 2500 MCG CAPS, Take 1 capsule by mouth 2 (two) times a day , Disp: , Rfl:     calcium carbonate (OS-ANTHONY) 600 MG tablet, Take 1,200 mg by mouth 2 (two) times a day with meals, Disp: , Rfl:     Cholecalciferol (VITAMIN D-3 PO), Take 1 tablet by mouth daily, Disp: , Rfl:     Cyanocobalamin (VITAMIN B-12  CR PO), Take 1 tablet by mouth daily, Disp: , Rfl:     Diclofenac Sodium (VOLTAREN) 1 %, Apply 2 g topically 4 (four) times a day, Disp: 200 g, Rfl: 1    dicyclomine (BENTYL) 10 mg capsule, Take 1 capsule (10 mg total) by mouth 2 (two) times a day, Disp: 180 capsule, Rfl: 3    diphenhydrAMINE (BENADRYL) 25 mg tablet, Take 2 tablets one hour before CT scan, Disp: 2 tablet, Rfl: 0    Evening Primrose Oil 1000 MG CAPS, Take 1 capsule (1,000 mg total) by mouth in the morning, Disp: , Rfl: 0    famotidine (PEPCID) 20 mg tablet, Take 1 tablet (20 mg total) by mouth 2 (two) times a day, Disp: 180 tablet, Rfl: 3    fexofenadine (ALLEGRA) 180 MG tablet, Take 180 mg by mouth daily, Disp: , Rfl:     gabapentin (NEURONTIN) 300 mg capsule, Take 1 tablet in the morning, 1 tablet afternoon and 2 bedtime, Disp: 360 capsule, Rfl: 3    hydroxychloroquine (PLAQUENIL) 200 mg tablet, Take 200 mg by mouth daily with breakfast, Disp: , Rfl:     losartan-hydrochlorothiazide (HYZAAR) 100-25 MG per tablet, Take 1 tablet by mouth daily, Disp: 90 tablet, Rfl: 3    methylPREDNISolone (MEDROL) 32 MG tablet, Take one tablet 12 hours before, and one tablet 2 hours before CT scan, Disp: 2 tablet, Rfl: 0    montelukast (SINGULAIR) 10 mg tablet, Take 1 tablet (10 mg total) by mouth daily at bedtime, Disp: 90 tablet, Rfl: 3    Multiple Vitamins-Minerals (PRESERVISION AREDS 2 PO), Take by mouth in the morning, Disp: , Rfl:     Omega-3 Fatty Acids (FISH OIL) 1,000 mg, Take 1,000 mg by mouth 3 (three) times a day, Disp: , Rfl:     omeprazole (PriLOSEC) 40 MG capsule, Take 1 capsule (40 mg total) by mouth daily, Disp: 90 capsule, Rfl: 3    rosuvastatin (CRESTOR) 10 MG tablet, Take 1 tablet (10 mg total) by mouth daily Take Monday, Wednesday and Friday alternate with simvastatin, Disp: 36 tablet, Rfl: 3    simvastatin (ZOCOR) 10 mg tablet, Take Tuesday, Thursday' Saturday and Sunday, to be alternated with rosuvastatin, Disp: 48 tablet, Rfl: 3    diazepam  (VALIUM) 10 mg tablet, Take 2 tablets (20 mg total) by mouth 1 (one) time for 1 dose Take 1 tablet 45 minutes prior to your schedule MRI, Disp: 2 tablet, Rfl: 0    EPINEPHrine (EPIPEN) 0.3 mg/0.3 mL SOAJ, INJECT 0.3 ML (0.3 MG TOTAL) INTO A MUSCLE AS NEEDED FOR ANAPHYLAXIS (Patient not taking: Reported on 7/19/2024), Disp: 2 each, Rfl: 1    hydrOXYzine HCL (ATARAX) 10 mg tablet, Take 2 tablets (20 mg total) by mouth daily at bedtime Take 2 tablets at bedtime (Patient not taking: Reported on 7/19/2024), Disp: 180 tablet, Rfl: 1    methocarbamol (ROBAXIN) 750 mg tablet, TAKE 1 TABLET EVERY 8 HOURS, Disp: 270 tablet, Rfl: 3    Current Facility-Administered Medications:     ondansetron (ZOFRAN) injection 4 mg, 4 mg, Intravenous, Q6H PRN, Guille Perdomo MD    Current Allergies     Allergies as of 09/28/2024 - Reviewed 09/28/2024   Allergen Reaction Noted    Bee venom Shortness Of Breath 08/11/2017    Chlorhexidine Rash 09/04/2020    Other Rash, Hives, Palpitations, and Shortness Of Breath 04/13/2017    Egg yolk - food allergy Hives 11/07/2013    Iodinated contrast media Hives and Other (See Comments) 04/02/2016    Penicillins Hives 04/02/2016    Lidocaine Other (See Comments) 10/14/2021    Allevyn adhesive [wound dressings] Hives and Rash 06/03/2022    Bactrim [sulfamethoxazole-trimethoprim] Rash 03/08/2019    Codeine Other (See Comments) 04/02/2016    Latex Rash and Other (See Comments) 12/08/2016    Medical tape Blisters, Hives, Itching, and Rash 05/12/2021    Oxybutynin Rash 04/22/2019    Pentosan polysulfate Rash 11/05/2018    Povidone iodine Rash 09/29/2020            The following portions of the patient's history were reviewed and updated as appropriate: allergies, current medications, past family history, past medical history, past social history, past surgical history and problem list.     Past Medical History:   Diagnosis Date    Abnormal findings on diagnostic imaging of breast     Abnormal Pap smear of  cervix     Arthritis     Chronic pain disorder     Closed fracture of proximal end of left fibula 06/04/2021    Extremity pain     Fibrocystic breast disease     Foot pain     GERD (gastroesophageal reflux disease)     Hyperlipidemia     Hypertension     Interstitial cystitis     Joint pain     Low back pain     Osteoarthritis     Osteopenia     PONV (postoperative nausea and vomiting) 10/17/2023    Uncomplicated opioid dependence (HCC) 03/01/2022       Past Surgical History:   Procedure Laterality Date    APPENDECTOMY      BACK SURGERY      BREAST EXCISIONAL BIOPSY Left 1996    benign    CARPAL BOSS EXCISION      CHOLECYSTECTOMY      DIAGNOSTIC LAPAROSCOPY      FOOT SURGERY      FRACTURE SURGERY      HAND SURGERY  5/2017    HYSTERECTOMY      age 32    HYSTEROSCOPY      IR CRYOABLATION  10/17/2023    JOINT REPLACEMENT      KIDNEY SURGERY Left     KNEE ARTHROSCOPY Bilateral     LAMINECTOMY      LAPAROSCOPY      hynecologic with adhesions    MYOMECTOMY      OOPHORECTOMY Bilateral     age 32    ORTHOPEDIC SURGERY      CO CAR IMPLTJ NSTIM ELTRDS PLATE/PADDLE EDRL N/A 05/18/2017    Procedure: PLACEMENT OF THORACIC SPINAL CORD STIMULATOR WITH LEFT BUTTOCK IMPLANTABLE PULSE GENERATOR (IMPULSE MONITORING-MOTORS (TCEMEP), EMG, SSEP);  Surgeon: Julius Damon MD;  Location: [facility];  Service: Neurosurgery    SPINAL CORD STIMULATOR IMPLANT Left 09/29/2020    Procedure: LAMINECTOMY THORACIC , REMOVAL OF SCS LEADS AND GENERATOR;  Surgeon: Oswald Whitt MD;  Location:  MAIN OR;  Service: Neurosurgery    SPINAL STIMULATOR PLACEMENT  05/2017    TONSILLECTOMY AND ADENOIDECTOMY      onset: unknown    TOTAL KNEE ARTHROPLASTY Right        Family History   Problem Relation Age of Onset    Bone cancer Mother     Cancer Mother     Asthma Mother     Brain cancer Father     Stroke Father         stroke sydrome    Transient ischemic attack Father     Depression Sister     Migraines Sister     Asthma Sister     Asthma Sister     No  "Known Problems Maternal Grandmother     No Known Problems Maternal Grandfather     Diabetes Paternal Grandmother     No Known Problems Paternal Grandfather     Heart disease Brother     Diabetes Brother     Heart attack Brother     Colon cancer Brother 66    Cancer Brother     No Known Problems Brother     Breast cancer Maternal Aunt 70    Breast cancer additional onset Maternal Aunt 72    Diabetes Sister     Depression Sister          Medications have been verified.        Objective   /84   Pulse 80   Temp 97.7 °F (36.5 °C)   Resp 18   Ht 5' 2\" (1.575 m)   Wt 82.6 kg (182 lb)   LMP  (LMP Unknown)   SpO2 95%   BMI 33.29 kg/m²   No LMP recorded (lmp unknown). Patient has had a hysterectomy.       Physical Exam     Physical Exam  Constitutional:       Appearance: Normal appearance.   Cardiovascular:      Rate and Rhythm: Normal rate and regular rhythm.      Heart sounds: Normal heart sounds. No murmur heard.     No friction rub. No gallop.   Pulmonary:      Effort: Pulmonary effort is normal. No respiratory distress.      Breath sounds: Normal breath sounds. No stridor. No wheezing, rhonchi or rales.   Abdominal:      Tenderness: There is no abdominal tenderness. There is no right CVA tenderness, left CVA tenderness, guarding or rebound.   Neurological:      Mental Status: She is alert.                   "

## 2024-10-01 LAB — BACTERIA UR CULT: ABNORMAL

## 2024-10-04 ENCOUNTER — OFFICE VISIT (OUTPATIENT)
Dept: FAMILY MEDICINE CLINIC | Facility: CLINIC | Age: 74
End: 2024-10-04
Payer: MEDICARE

## 2024-10-04 VITALS
DIASTOLIC BLOOD PRESSURE: 64 MMHG | RESPIRATION RATE: 18 BRPM | TEMPERATURE: 97.5 F | HEART RATE: 68 BPM | HEIGHT: 62 IN | OXYGEN SATURATION: 98 % | SYSTOLIC BLOOD PRESSURE: 102 MMHG | WEIGHT: 183 LBS | BODY MASS INDEX: 33.68 KG/M2

## 2024-10-04 DIAGNOSIS — Z12.11 SCREENING FOR COLORECTAL CANCER: ICD-10-CM

## 2024-10-04 DIAGNOSIS — Z12.12 SCREENING FOR COLORECTAL CANCER: ICD-10-CM

## 2024-10-04 DIAGNOSIS — N39.0 UTI (URINARY TRACT INFECTION), UNCOMPLICATED: Primary | ICD-10-CM

## 2024-10-04 LAB
SL AMB  POCT GLUCOSE, UA: ABNORMAL
SL AMB LEUKOCYTE ESTERASE,UA: ABNORMAL
SL AMB POCT BILIRUBIN,UA: ABNORMAL
SL AMB POCT BLOOD,UA: ABNORMAL
SL AMB POCT CLARITY,UA: ABNORMAL
SL AMB POCT COLOR,UA: ABNORMAL
SL AMB POCT KETONES,UA: ABNORMAL
SL AMB POCT NITRITE,UA: ABNORMAL
SL AMB POCT PH,UA: 6
SL AMB POCT SPECIFIC GRAVITY,UA: 1.02
SL AMB POCT URINE PROTEIN: ABNORMAL
SL AMB POCT UROBILINOGEN: ABNORMAL

## 2024-10-04 PROCEDURE — 81002 URINALYSIS NONAUTO W/O SCOPE: CPT | Performed by: PHYSICIAN ASSISTANT

## 2024-10-04 PROCEDURE — 99213 OFFICE O/P EST LOW 20 MIN: CPT | Performed by: PHYSICIAN ASSISTANT

## 2024-10-04 PROCEDURE — 87086 URINE CULTURE/COLONY COUNT: CPT | Performed by: PHYSICIAN ASSISTANT

## 2024-10-04 RX ORDER — NITROFURANTOIN 25; 75 MG/1; MG/1
100 CAPSULE ORAL 2 TIMES DAILY
Qty: 10 CAPSULE | Refills: 0 | Status: SHIPPED | OUTPATIENT
Start: 2024-10-04 | End: 2024-10-09

## 2024-10-04 NOTE — PROGRESS NOTES
Ambulatory Visit  Name: Ingrid Wheatley      : 1950      MRN: [id number]  Encounter Provider: Elsa Pollack PA-C  Encounter Date: 10/4/2024   Encounter department: [facility]    Assessment & Plan  UTI (urinary tract infection), uncomplicated  Reculture urine. Continue increased water intake, encouraged to urinate as soon as she feels urge. Will give macrobid based on last urine culture, will adjust if necessary.  Orders:    POCT urine dip    nitrofurantoin (MACROBID) 100 mg capsule; Take 1 capsule (100 mg total) by mouth 2 (two) times a day for 5 days    Screening for colorectal cancer  Reminded Ingrid to call her GI, states she is established with Dr. Cooper[facility] group in [place]. She will call them to schedule colonoscopy.         Depression Screening and Follow-up Plan: Patient was screened for depression during today's encounter. They screened negative with a PHQ-2 score of 0.      History of Present Illness     Ingrid is here today for persistent UTI symptoms. Have improved with use of cipro, however still feels bladder pressure. She has increased her water intake. Admits that she holds her urine too long.          Review of Systems   Constitutional:  Negative for chills and fever.   HENT:  Negative for ear pain and sore throat.    Eyes:  Negative for pain and visual disturbance.   Respiratory:  Negative for cough and shortness of breath.    Cardiovascular:  Negative for chest pain and palpitations.   Gastrointestinal:  Negative for abdominal pain and vomiting.   Genitourinary:  Negative for dysuria and hematuria.        +bladder pressure   Musculoskeletal:  Negative for arthralgias and back pain.   Skin:  Negative for color change and rash.   Neurological:  Negative for seizures and syncope.   All other systems reviewed and are negative.          Objective     /64 (BP Location: Left arm, Patient Position: Sitting, Cuff Size: Standard)   Pulse 68   Temp 97.5 °F (36.4 °C) (Temporal)    "Resp 18   Ht 5' 2\" (1.575 m)   Wt 83 kg (183 lb)   LMP  (LMP Unknown)   SpO2 98%   BMI 33.47 kg/m²     Physical Exam  Vitals reviewed.   Constitutional:       General: She is not in acute distress.     Appearance: She is well-developed. She is not diaphoretic.   HENT:      Head: Normocephalic and atraumatic.      Right Ear: Hearing, tympanic membrane, ear canal and external ear normal.      Left Ear: Hearing, tympanic membrane, ear canal and external ear normal.      Nose: Nose normal.      Mouth/Throat:      Mouth: Mucous membranes are moist.      Pharynx: Oropharynx is clear. Uvula midline. No oropharyngeal exudate.   Eyes:      General: No scleral icterus.        Right eye: No discharge.         Left eye: No discharge.      Conjunctiva/sclera: Conjunctivae normal.   Neck:      Thyroid: No thyromegaly.      Vascular: No carotid bruit.   Cardiovascular:      Rate and Rhythm: Normal rate and regular rhythm.      Heart sounds: Normal heart sounds. No murmur heard.  Pulmonary:      Effort: Pulmonary effort is normal. No respiratory distress.      Breath sounds: Normal breath sounds. No wheezing.   Abdominal:      General: Bowel sounds are normal. There is no distension.      Palpations: Abdomen is soft. There is no mass.      Tenderness: There is no abdominal tenderness. There is no guarding or rebound.   Musculoskeletal:         General: No tenderness. Normal range of motion.      Cervical back: Neck supple.   Lymphadenopathy:      Cervical: No cervical adenopathy.   Skin:     General: Skin is warm and dry.      Findings: No erythema or rash.   Neurological:      Mental Status: She is alert and oriented to person, place, and time.   Psychiatric:         Behavior: Behavior normal.         Thought Content: Thought content normal.         Judgment: Judgment normal.         "

## 2024-10-05 LAB — BACTERIA UR CULT: NORMAL

## 2024-10-14 ENCOUNTER — OFFICE VISIT (OUTPATIENT)
Dept: OBGYN CLINIC | Facility: CLINIC | Age: 74
End: 2024-10-14
Payer: MEDICARE

## 2024-10-14 ENCOUNTER — APPOINTMENT (OUTPATIENT)
Dept: RADIOLOGY | Facility: MEDICAL CENTER | Age: 74
End: 2024-10-14
Payer: MEDICARE

## 2024-10-14 VITALS
WEIGHT: 182 LBS | RESPIRATION RATE: 16 BRPM | OXYGEN SATURATION: 99 % | TEMPERATURE: 98.1 F | SYSTOLIC BLOOD PRESSURE: 118 MMHG | HEART RATE: 88 BPM | DIASTOLIC BLOOD PRESSURE: 74 MMHG | HEIGHT: 62 IN | BODY MASS INDEX: 33.49 KG/M2

## 2024-10-14 DIAGNOSIS — M79.645 PAIN OF LEFT THUMB: Primary | ICD-10-CM

## 2024-10-14 PROCEDURE — 73130 X-RAY EXAM OF HAND: CPT

## 2024-10-14 PROCEDURE — 20550 NJX 1 TENDON SHEATH/LIGAMENT: CPT | Performed by: STUDENT IN AN ORGANIZED HEALTH CARE EDUCATION/TRAINING PROGRAM

## 2024-10-14 PROCEDURE — 99203 OFFICE O/P NEW LOW 30 MIN: CPT | Performed by: STUDENT IN AN ORGANIZED HEALTH CARE EDUCATION/TRAINING PROGRAM

## 2024-10-14 RX ORDER — BETAMETHASONE SODIUM PHOSPHATE AND BETAMETHASONE ACETATE 3; 3 MG/ML; MG/ML
6 INJECTION, SUSPENSION INTRA-ARTICULAR; INTRALESIONAL; INTRAMUSCULAR; SOFT TISSUE
Status: COMPLETED | OUTPATIENT
Start: 2024-10-14 | End: 2024-10-14

## 2024-10-14 RX ORDER — LIDOCAINE HYDROCHLORIDE 10 MG/ML
1 INJECTION, SOLUTION INFILTRATION; PERINEURAL
Status: COMPLETED | OUTPATIENT
Start: 2024-10-14 | End: 2024-10-14

## 2024-10-14 RX ADMIN — LIDOCAINE HYDROCHLORIDE 1 ML: 10 INJECTION, SOLUTION INFILTRATION; PERINEURAL at 08:00

## 2024-10-14 RX ADMIN — BETAMETHASONE SODIUM PHOSPHATE AND BETAMETHASONE ACETATE 6 MG: 3; 3 INJECTION, SUSPENSION INTRA-ARTICULAR; INTRALESIONAL; INTRAMUSCULAR; SOFT TISSUE at 08:00

## 2024-10-14 NOTE — PROGRESS NOTES
ASSESSMENT/PLAN:    Assessment:   Left thumb trigger finger. The anatomy and physiology of trigger finger was discussed with the patient today in the office.  Edema and increased contact pressure within the flexor tendons at the A1 pulley can cause pain, crepitation, and limitation of function.  Treatment options include resting MP blocking splints to decrease edema, oral anti-inflammatory medications, home or formal therapy exercises, up to 2 steroid injections within the tendon sheath, or surgical release.  While majority of patients do respond to conservative treatment, up to 20% may require surgical release.     Left De Quervain's Tenosynovitis. The anatomy and physiology of de Quervain's tenosynovitis was discussed with the patient today in the office.  Edema and increased contact pressure within the first dorsal extensor compartment at the radial styloid can cause pain, crepitation, and limitation of function.  Treatment options include resting thumb spica splints to decrease edema, oral anti-inflammatory medications, home or formal therapy exercises, up to 2 steroid injections within the first dorsal extensor compartment, or surgical release.  While majority of patients do respond to conservative treatment, up to 20% may require surgical release.       Plan:  Discussed that patient has combination of left De Quervain's tenosynovitis in addition to left thumb A1 pulley triggering.   Treatment options were discussed including role of injections, oral analgesics, and surgical intervention if conservative measures fail.   Offered injections into the left thumb A1 pulley and left first dorsal extensor compartment to treat both conditions.   Patient accepts and injections were provided today.   Patient may consider repeat injections in six weeks if pain persists.   If both injections fail to provide relief, surgical intervention may be discussed.   Patient will follow-up in 6 weeks for re-evaluation.      Follow-Up:  Re-evaluation left hand  Consider repeat injections    _____________________________________________________  CHIEF COMPLAINT:  Chief Complaint   Patient presents with    Left Thumb - Pain         SUBJECTIVE:  Ingrid Wheatley is a 74 y.o. right hand dominant female who presents with left thumb pain.  The patient first noted symptoms about 3 weeks ago. Patient reports she has pain in the left thumb with pain radiating into the radial aspect of the wrist. Pain worsens with grasping objects. Patient reports she has locking in the left thumb and the thumb locks up in the morning. Patient had history of trigger thumb in the right thumb that was released several years ago.    Location of the pain: left thumb base and radial wrist  Quality of  pain: sharp  Severity of the pain: moderate  Aggravating symptoms: activity  Dropping objects: no  Increase in symptoms with increase use : yes    Occupation: works part time as  at [employer]      PAST MEDICAL HISTORY:  Past Medical History:   Diagnosis Date    Abnormal findings on diagnostic imaging of breast     Abnormal Pap smear of cervix     Arthritis     Chronic pain disorder     Closed fracture of proximal end of left fibula 06/04/2021    Extremity pain     Fibrocystic breast disease     Foot pain     GERD (gastroesophageal reflux disease)     Hyperlipidemia     Hypertension     Interstitial cystitis     Joint pain     Low back pain     Osteoarthritis     Osteopenia     PONV (postoperative nausea and vomiting) 10/17/2023    Uncomplicated opioid dependence (HCC) 03/01/2022       PAST SURGICAL HISTORY:  Past Surgical History:   Procedure Laterality Date    APPENDECTOMY      BACK SURGERY      BREAST EXCISIONAL BIOPSY Left 1996    benign    CARPAL BOSS EXCISION      CHOLECYSTECTOMY      DIAGNOSTIC LAPAROSCOPY      FOOT SURGERY      FRACTURE SURGERY      HAND SURGERY  5/2017    HYSTERECTOMY      age 32    HYSTEROSCOPY      IR CRYOABLATION  10/17/2023    JOINT  REPLACEMENT      KIDNEY SURGERY Left     KNEE ARTHROSCOPY Bilateral     LAMINECTOMY      LAPAROSCOPY      hynecologic with adhesions    MYOMECTOMY      OOPHORECTOMY Bilateral     age 32    ORTHOPEDIC SURGERY      VT CAR IMPLTJ NSTIM ELTRDS PLATE/PADDLE EDRL N/A 05/18/2017    Procedure: PLACEMENT OF THORACIC SPINAL CORD STIMULATOR WITH LEFT BUTTOCK IMPLANTABLE PULSE GENERATOR (IMPULSE MONITORING-MOTORS (TCEMEP), EMG, SSEP);  Surgeon: Julius Damon MD;  Location: [facility];  Service: Neurosurgery    SPINAL CORD STIMULATOR IMPLANT Left 09/29/2020    Procedure: LAMINECTOMY THORACIC , REMOVAL OF SCS LEADS AND GENERATOR;  Surgeon: Oswald Whitt MD;  Location:  MAIN OR;  Service: Neurosurgery    SPINAL STIMULATOR PLACEMENT  05/2017    TONSILLECTOMY AND ADENOIDECTOMY      onset: unknown    TOTAL KNEE ARTHROPLASTY Right        FAMILY HISTORY:  Family History   Problem Relation Age of Onset    Bone cancer Mother     Cancer Mother     Asthma Mother     Brain cancer Father     Stroke Father         stroke sydrome    Transient ischemic attack Father     Depression Sister     Migraines Sister     Asthma Sister     Asthma Sister     No Known Problems Maternal Grandmother     No Known Problems Maternal Grandfather     Diabetes Paternal Grandmother     No Known Problems Paternal Grandfather     Heart disease Brother     Diabetes Brother     Heart attack Brother     Colon cancer Brother 66    Cancer Brother     No Known Problems Brother     Breast cancer Maternal Aunt 70    Breast cancer additional onset Maternal Aunt 72    Diabetes Sister     Depression Sister        SOCIAL HISTORY:  Social History     Tobacco Use    Smoking status: Never    Smokeless tobacco: Never   Vaping Use    Vaping status: Never Used   Substance Use Topics    Alcohol use: Yes     Alcohol/week: 1.0 standard drink of alcohol     Comment: Drink socially, not too often    Drug use: No       MEDICATIONS:    Current Outpatient Medications:     amitriptyline  (ELAVIL) 50 mg tablet, Take 1 tablet (50 mg total) by mouth daily at bedtime, Disp: 90 tablet, Rfl: 3    Biotin 2500 MCG CAPS, Take 1 capsule by mouth 2 (two) times a day , Disp: , Rfl:     calcium carbonate (OS-ANTHONY) 600 MG tablet, Take 1,200 mg by mouth 2 (two) times a day with meals, Disp: , Rfl:     Cholecalciferol (VITAMIN D-3 PO), Take 1 tablet by mouth daily, Disp: , Rfl:     Cyanocobalamin (VITAMIN B-12 CR PO), Take 1 tablet by mouth daily, Disp: , Rfl:     Diclofenac Sodium (VOLTAREN) 1 %, Apply 2 g topically 4 (four) times a day, Disp: 200 g, Rfl: 1    dicyclomine (BENTYL) 10 mg capsule, Take 1 capsule (10 mg total) by mouth 2 (two) times a day, Disp: 180 capsule, Rfl: 3    diphenhydrAMINE (BENADRYL) 25 mg tablet, Take 2 tablets one hour before CT scan, Disp: 2 tablet, Rfl: 0    Evening Primrose Oil 1000 MG CAPS, Take 1 capsule (1,000 mg total) by mouth in the morning, Disp: , Rfl: 0    famotidine (PEPCID) 20 mg tablet, Take 1 tablet (20 mg total) by mouth 2 (two) times a day, Disp: 180 tablet, Rfl: 3    fexofenadine (ALLEGRA) 180 MG tablet, Take 180 mg by mouth daily, Disp: , Rfl:     gabapentin (NEURONTIN) 300 mg capsule, Take 1 tablet in the morning, 1 tablet afternoon and 2 bedtime, Disp: 360 capsule, Rfl: 3    hydroxychloroquine (PLAQUENIL) 200 mg tablet, Take 200 mg by mouth daily with breakfast, Disp: , Rfl:     losartan-hydrochlorothiazide (HYZAAR) 100-25 MG per tablet, Take 1 tablet by mouth daily, Disp: 90 tablet, Rfl: 3    methocarbamol (ROBAXIN) 750 mg tablet, TAKE 1 TABLET EVERY 8 HOURS, Disp: 270 tablet, Rfl: 3    montelukast (SINGULAIR) 10 mg tablet, Take 1 tablet (10 mg total) by mouth daily at bedtime, Disp: 90 tablet, Rfl: 3    Multiple Vitamins-Minerals (PRESERVISION AREDS 2 PO), Take by mouth in the morning, Disp: , Rfl:     Omega-3 Fatty Acids (FISH OIL) 1,000 mg, Take 1,000 mg by mouth 3 (three) times a day, Disp: , Rfl:     omeprazole (PriLOSEC) 40 MG capsule, Take 1 capsule (40 mg  total) by mouth daily, Disp: 90 capsule, Rfl: 3    rosuvastatin (CRESTOR) 10 MG tablet, Take 1 tablet (10 mg total) by mouth daily Take Monday, Wednesday and Friday alternate with simvastatin, Disp: 36 tablet, Rfl: 3    simvastatin (ZOCOR) 10 mg tablet, Take Tuesday, Thursday' Saturday and Sunday, to be alternated with rosuvastatin, Disp: 48 tablet, Rfl: 3    diazepam (VALIUM) 10 mg tablet, Take 2 tablets (20 mg total) by mouth 1 (one) time for 1 dose Take 1 tablet 45 minutes prior to your schedule MRI, Disp: 2 tablet, Rfl: 0    EPINEPHrine (EPIPEN) 0.3 mg/0.3 mL SOAJ, INJECT 0.3 ML (0.3 MG TOTAL) INTO A MUSCLE AS NEEDED FOR ANAPHYLAXIS (Patient not taking: Reported on 7/19/2024), Disp: 2 each, Rfl: 1    hydrOXYzine HCL (ATARAX) 10 mg tablet, Take 2 tablets (20 mg total) by mouth daily at bedtime Take 2 tablets at bedtime (Patient not taking: Reported on 7/19/2024), Disp: 180 tablet, Rfl: 1    methylPREDNISolone (MEDROL) 32 MG tablet, Take one tablet 12 hours before, and one tablet 2 hours before CT scan, Disp: 2 tablet, Rfl: 0    Current Facility-Administered Medications:     ondansetron (ZOFRAN) injection 4 mg, 4 mg, Intravenous, Q6H PRN, Guille Perdomo MD    ALLERGIES:  Allergies   Allergen Reactions    Bee Venom Shortness Of Breath    Chlorhexidine Rash    Other Rash, Hives, Palpitations and Shortness Of Breath     Adhesive tape    Egg Yolk - Food Allergy Hives    Iodinated Contrast Media Hives and Other (See Comments)    Penicillins Hives    Lidocaine Other (See Comments)     cream    Allevyn Adhesive [Wound Dressings] Hives and Rash    Bactrim [Sulfamethoxazole-Trimethoprim] Rash    Codeine Other (See Comments)     headaches    Latex Rash and Other (See Comments)    Medical Tape Blisters, Hives, Itching and Rash    Oxybutynin Rash    Pentosan Polysulfate Rash    Povidone Iodine Rash       REVIEW OF SYSTEMS:  Pertinent items are noted in HPI.    LABS:  HgA1c:   Lab Results   Component Value Date    HGBA1C  "5.5 09/14/2020     BMP:   Lab Results   Component Value Date    GLUCOSE 118 12/08/2016    CALCIUM 9.4 06/25/2024     11/09/2015    K 3.7 06/25/2024    CO2 28 06/25/2024     06/25/2024    BUN 13 06/25/2024    CREATININE 0.57 (L) 06/25/2024         _____________________________________________________  PHYSICAL EXAMINATION:  Vital signs: Resp 16   Ht 5' 2\" (1.575 m)   LMP  (LMP Unknown)   BMI 33.47 kg/m²   General: well developed and well nourished, alert, and appears comfortable  Psychiatric: Normal  HEENT: Trachea Midline, No torticollis  Cardiovascular: No discernable arrhythmia  Pulmonary: No wheezing or stridor  Abdomen: No rebound or guarding  Extremities: No peripheral edema  Skin: No masses, erythema, lacerations, fluctation, ulcerations  Neurovascular: Sensation Intact to the Median, Ulnar, Radial Nerve and Pulses Intact    MUSCULOSKELETAL EXAMINATION:  Patient presents with no obvious anatomical deformity  Skin is warm and dry to touch with no signs of erythema or infection.  Range of motion: Able to make full composite fist  There is no muscle atrophy.  FDS intact   FDP intact   FPL intact   wrist and finger extensors intact bilaterally  Wrist, elbow, and shoulder motion within normal limits  Forearm compartments are soft and supple  2+ Distal radial pulse with brisk capillary refill to the fingers  Sensation to light touch grossly intact in the median, radial and ulnar nerve distributions      Finkelstein positive  WHAT test postive  Positive tenderness over the first dorsal extensor compartment    Mild tenderness over the thumb CMC joint  Mild tenderness over thumb a1 pulley  Positive triggering over thumb A1 pulley      _____________________________________________________  STUDIES REVIEWED:  I reviewed imaging in PACS from 10/14/2024 of the left hand which demonstrates moderate stage 2 borderline stage 3 thumb CMC degenerative changes.      PROCEDURES PERFORMED:  Hand/upper extremity " injection: L extensor compartment 1  Universal Protocol:  Consent: Verbal consent obtained.  Risks and benefits: risks, benefits and alternatives were discussed  Consent given by: patient  Patient understanding: patient states understanding of the procedure being performed  Site marked: the operative site was marked  Patient identity confirmed: verbally with patient  Supporting Documentation  Indications: pain and therapeutic   Procedure Details  Condition:de Quervain's tenosynovitis Site: L extensor compartment 1   Needle size: 25 G  Ultrasound guidance: no  Medications administered: 6 mg betamethasone acetate-betamethasone sodium phosphate 6 (3-3) mg/mL; 1 mL lidocaine 1 %  Patient tolerance: patient tolerated the procedure well with no immediate complications  Dressing:  Sterile dressing applied       Hand/upper extremity injection: L thumb A1  Universal Protocol:  Consent: Verbal consent obtained.  Risks and benefits: risks, benefits and alternatives were discussed  Consent given by: patient  Patient understanding: patient states understanding of the procedure being performed  Site marked: the operative site was marked  Patient identity confirmed: verbally with patient  Supporting Documentation  Indications: pain and therapeutic   Procedure Details  Condition:trigger finger Location: thumb - L thumb A1   Needle size: 25 G  Medications administered: 1 mL lidocaine 1 %; 6 mg betamethasone acetate-betamethasone sodium phosphate 6 (3-3) mg/mL  Patient tolerance: patient tolerated the procedure well with no immediate complications  Dressing:  Sterile dressing applied         Scribe Attestation      I,:  Aliza Sanchez PA-C am acting as a scribe while in the presence of the attending physician.:       I,:  Jose Alfredo Corea MD personally performed the services described in this documentation    as scribed in my presence.:

## 2024-11-04 ENCOUNTER — APPOINTMENT (OUTPATIENT)
Dept: LAB | Facility: MEDICAL CENTER | Age: 74
End: 2024-11-04
Payer: MEDICARE

## 2024-11-04 DIAGNOSIS — Z11.59 ENCOUNTER FOR SCREENING FOR OTHER VIRAL DISEASES: ICD-10-CM

## 2024-11-04 DIAGNOSIS — M06.4 INFLAMMATORY POLYARTHRITIS (HCC): ICD-10-CM

## 2024-11-04 DIAGNOSIS — C64.2 RENAL CELL CARCINOMA OF LEFT KIDNEY (HCC): ICD-10-CM

## 2024-11-04 DIAGNOSIS — E78.2 MIXED HYPERLIPIDEMIA: ICD-10-CM

## 2024-11-04 DIAGNOSIS — I10 PRIMARY HYPERTENSION: ICD-10-CM

## 2024-11-04 LAB
ALBUMIN SERPL BCG-MCNC: 4.4 G/DL (ref 3.5–5)
ALP SERPL-CCNC: 63 U/L (ref 34–104)
ALT SERPL W P-5'-P-CCNC: 20 U/L (ref 7–52)
ANION GAP SERPL CALCULATED.3IONS-SCNC: 7 MMOL/L (ref 4–13)
AST SERPL W P-5'-P-CCNC: 19 U/L (ref 13–39)
BASOPHILS # BLD AUTO: 0.07 THOUSANDS/ΜL (ref 0–0.1)
BASOPHILS NFR BLD AUTO: 1 % (ref 0–1)
BILIRUB SERPL-MCNC: 0.43 MG/DL (ref 0.2–1)
BUN SERPL-MCNC: 18 MG/DL (ref 5–25)
CALCIUM SERPL-MCNC: 9.6 MG/DL (ref 8.4–10.2)
CHLORIDE SERPL-SCNC: 104 MMOL/L (ref 96–108)
CO2 SERPL-SCNC: 31 MMOL/L (ref 21–32)
CREAT SERPL-MCNC: 0.62 MG/DL (ref 0.6–1.3)
EOSINOPHIL # BLD AUTO: 0.25 THOUSAND/ΜL (ref 0–0.61)
EOSINOPHIL NFR BLD AUTO: 4 % (ref 0–6)
ERYTHROCYTE [DISTWIDTH] IN BLOOD BY AUTOMATED COUNT: 13.3 % (ref 11.6–15.1)
GFR SERPL CREATININE-BSD FRML MDRD: 89 ML/MIN/1.73SQ M
GLUCOSE P FAST SERPL-MCNC: 97 MG/DL (ref 65–99)
HCT VFR BLD AUTO: 42.4 % (ref 34.8–46.1)
HGB BLD-MCNC: 14.2 G/DL (ref 11.5–15.4)
IMM GRANULOCYTES # BLD AUTO: 0.01 THOUSAND/UL (ref 0–0.2)
IMM GRANULOCYTES NFR BLD AUTO: 0 % (ref 0–2)
LYMPHOCYTES # BLD AUTO: 1.73 THOUSANDS/ΜL (ref 0.6–4.47)
LYMPHOCYTES NFR BLD AUTO: 28 % (ref 14–44)
MCH RBC QN AUTO: 30 PG (ref 26.8–34.3)
MCHC RBC AUTO-ENTMCNC: 33.5 G/DL (ref 31.4–37.4)
MCV RBC AUTO: 90 FL (ref 82–98)
MONOCYTES # BLD AUTO: 0.45 THOUSAND/ΜL (ref 0.17–1.22)
MONOCYTES NFR BLD AUTO: 7 % (ref 4–12)
NEUTROPHILS # BLD AUTO: 3.68 THOUSANDS/ΜL (ref 1.85–7.62)
NEUTS SEG NFR BLD AUTO: 60 % (ref 43–75)
NRBC BLD AUTO-RTO: 0 /100 WBCS
PLATELET # BLD AUTO: 240 THOUSANDS/UL (ref 149–390)
PMV BLD AUTO: 9.7 FL (ref 8.9–12.7)
POTASSIUM SERPL-SCNC: 4 MMOL/L (ref 3.5–5.3)
PROT SERPL-MCNC: 6.9 G/DL (ref 6.4–8.4)
RBC # BLD AUTO: 4.73 MILLION/UL (ref 3.81–5.12)
SODIUM SERPL-SCNC: 142 MMOL/L (ref 135–147)
WBC # BLD AUTO: 6.19 THOUSAND/UL (ref 4.31–10.16)

## 2024-11-04 PROCEDURE — 85025 COMPLETE CBC W/AUTO DIFF WBC: CPT

## 2024-11-04 PROCEDURE — 86706 HEP B SURFACE ANTIBODY: CPT

## 2024-11-04 PROCEDURE — 86704 HEP B CORE ANTIBODY TOTAL: CPT

## 2024-11-04 PROCEDURE — 80053 COMPREHEN METABOLIC PANEL: CPT

## 2024-11-04 PROCEDURE — 87340 HEPATITIS B SURFACE AG IA: CPT

## 2024-11-04 PROCEDURE — 36415 COLL VENOUS BLD VENIPUNCTURE: CPT

## 2024-11-04 PROCEDURE — 86803 HEPATITIS C AB TEST: CPT

## 2024-11-04 PROCEDURE — 86705 HEP B CORE ANTIBODY IGM: CPT

## 2024-11-05 LAB
HBV CORE AB SER QL: NORMAL
HBV CORE IGM SER QL: NORMAL
HBV SURFACE AB SER-ACNC: 3.73 MIU/ML
HBV SURFACE AG SER QL: NORMAL

## 2024-11-06 LAB
HCV AB S/CO SERPL IA: NON REACTIVE
SL AMB INTERPRETATION: NORMAL

## 2024-11-27 ENCOUNTER — OFFICE VISIT (OUTPATIENT)
Dept: OBGYN CLINIC | Facility: CLINIC | Age: 74
End: 2024-11-27
Payer: MEDICARE

## 2024-11-27 VITALS
OXYGEN SATURATION: 98 % | HEIGHT: 62 IN | TEMPERATURE: 97.7 F | HEART RATE: 86 BPM | SYSTOLIC BLOOD PRESSURE: 120 MMHG | RESPIRATION RATE: 16 BRPM | DIASTOLIC BLOOD PRESSURE: 76 MMHG | BODY MASS INDEX: 33.34 KG/M2 | WEIGHT: 181.2 LBS

## 2024-11-27 DIAGNOSIS — M65.312 TRIGGER THUMB OF LEFT HAND: Primary | ICD-10-CM

## 2024-11-27 DIAGNOSIS — M65.4 DE QUERVAIN'S TENOSYNOVITIS, LEFT: ICD-10-CM

## 2024-11-27 PROCEDURE — 99213 OFFICE O/P EST LOW 20 MIN: CPT | Performed by: STUDENT IN AN ORGANIZED HEALTH CARE EDUCATION/TRAINING PROGRAM

## 2024-11-27 PROCEDURE — 20550 NJX 1 TENDON SHEATH/LIGAMENT: CPT | Performed by: STUDENT IN AN ORGANIZED HEALTH CARE EDUCATION/TRAINING PROGRAM

## 2024-11-27 RX ORDER — FOLIC ACID 1 MG/1
TABLET ORAL
COMMUNITY
Start: 2024-11-27

## 2024-11-27 RX ORDER — DOCUSATE SODIUM 100 MG/1
100 CAPSULE, LIQUID FILLED ORAL
COMMUNITY

## 2024-11-27 RX ORDER — LIDOCAINE HYDROCHLORIDE 10 MG/ML
1 INJECTION, SOLUTION INFILTRATION; PERINEURAL
Status: COMPLETED | OUTPATIENT
Start: 2024-11-27 | End: 2024-11-27

## 2024-11-27 RX ORDER — METHOTREXATE 2.5 MG/1
TABLET ORAL
COMMUNITY
Start: 2024-11-27

## 2024-11-27 RX ORDER — BETAMETHASONE SODIUM PHOSPHATE AND BETAMETHASONE ACETATE 3; 3 MG/ML; MG/ML
6 INJECTION, SUSPENSION INTRA-ARTICULAR; INTRALESIONAL; INTRAMUSCULAR; SOFT TISSUE
Status: COMPLETED | OUTPATIENT
Start: 2024-11-27 | End: 2024-11-27

## 2024-11-27 RX ADMIN — LIDOCAINE HYDROCHLORIDE 1 ML: 10 INJECTION, SOLUTION INFILTRATION; PERINEURAL at 09:15

## 2024-11-27 RX ADMIN — BETAMETHASONE SODIUM PHOSPHATE AND BETAMETHASONE ACETATE 6 MG: 3; 3 INJECTION, SUSPENSION INTRA-ARTICULAR; INTRALESIONAL; INTRAMUSCULAR; SOFT TISSUE at 09:15

## 2024-11-27 NOTE — PROGRESS NOTES
Orthopedic Surgery Management Note  Ingrid Wheatley (74 y.o. female)  : 1950 Encounter Date: 2024    Assessment/Plan:  74 y.o. female with left thumb trigger finger and left De Quervain's tenosynovitis.  She had improvement after the first 2 injections and has not actually had any worsening of symptoms since but she still has some lingering tenderness over the first dorsal extensor compartment as well as over the A1 pulley of the thumb.  There is no triggering of the thumb.  As such I think her symptoms are largely resolving although given her persistence I think another round of corticosteroid injections could feasibly alleviate her of all symptoms now that she is 6 weeks out.  I discussed additionally that we could just observe to see if her symptoms continue to improve however she expressed she does feel her symptoms have plateaued over the past 2 weeks and typically with the injections for both issues patients typically have resolution by 6 weeks.  After discussing these points the patient elected to proceed with repeat injections.  We injected both the first dorsal extensor compartment and the trigger thumb.  Patient tolerated these well.  All questions were answered.  Shared decision-making was had.    Offered repeat injections today for the left thumb trigger finger and left De Quervain's tenosynovitis. Patient accepts.   Injections were provided without immediate complications.   Return in about 6 weeks (around 2025).    Subjective:  74 y.o. female presenting to the office for  follow up of left thumb trigger finger and left De Quervain's tensynovitis, status post left thumb A1 pulley injection given on 10/14/2024.    Patient states she did get relief from the two injections. Patient states she feels the left hand is about 30-40% improved. Patient notices occasional locking in the left thumb, but it has improved and has not occurred in the past week.       Review of Systems  Review of  systems negative unless otherwise specified in HPI    Past Medical History  Past Medical History:   Diagnosis Date    Abnormal findings on diagnostic imaging of breast     Abnormal Pap smear of cervix     Arthritis     Chronic pain disorder     Closed fracture of proximal end of left fibula 06/04/2021    Extremity pain     Fibrocystic breast disease     Foot pain     GERD (gastroesophageal reflux disease)     Hyperlipidemia     Hypertension     Interstitial cystitis     Joint pain     Low back pain     Osteoarthritis     Osteopenia     PONV (postoperative nausea and vomiting) 10/17/2023    Uncomplicated opioid dependence (HCC) 03/01/2022     Past Surgical History  Past Surgical History:   Procedure Laterality Date    APPENDECTOMY      BACK SURGERY      BREAST EXCISIONAL BIOPSY Left 1996    benign    CARPAL BOSS EXCISION      CHOLECYSTECTOMY      DIAGNOSTIC LAPAROSCOPY      FOOT SURGERY      FRACTURE SURGERY      HAND SURGERY  5/2017    HYSTERECTOMY      age 32    HYSTEROSCOPY      IR CRYOABLATION  10/17/2023    JOINT REPLACEMENT      KIDNEY SURGERY Left     KNEE ARTHROSCOPY Bilateral     LAMINECTOMY      LAPAROSCOPY      hynecologic with adhesions    MYOMECTOMY      OOPHORECTOMY Bilateral     age 32    ORTHOPEDIC SURGERY      TN CAR IMPLTJ NSTIM ELTRDS PLATE/PADDLE EDRL N/A 05/18/2017    Procedure: PLACEMENT OF THORACIC SPINAL CORD STIMULATOR WITH LEFT BUTTOCK IMPLANTABLE PULSE GENERATOR (IMPULSE MONITORING-MOTORS (TCEMEP), EMG, SSEP);  Surgeon: Julius Damon MD;  Location: [facility];  Service: Neurosurgery    SPINAL CORD STIMULATOR IMPLANT Left 09/29/2020    Procedure: LAMINECTOMY THORACIC , REMOVAL OF SCS LEADS AND GENERATOR;  Surgeon: Oswald Whitt MD;  Location:  [facility] OR;  Service: Neurosurgery    SPINAL STIMULATOR PLACEMENT  05/2017    TONSILLECTOMY AND ADENOIDECTOMY      onset: unknown    TOTAL KNEE ARTHROPLASTY Right      Current Medications  Current Outpatient Medications on File Prior to Visit    Medication Sig Dispense Refill    amitriptyline (ELAVIL) 50 mg tablet Take 1 tablet (50 mg total) by mouth daily at bedtime 90 tablet 3    Biotin 2500 MCG CAPS Take 1 capsule by mouth 2 (two) times a day       calcium carbonate (OS-ANTHONY) 600 MG tablet Take 1,200 mg by mouth 2 (two) times a day with meals      Cholecalciferol (VITAMIN D-3 PO) Take 1 tablet by mouth daily      Cyanocobalamin (VITAMIN B-12 CR PO) Take 1 tablet by mouth daily      Diclofenac Sodium (VOLTAREN) 1 % Apply 2 g topically 4 (four) times a day 200 g 1    dicyclomine (BENTYL) 10 mg capsule Take 1 capsule (10 mg total) by mouth 2 (two) times a day 180 capsule 3    Evening Primrose Oil 1000 MG CAPS Take 1 capsule (1,000 mg total) by mouth in the morning  0    famotidine (PEPCID) 20 mg tablet Take 1 tablet (20 mg total) by mouth 2 (two) times a day 180 tablet 3    fexofenadine (ALLEGRA) 180 MG tablet Take 180 mg by mouth daily      gabapentin (NEURONTIN) 300 mg capsule Take 1 tablet in the morning, 1 tablet afternoon and 2 bedtime 360 capsule 3    hydroxychloroquine (PLAQUENIL) 200 mg tablet Take 200 mg by mouth daily with breakfast      losartan-hydrochlorothiazide (HYZAAR) 100-25 MG per tablet Take 1 tablet by mouth daily 90 tablet 3    methocarbamol (ROBAXIN) 750 mg tablet TAKE 1 TABLET EVERY 8 HOURS 270 tablet 3    montelukast (SINGULAIR) 10 mg tablet Take 1 tablet (10 mg total) by mouth daily at bedtime 90 tablet 3    Multiple Vitamins-Minerals (PRESERVISION AREDS 2 PO) Take by mouth in the morning      Omega-3 Fatty Acids (FISH OIL) 1,000 mg Take 1,000 mg by mouth 3 (three) times a day      omeprazole (PriLOSEC) 40 MG capsule Take 1 capsule (40 mg total) by mouth daily 90 capsule 3    rosuvastatin (CRESTOR) 10 MG tablet Take 1 tablet (10 mg total) by mouth daily Take Monday, Wednesday and Friday alternate with simvastatin 36 tablet 3    simvastatin (ZOCOR) 10 mg tablet Take Tuesday, Thursday' Saturday and Sunday, to be alternated with  rosuvastatin 48 tablet 3    diazepam (VALIUM) 10 mg tablet Take 2 tablets (20 mg total) by mouth 1 (one) time for 1 dose Take 1 tablet 45 minutes prior to your schedule MRI 2 tablet 0    diphenhydrAMINE (BENADRYL) 25 mg tablet Take 2 tablets one hour before CT scan 2 tablet 0    diphenhydrAMINE (BENADRYL) 25 mg tablet Take 1 hour prior to CT with contrast for contrast allergy 2 tablet 0    EPINEPHrine (EPIPEN) 0.3 mg/0.3 mL SOAJ INJECT 0.3 ML (0.3 MG TOTAL) INTO A MUSCLE AS NEEDED FOR ANAPHYLAXIS (Patient not taking: Reported on 7/19/2024) 2 each 1    hydrOXYzine HCL (ATARAX) 10 mg tablet Take 2 tablets (20 mg total) by mouth daily at bedtime Take 2 tablets at bedtime (Patient not taking: Reported on 7/19/2024) 180 tablet 1    methylPREDNISolone (MEDROL) 32 MG tablet Take one tablet 12 hours before, and one tablet 2 hours before CT scan 2 tablet 0    methylPREDNISolone (MEDROL) 32 MG tablet Take 12 hours and 2 hours prior to CT with contrast due to contrast allergy 2 tablet 0     Current Facility-Administered Medications on File Prior to Visit   Medication Dose Route Frequency Provider Last Rate Last Admin    ondansetron (ZOFRAN) injection 4 mg  4 mg Intravenous Q6H PRN Guille Perdomo MD           Physical exam  Exam: left hand  Inspection: Skin intact. No soft tissue swelling  Mild TTP over thumb A1 pulley and mild TTP over thumb CMC joint.   No locking or triggering of the thumb on examination  Finkelstein minimally positive.  WHAT test minimally positive.  Full flexion extension of the thumb IP actively  Range of Motion: full composite fist possible  Neurovascular status: Neuro intact, good cap refill      Imaging:  No new imaging    Hand/upper extremity injection: L extensor compartment 1  Universal Protocol:  Consent: Verbal consent obtained.  Risks and benefits: risks, benefits and alternatives were discussed  Consent given by: patient  Patient understanding: patient states understanding of the procedure  being performed  Patient identity confirmed: verbally with patient  Supporting Documentation  Indications: therapeutic   Procedure Details  Condition:de Quervain's tenosynovitis Site: L extensor compartment 1   Needle size: 25 G  Ultrasound guidance: no  Medications administered: 1 mL lidocaine 1 %; 6 mg betamethasone acetate-betamethasone sodium phosphate 6 (3-3) mg/mL  Patient tolerance: patient tolerated the procedure well with no immediate complications  Dressing:  Sterile dressing applied       Hand/upper extremity injection: L thumb A1  Universal Protocol:  Risks and benefits: risks, benefits and alternatives were discussed  Consent given by: patient  Patient understanding: patient states understanding of the procedure being performed  Patient identity confirmed: verbally with patient  Supporting Documentation  Indications: therapeutic   Procedure Details  Condition:trigger finger Location: thumb - L thumb A1   Needle size: 25 G  Ultrasound guidance: no  Medications administered: 1 mL lidocaine 1 %; 6 mg betamethasone acetate-betamethasone sodium phosphate 6 (3-3) mg/mL  Patient tolerance: patient tolerated the procedure well with no immediate complications  Dressing:  Sterile dressing applied             Scribe Attestation      I,:   am acting as a scribe while in the presence of the attending physician.:       I,:   personally performed the services described in this documentation    as scribed in my presence.:

## 2024-12-19 ENCOUNTER — APPOINTMENT (OUTPATIENT)
Dept: LAB | Facility: MEDICAL CENTER | Age: 74
End: 2024-12-19
Payer: MEDICARE

## 2024-12-19 LAB
ANION GAP SERPL CALCULATED.3IONS-SCNC: 9 MMOL/L (ref 4–13)
BUN SERPL-MCNC: 14 MG/DL (ref 5–25)
CALCIUM SERPL-MCNC: 9.6 MG/DL (ref 8.4–10.2)
CHLORIDE SERPL-SCNC: 104 MMOL/L (ref 96–108)
CO2 SERPL-SCNC: 29 MMOL/L (ref 21–32)
CREAT SERPL-MCNC: 0.64 MG/DL (ref 0.6–1.3)
GFR SERPL CREATININE-BSD FRML MDRD: 88 ML/MIN/1.73SQ M
GLUCOSE SERPL-MCNC: 93 MG/DL (ref 65–140)
POTASSIUM SERPL-SCNC: 3.7 MMOL/L (ref 3.5–5.3)
SODIUM SERPL-SCNC: 142 MMOL/L (ref 135–147)

## 2024-12-19 PROCEDURE — 80048 BASIC METABOLIC PNL TOTAL CA: CPT

## 2024-12-31 ENCOUNTER — ANNUAL EXAM (OUTPATIENT)
Dept: OBGYN CLINIC | Facility: CLINIC | Age: 74
End: 2024-12-31
Payer: MEDICARE

## 2024-12-31 VITALS
SYSTOLIC BLOOD PRESSURE: 120 MMHG | BODY MASS INDEX: 33.2 KG/M2 | DIASTOLIC BLOOD PRESSURE: 78 MMHG | HEIGHT: 62 IN | WEIGHT: 180.4 LBS

## 2024-12-31 DIAGNOSIS — Z01.419 WELL WOMAN EXAM WITH ROUTINE GYNECOLOGICAL EXAM: Primary | ICD-10-CM

## 2024-12-31 DIAGNOSIS — Z12.31 BREAST CANCER SCREENING BY MAMMOGRAM: ICD-10-CM

## 2024-12-31 DIAGNOSIS — M85.80 OSTEOPENIA, UNSPECIFIED LOCATION: ICD-10-CM

## 2024-12-31 DIAGNOSIS — Z12.11 COLON CANCER SCREENING: ICD-10-CM

## 2024-12-31 DIAGNOSIS — Z85.42 HISTORY OF UTERINE CANCER: ICD-10-CM

## 2024-12-31 PROCEDURE — G0101 CA SCREEN;PELVIC/BREAST EXAM: HCPCS | Performed by: OBSTETRICS & GYNECOLOGY

## 2024-12-31 PROCEDURE — G0476 HPV COMBO ASSAY CA SCREEN: HCPCS | Performed by: OBSTETRICS & GYNECOLOGY

## 2024-12-31 PROCEDURE — G0145 SCR C/V CYTO,THINLAYER,RESCR: HCPCS | Performed by: OBSTETRICS & GYNECOLOGY

## 2024-12-31 NOTE — PROGRESS NOTES
Assessment   74 y.o. postmenopausal female presenting for annual exam.     Plan   Diagnoses and all orders for this visit:    Well woman exam with routine gynecological exam  -     Liquid-based pap, screening  -     HPV High Risk  - Mammo scheduled  - Colon ca screening due  - DEXA current  - Return in 1yr for yearly    History of uterine cancer  -     Liquid-based pap, screening  -     HPV High Risk    Breast cancer screening by mammogram  - Scheduled    Colon cancer screening  - Has plan to complete after eval of ongoing medical concerns    Osteopenia, unspecified location  - Up to date  __________________________________________________________________      Subjective     74 y.o.  postmenopausal female who is s/p hysterectomy with BSO (34yo, endometriosis with concern for cancer on the outside wall of uterus; benign pathology after surgery by patient report) presenting for annual exam.     History of precanerous cells on uterus prior. Would prefer to continue pap testing. Reviewed limitations on dx for this and asccp guideline, but also reasonable to continue screening given hx prior malignancy. No current concerns for bleeding, change in discharge, pelvic pain.        GYN  Complaints: denies  Denies genital discharge, genital ulcers, pelvic pain and vulvar/vaginal symptoms  Menopause occurred at age 33. She has had no bleeding since this time.  Menopausal symptoms: rare hot flash, did HRT but had hair loss  Sexually active: No  Hx STI: denies   Hx Abnormal pap: denies  Last pap:  - nilm, hpv neg     OB          Complaints: denies  Denies urinary frequency, hematuria, urinary incontinence and dysuria     BREAST  Complaints: denies  Denies: breast lump, changed mole, dryness, nipple discharge, pruritus, rash, skin color change and skin lesion(s)  Last mammogram:  - birads2  Personal hx: breast bx  Family hx: denies fhx of uterine, ovarian cancers  Paternal grandmother and maternal aunt with breast  ca  Brother with colon ca  Patient does do regular self-exams     GENERAL  PMH reviewed/updated and is as below.   Patient does follow with a PCP.  Works part time at her [employer].  Denies domestic violence.        SCREENING  Cervical Ca: pap collected  Breast Ca: mammo order active, pending scheduling  Colon Ca: 2022 - colonoscopy - repeat 2yr  Kidney ca concerns and planning to complete after current concerns  Metabolic: 2023 - DEXA - osteopenia      Past Medical History:   Diagnosis Date    Abnormal findings on diagnostic imaging of breast     Abnormal Pap smear of cervix     Arthritis     Chronic pain disorder     Closed fracture of proximal end of left fibula 06/04/2021    Extremity pain     Fibrocystic breast disease     Foot pain     GERD (gastroesophageal reflux disease)     Hyperlipidemia     Hypertension     Interstitial cystitis     Joint pain     Low back pain     Osteoarthritis     Osteopenia     PONV (postoperative nausea and vomiting) 10/17/2023    Uncomplicated opioid dependence (HCC) 03/01/2022       Past Surgical History:   Procedure Laterality Date    APPENDECTOMY      BACK SURGERY      BREAST EXCISIONAL BIOPSY Left 1996    benign    CARPAL BOSS EXCISION      CHOLECYSTECTOMY      DIAGNOSTIC LAPAROSCOPY      FOOT SURGERY      FRACTURE SURGERY      HAND SURGERY  5/2017    HYSTERECTOMY      age 32    HYSTEROSCOPY      IR CRYOABLATION  10/17/2023    JOINT REPLACEMENT      KIDNEY SURGERY Left     KNEE ARTHROSCOPY Bilateral     LAMINECTOMY      LAPAROSCOPY      hynecologic with adhesions    MYOMECTOMY      OOPHORECTOMY Bilateral     age 32    ORTHOPEDIC SURGERY      [place] CAR IMPLTJ NSTIM ELTRDS PLATE/PADDLE EDRL N/A 05/18/2017    Procedure: PLACEMENT OF THORACIC SPINAL CORD STIMULATOR WITH LEFT BUTTOCK IMPLANTABLE PULSE GENERATOR (IMPULSE MONITORING-MOTORS (TCEMEP), EMG, SSEP);  Surgeon: Julius Damon MD;  Location: [facility];  Service: Neurosurgery    SPINAL CORD STIMULATOR IMPLANT Left 09/29/2020     Procedure: LAMINECTOMY THORACIC , REMOVAL OF SCS LEADS AND GENERATOR;  Surgeon: Oswald Whitt MD;  Location:  MAIN OR;  Service: Neurosurgery    SPINAL STIMULATOR PLACEMENT  05/2017    TONSILLECTOMY AND ADENOIDECTOMY      onset: unknown    TOTAL KNEE ARTHROPLASTY Right          Current Outpatient Medications:     amitriptyline (ELAVIL) 50 mg tablet, Take 1 tablet (50 mg total) by mouth daily at bedtime, Disp: 90 tablet, Rfl: 3    Biotin 2500 MCG CAPS, Take 1 capsule by mouth 2 (two) times a day , Disp: , Rfl:     calcium carbonate (OS-ANTHONY) 600 MG tablet, Take 1,200 mg by mouth 2 (two) times a day with meals, Disp: , Rfl:     Cholecalciferol (VITAMIN D-3 PO), Take 1 tablet by mouth daily, Disp: , Rfl:     Cyanocobalamin (VITAMIN B-12 CR PO), Take 1 tablet by mouth daily, Disp: , Rfl:     Diclofenac Sodium (VOLTAREN) 1 %, Apply 2 g topically 4 (four) times a day, Disp: 200 g, Rfl: 1    dicyclomine (BENTYL) 10 mg capsule, Take 1 capsule (10 mg total) by mouth 2 (two) times a day, Disp: 180 capsule, Rfl: 3    docusate sodium (COLACE) 100 mg capsule, Take 100 mg by mouth, Disp: , Rfl:     Evening Primrose Oil 1000 MG CAPS, Take 1 capsule (1,000 mg total) by mouth in the morning, Disp: , Rfl: 0    famotidine (PEPCID) 20 mg tablet, Take 1 tablet (20 mg total) by mouth 2 (two) times a day, Disp: 180 tablet, Rfl: 3    fexofenadine (ALLEGRA) 180 MG tablet, Take 180 mg by mouth daily, Disp: , Rfl:     folic acid (FOLVITE) 1 mg tablet, , Disp: , Rfl:     gabapentin (NEURONTIN) 300 mg capsule, Take 1 tablet in the morning, 1 tablet afternoon and 2 bedtime, Disp: 360 capsule, Rfl: 3    hydroxychloroquine (PLAQUENIL) 200 mg tablet, Take 200 mg by mouth daily with breakfast, Disp: , Rfl:     losartan-hydrochlorothiazide (HYZAAR) 100-25 MG per tablet, Take 1 tablet by mouth daily, Disp: 90 tablet, Rfl: 3    methocarbamol (ROBAXIN) 750 mg tablet, TAKE 1 TABLET EVERY 8 HOURS, Disp: 270 tablet, Rfl: 3    methotrexate 2.5 MG tablet,  Take by mouth, Disp: , Rfl:     montelukast (SINGULAIR) 10 mg tablet, Take 1 tablet (10 mg total) by mouth daily at bedtime, Disp: 90 tablet, Rfl: 3    Multiple Vitamins-Minerals (PRESERVISION AREDS 2 PO), Take by mouth in the morning, Disp: , Rfl:     Omega-3 Fatty Acids (FISH OIL) 1,000 mg, Take 1,000 mg by mouth 3 (three) times a day, Disp: , Rfl:     omeprazole (PriLOSEC) 40 MG capsule, Take 1 capsule (40 mg total) by mouth daily, Disp: 90 capsule, Rfl: 3    rosuvastatin (CRESTOR) 10 MG tablet, Take 1 tablet (10 mg total) by mouth daily Take Monday, Wednesday and Friday alternate with simvastatin, Disp: 36 tablet, Rfl: 3    simvastatin (ZOCOR) 10 mg tablet, Take Tuesday, Thursday' Saturday and Sunday, to be alternated with rosuvastatin, Disp: 48 tablet, Rfl: 3    diazepam (VALIUM) 10 mg tablet, Take 2 tablets (20 mg total) by mouth 1 (one) time for 1 dose Take 1 tablet 45 minutes prior to your schedule MRI, Disp: 2 tablet, Rfl: 0    EPINEPHrine (EPIPEN) 0.3 mg/0.3 mL SOAJ, INJECT 0.3 ML (0.3 MG TOTAL) INTO A MUSCLE AS NEEDED FOR ANAPHYLAXIS (Patient not taking: Reported on 7/19/2024), Disp: 2 each, Rfl: 1    Current Facility-Administered Medications:     ondansetron (ZOFRAN) injection 4 mg, 4 mg, Intravenous, Q6H PRN, Guille Perdomo MD    Allergies   Allergen Reactions    Bee Venom Shortness Of Breath    Chlorhexidine Rash    Other Rash, Hives, Palpitations and Shortness Of Breath     Adhesive tape    Egg Yolk - Food Allergy Hives    Iodinated Contrast Media Hives and Other (See Comments)    Penicillins Hives    Lidocaine Other (See Comments)     cream    Allevyn Adhesive [Wound Dressings] Hives and Rash    Bactrim [Sulfamethoxazole-Trimethoprim] Rash    Codeine Other (See Comments)     headaches    Latex Rash and Other (See Comments)    Medical Tape Blisters, Hives, Itching and Rash    Oxybutynin Rash    Pentosan Polysulfate Rash    Povidone Iodine Rash       Social History     Tobacco Use    Smoking status:  "Never    Smokeless tobacco: Never   Vaping Use    Vaping status: Never Used   Substance Use Topics    Alcohol use: Yes     Alcohol/week: 1.0 standard drink of alcohol     Comment: Drink socially, not too often    Drug use: No             Objective  /78   Ht 5' 2\" (1.575 m)   Wt 81.8 kg (180 lb 6.4 oz)   LMP  (LMP Unknown)   BMI 33.00 kg/m²      Physical Exam:  Physical Exam  Exam conducted with a chaperone present.   Constitutional:       General: She is not in acute distress.     Appearance: Normal appearance. She is well-developed. She is not ill-appearing, toxic-appearing or diaphoretic.   HENT:      Head: Normocephalic and atraumatic.   Eyes:      General: No scleral icterus.        Right eye: No discharge.         Left eye: No discharge.      Conjunctiva/sclera: Conjunctivae normal.   Cardiovascular:      Rate and Rhythm: Normal rate.   Pulmonary:      Effort: Pulmonary effort is normal. No accessory muscle usage or respiratory distress.   Chest:   Breasts:     Breasts are symmetrical.      Right: No inverted nipple, mass, nipple discharge, skin change or tenderness.      Left: No inverted nipple, mass, nipple discharge, skin change or tenderness.   Abdominal:      General: There is no distension.      Palpations: Abdomen is soft. There is no mass.      Tenderness: There is no abdominal tenderness. There is no guarding or rebound.   Genitourinary:     General: Normal vulva.      Exam position: Lithotomy position.      Labia:         Right: No rash, tenderness or lesion.         Left: No rash, tenderness or lesion.       Urethra: No prolapse, urethral swelling or urethral lesion.      Vagina: No signs of injury. No vaginal discharge, erythema, tenderness, bleeding or lesions.      Cervix: No cervical motion tenderness (surgically absent cervix. intact, normal appearing vaginal cuff).      Uterus: Absent.       Adnexa:         Right: No mass, tenderness or fullness.          Left: No mass, tenderness or " fullness.        Rectum: No external hemorrhoid. Normal anal tone.   Lymphadenopathy:      Upper Body:      Right upper body: No axillary or pectoral adenopathy.      Left upper body: No axillary or pectoral adenopathy.   Skin:     General: Skin is warm and dry.      Findings: No erythema or rash.   Neurological:      Mental Status: She is alert.   Psychiatric:         Mood and Affect: Mood normal.         Behavior: Behavior normal.         Thought Content: Thought content normal.         Judgment: Judgment normal.

## 2025-01-03 LAB
LAB AP GYN PRIMARY INTERPRETATION: NORMAL
Lab: NORMAL

## 2025-01-09 ENCOUNTER — RESULTS FOLLOW-UP (OUTPATIENT)
Dept: UROLOGY | Facility: MEDICAL CENTER | Age: 75
End: 2025-01-09

## 2025-01-09 NOTE — TELEPHONE ENCOUNTER
----- Message from Guille Garcia MD sent at 1/9/2025  9:51 AM EST -----  Stable scan.  Has follow up appointment this month   LM for pt regarding results and upcoming appt.

## 2025-01-13 ENCOUNTER — APPOINTMENT (OUTPATIENT)
Dept: LAB | Facility: MEDICAL CENTER | Age: 75
End: 2025-01-13
Payer: MEDICARE

## 2025-01-13 ENCOUNTER — OFFICE VISIT (OUTPATIENT)
Dept: OBGYN CLINIC | Facility: CLINIC | Age: 75
End: 2025-01-13
Payer: MEDICARE

## 2025-01-13 VITALS
HEART RATE: 81 BPM | HEIGHT: 62 IN | RESPIRATION RATE: 16 BRPM | WEIGHT: 180.6 LBS | BODY MASS INDEX: 33.23 KG/M2 | OXYGEN SATURATION: 98 % | TEMPERATURE: 97.5 F

## 2025-01-13 DIAGNOSIS — M65.4 DE QUERVAIN'S TENOSYNOVITIS, LEFT: ICD-10-CM

## 2025-01-13 DIAGNOSIS — M77.12 LATERAL EPICONDYLITIS OF LEFT ELBOW: Primary | ICD-10-CM

## 2025-01-13 DIAGNOSIS — M65.312 TRIGGER THUMB OF LEFT HAND: ICD-10-CM

## 2025-01-13 LAB
ALBUMIN SERPL BCG-MCNC: 4.2 G/DL (ref 3.5–5)
ALP SERPL-CCNC: 62 U/L (ref 34–104)
ALT SERPL W P-5'-P-CCNC: 22 U/L (ref 7–52)
ANION GAP SERPL CALCULATED.3IONS-SCNC: 9 MMOL/L (ref 4–13)
AST SERPL W P-5'-P-CCNC: 21 U/L (ref 13–39)
BILIRUB SERPL-MCNC: 0.51 MG/DL (ref 0.2–1)
BUN SERPL-MCNC: 15 MG/DL (ref 5–25)
CALCIUM SERPL-MCNC: 9.2 MG/DL (ref 8.4–10.2)
CHLORIDE SERPL-SCNC: 103 MMOL/L (ref 96–108)
CHOLEST SERPL-MCNC: 248 MG/DL (ref ?–200)
CO2 SERPL-SCNC: 29 MMOL/L (ref 21–32)
CREAT SERPL-MCNC: 0.59 MG/DL (ref 0.6–1.3)
GFR SERPL CREATININE-BSD FRML MDRD: 91 ML/MIN/1.73SQ M
GLUCOSE P FAST SERPL-MCNC: 108 MG/DL (ref 65–99)
HDLC SERPL-MCNC: 64 MG/DL
LDLC SERPL CALC-MCNC: 144 MG/DL (ref 0–100)
NONHDLC SERPL-MCNC: 184 MG/DL
POTASSIUM SERPL-SCNC: 3.7 MMOL/L (ref 3.5–5.3)
PROT SERPL-MCNC: 6.4 G/DL (ref 6.4–8.4)
SODIUM SERPL-SCNC: 141 MMOL/L (ref 135–147)
TRIGL SERPL-MCNC: 202 MG/DL (ref ?–150)

## 2025-01-13 PROCEDURE — 80061 LIPID PANEL: CPT

## 2025-01-13 PROCEDURE — 99213 OFFICE O/P EST LOW 20 MIN: CPT | Performed by: STUDENT IN AN ORGANIZED HEALTH CARE EDUCATION/TRAINING PROGRAM

## 2025-01-13 PROCEDURE — 80053 COMPREHEN METABOLIC PANEL: CPT

## 2025-01-13 NOTE — PROGRESS NOTES
Orthopedic Surgery Management Note  Ingrid Wheatley (74 y.o. female)  : 1950 Encounter Date: 2025      Assessment and Plan:  1. Lateral epicondylitis of left elbow  Cock Up Wrist Splint    Ambulatory Referral to PT/OT Hand Therapy      2. Trigger thumb of left hand        3. De Quervain's tenosynovitis, left            74 y.o. female presents in follow up for the above diagnosis.  We had previously given her an injection for the trigger finger as well as the de Quervain's tenosynovitis.  Both of these have greatly improved since the injections.  Her pain has essentially completely resolved for both.  She does still have some clicking of the trigger thumb on the left however there is no tenderness there are no formal locking of the thumb.  I discussed with her how she would like to proceed with these and at this time she elected to proceed with continued observation.  She understands that if there is recurrence of the symptoms in the future we can trial another round of injections for both.    She did note the new symptoms today of lateral elbow tenderness.  On my examination this is consistent with lateral epicondylitis.  The anatomy and physiology of lateral epicondylitis was discussed with the patient today.  Typically, degenerative changes in the extensor carpi radialis brevis muscle occur over time.  These degenerative changes appear as tears of the tendon on MRI. This creates pain over the lateral epicondyle (outside part of elbow).  This pain typically is made worse with palm down lifting activities as well as anything that involves strength and stability of the wrist.  The pain may radiate from the wrist up to the elbow.  At times, the shoulder may be weak as well which can predispose or cause continuation of the problem.  Conservative treatment usually cures a majority of patients; however, this may take up to 6-9 months.  Conservative treatment options typically include activity modification,  "therapy for strengthening of the shoulder and elbow, tennis elbow straps, and possible corticosteroid injections.  Corticosteroid injections are very effective at relieving pain but do not alter the natural history of this process.  Rather, steroid injections decrease the pain temporarily to allow for therapy to take place without discomfort. It was discussed that therapy to prevent recurrence is a \"life long\" process and that if patient relies on the steroid injection alone without performing therapeutic exercises the risk of recurrence is likely.  Typical home regimen includes heat and stretching and resisted wrist extension exercises discussed in the office. Surgery is required in fewer than 10% of patients.  The patient elected to proceed with conservative management for the lateral epicondylitis.      she expressed understanding of the plan and agreed. We encouraged them to contact our office with any questions or concerns.     Recommended a cock-up wrist brace at is to be worn at night.   Recommended an OTC tennis elbow brace to wear while she is active  PT/OT order placed for left lateral epicondylitis.    Return if symptoms worsen or fail to improve.        Chief Complaint:     Left elbow pain    Previous History:   The patient was last seen for this complaint on 11/27/2024. During this visit she had tenderness at the thumb A1 pulley and thumb CMC joint. She was able to make a full composite fist and had full flexion extension of the thumb. She received an injection of the left thumb A1 pulley as well as the left extensor compartment.     Interval History:  Today, she reports that she had a relief of her pain symptoms in both the wrist and the thumb after the prior injections.  In her thumb specifically she did have a relief of the pain with the relief of the triggering of the thumb.  Recently she has noted there has been some recurrence of clicking in the thumb but no formal locking.  Her main concern today " however is that she has had some tenderness in the lateral aspect of her elbow.  It is tender over this area specifically and she does have some pain in the elbow with wrist extension.    Past Medical History:  Past Medical History:   Diagnosis Date    Abnormal findings on diagnostic imaging of breast     Abnormal Pap smear of cervix     Arthritis     Chronic pain disorder     Closed fracture of proximal end of left fibula 06/04/2021    Extremity pain     Fibrocystic breast disease     Foot pain     GERD (gastroesophageal reflux disease)     Hyperlipidemia     Hypertension     Interstitial cystitis     Joint pain     Low back pain     Osteoarthritis     Osteopenia     PONV (postoperative nausea and vomiting) 10/17/2023    Uncomplicated opioid dependence (HCC) 03/01/2022     Past Surgical History:   Procedure Laterality Date    APPENDECTOMY      BACK SURGERY      BREAST EXCISIONAL BIOPSY Left 1996    benign    CARPAL BOSS EXCISION      CHOLECYSTECTOMY      DIAGNOSTIC LAPAROSCOPY      FOOT SURGERY      FRACTURE SURGERY      HAND SURGERY  5/2017    HYSTERECTOMY      age 32    HYSTEROSCOPY      IR CRYOABLATION  10/17/2023    JOINT REPLACEMENT      KIDNEY SURGERY Left     KNEE ARTHROSCOPY Bilateral     LAMINECTOMY      LAPAROSCOPY      hynecologic with adhesions    MYOMECTOMY      OOPHORECTOMY Bilateral     age 32    ORTHOPEDIC SURGERY      [place] CAR IMPLTJ NSTIM ELTRDS PLATE/PADDLE EDRL N/A 05/18/2017    Procedure: PLACEMENT OF THORACIC SPINAL CORD STIMULATOR WITH LEFT BUTTOCK IMPLANTABLE PULSE GENERATOR (IMPULSE MONITORING-MOTORS (TCEMEP), EMG, SSEP);  Surgeon: Julius Damon MD;  Location: [facility];  Service: Neurosurgery    SPINAL CORD STIMULATOR IMPLANT Left 09/29/2020    Procedure: LAMINECTOMY THORACIC , REMOVAL OF SCS LEADS AND GENERATOR;  Surgeon: Oswald Whitt MD;  Location:  [facility] OR;  Service: Neurosurgery    SPINAL STIMULATOR PLACEMENT  05/2017    TONSILLECTOMY AND ADENOIDECTOMY      onset: unknown    TOTAL  KNEE ARTHROPLASTY Right      Family History   Problem Relation Age of Onset    Bone cancer Mother     Cancer Mother     Asthma Mother     Brain cancer Father     Stroke Father         stroke sydrome    Transient ischemic attack Father     Depression Sister     Migraines Sister     Asthma Sister     Asthma Sister     No Known Problems Maternal Grandmother     No Known Problems Maternal Grandfather     Diabetes Paternal Grandmother     No Known Problems Paternal Grandfather     Heart disease Brother     Diabetes Brother     Heart attack Brother     Colon cancer Brother 66    Cancer Brother     No Known Problems Brother     Breast cancer Maternal Aunt 70    Breast cancer additional onset Maternal Aunt 72    Diabetes Sister     Depression Sister      Social History     Socioeconomic History    Marital status: Single     Spouse name: Not on file    Number of children: Not on file    Years of education: Not on file    Highest education level: Not on file   Occupational History    Occupation: Retired/ working part-time    Tobacco Use    Smoking status: Never    Smokeless tobacco: Never   Vaping Use    Vaping status: Never Used   Substance and Sexual Activity    Alcohol use: Yes     Alcohol/week: 1.0 standard drink of alcohol     Comment: Drink socially, not too often    Drug use: No    Sexual activity: Not Currently     Partners: Male     Birth control/protection: None   Other Topics Concern    Not on file   Social History Narrative    No caffeine use    Always uses sunscreen    Always uses a seatbelt     Social Drivers of Health     Financial Resource Strain: Low Risk  (1/10/2024)    Overall Financial Resource Strain (CARDIA)     Difficulty of Paying Living Expenses: Not hard at all   Food Insecurity: Not on file   Transportation Needs: No Transportation Needs (1/10/2024)    PRAPARE - Transportation     Lack of Transportation (Medical): No     Lack of Transportation (Non-Medical): No   Physical Activity: Not on  "file   Stress: Not on file   Social Connections: Not on file   Intimate Partner Violence: Unknown (9/4/2024)    Received from [facility]    Intimate Partner Violence     Within the last year, have you been afraid of your partner, ex-partner or family member?: Not on file     Within the last year, have you been humiliated or emotionally abused in other ways by your partner, ex-partner or family member?: Not on file     Within the last year, have you been kicked, hit, slapped, or otherwise physically hurt by your partner, ex-partner or family member?: Not on file     Within the last year, have you been raped or forced to have any kind of sexual activity by your partner, ex-partner or family member?: Not on file   Housing Stability: Not on file     Scheduled Meds:  Current Facility-Administered Medications   Medication Dose Route Frequency Provider Last Rate    ondansetron  4 mg Intravenous Q6H PRN Guille Perdomo MD       Continuous Infusions:   PRN Meds:.  ondansetron  Allergies   Allergen Reactions    Bee Venom Shortness Of Breath    Chlorhexidine Rash    Other Rash, Hives, Palpitations and Shortness Of Breath     Adhesive tape    Egg Yolk - Food Allergy Hives    Iodinated Contrast Media Hives and Other (See Comments)    Penicillins Hives    Lidocaine Other (See Comments)     cream    Allevyn Adhesive [Wound Dressings] Hives and Rash    Bactrim [Sulfamethoxazole-Trimethoprim] Rash    Codeine Other (See Comments)     headaches    Latex Rash and Other (See Comments)    Medical Tape Blisters, Hives, Itching and Rash    Oxybutynin Rash    Pentosan Polysulfate Rash    Povidone Iodine Rash       Physical Examination:    Pulse 81   Temp 97.5 °F (36.4 °C) (Temporal)   Resp 16   Ht 5' 2\" (1.575 m)   Wt 81.9 kg (180 lb 9.6 oz)   LMP  (LMP Unknown)   SpO2 98%   BMI 33.03 kg/m²     Gen: A&Ox3, NAD  Cardiac: regular rate  Chest: non labored breathing  Abdomen: Non-distended    Cervcial Spine: Negative " Spurling's    Left Upper Extremity:  Skin CDI  Swelling at wrist. Tender at lateral epicondyle.   Finkelstein and WHAT test minimally positive.   - locking or triggering of the thumb   TTP over the lateral epicondyle at the origin of the ECRB  No obvious deformity of the shoulder, arm, elbow, forearm, wrist, hand  Sensation intact to light touch in the axillary median, ulnar, and radial nerve distributions  2+RP    Studies:  No new studies to review      Jose Alfredo Corea MD  Hand and Upper Extremity Surgery      *This note was dictated using Dragon voice recognition software. Please excuse any word substitutions or errors.*         Scribe Attestation      I,:  Danica Dodd am acting as a scribe while in the presence of the attending physician.:       I,:  Jose Alfredo Corea MD personally performed the services described in this documentation    as scribed in my presence.:

## 2025-01-17 ENCOUNTER — HOSPITAL ENCOUNTER (OUTPATIENT)
Dept: MAMMOGRAPHY | Facility: HOSPITAL | Age: 75
Discharge: HOME | End: 2025-01-17
Attending: OBSTETRICS & GYNECOLOGY
Payer: MEDICARE

## 2025-01-17 VITALS — BODY MASS INDEX: 33.13 KG/M2 | HEIGHT: 62 IN | WEIGHT: 180 LBS

## 2025-01-17 DIAGNOSIS — Z12.31 BREAST CANCER SCREENING BY MAMMOGRAM: ICD-10-CM

## 2025-01-17 PROCEDURE — 77063 BREAST TOMOSYNTHESIS BI: CPT

## 2025-01-17 PROCEDURE — 77067 SCR MAMMO BI INCL CAD: CPT

## 2025-01-18 NOTE — PROGRESS NOTES
PT Evaluation     Today's date: 2025  Patient name: Ingrid Wheatley  : 1950  MRN: [id number]  Referring provider: Jose Alfredo Corea MD  Dx:   Encounter Diagnosis     ICD-10-CM    1. Lateral epicondylitis of left elbow  M77.12 Ambulatory Referral to PT/OT Hand Therapy                     Assessment    Assessment details: Pt is a 75 YO female presenting to PT with pain, decreased AROM, strength and tolerance to activity.  Pt would benefit from skilled intervention to address these issues and maximize overall function.  Occupation- clerical/ [employer]  Dominant- right Involved- left elbow       Goals    ST.  Decrease pain to 0-2/10 in 4-8 weeks to ease ADL and self care            2.  Decrease swelling 0.5 cm in 4-8 weeks            3.  Increase AROM 10-20 degrees in 4 weeks for improved ability to bathe, dress, and assist in functional activity            4.  Provide orthotic for protection, compression for support            5.  Instruct in activity modification for ADL and self care with independence in 4-6 weeks            6.  Instruct in HEP   LT.  Increase functional AROM to assist with independence in 8-12 weeks            2.  [place] with HEP in 4-6 weeks            3.  Increase  strength by 2-5 lbs in 8 weeks            4. Recreational activities are improved to maximum level in 12 weeks.            5.  ADL performance is improved to maximal level of function in 12 weeks.            6. Ability to RTW by DC        Plan  Patient would benefit from: skilled physical therapy  Planned modality interventions: cryotherapy, ultrasound, neuromuscular electric stimulation and thermotherapy: hydrocollator packs    Planned therapy interventions: activity modification, manual therapy, strengthening, stretching, therapeutic activities, therapeutic exercise and home exercise program    Frequency: 2x week  Duration in weeks: 4  Treatment plan discussed with:  patient      Subjective Evaluation    History of Present Illness  Mechanism of injury: Progressive worsening of pain over past 4 weeks with activity using her left elbow   Patient Goals  Patient goals for therapy: decreased pain, increased motion, increased strength, independence with ADLs/IADLs and return to sport/leisure activities    Pain  Current pain ratin  At best pain ratin  At worst pain rating: 10  Location: left lateral elbow  Quality: dull ache, throbbing and radiating    Hand dominance: right    Treatments  Current treatment: physical therapy      Objective     General Comments:      Elbow Comments   AROM left elbow E/F-0/135' FA S/P- 75/85                   Wrist E/F- 60/50- painful  Strength- Shoulder- FE/ABD-grossly 4/5                   II- with elbow flexed- 30/40- pain 2/10                               Elbow extended - 25/40- pain 5/10        Palpation- pain lateral elbow. FA- (+) Cozen's   Sensation- no tingling noted             Precautions: avoid provocative positions and activity      Manuals             STM 15                                                   Neuro Re-Ed             HP/pulsed biph 15                                      Ther Ex             Self stretch #4 3x                                                                                                                                                                                     Modalities             US 15            CP def

## 2025-01-20 ENCOUNTER — EVALUATION (OUTPATIENT)
Facility: CLINIC | Age: 75
End: 2025-01-20
Payer: MEDICARE

## 2025-01-20 DIAGNOSIS — M77.12 LATERAL EPICONDYLITIS OF LEFT ELBOW: ICD-10-CM

## 2025-01-20 PROCEDURE — 97161 PT EVAL LOW COMPLEX 20 MIN: CPT | Performed by: PHYSICAL THERAPIST

## 2025-01-20 PROCEDURE — 97035 APP MDLTY 1+ULTRASOUND EA 15: CPT | Performed by: PHYSICAL THERAPIST

## 2025-01-20 PROCEDURE — 97112 NEUROMUSCULAR REEDUCATION: CPT | Performed by: PHYSICAL THERAPIST

## 2025-01-20 PROCEDURE — 97140 MANUAL THERAPY 1/> REGIONS: CPT | Performed by: PHYSICAL THERAPIST

## 2025-01-20 PROCEDURE — 97110 THERAPEUTIC EXERCISES: CPT | Performed by: PHYSICAL THERAPIST

## 2025-01-21 ENCOUNTER — OFFICE VISIT (OUTPATIENT)
Dept: FAMILY MEDICINE CLINIC | Facility: CLINIC | Age: 75
End: 2025-01-21
Payer: MEDICARE

## 2025-01-21 ENCOUNTER — RESULTS FOLLOW-UP (OUTPATIENT)
Dept: FAMILY MEDICINE CLINIC | Facility: CLINIC | Age: 75
End: 2025-01-21

## 2025-01-21 VITALS
HEIGHT: 62 IN | TEMPERATURE: 97.2 F | BODY MASS INDEX: 33.49 KG/M2 | OXYGEN SATURATION: 97 % | SYSTOLIC BLOOD PRESSURE: 124 MMHG | WEIGHT: 182 LBS | DIASTOLIC BLOOD PRESSURE: 76 MMHG | RESPIRATION RATE: 18 BRPM | HEART RATE: 84 BPM

## 2025-01-21 DIAGNOSIS — M06.09 RHEUMATOID ARTHRITIS OF MULTIPLE SITES WITH NEGATIVE RHEUMATOID FACTOR (HCC): ICD-10-CM

## 2025-01-21 DIAGNOSIS — C64.2 MALIGNANT NEOPLASM OF LEFT KIDNEY, EXCEPT RENAL PELVIS (HCC): ICD-10-CM

## 2025-01-21 DIAGNOSIS — Z12.11 COLON CANCER SCREENING: ICD-10-CM

## 2025-01-21 DIAGNOSIS — Z00.00 MEDICARE ANNUAL WELLNESS VISIT, SUBSEQUENT: Primary | ICD-10-CM

## 2025-01-21 DIAGNOSIS — M46.1 SACROILIITIS (HCC): ICD-10-CM

## 2025-01-21 PROCEDURE — G0439 PPPS, SUBSEQ VISIT: HCPCS | Performed by: INTERNAL MEDICINE

## 2025-01-21 NOTE — ASSESSMENT & PLAN NOTE
Reviewed recent labs Exercise encouraged as weather improves  She will schedule colon screen  Mammo completed - result pending

## 2025-01-21 NOTE — PROGRESS NOTES
Name: Ingrid Wheatley      : 1950      MRN: [id number]  Encounter Provider: Leslie Bermudez DO  Encounter Date: 2025   Encounter department: [facility]    Assessment & Plan  Rheumatoid arthritis of multiple sites with negative rheumatoid factor (HCC)  Pt stable on current regimen and has continued followup/labs thru Rheumatology       Sacroiliitis (HCC)  Sxs are better overall        Malignant neoplasm of left kidney, except renal pelvis (HCC)  Recent MRI shows stability and she has Urology appt this month for followup       Medicare annual wellness visit, subsequent  Reviewed recent labs Exercise encouraged as weather improves  She will schedule colon screen  Mammo completed - result pending        Colon cancer screening    Orders:    Ambulatory Referral to Gastroenterology; Future  Pt will schedule repeat colon screen    Depression Screening and Follow-up Plan: Patient was screened for depression during today's encounter. They screened negative with a PHQ-2 score of 0.      Preventive health issues were discussed with patient, and age appropriate screening tests were ordered as noted in patient's After Visit Summary. Personalized health advice and appropriate referrals for health education or preventive services given if needed, as noted in patient's After Visit Summary.    History of Present Illness     HPI   Pt overall doing ok She is in PT for elbow pain thru Dr Corea Back sxs are improved overall and she feels joint sxs better on current regimen thru Rheumatology Pt aware due for colon rescreen and will schedule now that things are settled otherwise No acute issues   Patient Care Team:  Leslie Bermudez DO as PCP - General  DO Jefferson Arenas DO Chandra Reddy, MD Richard C Miller, MD Steven Falowski, MD Kartik Rai, DO (Orthopedics)    Review of Systems   Constitutional:  Negative for chills and fever.   HENT: Negative.     Eyes:  Negative for visual  disturbance.   Respiratory:  Negative for cough and shortness of breath.    Cardiovascular: Negative.    Gastrointestinal: Negative.    Genitourinary: Negative.    Musculoskeletal:  Positive for arthralgias and gait problem.   Neurological:  Negative for dizziness, light-headedness and headaches.   Psychiatric/Behavioral:  Negative for sleep disturbance. The patient is not nervous/anxious.      Medical History Reviewed by provider this encounter:       Annual Wellness Visit Questionnaire   Ingrid is here for her Subsequent Wellness visit. Last Medicare Wellness visit information reviewed, patient interviewed, no change since last AWV.     Health Risk Assessment:   Patient rates overall health as very good. Patient feels that their physical health rating is much better. Patient is very satisfied with their life. Eyesight was rated as same. Hearing was rated as same. Patient feels that their emotional and mental health rating is much better. Patients states they are never, rarely angry. Patient states they are sometimes unusually tired/fatigued. Pain experienced in the last 7 days has been some. Patient's pain rating has been 5/10. Patient states that she has experienced no weight loss or gain in last 6 months.     Depression Screening:   PHQ-2 Score: 0      Fall Risk Screening:   In the past year, patient has experienced: no history of falling in past year      Urinary Incontinence Screening:   Patient has not leaked urine accidently in the last six months.     Home Safety:  Patient does not have trouble with stairs inside or outside of their home. Patient has working smoke alarms and has working carbon monoxide detector. Home safety hazards include: none.     Nutrition:   Current diet is Low Cholesterol, No Added Salt and Limited junk food.     Medications:   Patient is currently taking over-the-counter supplements. OTC medications include: see medication list. Patient is able to manage medications.     Activities of  Daily Living (ADLs)/Instrumental Activities of Daily Living (IADLs):   Walk and transfer into and out of bed and chair?: Yes  Dress and groom yourself?: Yes    Bathe or shower yourself?: Yes    Feed yourself? Yes  Do your laundry/housekeeping?: Yes  Manage your money, pay your bills and track your expenses?: Yes  Make your own meals?: Yes    Do your own shopping?: Yes    Previous Hospitalizations:   Any hospitalizations or ED visits within the last 12 months?: Yes    How many hospitalizations have you had in the last year?: 1-2    Hospitalization Comments: Same day surgery for Spinal Fusion    Advance Care Planning:   Living will: Yes    Durable POA for healthcare: Yes    Advanced directive: Yes    End of Life Decisions reviewed with patient: Yes    Provider agrees with end of life decisions: Yes      Cognitive Screening:   Provider or family/friend/caregiver concerned regarding cognition?: No    PREVENTIVE SCREENINGS      Cardiovascular Screening:    General: History Lipid Disorder and Screening Current      Diabetes Screening:     General: Screening Current      Colorectal Cancer Screening:     General: Screening Current      Breast Cancer Screening:     General: Screening Current      Cervical Cancer Screening:    General: Screening Not Indicated      Osteoporosis Screening:    General: Screening Not Indicated and History Osteoporosis      Abdominal Aortic Aneurysm (AAA) Screening:        General: Screening Current      Lung Cancer Screening:     General: Screening Not Indicated      Hepatitis C Screening:    General: Screening Current    Screening, Brief Intervention, and Referral to Treatment (SBIRT)    Screening  Typical number of drinks in a day: 0  Typical number of drinks in a week: 1  Interpretation: Low risk drinking behavior.    AUDIT-C Screenin) How often did you have a drink containing alcohol in the past year? monthly or less  2) How many drinks did you have on a typical day when you were  "drinking in the past year? 0  3) How often did you have 6 or more drinks on one occasion in the past year? never    AUDIT-C Score: 1  Interpretation: Score 0-2 (female): Negative screen for alcohol misuse    Single Item Drug Screening:  How often have you used an illegal drug (including marijuana) or a prescription medication for non-medical reasons in the past year? never    Single Item Drug Screen Score: 0  Interpretation: Negative screen for possible drug use disorder    Brief Intervention  Alcohol & drug use screenings were reviewed. No concerns regarding substance use disorder identified.     Other Counseling Topics:   Regular weightbearing exercise and calcium and vitamin D intake.     Social Drivers of Health     Financial Resource Strain: Low Risk  (1/10/2024)    Overall Financial Resource Strain (CARDIA)     Difficulty of Paying Living Expenses: Not hard at all   Food Insecurity: No Food Insecurity (1/14/2025)    Hunger Vital Sign     Worried About Running Out of Food in the Last Year: Never true     Ran Out of Food in the Last Year: Never true   Transportation Needs: No Transportation Needs (1/14/2025)    PRAPARE - Transportation     Lack of Transportation (Medical): No     Lack of Transportation (Non-Medical): No   Housing Stability: Low Risk  (1/14/2025)    Housing Stability Vital Sign     Unable to Pay for Housing in the Last Year: No     Number of Times Moved in the Last Year: 0     Homeless in the Last Year: No   Utilities: Not At Risk (1/14/2025)    [facility] Utilities     Threatened with loss of utilities: No     No results found.    Objective   /76   Pulse 84   Temp (!) 97.2 °F (36.2 °C) (Temporal)   Resp 18   Ht 5' 2\" (1.575 m)   Wt 82.6 kg (182 lb)   LMP  (LMP Unknown)   SpO2 97%   BMI 33.29 kg/m²     Physical Exam  Vitals and nursing note reviewed.   Constitutional:       General: She is not in acute distress.     Appearance: Normal appearance. She is not ill-appearing, toxic-appearing " or diaphoretic.   HENT:      Head: Normocephalic and atraumatic.      Right Ear: External ear normal.      Left Ear: External ear normal.      Nose: Nose normal.      Mouth/Throat:      Mouth: Mucous membranes are moist.   Eyes:      General: No scleral icterus.  Cardiovascular:      Rate and Rhythm: Normal rate and regular rhythm.      Pulses: Normal pulses.      Heart sounds: Normal heart sounds. No murmur heard.  Pulmonary:      Effort: Pulmonary effort is normal. No respiratory distress.      Breath sounds: Normal breath sounds. No wheezing.   Abdominal:      General: Bowel sounds are normal. There is no distension.      Palpations: Abdomen is soft.      Tenderness: There is no abdominal tenderness.   Musculoskeletal:      Cervical back: Normal range of motion and neck supple.      Right lower leg: No edema.      Left lower leg: No edema.   Lymphadenopathy:      Cervical: No cervical adenopathy.   Skin:     General: Skin is warm and dry.      Coloration: Skin is not jaundiced or pale.   Neurological:      General: No focal deficit present.      Mental Status: She is alert and oriented to person, place, and time. Mental status is at baseline.      Cranial Nerves: No cranial nerve deficit.   Psychiatric:         Mood and Affect: Mood normal.         Behavior: Behavior normal.         Thought Content: Thought content normal.         Judgment: Judgment normal.          99

## 2025-01-21 NOTE — PATIENT INSTRUCTIONS
Medicare Preventive Visit Patient Instructions  Thank you for completing your Welcome to Medicare Visit or Medicare Annual Wellness Visit today. Your next wellness visit will be due in one year (1/22/2026).  The screening/preventive services that you may require over the next 5-10 years are detailed below. Some tests may not apply to you based off risk factors and/or age. Screening tests ordered at today's visit but not completed yet may show as past due. Also, please note that scanned in results may not display below.  Preventive Screenings:  Service Recommendations Previous Testing/Comments   Colorectal Cancer Screening  * Colonoscopy    * Fecal Occult Blood Test (FOBT)/Fecal Immunochemical Test (FIT)  * Fecal DNA/Cologuard Test  * Flexible Sigmoidoscopy Age: 45-75 years old   Colonoscopy: every 10 years (may be performed more frequently if at higher risk)  OR  FOBT/FIT: every 1 year  OR  Cologuard: every 3 years  OR  Sigmoidoscopy: every 5 years  Screening may be recommended earlier than age 45 if at higher risk for colorectal cancer. Also, an individualized decision between you and your healthcare provider will decide whether screening between the ages of 76-85 would be appropriate. Colonoscopy: 02/07/2022  FOBT/FIT: Not on file  Cologuard: Not on file  Sigmoidoscopy: Not on file    Screening Current     Breast Cancer Screening Age: 40+ years old  Frequency: every 1-2 years  Not required if history of left and right mastectomy Mammogram: 01/17/2025    Screening Current   Cervical Cancer Screening Between the ages of 21-29, pap smear recommended once every 3 years.   Between the ages of 30-65, can perform pap smear with HPV co-testing every 5 years.   Recommendations may differ for women with a history of total hysterectomy, cervical cancer, or abnormal pap smears in past. Pap Smear: 12/31/2024    Screening Not Indicated   Hepatitis C Screening Once for adults born between 1945 and 1965  More frequently in  patients at high risk for Hepatitis C Hep C Antibody: 11/04/2024    Screening Current   Diabetes Screening 1-2 times per year if you're at risk for diabetes or have pre-diabetes Fasting glucose: 108 mg/dL (1/13/2025)  A1C: 5.5 % (9/14/2020)  Screening Current   Cholesterol Screening Once every 5 years if you don't have a lipid disorder. May order more often based on risk factors. Lipid panel: 01/13/2025    Screening Not Indicated  History Lipid Disorder     Other Preventive Screenings Covered by Medicare:  Abdominal Aortic Aneurysm (AAA) Screening: covered once if your at risk. You're considered to be at risk if you have a family history of AAA.  Lung Cancer Screening: covers low dose CT scan once per year if you meet all of the following conditions: (1) Age 55-77; (2) No signs or symptoms of lung cancer; (3) Current smoker or have quit smoking within the last 15 years; (4) You have a tobacco smoking history of at least 20 pack years (packs per day multiplied by number of years you smoked); (5) You get a written order from a healthcare provider.  Glaucoma Screening: covered annually if you're considered high risk: (1) You have diabetes OR (2) Family history of glaucoma OR (3)  aged 50 and older OR (4)  American aged 65 and older  Osteoporosis Screening: covered every 2 years if you meet one of the following conditions: (1) You're estrogen deficient and at risk for osteoporosis based off medical history and other findings; (2) Have a vertebral abnormality; (3) On glucocorticoid therapy for more than 3 months; (4) Have primary hyperparathyroidism; (5) On osteoporosis medications and need to assess response to drug therapy.   Last bone density test (DXA Scan): 12/29/2023.  HIV Screening: covered annually if you're between the age of 15-65. Also covered annually if you are younger than 15 and older than 65 with risk factors for HIV infection. For pregnant patients, it is covered up to 3 times per  pregnancy.    Immunizations:  Immunization Recommendations   Influenza Vaccine Annual influenza vaccination during flu season is recommended for all persons aged >= 6 months who do not have contraindications   Pneumococcal Vaccine   * Pneumococcal conjugate vaccine = PCV13 (Prevnar 13), PCV15 (Vaxneuvance), PCV20 (Prevnar 20)  * Pneumococcal polysaccharide vaccine = PPSV23 (Pneumovax) Adults 19-63 yo with certain risk factors or if 65+ yo  If never received any pneumonia vaccine: recommend Prevnar 20 (PCV20)  Give PCV20 if previously received 1 dose of PCV13 or PPSV23   Hepatitis B Vaccine 3 dose series if at intermediate or high risk (ex: diabetes, end stage renal disease, liver disease)   Respiratory syncytial virus (RSV) Vaccine - COVERED BY MEDICARE PART D  * RSVPreF3 (Arexvy) CDC recommends that adults 60 years of age and older may receive a single dose of RSV vaccine using shared clinical decision-making (SCDM)   Tetanus (Td) Vaccine - COST NOT COVERED BY MEDICARE PART B Following completion of primary series, a booster dose should be given every 10 years to maintain immunity against tetanus. Td may also be given as tetanus wound prophylaxis.   Tdap Vaccine - COST NOT COVERED BY MEDICARE PART B Recommended at least once for all adults. For pregnant patients, recommended with each pregnancy.   Shingles Vaccine (Shingrix) - COST NOT COVERED BY MEDICARE PART B  2 shot series recommended in those 19 years and older who have or will have weakened immune systems or those 50 years and older     Health Maintenance Due:      Topic Date Due   • Colorectal Cancer Screening  02/07/2024   • Breast Cancer Screening: Mammogram  12/29/2024   • DXA SCAN  12/29/2025   • Hepatitis C Screening  Completed     Immunizations Due:      Topic Date Due   • Pneumococcal Vaccine: 65+ Years (1 of 2 - PCV) Never done   • Influenza Vaccine (1) Never done   • COVID-19 Vaccine (4 - 2024-25 season) 09/01/2024     Advance Directives   What are  advance directives?  Advance directives are legal documents that state your wishes and plans for medical care. These plans are made ahead of time in case you lose your ability to make decisions for yourself. Advance directives can apply to any medical decision, such as the treatments you want, and if you want to donate organs.   What are the types of advance directives?  There are many types of advance directives, and each state has rules about how to use them. You may choose a combination of any of the following:  Living will:  This is a written record of the treatment you want. You can also choose which treatments you do not want, which to limit, and which to stop at a certain time. This includes surgery, medicine, IV fluid, and tube feedings.   Durable power of  for healthcare (DPAHC):  This is a written record that states who you want to make healthcare choices for you when you are unable to make them for yourself. This person, called a proxy, is usually a family member or a friend. You may choose more than 1 proxy.  Do not resuscitate (DNR) order:  A DNR order is used in case your heart stops beating or you stop breathing. It is a request not to have certain forms of treatment, such as CPR. A DNR order may be included in other types of advance directives.  Medical directive:  This covers the care that you want if you are in a coma, near death, or unable to make decisions for yourself. You can list the treatments you want for each condition. Treatment may include pain medicine, surgery, blood transfusions, dialysis, IV or tube feedings, and a ventilator (breathing machine).  Values history:  This document has questions about your views, beliefs, and how you feel and think about life. This information can help others choose the care that you would choose.  Why are advance directives important?  An advance directive helps you control your care. Although spoken wishes may be used, it is better to have your  wishes written down. Spoken wishes can be misunderstood, or not followed. Treatments may be given even if you do not want them. An advance directive may make it easier for your family to make difficult choices about your care.   Weight Management   Why it is important to manage your weight:  Being overweight increases your risk of health conditions such as heart disease, high blood pressure, type 2 diabetes, and certain types of cancer. It can also increase your risk for osteoarthritis, sleep apnea, and other respiratory problems. Aim for a slow, steady weight loss. Even a small amount of weight loss can lower your risk of health problems.  How to lose weight safely:  A safe and healthy way to lose weight is to eat fewer calories and get regular exercise. You can lose up about 1 pound a week by decreasing the number of calories you eat by 500 calories each day.   Healthy meal plan for weight management:  A healthy meal plan includes a variety of foods, contains fewer calories, and helps you stay healthy. A healthy meal plan includes the following:  Eat whole-grain foods more often.  A healthy meal plan should contain fiber. Fiber is the part of grains, fruits, and vegetables that is not broken down by your body. Whole-grain foods are healthy and provide extra fiber in your diet. Some examples of whole-grain foods are whole-wheat breads and pastas, oatmeal, brown rice, and bulgur.  Eat a variety of vegetables every day.  Include dark, leafy greens such as spinach, kale, raudel greens, and mustard greens. Eat yellow and orange vegetables such as carrots, sweet potatoes, and winter squash.   Eat a variety of fruits every day.  Choose fresh or canned fruit (canned in its own juice or light syrup) instead of juice. Fruit juice has very little or no fiber.  Eat low-fat dairy foods.  Drink fat-free (skim) milk or 1% milk. Eat fat-free yogurt and low-fat cottage cheese. Try low-fat cheeses such as mozzarella and other  reduced-fat cheeses.  Choose meat and other protein foods that are low in fat.  Choose beans or other legumes such as split peas or lentils. Choose fish, skinless poultry (chicken or turkey), or lean cuts of red meat (beef or pork). Before you cook meat or poultry, cut off any visible fat.   Use less fat and oil.  Try baking foods instead of frying them. Add less fat, such as margarine, sour cream, regular salad dressing and mayonnaise to foods. Eat fewer high-fat foods. Some examples of high-fat foods include french fries, doughnuts, ice cream, and cakes.  Eat fewer sweets.  Limit foods and drinks that are high in sugar. This includes candy, cookies, regular soda, and sweetened drinks.  Exercise:  Exercise at least 30 minutes per day on most days of the week. Some examples of exercise include walking, biking, dancing, and swimming. You can also fit in more physical activity by taking the stairs instead of the elevator or parking farther away from stores. Ask your healthcare provider about the best exercise plan for you.      © Copyright [employer] 2018 Information is for End User's use only and may not be sold, redistributed or otherwise used for commercial purposes. All illustrations and images included in CareNotes® are the copyrighted property of [employer] or [employer]

## 2025-01-22 ENCOUNTER — OFFICE VISIT (OUTPATIENT)
Facility: CLINIC | Age: 75
End: 2025-01-22
Payer: MEDICARE

## 2025-01-22 DIAGNOSIS — M77.12 LATERAL EPICONDYLITIS OF LEFT ELBOW: Primary | ICD-10-CM

## 2025-01-22 PROCEDURE — 97140 MANUAL THERAPY 1/> REGIONS: CPT | Performed by: PHYSICAL THERAPIST

## 2025-01-22 PROCEDURE — 97110 THERAPEUTIC EXERCISES: CPT | Performed by: PHYSICAL THERAPIST

## 2025-01-22 PROCEDURE — 97112 NEUROMUSCULAR REEDUCATION: CPT | Performed by: PHYSICAL THERAPIST

## 2025-01-22 PROCEDURE — 97035 APP MDLTY 1+ULTRASOUND EA 15: CPT | Performed by: PHYSICAL THERAPIST

## 2025-01-22 NOTE — PROGRESS NOTES
Daily Note     Today's date: 2025  Patient name: Ingrid Wheatley  : 1950  MRN: [id number]  Referring provider: Jose Alfredo Corea MD  Dx:   Encounter Diagnosis     ICD-10-CM    1. Lateral epicondylitis of left elbow  M77.12                      Subjective: pt notes soreness in her lateral muscle with activity.  She has been unable to wear her brace due to rubbing      Objective: See treatment diary below    PT refit SA orthosis for proper fit for pt to wear at nighttime  AROM left elbow E/F-0/135' FA S/P- 75/85                   Wrist E/F- 60/50- painful  Strength- Shoulder- FE/ABD-grossly 4/5                   II- with elbow flexed- 30/40- pain 2/10                               Elbow extended - 25/40- pain 5/10        Palpation- pain lateral elbow. FA- (+) Cozen's   Sensation- no tingling noted     Assessment: Tolerated treatment well. Patient would benefit from continued PT      Plan: Continue per plan of care.      Precautions: avoid provocative positions and activity      Manuals            STM 15 15                                                  Neuro Re-Ed             HP/pulsed biph 15 15                                     Ther Ex             Self stretch #4 3x 3x                                                                                                                                                                                    Modalities             US 15 15           CP def 15

## 2025-01-24 ENCOUNTER — PREP FOR PROCEDURE (OUTPATIENT)
Age: 75
End: 2025-01-24

## 2025-01-24 ENCOUNTER — TELEPHONE (OUTPATIENT)
Age: 75
End: 2025-01-24

## 2025-01-24 DIAGNOSIS — Z12.11 SCREENING FOR COLON CANCER: Primary | ICD-10-CM

## 2025-01-24 NOTE — TELEPHONE ENCOUNTER
Scheduled date of colonoscopy (as of today):2/12/2025  Physician performing colonoscopy:Dr. Weinberg  Location of colonoscopy:[facility]  Bowel prep reviewed with patient:hermes/Dul  Instructions reviewed with patient by:MIGUEL-Hermes/Dul prep instructions sent via email to [email or web address]  Clearances: n/a

## 2025-01-24 NOTE — TELEPHONE ENCOUNTER
01/24/25  Screened by: Aliza Hamilton    Referring Provider     Pre- Screening:     There is no height or weight on file to calculate BMI.33.29  Ht-5'2  Wt-180lbs  Has patient been referred for a routine screening Colonoscopy? yes  Is the patient between 45-75 years old? yes      Previous Colonoscopy yes   If yes:    Date:     Facility:     Reason:     Does the patient want to see a Gastroenterologist prior to their procedure OR are they having any GI symptoms? no    Has the patient been hospitalized or had abdominal surgery in the past 6 months? no    Does the patient use supplemental oxygen? no    Does the patient take Coumadin, Lovenox, Plavix, Elliquis, Xarelto, or other blood thinning medication? no    Has the patient had a stroke, cardiac event, or stent placed in the past year? no    If patient is between 45yrs - 49yrs, please advise patient that we will have to confirm benefits & coverage with their insurance company for a routine screening colonoscopy.

## 2025-01-27 ENCOUNTER — OFFICE VISIT (OUTPATIENT)
Facility: CLINIC | Age: 75
End: 2025-01-27
Payer: MEDICARE

## 2025-01-27 ENCOUNTER — APPOINTMENT (OUTPATIENT)
Dept: LAB | Facility: MEDICAL CENTER | Age: 75
End: 2025-01-27
Payer: MEDICARE

## 2025-01-27 DIAGNOSIS — M77.12 LATERAL EPICONDYLITIS OF LEFT ELBOW: Primary | ICD-10-CM

## 2025-01-27 DIAGNOSIS — M06.4 INFLAMMATORY POLYARTHROPATHY (HCC): ICD-10-CM

## 2025-01-27 LAB
ALBUMIN SERPL BCG-MCNC: 4.3 G/DL (ref 3.5–5)
ALP SERPL-CCNC: 58 U/L (ref 34–104)
ALT SERPL W P-5'-P-CCNC: 18 U/L (ref 7–52)
ANION GAP SERPL CALCULATED.3IONS-SCNC: 7 MMOL/L (ref 4–13)
AST SERPL W P-5'-P-CCNC: 16 U/L (ref 13–39)
BASOPHILS # BLD AUTO: 0.06 THOUSANDS/ΜL (ref 0–0.1)
BASOPHILS NFR BLD AUTO: 1 % (ref 0–1)
BILIRUB SERPL-MCNC: 0.33 MG/DL (ref 0.2–1)
BUN SERPL-MCNC: 13 MG/DL (ref 5–25)
CALCIUM SERPL-MCNC: 9.6 MG/DL (ref 8.4–10.2)
CHLORIDE SERPL-SCNC: 105 MMOL/L (ref 96–108)
CO2 SERPL-SCNC: 29 MMOL/L (ref 21–32)
CREAT SERPL-MCNC: 0.59 MG/DL (ref 0.6–1.3)
EOSINOPHIL # BLD AUTO: 0.28 THOUSAND/ΜL (ref 0–0.61)
EOSINOPHIL NFR BLD AUTO: 5 % (ref 0–6)
ERYTHROCYTE [DISTWIDTH] IN BLOOD BY AUTOMATED COUNT: 13.5 % (ref 11.6–15.1)
GFR SERPL CREATININE-BSD FRML MDRD: 90 ML/MIN/1.73SQ M
GLUCOSE P FAST SERPL-MCNC: 106 MG/DL (ref 65–99)
HCT VFR BLD AUTO: 41.6 % (ref 34.8–46.1)
HGB BLD-MCNC: 13.9 G/DL (ref 11.5–15.4)
IMM GRANULOCYTES # BLD AUTO: 0.02 THOUSAND/UL (ref 0–0.2)
IMM GRANULOCYTES NFR BLD AUTO: 0 % (ref 0–2)
LYMPHOCYTES # BLD AUTO: 1.86 THOUSANDS/ΜL (ref 0.6–4.47)
LYMPHOCYTES NFR BLD AUTO: 30 % (ref 14–44)
MCH RBC QN AUTO: 30.5 PG (ref 26.8–34.3)
MCHC RBC AUTO-ENTMCNC: 33.4 G/DL (ref 31.4–37.4)
MCV RBC AUTO: 91 FL (ref 82–98)
MONOCYTES # BLD AUTO: 0.49 THOUSAND/ΜL (ref 0.17–1.22)
MONOCYTES NFR BLD AUTO: 8 % (ref 4–12)
NEUTROPHILS # BLD AUTO: 3.58 THOUSANDS/ΜL (ref 1.85–7.62)
NEUTS SEG NFR BLD AUTO: 56 % (ref 43–75)
NRBC BLD AUTO-RTO: 0 /100 WBCS
PLATELET # BLD AUTO: 216 THOUSANDS/UL (ref 149–390)
PMV BLD AUTO: 10 FL (ref 8.9–12.7)
POTASSIUM SERPL-SCNC: 4.1 MMOL/L (ref 3.5–5.3)
PROT SERPL-MCNC: 6.7 G/DL (ref 6.4–8.4)
RBC # BLD AUTO: 4.55 MILLION/UL (ref 3.81–5.12)
SODIUM SERPL-SCNC: 141 MMOL/L (ref 135–147)
WBC # BLD AUTO: 6.29 THOUSAND/UL (ref 4.31–10.16)

## 2025-01-27 PROCEDURE — 97035 APP MDLTY 1+ULTRASOUND EA 15: CPT | Performed by: PHYSICAL THERAPIST

## 2025-01-27 PROCEDURE — 97110 THERAPEUTIC EXERCISES: CPT | Performed by: PHYSICAL THERAPIST

## 2025-01-27 PROCEDURE — 97112 NEUROMUSCULAR REEDUCATION: CPT | Performed by: PHYSICAL THERAPIST

## 2025-01-27 PROCEDURE — 36415 COLL VENOUS BLD VENIPUNCTURE: CPT

## 2025-01-27 PROCEDURE — 85025 COMPLETE CBC W/AUTO DIFF WBC: CPT

## 2025-01-27 PROCEDURE — 97140 MANUAL THERAPY 1/> REGIONS: CPT | Performed by: PHYSICAL THERAPIST

## 2025-01-27 PROCEDURE — 80053 COMPREHEN METABOLIC PANEL: CPT

## 2025-01-27 NOTE — PROGRESS NOTES
Daily Note     Today's date: 2025  Patient name: Ingrid Wheatley  : 1950  MRN: [id number]  Referring provider: Jose Alfredo Corea MD  Dx:   Encounter Diagnosis     ICD-10-CM    1. Lateral epicondylitis of left elbow  M77.12                      Subjective: pt noting much less pain. She is wearing her orthosis at nighttime      Objective: See treatment diary below    PT refit SA orthosis for proper fit for pt to wear at nighttime  AROM left elbow E/F-0/135' FA S/P- 75/85                   Wrist E/F- 60/50- painful  Strength- Shoulder- FE/ABD-grossly 4/5                   II- with elbow flexed- 30/40- pain 2/10                               Elbow extended - 25/40- pain 5/10        Palpation- pain lateral elbow. FA- (+) Cozen's   Sensation- no tingling noted  Assessment: Tolerated treatment well. Patient would benefit from continued PT      Plan: Continue per plan of care.      Precautions: avoid provocative positions and activity      Manuals           STM 15 15 15                                                 Neuro Re-Ed             HP/pulsed biph 15 15 15                                    Ther Ex             Self stretch #4 3x 3x 3x                                                                                                                                                                                   Modalities             US 15 15 15          CP def 15 15

## 2025-01-28 ENCOUNTER — APPOINTMENT (OUTPATIENT)
Facility: CLINIC | Age: 75
End: 2025-01-28
Payer: MEDICARE

## 2025-01-30 ENCOUNTER — OFFICE VISIT (OUTPATIENT)
Facility: CLINIC | Age: 75
End: 2025-01-30
Payer: MEDICARE

## 2025-01-30 DIAGNOSIS — M77.12 LATERAL EPICONDYLITIS OF LEFT ELBOW: Primary | ICD-10-CM

## 2025-01-30 PROCEDURE — 97110 THERAPEUTIC EXERCISES: CPT | Performed by: PHYSICAL THERAPIST

## 2025-01-30 PROCEDURE — 97035 APP MDLTY 1+ULTRASOUND EA 15: CPT | Performed by: PHYSICAL THERAPIST

## 2025-01-30 PROCEDURE — 97112 NEUROMUSCULAR REEDUCATION: CPT | Performed by: PHYSICAL THERAPIST

## 2025-01-30 PROCEDURE — 97140 MANUAL THERAPY 1/> REGIONS: CPT | Performed by: PHYSICAL THERAPIST

## 2025-01-30 NOTE — PROGRESS NOTES
Daily Note     Today's date: 2025  Patient name: Ingrid Wheatley  : 1950  MRN: [id number]  Referring provider: Jose Alfredo Corea MD  Dx:   Encounter Diagnosis     ICD-10-CM    1. Lateral epicondylitis of left elbow  M77.12                      Subjective: pt noting some increase in soreness from using her arm more      Objective: See treatment diary below    PT refit SA orthosis for proper fit for pt to wear at nighttime  AROM left elbow E/F-0/135' FA S/P- 75/85                   Wrist E/F- 60/50- painful  Strength- Shoulder- FE/ABD-grossly 4/5                   II- with elbow flexed- 30/40- pain 2/10                               Elbow extended - 25/40- pain 5/10        Palpation- pain lateral elbow. FA- (+) Cozen's   Sensation- no tingling noted    Assessment: Tolerated treatment well. Patient would benefit from continued PT      Plan: Continue per plan of care.      Precautions: avoid provocative positions and activity      Manuals          STM 15 15 15 15                                                Neuro Re-Ed             HP/pulsed biph 15 15 15 15                                   Ther Ex             Self stretch #4 3x 3x 3x 3x                                                                                                                                                                                  Modalities             US 15 15 15 15         CP def 15 15 15

## 2025-01-31 ENCOUNTER — OFFICE VISIT (OUTPATIENT)
Dept: UROLOGY | Facility: MEDICAL CENTER | Age: 75
End: 2025-01-31
Payer: MEDICARE

## 2025-01-31 ENCOUNTER — APPOINTMENT (OUTPATIENT)
Dept: RADIOLOGY | Facility: MEDICAL CENTER | Age: 75
End: 2025-01-31
Payer: MEDICARE

## 2025-01-31 VITALS
WEIGHT: 182 LBS | HEIGHT: 62 IN | SYSTOLIC BLOOD PRESSURE: 130 MMHG | HEART RATE: 78 BPM | BODY MASS INDEX: 33.49 KG/M2 | DIASTOLIC BLOOD PRESSURE: 78 MMHG | OXYGEN SATURATION: 97 %

## 2025-01-31 DIAGNOSIS — C64.2 RENAL CELL CARCINOMA OF LEFT KIDNEY (HCC): ICD-10-CM

## 2025-01-31 DIAGNOSIS — C64.2 RENAL CELL CARCINOMA OF LEFT KIDNEY (HCC): Primary | ICD-10-CM

## 2025-01-31 PROCEDURE — 99213 OFFICE O/P EST LOW 20 MIN: CPT | Performed by: UROLOGY

## 2025-01-31 PROCEDURE — 71046 X-RAY EXAM CHEST 2 VIEWS: CPT

## 2025-01-31 NOTE — PROGRESS NOTES
Name: Ingrid Wheatley      : 1950      MRN: [id number]  Encounter Provider: Guille Garcia MD  Encounter Date: 2025   Encounter department: [facility] [place]  :  Assessment & Plan  Renal cell carcinoma of left kidney (HCC)  3.2 cm left renal cell carcinoma s/p cryoablation on 10/17/2023  MRI (2023) shows regression of lesion post cryo to 2.3 cm without clear soft tissue enhancement  MRI (2025):  regression of lesion post cryo to 2.1 cm with no enhancement  - CXR now   - plan for CT chest/abdomen/pelvis in 1 year           History of Present Illness   Ingrid Wheatley is a 75 y.o. female who presents for follow up for her left RCC s/p cryoablation    Completed MRI in 2025 that showed decrease in size of ablated lesion to 2.1 cm without abnormal enhancement      Review of Systems   All other systems reviewed and are negative.    Past Medical History   Past Medical History:   Diagnosis Date    Abnormal findings on diagnostic imaging of breast     Abnormal Pap smear of cervix     Arthritis     Chronic pain disorder     Closed fracture of proximal end of left fibula 2021    Extremity pain     Fibrocystic breast disease     Foot pain     GERD (gastroesophageal reflux disease)     Hyperlipidemia     Hypertension     Interstitial cystitis     Joint pain     Low back pain     Osteoarthritis     Osteopenia     PONV (postoperative nausea and vomiting) 10/17/2023    Uncomplicated opioid dependence (HCC) 2022     Past Surgical History:   Procedure Laterality Date    APPENDECTOMY      BACK SURGERY      BREAST EXCISIONAL BIOPSY Left 1996    benign    CARPAL BOSS EXCISION      CHOLECYSTECTOMY      DIAGNOSTIC LAPAROSCOPY      FOOT SURGERY      FRACTURE SURGERY      HAND SURGERY  2017    HYSTERECTOMY      age 32    HYSTEROSCOPY      IR CRYOABLATION  10/17/2023    JOINT REPLACEMENT      KIDNEY SURGERY Left     KNEE ARTHROSCOPY Bilateral     LAMINECTOMY      LAPAROSCOPY       hynecologic with adhesions    MYOMECTOMY      OOPHORECTOMY Bilateral     age 32    ORTHOPEDIC SURGERY      CT CAR IMPLTJ NSTIM ELTRDS PLATE/PADDLE EDRL N/A 05/18/2017    Procedure: PLACEMENT OF THORACIC SPINAL CORD STIMULATOR WITH LEFT BUTTOCK IMPLANTABLE PULSE GENERATOR (IMPULSE MONITORING-MOTORS (TCEMEP), EMG, SSEP);  Surgeon: Julius Damon MD;  Location: [facility];  Service: Neurosurgery    SPINAL CORD STIMULATOR IMPLANT Left 09/29/2020    Procedure: LAMINECTOMY THORACIC , REMOVAL OF SCS LEADS AND GENERATOR;  Surgeon: Oswald Whitt MD;  Location: [facility];  Service: Neurosurgery    SPINAL STIMULATOR PLACEMENT  05/2017    TONSILLECTOMY AND ADENOIDECTOMY      onset: unknown    TOTAL KNEE ARTHROPLASTY Right      Family History   Problem Relation Age of Onset    Bone cancer Mother     Cancer Mother     Asthma Mother     Brain cancer Father     Stroke Father         stroke sydrome    Transient ischemic attack Father     BRCA1 Negative Sister     BRCA2 Negative Sister     Depression Sister     Migraines Sister     Asthma Sister     Asthma Sister     Diabetes Sister     Depression Sister     No Known Problems Maternal Grandmother     No Known Problems Maternal Grandfather     Diabetes Paternal Grandmother     No Known Problems Paternal Grandfather     Heart disease Brother     Diabetes Brother     Heart attack Brother     Colon cancer Brother 66    Cancer Brother     No Known Problems Brother     Breast cancer Maternal Aunt 70    Breast cancer additional onset Maternal Aunt 72      reports that she has never smoked. She has never used smokeless tobacco. She reports current alcohol use of about 1.0 standard drink of alcohol per week. She reports that she does not use drugs.  Current Outpatient Medications on File Prior to Visit   Medication Sig Dispense Refill    amitriptyline (ELAVIL) 50 mg tablet Take 1 tablet (50 mg total) by mouth daily at bedtime 90 tablet 3    Biotin 2500 MCG CAPS Take 1 capsule by mouth 2  (two) times a day       calcium carbonate (OS-ANTHONY) 600 MG tablet Take 1,200 mg by mouth 2 (two) times a day with meals      Cholecalciferol (VITAMIN D-3 PO) Take 1 tablet by mouth daily      Cyanocobalamin (VITAMIN B-12 CR PO) Take 1 tablet by mouth daily      Diclofenac Sodium (VOLTAREN) 1 % Apply 2 g topically 4 (four) times a day 200 g 1    dicyclomine (BENTYL) 10 mg capsule Take 1 capsule (10 mg total) by mouth 2 (two) times a day 180 capsule 3    docusate sodium (COLACE) 100 mg capsule Take 100 mg by mouth      EPINEPHrine (EPIPEN) 0.3 mg/0.3 mL SOAJ INJECT 0.3 ML (0.3 MG TOTAL) INTO A MUSCLE AS NEEDED FOR ANAPHYLAXIS 2 each 1    Evening Primrose Oil 1000 MG CAPS Take 1 capsule (1,000 mg total) by mouth in the morning  0    famotidine (PEPCID) 20 mg tablet Take 1 tablet (20 mg total) by mouth 2 (two) times a day 180 tablet 3    fexofenadine (ALLEGRA) 180 MG tablet Take 180 mg by mouth daily      folic acid (FOLVITE) 1 mg tablet       gabapentin (NEURONTIN) 300 mg capsule Take 1 tablet in the morning, 1 tablet afternoon and 2 bedtime 360 capsule 3    hydroxychloroquine (PLAQUENIL) 200 mg tablet Take 200 mg by mouth daily with breakfast      losartan-hydrochlorothiazide (HYZAAR) 100-25 MG per tablet Take 1 tablet by mouth daily 90 tablet 3    methocarbamol (ROBAXIN) 750 mg tablet TAKE 1 TABLET EVERY 8 HOURS 270 tablet 3    methotrexate 2.5 MG tablet Take by mouth      montelukast (SINGULAIR) 10 mg tablet Take 1 tablet (10 mg total) by mouth daily at bedtime 90 tablet 3    Multiple Vitamins-Minerals (PRESERVISION AREDS 2 PO) Take by mouth in the morning      Omega-3 Fatty Acids (FISH OIL) 1,000 mg Take 1,000 mg by mouth 3 (three) times a day      omeprazole (PriLOSEC) 40 MG capsule Take 1 capsule (40 mg total) by mouth daily 90 capsule 3    rosuvastatin (CRESTOR) 10 MG tablet Take 1 tablet (10 mg total) by mouth daily Take Monday, Wednesday and Friday alternate with simvastatin 36 tablet 3    simvastatin (ZOCOR)  "10 mg tablet Take Tuesday, Thursday' Saturday and Sunday, to be alternated with rosuvastatin 48 tablet 3    diazepam (VALIUM) 10 mg tablet Take 2 tablets (20 mg total) by mouth 1 (one) time for 1 dose Take 1 tablet 45 minutes prior to your schedule MRI 2 tablet 0     Current Facility-Administered Medications on File Prior to Visit   Medication Dose Route Frequency Provider Last Rate Last Admin    ondansetron (ZOFRAN) injection 4 mg  4 mg Intravenous Q6H PRN Guille Perdomo MD         Allergies   Allergen Reactions    Bee Venom Shortness Of Breath    Chlorhexidine Rash    Other Rash, Hives, Palpitations and Shortness Of Breath     Adhesive tape    Egg Yolk - Food Allergy Hives    Iodinated Contrast Media Hives and Other (See Comments)    Penicillins Hives    Lidocaine Other (See Comments)     cream    Allevyn Adhesive [Wound Dressings] Hives and Rash    Bactrim [Sulfamethoxazole-Trimethoprim] Rash    Codeine Other (See Comments)     headaches    Latex Rash and Other (See Comments)    Medical Tape Blisters, Hives, Itching and Rash    Oxybutynin Rash    Pentosan Polysulfate Rash    Povidone Iodine Rash         Objective   /78 (BP Location: Left arm, Patient Position: Sitting, Cuff Size: Standard)   Pulse 78   Ht 5' 2\" (1.575 m)   Wt 82.6 kg (182 lb)   LMP  (LMP Unknown)   SpO2 97%   BMI 33.29 kg/m²     Physical Exam  Vitals and nursing note reviewed.   Constitutional:       General: She is not in acute distress.     Appearance: She is well-developed.   HENT:      Head: Normocephalic and atraumatic.   Eyes:      Conjunctiva/sclera: Conjunctivae normal.   Cardiovascular:      Rate and Rhythm: Normal rate and regular rhythm.      Heart sounds: No murmur heard.  Pulmonary:      Effort: Pulmonary effort is normal. No respiratory distress.      Breath sounds: Normal breath sounds.   Abdominal:      Palpations: Abdomen is soft.      Tenderness: There is no abdominal tenderness.   Musculoskeletal:         " "General: No swelling.      Cervical back: Neck supple.   Skin:     General: Skin is warm and dry.      Capillary Refill: Capillary refill takes less than 2 seconds.   Neurological:      Mental Status: She is alert.   Psychiatric:         Mood and Affect: Mood normal.          Results  No results found for: \"PSA\"  Lab Results   Component Value Date    GLUCOSE 118 12/08/2016    CALCIUM 9.6 01/27/2025     11/09/2015    K 4.1 01/27/2025    CO2 29 01/27/2025     01/27/2025    BUN 13 01/27/2025    CREATININE 0.59 (L) 01/27/2025     Lab Results   Component Value Date    WBC 6.29 01/27/2025    HGB 13.9 01/27/2025    HCT 41.6 01/27/2025    MCV 91 01/27/2025     01/27/2025       Office Urine Dip  No results found for this or any previous visit (from the past hour).]      "

## 2025-02-04 ENCOUNTER — OFFICE VISIT (OUTPATIENT)
Facility: CLINIC | Age: 75
End: 2025-02-04
Payer: MEDICARE

## 2025-02-04 DIAGNOSIS — M77.12 LATERAL EPICONDYLITIS OF LEFT ELBOW: Primary | ICD-10-CM

## 2025-02-04 PROCEDURE — 97110 THERAPEUTIC EXERCISES: CPT | Performed by: PHYSICAL THERAPIST

## 2025-02-04 PROCEDURE — 97140 MANUAL THERAPY 1/> REGIONS: CPT | Performed by: PHYSICAL THERAPIST

## 2025-02-04 PROCEDURE — 97035 APP MDLTY 1+ULTRASOUND EA 15: CPT | Performed by: PHYSICAL THERAPIST

## 2025-02-04 PROCEDURE — 97112 NEUROMUSCULAR REEDUCATION: CPT | Performed by: PHYSICAL THERAPIST

## 2025-02-04 NOTE — PROGRESS NOTES
Daily Note     Today's date: 2025  Patient name: Ingrid Wheatley  : 1950  MRN: [id number]  Referring provider: Jose Alfredo Corea MD  Dx:   Encounter Diagnosis     ICD-10-CM    1. Lateral epicondylitis of left elbow  M77.12                      Subjective: pt noting episodes of pain rather than constant pain.        Objective: See treatment diary below    PT refit SA orthosis for proper fit for pt to wear at nighttime  AROM left elbow E/F-0/135' FA S/P- 75/85                   Wrist E/F- 60/50- painful  Strength- Shoulder- FE/ABD-grossly 4/5                   II- with elbow flexed- 30/40- pain 2/10                               Elbow extended - 25/40- pain 5/10        Palpation- pain lateral elbow. FA- (+) Cozen's   Sensation- no tingling noted    Assessment: Tolerated treatment well. Patient would benefit from continued PT      Plan: Continue per plan of care.      Precautions: avoid provocative positions and activity      Manuals  2/4        STM 15 15 15 15 15                                               Neuro Re-Ed             HP/pulsed biph 15 15 15 15 15                                  Ther Ex             Self stretch #4 3x 3x 3x 3x 3x                                                                                                                                                                                 Modalities             US 15 15 15 15 15        CP def 15 15 15 15

## 2025-02-05 ENCOUNTER — OFFICE VISIT (OUTPATIENT)
Facility: CLINIC | Age: 75
End: 2025-02-05
Payer: MEDICARE

## 2025-02-05 DIAGNOSIS — M77.12 LATERAL EPICONDYLITIS OF LEFT ELBOW: Primary | ICD-10-CM

## 2025-02-05 PROCEDURE — 97035 APP MDLTY 1+ULTRASOUND EA 15: CPT | Performed by: PHYSICAL THERAPIST

## 2025-02-05 PROCEDURE — 97140 MANUAL THERAPY 1/> REGIONS: CPT | Performed by: PHYSICAL THERAPIST

## 2025-02-05 PROCEDURE — 97110 THERAPEUTIC EXERCISES: CPT | Performed by: PHYSICAL THERAPIST

## 2025-02-05 PROCEDURE — 97112 NEUROMUSCULAR REEDUCATION: CPT | Performed by: PHYSICAL THERAPIST

## 2025-02-05 NOTE — PROGRESS NOTES
Daily Note     Today's date: 2025  Patient name: Ingrid Wheatley  : 1950  MRN: [id number]  Referring provider: Jose Alfredo Corea MD  Dx:   Encounter Diagnosis     ICD-10-CM    1. Lateral epicondylitis of left elbow  M77.12                      Subjective: pt feeling better- less pain with activity      Objective: See treatment diary below    PT refit SA orthosis for proper fit for pt to wear at nighttime  AROM left elbow E/F-0/135' FA S/P- 75/85                   Wrist E/F- 60/50  Strength- Shoulder- FE/ABD-grossly 4/5                   II- with elbow flexed- 35/45 pain 0/10                               Elbow extended - 35/45- pain 0/10        Palpation- no pain lateral elbow. FA- (-) Cozen's   Sensation- no tingling noted     Assessment: Tolerated treatment well. Patient would benefit from continued PT      Plan: Continue per plan of care.      Precautions: avoid provocative positions and activity      Manuals  2/4 2/5       STM 15 15 15 15 15 15                                              Neuro Re-Ed             HP/pulsed biph 15 15 15 15 15 15                                 Ther Ex             Self stretch #4 3x 3x 3x 3x 3x 3x       TP        2'       TP 5 finger      T 1'       Flexbar      Y 20/20       DB E/F      4 2/10       Tulio      25 2/10       Blue      III 2/10                                                                                                  Modalities             US 15 15 15 15 15 15 dc dc     CP def 15 15 15 15 15

## 2025-02-06 ENCOUNTER — APPOINTMENT (OUTPATIENT)
Facility: CLINIC | Age: 75
End: 2025-02-06
Payer: MEDICARE

## 2025-02-08 DIAGNOSIS — E78.2 MIXED HYPERLIPIDEMIA: ICD-10-CM

## 2025-02-08 RX ORDER — SIMVASTATIN 10 MG
TABLET ORAL
Qty: 48 TABLET | Refills: 3 | Status: SHIPPED | OUTPATIENT
Start: 2025-02-08

## 2025-02-11 ENCOUNTER — APPOINTMENT (OUTPATIENT)
Facility: CLINIC | Age: 75
End: 2025-02-11
Payer: MEDICARE

## 2025-02-11 ENCOUNTER — TELEPHONE (OUTPATIENT)
Age: 75
End: 2025-02-11

## 2025-02-11 NOTE — TELEPHONE ENCOUNTER
Scheduled date of colonoscopy (as of today):02.12.25    Physician performing colonoscopy:Alen    Location of colonoscopy:[facility]    Pt calling because she was scheduled for a colonoscopy W/ at [facility], but she lives in [place] and says she requested that location. She had not realized she was scheduled at [facility] and has already started her prep so did not want to change dates but was confused because [place] is much further from her and she's concerned about the weather. I was able to change her location by switching her to  for 02.12 and she is very happy with that. I called both Endo's to let them know the changes to the schedule for tomorrow. Pt knows she should get her time call today from [facility].

## 2025-02-12 ENCOUNTER — ANESTHESIA (OUTPATIENT)
Dept: PERIOP | Facility: HOSPITAL | Age: 75
End: 2025-02-12
Payer: MEDICARE

## 2025-02-12 ENCOUNTER — ANESTHESIA EVENT (OUTPATIENT)
Dept: PERIOP | Facility: HOSPITAL | Age: 75
End: 2025-02-12
Payer: MEDICARE

## 2025-02-12 ENCOUNTER — HOSPITAL ENCOUNTER (OUTPATIENT)
Dept: PERIOP | Facility: HOSPITAL | Age: 75
Setting detail: OUTPATIENT SURGERY
Discharge: HOME | End: 2025-02-12
Attending: STUDENT IN AN ORGANIZED HEALTH CARE EDUCATION/TRAINING PROGRAM
Payer: MEDICARE

## 2025-02-12 VITALS
SYSTOLIC BLOOD PRESSURE: 124 MMHG | WEIGHT: 182 LBS | BODY MASS INDEX: 33.49 KG/M2 | TEMPERATURE: 97.8 F | RESPIRATION RATE: 20 BRPM | OXYGEN SATURATION: 99 % | HEART RATE: 78 BPM | DIASTOLIC BLOOD PRESSURE: 60 MMHG | HEIGHT: 62 IN

## 2025-02-12 DIAGNOSIS — Z86.0100 PERSONAL HISTORY OF COLON POLYPS, UNSPECIFIED: ICD-10-CM

## 2025-02-12 DIAGNOSIS — E78.2 MIXED HYPERLIPIDEMIA: ICD-10-CM

## 2025-02-12 DIAGNOSIS — G62.9 NEUROPATHY: ICD-10-CM

## 2025-02-12 DIAGNOSIS — Z87.19 HISTORY OF ESOPHAGEAL REFLUX: ICD-10-CM

## 2025-02-12 DIAGNOSIS — I10 PRIMARY HYPERTENSION: ICD-10-CM

## 2025-02-12 DIAGNOSIS — Z12.11 SCREENING FOR COLON CANCER: ICD-10-CM

## 2025-02-12 PROCEDURE — 45380 COLONOSCOPY AND BIOPSY: CPT | Performed by: INTERNAL MEDICINE

## 2025-02-12 PROCEDURE — 88305 TISSUE EXAM BY PATHOLOGIST: CPT | Performed by: PATHOLOGY

## 2025-02-12 RX ORDER — GLYCOPYRROLATE 0.2 MG/ML
INJECTION INTRAMUSCULAR; INTRAVENOUS AS NEEDED
Status: DISCONTINUED | OUTPATIENT
Start: 2025-02-12 | End: 2025-02-12

## 2025-02-12 RX ORDER — SODIUM CHLORIDE, SODIUM LACTATE, POTASSIUM CHLORIDE, CALCIUM CHLORIDE 600; 310; 30; 20 MG/100ML; MG/100ML; MG/100ML; MG/100ML
INJECTION, SOLUTION INTRAVENOUS CONTINUOUS PRN
Status: DISCONTINUED | OUTPATIENT
Start: 2025-02-12 | End: 2025-02-12

## 2025-02-12 RX ORDER — ROSUVASTATIN CALCIUM 10 MG/1
TABLET, COATED ORAL
Qty: 36 TABLET | Refills: 1 | Status: SHIPPED | OUTPATIENT
Start: 2025-02-12

## 2025-02-12 RX ORDER — PROPOFOL 10 MG/ML
INJECTION, EMULSION INTRAVENOUS CONTINUOUS PRN
Status: DISCONTINUED | OUTPATIENT
Start: 2025-02-12 | End: 2025-02-12

## 2025-02-12 RX ORDER — ONDANSETRON 2 MG/ML
4 INJECTION INTRAMUSCULAR; INTRAVENOUS ONCE AS NEEDED
Status: DISCONTINUED | OUTPATIENT
Start: 2025-02-12 | End: 2025-02-16 | Stop reason: HOSPADM

## 2025-02-12 RX ORDER — LOSARTAN POTASSIUM AND HYDROCHLOROTHIAZIDE 25; 100 MG/1; MG/1
1 TABLET ORAL DAILY
Qty: 90 TABLET | Refills: 1 | Status: SHIPPED | OUTPATIENT
Start: 2025-02-12

## 2025-02-12 RX ORDER — ALBUTEROL SULFATE 0.83 MG/ML
2.5 SOLUTION RESPIRATORY (INHALATION) ONCE AS NEEDED
Status: DISCONTINUED | OUTPATIENT
Start: 2025-02-12 | End: 2025-02-16 | Stop reason: HOSPADM

## 2025-02-12 RX ORDER — PROPOFOL 10 MG/ML
INJECTION, EMULSION INTRAVENOUS AS NEEDED
Status: DISCONTINUED | OUTPATIENT
Start: 2025-02-12 | End: 2025-02-12

## 2025-02-12 RX ORDER — AMITRIPTYLINE HYDROCHLORIDE 50 MG/1
50 TABLET ORAL
Qty: 90 TABLET | Refills: 1 | Status: SHIPPED | OUTPATIENT
Start: 2025-02-12

## 2025-02-12 RX ORDER — FAMOTIDINE 20 MG/1
20 TABLET, FILM COATED ORAL 2 TIMES DAILY
Qty: 180 TABLET | Refills: 1 | Status: SHIPPED | OUTPATIENT
Start: 2025-02-12

## 2025-02-12 RX ADMIN — SODIUM CHLORIDE, SODIUM LACTATE, POTASSIUM CHLORIDE, AND CALCIUM CHLORIDE: .6; .31; .03; .02 INJECTION, SOLUTION INTRAVENOUS at 11:03

## 2025-02-12 RX ADMIN — GLYCOPYRROLATE 0.1 MG: 0.2 INJECTION INTRAMUSCULAR; INTRAVENOUS at 11:06

## 2025-02-12 RX ADMIN — PROPOFOL 100 MG: 10 INJECTION, EMULSION INTRAVENOUS at 11:07

## 2025-02-12 RX ADMIN — PROPOFOL 100 MCG/KG/MIN: 10 INJECTION, EMULSION INTRAVENOUS at 11:06

## 2025-02-12 RX ADMIN — PROPOFOL 50 MG: 10 INJECTION, EMULSION INTRAVENOUS at 11:09

## 2025-02-12 NOTE — ANESTHESIA POSTPROCEDURE EVALUATION
Post-Op Assessment Note    CV Status:  Stable  Pain Score: 0    Pain management: adequate       Mental Status:  Alert and awake   Hydration Status:  Euvolemic   PONV Controlled:  Controlled   Airway Patency:  Patent     Post Op Vitals Reviewed: Yes    No anethesia notable event occurred.    Staff: CRNA           Last Filed PACU Vitals:  Vitals Value Taken Time   Temp 98    Pulse 78    /70    Resp 12    SpO2 99

## 2025-02-12 NOTE — H&P
History and Physical - [facility]  Ingrid Wheatley 75 y.o. female MRN: [id number]                  HPI: Ingrid Wheatley is a 75 y.o. year old female who presents for personal history of colon polyps      REVIEW OF SYSTEMS: Per the HPI, and otherwise unremarkable.    Historical Information   Past Medical History:   Diagnosis Date    Abnormal findings on diagnostic imaging of breast     Abnormal Pap smear of cervix     Arthritis     Chronic pain disorder     Closed fracture of proximal end of left fibula 06/04/2021    Extremity pain     Fibrocystic breast disease     Foot pain     GERD (gastroesophageal reflux disease)     Hyperlipidemia     Hypertension     Interstitial cystitis     Joint pain     Low back pain     Osteoarthritis     Osteopenia     PONV (postoperative nausea and vomiting) 10/17/2023    Uncomplicated opioid dependence (HCC) 03/01/2022     Past Surgical History:   Procedure Laterality Date    APPENDECTOMY      BACK SURGERY      lumbar    BREAST EXCISIONAL BIOPSY Left 1996    benign    CARPAL BOSS EXCISION      CHOLECYSTECTOMY      DIAGNOSTIC LAPAROSCOPY      FOOT SURGERY      FRACTURE SURGERY      HAND SURGERY  5/2017    HYSTERECTOMY      age 32    HYSTEROSCOPY      IR CRYOABLATION  10/17/2023    JOINT REPLACEMENT Bilateral     tkr    KIDNEY SURGERY Left     KNEE ARTHROSCOPY Bilateral     LAMINECTOMY      LAPAROSCOPY      hynecologic with adhesions    MYOMECTOMY      OOPHORECTOMY Bilateral     age 32    ORTHOPEDIC SURGERY      IA CAR IMPLTJ NSTIM ELTRDS PLATE/PADDLE EDRL N/A 05/18/2017    Procedure: PLACEMENT OF THORACIC SPINAL CORD STIMULATOR WITH LEFT BUTTOCK IMPLANTABLE PULSE GENERATOR (IMPULSE MONITORING-MOTORS (TCEMEP), EMG, SSEP);  Surgeon: Julius Damon MD;  Location:  MAIN OR;  Service: Neurosurgery    SPINAL CORD STIMULATOR IMPLANT Left 09/29/2020    Procedure: LAMINECTOMY THORACIC , REMOVAL OF SCS LEADS AND GENERATOR;  Surgeon: Oswald Whitt MD;  Location:  MAIN OR;   Service: Neurosurgery    SPINAL STIMULATOR PLACEMENT  05/2017    TONSILLECTOMY AND ADENOIDECTOMY      onset: unknown    TOTAL KNEE ARTHROPLASTY Right      Social History   Social History     Substance and Sexual Activity   Alcohol Use Yes    Alcohol/week: 1.0 standard drink of alcohol    Comment: Drink socially, not too often     Social History     Substance and Sexual Activity   Drug Use No     Social History     Tobacco Use   Smoking Status Never   Smokeless Tobacco Never     Family History   Problem Relation Age of Onset    Bone cancer Mother     Cancer Mother     Asthma Mother     Brain cancer Father     Stroke Father         stroke sydrome    Transient ischemic attack Father     BRCA1 Negative Sister     BRCA2 Negative Sister     Depression Sister     Migraines Sister     Asthma Sister     Asthma Sister     Diabetes Sister     Depression Sister     No Known Problems Maternal Grandmother     No Known Problems Maternal Grandfather     Diabetes Paternal Grandmother     No Known Problems Paternal Grandfather     Heart disease Brother     Diabetes Brother     Heart attack Brother     Colon cancer Brother 66    Cancer Brother     No Known Problems Brother     Breast cancer Maternal Aunt 70    Breast cancer additional onset Maternal Aunt 72       Meds/Allergies     Not in a hospital admission.    Allergies   Allergen Reactions    Bee Venom Shortness Of Breath    Chlorhexidine Rash    Other Rash, Hives, Palpitations and Shortness Of Breath     Adhesive tape    Egg Yolk - Food Allergy Hives    Iodinated Contrast Media Hives and Other (See Comments)    Penicillins Hives    Lidocaine Other (See Comments)     cream    Allevyn Adhesive [Wound Dressings] Hives and Rash    Bactrim [Sulfamethoxazole-Trimethoprim] Rash    Codeine Other (See Comments)     headaches    Latex Rash and Other (See Comments)    Medical Tape Blisters, Hives, Itching and Rash    Oxybutynin Rash    Pentosan Polysulfate Rash    Povidone Iodine Rash        Objective     There were no vitals taken for this visit.      PHYSICAL EXAMINATION:    General Appearance:   Alert, cooperative, no distress   HEENT:  Normocephalic, atraumatic, anicteric. Neck supple, symmetrical, trachea midline.   Lungs:   Equal chest rise and unlabored breathing, normal effort, no coughing.   Cardiovascular:   No visualized JVD.   Abdomen:   No abdominal distension.   Skin:   No jaundice, rashes, or lesions.    Musculoskeletal:   Normal range of motion visualized.   Psych:  Normal affect and normal insight.   Neuro:  Alert and appropriate.           ASSESSMENT/PLAN:  This is a 75 y.o. year old female here for colonoscopy, and she is stable and optimized for her procedure.

## 2025-02-12 NOTE — ANESTHESIA PREPROCEDURE EVALUATION
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   (+) Lateral epicondylitis of left elbow   (+) Myofascial pain syndrome   (+) Osteoarthritis   (+) Osteoarthritis of left knee   (+) Rheumatoid arthritis of multiple sites with negative rheumatoid factor (HCC)   (+) Thoracic myofascial strain      NEURO/PSYCH   (+) Chronic low back pain   (+) Chronic pain syndrome   (+) Chronic thoracic back pain   (+) Myofascial pain syndrome        Physical Exam    Airway    Mallampati score: II  TM Distance: >3 FB  Neck ROM: full     Dental       Cardiovascular      Pulmonary      Other Findings        Anesthesia Plan  ASA Score- 2     Anesthesia Type- IV sedation with anesthesia with ASA Monitors.         Additional Monitors:     Airway Plan:            Plan Factors-Exercise tolerance (METS): >4 METS.    Chart reviewed. EKG reviewed.  Existing labs reviewed. Patient summary reviewed.    Patient is not a current smoker.      Obstructive sleep apnea risk education given perioperatively.        Induction-     Postoperative Plan-         Informed Consent- Anesthetic plan and risks discussed with patient.  I personally reviewed this patient with the CRNA. Discussed and agreed on the Anesthesia Plan with the CRNA..      NPO Status:  Vitals Value Taken Time   Date of last liquid 02/12/25 02/12/25 1023   Time of last liquid 0500 02/12/25 1023   Date of last solid 02/10/25 02/12/25 1023   Time of last solid 2100 02/12/25 1023

## 2025-02-13 ENCOUNTER — APPOINTMENT (OUTPATIENT)
Facility: CLINIC | Age: 75
End: 2025-02-13
Payer: MEDICARE

## 2025-02-13 ENCOUNTER — OFFICE VISIT (OUTPATIENT)
Dept: URGENT CARE | Facility: MEDICAL CENTER | Age: 75
End: 2025-02-13
Payer: MEDICARE

## 2025-02-13 VITALS
BODY MASS INDEX: 32.94 KG/M2 | RESPIRATION RATE: 17 BRPM | DIASTOLIC BLOOD PRESSURE: 70 MMHG | WEIGHT: 179 LBS | SYSTOLIC BLOOD PRESSURE: 136 MMHG | HEIGHT: 62 IN | TEMPERATURE: 98.6 F | HEART RATE: 85 BPM

## 2025-02-13 DIAGNOSIS — R39.9 UTI SYMPTOMS: Primary | ICD-10-CM

## 2025-02-13 LAB
SL AMB  POCT GLUCOSE, UA: ABNORMAL
SL AMB LEUKOCYTE ESTERASE,UA: ABNORMAL
SL AMB POCT BILIRUBIN,UA: ABNORMAL
SL AMB POCT BLOOD,UA: ABNORMAL
SL AMB POCT CLARITY,UA: ABNORMAL
SL AMB POCT COLOR,UA: YELLOW
SL AMB POCT KETONES,UA: ABNORMAL
SL AMB POCT NITRITE,UA: ABNORMAL
SL AMB POCT PH,UA: 7.5
SL AMB POCT SPECIFIC GRAVITY,UA: 1.02
SL AMB POCT URINE PROTEIN: ABNORMAL
SL AMB POCT UROBILINOGEN: 0.2

## 2025-02-13 PROCEDURE — 81002 URINALYSIS NONAUTO W/O SCOPE: CPT | Performed by: PHYSICIAN ASSISTANT

## 2025-02-13 PROCEDURE — 87077 CULTURE AEROBIC IDENTIFY: CPT | Performed by: PHYSICIAN ASSISTANT

## 2025-02-13 PROCEDURE — 87086 URINE CULTURE/COLONY COUNT: CPT | Performed by: PHYSICIAN ASSISTANT

## 2025-02-13 PROCEDURE — G0463 HOSPITAL OUTPT CLINIC VISIT: HCPCS | Performed by: PHYSICIAN ASSISTANT

## 2025-02-13 PROCEDURE — 99213 OFFICE O/P EST LOW 20 MIN: CPT | Performed by: PHYSICIAN ASSISTANT

## 2025-02-13 PROCEDURE — 87186 SC STD MICRODIL/AGAR DIL: CPT | Performed by: PHYSICIAN ASSISTANT

## 2025-02-13 RX ORDER — CIPROFLOXACIN 500 MG/1
500 TABLET, FILM COATED ORAL EVERY 12 HOURS SCHEDULED
Qty: 10 TABLET | Refills: 0 | Status: SHIPPED | OUTPATIENT
Start: 2025-02-13 | End: 2025-02-18 | Stop reason: SDUPTHER

## 2025-02-13 NOTE — PROGRESS NOTES
[facility]        NAME: Ingrid Wheatley is a 75 y.o. female  : 1950    MRN: [id number]  DATE: 2025  TIME: 6:44 PM    Assessment and Plan   UTI symptoms [R39.9]  1. UTI symptoms  POCT urine dip    ciprofloxacin (CIPRO) 500 mg tablet    Urine culture            Patient Instructions     Take medicine as prescribed  Follow up with PCP in 3-5 days.  Proceed to  ER if symptoms worsen.    If tests have been performed at [facility], our office will contact you with results if changes need to be made to the care plan discussed with you at the visit.  You can review your full results on [facility]    Chief Complaint     Chief Complaint   Patient presents with    Possible UTI     Frequent urination, painful urination and lower back pain started today          History of Present Illness       Urinary Tract Infection   This is a new problem. The current episode started today. The problem occurs every urination (burning and pressure). The problem has been gradually worsening. The pain is at a severity of 4/10. There has been no fever. She is Not sexually active. There is No history of pyelonephritis. Associated symptoms include frequency and urgency. Pertinent negatives include no flank pain (left lower back pain reports chronic back pain), hematuria, nausea or vomiting. Associated symptoms comments: Denies difficulty urinating. She has tried nothing for the symptoms. There is no history of recurrent UTIs.   Patient has history of interstital cystitis and RCC.  Last UTI was in 2024 and culture revealed e.coli.     Review of Systems   Review of Systems   Gastrointestinal:  Negative for nausea and vomiting.   Genitourinary:  Positive for frequency and urgency. Negative for flank pain (left lower back pain reports chronic back pain) and hematuria.         Current Medications       Current Outpatient Medications:     amitriptyline (ELAVIL) 50 mg tablet, TAKE 1 TABLET AT BEDTIME,  Disp: 90 tablet, Rfl: 1    Biotin 2500 MCG CAPS, Take 1 capsule by mouth 2 (two) times a day , Disp: , Rfl:     calcium carbonate (OS-ANTHONY) 600 MG tablet, Take 1,200 mg by mouth 2 (two) times a day with meals, Disp: , Rfl:     Cholecalciferol (VITAMIN D-3 PO), Take 1 tablet by mouth daily, Disp: , Rfl:     ciprofloxacin (CIPRO) 500 mg tablet, Take 1 tablet (500 mg total) by mouth every 12 (twelve) hours for 5 days, Disp: 10 tablet, Rfl: 0    Cyanocobalamin (VITAMIN B-12 CR PO), Take 1 tablet by mouth daily, Disp: , Rfl:     Diclofenac Sodium (VOLTAREN) 1 %, Apply 2 g topically 4 (four) times a day, Disp: 200 g, Rfl: 1    dicyclomine (BENTYL) 10 mg capsule, Take 1 capsule (10 mg total) by mouth 2 (two) times a day, Disp: 180 capsule, Rfl: 3    docusate sodium (COLACE) 100 mg capsule, Take 100 mg by mouth daily at bedtime, Disp: , Rfl:     EPINEPHrine (EPIPEN) 0.3 mg/0.3 mL SOAJ, INJECT 0.3 ML (0.3 MG TOTAL) INTO A MUSCLE AS NEEDED FOR ANAPHYLAXIS, Disp: 2 each, Rfl: 1    Evening Primrose Oil 1000 MG CAPS, Take 1 capsule (1,000 mg total) by mouth in the morning, Disp: , Rfl: 0    famotidine (PEPCID) 20 mg tablet, TAKE 1 TABLET TWICE DAILY, Disp: 180 tablet, Rfl: 1    fexofenadine (ALLEGRA) 180 MG tablet, Take 180 mg by mouth daily, Disp: , Rfl:     folic acid (FOLVITE) 1 mg tablet, , Disp: , Rfl:     gabapentin (NEURONTIN) 300 mg capsule, Take 1 tablet in the morning, 1 tablet afternoon and 2 bedtime, Disp: 360 capsule, Rfl: 3    hydroxychloroquine (PLAQUENIL) 200 mg tablet, Take 200 mg by mouth daily with breakfast, Disp: , Rfl:     losartan-hydrochlorothiazide (HYZAAR) 100-25 MG per tablet, TAKE 1 TABLET EVERY DAY, Disp: 90 tablet, Rfl: 1    methocarbamol (ROBAXIN) 750 mg tablet, TAKE 1 TABLET EVERY 8 HOURS, Disp: 270 tablet, Rfl: 3    methotrexate 2.5 MG tablet, Take by mouth once a week, Disp: , Rfl:     montelukast (SINGULAIR) 10 mg tablet, Take 1 tablet (10 mg total) by mouth daily at bedtime, Disp: 90 tablet,  Rfl: 3    Multiple Vitamins-Minerals (PRESERVISION AREDS 2 PO), Take by mouth in the morning, Disp: , Rfl:     Omega-3 Fatty Acids (FISH OIL) 1,000 mg, Take 1,000 mg by mouth 3 (three) times a day, Disp: , Rfl:     omeprazole (PriLOSEC) 40 MG capsule, Take 1 capsule (40 mg total) by mouth daily, Disp: 90 capsule, Rfl: 3    rosuvastatin (CRESTOR) 10 MG tablet, TAKE 1 TABLET DAILY ON MONDAY, WEDNESDAY AND FRIDAY (ALTERNATE WITH SIMVASTATIN), Disp: 36 tablet, Rfl: 1    simvastatin (ZOCOR) 10 mg tablet, TAKE 1 TABLET ON TUESDAY, THURSDAY, SATURDAY AND SUNDAY (TO BE ALTERNATED WITH ROSUVASTATIN), Disp: 48 tablet, Rfl: 3    Current Facility-Administered Medications:     ondansetron (ZOFRAN) injection 4 mg, 4 mg, Intravenous, Q6H PRN, Guille Perdomo MD    Facility-Administered Medications Ordered in Other Visits:     albuterol inhalation solution 2.5 mg, 2.5 mg, Nebulization, Once PRN, Wilmar Estes MD    ondansetron (ZOFRAN) injection 4 mg, 4 mg, Intravenous, Once PRN, Wilmar Estes MD    Current Allergies     Allergies as of 02/13/2025 - Reviewed 02/13/2025   Allergen Reaction Noted    Bee venom Shortness Of Breath 08/11/2017    Chlorhexidine Rash 09/04/2020    Other Rash, Hives, Palpitations, and Shortness Of Breath 04/13/2017    Egg yolk - food allergy Hives 11/07/2013    Iodinated contrast media Hives and Other (See Comments) 04/02/2016    Penicillins Hives 04/02/2016    Lidocaine Other (See Comments) 10/14/2021    Allevyn adhesive [wound dressings] Hives and Rash 06/03/2022    Bactrim [sulfamethoxazole-trimethoprim] Rash 03/08/2019    Codeine Other (See Comments) 04/02/2016    Latex Rash and Other (See Comments) 12/08/2016    Medical tape Blisters, Hives, Itching, and Rash 05/12/2021    Oxybutynin Rash 04/22/2019    Pentosan polysulfate Rash 11/05/2018    Povidone iodine Rash 09/29/2020            The following portions of the patient's history were reviewed and updated as appropriate: allergies, current  medications, past family history, past medical history, past social history, past surgical history and problem list.     Past Medical History:   Diagnosis Date    Abnormal findings on diagnostic imaging of breast     Abnormal Pap smear of cervix     Arthritis     Chronic pain disorder     Closed fracture of proximal end of left fibula 06/04/2021    Extremity pain     Fibrocystic breast disease     Foot pain     GERD (gastroesophageal reflux disease)     Hyperlipidemia     Hypertension     Interstitial cystitis     Joint pain     Low back pain     Osteoarthritis     Osteopenia     PONV (postoperative nausea and vomiting) 10/17/2023    Uncomplicated opioid dependence (HCC) 03/01/2022       Past Surgical History:   Procedure Laterality Date    APPENDECTOMY      BACK SURGERY      lumbar    BREAST EXCISIONAL BIOPSY Left 1996    benign    CARPAL BOSS EXCISION      CHOLECYSTECTOMY      DIAGNOSTIC LAPAROSCOPY      FOOT SURGERY      FRACTURE SURGERY      HAND SURGERY  5/2017    HYSTERECTOMY      age 32    HYSTEROSCOPY      IR CRYOABLATION  10/17/2023    JOINT REPLACEMENT Bilateral     tkr    KIDNEY SURGERY Left     KNEE ARTHROSCOPY Bilateral     LAMINECTOMY      LAPAROSCOPY      hynecologic with adhesions    MYOMECTOMY      OOPHORECTOMY Bilateral     age 32    ORTHOPEDIC SURGERY      IL CAR IMPLTJ NSTIM ELTRDS PLATE/PADDLE EDRL N/A 05/18/2017    Procedure: PLACEMENT OF THORACIC SPINAL CORD STIMULATOR WITH LEFT BUTTOCK IMPLANTABLE PULSE GENERATOR (IMPULSE MONITORING-MOTORS (TCEMEP), EMG, SSEP);  Surgeon: Julius Damon MD;  Location: [facility];  Service: Neurosurgery    SPINAL CORD STIMULATOR IMPLANT Left 09/29/2020    Procedure: LAMINECTOMY THORACIC , REMOVAL OF SCS LEADS AND GENERATOR;  Surgeon: Oswald Whitt MD;  Location:  MAIN OR;  Service: Neurosurgery    SPINAL STIMULATOR PLACEMENT  05/2017    TONSILLECTOMY AND ADENOIDECTOMY      onset: unknown    TOTAL KNEE ARTHROPLASTY Right        Family History   Problem  "Relation Age of Onset    Bone cancer Mother     Cancer Mother     Asthma Mother     Brain cancer Father     Stroke Father         stroke sydrome    Transient ischemic attack Father     BRCA1 Negative Sister     BRCA2 Negative Sister     Depression Sister     Migraines Sister     Asthma Sister     Asthma Sister     Diabetes Sister     Depression Sister     No Known Problems Maternal Grandmother     No Known Problems Maternal Grandfather     Diabetes Paternal Grandmother     No Known Problems Paternal Grandfather     Heart disease Brother     Diabetes Brother     Heart attack Brother     Colon cancer Brother 66    Cancer Brother     No Known Problems Brother     Breast cancer Maternal Aunt 70    Breast cancer additional onset Maternal Aunt 72         Medications have been verified.        Objective   /70   Pulse 85   Temp 98.6 °F (37 °C)   Resp 17   Ht 5' 2\" (1.575 m)   Wt 81.2 kg (179 lb)   LMP  (LMP Unknown)   BMI 32.74 kg/m²   No LMP recorded (lmp unknown). Patient has had a hysterectomy.       Physical Exam     Physical Exam  Constitutional:       Appearance: Normal appearance.   Cardiovascular:      Rate and Rhythm: Normal rate and regular rhythm.   Pulmonary:      Effort: Pulmonary effort is normal. No respiratory distress.      Breath sounds: Normal breath sounds. No stridor. No wheezing, rhonchi or rales.   Abdominal:      General: Abdomen is flat. There is no distension.      Palpations: Abdomen is soft. There is no mass.      Tenderness: There is abdominal tenderness in the suprapubic area. There is no right CVA tenderness, left CVA tenderness, guarding or rebound.      Hernia: No hernia is present.   Neurological:      Mental Status: She is alert.                   "

## 2025-02-14 ENCOUNTER — RESULTS FOLLOW-UP (OUTPATIENT)
Dept: GASTROENTEROLOGY | Facility: CLINIC | Age: 75
End: 2025-02-14

## 2025-02-14 ENCOUNTER — RESULTS FOLLOW-UP (OUTPATIENT)
Dept: URGENT CARE | Facility: MEDICAL CENTER | Age: 75
End: 2025-02-14

## 2025-02-14 DIAGNOSIS — K55.9 ISCHEMIC COLITIS (HCC): Primary | ICD-10-CM

## 2025-02-14 PROCEDURE — 88305 TISSUE EXAM BY PATHOLOGIST: CPT | Performed by: PATHOLOGY

## 2025-02-15 LAB — BACTERIA UR CULT: ABNORMAL

## 2025-02-17 RX ORDER — DIPHENHYDRAMINE HCL 25 MG
TABLET ORAL
Qty: 1 TABLET | Refills: 0 | Status: SHIPPED | OUTPATIENT
Start: 2025-02-17

## 2025-02-17 RX ORDER — METHYLPREDNISOLONE 32 MG/1
TABLET ORAL
Qty: 2 TABLET | Refills: 0 | Status: SHIPPED | OUTPATIENT
Start: 2025-02-17

## 2025-02-17 NOTE — TELEPHONE ENCOUNTER
Pt transferred to nurse line for questions.  Pt would like to know what provider meant by poor blood flow to colon.  Pt also states she developed a UTI after past 2 colonoscopies and would like to know why.  She also needs to be pre-medicated before CT due to allergy to dye.  Will need kit sent to pharmacy.     Please advise.

## 2025-02-17 NOTE — TELEPHONE ENCOUNTER
Hi,      We did see some evidence of inflammation in the colon on her colonoscopy and it looks like it could be secondary to when the colon is not getting enough blood supply.  Sometimes this can happen if there is plaque in the blood vessels.  The CT scan can help us see this.  I did order premedications and sent it to the pharmacy.  She should take Solu-Medrol 12 hours prior to the CAT scan and again 1 hour prior to the CAT scan.  She should also take Benadryl 30 minutes prior to the CAT scan.    Thank you!

## 2025-02-17 NOTE — TELEPHONE ENCOUNTER
Pt transferred to RN line. Colonoscopy results/recommendations reviewed with pt. Pt is agreeable to follow up CTA with premedications. Pt transferred to [facility] to further assist with CTA scheduling.

## 2025-02-18 ENCOUNTER — TELEPHONE (OUTPATIENT)
Age: 75
End: 2025-02-18

## 2025-02-18 ENCOUNTER — OFFICE VISIT (OUTPATIENT)
Facility: CLINIC | Age: 75
End: 2025-02-18
Payer: MEDICARE

## 2025-02-18 DIAGNOSIS — M54.16 LUMBAR RADICULOPATHY: ICD-10-CM

## 2025-02-18 DIAGNOSIS — M77.12 LATERAL EPICONDYLITIS OF LEFT ELBOW: Primary | ICD-10-CM

## 2025-02-18 DIAGNOSIS — R39.9 UTI SYMPTOMS: ICD-10-CM

## 2025-02-18 PROCEDURE — 97110 THERAPEUTIC EXERCISES: CPT | Performed by: PHYSICAL THERAPIST

## 2025-02-18 PROCEDURE — 97140 MANUAL THERAPY 1/> REGIONS: CPT | Performed by: PHYSICAL THERAPIST

## 2025-02-18 PROCEDURE — 97112 NEUROMUSCULAR REEDUCATION: CPT | Performed by: PHYSICAL THERAPIST

## 2025-02-18 PROCEDURE — 97035 APP MDLTY 1+ULTRASOUND EA 15: CPT | Performed by: PHYSICAL THERAPIST

## 2025-02-18 RX ORDER — CIPROFLOXACIN 500 MG/1
500 TABLET, FILM COATED ORAL EVERY 12 HOURS SCHEDULED
Qty: 10 TABLET | Refills: 0 | Status: SHIPPED | OUTPATIENT
Start: 2025-02-18 | End: 2025-02-23

## 2025-02-18 RX ORDER — GABAPENTIN 300 MG/1
CAPSULE ORAL
Qty: 30 CAPSULE | Refills: 0 | Status: SHIPPED | OUTPATIENT
Start: 2025-02-18

## 2025-02-18 NOTE — TELEPHONE ENCOUNTER
Patient calls with two medication requests.    Patient requests short term supply of gabapentin to be sent to [facility]. She states there was an issue with mail order pharmacy, and gabapentin shipment was delayed.    Patient also requests an additional course of Cipro for continued UTI symptoms. She was seen at Urgent Care on 2/14/2025 for UTI. She has completed the 5 days course of Cipro with some improvement in symptoms. However, she still has some pressure and discomfort. She feels an additional course of Cipro will fully clear the UTI.

## 2025-02-18 NOTE — NURSING NOTE
Called patient to inquire about allergy to iodinated contrast. Patient states reaction was hives, but last time she had contrast needed to go to the ER for jittery feeling and palpations. Patient's case evaluated with Dr. Hutchinson. After last CT scan patient went to ER for jittery feeling, in ER EKG read normal sinus rhythm with a rate of 88. Per Dr. Hutchinson patient okay to be scanned with allergy prep medications.  Allergy prep medications ordered by provider. Instructions given on allergy prep meds. Instructions include:     Medrol 32mg 12 hour prior to exam: 2/20 at 8:00pm  Medrol 32mg 1 hour prior to exam: 2/21 at 7:00am  Benadryl 50mg 1 hour prior to exam: 2/21 at 7:00am    Patient recommended to have  for exam. Patient verbalized understanding of all allergy prep medication administration instructions.

## 2025-02-18 NOTE — PROGRESS NOTES
PT Re-Evaluation     Today's date: 2025  Patient name: Ingrid Wheatley  : 1950  MRN: [id number]  Referring provider: Jose Alfredo Corea MD  Dx:   Encounter Diagnosis     ICD-10-CM    1. Lateral epicondylitis of left elbow  M77.12                      Assessment    Assessment details: Pt is a 75 YO female presenting to PT with pain, decreased AROM, strength and tolerance to activity.  Pt would benefit from skilled intervention to address these issues and maximize overall function.  Occupation- clerical/ [employer]  Dominant- right Involved- left elbow     Pt has seen significant progress with decreased pain in her lateral elbow/FA.  She has begun functional strengthening for her full UE with no increase in pain.      Goals    ST.  Decrease pain to 0-2/10 in 4-8 weeks to ease ADL and self care            2.  Decrease swelling 0.5 cm in 4-8 weeks            3.  Increase AROM 10-20 degrees in 4 weeks for improved ability to bathe, dress, and assist in functional activity            4.  Provide orthotic for protection, compression for support            5.  Instruct in activity modification for ADL and self care with independence in 4-6 weeks            6.  Instruct in HEP   LT.  Increase functional AROM to assist with independence in 8-12 weeks            2.  [place] with HEP in 4-6 weeks            3.  Increase  strength by 2-5 lbs in 8 weeks            4. Recreational activities are improved to maximum level in 12 weeks.            5.  ADL performance is improved to maximal level of function in 12 weeks.            6. Ability to RTW by DC        Plan  Patient would benefit from: skilled physical therapy  Planned modality interventions: cryotherapy, ultrasound, neuromuscular electric stimulation and thermotherapy: hydrocollator packs    Planned therapy interventions: activity modification, manual therapy, strengthening, stretching, therapeutic activities,  Problem: Self Care Deficits Care Plan (Adult)  Goal: *Acute Goals and Plan of Care (Insert Text)  Occupational Therapy Goals  Initiated 1/8/2019    1. Patient will perform lower body dressing with modified independence using Reacher and Stocking Aid PRN within 7 days. 2.  Patient will perform toileting with modified independence using most appropriate DME within 7 days. 3.  Patient will grooming at modified independence within 7 days, standing at sink. 4.  Patient will don/doff back brace at modified independence within 7 days. 5.  Patient will verbalize/demonstrate 3/3 back precautions during ADL tasks without cues within 7 days. Occupational Therapy TREATMENT  Patient: Humberto Garibay (76 y.o. male)  Date: 1/9/2019  Diagnosis: Spondylolisthesis of lumbar region [M43.16]  Pseudoclaudication syndrome [M48.062] <principal problem not specified>  Procedure(s) (LRB):  L4-S1 LAMINECTOMY, L4-L5 FUSION, INSTRUMENTATION, TLIF, BONE GRAFT (N/A) 2 Days Post-Op  Precautions: Fall, Back(LSO when OOB)  Chart, occupational therapy assessment, plan of care, and goals were reviewed. ASSESSMENT:  Pt seated in chair agreeable to OT. RN stated he had pain medications about 2 hours ago. Pt able to recall all his spinal precautions. He stood with contact guard and attempted to walk to bathroom to brush his teeth however he wanted to sit due to pain. Pt brushed his teeth in sitting with set-up. Reviewed AE for bathing and dressing. Pt stated he has a reacher and long handle shoe horn at home. Recommend home health. Progression toward goals:  []       Improving appropriately and progressing toward goals  [x]       Improving slowly and progressing toward goals  []       Not making progress toward goals and plan of care will be adjusted     PLAN:  Patient continues to benefit from skilled intervention to address the above impairments. Continue treatment per established plan of care.   Discharge Recommendations:  Home health  Further Equipment Recommendations for Discharge: None     SUBJECTIVE:   Patient stated [facility] florencia is an RN and I should have listened to her more.     OBJECTIVE DATA SUMMARY:   Cognitive/Behavioral Status:  Neurologic State: Alert; Appropriate for age  Orientation Level: Appropriate for age;Oriented X4  Cognition: Appropriate decision making; Appropriate for age attention/concentration; Appropriate safety awareness; Follows commands             Functional Mobility and Transfers for ADLs:  Bed Mobility:     Not tested as pt already up in the chair  Transfers:  Sit to Stand: Contact guard assistance          Balance: Intact sitting balance       ADL Intervention:       Grooming  Brushing Teeth: Supervision/set-up(seated in chair)       Pain:  Pain Scale 1: Numeric (0 - 10)  Pain Intensity 1: 7  Pain Location 1: Back  Pain Orientation 1: Lower  Pain Description 1: Aching;Constant  Pain Intervention(s) 1: Medication (see MAR)  Activity Tolerance:   Fair  Please refer to the flowsheet for vital signs taken during this treatment.   After treatment:   [x] Patient left in no apparent distress sitting up in chair  [] Patient left in no apparent distress in bed  [x] Call bell left within reach  [x] Nursing notified  [] Caregiver present  [x] Bed alarm activated    COMMUNICATION/COLLABORATION:   The patients plan of care was discussed with: Occupational Therapist and Registered Nurse , Physical Therapy  JOSEPH Clement/L  Time Calculation: 16 mins therapeutic exercise and home exercise program    Frequency: 2x week  Duration in weeks: 4  Treatment plan discussed with: patient        Subjective Evaluation    History of Present Illness  Mechanism of injury: Progressive worsening of pain over past 4 weeks with activity using her left elbow   Patient Goals  Patient goals for therapy: decreased pain, increased motion, increased strength, independence with ADLs/IADLs and return to sport/leisure activities    Pain  Current pain ratin  At best pain ratin  At worst pain ratin  Location: left lateral elbow  Quality: dull ache, throbbing and radiating    Hand dominance: right    Treatments  Current treatment: physical therapy        Objective     General Comments:      Elbow Comments   PT refit SA orthosis for proper fit for pt to wear at nighttime  AROM left elbow E/F-0/135' FA S/P- 75/85                   Wrist E/F- 60/50  Strength- Shoulder- FE/ABD-grossly 4/5                   II- with elbow flexed- 35/45 pain 0/10                               Elbow extended - 40/45- pain 0/10        Palpation- no pain lateral elbow. FA- (-) Cozen's   Sensation- no tingling noted                  Precautions: avoid provocative positions and activity    Manuals  2/4 2/5           STM 15 15 15 15 15 15  15                                                                                 Neuro Re-Ed                       HP/pulsed biph 15 15 15 15 15 15  153x                                                         Ther Ex                       Self stretch #4 3x 3x 3x 3x 3x 3x           TP             T 2'  T 2'         TP 5 finger           T 1'  T 2'         Flexbar           Y 20/20  Y 20/20         DB E/F           4 2/10  4 2/10         Tulio           25 2/10  25 2/10         Blue           III /10  III /10                                                                                                                                                                                  Modalities                       US 15 15 15 15 15 15 15 dc       CP def 15 15 15 15 15  def

## 2025-02-19 ENCOUNTER — OFFICE VISIT (OUTPATIENT)
Dept: PAIN MEDICINE | Facility: MEDICAL CENTER | Age: 75
End: 2025-02-19
Payer: MEDICARE

## 2025-02-19 VITALS — BODY MASS INDEX: 33.49 KG/M2 | WEIGHT: 182 LBS | HEIGHT: 62 IN

## 2025-02-19 DIAGNOSIS — M46.1 SACROILIITIS (HCC): ICD-10-CM

## 2025-02-19 DIAGNOSIS — M79.18 MYOFASCIAL PAIN SYNDROME: Primary | ICD-10-CM

## 2025-02-19 DIAGNOSIS — G57.03 PIRIFORMIS SYNDROME OF BOTH SIDES: ICD-10-CM

## 2025-02-19 DIAGNOSIS — M47.816 LUMBAR SPONDYLOSIS: ICD-10-CM

## 2025-02-19 PROCEDURE — 99214 OFFICE O/P EST MOD 30 MIN: CPT | Performed by: PHYSICIAN ASSISTANT

## 2025-02-19 PROCEDURE — G2211 COMPLEX E/M VISIT ADD ON: HCPCS | Performed by: PHYSICIAN ASSISTANT

## 2025-02-19 RX ORDER — METHYLPREDNISOLONE 4 MG/1
TABLET ORAL
COMMUNITY
Start: 2025-02-17

## 2025-02-19 NOTE — PROGRESS NOTES
Assessment:  1. Myofascial pain syndrome    2. Piriformis syndrome of both sides    3. Sacroiliitis (HCC)    4. Lumbar spondylosis        Plan:  While the patient was in the office today, I did have a thorough conversation regarding their chronic pain syndrome, medication management, and treatment plan options.    After discussing options, the patient will be scheduled for bilateral piriformis injections under fluoroscopy to address the localized sciatic type pain she is experiencing.  These injections have been beneficial at reducing 90% of the same pain she is currently experiencing.    Complete risks and benefits including bleeding, infection, tissue reaction, nerve injury and allergic reaction were discussed. The approach was demonstrated using models and literature was provided. Verbal and written consent was obtained.    My impressions and treatment recommendations were discussed in detail with the patient who verbalized understanding and had no further questions.  Discharge instructions were provided. I personally saw and examined the patient and I agree with the above discussed plan of care.    Orders Placed This Encounter   Procedures   • FL spine and pain procedure     Standing Status:   Future     Expected Date:   2/19/2025     Expiration Date:   2/19/2029     Reason for Exam::   B/L piriformis inj     Anticoagulant hold needed?:   no     New Medications Ordered This Visit   Medications   • methylprednisolone (MEDROL) 4 mg tablet       History of Present Illness:  Ingrid Wheatley is a 75 y.o. female who presents for a follow up office visit in regards to chronic low back pain.   The patient’s current symptoms include low back pain and bilateral buttock pain that she rates a 6 out of 10 and describes it as an intermittent burning, throbbing type of pain that is made worse with sitting.  She states her pain is not interfering with her daily living activities and she continues to perform daily home  exercises.  Patient has a history of a lumbar fusion and spinal cord stimulator implant.  She states her low back pain and lower extremity radicular pain has been well-controlled since the surgeries.  In 2023 the patient underwent bilateral piriformis injections for the same pain she is currently experiencing and that reduced 90% of her symptoms up until recently.    I have personally reviewed and/or updated the patient's past medical history, past surgical history, family history, social history, current medications, allergies, and vital signs today.     Review of Systems   Respiratory:  Negative for shortness of breath.    Cardiovascular:  Negative for chest pain.   Gastrointestinal:  Negative for constipation, diarrhea, nausea and vomiting.   Musculoskeletal:  Positive for arthralgias, back pain, gait problem and myalgias. Negative for joint swelling.   Skin:  Negative for rash.   Neurological:  Negative for dizziness, seizures and weakness.   All other systems reviewed and are negative.      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)   • Chronic pain syndrome   • Osteoarthritis   • Transaminitis   • Sacroiliitis (HCC)   • Family history of colon cancer   • Spasm of left piriformis muscle   • Osteoarthritis of left knee   • Thoracic myofascial strain   • Allergic reaction to contrast dye   • Left renal mass   • Myofascial pain syndrome   • Malignant neoplasm of left kidney, except renal pelvis (HCC)   • Age related  osteoporosis   • Lumbar stenosis with neurogenic claudication   • Lateral epicondylitis of left elbow   • Rheumatoid arthritis of multiple sites with negative rheumatoid factor (HCC)       Past Medical History:   Diagnosis Date   • Abnormal findings on diagnostic imaging of breast    • Abnormal Pap smear of cervix    • Arthritis    • BRCA1 negative    • BRCA2 negative    • Breast cyst    • Chronic pain disorder    • Closed fracture of proximal end of left fibula 06/04/2021   • Extremity pain    • Fibrocystic breast disease    • Foot pain    • GERD (gastroesophageal reflux disease)    • Hyperlipidemia    • Hypertension    • Interstitial cystitis    • Joint pain    • Low back pain    • Osteoarthritis    • Osteopenia    • PONV (postoperative nausea and vomiting) 10/17/2023   • Uncomplicated opioid dependence (HCC) 03/01/2022       Past Surgical History:   Procedure Laterality Date   • APPENDECTOMY     • BACK SURGERY      lumbar   • BREAST CYST ASPIRATION     • BREAST CYST EXCISION     • BREAST EXCISIONAL BIOPSY Left 1996    benign   • BREAST LUMPECTOMY  1996   • CARPAL BOSS EXCISION     • CHOLECYSTECTOMY     • DIAGNOSTIC LAPAROSCOPY     • FOOT SURGERY     • FRACTURE SURGERY     • HAND SURGERY  5/2017   • HYSTERECTOMY      age 32   • HYSTEROSCOPY     • IR CRYOABLATION  10/17/2023   • JOINT REPLACEMENT Bilateral     tkr   • KIDNEY SURGERY Left    • KNEE ARTHROSCOPY Bilateral    • LAMINECTOMY     • LAPAROSCOPY      hynecologic with adhesions   • MYOMECTOMY     • OOPHORECTOMY Bilateral     age 32   • ORTHOPEDIC SURGERY     • GA CAR IMPLTJ NSTIM ELTRDS PLATE/PADDLE EDRL N/A 05/18/2017    Procedure: PLACEMENT OF THORACIC SPINAL CORD STIMULATOR WITH LEFT BUTTOCK IMPLANTABLE PULSE GENERATOR (IMPULSE MONITORING-MOTORS (TCEMEP), EMG, SSEP);  Surgeon: Julius Damon MD;  Location: [facility];  Service: Neurosurgery   • SPINAL CORD STIMULATOR IMPLANT Left 09/29/2020    Procedure: LAMINECTOMY THORACIC , REMOVAL OF SCS LEADS AND  GENERATOR;  Surgeon: Oswald Whitt MD;  Location:  MAIN OR;  Service: Neurosurgery   • SPINAL STIMULATOR PLACEMENT  05/2017   • TONSILLECTOMY AND ADENOIDECTOMY      onset: unknown   • TOTAL KNEE ARTHROPLASTY Right        Family History   Problem Relation Age of Onset   • Bone cancer Mother    • Cancer Mother    • Asthma Mother    • Brain cancer Father    • Stroke Father         stroke sydrome   • Transient ischemic attack Father    • BRCA1 Negative Sister    • BRCA2 Negative Sister    • Depression Sister    • Migraines Sister    • Asthma Sister    • Asthma Sister    • Diabetes Sister    • Depression Sister    • No Known Problems Maternal Grandmother    • No Known Problems Maternal Grandfather    • Diabetes Paternal Grandmother    • No Known Problems Paternal Grandfather    • Heart disease Brother    • Diabetes Brother    • Heart attack Brother    • Colon cancer Brother 66   • Cancer Brother    • No Known Problems Brother    • Breast cancer Maternal Aunt 70   • Breast cancer additional onset Maternal Aunt 72       Social History     Occupational History   • Occupation: Retired/ working part-time    Tobacco Use   • Smoking status: Never   • Smokeless tobacco: Never   Vaping Use   • Vaping status: Never Used   Substance and Sexual Activity   • Alcohol use: Yes     Alcohol/week: 1.0 standard drink of alcohol     Types: 1 Glasses of wine per week     Comment: Drink socially, not too often   • Drug use: No   • Sexual activity: Not Currently     Partners: Male     Birth control/protection: None       Current Outpatient Medications on File Prior to Visit   Medication Sig   • amitriptyline (ELAVIL) 50 mg tablet TAKE 1 TABLET AT BEDTIME   • Biotin 2500 MCG CAPS Take 1 capsule by mouth 2 (two) times a day    • calcium carbonate (OS-ANTHONY) 600 MG tablet Take 1,200 mg by mouth 2 (two) times a day with meals   • Cholecalciferol (VITAMIN D-3 PO) Take 1 tablet by mouth daily   • ciprofloxacin (CIPRO) 500 mg tablet Take 1  tablet (500 mg total) by mouth every 12 (twelve) hours for 5 days   • Cyanocobalamin (VITAMIN B-12 CR PO) Take 1 tablet by mouth daily   • Diclofenac Sodium (VOLTAREN) 1 % Apply 2 g topically 4 (four) times a day   • dicyclomine (BENTYL) 10 mg capsule Take 1 capsule (10 mg total) by mouth 2 (two) times a day   • diphenhydrAMINE (BENADRYL) 25 mg tablet Take 30 minutes prior to CT scan   • docusate sodium (COLACE) 100 mg capsule Take 100 mg by mouth daily at bedtime   • EPINEPHrine (EPIPEN) 0.3 mg/0.3 mL SOAJ INJECT 0.3 ML (0.3 MG TOTAL) INTO A MUSCLE AS NEEDED FOR ANAPHYLAXIS   • Evening Primrose Oil 1000 MG CAPS Take 1 capsule (1,000 mg total) by mouth in the morning   • famotidine (PEPCID) 20 mg tablet TAKE 1 TABLET TWICE DAILY   • fexofenadine (ALLEGRA) 180 MG tablet Take 180 mg by mouth daily   • folic acid (FOLVITE) 1 mg tablet    • gabapentin (NEURONTIN) 300 mg capsule Take 1 tablet in the morning, 1 tablet afternoon and 2 bedtime   • hydroxychloroquine (PLAQUENIL) 200 mg tablet Take 200 mg by mouth daily with breakfast   • losartan-hydrochlorothiazide (HYZAAR) 100-25 MG per tablet TAKE 1 TABLET EVERY DAY   • methocarbamol (ROBAXIN) 750 mg tablet TAKE 1 TABLET EVERY 8 HOURS   • methotrexate 2.5 MG tablet Take by mouth once a week   • methylPREDNISolone (MEDROL) 32 MG tablet Take 1 tablet 12 hours prior to CT and another tablet 1 hour prior to CT scan   • methylprednisolone (MEDROL) 4 mg tablet    • montelukast (SINGULAIR) 10 mg tablet Take 1 tablet (10 mg total) by mouth daily at bedtime   • Multiple Vitamins-Minerals (PRESERVISION AREDS 2 PO) Take by mouth in the morning   • Omega-3 Fatty Acids (FISH OIL) 1,000 mg Take 1,000 mg by mouth 3 (three) times a day   • omeprazole (PriLOSEC) 40 MG capsule Take 1 capsule (40 mg total) by mouth daily   • rosuvastatin (CRESTOR) 10 MG tablet TAKE 1 TABLET DAILY ON MONDAY, WEDNESDAY AND FRIDAY (ALTERNATE WITH SIMVASTATIN)   • simvastatin (ZOCOR) 10 mg tablet TAKE 1 TABLET  "ON TUESDAY, THURSDAY, SATURDAY AND SUNDAY (TO BE ALTERNATED WITH ROSUVASTATIN)     Current Facility-Administered Medications on File Prior to Visit   Medication   • ondansetron (ZOFRAN) injection 4 mg       Allergies   Allergen Reactions   • Bee Venom Shortness Of Breath   • Chlorhexidine Rash   • Other Rash, Hives, Palpitations and Shortness Of Breath     Adhesive tape   • Egg Yolk - Food Allergy Hives   • Iodinated Contrast Media Hives and Other (See Comments)   • Penicillins Hives   • Lidocaine Other (See Comments)     cream   • Allevyn Adhesive [Wound Dressings] Hives and Rash   • Bactrim [Sulfamethoxazole-Trimethoprim] Rash   • Codeine Other (See Comments)     headaches   • Latex Rash and Other (See Comments)   • Medical Tape Blisters, Hives, Itching and Rash   • Oxybutynin Rash   • Pentosan Polysulfate Rash   • Povidone Iodine Rash       Physical Exam:    Ht 5' 2\" (1.575 m)   Wt 82.6 kg (182 lb)   LMP  (LMP Unknown)   BMI 33.29 kg/m²     Constitutional:normal, well developed, well nourished, alert, in no distress and non-toxic and no overt pain behavior.  Eyes:anicteric  HEENT:grossly intact  Neck:supple, symmetric, trachea midline and no masses   Pulmonary:even and unlabored  Cardiovascular:No edema or pitting edema present  Skin:Normal without rashes or lesions and well hydrated  Psychiatric:Mood and affect appropriate  Neurologic:Cranial Nerves II-XII grossly intact  Musculoskeletal: Lumbar scar from prior fusion surgery, lumbar facet joints are nontender, SI joints are nontender, tenderness to palpation along bilateral piriformis/sciatic notch.    Imaging      "

## 2025-02-20 ENCOUNTER — OFFICE VISIT (OUTPATIENT)
Facility: CLINIC | Age: 75
End: 2025-02-20
Payer: MEDICARE

## 2025-02-20 DIAGNOSIS — M77.12 LATERAL EPICONDYLITIS OF LEFT ELBOW: Primary | ICD-10-CM

## 2025-02-20 PROBLEM — Z00.00 MEDICARE ANNUAL WELLNESS VISIT, SUBSEQUENT: Status: RESOLVED | Noted: 2018-12-06 | Resolved: 2025-02-20

## 2025-02-20 PROCEDURE — 97140 MANUAL THERAPY 1/> REGIONS: CPT

## 2025-02-20 PROCEDURE — 97112 NEUROMUSCULAR REEDUCATION: CPT

## 2025-02-20 PROCEDURE — 97110 THERAPEUTIC EXERCISES: CPT

## 2025-02-20 NOTE — PROGRESS NOTES
"Daily Note     Today's date: 2025  Patient name: Ingrid Wheatley  : 1950  MRN: [id number]  Referring provider: Jose Alfredo Corea MD  Dx:   Encounter Diagnosis     ICD-10-CM    1. Lateral epicondylitis of left elbow  M77.12                      Subjective: Pt reports \"I am doing much better with my elbow.\" My CC is intermittent 'shooting pain with positional movements.\"      Objective: See treatment diary below  PT refit SA orthosis for proper fit for pt to wear at nighttime  AROM left elbow E/F-0/135' FA S/P- 75/85                   Wrist E/F- 60/50  Strength- Shoulder- FE/ABD-grossly 4/5                   II- with elbow flexed- 35/45 pain 0/10                               Elbow extended - 40/45- pain 0/10        Palpation- no pain lateral elbow. FA- (-) Cozen's   Sensation- no tingling noted         Assessment: Tolerated treatment well. Patient would benefit from continued PT. Pt responding well to program as pt strengthening with no exacerbation of symptoms.      Plan: Progress treatment as tolerated.       Precautions: avoid provocative positions and activity    Manuals  2/       STM 15 15 15 15 15 15  15  15                                                                               Neuro Re-Ed                       HP/pulsed biph 15 15 15 15 15 15  153x  15                                                       Ther Ex                       Self stretch #4 3x 3x 3x 3x 3x 3x           TP             T 2'  T 2'  T2'       TP 5 finger           T 1'  T 2'  T2'       Flexbar           Y 20/20  Y 20/20  Y 20/20       DB E/F           4 2/10  4 /10  4 /10       Tulio           25 2/10  25 2/10  25 2/10       Blue           III 2/10  III 2/10  IV 2/10                                                                                                                                                                               Modalities                       US 15 " 15 15 15 15 15 15 dc       CP def 15 15 15 15 15  def  10'

## 2025-02-21 ENCOUNTER — OFFICE VISIT (OUTPATIENT)
Dept: URGENT CARE | Facility: MEDICAL CENTER | Age: 75
End: 2025-02-21
Payer: MEDICARE

## 2025-02-21 ENCOUNTER — HOSPITAL ENCOUNTER (OUTPATIENT)
Dept: CT IMAGING | Facility: HOSPITAL | Age: 75
Discharge: HOME | End: 2025-02-21
Attending: INTERNAL MEDICINE
Payer: MEDICARE

## 2025-02-21 VITALS
SYSTOLIC BLOOD PRESSURE: 124 MMHG | OXYGEN SATURATION: 97 % | WEIGHT: 180 LBS | RESPIRATION RATE: 18 BRPM | DIASTOLIC BLOOD PRESSURE: 71 MMHG | TEMPERATURE: 97.1 F | HEART RATE: 106 BPM | BODY MASS INDEX: 33.13 KG/M2 | HEIGHT: 62 IN

## 2025-02-21 DIAGNOSIS — S01.01XA LACERATION OF SCALP WITHOUT FOREIGN BODY, INITIAL ENCOUNTER: Primary | ICD-10-CM

## 2025-02-21 DIAGNOSIS — S60.212A CONTUSION OF LEFT WRIST, INITIAL ENCOUNTER: ICD-10-CM

## 2025-02-21 DIAGNOSIS — K55.9 ISCHEMIC COLITIS (HCC): ICD-10-CM

## 2025-02-21 PROCEDURE — 99214 OFFICE O/P EST MOD 30 MIN: CPT

## 2025-02-21 PROCEDURE — 12001 RPR S/N/AX/GEN/TRNK 2.5CM/<: CPT

## 2025-02-21 PROCEDURE — G0463 HOSPITAL OUTPT CLINIC VISIT: HCPCS

## 2025-02-21 PROCEDURE — 74174 CTA ABD&PLVS W/CONTRAST: CPT

## 2025-02-21 RX ADMIN — IOHEXOL 100 ML: 350 INJECTION, SOLUTION INTRAVENOUS at 08:15

## 2025-02-21 NOTE — PROGRESS NOTES
[facility]        NAME: Ingrid Wheatley is a 75 y.o. female  : 1950    MRN: [id number]  DATE: 2025  TIME: 3:18 PM    Assessment and Plan   Laceration of scalp without foreign body, initial encounter [S01.01XA]  1. Laceration of scalp without foreign body, initial encounter  Laceration repair      2. Contusion of left wrist, initial encounter  Laceration repair            Patient Instructions       Follow up with PCP in 3-5 days.  Proceed to  ER if symptoms worsen.    If tests are performed, our office will contact you with results only if changes need to made to the care plan discussed with you at the visit. You can [facility] on [facility]    Chief Complaint     Chief Complaint   Patient presents with   • Head Laceration     Pt complains of laceration on head, shelf fell on head, injured Top Left,  no meds taken for pain         History of Present Illness       Patient here with laceration on top of head from a shelf falling.  It happened today around 2:11 pm. No LOC, no headache.  She states she had a headache prior to shelf falling on her head but after the incident her headache is gone.  She has no neck pain, dizziness, or lightheadedness.  No fever or chills.  No blurry vision or spots.  She hasn't taken anything at home. She also noted some bruising on the left wrist, has FROM. Minimal swelling, thinks the shelf may have hit her hand/wrist.         Review of Systems   Review of Systems   Constitutional:  Negative for chills and fever.   Respiratory:  Negative for chest tightness and shortness of breath.    Cardiovascular:  Negative for chest pain.   Musculoskeletal:  Negative for neck pain and neck stiffness.   Skin:  Positive for color change.   Neurological:  Positive for headaches. Negative for dizziness and light-headedness.         Current Medications       Current Outpatient Medications:   •  amitriptyline (ELAVIL) 50 mg tablet, TAKE 1 TABLET AT  BEDTIME, Disp: 90 tablet, Rfl: 1  •  Biotin 2500 MCG CAPS, Take 1 capsule by mouth 2 (two) times a day , Disp: , Rfl:   •  calcium carbonate (OS-ANTHONY) 600 MG tablet, Take 1,200 mg by mouth 2 (two) times a day with meals, Disp: , Rfl:   •  Cholecalciferol (VITAMIN D-3 PO), Take 1 tablet by mouth daily, Disp: , Rfl:   •  ciprofloxacin (CIPRO) 500 mg tablet, Take 1 tablet (500 mg total) by mouth every 12 (twelve) hours for 5 days, Disp: 10 tablet, Rfl: 0  •  Cyanocobalamin (VITAMIN B-12 CR PO), Take 1 tablet by mouth daily, Disp: , Rfl:   •  Diclofenac Sodium (VOLTAREN) 1 %, Apply 2 g topically 4 (four) times a day, Disp: 200 g, Rfl: 1  •  dicyclomine (BENTYL) 10 mg capsule, Take 1 capsule (10 mg total) by mouth 2 (two) times a day, Disp: 180 capsule, Rfl: 3  •  diphenhydrAMINE (BENADRYL) 25 mg tablet, Take 30 minutes prior to CT scan, Disp: 1 tablet, Rfl: 0  •  docusate sodium (COLACE) 100 mg capsule, Take 100 mg by mouth daily at bedtime, Disp: , Rfl:   •  EPINEPHrine (EPIPEN) 0.3 mg/0.3 mL SOAJ, INJECT 0.3 ML (0.3 MG TOTAL) INTO A MUSCLE AS NEEDED FOR ANAPHYLAXIS, Disp: 2 each, Rfl: 1  •  Evening Primrose Oil 1000 MG CAPS, Take 1 capsule (1,000 mg total) by mouth in the morning, Disp: , Rfl: 0  •  famotidine (PEPCID) 20 mg tablet, TAKE 1 TABLET TWICE DAILY, Disp: 180 tablet, Rfl: 1  •  fexofenadine (ALLEGRA) 180 MG tablet, Take 180 mg by mouth daily, Disp: , Rfl:   •  folic acid (FOLVITE) 1 mg tablet, , Disp: , Rfl:   •  gabapentin (NEURONTIN) 300 mg capsule, Take 1 tablet in the morning, 1 tablet afternoon and 2 bedtime, Disp: 30 capsule, Rfl: 0  •  hydroxychloroquine (PLAQUENIL) 200 mg tablet, Take 200 mg by mouth daily with breakfast, Disp: , Rfl:   •  losartan-hydrochlorothiazide (HYZAAR) 100-25 MG per tablet, TAKE 1 TABLET EVERY DAY, Disp: 90 tablet, Rfl: 1  •  methocarbamol (ROBAXIN) 750 mg tablet, TAKE 1 TABLET EVERY 8 HOURS, Disp: 270 tablet, Rfl: 3  •  methotrexate 2.5 MG tablet, Take by mouth once a week,  Disp: , Rfl:   •  methylPREDNISolone (MEDROL) 32 MG tablet, Take 1 tablet 12 hours prior to CT and another tablet 1 hour prior to CT scan, Disp: 2 tablet, Rfl: 0  •  montelukast (SINGULAIR) 10 mg tablet, Take 1 tablet (10 mg total) by mouth daily at bedtime, Disp: 90 tablet, Rfl: 3  •  Multiple Vitamins-Minerals (PRESERVISION AREDS 2 PO), Take by mouth in the morning, Disp: , Rfl:   •  Omega-3 Fatty Acids (FISH OIL) 1,000 mg, Take 1,000 mg by mouth 3 (three) times a day, Disp: , Rfl:   •  omeprazole (PriLOSEC) 40 MG capsule, Take 1 capsule (40 mg total) by mouth daily, Disp: 90 capsule, Rfl: 3  •  rosuvastatin (CRESTOR) 10 MG tablet, TAKE 1 TABLET DAILY ON MONDAY, WEDNESDAY AND FRIDAY (ALTERNATE WITH SIMVASTATIN), Disp: 36 tablet, Rfl: 1  •  simvastatin (ZOCOR) 10 mg tablet, TAKE 1 TABLET ON TUESDAY, THURSDAY, SATURDAY AND SUNDAY (TO BE ALTERNATED WITH ROSUVASTATIN), Disp: 48 tablet, Rfl: 3  •  methylprednisolone (MEDROL) 4 mg tablet, , Disp: , Rfl:     Current Facility-Administered Medications:   •  ondansetron (ZOFRAN) injection 4 mg, 4 mg, Intravenous, Q6H PRN, Guille Perdomo MD    Current Allergies     Allergies as of 02/21/2025 - Reviewed 02/21/2025   Allergen Reaction Noted   • Bee venom Shortness Of Breath 08/11/2017   • Chlorhexidine Rash 09/04/2020   • Other Rash, Hives, Palpitations, and Shortness Of Breath 04/13/2017   • Egg yolk - food allergy Hives 11/07/2013   • Iodinated contrast media Hives and Other (See Comments) 04/02/2016   • Penicillins Hives 04/02/2016   • Lidocaine Other (See Comments) 10/14/2021   • Penicillin g Hives 02/21/2025   • Allevyn adhesive [wound dressings] Hives and Rash 06/03/2022   • Bactrim [sulfamethoxazole-trimethoprim] Rash 03/08/2019   • Codeine Other (See Comments) 04/02/2016   • Latex Rash and Other (See Comments) 12/08/2016   • Medical tape Blisters, Hives, Itching, and Rash 05/12/2021   • Oxybutynin Rash 04/22/2019   • Pentosan polysulfate Rash 11/05/2018   • Povidone  iodine Rash 09/29/2020            The following portions of the patient's history were reviewed and updated as appropriate: allergies, current medications, past family history, past medical history, past social history, past surgical history and problem list.     Past Medical History:   Diagnosis Date   • Abnormal findings on diagnostic imaging of breast    • Abnormal Pap smear of cervix    • Arthritis    • BRCA1 negative    • BRCA2 negative    • Breast cyst    • Chronic pain disorder    • Closed fracture of proximal end of left fibula 06/04/2021   • Extremity pain    • Fibrocystic breast disease    • Foot pain    • GERD (gastroesophageal reflux disease)    • Hyperlipidemia    • Hypertension    • Interstitial cystitis    • Joint pain    • Low back pain    • Osteoarthritis    • Osteopenia    • PONV (postoperative nausea and vomiting) 10/17/2023   • Uncomplicated opioid dependence (HCC) 03/01/2022       Past Surgical History:   Procedure Laterality Date   • APPENDECTOMY     • BACK SURGERY      lumbar   • BREAST CYST ASPIRATION     • BREAST CYST EXCISION     • BREAST EXCISIONAL BIOPSY Left 1996    benign   • BREAST LUMPECTOMY  1996   • CARPAL BOSS EXCISION     • CHOLECYSTECTOMY     • DIAGNOSTIC LAPAROSCOPY     • FOOT SURGERY     • FRACTURE SURGERY     • HAND SURGERY  5/2017   • HYSTERECTOMY      age 32   • HYSTEROSCOPY     • IR CRYOABLATION  10/17/2023   • JOINT REPLACEMENT Bilateral     tkr   • KIDNEY SURGERY Left    • KNEE ARTHROSCOPY Bilateral    • LAMINECTOMY     • LAPAROSCOPY      hynecologic with adhesions   • MYOMECTOMY     • OOPHORECTOMY Bilateral     age 32   • ORTHOPEDIC SURGERY     • TX CAR IMPLTJ NSTIM ELTRDS PLATE/PADDLE EDRL N/A 05/18/2017    Procedure: PLACEMENT OF THORACIC SPINAL CORD STIMULATOR WITH LEFT BUTTOCK IMPLANTABLE PULSE GENERATOR (IMPULSE MONITORING-MOTORS (TCEMEP), EMG, SSEP);  Surgeon: Julius Damon MD;  Location: [facility];  Service: Neurosurgery   • SPINAL CORD STIMULATOR IMPLANT Left  "09/29/2020    Procedure: LAMINECTOMY THORACIC , REMOVAL OF SCS LEADS AND GENERATOR;  Surgeon: Oswald Whitt MD;  Location:  MAIN OR;  Service: Neurosurgery   • SPINAL STIMULATOR PLACEMENT  05/2017   • TONSILLECTOMY AND ADENOIDECTOMY      onset: unknown   • TOTAL KNEE ARTHROPLASTY Right        Family History   Problem Relation Age of Onset   • Bone cancer Mother    • Cancer Mother    • Asthma Mother    • Brain cancer Father    • Stroke Father         stroke sydrome   • Transient ischemic attack Father    • BRCA1 Negative Sister    • BRCA2 Negative Sister    • Depression Sister    • Migraines Sister    • Asthma Sister    • Asthma Sister    • Diabetes Sister    • Depression Sister    • No Known Problems Maternal Grandmother    • No Known Problems Maternal Grandfather    • Diabetes Paternal Grandmother    • No Known Problems Paternal Grandfather    • Heart disease Brother    • Diabetes Brother    • Heart attack Brother    • Colon cancer Brother 66   • Cancer Brother    • No Known Problems Brother    • Breast cancer Maternal Aunt 70   • Breast cancer additional onset Maternal Aunt 72         Medications have been verified.        Objective   /71   Pulse (!) 106   Temp (!) 97.1 °F (36.2 °C)   Resp 18   Ht 5' 2\" (1.575 m)   Wt 81.6 kg (180 lb)   LMP  (LMP Unknown)   SpO2 97%   BMI 32.92 kg/m²        Physical Exam     Physical Exam  Vitals and nursing note reviewed.   Constitutional:       Appearance: Normal appearance.   HENT:      Head: Normocephalic.        Comments: 2cm lac superficial - approximates well.   Cardiovascular:      Rate and Rhythm: Normal rate and regular rhythm.      Pulses: Normal pulses.      Heart sounds: Normal heart sounds.   Pulmonary:      Effort: Pulmonary effort is normal.      Breath sounds: Normal breath sounds.   Musculoskeletal:      Cervical back: Full passive range of motion without pain, normal range of motion and neck supple. No tenderness. No spinous process tenderness or " "muscular tenderness.   Skin:     General: Skin is warm and dry.      Findings: Wound present.          Neurological:      General: No focal deficit present.      Mental Status: She is alert and oriented to person, place, and time. Mental status is at baseline.      GCS: GCS eye subscore is 4. GCS verbal subscore is 5. GCS motor subscore is 6.   Psychiatric:         Mood and Affect: Mood normal.         Behavior: Behavior normal.       [place] Protocol:  procedure performed by consultantConsent: Verbal consent obtained.  Risks and benefits: risks, benefits and alternatives were discussed  Consent given by: patient  Time out: Immediately prior to procedure a \"time out\" was called to verify the correct patient, procedure, equipment, support staff and site/side marked as required.  Patient understanding: patient states understanding of the procedure being performed  Patient consent: the patient's understanding of the procedure matches consent given  Procedure consent: procedure consent matches procedure scheduled  Patient identity confirmed: verbally with patient  Laceration repair    Date/Time: 2/21/2025 3:11 PM    Performed by: EBONI Espinosa  Authorized by: EBONI Espinosa  Body area: head/neck  Location details: scalp  Laceration length: 2 cm  Foreign bodies: no foreign bodies  Vascular damage: no      Procedure Details:  Preparation: Patient was prepped and draped in the usual sterile fashion.  Irrigation solution: saline  Skin closure: staples  Number of sutures: 4 Staples.  Approximation difficulty: simple  Patient tolerance: patient tolerated the procedure well with no immediate complications                    "

## 2025-02-21 NOTE — PATIENT INSTRUCTIONS
Please monitor the wound for any signs of infection. If you were prescribed any antibiotics please take them in entirety. Keep the wound clean and dry.   You will need to return here or your family doctors offices for staples to be removed.   You do not need an appt.   Your staples should be removed in approximately 7-10 days.   The provider will evaluate your wound at the time of you returning to determine if it is an appropriate time for the sutures to be removed.   Suture Removal Time Guideline:  Scalp: 7-10 days

## 2025-02-25 ENCOUNTER — OFFICE VISIT (OUTPATIENT)
Facility: CLINIC | Age: 75
End: 2025-02-25
Payer: MEDICARE

## 2025-02-25 ENCOUNTER — RESULTS FOLLOW-UP (OUTPATIENT)
Dept: GASTROENTEROLOGY | Facility: AMBULARY SURGERY CENTER | Age: 75
End: 2025-02-25

## 2025-02-25 DIAGNOSIS — M77.12 LATERAL EPICONDYLITIS OF LEFT ELBOW: Primary | ICD-10-CM

## 2025-02-25 PROCEDURE — 97140 MANUAL THERAPY 1/> REGIONS: CPT

## 2025-02-25 PROCEDURE — 97110 THERAPEUTIC EXERCISES: CPT

## 2025-02-25 PROCEDURE — 97112 NEUROMUSCULAR REEDUCATION: CPT

## 2025-02-25 NOTE — PROGRESS NOTES
"Daily Note     Today's date: 2025  Patient name: Ingrid Wheatley  : 1950  MRN: [id number]  Referring provider: Jose Alfredo Corea MD  Dx:   Encounter Diagnosis     ICD-10-CM    1. Lateral epicondylitis of left elbow  M77.12                      Subjective: Pt reports the elbow \"is getting better and stronger.\" \"My RA is bothering my fingers moreso\"      Objective: See treatment diary below  PT refit SA orthosis for proper fit for pt to wear at nighttime  AROM left elbow E/F-0/135' FA S/P- 75/85                   Wrist E/F- 60/50  Strength- Shoulder- FE/ABD-grossly 4/5                   II- with elbow flexed- 35/45 pain 0/10                               Elbow extended - 40/45- pain 0/10        Palpation- no pain lateral elbow. FA- (-) Cozen's   Sensation- no tingling noted    Assessment: Tolerated treatment well. Patient would benefit from continued PT      Plan: Progress treatment as tolerated.       Precautions: avoid provocative positions and activity    Manuals  2/4 2/     STM 15 15 15 15 15 15  15  15  15                                                                             Neuro Re-Ed                       HP/pulsed biph 15 15 15 15 15 15  153x  15  15                                                     Ther Ex                       Self stretch #4 3x 3x 3x 3x 3x 3x      3x     TP             T 2'  T 2'  T2'  T2'     TP 5 finger           T 1'  T 2'  T2'  T2'     Flexbar           Y 20/20  Y 20/20  Y 20/20  Y 20/20     DB E/F           4 2/10  4 2/10  4 2/10  4 2/10     Tulio           25 2/10  25 2/10  25 2/10  25 2/10     Blue           III 2/10  III 2/10  IV 2/10  IV 2/10      Flexbar                Y 20/20 Y 20/20  R                                                                                                                                                    Modalities                       US 15 15 15 15 15 15 15 dc       CP def 15 15 15 15 15 "  def  10'  10'

## 2025-02-27 ENCOUNTER — OFFICE VISIT (OUTPATIENT)
Facility: CLINIC | Age: 75
End: 2025-02-27
Payer: MEDICARE

## 2025-02-27 DIAGNOSIS — M77.12 LATERAL EPICONDYLITIS OF LEFT ELBOW: Primary | ICD-10-CM

## 2025-02-27 PROCEDURE — 97140 MANUAL THERAPY 1/> REGIONS: CPT | Performed by: PHYSICAL THERAPIST

## 2025-02-27 PROCEDURE — 97110 THERAPEUTIC EXERCISES: CPT | Performed by: PHYSICAL THERAPIST

## 2025-02-27 PROCEDURE — 97112 NEUROMUSCULAR REEDUCATION: CPT | Performed by: PHYSICAL THERAPIST

## 2025-02-27 NOTE — PROGRESS NOTES
Daily Note     Today's date: 2025  Patient name: Ingrid Wheatley  : 1950  MRN: [id number]  Referring provider: Jose Alfredo Corea MD  Dx:   Encounter Diagnosis     ICD-10-CM    1. Lateral epicondylitis of left elbow  M77.12                      Subjective: pt notes minimal pain with activity      Objective: See treatment diary below    PT refit SA orthosis for proper fit for pt to wear at nighttime  AROM left elbow E/F-0/135' FA S/P- 75/85                   Wrist E/F- 60/50  Strength- Shoulder- FE/ABD-grossly 4/5                   II- with elbow flexed- 52/52 pain 0/10                               Elbow extended - 50/50- pain 0/10        Palpation- no pain lateral elbow. FA- (-) Cozen's   Sensation- no tingling noted    Assessment: Tolerated treatment well. Patient would benefit from continued PT      Plan: Continue per plan of care.      Precautions: avoid provocative positions and activity    Manuals             STM 15 15 15 15 15 15  15  15  15  15                                                                           Neuro Re-Ed                       HP/pulsed biph 15 15 15 15 15 15  153x  15  15  15                                                   Ther Ex                       Self stretch #4 3x 3x 3x 3x 3x 3x      3x  3x   TP   T/Y 2'          T 2'  T 2'  T2'  T2'  T 2'   TP 5 finger  T/Y 2'         T 1'  T 2'  T2'  T2'  T 2'   DB E/F  4 2/10         4 2/10  4 2/10  4 2/10  4 2/10  4 2/10       S/P 1 2/10            Tulio  25 2/10         25 2/10  25 2/10  25 2/10  25 2/10  252/10   Blue  IV 2/10         III 2/10  III 2/10  IV 2/10  IV 2/10  IV 2/10    Flexbar  R 20/20              Y 20/20 Y 20/20  R     Zottman's  4 2/10                                                                                                                                             Modalities                       CP def 15 15 15 15 15  def  10'  10'

## 2025-02-28 ENCOUNTER — OFFICE VISIT (OUTPATIENT)
Dept: URGENT CARE | Facility: MEDICAL CENTER | Age: 75
End: 2025-02-28
Payer: MEDICARE

## 2025-02-28 ENCOUNTER — HOSPITAL ENCOUNTER (OUTPATIENT)
Dept: RADIOLOGY | Facility: MEDICAL CENTER | Age: 75
End: 2025-02-28
Payer: MEDICARE

## 2025-02-28 VITALS
TEMPERATURE: 97.8 F | DIASTOLIC BLOOD PRESSURE: 78 MMHG | BODY MASS INDEX: 32.92 KG/M2 | WEIGHT: 180 LBS | HEART RATE: 110 BPM | RESPIRATION RATE: 20 BRPM | SYSTOLIC BLOOD PRESSURE: 142 MMHG | OXYGEN SATURATION: 97 %

## 2025-02-28 VITALS
DIASTOLIC BLOOD PRESSURE: 73 MMHG | HEART RATE: 68 BPM | SYSTOLIC BLOOD PRESSURE: 108 MMHG | RESPIRATION RATE: 18 BRPM | TEMPERATURE: 97.6 F | OXYGEN SATURATION: 97 %

## 2025-02-28 DIAGNOSIS — M79.18 MYOFASCIAL PAIN SYNDROME: ICD-10-CM

## 2025-02-28 DIAGNOSIS — Z48.02 ENCOUNTER FOR STAPLE REMOVAL: Primary | ICD-10-CM

## 2025-02-28 PROCEDURE — 77002 NEEDLE LOCALIZATION BY XRAY: CPT | Performed by: PHYSICAL MEDICINE & REHABILITATION

## 2025-02-28 PROCEDURE — 20552 NJX 1/MLT TRIGGER POINT 1/2: CPT | Performed by: PHYSICAL MEDICINE & REHABILITATION

## 2025-02-28 PROCEDURE — 99213 OFFICE O/P EST LOW 20 MIN: CPT

## 2025-02-28 PROCEDURE — G0463 HOSPITAL OUTPT CLINIC VISIT: HCPCS

## 2025-02-28 RX ORDER — METHYLPREDNISOLONE ACETATE 40 MG/ML
80 INJECTION, SUSPENSION INTRA-ARTICULAR; INTRALESIONAL; INTRAMUSCULAR; PARENTERAL; SOFT TISSUE ONCE
Status: COMPLETED | OUTPATIENT
Start: 2025-02-28 | End: 2025-02-28

## 2025-02-28 RX ADMIN — METHYLPREDNISOLONE ACETATE 80 MG: 40 INJECTION, SUSPENSION INTRA-ARTICULAR; INTRALESIONAL; INTRAMUSCULAR; SOFT TISSUE at 09:26

## 2025-02-28 NOTE — DISCHARGE INSTR - LAB
Do not apply heat to any area that is numb. If you have discomfort or soreness at the injection site, you may apply ice today, 20 minutes on and 20 minutes off. Tomorrow you may use ice or warm, moist heat. Do not apply ice or heat directly to the skin.  If you experience severe shortness of breath, go to the Emergency Room.  You may have numbness for several hours from the local anesthetic. Please use caution and common sense, especially with weight-bearing activities.  You may have an increase or change in the discomfort for 36-48 hours after your treatment. Apply ice and continue with any pain medicine you have been prescribed.  Do not do anything strenuous today. You may shower, but no tub baths or hot tubs today. You may resume your normal activities tomorrow, but do not “overdo it”. Resume normal activities slowly when you are feeling better.  If you experience redness, drainage or swelling at the injection site, or if you develop a fever above 100 degrees, please call [facility] at [phone] or go to the Emergency Room.  Continue to take all routine medicines prescribed by your primary care physician unless otherwise instructed by our staff. Most blood thinners should be started again according to your regularly scheduled dosing. If you have any questions, please give our office a call.    As no general anesthesia was used in today's procedure, you should not experience any side effects related to anesthesia.       If you have a problem specifically related to your procedure, please call our office at [phone].  Problems not related to your procedure should be directed to your primary care physician.

## 2025-02-28 NOTE — H&P
History of Present Illness: The patient is a 75 y.o. female who presents with complaints of bilateral buttock     Past Medical History:   Diagnosis Date    Abnormal findings on diagnostic imaging of breast     Abnormal Pap smear of cervix     Arthritis     BRCA1 negative     BRCA2 negative     Breast cyst     Chronic pain disorder     Closed fracture of proximal end of left fibula 06/04/2021    Extremity pain     Fibrocystic breast disease     Foot pain     GERD (gastroesophageal reflux disease)     Hyperlipidemia     Hypertension     Interstitial cystitis     Joint pain     Low back pain     Osteoarthritis     Osteopenia     PONV (postoperative nausea and vomiting) 10/17/2023    Uncomplicated opioid dependence (HCC) 03/01/2022       Past Surgical History:   Procedure Laterality Date    APPENDECTOMY      BACK SURGERY      lumbar    BREAST CYST ASPIRATION      BREAST CYST EXCISION      BREAST EXCISIONAL BIOPSY Left 1996    benign    BREAST LUMPECTOMY  1996    CARPAL BOSS EXCISION      CHOLECYSTECTOMY      DIAGNOSTIC LAPAROSCOPY      FOOT SURGERY      FRACTURE SURGERY      HAND SURGERY  5/2017    HYSTERECTOMY      age 32    HYSTEROSCOPY      IR CRYOABLATION  10/17/2023    JOINT REPLACEMENT Bilateral     tkr    KIDNEY SURGERY Left     KNEE ARTHROSCOPY Bilateral     LAMINECTOMY      LAPAROSCOPY      hynecologic with adhesions    MYOMECTOMY      OOPHORECTOMY Bilateral     age 32    ORTHOPEDIC SURGERY      [place] CAR IMPLTJ NSTIM ELTRDS PLATE/PADDLE EDRL N/A 05/18/2017    Procedure: PLACEMENT OF THORACIC SPINAL CORD STIMULATOR WITH LEFT BUTTOCK IMPLANTABLE PULSE GENERATOR (IMPULSE MONITORING-MOTORS (TCEMEP), EMG, SSEP);  Surgeon: Julius Damon MD;  Location:  MAIN OR;  Service: Neurosurgery    SPINAL CORD STIMULATOR IMPLANT Left 09/29/2020    Procedure: LAMINECTOMY THORACIC , REMOVAL OF SCS LEADS AND GENERATOR;  Surgeon: Oswald Whitt MD;  Location:  MAIN OR;  Service: Neurosurgery    SPINAL STIMULATOR PLACEMENT   05/2017    TONSILLECTOMY AND ADENOIDECTOMY      onset: unknown    TOTAL KNEE ARTHROPLASTY Right          Current Outpatient Medications:     amitriptyline (ELAVIL) 50 mg tablet, TAKE 1 TABLET AT BEDTIME, Disp: 90 tablet, Rfl: 1    Biotin 2500 MCG CAPS, Take 1 capsule by mouth 2 (two) times a day , Disp: , Rfl:     calcium carbonate (OS-ANTHONY) 600 MG tablet, Take 1,200 mg by mouth 2 (two) times a day with meals, Disp: , Rfl:     Cholecalciferol (VITAMIN D-3 PO), Take 1 tablet by mouth daily, Disp: , Rfl:     Cyanocobalamin (VITAMIN B-12 CR PO), Take 1 tablet by mouth daily, Disp: , Rfl:     Diclofenac Sodium (VOLTAREN) 1 %, Apply 2 g topically 4 (four) times a day, Disp: 200 g, Rfl: 1    dicyclomine (BENTYL) 10 mg capsule, Take 1 capsule (10 mg total) by mouth 2 (two) times a day, Disp: 180 capsule, Rfl: 3    diphenhydrAMINE (BENADRYL) 25 mg tablet, Take 30 minutes prior to CT scan, Disp: 1 tablet, Rfl: 0    docusate sodium (COLACE) 100 mg capsule, Take 100 mg by mouth daily at bedtime, Disp: , Rfl:     EPINEPHrine (EPIPEN) 0.3 mg/0.3 mL SOAJ, INJECT 0.3 ML (0.3 MG TOTAL) INTO A MUSCLE AS NEEDED FOR ANAPHYLAXIS, Disp: 2 each, Rfl: 1    Evening Primrose Oil 1000 MG CAPS, Take 1 capsule (1,000 mg total) by mouth in the morning, Disp: , Rfl: 0    famotidine (PEPCID) 20 mg tablet, TAKE 1 TABLET TWICE DAILY, Disp: 180 tablet, Rfl: 1    fexofenadine (ALLEGRA) 180 MG tablet, Take 180 mg by mouth daily, Disp: , Rfl:     folic acid (FOLVITE) 1 mg tablet, , Disp: , Rfl:     gabapentin (NEURONTIN) 300 mg capsule, Take 1 tablet in the morning, 1 tablet afternoon and 2 bedtime, Disp: 30 capsule, Rfl: 0    hydroxychloroquine (PLAQUENIL) 200 mg tablet, Take 200 mg by mouth daily with breakfast, Disp: , Rfl:     losartan-hydrochlorothiazide (HYZAAR) 100-25 MG per tablet, TAKE 1 TABLET EVERY DAY, Disp: 90 tablet, Rfl: 1    methocarbamol (ROBAXIN) 750 mg tablet, TAKE 1 TABLET EVERY 8 HOURS, Disp: 270 tablet, Rfl: 3    methotrexate 2.5  MG tablet, Take by mouth once a week, Disp: , Rfl:     methylPREDNISolone (MEDROL) 32 MG tablet, Take 1 tablet 12 hours prior to CT and another tablet 1 hour prior to CT scan, Disp: 2 tablet, Rfl: 0    methylprednisolone (MEDROL) 4 mg tablet, , Disp: , Rfl:     montelukast (SINGULAIR) 10 mg tablet, Take 1 tablet (10 mg total) by mouth daily at bedtime, Disp: 90 tablet, Rfl: 3    Multiple Vitamins-Minerals (PRESERVISION AREDS 2 PO), Take by mouth in the morning, Disp: , Rfl:     Omega-3 Fatty Acids (FISH OIL) 1,000 mg, Take 1,000 mg by mouth 3 (three) times a day, Disp: , Rfl:     omeprazole (PriLOSEC) 40 MG capsule, Take 1 capsule (40 mg total) by mouth daily, Disp: 90 capsule, Rfl: 3    rosuvastatin (CRESTOR) 10 MG tablet, TAKE 1 TABLET DAILY ON MONDAY, WEDNESDAY AND FRIDAY (ALTERNATE WITH SIMVASTATIN), Disp: 36 tablet, Rfl: 1    simvastatin (ZOCOR) 10 mg tablet, TAKE 1 TABLET ON TUESDAY, THURSDAY, SATURDAY AND SUNDAY (TO BE ALTERNATED WITH ROSUVASTATIN), Disp: 48 tablet, Rfl: 3    Current Facility-Administered Medications:     ondansetron (ZOFRAN) injection 4 mg, 4 mg, Intravenous, Q6H PRN, Guille Perdomo MD    Allergies   Allergen Reactions    Bee Venom Shortness Of Breath    Chlorhexidine Rash    Other Rash, Hives, Palpitations and Shortness Of Breath     Adhesive tape    Egg Yolk - Food Allergy Hives    Iodinated Contrast Media Hives and Other (See Comments)     Pt tolerated CT with contrast being allergy prepped on 2/21/2025    Penicillins Hives    Lidocaine Other (See Comments)     cream    Penicillin G Hives    Allevyn Adhesive [Wound Dressings] Hives and Rash    Bactrim [Sulfamethoxazole-Trimethoprim] Rash    Codeine Other (See Comments)     headaches    Latex Rash and Other (See Comments)    Medical Tape Blisters, Hives, Itching and Rash    Oxybutynin Rash    Pentosan Polysulfate Rash    Povidone Iodine Rash       Physical Exam:   Vitals:    02/28/25 0905   BP: 126/85   Pulse: 79   Resp: 18   Temp: 97.6  °F (36.4 °C)   SpO2: 96%     General: Awake, Alert, Oriented x 3, Mood and affect appropriate  Respiratory: Respirations even and unlabored  Cardiovascular: Peripheral pulses intact; no edema  Musculoskeletal Exam: bilateral piriformis tenderness    ASA Score: 3    Patient/Chart Verification  Patient ID Verified: Verbal  ID Band Applied: No  Consents Confirmed: To be obtained in the Procedural area  Interval H&P(within 24 hr) Complete (required for Outpatients and Surgery Admit only): To be obtained in the Procedural area  Allergies Reviewed: Yes  Anticoag/NSAID held?: NA  Currently on antibiotics?: No  Pregnancy denied?: NA    Assessment:   1. Myofascial pain syndrome        Plan: B/L piriformis inj

## 2025-02-28 NOTE — PROGRESS NOTES
[facility]        NAME: Ingrid Wheatley is a 75 y.o. female  : 1950    MRN: [id number]  DATE: 2025  TIME: 7:46 PM    Assessment and Plan   Encounter for staple removal [Z48.02]  1. Encounter for staple removal  Suture removal            Patient Instructions       Follow up with PCP in 3-5 days.  Proceed to  ER if symptoms worsen.    If tests are performed, our office will contact you with results only if changes need to made to the care plan discussed with you at the visit. [facility]    Chief Complaint     Chief Complaint   Patient presents with   • Suture / Staple Removal           History of Present Illness       Patient presents for staples to be removed from laceration which were placed approx 7 days ago. No signs of infection. No problems since time of injury.  Wound healing nicely without signs of infection.        Review of Systems   Review of Systems   Constitutional:  Negative for chills, fatigue and fever.   Skin:  Positive for wound.   Neurological:  Negative for dizziness, light-headedness and headaches.         Current Medications       Current Outpatient Medications:   •  amitriptyline (ELAVIL) 50 mg tablet, TAKE 1 TABLET AT BEDTIME, Disp: 90 tablet, Rfl: 1  •  Biotin 2500 MCG CAPS, Take 1 capsule by mouth 2 (two) times a day , Disp: , Rfl:   •  calcium carbonate (OS-ANTHONY) 600 MG tablet, Take 1,200 mg by mouth 2 (two) times a day with meals, Disp: , Rfl:   •  Cholecalciferol (VITAMIN D-3 PO), Take 1 tablet by mouth daily, Disp: , Rfl:   •  Cyanocobalamin (VITAMIN B-12 CR PO), Take 1 tablet by mouth daily, Disp: , Rfl:   •  Diclofenac Sodium (VOLTAREN) 1 %, Apply 2 g topically 4 (four) times a day, Disp: 200 g, Rfl: 1  •  dicyclomine (BENTYL) 10 mg capsule, Take 1 capsule (10 mg total) by mouth 2 (two) times a day, Disp: 180 capsule, Rfl: 3  •  diphenhydrAMINE (BENADRYL) 25 mg tablet, Take 30 minutes prior to CT scan, Disp: 1  [FreeTextEntry1] : #Folliculitis vs mild HS -No clear physical exam findings of HS, though given location and frequent recurrences on ddx -New diagnosis, unc prognosis -Disc topicals vs Doxycycline, pt opts for trial of topicals first -Hibiclens wash neck down daily -Clinda gel to AA bid as needed, SED  #Cyst R neck - Discussed benign appearance on today's exam, though definitive diagnosis and treatment would require surgical excision. - Will trial ILK and see response Intralesional injection of Kenalog, 10 mg/ml A total of 0.25 ml was injected. The risks/benefits/alternatives of intralesional steroid injections were reviewed with the patient which include but are not limited to pain, atrophy, and dispigmentation. Intralesional injections were performed in the affected area. The patient tolerated the procedure well.  RTC 6 weeks  tablet, Rfl: 0  •  docusate sodium (COLACE) 100 mg capsule, Take 100 mg by mouth daily at bedtime, Disp: , Rfl:   •  EPINEPHrine (EPIPEN) 0.3 mg/0.3 mL SOAJ, INJECT 0.3 ML (0.3 MG TOTAL) INTO A MUSCLE AS NEEDED FOR ANAPHYLAXIS, Disp: 2 each, Rfl: 1  •  Evening Primrose Oil 1000 MG CAPS, Take 1 capsule (1,000 mg total) by mouth in the morning, Disp: , Rfl: 0  •  famotidine (PEPCID) 20 mg tablet, TAKE 1 TABLET TWICE DAILY, Disp: 180 tablet, Rfl: 1  •  fexofenadine (ALLEGRA) 180 MG tablet, Take 180 mg by mouth daily, Disp: , Rfl:   •  folic acid (FOLVITE) 1 mg tablet, , Disp: , Rfl:   •  gabapentin (NEURONTIN) 300 mg capsule, Take 1 tablet in the morning, 1 tablet afternoon and 2 bedtime, Disp: 30 capsule, Rfl: 0  •  hydroxychloroquine (PLAQUENIL) 200 mg tablet, Take 200 mg by mouth daily with breakfast, Disp: , Rfl:   •  losartan-hydrochlorothiazide (HYZAAR) 100-25 MG per tablet, TAKE 1 TABLET EVERY DAY, Disp: 90 tablet, Rfl: 1  •  methocarbamol (ROBAXIN) 750 mg tablet, TAKE 1 TABLET EVERY 8 HOURS, Disp: 270 tablet, Rfl: 3  •  methotrexate 2.5 MG tablet, Take by mouth once a week, Disp: , Rfl:   •  methylPREDNISolone (MEDROL) 32 MG tablet, Take 1 tablet 12 hours prior to CT and another tablet 1 hour prior to CT scan, Disp: 2 tablet, Rfl: 0  •  methylprednisolone (MEDROL) 4 mg tablet, , Disp: , Rfl:   •  montelukast (SINGULAIR) 10 mg tablet, Take 1 tablet (10 mg total) by mouth daily at bedtime, Disp: 90 tablet, Rfl: 3  •  Multiple Vitamins-Minerals (PRESERVISION AREDS 2 PO), Take by mouth in the morning, Disp: , Rfl:   •  Omega-3 Fatty Acids (FISH OIL) 1,000 mg, Take 1,000 mg by mouth 3 (three) times a day, Disp: , Rfl:   •  omeprazole (PriLOSEC) 40 MG capsule, Take 1 capsule (40 mg total) by mouth daily, Disp: 90 capsule, Rfl: 3  •  rosuvastatin (CRESTOR) 10 MG tablet, TAKE 1 TABLET DAILY ON MONDAY, WEDNESDAY AND FRIDAY (ALTERNATE WITH SIMVASTATIN), Disp: 36 tablet, Rfl: 1  •  simvastatin (ZOCOR) 10 mg tablet, TAKE 1  TABLET ON TUESDAY, THURSDAY, SATURDAY AND SUNDAY (TO BE ALTERNATED WITH ROSUVASTATIN), Disp: 48 tablet, Rfl: 3    Current Facility-Administered Medications:   •  ondansetron (ZOFRAN) injection 4 mg, 4 mg, Intravenous, Q6H PRN, Guille Perdomo MD    Current Allergies     Allergies as of 02/28/2025 - Reviewed 02/28/2025   Allergen Reaction Noted   • Bee venom Shortness Of Breath 08/11/2017   • Chlorhexidine Rash 09/04/2020   • Other Rash, Hives, Palpitations, and Shortness Of Breath 04/13/2017   • Egg yolk - food allergy Hives 11/07/2013   • Iodinated contrast media Hives and Other (See Comments) 04/02/2016   • Penicillins Hives 04/02/2016   • Lidocaine Other (See Comments) 10/14/2021   • Penicillin g Hives 02/21/2025   • Allevyn adhesive [wound dressings] Hives and Rash 06/03/2022   • Bactrim [sulfamethoxazole-trimethoprim] Rash 03/08/2019   • Codeine Other (See Comments) 04/02/2016   • Latex Rash and Other (See Comments) 12/08/2016   • Medical tape Blisters, Hives, Itching, and Rash 05/12/2021   • Oxybutynin Rash 04/22/2019   • Pentosan polysulfate Rash 11/05/2018   • Povidone iodine Rash 09/29/2020            The following portions of the patient's history were reviewed and updated as appropriate: allergies, current medications, past family history, past medical history, past social history, past surgical history and problem list.     Past Medical History:   Diagnosis Date   • Abnormal findings on diagnostic imaging of breast    • Abnormal Pap smear of cervix    • Arthritis    • BRCA1 negative    • BRCA2 negative    • Breast cyst    • Chronic pain disorder    • Closed fracture of proximal end of left fibula 06/04/2021   • Extremity pain    • Fibrocystic breast disease    • Foot pain    • GERD (gastroesophageal reflux disease)    • Hyperlipidemia    • Hypertension    • Interstitial cystitis    • Joint pain    • Low back pain    • Osteoarthritis    • Osteopenia    • PONV (postoperative nausea and vomiting)  10/17/2023   • Uncomplicated opioid dependence (HCC) 03/01/2022       Past Surgical History:   Procedure Laterality Date   • APPENDECTOMY     • BACK SURGERY      lumbar   • BREAST CYST ASPIRATION     • BREAST CYST EXCISION     • BREAST EXCISIONAL BIOPSY Left 1996    benign   • BREAST LUMPECTOMY  1996   • CARPAL BOSS EXCISION     • CHOLECYSTECTOMY     • DIAGNOSTIC LAPAROSCOPY     • FOOT SURGERY     • FRACTURE SURGERY     • HAND SURGERY  5/2017   • HYSTERECTOMY      age 32   • HYSTEROSCOPY     • IR CRYOABLATION  10/17/2023   • JOINT REPLACEMENT Bilateral     tkr   • KIDNEY SURGERY Left    • KNEE ARTHROSCOPY Bilateral    • LAMINECTOMY     • LAPAROSCOPY      hynecologic with adhesions   • MYOMECTOMY     • OOPHORECTOMY Bilateral     age 32   • ORTHOPEDIC SURGERY     • [place] CAR IMPLTJ NSTIM ELTRDS PLATE/PADDLE EDRL N/A 05/18/2017    Procedure: PLACEMENT OF THORACIC SPINAL CORD STIMULATOR WITH LEFT BUTTOCK IMPLANTABLE PULSE GENERATOR (IMPULSE MONITORING-MOTORS (TCEMEP), EMG, SSEP);  Surgeon: Julius Damon MD;  Location: [facility];  Service: Neurosurgery   • SPINAL CORD STIMULATOR IMPLANT Left 09/29/2020    Procedure: LAMINECTOMY THORACIC , REMOVAL OF SCS LEADS AND GENERATOR;  Surgeon: Oswald Whitt MD;  Location:  MAIN OR;  Service: Neurosurgery   • SPINAL STIMULATOR PLACEMENT  05/2017   • TONSILLECTOMY AND ADENOIDECTOMY      onset: unknown   • TOTAL KNEE ARTHROPLASTY Right        Family History   Problem Relation Age of Onset   • Bone cancer Mother    • Cancer Mother    • Asthma Mother    • Brain cancer Father    • Stroke Father         stroke sydrome   • Transient ischemic attack Father    • BRCA1 Negative Sister    • BRCA2 Negative Sister    • Depression Sister    • Migraines Sister    • Asthma Sister    • Asthma Sister    • Diabetes Sister    • Depression Sister    • No Known Problems Maternal Grandmother    • No Known Problems Maternal Grandfather    • Diabetes Paternal Grandmother    • No Known Problems Paternal  "Grandfather    • Heart disease Brother    • Diabetes Brother    • Heart attack Brother    • Colon cancer Brother 66   • Cancer Brother    • No Known Problems Brother    • Breast cancer Maternal Aunt 70   • Breast cancer additional onset Maternal Aunt 72         Medications have been verified.        Objective   /78   Pulse (!) 110   Temp 97.8 °F (36.6 °C)   Resp 20   Wt 81.6 kg (180 lb)   LMP  (LMP Unknown)   SpO2 97%   BMI 32.92 kg/m²        Physical Exam     Physical Exam  Vitals and nursing note reviewed.   Constitutional:       General: She is awake.      Appearance: Normal appearance. She is well-developed.   HENT:      Head:      Comments: Staples in the top left     Mouth/Throat:      Lips: Pink.      Mouth: Mucous membranes are moist.   Cardiovascular:      Rate and Rhythm: Normal rate and regular rhythm.      Pulses: Normal pulses.      Heart sounds: Normal heart sounds.   Pulmonary:      Effort: Pulmonary effort is normal.      Breath sounds: Normal breath sounds.   Skin:     General: Skin is warm and dry.      Capillary Refill: Capillary refill takes less than 2 seconds.      Findings: No rash.   Neurological:      General: No focal deficit present.      Mental Status: She is alert. Mental status is at baseline.   Psychiatric:         Mood and Affect: Mood normal.         Behavior: Behavior is cooperative.       Suture removal    Date/Time: 2/28/2025 6:30 PM    Performed by: EBONI Espinosa  Authorized by: EBONI Espinosa  Universal Protocol:  procedure performed by consultantConsent: Verbal consent obtained.  Risks and benefits: risks, benefits and alternatives were discussed  Time out: Immediately prior to procedure a \"time out\" was called to verify the correct patient, procedure, equipment, support staff and site/side marked as required.  Patient understanding: patient states understanding of the procedure being performed  Patient consent: the patient's understanding of " the procedure matches consent given  Procedure consent: procedure consent matches procedure scheduled  Patient identity confirmed: verbally with patient      Patient location:  Clinic  Location:     Location:  Head/neck    Head/neck location:  Scalp  Procedure details:     Wound appearance:  No sign(s) of infection, clean, pink, nontender and good wound healing    Number of staples removed:  4  Post-procedure details:     Post-removal:  Antibiotic ointment applied

## 2025-03-06 ENCOUNTER — OFFICE VISIT (OUTPATIENT)
Facility: CLINIC | Age: 75
End: 2025-03-06
Payer: MEDICARE

## 2025-03-06 DIAGNOSIS — M77.12 LATERAL EPICONDYLITIS OF LEFT ELBOW: Primary | ICD-10-CM

## 2025-03-06 PROCEDURE — 97110 THERAPEUTIC EXERCISES: CPT | Performed by: PHYSICAL THERAPIST

## 2025-03-06 PROCEDURE — 97140 MANUAL THERAPY 1/> REGIONS: CPT | Performed by: PHYSICAL THERAPIST

## 2025-03-06 PROCEDURE — 97112 NEUROMUSCULAR REEDUCATION: CPT | Performed by: PHYSICAL THERAPIST

## 2025-03-06 NOTE — PROGRESS NOTES
PT Discharge    Today's date: 3/6/2025  Patient name: Ingrid Wheatley  : 1950  MRN: [id number]  Referring provider: Jose Alfredo Corea MD  Dx:   Encounter Diagnosis     ICD-10-CM    1. Lateral epicondylitis of left elbow  M77.12                      Assessment    Assessment details: Pt is a 73 YO female presenting to PT with pain, decreased AROM, strength and tolerance to activity.  Pt would benefit from skilled intervention to address these issues and maximize overall function.  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  She has begun functional strengthening for her full UE with no increase in pain.    Pt is noting no pain with activity.  She will transition to a HEP    Goals    ST.  Decrease pain to 0-2/10 in 4-8 weeks to ease ADL and self care            2.  Decrease swelling 0.5 cm in 4-8 weeks            3.  Increase AROM 10-20 degrees in 4 weeks for improved ability to bathe, dress, and assist in functional activity            4.  Provide orthotic for protection, compression for support            5.  Instruct in activity modification for ADL and self care with independence in 4-6 weeks            6.  Instruct in HEP   LT.  Increase functional AROM to assist with independence in 8-12 weeks            2.  [place] with HEP in 4-6 weeks            3.  Increase  strength by 2-5 lbs in 8 weeks            4. Recreational activities are improved to maximum level in 12 weeks.            5.  ADL performance is improved to maximal level of function in 12 weeks.            6. Ability to RTW by DC    Goals met    Plan  Patient would benefit from: skilled physical therapy  Planned modality interventions: cryotherapy, ultrasound, neuromuscular electric stimulation and thermotherapy: hydrocollator packs    Planned therapy interventions: activity modification,  manual therapy, strengthening, stretching, therapeutic activities, therapeutic exercise and home exercise program    Frequency: 2x week  Duration in weeks: 4  Treatment plan discussed with: patient      Subjective Evaluation    History of Present Illness  Mechanism of injury: Progressive worsening of pain over past 4 weeks with activity using her left elbow   Patient Goals  Patient goals for therapy: decreased pain, increased motion, increased strength, independence with ADLs/IADLs and return to sport/leisure activities    Pain  Current pain ratin  At best pain ratin  At worst pain ratin  Location: left lateral elbow  Quality: dull ache, throbbing and radiating    Hand dominance: right    Treatments  Current treatment: physical therapy      Objective     General Comments:      Elbow Comments   PT refit SA orthosis for proper fit for pt to wear at nighttime  AROM left elbow E/F-0/135' FA S/P- 75/85                   Wrist E/F- 60/50  Strength- Shoulder- FE/ABD-grossly 4/5                   II- with elbow flexed- 52/52 pain 0/10                               Elbow extended - 50/50- pain 0/10        Palpation- no pain lateral elbow. FA- (-) Cozen's   Sensation- no tingling noted             Precautions: avoid provocative positions and activity    Manuals 2/27  3/6                   STM 15 15 15 15 15 15  15  15  15  15                                                                           Neuro Re-Ed                       HP/pulsed biph 15 15 15 15 15 15  153x  15  15  15                                                   Ther Ex                       Self stretch #4 3x 3x 3x 3x 3x 3x      3x  3x   TP   T/Y 2'  T/Y 2'        T 2'  T 2'  T2'  T2'  T 2'   TP 5 finger  T/Y 2'  T/Y 2'       T 1'  T 2'  T2'  T2'  T 2'   DB E/F  4 /10  4 /10       4 /10  4 /10  4 /10  4 /10  4 2/10       S/P 1 2/10  1 2/10                   Tulio  25 2/10  25 2/10       25 2/10  25 2/10  25 2/10  25 2/10  252/10    Blue  IV 2/10  IV 2/10       III 2/10  III 2/10  IV 2/10  IV 2/10  IV 2/10    Flexbar  R 20/20  G 20/20            Y 20/20 Y 20/20  R     Nicolas's  4 2/10  4 2/10                                                                                                                                           Modalities                       CP def 15 15 15 15 15  def  10'  10'

## 2025-03-10 ENCOUNTER — OFFICE VISIT (OUTPATIENT)
Dept: OBGYN CLINIC | Facility: CLINIC | Age: 75
End: 2025-03-10
Payer: MEDICARE

## 2025-03-10 VITALS
HEIGHT: 62 IN | WEIGHT: 178.2 LBS | OXYGEN SATURATION: 99 % | BODY MASS INDEX: 32.79 KG/M2 | HEART RATE: 72 BPM | TEMPERATURE: 97.8 F

## 2025-03-10 DIAGNOSIS — M65.4 DE QUERVAIN'S TENOSYNOVITIS, LEFT: ICD-10-CM

## 2025-03-10 DIAGNOSIS — M65.312 TRIGGER THUMB OF LEFT HAND: Primary | ICD-10-CM

## 2025-03-10 PROCEDURE — 20550 NJX 1 TENDON SHEATH/LIGAMENT: CPT | Performed by: STUDENT IN AN ORGANIZED HEALTH CARE EDUCATION/TRAINING PROGRAM

## 2025-03-10 PROCEDURE — 99213 OFFICE O/P EST LOW 20 MIN: CPT | Performed by: STUDENT IN AN ORGANIZED HEALTH CARE EDUCATION/TRAINING PROGRAM

## 2025-03-10 RX ORDER — LIDOCAINE HYDROCHLORIDE 10 MG/ML
1 INJECTION, SOLUTION INFILTRATION; PERINEURAL
Status: COMPLETED | OUTPATIENT
Start: 2025-03-10 | End: 2025-03-10

## 2025-03-10 RX ORDER — BETAMETHASONE SODIUM PHOSPHATE AND BETAMETHASONE ACETATE 3; 3 MG/ML; MG/ML
6 INJECTION, SUSPENSION INTRA-ARTICULAR; INTRALESIONAL; INTRAMUSCULAR; SOFT TISSUE
Status: COMPLETED | OUTPATIENT
Start: 2025-03-10 | End: 2025-03-10

## 2025-03-10 RX ADMIN — BETAMETHASONE SODIUM PHOSPHATE AND BETAMETHASONE ACETATE 6 MG: 3; 3 INJECTION, SUSPENSION INTRA-ARTICULAR; INTRALESIONAL; INTRAMUSCULAR; SOFT TISSUE at 07:45

## 2025-03-10 RX ADMIN — LIDOCAINE HYDROCHLORIDE 1 ML: 10 INJECTION, SOLUTION INFILTRATION; PERINEURAL at 07:45

## 2025-03-10 NOTE — PROGRESS NOTES
Orthopedic Surgery Management Note  Ingrid Wheatley (75 y.o. female)  : 1950 Encounter Date: 3/10/2025      Assessment and Plan:  1. Trigger thumb of left hand        2. De Quervain's tenosynovitis, left            75 y.o. female presents in follow up for the above diagnosis.     The patient reports that although after the prior injection she did have relief of her symptoms, February should started to develop a recurrence of the pain at the base of the thumb and also a little bit at the wrist itself.  She does report that the thumb has had some locking although the most recent was about a week ago and is not locking today.  On examination she does have tenderness over the A1 pulley and I can feel there is a full nodule in the flexor sheath.  Additionally she does have tenderness over the first dorsal extensor compartment.    We discussed treatment options and the patient elected to proceed with another round of injections in both the first dorsal extensor compartment as well as the A1 pulley of the thumb.  Again the risks and benefits of the injections were discussed.  The patient taught tolerated the injections well.    Notably the patient does report that her lateral epicondylitis has completely resolved and she is very happy with this.    Repeat left thumb and left de Quervain's corticosteroid injections provided today.  This is her second for both locations.  Discussed with patient that this is likely the last round of injections we will provide prior to recommending surgery.  Patient will follow-up if she has persistence of symptoms, no x-rays at that time    she expressed understanding of the plan and agreed. We encouraged them to contact our office with any questions or concerns.     Return if symptoms worsen or fail to improve, for Re-evaluation and consideration for surgery.        Chief Complaint:     Left thumb and wrist pain    Previous History:   The patient was last seen on 2025 where she  was referred to physical therapy for lateral epicondylitis of the elbow.  She was previously seen on 10/14/2024 where she received injections in her left thumb for trigger thumb as well as her left wrist for de Quervain's tenosynovitis.    Interval History:  The patient reports that her tennis elbow symptoms have completely resolved.  However the pain in the base of her thumb as well as the pain in her wrist which had completely resolved after prior injections had started to recur started at the beginning of February.  She then started noticing locking of the thumb again.  Her most recent thumb locking was about 1 week ago where she had to forcibly extend it.  Since that time she has had trouble flexing the thumb fully.  She reports this feels like recurrence of her symptoms.    Past Medical History:  Past Medical History:   Diagnosis Date    Abnormal findings on diagnostic imaging of breast     Abnormal Pap smear of cervix     Arthritis     BRCA1 negative     BRCA2 negative     Breast cyst     Chronic pain disorder     Closed fracture of proximal end of left fibula 06/04/2021    Extremity pain     Fibrocystic breast disease     Foot pain     GERD (gastroesophageal reflux disease)     Hyperlipidemia     Hypertension     Interstitial cystitis     Joint pain     Low back pain     Osteoarthritis     Osteopenia     PONV (postoperative nausea and vomiting) 10/17/2023    Uncomplicated opioid dependence (HCC) 03/01/2022     Past Surgical History:   Procedure Laterality Date    APPENDECTOMY      BACK SURGERY      lumbar    BREAST CYST ASPIRATION      BREAST CYST EXCISION      BREAST EXCISIONAL BIOPSY Left 1996    benign    BREAST LUMPECTOMY  1996    CARPAL BOSS EXCISION      CHOLECYSTECTOMY      DIAGNOSTIC LAPAROSCOPY      FOOT SURGERY      FRACTURE SURGERY      HAND SURGERY  5/2017    HYSTERECTOMY      age 32    HYSTEROSCOPY      IR CRYOABLATION  10/17/2023    JOINT REPLACEMENT Bilateral     tkr    KIDNEY SURGERY Left      KNEE ARTHROSCOPY Bilateral     LAMINECTOMY      LAPAROSCOPY      hynecologic with adhesions    MYOMECTOMY      OOPHORECTOMY Bilateral     age 32    ORTHOPEDIC SURGERY      SC CAR IMPLTJ NSTIM ELTRDS PLATE/PADDLE EDRL N/A 05/18/2017    Procedure: PLACEMENT OF THORACIC SPINAL CORD STIMULATOR WITH LEFT BUTTOCK IMPLANTABLE PULSE GENERATOR (IMPULSE MONITORING-MOTORS (TCEMEP), EMG, SSEP);  Surgeon: Julius Damon MD;  [facility];  Service: Neurosurgery    SPINAL CORD STIMULATOR IMPLANT Left 09/29/2020    Procedure: LAMINECTOMY THORACIC , REMOVAL OF SCS LEADS AND GENERATOR;  Surgeon: Oswald Whitt MD;  Location: [facility];  Service: Neurosurgery    SPINAL STIMULATOR PLACEMENT  05/2017    TONSILLECTOMY AND ADENOIDECTOMY      onset: unknown    TOTAL KNEE ARTHROPLASTY Right      Family History   Problem Relation Age of Onset    Bone cancer Mother     Cancer Mother     Asthma Mother     Brain cancer Father     Stroke Father         stroke sydrome    Transient ischemic attack Father     BRCA1 Negative Sister     BRCA2 Negative Sister     Depression Sister     Migraines Sister     Asthma Sister     Asthma Sister     Diabetes Sister     Depression Sister     No Known Problems Maternal Grandmother     No Known Problems Maternal Grandfather     Diabetes Paternal Grandmother     No Known Problems Paternal Grandfather     Heart disease Brother     Diabetes Brother     Heart attack Brother     Colon cancer Brother 66    Cancer Brother     No Known Problems Brother     Breast cancer Maternal Aunt 70    Breast cancer additional onset Maternal Aunt 72     Social History     Socioeconomic History    Marital status: Single     Spouse name: Not on file    Number of children: Not on file    Years of education: Not on file    Highest education level: Not on file   Occupational History    Occupation: Retired/ working part-time    Tobacco Use    Smoking status: Never    Smokeless tobacco: Never   Vaping Use    Vaping  status: Never Used   Substance and Sexual Activity    Alcohol use: Yes     Alcohol/week: 1.0 standard drink of alcohol     Types: 1 Glasses of wine per week     Comment: Drink socially, not too often    Drug use: No    Sexual activity: Not Currently     Partners: Male     Birth control/protection: None   Other Topics Concern    Not on file   Social History Narrative    No caffeine use    Always uses sunscreen    Always uses a seatbelt     Social Drivers of Health     Financial Resource Strain: Low Risk  (1/10/2024)    Overall Financial Resource Strain (CARDIA)     Difficulty of Paying Living Expenses: Not hard at all   Food Insecurity: No Food Insecurity (1/14/2025)    Hunger Vital Sign     Worried About Running Out of Food in the Last Year: Never true     Ran Out of Food in the Last Year: Never true   Transportation Needs: No Transportation Needs (1/14/2025)    PRAPARE - Transportation     Lack of Transportation (Medical): No     Lack of Transportation (Non-Medical): No   Physical Activity: Not on file   Stress: Not on file   Social Connections: Not on file   Intimate Partner Violence: Unknown (9/4/2024)    Received from [facility]    Intimate Partner Violence     Within the last year, have you been afraid of your partner, ex-partner or family member?: Not on file     Within the last year, have you been humiliated or emotionally abused in other ways by your partner, ex-partner or family member?: Not on file     Within the last year, have you been kicked, hit, slapped, or otherwise physically hurt by your partner, ex-partner or family member?: Not on file     Within the last year, have you been raped or forced to have any kind of sexual activity by your partner, ex-partner or family member?: Not on file   Housing Stability: Low Risk  (1/14/2025)    Housing Stability Vital Sign     Unable to Pay for Housing in the Last Year: No     Number of Times Moved in the Last Year: 0     Homeless in the Last Year:  "No     Scheduled Meds:  Current Facility-Administered Medications   Medication Dose Route Frequency Provider Last Rate    ondansetron  4 mg Intravenous Q6H PRN Guille Perdomo MD       Continuous Infusions:   PRN Meds:.  ondansetron  Allergies   Allergen Reactions    Bee Venom Shortness Of Breath    Chlorhexidine Rash    Other Rash, Hives, Palpitations and Shortness Of Breath     Adhesive tape    Egg Yolk - Food Allergy Hives    Iodinated Contrast Media Hives and Other (See Comments)     Pt tolerated CT with contrast being allergy prepped on 2/21/2025    Penicillins Hives    Lidocaine Other (See Comments)     cream    Penicillin G Hives    Allevyn Adhesive [Wound Dressings] Hives and Rash    Bactrim [Sulfamethoxazole-Trimethoprim] Rash    Codeine Other (See Comments)     headaches    Latex Rash and Other (See Comments)    Medical Tape Blisters, Hives, Itching and Rash    Oxybutynin Rash    Pentosan Polysulfate Rash    Povidone Iodine Rash       Physical Examination:    Pulse 72   Temp 97.8 °F (36.6 °C) (Temporal)   Ht 5' 2\" (1.575 m)   Wt 80.8 kg (178 lb 3.2 oz)   LMP  (LMP Unknown)   SpO2 99%   BMI 32.59 kg/m²     Gen: A&Ox3, NAD  Cardiac: regular rate  Chest: non labored breathing  Abdomen: Non-distended        Left Upper Extremity:  Skin CDI  No tenderness over the lateral epicondyle of the elbow  Mild tenderness to palpation over the A1 pulley of the thumb volarly as well as mild tenderness over the first dorsal extensor compartment  She is WHAT test and Finkelstein positive  There is a nodule within the flexor sheath and the thumb has limited flexion past about 40 degrees.  She is able to fully extend the thumb  Sensation intact to light touch in the axillary median, ulnar, and radial nerve distributions  5/5 motor to FPL (AIN), EPL (PIN) and FDIO (ulnar)  2+RP    Studies:  No new imaging    Hand/upper extremity injection  Universal Protocol:  Consent: Verbal consent obtained.  Risks and benefits: " risks, benefits and alternatives were discussed  Consent given by: patient  Patient understanding: patient states understanding of the procedure being performed  Radiology Images displayed and confirmed. If images not available, report reviewed: imaging studies available  Patient identity confirmed: verbally with patient  Supporting Documentation  Indications: therapeutic, tendon swelling and pain   Procedure Details  Condition:de Quervain's tenosynovitis Needle size: 25 G  Ultrasound guidance: no  Approach: radial  Medications administered: 1 mL lidocaine 1 %; 6 mg betamethasone acetate-betamethasone sodium phosphate 6 (3-3) mg/mL  Patient tolerance: patient tolerated the procedure well with no immediate complications       Hand/upper extremity injection  Universal Protocol:  Consent: Verbal consent obtained.  Risks and benefits: risks, benefits and alternatives were discussed  Consent given by: patient  Patient understanding: patient states understanding of the procedure being performed  Radiology Images displayed and confirmed. If images not available, report reviewed: imaging studies available  Patient identity confirmed: verbally with patient  Supporting Documentation  Indications: pain, tendon swelling and therapeutic   Procedure Details  Condition:trigger finger Needle size: 25 G  Ultrasound guidance: no  Approach: volar  Medications administered: 1 mL lidocaine 1 %; 6 mg betamethasone acetate-betamethasone sodium phosphate 6 (3-3) mg/mL  Patient tolerance: patient tolerated the procedure well with no immediate complications            Jose Alfredo Corea MD  Hand and Upper Extremity Surgery      *This note was dictated using Dragon voice recognition software. Please excuse any word substitutions or errors.*

## 2025-03-14 ENCOUNTER — TELEPHONE (OUTPATIENT)
Dept: PAIN MEDICINE | Facility: CLINIC | Age: 75
End: 2025-03-14

## 2025-03-14 NOTE — TELEPHONE ENCOUNTER
Caller: Patient    Doctor/Office: Dr Erickson    Call regarding :  returned call      Call was transferred to: spa

## 2025-04-02 DIAGNOSIS — M17.12 PRIMARY OSTEOARTHRITIS OF LEFT KNEE: ICD-10-CM

## 2025-04-06 DIAGNOSIS — K58.9 IRRITABLE BOWEL SYNDROME WITHOUT DIARRHEA: ICD-10-CM

## 2025-04-08 RX ORDER — DICYCLOMINE HYDROCHLORIDE 10 MG/1
10 CAPSULE ORAL 2 TIMES DAILY
Qty: 180 CAPSULE | Refills: 1 | Status: SHIPPED | OUTPATIENT
Start: 2025-04-08

## 2025-04-09 ENCOUNTER — OFFICE VISIT (OUTPATIENT)
Dept: PAIN MEDICINE | Facility: MEDICAL CENTER | Age: 75
End: 2025-04-09
Payer: MEDICARE

## 2025-04-09 VITALS — BODY MASS INDEX: 32.57 KG/M2 | WEIGHT: 177 LBS | HEIGHT: 62 IN

## 2025-04-09 DIAGNOSIS — G57.03 PIRIFORMIS SYNDROME OF BOTH SIDES: ICD-10-CM

## 2025-04-09 DIAGNOSIS — M79.18 MYOFASCIAL PAIN SYNDROME: Primary | ICD-10-CM

## 2025-04-09 PROCEDURE — 99213 OFFICE O/P EST LOW 20 MIN: CPT

## 2025-04-09 NOTE — PROGRESS NOTES
Assessment:  1. Myofascial pain syndrome    2. Piriformis syndrome of both sides        Plan:  Patient reporting 70 to 100% of pain relief following recent bilateral piriformis injections.  Patient aware that these injections can be repeated on an as-needed basis.    Continue the use of gabapentin, methocarbamol, and amitriptyline as prescribed by PCP.    I discussed with the patient that at this point in time she can follow up with our office on an as-needed basis. I did review with the patient that if her pain symptoms should change, worsen, and/or if she would experience any new symptoms she would like to be evaluated for, she should give our office a call. The patient was agreeable and verbalized an understanding.    My impressions and treatment recommendations were discussed in detail with the patient who verbalized understanding and had no further questions.  Discharge instructions were provided. I personally saw and examined the patient and I agree with the above discussed plan of care.    No orders of the defined types were placed in this encounter.    No orders of the defined types were placed in this encounter.      History of Present Illness:  Ingrid Wheatlye is a 75 y.o. female who presents for a follow up office visit following bilateral piriformis injections 2/28/2025.  Patient reports experiencing 75 to 100% symptom relief following this most recent injection.  She does note some residual pain along bilateral piriformis region.  She describes this pain as an intermittent burning and throbbing pain.  She rates his pain 3/10 on a numeric rating scale.  Denies numbness, tingling, radiating pain, weakness of bilateral lower extremities.  Patient notes her pain is exacerbated with prolonged sitting.  Admits to use of gabapentin, methocarbamol, and amitriptyline for symptom management.  Patient currently attending physical therapy for ongoing symptoms.    I have personally reviewed and/or updated the  patient's past medical history, past surgical history, family history, social history, current medications, allergies, and vital signs today.     Review of Systems   Respiratory:  Negative for shortness of breath.    Cardiovascular:  Negative for chest pain.   Gastrointestinal:  Negative for constipation, diarrhea, nausea and vomiting.   Musculoskeletal:  Positive for arthralgias, back pain and myalgias. Negative for gait problem and joint swelling.   Skin:  Negative for rash.   Neurological:  Negative for dizziness, seizures and weakness.   All other systems reviewed and are negative.      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 ([facility])   • Chronic pain syndrome   • Osteoarthritis   • Transaminitis   • Sacroiliitis ([facility])   • Family history of colon cancer   • Spasm of left piriformis muscle   • Osteoarthritis of left knee   • Thoracic myofascial strain   • Allergic reaction to contrast dye   • Left renal mass   • Myofascial pain syndrome   • Malignant neoplasm of left kidney, except renal pelvis ([facility])   • Age related osteoporosis   • Lumbar stenosis with neurogenic claudication   • Lateral epicondylitis of left elbow   • Rheumatoid arthritis of multiple sites with negative rheumatoid factor ([facility])       Past Medical History:   Diagnosis Date   • Abnormal findings on diagnostic imaging of breast    • Abnormal Pap smear of cervix    • Allergic    • Arthritis    • BRCA1 negative    • BRCA2 negative    • Breast cyst    • Chronic pain disorder    • Closed fracture of  proximal end of left fibula 06/04/2021   • Extremity pain    • Fibrocystic breast disease    • Foot pain    • GERD (gastroesophageal reflux disease)    • Hyperlipidemia    • Hypertension    • Interstitial cystitis    • Irritable bowel syndrome    • Joint pain    • Low back pain     Years   • Lumbosacral disc disease    • Osteoarthritis    • Osteopenia    • PONV (postoperative nausea and vomiting) 10/17/2023   • Thoracic disc disorder     Years   • Uncomplicated opioid dependence (HCC) 03/01/2022   • Urinary tract infection        Past Surgical History:   Procedure Laterality Date   • APPENDECTOMY     • BACK SURGERY      lumbar   • BREAST CYST ASPIRATION     • BREAST CYST EXCISION     • BREAST EXCISIONAL BIOPSY Left 1996    benign   • BREAST LUMPECTOMY     • CARPAL BOSS EXCISION     • CARPAL TUNNEL RELEASE     • CHOLECYSTECTOMY     • COLONOSCOPY     • DIAGNOSTIC LAPAROSCOPY     • FOOT SURGERY     • FRACTURE SURGERY     • HAND SURGERY  5/2017   • HYSTERECTOMY      age 32   • HYSTEROSCOPY     • IR CRYOABLATION  10/17/2023   • JOINT REPLACEMENT Bilateral     tkr   • KIDNEY SURGERY Left    • KNEE ARTHROSCOPY Bilateral    • LAMINECTOMY     • LAPAROSCOPY      hynecologic with adhesions   • MYOMECTOMY     • OOPHORECTOMY Bilateral     age 32   • ORTHOPEDIC SURGERY     • CT CAR IMPLTJ NSTIM ELTRDS PLATE/PADDLE EDRL N/A 05/18/2017    Procedure: PLACEMENT OF THORACIC SPINAL CORD STIMULATOR WITH LEFT BUTTOCK IMPLANTABLE PULSE GENERATOR (IMPULSE MONITORING-MOTORS (TCEMEP), EMG, SSEP);  Surgeon: Julius Damon MD;  Location[facility];  Service: Neurosurgery   • SPINAL CORD STIMULATOR IMPLANT Left 09/29/2020    Procedure: LAMINECTOMY THORACIC , REMOVAL OF SCS LEADS AND GENERATOR;  Surgeon: Oswald Whitt MD;  Location: [facility];  Service: Neurosurgery   • SPINAL STIMULATOR PLACEMENT  05/2017   • TONSILLECTOMY AND ADENOIDECTOMY      onset: unknown   • TOTAL KNEE ARTHROPLASTY Right        Family History   Problem Relation Age of  Onset   • Bone cancer Mother    • Cancer Mother    • Asthma Mother    • Rheum arthritis Mother    • Brain cancer Father    • Stroke Father         stroke sydrome   • Transient ischemic attack Father    • BRCA1 Negative Sister    • BRCA2 Negative Sister    • Depression Sister    • Migraines Sister    • Asthma Sister    • Asthma Sister    • Diabetes Sister    • Depression Sister    • Bipolar disorder Sister    • Suicide Attempts Sister    • No Known Problems Maternal Grandmother    • No Known Problems Maternal Grandfather    • Diabetes Paternal Grandmother    • No Known Problems Paternal Grandfather    • Heart disease Brother    • Diabetes Brother    • Heart attack Brother    • Colon cancer Brother 66   • Cancer Brother    • Hypertension Brother    • No Known Problems Brother    • Breast cancer Maternal Aunt 70   • Breast cancer additional onset Maternal Aunt 72   • Cancer Maternal Aunt    • Bipolar disorder Sister        Social History     Occupational History   • Occupation: Retired/ working part-time    Tobacco Use   • Smoking status: Never   • Smokeless tobacco: Never   Vaping Use   • Vaping status: Never Used   Substance and Sexual Activity   • Alcohol use: Yes     Alcohol/week: 1.0 standard drink of alcohol     Types: 1 Glasses of wine per week     Comment: Drink socially, not too often   • Drug use: No   • Sexual activity: Not Currently     Partners: Male     Birth control/protection: None       Current Outpatient Medications on File Prior to Visit   Medication Sig   • amitriptyline (ELAVIL) 50 mg tablet TAKE 1 TABLET AT BEDTIME   • Biotin 2500 MCG CAPS Take 1 capsule by mouth 2 (two) times a day    • calcium carbonate (OS-ANTHONY) 600 MG tablet Take 1,200 mg by mouth 2 (two) times a day with meals   • Cholecalciferol (VITAMIN D-3 PO) Take 1 tablet by mouth daily   • Cyanocobalamin (VITAMIN B-12 CR PO) Take 1 tablet by mouth daily   • Diclofenac Sodium (VOLTAREN) 1 % Apply 2 g topically 4 (four) times a  day   • dicyclomine (BENTYL) 10 mg capsule TAKE 1 CAPSULE TWICE DAILY   • diphenhydrAMINE (BENADRYL) 25 mg tablet Take 30 minutes prior to CT scan   • docusate sodium (COLACE) 100 mg capsule Take 100 mg by mouth daily at bedtime   • Evening Primrose Oil 1000 MG CAPS Take 1 capsule (1,000 mg total) by mouth in the morning   • famotidine (PEPCID) 20 mg tablet TAKE 1 TABLET TWICE DAILY   • fexofenadine (ALLEGRA) 180 MG tablet Take 180 mg by mouth daily   • folic acid (FOLVITE) 1 mg tablet    • gabapentin (NEURONTIN) 300 mg capsule Take 1 tablet in the morning, 1 tablet afternoon and 2 bedtime   • hydroxychloroquine (PLAQUENIL) 200 mg tablet Take 200 mg by mouth daily with breakfast   • losartan-hydrochlorothiazide (HYZAAR) 100-25 MG per tablet TAKE 1 TABLET EVERY DAY   • methocarbamol (ROBAXIN) 750 mg tablet TAKE 1 TABLET EVERY 8 HOURS   • methotrexate 2.5 MG tablet Take by mouth once a week   • montelukast (SINGULAIR) 10 mg tablet Take 1 tablet (10 mg total) by mouth daily at bedtime   • Multiple Vitamins-Minerals (PRESERVISION AREDS 2 PO) Take by mouth in the morning   • Omega-3 Fatty Acids (FISH OIL) 1,000 mg Take 1,000 mg by mouth 3 (three) times a day   • omeprazole (PriLOSEC) 40 MG capsule Take 1 capsule (40 mg total) by mouth daily   • rosuvastatin (CRESTOR) 10 MG tablet TAKE 1 TABLET DAILY ON MONDAY, WEDNESDAY AND FRIDAY (ALTERNATE WITH SIMVASTATIN)   • simvastatin (ZOCOR) 10 mg tablet TAKE 1 TABLET ON TUESDAY, THURSDAY, SATURDAY AND SUNDAY (TO BE ALTERNATED WITH ROSUVASTATIN)   • EPINEPHrine (EPIPEN) 0.3 mg/0.3 mL SOAJ INJECT 0.3 ML (0.3 MG TOTAL) INTO A MUSCLE AS NEEDED FOR ANAPHYLAXIS   • methylPREDNISolone (MEDROL) 32 MG tablet Take 1 tablet 12 hours prior to CT and another tablet 1 hour prior to CT scan   • methylprednisolone (MEDROL) 4 mg tablet      Current Facility-Administered Medications on File Prior to Visit   Medication   • ondansetron (ZOFRAN) injection 4 mg       Allergies   Allergen Reactions  "  • Bee Venom Shortness Of Breath   • Chlorhexidine Rash   • Other Rash, Hives, Palpitations and Shortness Of Breath     Adhesive tape   • Egg Yolk - Food Allergy Hives   • Iodinated Contrast Media Hives and Other (See Comments)     Pt tolerated CT with contrast being allergy prepped on 2/21/2025   • Penicillins Hives   • Lidocaine Other (See Comments)     cream   • Penicillin G Hives   • Allevyn Adhesive [Wound Dressings] Hives and Rash   • Bactrim [Sulfamethoxazole-Trimethoprim] Rash   • Codeine Other (See Comments)     headaches   • Latex Rash and Other (See Comments)   • Medical Tape Blisters, Hives, Itching and Rash   • Oxybutynin Rash   • Pentosan Polysulfate Rash   • Povidone Iodine Rash       Physical Exam:    Ht 5' 2\" (1.575 m)   Wt 80.3 kg (177 lb)   LMP  (LMP Unknown)   BMI 32.37 kg/m²     Constitutional:normal, well developed, well nourished, alert, in no distress and non-toxic and no overt pain behavior.  Eyes:anicteric  HEENT:grossly intact  Neck:supple, symmetric, trachea midline and no masses   Pulmonary:even and unlabored  Cardiovascular:No edema or pitting edema present  Skin:Normal without rashes or lesions and well hydrated  Psychiatric:Mood and affect appropriate  Neurologic:Cranial Nerves II-XII grossly intact  Musculoskeletal:normal    Imaging    "

## 2025-04-17 ENCOUNTER — TELEPHONE (OUTPATIENT)
Dept: FAMILY MEDICINE CLINIC | Facility: CLINIC | Age: 75
End: 2025-04-17

## 2025-04-17 NOTE — TELEPHONE ENCOUNTER
PA for dicyclomine (BENTYL) 10 mg capsule SUBMITTED to     via    [x]CMM-KEY: [id number]  []Surescripts-Case ID #   []Availity-Auth ID # NDC #   []Faxed to plan   []Other website   []Phone call Case ID #     [x]PA sent as URGENT    All office notes, labs and other pertaining documents and studies sent. Clinical questions answered. Awaiting determination from insurance company.     Turnaround time for your insurance to make a decision on your Prior Authorization can take 7-21 business days.

## 2025-04-17 NOTE — TELEPHONE ENCOUNTER
Please see scanned prior auth for Dicyclomine HCL received from [facility] pharmacy. Thank you.

## 2025-04-18 NOTE — TELEPHONE ENCOUNTER
PA for dicyclomine (BENTYL) 10 mg capsule  APPROVED     Date(s) approved until 12/31/2025      Patient advised by          []MyChart Message  []Phone call   []LMOM  []L/M to call office as no active Communication consent on file  []Unable to leave detailed message as VM not approved on Communication consent       Pharmacy advised by    [x]Fax  []Phone call  []Secure Chat    Specialty Pharmacy    []     Approval letter scanned into Media Yes

## 2025-04-19 DIAGNOSIS — Z00.6 ENCOUNTER FOR EXAMINATION FOR NORMAL COMPARISON OR CONTROL IN CLINICAL RESEARCH PROGRAM: ICD-10-CM

## 2025-04-22 ENCOUNTER — APPOINTMENT (OUTPATIENT)
Dept: LAB | Facility: MEDICAL CENTER | Age: 75
End: 2025-04-22
Attending: PATHOLOGY
Payer: MEDICARE

## 2025-04-22 DIAGNOSIS — Z00.6 ENCOUNTER FOR EXAMINATION FOR NORMAL COMPARISON OR CONTROL IN CLINICAL RESEARCH PROGRAM: ICD-10-CM

## 2025-04-22 PROCEDURE — 36415 COLL VENOUS BLD VENIPUNCTURE: CPT

## 2025-04-30 ENCOUNTER — RESULTS FOLLOW-UP (OUTPATIENT)
Dept: FAMILY MEDICINE CLINIC | Facility: CLINIC | Age: 75
End: 2025-04-30

## 2025-04-30 ENCOUNTER — OFFICE VISIT (OUTPATIENT)
Dept: FAMILY MEDICINE CLINIC | Facility: CLINIC | Age: 75
End: 2025-04-30
Payer: MEDICARE

## 2025-04-30 ENCOUNTER — APPOINTMENT (OUTPATIENT)
Dept: RADIOLOGY | Facility: MEDICAL CENTER | Age: 75
End: 2025-04-30
Payer: MEDICARE

## 2025-04-30 VITALS
OXYGEN SATURATION: 97 % | BODY MASS INDEX: 32.57 KG/M2 | HEART RATE: 82 BPM | TEMPERATURE: 97.2 F | SYSTOLIC BLOOD PRESSURE: 128 MMHG | DIASTOLIC BLOOD PRESSURE: 70 MMHG | WEIGHT: 177 LBS | HEIGHT: 62 IN | RESPIRATION RATE: 18 BRPM

## 2025-04-30 DIAGNOSIS — M54.16 LUMBAR RADICULOPATHY: ICD-10-CM

## 2025-04-30 DIAGNOSIS — M25.552 LEFT HIP PAIN: ICD-10-CM

## 2025-04-30 DIAGNOSIS — E78.5 HYPERLIPIDEMIA, UNSPECIFIED HYPERLIPIDEMIA TYPE: ICD-10-CM

## 2025-04-30 DIAGNOSIS — M16.12 OSTEOARTHRITIS OF LEFT HIP, UNSPECIFIED OSTEOARTHRITIS TYPE: Primary | ICD-10-CM

## 2025-04-30 DIAGNOSIS — M25.552 LEFT HIP PAIN: Primary | ICD-10-CM

## 2025-04-30 PROCEDURE — 99214 OFFICE O/P EST MOD 30 MIN: CPT | Performed by: INTERNAL MEDICINE

## 2025-04-30 PROCEDURE — G2211 COMPLEX E/M VISIT ADD ON: HCPCS | Performed by: INTERNAL MEDICINE

## 2025-04-30 PROCEDURE — 73502 X-RAY EXAM HIP UNI 2-3 VIEWS: CPT

## 2025-04-30 RX ORDER — GABAPENTIN 300 MG/1
CAPSULE ORAL
Qty: 360 CAPSULE | Refills: 3 | Status: SHIPPED | OUTPATIENT
Start: 2025-04-30

## 2025-04-30 NOTE — ASSESSMENT & PLAN NOTE
Orders:    LDL cholesterol, direct; Future  Recheck ldl with upcoming labs in summer Lo fat diet and continue current rx

## 2025-04-30 NOTE — ASSESSMENT & PLAN NOTE
Orders:    gabapentin (NEURONTIN) 300 mg capsule; Take 1 tablet in the morning, 1 tablet afternoon and 2 bedtime    XR hip/pelv 2-3 vws left if performed; Future  Check xray of hip and if that is negative would suspect back as source of recent issue

## 2025-04-30 NOTE — PROGRESS NOTES
Name: Ingrid Wheatley      : 1950      MRN: [id number]  Encounter Provider: Leslie Bermudez DO  Encounter Date: 2025   Encounter department: [facility]  :  Assessment & Plan  Lumbar radiculopathy    Orders:    gabapentin (NEURONTIN) 300 mg capsule; Take 1 tablet in the morning, 1 tablet afternoon and 2 bedtime    XR hip/pelv 2-3 vws left if performed; Future  Check xray of hip and if that is negative would suspect back as source of recent issue   Left hip pain    Orders:    XR hip/pelv 2-3 vws left if performed; Future  Pt will go for xray today and followup pending results   Hyperlipidemia, unspecified hyperlipidemia type    Orders:    LDL cholesterol, direct; Future  Recheck ldl with upcoming labs in summer Lo fat diet and continue current rx         Depression Screening and Follow-up Plan: Patient was screened for depression during today's encounter. They screened negative with a PHQ-2 score of 0.      Rto as scheduled   History of Present Illness   HPI  Pt has acute onset left hip pain No fall but she was active and is on her feet more She had recent back injection so does not feel back sxs are worse than they were She does not e some right lower leg swelling but again has been on her feet more in recent past No trauma Some tingling but not new as she has chronic back pain No bowel or bladder incontinence   Review of Systems   Constitutional:  Negative for chills and fever.   HENT: Negative.     Eyes:  Negative for visual disturbance.   Respiratory:  Negative for cough and shortness of breath.    Cardiovascular:  Negative for chest pain and palpitations.   Gastrointestinal: Negative.    Genitourinary: Negative.    Musculoskeletal:  Positive for arthralgias.        Left hip pain   Neurological:  Negative for dizziness, light-headedness and headaches.   Psychiatric/Behavioral:  Negative for sleep disturbance. The patient is not nervous/anxious.      Past Medical History:   Diagnosis  Date    Abnormal findings on diagnostic imaging of breast     Abnormal Pap smear of cervix     Allergic     Arthritis     BRCA1 negative     BRCA2 negative     Breast cyst     Chronic pain disorder     Closed fracture of proximal end of left fibula 06/04/2021    Extremity pain     Fibrocystic breast disease     Foot pain     GERD (gastroesophageal reflux disease)     Hyperlipidemia     Hypertension     Interstitial cystitis     Irritable bowel syndrome     Joint pain     Low back pain     Years    Lumbosacral disc disease     Osteoarthritis     Osteopenia     PONV (postoperative nausea and vomiting) 10/17/2023    Thoracic disc disorder     Years    Uncomplicated opioid dependence (HCC) 03/01/2022    Urinary tract infection      Past Surgical History:   Procedure Laterality Date    APPENDECTOMY      BACK SURGERY      lumbar    BREAST CYST ASPIRATION      BREAST CYST EXCISION      BREAST EXCISIONAL BIOPSY Left 1996    benign    BREAST LUMPECTOMY      CARPAL BOSS EXCISION      CARPAL TUNNEL RELEASE      CHOLECYSTECTOMY      COLONOSCOPY      DIAGNOSTIC LAPAROSCOPY      FOOT SURGERY      FRACTURE SURGERY      HAND SURGERY  5/2017    HYSTERECTOMY      age 32    HYSTEROSCOPY      IR CRYOABLATION  10/17/2023    JOINT REPLACEMENT Bilateral     tkr    KIDNEY SURGERY Left     KNEE ARTHROSCOPY Bilateral     LAMINECTOMY      LAPAROSCOPY      hynecologic with adhesions    MYOMECTOMY      OOPHORECTOMY Bilateral     age 32    ORTHOPEDIC SURGERY      DC CAR IMPLTJ NSTIM ELTRDS PLATE/PADDLE EDRL N/A 05/18/2017    Procedure: PLACEMENT OF THORACIC SPINAL CORD STIMULATOR WITH LEFT BUTTOCK IMPLANTABLE PULSE GENERATOR (IMPULSE MONITORING-MOTORS (TCEMEP), EMG, SSEP);  Surgeon: Julius Damon MD;  Location: [facility];  Service: Neurosurgery    SPINAL CORD STIMULATOR IMPLANT Left 09/29/2020    Procedure: LAMINECTOMY THORACIC , REMOVAL OF SCS LEADS AND GENERATOR;  Surgeon: Oswald Whitt MD;  Location:  MAIN OR;  Service: Neurosurgery     SPINAL STIMULATOR PLACEMENT  05/2017    TONSILLECTOMY AND ADENOIDECTOMY      onset: unknown    TOTAL KNEE ARTHROPLASTY Right      Social History     Socioeconomic History    Marital status: Single     Spouse name: Not on file    Number of children: Not on file    Years of education: Not on file    Highest education level: Not on file   Occupational History    Occupation: Retired/ working part-time    Tobacco Use    Smoking status: Never    Smokeless tobacco: Never   Vaping Use    Vaping status: Never Used   Substance and Sexual Activity    Alcohol use: Yes     Alcohol/week: 1.0 standard drink of alcohol     Types: 1 Glasses of wine per week     Comment: Drink socially, not too often    Drug use: No    Sexual activity: Not Currently     Partners: Male     Birth control/protection: None   Other Topics Concern    Not on file   Social History Narrative    No caffeine use    Always uses sunscreen    Always uses a seatbelt     Social Drivers of Health     Financial Resource Strain: Low Risk  (1/10/2024)    Overall Financial Resource Strain (CARDIA)     Difficulty of Paying Living Expenses: Not hard at all   Food Insecurity: No Food Insecurity (1/14/2025)    Hunger Vital Sign     Worried About Running Out of Food in the Last Year: Never true     Ran Out of Food in the Last Year: Never true   Transportation Needs: No Transportation Needs (1/14/2025)    PRAPARE - Transportation     Lack of Transportation (Medical): No     Lack of Transportation (Non-Medical): No   Physical Activity: Not on file   Stress: Not on file   Social Connections: Not on file   Intimate Partner Violence: Unknown (9/4/2024)    Received from [facility]    Intimate Partner Violence     Within the last year, have you been afraid of your partner, ex-partner or family member?: Not on file     Within the last year, have you been humiliated or emotionally abused in other ways by your partner, ex-partner or family member?: Not on file     " Within the last year, have you been kicked, hit, slapped, or otherwise physically hurt by your partner, ex-partner or family member?: Not on file     Within the last year, have you been raped or forced to have any kind of sexual activity by your partner, ex-partner or family member?: Not on file   Housing Stability: Low Risk  (1/14/2025)    Housing Stability Vital Sign     Unable to Pay for Housing in the Last Year: No     Number of Times Moved in the Last Year: 0     Homeless in the Last Year: No     Allergies   Allergen Reactions    Bee Venom Shortness Of Breath    Chlorhexidine Rash    Other Rash, Hives, Palpitations and Shortness Of Breath     Adhesive tape    Egg Yolk - Food Allergy Hives    Iodinated Contrast Media Hives and Other (See Comments)     Pt tolerated CT with contrast being allergy prepped on 2/21/2025    Penicillins Hives    Lidocaine Other (See Comments)     cream    Penicillin G Hives    Allevyn Adhesive [Wound Dressings] Hives and Rash    Bactrim [Sulfamethoxazole-Trimethoprim] Rash    Codeine Other (See Comments)     headaches    Latex Rash and Other (See Comments)    Medical Tape Blisters, Hives, Itching and Rash    Oxybutynin Rash    Pentosan Polysulfate Rash    Povidone Iodine Rash       Objective   /70   Pulse 82   Temp (!) 97.2 °F (36.2 °C) (Temporal)   Resp 18   Ht 5' 2\" (1.575 m)   Wt 80.3 kg (177 lb)   LMP  (LMP Unknown)   SpO2 97%   BMI 32.37 kg/m²      Physical Exam  Vitals and nursing note reviewed.   Constitutional:       General: She is not in acute distress.     Appearance: Normal appearance. She is not ill-appearing, toxic-appearing or diaphoretic.   HENT:      Head: Normocephalic and atraumatic.      Right Ear: External ear normal.      Left Ear: External ear normal.      Nose: Nose normal.      Mouth/Throat:      Mouth: Mucous membranes are moist.   Eyes:      General: No scleral icterus.     Extraocular Movements: Extraocular movements intact.      " Conjunctiva/sclera: Conjunctivae normal.      Pupils: Pupils are equal, round, and reactive to light.   Cardiovascular:      Rate and Rhythm: Normal rate and regular rhythm.      Pulses: Normal pulses.   Pulmonary:      Effort: Pulmonary effort is normal. No respiratory distress.      Breath sounds: Normal breath sounds. No wheezing.   Abdominal:      General: Bowel sounds are normal. There is no distension.      Palpations: Abdomen is soft.      Tenderness: There is no abdominal tenderness.   Musculoskeletal:         General: Tenderness present.      Cervical back: Normal range of motion and neck supple.      Right lower leg: No edema.      Left lower leg: Edema present.   Lymphadenopathy:      Cervical: No cervical adenopathy.   Skin:     General: Skin is warm and dry.      Coloration: Skin is not jaundiced or pale.   Neurological:      General: No focal deficit present.      Mental Status: She is alert and oriented to person, place, and time. Mental status is at baseline.      Cranial Nerves: No cranial nerve deficit.      Sensory: No sensory deficit.   Psychiatric:         Mood and Affect: Mood normal.         Behavior: Behavior normal.         Thought Content: Thought content normal.         Judgment: Judgment normal.     Tenderness left hip bursa mild discomfort with straight leg raising and forward flex

## 2025-04-30 NOTE — ASSESSMENT & PLAN NOTE
Orders:    XR hip/pelv 2-3 vws left if performed; Future  Pt will go for xray today and followup pending results

## 2025-05-14 ENCOUNTER — OFFICE VISIT (OUTPATIENT)
Dept: OBGYN CLINIC | Facility: CLINIC | Age: 75
End: 2025-05-14
Attending: INTERNAL MEDICINE
Payer: MEDICARE

## 2025-05-14 VITALS
HEIGHT: 62 IN | WEIGHT: 178 LBS | HEART RATE: 73 BPM | BODY MASS INDEX: 32.76 KG/M2 | TEMPERATURE: 97.9 F | RESPIRATION RATE: 16 BRPM | OXYGEN SATURATION: 99 %

## 2025-05-14 DIAGNOSIS — M16.7 OTHER SECONDARY OSTEOARTHRITIS OF LEFT HIP: Primary | ICD-10-CM

## 2025-05-14 DIAGNOSIS — M70.62 TROCHANTERIC BURSITIS OF LEFT HIP: ICD-10-CM

## 2025-05-14 PROCEDURE — 99214 OFFICE O/P EST MOD 30 MIN: CPT | Performed by: STUDENT IN AN ORGANIZED HEALTH CARE EDUCATION/TRAINING PROGRAM

## 2025-05-14 RX ORDER — PREDNISONE 5 MG/1
TABLET ORAL
COMMUNITY
Start: 2025-05-05

## 2025-05-14 NOTE — ASSESSMENT & PLAN NOTE
75-year-old female with rheumatoid arthritis and history of lumbar surgery with atraumatic chronic lateral left hip pain.  Reviewed her imaging, exam findings diagnosis.  Her pain is consistent with trochanteric bursitis.  She has had some improvement with Voltaren gel use.  At this point we discussed treatment options including continued Voltaren gel and coupling with physical therapy to help loosen the muscles around the truck bursa.  We also discussed the role of a corticosteroid injection to decrease inflammation and improve pain control.  At this point she would like to start with physical therapy and continue Voltaren use.  A physical therapy prescription was provided today.  She can follow-up with me in the future as needed.  Orders:    Ambulatory Referral to Physical Therapy; Future

## 2025-05-14 NOTE — PROGRESS NOTES
Assessment & Plan  Other secondary osteoarthritis of left hip  75-year-old female with rheumatoid arthritis and history of lumbar surgery with atraumatic chronic lateral left hip pain.  Reviewed her imaging, exam findings diagnosis.  Her pain is consistent with trochanteric bursitis.  She has had some improvement with Voltaren gel use.  At this point we discussed treatment options including continued Voltaren gel and coupling with physical therapy to help loosen the muscles around the truck bursa.  We also discussed the role of a corticosteroid injection to decrease inflammation and improve pain control.  At this point she would like to start with physical therapy and continue Voltaren use.  A physical therapy prescription was provided today.  She can follow-up with me in the future as needed.  Orders:    Ambulatory Referral to Physical Therapy; Future    Trochanteric bursitis of left hip    Orders:    Ambulatory Referral to Physical Therapy; Future        Chief Complaint   Patient presents with    Left Hip - Pain        Subjective    Ingrid Wheatley is a 75 y.o. female who presents with left hip pain:         History of Present Illness   Hip pain started many months ago without injury.  The pain is 4/10. The pain is located laterally. The pain is described as Sharp and Sore. They have tried Tylenol and voltaren for their problem. Pain improves with voltaren. Pain worsens with rest and certain activities.    The pain does not shoot down into the foot. The patient does have numbness and/or tingling in the foot. The hip does not  pop. The patient does not  have pain with coughing or sneezing. The patient does not  have bowel or bladder problems.    [facility] Patient Answers  Failed to redirect to the Timeline version of the [employer] SmartLink.    Allergies[1]  Encounter Medications[2]  Past Medical History:   Diagnosis Date    Abnormal findings on diagnostic imaging of breast     Abnormal Pap smear of cervix      Allergic     Arthritis     BRCA1 negative     BRCA2 negative     Breast cyst     Chronic pain disorder     Closed fracture of proximal end of left fibula 06/04/2021    Extremity pain     Fibrocystic breast disease     Foot pain     GERD (gastroesophageal reflux disease)     Hyperlipidemia     Hypertension     Interstitial cystitis     Irritable bowel syndrome     Joint pain     Low back pain     Years    Lumbosacral disc disease     Osteoarthritis     Osteopenia     PONV (postoperative nausea and vomiting) 10/17/2023    Thoracic disc disorder     Years    Uncomplicated opioid dependence (HCC) 03/01/2022    Urinary tract infection        Past Surgical History:   Procedure Laterality Date    APPENDECTOMY      BACK SURGERY      lumbar    BREAST CYST ASPIRATION      BREAST CYST EXCISION      BREAST EXCISIONAL BIOPSY Left 1996    benign    BREAST LUMPECTOMY      CARPAL BOSS EXCISION      CARPAL TUNNEL RELEASE      CHOLECYSTECTOMY      COLONOSCOPY      DIAGNOSTIC LAPAROSCOPY      FOOT SURGERY      FRACTURE SURGERY      HAND SURGERY  5/2017    HYSTERECTOMY      age 32    HYSTEROSCOPY      IR CRYOABLATION  10/17/2023    JOINT REPLACEMENT Bilateral     tkr    KIDNEY SURGERY Left     KNEE ARTHROSCOPY Bilateral     LAMINECTOMY      LAPAROSCOPY      hynecologic with adhesions    MYOMECTOMY      OOPHORECTOMY Bilateral     age 32    ORTHOPEDIC SURGERY      IA CAR IMPLTJ NSTIM ELTRDS PLATE/PADDLE EDRL N/A 05/18/2017    Procedure: PLACEMENT OF THORACIC SPINAL CORD STIMULATOR WITH LEFT BUTTOCK IMPLANTABLE PULSE GENERATOR (IMPULSE MONITORING-MOTORS (TCEMEP), EMG, SSEP);  Surgeon: Julius Damon MD;  Location: [facility];  Service: Neurosurgery    SPINAL CORD STIMULATOR IMPLANT Left 09/29/2020    Procedure: LAMINECTOMY THORACIC , REMOVAL OF SCS LEADS AND GENERATOR;  Surgeon: Oswald Whitt MD;  Location:  MAIN OR;  Service: Neurosurgery    SPINAL STIMULATOR PLACEMENT  05/2017    TONSILLECTOMY AND ADENOIDECTOMY      onset: unknown     TOTAL KNEE ARTHROPLASTY Right      Family History   Problem Relation Age of Onset    Bone cancer Mother     Cancer Mother     Asthma Mother     Rheum arthritis Mother     Brain cancer Father     Stroke Father         stroke sydrome    Transient ischemic attack Father     BRCA1 Negative Sister     BRCA2 Negative Sister     Depression Sister     Migraines Sister     Asthma Sister     Asthma Sister     Diabetes Sister     Depression Sister     Bipolar disorder Sister     Suicide Attempts Sister     No Known Problems Maternal Grandmother     No Known Problems Maternal Grandfather     Diabetes Paternal Grandmother     No Known Problems Paternal Grandfather     Heart disease Brother     Diabetes Brother     Heart attack Brother     Colon cancer Brother 66    Cancer Brother     Hypertension Brother     No Known Problems Brother     Breast cancer Maternal Aunt 70    Breast cancer additional onset Maternal Aunt 72    Cancer Maternal Aunt     Bipolar disorder Sister        Social History[3]        Objective    [unfilled]  [unfilled]  Body mass index is 32.56 kg/m².  Physical Exam  Hip/Spine Exam  Side: left   Gait: Antalgic   Tenderness: Troch bursa       Hip range of motion   Flexion Extension IR ER   Left 110 5 15 25   Hip strength   Flexion Extension Abduction Adduction   Left 5 5 5 5   Provocative Tests:  Flexion/Internal rotation (FADIR) test for impingement: Negative   Scour test Negative   KATELYN test: Negative   Stinchfield test Negative   Log roll test Negative   Danyel's test Positive   Straight leg raise: Negative   Distally the patient's neurovascular status is normal.    IMAGING:  I reviewed and interpreted multiple x-rays of the left hip taken 4/30/2025 demonstrate mild to moderate arthritis         Follow Up: Return if symptoms worsen or fail to improve.    All questions answered and patient agrees with plan.             [1]   Allergies  Allergen Reactions    Bee Venom Shortness Of Breath    Chlorhexidine Rash     Other Rash, Hives, Palpitations and Shortness Of Breath     Adhesive tape    Egg Yolk - Food Allergy Hives    Iodinated Contrast Media Hives and Other (See Comments)     Pt tolerated CT with contrast being allergy prepped on 2/21/2025    Penicillins Hives    Lidocaine Other (See Comments)     cream    Penicillin G Hives    Allevyn Adhesive [Wound Dressings] Hives and Rash    Bactrim [Sulfamethoxazole-Trimethoprim] Rash    Codeine Other (See Comments)     headaches    Latex Rash and Other (See Comments)    Medical Tape Blisters, Hives, Itching and Rash    Oxybutynin Rash    Pentosan Polysulfate Rash    Povidone Iodine Rash   [2]   Outpatient Encounter Medications as of 5/14/2025   Medication Sig Dispense Refill    amitriptyline (ELAVIL) 50 mg tablet TAKE 1 TABLET AT BEDTIME 90 tablet 1    Biotin 2500 MCG CAPS Take 1 capsule by mouth in the morning and 1 capsule before bedtime.      calcium carbonate (OS-ANTHONY) 600 MG tablet Take 1,200 mg by mouth in the morning and 1,200 mg in the evening. Take with meals.      Cholecalciferol (VITAMIN D-3 PO) Take 1 tablet by mouth in the morning.      Cyanocobalamin (VITAMIN B-12 CR PO) Take 1 tablet by mouth in the morning.      Diclofenac Sodium (VOLTAREN) 1 % Apply 2 g topically 4 (four) times a day 400 g 5    dicyclomine (BENTYL) 10 mg capsule TAKE 1 CAPSULE TWICE DAILY 180 capsule 1    diphenhydrAMINE (BENADRYL) 25 mg tablet Take 30 minutes prior to CT scan 1 tablet 0    docusate sodium (COLACE) 100 mg capsule Take 100 mg by mouth daily at bedtime      EPINEPHrine (EPIPEN) 0.3 mg/0.3 mL SOAJ INJECT 0.3 ML (0.3 MG TOTAL) INTO A MUSCLE AS NEEDED FOR ANAPHYLAXIS 2 each 1    Evening Primrose Oil 1000 MG CAPS Take 1 capsule (1,000 mg total) by mouth in the morning  0    famotidine (PEPCID) 20 mg tablet TAKE 1 TABLET TWICE DAILY 180 tablet 1    fexofenadine (ALLEGRA) 180 MG tablet Take 180 mg by mouth in the morning.      folic acid (FOLVITE) 1 mg tablet       gabapentin  (NEURONTIN) 300 mg capsule Take 1 tablet in the morning, 1 tablet afternoon and 2 bedtime 360 capsule 3    hydroxychloroquine (PLAQUENIL) 200 mg tablet Take 200 mg by mouth daily with breakfast      losartan-hydrochlorothiazide (HYZAAR) 100-25 MG per tablet TAKE 1 TABLET EVERY DAY 90 tablet 1    methocarbamol (ROBAXIN) 750 mg tablet TAKE 1 TABLET EVERY 8 HOURS 270 tablet 3    methotrexate 2.5 MG tablet Take by mouth once a week      montelukast (SINGULAIR) 10 mg tablet Take 1 tablet (10 mg total) by mouth daily at bedtime 90 tablet 3    Multiple Vitamins-Minerals (PRESERVISION AREDS 2 PO) Take by mouth in the morning      Omega-3 Fatty Acids (FISH OIL) 1,000 mg Take 1,000 mg by mouth in the morning and 1,000 mg in the evening and 1,000 mg before bedtime.      omeprazole (PriLOSEC) 40 MG capsule Take 1 capsule (40 mg total) by mouth daily 90 capsule 3    predniSONE 5 mg tablet       rosuvastatin (CRESTOR) 10 MG tablet TAKE 1 TABLET DAILY ON MONDAY, WEDNESDAY AND FRIDAY (ALTERNATE WITH SIMVASTATIN) 36 tablet 1    simvastatin (ZOCOR) 10 mg tablet TAKE 1 TABLET ON TUESDAY, THURSDAY, SATURDAY AND SUNDAY (TO BE ALTERNATED WITH ROSUVASTATIN) 48 tablet 3     Facility-Administered Encounter Medications as of 5/14/2025   Medication Dose Route Frequency Provider Last Rate Last Admin    ondansetron (ZOFRAN) injection 4 mg  4 mg Intravenous Q6H PRN Guille Perdomo MD       [3]   Social History  Tobacco Use    Smoking status: Never    Smokeless tobacco: Never   Vaping Use    Vaping status: Never Used   Substance Use Topics    Alcohol use: Yes     Alcohol/week: 1.0 standard drink of alcohol     Types: 1 Glasses of wine per week     Comment: Drink socially, not too often    Drug use: No

## 2025-05-15 NOTE — PROGRESS NOTES
PT Evaluation     Today's date: 2025  Patient name: Ingrid Wheatley  : 1950  MRN: [id number]  Referring provider: Guille Ornelas,*  Dx:   Encounter Diagnosis     ICD-10-CM    1. Greater trochanteric bursitis of left hip  M70.62       2. Primary osteoarthritis of left hip  M16.12                      Assessment    Assessment details:   CURRENT FUNCTIONAL STATUS    Standing/ADL tolerance 30 minutes.   Walking tolerance 1/2 mile.    Ascends/descends stairs reciprocally with mild pain.  Difficulty arising from sitting: None   Ability to dress lower extremities: Good   Ability to enter/exit vehicle/bed: Good    SHORT TERM GOALS (2 WEEKS)    Increase hip strength 2-3 lbs in all weak areas.  Decrease pain to 1-6/10.  Independent with HEP.  Standing/ADL tolerance 45 minutes.   Walking tolerance 3/4 mile.    Ascends/descends stairs reciprocally with minimal pain.    LONG TERM GOALS (DISCHARGE)    Hip AROM: WNL and painfree in all planes.  Hip Strength: ABD=19 lbs.  Decrease pain to 0-3/10.  Independent with HEP.  Standing/ADL tolerance 60 minutes.   Walking tolerance 1 mile.    Ascends/descends stairs reciprocally without pain.  Understanding of Dx/Px/POC: good     Prognosis: good    Goals  See assessment details above.      Plan  Patient would benefit from: skilled physical therapy  Planned modality interventions: cryotherapy and thermotherapy: hydrocollator packs    Planned therapy interventions: manual therapy, therapeutic exercise, therapeutic activities, self care, neuromuscular re-education and home exercise program    Frequency: 2x week  Duration in weeks: 4  Plan details: Ingrid Wheatley is a 75 y.o. female presenting to PT with pain, decreased range of motion, decreased strength, and decreased tolerance to activity. This patient would benefit from skilled PT services to address these issues and to maximize function. A home exercise program was provided and all questions were answered. Thank you  "for the referral.          Subjective Evaluation    History of Present Illness  Mechanism of injury: CC: Left hip pain. Denies having any weakness.  HPI: The patient's left hip pain began one month ago for no apparent reason.  X-rays revealed moderate left hip joint arthritis. She uses Voltaren gel for relief. She has a history of chronic low back pain for which she had two surgeries. She also has an implanted pain stimulator. She works part time as a  and as a .  Patient Goals  Patient goals for therapy: decreased pain and increased motion  Patient goal: Improved ability to stand and walk.  Pain  Current pain ratin  At best pain ratin  At worst pain rating: 10          Objective     Observations     Additional Observation Details  Gait is slightly antalgic on the left leg without an AD.    General Comments:      Hip Comments   CURRENT OBJECTIVE MEASUREMENTS    Hip AROM: WFL with moderate pain during abduction.  Hip Strength: ABD=16 lbs with pain.                                  Precautions: S/P Lumbar Laminectomy, RA, Chronic Pain      Manuals         STM left lateral hip RK        Piriformis Stretch                  Neuro Re-Ed         SLS         Heel/Toe Gait         Marching while Walking         Rocker Board                  Ther Ex         Hip PROM         NuStep:   S , A         SLR Supine         Heel Slides         Bridging         Ball Squeeze         TB Clamshells Hooklying L1 TB 10x BID HEP        Supine Piriformis Stretch 10x10\" BID HEP        TB TKE         Hip Patterns Standing         Multi Hip:FLEX  F             EXT  P             ABD                 ADD         Leg Press: S         XO TKE         PYR QUAD:  S , P, C         PYR HAM:  S , P , C                  Ther Activity         Mini Squats         Marching         Side Stepping         Steps Forward         Steps Lateral         Steps Downward         Lunging         Lifting          Carrying                "   Gait Training                           Modalities         CP in right sidelying 10 min

## 2025-05-19 ENCOUNTER — EVALUATION (OUTPATIENT)
Dept: PHYSICAL THERAPY | Facility: CLINIC | Age: 75
End: 2025-05-19
Attending: STUDENT IN AN ORGANIZED HEALTH CARE EDUCATION/TRAINING PROGRAM
Payer: MEDICARE

## 2025-05-19 DIAGNOSIS — M16.12 PRIMARY OSTEOARTHRITIS OF LEFT HIP: ICD-10-CM

## 2025-05-19 DIAGNOSIS — M70.62 GREATER TROCHANTERIC BURSITIS OF LEFT HIP: Primary | ICD-10-CM

## 2025-05-19 PROCEDURE — 97162 PT EVAL MOD COMPLEX 30 MIN: CPT | Performed by: PHYSICAL THERAPIST

## 2025-05-19 PROCEDURE — 97535 SELF CARE MNGMENT TRAINING: CPT | Performed by: PHYSICAL THERAPIST

## 2025-05-19 PROCEDURE — 97140 MANUAL THERAPY 1/> REGIONS: CPT | Performed by: PHYSICAL THERAPIST

## 2025-05-22 ENCOUNTER — RESULTS FOLLOW-UP (OUTPATIENT)
Dept: OTHER | Facility: HOSPITAL | Age: 75
End: 2025-05-22

## 2025-05-22 ENCOUNTER — OFFICE VISIT (OUTPATIENT)
Dept: PHYSICAL THERAPY | Facility: CLINIC | Age: 75
End: 2025-05-22
Attending: STUDENT IN AN ORGANIZED HEALTH CARE EDUCATION/TRAINING PROGRAM
Payer: MEDICARE

## 2025-05-22 DIAGNOSIS — M16.12 PRIMARY OSTEOARTHRITIS OF LEFT HIP: ICD-10-CM

## 2025-05-22 DIAGNOSIS — M70.62 GREATER TROCHANTERIC BURSITIS OF LEFT HIP: Primary | ICD-10-CM

## 2025-05-22 LAB
APOB+LDLR+PCSK9 GENE MUT ANL BLD/T: ABNORMAL
BRCA1+BRCA2 DEL+DUP + FULL MUT ANL BLD/T: NOT DETECTED
GENE DIS ANL INTERP-IMP: POSITIVE
MLH1+MSH2+MSH6+PMS2 GN DEL+DUP+FUL M: NOT DETECTED

## 2025-05-22 PROCEDURE — 97110 THERAPEUTIC EXERCISES: CPT | Performed by: PHYSICAL THERAPIST

## 2025-05-22 PROCEDURE — 97140 MANUAL THERAPY 1/> REGIONS: CPT | Performed by: PHYSICAL THERAPIST

## 2025-05-22 NOTE — TELEPHONE ENCOUNTER
Spoke to Ingrid about her [employer] results specific to FH. She reviewed her results prior to our conversation. She is currently under care for high cholesterol and decided to decline genetic counseling since she is already receiving treatment. Ingrid can contact [facility] at [phone] if she changes her mind about genetic counseling.

## 2025-05-22 NOTE — PROGRESS NOTES
"Daily Note     Today's date: 2025  Patient name: Ingrid Wheatley  : 1950  MRN: [id number]  Referring provider: Guille Ornelas,*  Dx:   Encounter Diagnosis     ICD-10-CM    1. Greater trochanteric bursitis of left hip  M70.62       2. Primary osteoarthritis of left hip  M16.12                      Subjective: Patient was sore after the initial session. Currently she is painfree.      Objective: See treatment diary below      Assessment: Tolerated treatment well. Patient exhibited good technique with therapeutic exercise progressions.      Plan: Progress treatment as tolerated.       Precautions: S/P Lumbar Laminectomy, RA, Chronic Pain        Manuals            STM left lateral hip RK  RK           Piriformis Stretch                               Neuro Re-Ed               SLS               Heel/Toe Gait               Marching while Walking               Rocker Board                               Ther Ex               Hip PROM               NuStep for strengthening:   S 7, A 9    L3 12 min           SLR Supine               Heel Slides               Bridging               Ball Squeeze               TB Clamshells Hooklying L1 TB 10x BID HEP  L1 1/10  L2 1/10           Supine Piriformis Stretch 10x10\" BID HEP  10x10\"           TB TKE               Hip Patterns Standing               Multi Hip:FLEX  F             EXT  P             ABD                 ADD               Leg Press: S               XO TKE               PYR QUAD:  S , P, C               PYR HAM:  S , P , C                               Ther Activity               Mini Squats               Marching               Side Stepping               Steps Forward               Steps Lateral               Steps Downward               Lunging               Lifting                Carrying                               Gait Training                                               Modalities               CP in right sidelying 10 min  Def        "

## 2025-05-27 ENCOUNTER — OFFICE VISIT (OUTPATIENT)
Dept: PHYSICAL THERAPY | Facility: CLINIC | Age: 75
End: 2025-05-27
Attending: STUDENT IN AN ORGANIZED HEALTH CARE EDUCATION/TRAINING PROGRAM
Payer: MEDICARE

## 2025-05-27 DIAGNOSIS — M70.62 GREATER TROCHANTERIC BURSITIS OF LEFT HIP: Primary | ICD-10-CM

## 2025-05-27 DIAGNOSIS — M16.12 PRIMARY OSTEOARTHRITIS OF LEFT HIP: ICD-10-CM

## 2025-05-27 PROCEDURE — 97140 MANUAL THERAPY 1/> REGIONS: CPT | Performed by: PHYSICAL THERAPIST

## 2025-05-27 PROCEDURE — 97110 THERAPEUTIC EXERCISES: CPT | Performed by: PHYSICAL THERAPIST

## 2025-05-27 NOTE — PROGRESS NOTES
"Daily Note     Today's date: 2025  Patient name: Ingrid Wheatley  : 1950  MRN: [id number]  Referring provider: Guille Ornelas,*  Dx:   Encounter Diagnosis     ICD-10-CM    1. Greater trochanteric bursitis of left hip  M70.62       2. Primary osteoarthritis of left hip  M16.12                      Subjective: Patient had some soreness after prolonged driving.      Objective: See treatment diary below      Assessment: Tolerated treatment well. Patient exhibited good technique with therapeutic exercise progressions      Plan: Progress treatment as tolerated.       Precautions: S/P Lumbar Laminectomy, RA, Chronic Pain        Manuals          STM left lateral hip RK  RK  RK         Piriformis Stretch                               Neuro Re-Ed               SLS               Heel/Toe Gait               Marching while Walking               Rocker Board                               Ther Ex               Hip PROM               NuStep for strengthening:   S 7, A 9    L3 12 min  L3 12 min         SLR Supine               Heel Slides               Bridging               Ball Squeeze               TB Clamshells Hooklying L1 TB 10x BID HEP  L1 1/10  L2 1/10  L2 2/10         Supine Piriformis Stretch 10x10\" BID HEP  10x10\"  10x10\"         Hip Abduction Sidelying    10x BID HEP  10x         Hip Patterns Standing      10x ea TIW HEP         Multi Hip:FLEX  F             EXT  P             ABD                 ADD               Leg Press: S               XO TKE               PYR QUAD:  S , P, C               PYR HAM:  S , P , C                               Ther Activity               Mini Squats               Marching               Side Stepping               Steps Forward               Steps Lateral               Steps Downward               Lunging               Lifting                Carrying                               Gait Training                                               Modalities    "            CP in right sidelying 10 min  Def

## 2025-05-28 ENCOUNTER — TELEPHONE (OUTPATIENT)
Age: 75
End: 2025-05-28

## 2025-05-28 NOTE — TELEPHONE ENCOUNTER
Pt under care of: Jose  Office Location: [place]    Insurance   Current Insurance? Medicare   Insurance E-verified? Yes    History  Last Seen (include Follow Up recommendations of last visit- see Office Visit - Instructions): 1/31/2025 - Return in 1 year.     Pt calling to schedule her yearly visit with , pt was advised the provider's schedule is not yet available for next year. Pt will call back in a few weeks.

## 2025-06-02 ENCOUNTER — OFFICE VISIT (OUTPATIENT)
Dept: PHYSICAL THERAPY | Facility: CLINIC | Age: 75
End: 2025-06-02
Attending: STUDENT IN AN ORGANIZED HEALTH CARE EDUCATION/TRAINING PROGRAM
Payer: MEDICARE

## 2025-06-02 DIAGNOSIS — M70.62 GREATER TROCHANTERIC BURSITIS OF LEFT HIP: Primary | ICD-10-CM

## 2025-06-02 DIAGNOSIS — M16.12 PRIMARY OSTEOARTHRITIS OF LEFT HIP: ICD-10-CM

## 2025-06-02 PROCEDURE — 97110 THERAPEUTIC EXERCISES: CPT | Performed by: PHYSICAL THERAPIST

## 2025-06-02 PROCEDURE — 97140 MANUAL THERAPY 1/> REGIONS: CPT | Performed by: PHYSICAL THERAPIST

## 2025-06-02 NOTE — PROGRESS NOTES
"Daily Note     Today's date: 2025  Patient name: Ingrid Wheatley  : 1950  MRN: [id number]  Referring provider: Guille Ornelas,*  Dx:   Encounter Diagnosis     ICD-10-CM    1. Greater trochanteric bursitis of left hip  M70.62       2. Primary osteoarthritis of left hip  M16.12                      Subjective: Patient feels some burning in the hip today. She is staying very active.      Objective: See treatment diary below      Assessment: Tolerated treatment well. Patient exhibited good technique with therapeutic exercises and would benefit from continued PT      Plan: Continue per plan of care.      Precautions: S/P Lumbar Laminectomy, RA, Chronic Pain        Manuals        STM left lateral hip RK  RK  RK  RK       Piriformis Stretch                               Neuro Re-Ed               SLS               Heel/Toe Gait               Marching while Walking               Rocker Board                               Ther Ex               Hip PROM               NuStep for strengthening:   S 7, A 9    L3 12 min  L3 12 min  L3 12 min       SLR Supine               Heel Slides               Bridging               Ball Squeeze               TB Clamshells Hooklying L1 TB 10x BID HEP  L1 1/10  L2 1/10  L2 2/10  L2 2/10       Supine Piriformis Stretch 10x10\" BID HEP  10x10\"  10x10\"  10x10\"       Hip Abduction Sidelying    10x BID HEP  10x  10x       Hip Patterns Standing      10x ea TIW HEP  10x ea       Multi Hip:FLEX  F             EXT  P             ABD                 ADD               Leg Press: S               XO TKE               PYR QUAD:  S , P, C               PYR HAM:  S , P , C                               Ther Activity               Mini Squats               Marching               Side Stepping               Steps Forward               Steps Lateral               Steps Downward               Lunging               Lifting                Carrying                             "   Gait Training                                               Modalities               CP in right sidelying 10 min  Def

## 2025-06-05 ENCOUNTER — OFFICE VISIT (OUTPATIENT)
Dept: PHYSICAL THERAPY | Facility: CLINIC | Age: 75
End: 2025-06-05
Attending: STUDENT IN AN ORGANIZED HEALTH CARE EDUCATION/TRAINING PROGRAM
Payer: MEDICARE

## 2025-06-05 DIAGNOSIS — M70.62 GREATER TROCHANTERIC BURSITIS OF LEFT HIP: Primary | ICD-10-CM

## 2025-06-05 DIAGNOSIS — M16.12 PRIMARY OSTEOARTHRITIS OF LEFT HIP: ICD-10-CM

## 2025-06-05 PROCEDURE — 97110 THERAPEUTIC EXERCISES: CPT | Performed by: PHYSICAL THERAPIST

## 2025-06-05 PROCEDURE — 97140 MANUAL THERAPY 1/> REGIONS: CPT | Performed by: PHYSICAL THERAPIST

## 2025-06-05 NOTE — PROGRESS NOTES
"Daily Note     Today's date: 2025  Patient name: Ingrid Wheatley  : 1950  MRN: [id number]  Referring provider: Guille Ornelas,*  Dx:   Encounter Diagnosis     ICD-10-CM    1. Greater trochanteric bursitis of left hip  M70.62       2. Primary osteoarthritis of left hip  M16.12                      Subjective: Patient has some hip pain today after driving more.      Objective: Lateral gluteal muscle tightness and tenderness are steadily improving.      Assessment: Tolerated treatment well. Patient exhibited good technique with therapeutic exercises      Plan: Continue per plan of care.      Precautions: S/P Lumbar Laminectomy, RA, Chronic Pain        Manuals      STM left lateral hip RK  RK  RK  RK  RK     Piriformis Stretch                               Neuro Re-Ed               SLS               Heel/Toe Gait               Marching while Walking               Rocker Board                               Ther Ex               Hip PROM               NuStep for strengthening:   S 7, A 9    L3 12 min  L3 12 min  L3 12 min  L3 15 min     SLR Supine               Heel Slides               Bridging               Ball Squeeze               TB Clamshells Hooklying L1 TB 10x BID HEP  L1 1/10  L2 1/10  L2 2/10  L2 2/10  L2 2/10     Supine Piriformis Stretch 10x10\" BID HEP  10x10\"  10x10\"  10x10\"  10x10\"     Hip Abduction Sidelying    10x BID HEP  10x  10x  10x     Hip Patterns Standing      10x ea TIW HEP  10x ea  10x ea     Multi Hip:FLEX  F             EXT  P             ABD                 ADD               Leg Press: S               XO TKE               PYR QUAD:  S , P, C               PYR HAM:  S , P , C                               Ther Activity               Mini Squats               Marching               Side Stepping               Steps Forward               Steps Lateral               Steps Downward               Lunging               Lifting                Carrying      "                          Gait Training                                               Modalities               CP in right sidelying 10 min  Def

## 2025-06-15 NOTE — PROGRESS NOTES
PT Re-Evaluation     Today's date: 2025  Patient name: Ingrid Wheatley  : 1950  MRN: [id number]  Referring provider: Guille Ornelas,*  Dx:   Encounter Diagnosis     ICD-10-CM    1. Greater trochanteric bursitis of left hip  M70.62       2. Primary osteoarthritis of left hip  M16.12                        Assessment    Assessment details:   CURRENT FUNCTIONAL STATUS    Standing/ADL tolerance 60 minutes.   Walking tolerance 1 mile.    Ascends/descends stairs reciprocally without mild pain.  Difficulty arising from sitting: None   Ability to dress lower extremities: Good   Ability to enter/exit vehicle/bed: Good    LONG TERM GOALS (DISCHARGE)    Hip AROM: WNL and painfree in all planes.-met  Hip Strength: ABD=19 lbs.-met  Decrease pain to 0-3/10.-met  Independent with HEP.-met  Standing/ADL tolerance 60 minutes.-met   Walking tolerance 1 mile.-met    Ascends/descends stairs reciprocally without pain.-met  Understanding of Dx/Px/POC: good     Prognosis: good    Goals  See assessment details above.      Plan  Planned modality interventions: cryotherapy and thermotherapy: hydrocollator packs    Planned therapy interventions: manual therapy, therapeutic exercise, therapeutic activities, self care, neuromuscular re-education and home exercise program    Plan details: The patient has shown good improvement in PT demonstrating decreased pain, increased range of motion, increased strength, and increased tolerance to activity. Goals have been met, the patient is satisfied with her current status, and she has been discharged to a home exercise program.        Subjective Evaluation    History of Present Illness  Mechanism of injury: Subjective: The patient's left hip pain is now mild and intermittent. She is doing well with daily activities that involve her hip. She feels that her remaining symptoms are due to her chronic low back problems.  Patient Goals  Patient goals for therapy: decreased pain and  "increased motion  Patient goal: Improved ability to stand and walk.  Pain  Current pain ratin  At best pain ratin  At worst pain ratin        Objective     Observations     Additional Observation Details  Gait is slightly antalgic on the left leg without an AD.    General Comments:      Hip Comments   CURRENT OBJECTIVE MEASUREMENTS    Hip AROM: WFL without pain during abduction.  Hip Strength: ABD=21 lbs without pain.                           Precautions: S/P Lumbar Laminectomy, RA, Chronic Pain        Manuals    STM left lateral hip RK  RK  RK  RK  RK  RK   Piriformis Stretch                               Neuro Re-Ed               SLS               Heel/Toe Gait               Marching while Walking               Rocker Board                               Ther Ex               Hip PROM               NuStep for strengthening:   S 7, A 9  L3 15 min  L3 12 min  L3 12 min  L3 12 min  L3 15 min  L3 15 min   SLR Supine               Heel Slides               Bridging               Ball Squeeze               TB Clamshells Hooklying L2 2/10  L1 1/10  L2 1/10  L2 2/10  L2 2/10  L2 2/10  L2 2/10   Supine Piriformis Stretch 10x10\"   10x10\"  10x10\"  10x10\"  10x10\"  10x10\"   Hip Abduction Sidelying  10x  10x BID HEP  10x  10x  10x  10x   Hip Patterns Standing  10x ea    10x ea TIW HEP  10x ea  10x ea  10x ea   Multi Hip:FLEX  F             EXT  P             ABD                 ADD               Leg Press: S               XO TKE               PYR QUAD:  S , P, C               PYR HAM:  S , P , C                               Ther Activity               Mini Squats               Marching               Side Stepping               Steps Forward               Steps Lateral               Steps Downward               Lunging               Lifting                Carrying                               Gait Training                                               Modalities               CP in " right sidelying   Def

## 2025-06-16 ENCOUNTER — OFFICE VISIT (OUTPATIENT)
Dept: PHYSICAL THERAPY | Facility: CLINIC | Age: 75
End: 2025-06-16
Attending: STUDENT IN AN ORGANIZED HEALTH CARE EDUCATION/TRAINING PROGRAM
Payer: MEDICARE

## 2025-06-16 DIAGNOSIS — M70.62 GREATER TROCHANTERIC BURSITIS OF LEFT HIP: Primary | ICD-10-CM

## 2025-06-16 DIAGNOSIS — M16.12 PRIMARY OSTEOARTHRITIS OF LEFT HIP: ICD-10-CM

## 2025-06-16 PROCEDURE — 97110 THERAPEUTIC EXERCISES: CPT | Performed by: PHYSICAL THERAPIST

## 2025-06-16 PROCEDURE — 97140 MANUAL THERAPY 1/> REGIONS: CPT | Performed by: PHYSICAL THERAPIST

## 2025-06-16 NOTE — PROGRESS NOTES
"Daily Note     Today's date: 2025  Patient name: Ingrid Wheatley  : 1950  MRN: [id number]  Referring provider: Guille Ornelas,*  Dx:   Encounter Diagnosis     ICD-10-CM    1. Greater trochanteric bursitis of left hip  M70.62       2. Primary osteoarthritis of left hip  M16.12                      Subjective: Patient has some pain increase in her low back and hip after sitting in an airplane for many hours.      Objective: See treatment diary below      Assessment: Gluteal tightness and tenderness are steadily improving. Patient exhibited good technique with therapeutic exercises      Plan: Progress note during next visit.  Potential discharge next visit.     Precautions: S/P Lumbar Laminectomy, RA, Chronic Pain        Manuals    STM left lateral hip RK  RK  RK  RK  RK  RK   Piriformis Stretch                               Neuro Re-Ed               SLS               Heel/Toe Gait               Marching while Walking               Rocker Board                               Ther Ex               Hip PROM               NuStep for strengthening:   S 7, A 9    L3 12 min  L3 12 min  L3 12 min  L3 15 min  L3 15 min   SLR Supine               Heel Slides               Bridging               Ball Squeeze               TB Clamshells Hooklying L1 TB 10x BID HEP  L1 1/10  L2 1/10  L2 2/10  L2 2/10  L2 2/10  L2 2/10   Supine Piriformis Stretch 10x10\" BID HEP  10x10\"  10x10\"  10x10\"  10x10\"  10x10\"   Hip Abduction Sidelying    10x BID HEP  10x  10x  10x  10x   Hip Patterns Standing      10x ea TIW HEP  10x ea  10x ea  10x ea   Multi Hip:FLEX  F             EXT  P             ABD                 ADD               Leg Press: S               XO TKE               PYR QUAD:  S , P, C               PYR HAM:  S , P , C                               Ther Activity               Mini Squats               Marching               Side Stepping               Steps Forward               Steps " Lateral               Steps Downward               Lunging               Lifting                Carrying                               Gait Training                                               Modalities               CP in right sidelying 10 min  Def

## 2025-06-17 DIAGNOSIS — M17.12 PRIMARY OSTEOARTHRITIS OF LEFT KNEE: ICD-10-CM

## 2025-06-19 ENCOUNTER — EVALUATION (OUTPATIENT)
Dept: PHYSICAL THERAPY | Facility: CLINIC | Age: 75
End: 2025-06-19
Attending: STUDENT IN AN ORGANIZED HEALTH CARE EDUCATION/TRAINING PROGRAM
Payer: MEDICARE

## 2025-06-19 DIAGNOSIS — M16.12 PRIMARY OSTEOARTHRITIS OF LEFT HIP: ICD-10-CM

## 2025-06-19 DIAGNOSIS — M70.62 GREATER TROCHANTERIC BURSITIS OF LEFT HIP: Primary | ICD-10-CM

## 2025-06-19 PROCEDURE — 97140 MANUAL THERAPY 1/> REGIONS: CPT | Performed by: PHYSICAL THERAPIST

## 2025-06-19 PROCEDURE — 97110 THERAPEUTIC EXERCISES: CPT | Performed by: PHYSICAL THERAPIST

## 2025-06-20 DIAGNOSIS — R05.9 COUGH: ICD-10-CM

## 2025-06-21 RX ORDER — MONTELUKAST SODIUM 10 MG/1
10 TABLET ORAL
Qty: 90 TABLET | Refills: 3 | Status: SHIPPED | OUTPATIENT
Start: 2025-06-21

## 2025-06-23 ENCOUNTER — OFFICE VISIT (OUTPATIENT)
Dept: URGENT CARE | Facility: MEDICAL CENTER | Age: 75
End: 2025-06-23
Payer: MEDICARE

## 2025-06-23 VITALS
OXYGEN SATURATION: 98 % | RESPIRATION RATE: 18 BRPM | DIASTOLIC BLOOD PRESSURE: 70 MMHG | SYSTOLIC BLOOD PRESSURE: 118 MMHG | HEART RATE: 104 BPM | BODY MASS INDEX: 32.19 KG/M2 | WEIGHT: 176 LBS | TEMPERATURE: 98.6 F

## 2025-06-23 DIAGNOSIS — R05.1 ACUTE COUGH: ICD-10-CM

## 2025-06-23 DIAGNOSIS — J01.00 ACUTE MAXILLARY SINUSITIS, RECURRENCE NOT SPECIFIED: Primary | ICD-10-CM

## 2025-06-23 PROCEDURE — 99213 OFFICE O/P EST LOW 20 MIN: CPT | Performed by: PHYSICIAN ASSISTANT

## 2025-06-23 PROCEDURE — G0463 HOSPITAL OUTPT CLINIC VISIT: HCPCS | Performed by: PHYSICIAN ASSISTANT

## 2025-06-23 RX ORDER — CIPROFLOXACIN 500 MG/1
500 TABLET, FILM COATED ORAL EVERY 12 HOURS SCHEDULED
Qty: 14 TABLET | Refills: 0 | Status: SHIPPED | OUTPATIENT
Start: 2025-06-23 | End: 2025-06-30

## 2025-06-23 RX ORDER — ALBUTEROL SULFATE 90 UG/1
2 INHALANT RESPIRATORY (INHALATION) EVERY 4 HOURS PRN
Qty: 8.5 G | Refills: 0 | Status: SHIPPED | OUTPATIENT
Start: 2025-06-23

## 2025-06-23 NOTE — PATIENT INSTRUCTIONS
Start ciprofloxacin 1 tablet by mouth twice daily x 7 days  Take mucinex 1 tablet by mouth twice daily with a full glass of water.   Stay well hydrated.  Start using albuterol inhaler 2 puffs every 4 hours as needed. Remember to rinse mouth after use.   Recommend use of netti pot or laura med sinus rinse twice a day PRN.   Recommend probiotic rich foods or supplementing with a probiotic while on antibiotics.  F/U with worsening or failure to improve

## 2025-06-23 NOTE — PROGRESS NOTES
[facility]        NAME: Ingrid Wheatley is a 75 y.o. female  : 1950    MRN: [id number]  DATE: 2025  TIME: 4:00 PM    Assessment and Plan   Acute maxillary sinusitis, recurrence not specified [J01.00]  1. Acute maxillary sinusitis, recurrence not specified  ciprofloxacin (CIPRO) 500 mg tablet      2. Acute cough  albuterol (ProAir HFA) 90 mcg/act inhaler        Start ciprofloxacin 1 tablet by mouth twice daily x 7 days  Take mucinex 1 tablet by mouth twice daily with a full glass of water.   Stay well hydrated.  Start using albuterol inhaler 2 puffs every 4 hours as needed. Remember to rinse mouth after use.   Recommend use of netti pot or laura med sinus rinse twice a day PRN.   Recommend probiotic rich foods or supplementing with a probiotic while on antibiotics.  F/U with worsening or failure to improve      Patient Instructions       Follow up with PCP in 3-5 days.  Proceed to  ER if symptoms worsen.    If tests have been performed at [facility], our office will contact you with results if changes need to be made to the care plan discussed with you at the visit.  You can review your full results on [facility].    Chief Complaint     Chief Complaint   Patient presents with    Cold Like Symptoms     Started with sinus issues 4 days ago. Still has cough , stuffy nose/ runny nose, hoarseness.Pt now taking Prednisone 5mg  daily.         History of Present Illness       Ingrid presents for evaluation of worsening cold symptoms after they started 4 days ago. She has rheumatoid arthritis at baseline and has been taking prednisone 5mg tablets as prescribed since May 5th, stopped, and then restarted on .   Symptoms include cough, congestion, sinus pressure, and burning eyes. Some sneezing.  Patient was starting to feel better, but last night took a turn for the worse. Coughing, mostly at night.   Taking zicam and delsym as needed without relief.   Decreased appetite, drinking well.  Normal urine output and bowel movements.   Denies fever.         Review of Systems   Review of Systems   Constitutional:  Negative for activity change, appetite change, fatigue and fever.   HENT:  Positive for congestion, sinus pressure and sinus pain. Negative for ear pain, rhinorrhea, sneezing, sore throat and trouble swallowing.    Eyes:  Negative for discharge and redness.   Respiratory:  Positive for cough. Negative for shortness of breath and wheezing.    Gastrointestinal:  Negative for abdominal pain, constipation, diarrhea, nausea and vomiting.   Skin:  Negative for rash.         Current Medications     Current Medications[1]    Current Allergies     Allergies as of 06/23/2025 - Reviewed 06/23/2025   Allergen Reaction Noted    Bee venom Shortness Of Breath 08/11/2017    Chlorhexidine Rash 09/04/2020    Other Rash, Hives, Palpitations, and Shortness Of Breath 04/13/2017    Egg yolk - food allergy Hives 11/07/2013    Iodinated contrast media Hives and Other (See Comments) 04/02/2016    Penicillins Hives 04/02/2016    Lidocaine Other (See Comments) 10/14/2021    Penicillin g Hives 02/21/2025    Allevyn adhesive [wound dressings] Hives and Rash 06/03/2022    Bactrim [sulfamethoxazole-trimethoprim] Rash 03/08/2019    Codeine Other (See Comments) 04/02/2016    Latex Rash and Other (See Comments) 12/08/2016    Medical tape Blisters, Hives, Itching, and Rash 05/12/2021    Oxybutynin Rash 04/22/2019    Pentosan polysulfate Rash 11/05/2018    Povidone iodine Rash 09/29/2020            The following portions of the patient's history were reviewed and updated as appropriate: allergies, current medications, past family history, past medical history, past social history, past surgical history and problem list.     Past Medical History[2]    Past Surgical History[3]    Family History[4]      Medications have been verified.        Objective   /70 (BP Location: Left arm, Patient Position: Sitting, Cuff Size: Large)    Pulse 104   Temp 98.6 °F (37 °C) (Temporal)   Resp 18   Wt 79.8 kg (176 lb)   LMP  (LMP Unknown)   SpO2 98%   BMI 32.19 kg/m²   No LMP recorded (lmp unknown). Patient has had a hysterectomy.       Physical Exam     Physical Exam  Vitals and nursing note reviewed.   Constitutional:       General: She is awake.      Appearance: Normal appearance. She is well-developed, well-groomed and normal weight. She is not ill-appearing.   HENT:      Head: Normocephalic.      Right Ear: Tympanic membrane, ear canal and external ear normal.      Left Ear: Tympanic membrane, ear canal and external ear normal.      Nose: Congestion present. No nasal deformity or rhinorrhea.      Right Sinus: Maxillary sinus tenderness present. No frontal sinus tenderness.      Left Sinus: Maxillary sinus tenderness present. No frontal sinus tenderness.      Mouth/Throat:      Lips: Pink. No lesions.      Mouth: Mucous membranes are moist.     Eyes:      General: Lids are normal.      Conjunctiva/sclera: Conjunctivae normal.      Pupils: Pupils are equal, round, and reactive to light.     Neck:      Thyroid: No thyromegaly.     Cardiovascular:      Rate and Rhythm: Normal rate and regular rhythm.      Heart sounds: Normal heart sounds. No murmur heard.  Pulmonary:      Effort: Pulmonary effort is normal. No accessory muscle usage, respiratory distress or retractions.      Breath sounds: Decreased air movement present. No stridor or transmitted upper airway sounds. Examination of the right-lower field reveals decreased breath sounds. Examination of the left-lower field reveals decreased breath sounds. Decreased breath sounds present. No wheezing, rhonchi or rales.      Comments: Frequent tight cough  Abdominal:      General: Bowel sounds are normal.      Palpations: Abdomen is soft.      Tenderness: There is no abdominal tenderness.      Hernia: No hernia is present.     Musculoskeletal:      Cervical back: Normal range of motion and neck  supple.   Lymphadenopathy:      Head:      Right side of head: No submandibular, tonsillar, preauricular or posterior auricular adenopathy.      Left side of head: No submandibular, tonsillar, preauricular or posterior auricular adenopathy.      Cervical: No cervical adenopathy.     Skin:     General: Skin is warm and dry.      Capillary Refill: Capillary refill takes less than 2 seconds.      Coloration: Skin is not pale.      Findings: No rash.     Neurological:      Mental Status: She is alert and oriented to person, place, and time.      Comments: CN II-X grossly intact.   Psychiatric:         Speech: Speech normal.         Behavior: Behavior normal. Behavior is cooperative.                        [1]   Current Outpatient Medications:     albuterol (ProAir HFA) 90 mcg/act inhaler, Inhale 2 puffs every 4 (four) hours as needed for wheezing, Disp: 8.5 g, Rfl: 0    amitriptyline (ELAVIL) 50 mg tablet, TAKE 1 TABLET AT BEDTIME, Disp: 90 tablet, Rfl: 1    Biotin 2500 MCG CAPS, Take 1 capsule by mouth in the morning and 1 capsule before bedtime., Disp: , Rfl:     calcium carbonate (OS-ANTHONY) 600 MG tablet, Take 1,200 mg by mouth in the morning and 1,200 mg in the evening. Take with meals., Disp: , Rfl:     Cholecalciferol (VITAMIN D-3 PO), Take 1 tablet by mouth in the morning., Disp: , Rfl:     ciprofloxacin (CIPRO) 500 mg tablet, Take 1 tablet (500 mg total) by mouth every 12 (twelve) hours for 7 days, Disp: 14 tablet, Rfl: 0    Cyanocobalamin (VITAMIN B-12 CR PO), Take 1 tablet by mouth in the morning., Disp: , Rfl:     Diclofenac Sodium (VOLTAREN) 1 %, Apply 2 g topically 4 (four) times a day, Disp: 400 g, Rfl: 5    dicyclomine (BENTYL) 10 mg capsule, TAKE 1 CAPSULE TWICE DAILY, Disp: 180 capsule, Rfl: 1    diphenhydrAMINE (BENADRYL) 25 mg tablet, Take 30 minutes prior to CT scan, Disp: 1 tablet, Rfl: 0    docusate sodium (COLACE) 100 mg capsule, Take 100 mg by mouth daily at bedtime, Disp: , Rfl:     EPINEPHrine  (EPIPEN) 0.3 mg/0.3 mL SOAJ, INJECT 0.3 ML (0.3 MG TOTAL) INTO A MUSCLE AS NEEDED FOR ANAPHYLAXIS, Disp: 2 each, Rfl: 1    Evening Primrose Oil 1000 MG CAPS, Take 1 capsule (1,000 mg total) by mouth in the morning, Disp: , Rfl: 0    famotidine (PEPCID) 20 mg tablet, TAKE 1 TABLET TWICE DAILY, Disp: 180 tablet, Rfl: 1    fexofenadine (ALLEGRA) 180 MG tablet, Take 180 mg by mouth in the morning., Disp: , Rfl:     folic acid (FOLVITE) 1 mg tablet, , Disp: , Rfl:     gabapentin (NEURONTIN) 300 mg capsule, Take 1 tablet in the morning, 1 tablet afternoon and 2 bedtime, Disp: 360 capsule, Rfl: 3    hydroxychloroquine (PLAQUENIL) 200 mg tablet, Take 200 mg by mouth daily with breakfast, Disp: , Rfl:     losartan-hydrochlorothiazide (HYZAAR) 100-25 MG per tablet, TAKE 1 TABLET EVERY DAY, Disp: 90 tablet, Rfl: 1    methocarbamol (ROBAXIN) 750 mg tablet, TAKE 1 TABLET EVERY 8 HOURS, Disp: 270 tablet, Rfl: 3    methotrexate 2.5 MG tablet, Take by mouth once a week, Disp: , Rfl:     montelukast (SINGULAIR) 10 mg tablet, TAKE 1 TABLET AT BEDTIME, Disp: 90 tablet, Rfl: 3    Multiple Vitamins-Minerals (PRESERVISION AREDS 2 PO), Take by mouth in the morning, Disp: , Rfl:     Omega-3 Fatty Acids (FISH OIL) 1,000 mg, Take 1,000 mg by mouth in the morning and 1,000 mg in the evening and 1,000 mg before bedtime., Disp: , Rfl:     omeprazole (PriLOSEC) 40 MG capsule, Take 1 capsule (40 mg total) by mouth daily, Disp: 90 capsule, Rfl: 3    rosuvastatin (CRESTOR) 10 MG tablet, TAKE 1 TABLET DAILY ON MONDAY, WEDNESDAY AND FRIDAY (ALTERNATE WITH SIMVASTATIN), Disp: 36 tablet, Rfl: 1    simvastatin (ZOCOR) 10 mg tablet, TAKE 1 TABLET ON TUESDAY, THURSDAY, SATURDAY AND SUNDAY (TO BE ALTERNATED WITH ROSUVASTATIN), Disp: 48 tablet, Rfl: 3    Current Facility-Administered Medications:     ondansetron (ZOFRAN) injection 4 mg, 4 mg, Intravenous, Q6H PRN, Guille Perdomo MD  [2]   Past Medical History:  Diagnosis Date    Abnormal findings on  diagnostic imaging of breast     Abnormal Pap smear of cervix     Allergic     Arthritis     BRCA1 negative     BRCA2 negative     Breast cyst     Chronic pain disorder     Closed fracture of proximal end of left fibula 06/04/2021    Extremity pain     Fibrocystic breast disease     Foot pain     GERD (gastroesophageal reflux disease)     Hyperlipidemia     Hypertension     Interstitial cystitis     Irritable bowel syndrome     Joint pain     Low back pain     Years    Lumbosacral disc disease     Osteoarthritis     Osteopenia     PONV (postoperative nausea and vomiting) 10/17/2023    Thoracic disc disorder     Years    Uncomplicated opioid dependence (HCC) 03/01/2022    Urinary tract infection    [3]   Past Surgical History:  Procedure Laterality Date    APPENDECTOMY      BACK SURGERY      lumbar    BREAST CYST ASPIRATION      BREAST CYST EXCISION      BREAST EXCISIONAL BIOPSY Left 1996    benign    BREAST LUMPECTOMY      CARPAL BOSS EXCISION      CARPAL TUNNEL RELEASE      CHOLECYSTECTOMY      COLONOSCOPY      DIAGNOSTIC LAPAROSCOPY      FOOT SURGERY      FRACTURE SURGERY      HAND SURGERY  5/2017    HYSTERECTOMY      age 32    HYSTEROSCOPY      IR CRYOABLATION  10/17/2023    JOINT REPLACEMENT Bilateral     tkr    KIDNEY SURGERY Left     KNEE ARTHROSCOPY Bilateral     LAMINECTOMY      LAPAROSCOPY      hynecologic with adhesions    MYOMECTOMY      OOPHORECTOMY Bilateral     age 32    ORTHOPEDIC SURGERY      NH CAR IMPLTJ NSTIM ELTRDS PLATE/PADDLE EDRL N/A 05/18/2017    Procedure: PLACEMENT OF THORACIC SPINAL CORD STIMULATOR WITH LEFT BUTTOCK IMPLANTABLE PULSE GENERATOR (IMPULSE MONITORING-MOTORS (TCEMEP), EMG, SSEP);  Surgeon: Julius Damon MD;  Location: [facility];  Service: Neurosurgery    SPINAL CORD STIMULATOR IMPLANT Left 09/29/2020    Procedure: LAMINECTOMY THORACIC , REMOVAL OF SCS LEADS AND GENERATOR;  Surgeon: Oswald Whitt MD;  Location:  MAIN OR;  Service: Neurosurgery    SPINAL STIMULATOR PLACEMENT   05/2017    TONSILLECTOMY AND ADENOIDECTOMY      onset: unknown    TOTAL KNEE ARTHROPLASTY Right    [4]   Family History  Problem Relation Name Age of Onset    Bone cancer Mother María Wheatley     Cancer Mother María Wheatley     Asthma Mother María Wheatley     Rheum arthritis Mother María Wheatley     Brain cancer Father Hunter Wheatley     Stroke Father Hunter Wheatley         stroke sydrome    Transient ischemic attack Father Hunter Wheatley     BRCA1 Negative Sister ERLA BIASI SISTER     BRCA2 Negative Sister ERLA BIASI SISTER     Depression Sister ERLA BIASI SISTER     Migraines Sister ERLA BIASI SISTER     Asthma Sister ERLA BIASI SISTER     Asthma Sister      Diabetes Sister Christineashley Levinekathy     Depression Sister Christine Levinekathy     Bipolar disorder Sister Christine Chamberlain     Suicide Attempts Sister Christine Chamberlain     No Known Problems Maternal Grandmother      No Known Problems Maternal Grandfather      Diabetes Paternal Grandmother Maritza Mcknightsarita     No Known Problems Paternal Grandfather      Heart disease Brother Asael Wheatley     Diabetes Brother Asael Wheatley     Heart attack Brother Asael Wheatley     Colon cancer Brother Asael Wheatley 66    Cancer Brother Asael Wheatley     Hypertension Brother Asael Wheatley     No Known Problems Brother      Breast cancer Maternal Aunt Ly Zuleta 70    Breast cancer additional onset Maternal Aunt Ly Zuleta 72    Cancer Maternal Aunt Ly Zuleta     Bipolar disorder Sister Padma Wheatley

## 2025-06-26 ENCOUNTER — APPOINTMENT (OUTPATIENT)
Dept: RADIOLOGY | Facility: MEDICAL CENTER | Age: 75
End: 2025-06-26
Attending: INTERNAL MEDICINE
Payer: MEDICARE

## 2025-06-26 ENCOUNTER — NURSE TRIAGE (OUTPATIENT)
Age: 75
End: 2025-06-26

## 2025-06-26 DIAGNOSIS — R05.2 SUBACUTE COUGH: Primary | ICD-10-CM

## 2025-06-26 DIAGNOSIS — R05.2 SUBACUTE COUGH: ICD-10-CM

## 2025-06-26 PROCEDURE — 71046 X-RAY EXAM CHEST 2 VIEWS: CPT

## 2025-06-26 RX ORDER — DEXTROMETHORPHAN HYDROBROMIDE AND PROMETHAZINE HYDROCHLORIDE 15; 6.25 MG/5ML; MG/5ML
5 SYRUP ORAL 4 TIMES DAILY PRN
Qty: 240 ML | Refills: 1 | Status: SHIPPED | OUTPATIENT
Start: 2025-06-26

## 2025-06-26 NOTE — TELEPHONE ENCOUNTER
Please look for options for followup appt for patient - I do not have availability today and not sure of tomorrow but check other options  I ordered cxr and sent rx for cough syrup but should schedule followup visit in next few days

## 2025-06-26 NOTE — TELEPHONE ENCOUNTER
"REASON FOR CONVERSATION: Cough    SYMPTOMS: cough, runny nose, yellow sputum, headache x 6 days    OTHER HEALTH INFORMATION: Patient went to urgent on Monday 6/2/25 and was given ciprofloxacin 500 MG and albuterol inhaler. She's also using mucinex. Patient states she's been taking the medications as prescribed but still has a bad cough. Patient is asking for recommendations for her cough.    PROTOCOL DISPOSITION: Discuss With PCP and Callback by Nurse Today (overriding See Today in Office)    CARE ADVICE PROVIDED: Will discuss with PCP and call back with further recommendations. Patient advised to use a humidifier at home to help with coughing. Take over the counter tylenol or ibuprofen for the headache. Continue with mucinex and medications as prescribed and call back if symptoms worsen.    PRACTICE FOLLOW-UP: Please call back patient with further recommendations.      Reason for Disposition   SEVERE coughing spells (e.g., whooping sound after coughing, vomiting after coughing)    Answer Assessment - Initial Assessment Questions  1. ONSET: \"When did the cough begin?\"       Last Friday 6/20 patient started with cough    2. SEVERITY: \"How bad is the cough today?\"       Patient states her coughing is bad today, she's experiencing coughing spells    3. SPUTUM: \"Describe the color of your sputum\" (e.g., none, dry cough; clear, white, yellow, green)      A little yellow buyt not as bad as it used to be    4. HEMOPTYSIS: \"Are you coughing up any blood?\" If Yes, ask: \"How much?\" (e.g., flecks, streaks, tablespoons, etc.)      Denies    5. DIFFICULTY BREATHING: \"Are you having difficulty breathing?\" If Yes, ask: \"How bad is it?\" (e.g., mild, moderate, severe)       Denies    6. FEVER: \"Do you have a fever?\" If Yes, ask: \"What is your temperature, how was it measured, and when did it start?\"      Denies    7. CARDIAC HISTORY: \"Do you have any history of heart disease?\" (e.g., heart attack, congestive heart failure)       " "Hypertension    8. LUNG HISTORY: \"Do you have any history of lung disease?\"  (e.g., pulmonary embolus, asthma, emphysema)      Denies    9. PE RISK FACTORS: \"Do you have a history of blood clots?\" (or: recent major surgery, recent prolonged travel, bedridden)      Denies    10. OTHER SYMPTOMS: \"Do you have any other symptoms?\" (e.g., runny nose, wheezing, chest pain)        Runny nose but has gotten better, fatigue, headache      12. TRAVEL: \"Have you traveled out of the country in the last month?\" (e.g., travel history, exposures)        denies    Protocols used: Cough-Adult-[place]    "

## 2025-06-27 ENCOUNTER — TELEPHONE (OUTPATIENT)
Age: 75
End: 2025-06-27

## 2025-06-27 ENCOUNTER — OFFICE VISIT (OUTPATIENT)
Dept: FAMILY MEDICINE CLINIC | Facility: CLINIC | Age: 75
End: 2025-06-27
Payer: MEDICARE

## 2025-06-27 ENCOUNTER — NURSE TRIAGE (OUTPATIENT)
Age: 75
End: 2025-06-27

## 2025-06-27 ENCOUNTER — PATIENT MESSAGE (OUTPATIENT)
Dept: FAMILY MEDICINE CLINIC | Facility: CLINIC | Age: 75
End: 2025-06-27

## 2025-06-27 VITALS
SYSTOLIC BLOOD PRESSURE: 124 MMHG | BODY MASS INDEX: 31.01 KG/M2 | RESPIRATION RATE: 18 BRPM | TEMPERATURE: 96.2 F | HEART RATE: 81 BPM | HEIGHT: 63 IN | OXYGEN SATURATION: 96 % | WEIGHT: 175 LBS | DIASTOLIC BLOOD PRESSURE: 78 MMHG

## 2025-06-27 DIAGNOSIS — R11.0 NAUSEA: Primary | ICD-10-CM

## 2025-06-27 DIAGNOSIS — I10 HYPERTENSION, UNSPECIFIED TYPE: ICD-10-CM

## 2025-06-27 DIAGNOSIS — R11.0 NAUSEA: ICD-10-CM

## 2025-06-27 DIAGNOSIS — J40 BRONCHITIS: Primary | ICD-10-CM

## 2025-06-27 DIAGNOSIS — M06.09 RHEUMATOID ARTHRITIS OF MULTIPLE SITES WITH NEGATIVE RHEUMATOID FACTOR (HCC): ICD-10-CM

## 2025-06-27 PROCEDURE — 99214 OFFICE O/P EST MOD 30 MIN: CPT | Performed by: INTERNAL MEDICINE

## 2025-06-27 PROCEDURE — G2211 COMPLEX E/M VISIT ADD ON: HCPCS | Performed by: INTERNAL MEDICINE

## 2025-06-27 RX ORDER — ONDANSETRON 4 MG/1
4 TABLET, FILM COATED ORAL EVERY 8 HOURS PRN
Qty: 12 TABLET | Refills: 0 | Status: SHIPPED | OUTPATIENT
Start: 2025-06-27 | End: 2025-06-27 | Stop reason: SDUPTHER

## 2025-06-27 RX ORDER — ONDANSETRON 4 MG/1
4 TABLET, FILM COATED ORAL EVERY 8 HOURS PRN
Qty: 12 TABLET | Refills: 0 | Status: SHIPPED | OUTPATIENT
Start: 2025-06-27 | End: 2025-07-01

## 2025-06-27 RX ORDER — AZELASTINE 1 MG/ML
1 SPRAY, METERED NASAL 2 TIMES DAILY
Qty: 30 ML | Refills: 1 | Status: SHIPPED | OUTPATIENT
Start: 2025-06-27

## 2025-06-27 NOTE — ASSESSMENT & PLAN NOTE
Orders:    azelastine (ASTELIN) 0.1 % nasal spray; 1 spray into each nostril 2 (two) times a day Use in each nostril as directed  Add nasal spray   10ml cough syrup at HS Stay hydrated,rest  CXR pending but clinically stable  PT is taking prednisone for RA so she will take 20mg for 5 days

## 2025-06-27 NOTE — TELEPHONE ENCOUNTER
Make sure taking prednisone with food  Would hold second dose of cipro today and only use the cough syrup prescribed (hold mucinex etc in addition)  Increase fluids, gatorade, rest)  Pepcid twice a day loose stools continue would stop the antibiotic  I sent zofran to pharmacy to help with nausea but if symptoms worsen go to ER for eval   Please call to check if radiology can read cxr done yesterday given patient symptoms

## 2025-06-27 NOTE — TELEPHONE ENCOUNTER
Pt called stating that for the past 30-45min her stomach has been bothering her, like a burning sensation and states its travelling to her chest.  States pain is 10/10.  Pt states she was sweating and had an episode of diarrhea as well.  States she told PCP she had upset stomach at appt this morning.  Pt states she feels weaker but thinks that is related to diarrhea and not eating much.  Pt is no longer sweating but wanted to notify PCP of episode.      Pt states she did not  prescribed nasal spray yet but did  cough syrup last night.  Pt confirms she took her pepcid this morning.  Please review and advise how pt should proceed.

## 2025-06-27 NOTE — PROGRESS NOTES
"Name: Ingrid Wheatley      : 1950      MRN: [id number]  Encounter Provider: Leslie Bermudez DO  Encounter Date: 2025   Encounter department: [facility]  :  Assessment & Plan  Hypertension, unspecified type  Stable Continue current rx Avoid decongestants        Bronchitis    Orders:  •  azelastine (ASTELIN) 0.1 % nasal spray; 1 spray into each nostril 2 (two) times a day Use in each nostril as directed  Add nasal spray   10ml cough syrup at HS Stay hydrated,rest  CXR pending but clinically stable  PT is taking prednisone for RA so she will take 20mg for 5 days   Rheumatoid arthritis of multiple sites with negative rheumatoid factor (HCC)  Pt on prednisone taper for RA thru rheumatology Take as directed with food        Rto as scheduled       History of Present Illness   HPI  Pt has had cough and congestion since end of last week She is on cipro from the care now  She has cough that seems to be loosening in past day or so and it is now white not colored No fever or chills No sob Harsh cough that intereferes with sleep She did get cough syrup rx yesterday and thinks that helped a bit last PM She was off work on Tuesday and left early yesterday due to sxs   Review of Systems   Constitutional:  Negative for chills and fever.   HENT:  Positive for postnasal drip.    Eyes:  Negative for visual disturbance.   Respiratory:  Positive for cough. Negative for shortness of breath and wheezing.    Cardiovascular:  Negative for chest pain, palpitations and leg swelling.   Gastrointestinal: Negative.    Genitourinary: Negative.    Musculoskeletal:  Positive for arthralgias.   Neurological:  Negative for dizziness, light-headedness and headaches.   Psychiatric/Behavioral:  Negative for sleep disturbance. The patient is not nervous/anxious.        Objective   /78   Pulse 81   Temp (!) 96.2 °F (35.7 °C)   Resp 18   Ht 5' 3\" (1.6 m)   Wt 79.4 kg (175 lb)   LMP  (LMP Unknown)   SpO2 96%   " BMI 31.00 kg/m²      Physical Exam  Vitals and nursing note reviewed.   Constitutional:       General: She is not in acute distress.     Appearance: Normal appearance. She is not ill-appearing, toxic-appearing or diaphoretic.   HENT:      Head: Normocephalic and atraumatic.      Right Ear: Tympanic membrane, ear canal and external ear normal.      Left Ear: Tympanic membrane, ear canal and external ear normal.      Nose: Congestion and rhinorrhea present.      Mouth/Throat:      Mouth: Mucous membranes are moist.      Pharynx: No oropharyngeal exudate or posterior oropharyngeal erythema.     Eyes:      General: No scleral icterus.      Cardiovascular:      Rate and Rhythm: Normal rate and regular rhythm.      Pulses: Normal pulses.   Pulmonary:      Effort: Pulmonary effort is normal. No respiratory distress.      Breath sounds: Normal breath sounds. No wheezing.   Abdominal:      General: There is no distension.      Palpations: Abdomen is soft.      Tenderness: There is no abdominal tenderness.     Musculoskeletal:      Cervical back: Normal range of motion and neck supple.      Right lower leg: No edema.      Left lower leg: No edema.   Lymphadenopathy:      Cervical: No cervical adenopathy.     Skin:     General: Skin is warm and dry.      Coloration: Skin is not jaundiced.     Neurological:      General: No focal deficit present.      Mental Status: She is alert and oriented to person, place, and time. Mental status is at baseline.     Psychiatric:         Mood and Affect: Mood normal.         Behavior: Behavior normal.         Thought Content: Thought content normal.         Judgment: Judgment normal.      10-Mar-2021 21:41

## 2025-06-27 NOTE — TELEPHONE ENCOUNTER
"Reason for Disposition   Prescription not at pharmacy and was prescribed by PCP recently  (Exception: triager has access to EMR and prescription is recorded there. Go to Home Care and confirm for pharmacy.)    Answer Assessment - Initial Assessment Questions  1. DRUG NAME: \"What medicine do you need to have refilled?\"        ondansetron (ZOFRAN) 4 mg tablet      2. REFILLS REMAINING: \"How many refills are remaining?\" (Note: The label on the medicine or pill bottle will show how many refills are remaining. If there are no refills remaining, then a renewal may be needed.)      0  3. EXPIRATION DATE: \"What is the expiration date?\" (Note: The label states when the prescription will , and thus can no longer be refilled.)      25  4. PRESCRIBING HCP: \"Who prescribed it?\" Reason: If prescribed by specialist, call should be referred to that group.      Dr. Bermudez  5. SYMPTOMS: \"Do you have any symptoms?\"      N/a  6. PREGNANCY: \"Is there any chance that you are pregnant?\" \"When was your last menstrual period?\"      N/a    Protocols used: Medication Refill and Renewal Call-Adult-[place]    "

## 2025-06-27 NOTE — TELEPHONE ENCOUNTER
Relayed provider message/recommendations to pt.  Pt verbalized understanding.    Please reach out to radiology as per provider request.

## 2025-06-29 ENCOUNTER — HOSPITAL ENCOUNTER (EMERGENCY)
Facility: HOSPITAL | Age: 75
Discharge: HOME | End: 2025-06-29
Attending: EMERGENCY MEDICINE | Admitting: EMERGENCY MEDICINE
Payer: MEDICARE

## 2025-06-29 VITALS
HEART RATE: 74 BPM | SYSTOLIC BLOOD PRESSURE: 110 MMHG | DIASTOLIC BLOOD PRESSURE: 53 MMHG | TEMPERATURE: 97.4 F | RESPIRATION RATE: 20 BRPM | WEIGHT: 177.69 LBS | BODY MASS INDEX: 31.48 KG/M2 | OXYGEN SATURATION: 96 %

## 2025-06-29 DIAGNOSIS — R19.7 DIARRHEA: Primary | ICD-10-CM

## 2025-06-29 DIAGNOSIS — R11.0 NAUSEA: ICD-10-CM

## 2025-06-29 LAB
ALBUMIN SERPL BCG-MCNC: 4.5 G/DL (ref 3.5–5)
ALP SERPL-CCNC: 76 U/L (ref 34–104)
ALT SERPL W P-5'-P-CCNC: 28 U/L (ref 7–52)
ANION GAP SERPL CALCULATED.3IONS-SCNC: 8 MMOL/L (ref 4–13)
AST SERPL W P-5'-P-CCNC: 30 U/L (ref 13–39)
BASOPHILS # BLD AUTO: 0.03 THOUSANDS/ÂΜL (ref 0–0.1)
BASOPHILS NFR BLD AUTO: 0 % (ref 0–1)
BILIRUB SERPL-MCNC: 0.45 MG/DL (ref 0.2–1)
BUN SERPL-MCNC: 13 MG/DL (ref 5–25)
CALCIUM SERPL-MCNC: 9.3 MG/DL (ref 8.4–10.2)
CHLORIDE SERPL-SCNC: 100 MMOL/L (ref 96–108)
CO2 SERPL-SCNC: 30 MMOL/L (ref 21–32)
CREAT SERPL-MCNC: 0.67 MG/DL (ref 0.6–1.3)
EOSINOPHIL # BLD AUTO: 0.04 THOUSAND/ÂΜL (ref 0–0.61)
EOSINOPHIL NFR BLD AUTO: 1 % (ref 0–6)
ERYTHROCYTE [DISTWIDTH] IN BLOOD BY AUTOMATED COUNT: 13.3 % (ref 11.6–15.1)
GFR SERPL CREATININE-BSD FRML MDRD: 86 ML/MIN/1.73SQ M
GLUCOSE SERPL-MCNC: 121 MG/DL (ref 65–140)
HCT VFR BLD AUTO: 41.7 % (ref 34.8–46.1)
HGB BLD-MCNC: 14.3 G/DL (ref 11.5–15.4)
IMM GRANULOCYTES # BLD AUTO: 0.04 THOUSAND/UL (ref 0–0.2)
IMM GRANULOCYTES NFR BLD AUTO: 1 % (ref 0–2)
LIPASE SERPL-CCNC: 42 U/L (ref 11–82)
LYMPHOCYTES # BLD AUTO: 1.57 THOUSANDS/ÂΜL (ref 0.6–4.47)
LYMPHOCYTES NFR BLD AUTO: 18 % (ref 14–44)
MCH RBC QN AUTO: 31.1 PG (ref 26.8–34.3)
MCHC RBC AUTO-ENTMCNC: 34.3 G/DL (ref 31.4–37.4)
MCV RBC AUTO: 91 FL (ref 82–98)
MONOCYTES # BLD AUTO: 0.35 THOUSAND/ÂΜL (ref 0.17–1.22)
MONOCYTES NFR BLD AUTO: 4 % (ref 4–12)
NEUTROPHILS # BLD AUTO: 6.54 THOUSANDS/ÂΜL (ref 1.85–7.62)
NEUTS SEG NFR BLD AUTO: 76 % (ref 43–75)
NRBC BLD AUTO-RTO: 0 /100 WBCS
PLATELET # BLD AUTO: 189 THOUSANDS/UL (ref 149–390)
PMV BLD AUTO: 9.7 FL (ref 8.9–12.7)
POTASSIUM SERPL-SCNC: 3.6 MMOL/L (ref 3.5–5.3)
PROT SERPL-MCNC: 7 G/DL (ref 6.4–8.4)
RBC # BLD AUTO: 4.6 MILLION/UL (ref 3.81–5.12)
SODIUM SERPL-SCNC: 138 MMOL/L (ref 135–147)
WBC # BLD AUTO: 8.57 THOUSAND/UL (ref 4.31–10.16)

## 2025-06-29 PROCEDURE — 85025 COMPLETE CBC W/AUTO DIFF WBC: CPT

## 2025-06-29 PROCEDURE — 99285 EMERGENCY DEPT VISIT HI MDM: CPT

## 2025-06-29 PROCEDURE — 96375 TX/PRO/DX INJ NEW DRUG ADDON: CPT

## 2025-06-29 PROCEDURE — 83690 ASSAY OF LIPASE: CPT

## 2025-06-29 PROCEDURE — 80053 COMPREHEN METABOLIC PANEL: CPT

## 2025-06-29 PROCEDURE — 93005 ELECTROCARDIOGRAM TRACING: CPT

## 2025-06-29 PROCEDURE — 99284 EMERGENCY DEPT VISIT MOD MDM: CPT

## 2025-06-29 PROCEDURE — 36415 COLL VENOUS BLD VENIPUNCTURE: CPT

## 2025-06-29 PROCEDURE — 96374 THER/PROPH/DIAG INJ IV PUSH: CPT

## 2025-06-29 RX ORDER — KETOROLAC TROMETHAMINE 30 MG/ML
15 INJECTION, SOLUTION INTRAMUSCULAR; INTRAVENOUS ONCE
Status: COMPLETED | OUTPATIENT
Start: 2025-06-29 | End: 2025-06-29

## 2025-06-29 RX ORDER — ONDANSETRON 2 MG/ML
4 INJECTION INTRAMUSCULAR; INTRAVENOUS ONCE
Status: COMPLETED | OUTPATIENT
Start: 2025-06-29 | End: 2025-06-29

## 2025-06-29 RX ADMIN — KETOROLAC TROMETHAMINE 15 MG: 30 INJECTION, SOLUTION INTRAMUSCULAR at 14:18

## 2025-06-29 RX ADMIN — ONDANSETRON 4 MG: 2 INJECTION INTRAMUSCULAR; INTRAVENOUS at 14:18

## 2025-06-29 NOTE — ED PROVIDER NOTES
Time reflects when diagnosis was documented in both MDM as applicable and the Disposition within this note       Time User Action Codes Description Comment    6/29/2025  3:48 PM Shannon Kwok Add [R19.7] Diarrhea     6/29/2025  3:48 PM Shannon Kwok Add [R11.0] Nausea           ED Disposition       ED Disposition   Discharge    Condition   Stable    Date/Time   Sun Jun 29, 2025  3:48 PM    Comment   Ingrid Wheatley discharge to home/self care.                   Assessment & Plan       Medical Decision Making  Patient is a 75-year-old female presenting for evaluation of abdominal pain, nausea, and diarrhea. Upon examination, patient is non-toxic appearing and does not appear in acute distress. Vital signs are stable and patient is afebrile. Normal heart and lung sounds noted. No abdominal tenderness upon palpation. No signs of dehydration are present.     Patient is at risk for: electrolyte imbalance, dehydration, gastroenteritis, and other viral syndromes.    Blood work obtained was grossly unremarkable. EKG was obtained which showed sinus tachycardia with a heart rate of 92 and no axis shift. No significant change when compared to previous EKG on file. Patient's abdomen was reassessed and she continues to deny any abdominal tenderness. Notes that her nausea is controlled with the Zofran she received and abdominal pain is resolved with Toradol. Discussed with patient that she may continue to take the remainder of her cipro prescription as it is unlikely that is the cause of the diarrhea. Cipro may be the cause of some abdominal discomfort and nausea and she should continue to use Zofran for nausea. Encouraged small bland meals and increased hydration. Encouraged follow-up with PCP and to return to the ED for any worsening symptoms. Patient verbalizes understanding of discharge instructions and follow-up care at this time.    Amount and/or Complexity of Data Reviewed  Labs: ordered. Decision-making details  documented in ED Course.     Details: Reviewed     Risk  Prescription drug management.        ED Course as of 06/30/25 2007   Sun Jun 29, 2025   1330 CBC and differential(!)  No leukocytosis. Hemoglobin is stable.    1337 Comprehensive metabolic panel  No electrolyte imbalance noted.    1345 Lipase  Normal        Medications   ondansetron (ZOFRAN) injection 4 mg (4 mg Intravenous Given 6/29/25 1418)   ketorolac (TORADOL) injection 15 mg (15 mg Intravenous Given 6/29/25 1418)       ED Risk Strat Scores                    (ISAR) Identification of Seniors at Risk  Before the illness or injury that brought you to the Emergency, did you need someone to help you on a regular basis?: 0  In the last 24 hours, have you needed more help than usual?: 0  Have you been hospitalized for one or more nights during the past 6 months?: 0  In general, do you see well?: 0  In general, do you have serious problems with your memory?: 0  Do you take more than three different medications every day?: 0  ISAR Score: 0            SBIRT 22yo+      Flowsheet Row Most Recent Value   Initial Alcohol Screen: US AUDIT-C     1. How often do you have a drink containing alcohol? 0 Filed at: 06/29/2025 1234   2. How many drinks containing alcohol do you have on a typical day you are drinking?  0 Filed at: 06/29/2025 1234   3b. FEMALE Any Age, or MALE 65+: How often do you have 4 or more drinks on one occassion? 0 Filed at: 06/29/2025 1234   Audit-C Score 0 Filed at: 06/29/2025 1234   NOHEMY: How many times in the past year have you...    Used an illegal drug or used a prescription medication for non-medical reasons? Never Filed at: 06/29/2025 1234                            History of Present Illness       Chief Complaint   Patient presents with    Abdominal Pain     Abdominal pain and diarrhea that started on Friday. States that she had recently started and antibiotic for a URI        Past Medical History[1]   Past Surgical History[2]   Family  History[3]   Social History[4]   E-Cigarette/Vaping    E-Cigarette Use Never User       E-Cigarette/Vaping Substances    Nicotine No     THC No     CBD No     Flavoring No     Other No     Unknown No       I have reviewed and agree with the history as documented.     Patient is a 75-year-old female with a past medical history including hyperlipidemia, hypertension, interstitial cystitis, IBS, and GERD. Presents today for evaluation of abdominal pain and non-bloody diarrhea. States that she started ciprofloxacin 6 days ago for treatment of an upper respiratory infection. About 2 days ago started with some mild generalized abdominal pain, diarrhea, and nausea without any episodes of vomiting. She took some Zofran with slight relief. She reached out to her PCP who recommended not taking the antibiotic that day to see if that would help her abdominal discomfort and nausea. This morning she took an Imodium which had seemed to help the diarrhea but still had some slight abdominal discomfort and wanted to be evaluated. She is still eating and drinking appropriately.      Abdominal Pain  Associated symptoms: cough, diarrhea and nausea    Associated symptoms: no chest pain, no chills, no fever, no shortness of breath and no vomiting        Review of Systems   Constitutional:  Negative for chills and fever.   Respiratory:  Positive for cough. Negative for shortness of breath.    Cardiovascular:  Negative for chest pain.   Gastrointestinal:  Positive for abdominal pain, diarrhea and nausea. Negative for blood in stool and vomiting.   Neurological:  Positive for headaches.           Objective       ED Triage Vitals [06/29/25 1232]   Temperature Pulse Blood Pressure Respirations SpO2 Patient Position - Orthostatic VS   (!) 97.4 °F (36.3 °C) 91 121/59 18 95 % Lying      Temp Source Heart Rate Source BP Location FiO2 (%) Pain Score    Temporal Monitor Left arm -- 5      Vitals      Date and Time Temp Pulse SpO2 Resp BP Pain Score  FACES Pain Rating User   06/29/25 1530 -- 74 96 % 20 110/53 -- -- [place]   06/29/25 1500 -- 80 96 % 19 140/60 -- -- CB   06/29/25 1418 -- -- -- -- -- 7 --    06/29/25 1415 -- 79 96 % 16 117/60 -- --    06/29/25 1241 -- -- -- -- -- 5 --    06/29/25 1232 97.4 °F (36.3 °C) 91 95 % 18 121/59 5 -- [place]            Physical Exam  Vitals and nursing note reviewed.   Constitutional:       Appearance: Normal appearance.     Cardiovascular:      Rate and Rhythm: Normal rate.      Heart sounds: Normal heart sounds.   Pulmonary:      Effort: Pulmonary effort is normal.      Breath sounds: Normal breath sounds.   Abdominal:      General: Abdomen is flat.      Palpations: Abdomen is soft.      Tenderness: There is no abdominal tenderness.     Musculoskeletal:         General: Normal range of motion.     Skin:     General: Skin is warm and dry.      Capillary Refill: Capillary refill takes less than 2 seconds.     Neurological:      General: No focal deficit present.      Mental Status: She is alert and oriented to person, place, and time.     Psychiatric:         Mood and Affect: Mood normal.         Behavior: Behavior normal.         Results Reviewed       Procedure Component Value Units Date/Time    Comprehensive metabolic panel [[id number]] Collected: 06/29/25 1315    Lab Status: Final result Specimen: Blood from Arm, Right Updated: 06/29/25 1336     Sodium 138 mmol/L      Potassium 3.6 mmol/L      Chloride 100 mmol/L      CO2 30 mmol/L      ANION GAP 8 mmol/L      BUN 13 mg/dL      Creatinine 0.67 mg/dL      Glucose 121 mg/dL      Calcium 9.3 mg/dL      AST 30 U/L      ALT 28 U/L      Alkaline Phosphatase 76 U/L      Total Protein 7.0 g/dL      Albumin 4.5 g/dL      Total Bilirubin 0.45 mg/dL      eGFR 86 ml/min/1.73sq m     Narrative:      National Kid[employer] guidelines for Chronic Kidney Disease (CKD):     Stage 1 with normal or high GFR (GFR > 90 mL/min/1.73 square meters)    Stage 2 Mild CKD (GFR = 60-89  mL/min/1.73 square meters)    Stage 3A Moderate CKD (GFR = 45-59 mL/min/1.73 square meters)    Stage 3B Moderate CKD (GFR = 30-44 mL/min/1.73 square meters)    Stage 4 Severe CKD (GFR = 15-29 mL/min/1.73 square meters)    Stage 5 End Stage CKD (GFR <15 mL/min/1.73 square meters)  Note: GFR calculation is accurate only with a steady state creatinine    Lipase [[id number]]  (Normal) Collected: 06/29/25 1315    Lab Status: Final result Specimen: Blood from Arm, Right Updated: 06/29/25 1336     Lipase 42 u/L     CBC and differential [[id number]]  (Abnormal) Collected: 06/29/25 1315    Lab Status: Final result Specimen: Blood from Arm, Right Updated: 06/29/25 1319     WBC 8.57 Thousand/uL      RBC 4.60 Million/uL      Hemoglobin 14.3 g/dL      Hematocrit 41.7 %      MCV 91 fL      MCH 31.1 pg      MCHC 34.3 g/dL      RDW 13.3 %      MPV 9.7 fL      Platelets 189 Thousands/uL      nRBC 0 /100 WBCs      Segmented % 76 %      Immature Grans % 1 %      Lymphocytes % 18 %      Monocytes % 4 %      Eosinophils Relative 1 %      Basophils Relative 0 %      Absolute Neutrophils 6.54 Thousands/µL      Absolute Immature Grans 0.04 Thousand/uL      Absolute Lymphocytes 1.57 Thousands/µL      Absolute Monocytes 0.35 Thousand/µL      Eosinophils Absolute 0.04 Thousand/µL      Basophils Absolute 0.03 Thousands/µL             No orders to display       ECG 12 Lead Documentation Only    Date/Time: 6/29/2025 12:45 PM    Performed by: EBONI Nickerson  Authorized by: EBONI Nickerson    Indications / Diagnosis:  Abdominal pain  ECG reviewed by me, the ED Provider: yes    Patient location:  ED  Previous ECG:     Previous ECG:  Compared to current    Comparison ECG info:  NSR, HR=86    Similarity:  No change  Interpretation:     Interpretation: normal    Rate:     ECG rate:  92    ECG rate assessment: tachycardic    Rhythm:     Rhythm: sinus tachycardia    Ectopy:     Ectopy: none    QRS:     QRS axis:  Normal    QRS  intervals:  Normal  Conduction:     Conduction: normal    ST segments:     ST segments:  Normal  T waves:     T waves: normal        ED Medication and Procedure Management   Prior to Admission Medications   Prescriptions Last Dose Informant Patient Reported? Taking?   Biotin 2500 MCG CAPS  Self Yes No   Sig: Take 1 capsule by mouth in the morning and 1 capsule before bedtime.   Cholecalciferol (VITAMIN D-3 PO)  Self Yes No   Sig: Take 1 tablet by mouth in the morning.   Cyanocobalamin (VITAMIN B-12 CR PO)  Self Yes No   Sig: Take 1 tablet by mouth in the morning.   Diclofenac Sodium (VOLTAREN) 1 %   No No   Sig: Apply 2 g topically 4 (four) times a day   EPINEPHrine (EPIPEN) 0.3 mg/0.3 mL SOAJ  Self No No   Sig: INJECT 0.3 ML (0.3 MG TOTAL) INTO A MUSCLE AS NEEDED FOR ANAPHYLAXIS   Evening Primrose Oil 1000 MG CAPS  Self No No   Sig: Take 1 capsule (1,000 mg total) by mouth in the morning   Multiple Vitamins-Minerals (PRESERVISION AREDS 2 PO)  Self Yes No   Sig: Take by mouth in the morning   Omega-3 Fatty Acids (FISH OIL) 1,000 mg  Self Yes No   Sig: Take 1,000 mg by mouth in the morning and 1,000 mg in the evening and 1,000 mg before bedtime.   albuterol (ProAir HFA) 90 mcg/act inhaler   No No   Sig: Inhale 2 puffs every 4 (four) hours as needed for wheezing   amitriptyline (ELAVIL) 50 mg tablet   No No   Sig: TAKE 1 TABLET AT BEDTIME   azelastine (ASTELIN) 0.1 % nasal spray   No No   Si spray into each nostril 2 (two) times a day Use in each nostril as directed   calcium carbonate (OS-ANTHONY) 600 MG tablet  Self Yes No   Sig: Take 1,200 mg by mouth in the morning and 1,200 mg in the evening. Take with meals.   Patient not taking: Reported on 2025   ciprofloxacin (CIPRO) 500 mg tablet   No No   Sig: Take 1 tablet (500 mg total) by mouth every 12 (twelve) hours for 7 days   dicyclomine (BENTYL) 10 mg capsule   No No   Sig: TAKE 1 CAPSULE TWICE DAILY   diphenhydrAMINE (BENADRYL) 25 mg tablet   No No   Sig:  Take 30 minutes prior to CT scan   docusate sodium (COLACE) 100 mg capsule  Self Yes No   Sig: Take 100 mg by mouth daily at bedtime   famotidine (PEPCID) 20 mg tablet   No No   Sig: TAKE 1 TABLET TWICE DAILY   fexofenadine (ALLEGRA) 180 MG tablet  Self Yes No   Sig: Take 180 mg by mouth in the morning.   folic acid (FOLVITE) 1 mg tablet  Self Yes No   gabapentin (NEURONTIN) 300 mg capsule   No No   Sig: Take 1 tablet in the morning, 1 tablet afternoon and 2 bedtime   hydroxychloroquine (PLAQUENIL) 200 mg tablet  Self Yes No   Sig: Take 200 mg by mouth daily with breakfast   losartan-hydrochlorothiazide (HYZAAR) 100-25 MG per tablet   No No   Sig: TAKE 1 TABLET EVERY DAY   methocarbamol (ROBAXIN) 750 mg tablet  Self No No   Sig: TAKE 1 TABLET EVERY 8 HOURS   methotrexate 2.5 MG tablet  Self Yes No   Sig: Take by mouth once a week   montelukast (SINGULAIR) 10 mg tablet   No No   Sig: TAKE 1 TABLET AT BEDTIME   omeprazole (PriLOSEC) 40 MG capsule  Self No No   Sig: Take 1 capsule (40 mg total) by mouth daily   ondansetron (ZOFRAN) 4 mg tablet   No No   Sig: Take 1 tablet (4 mg total) by mouth every 8 (eight) hours as needed for nausea or vomiting for up to 4 days   promethazine-dextromethorphan (PHENERGAN-DM) 6.25-15 mg/5 mL oral syrup   No No   Sig: Take 5 mL by mouth 4 (four) times a day as needed for cough   rosuvastatin (CRESTOR) 10 MG tablet   No No   Sig: TAKE 1 TABLET DAILY ON MONDAY, WEDNESDAY AND FRIDAY (ALTERNATE WITH SIMVASTATIN)   simvastatin (ZOCOR) 10 mg tablet   No No   Sig: TAKE 1 TABLET ON TUESDAY, THURSDAY, SATURDAY AND SUNDAY (TO BE ALTERNATED WITH ROSUVASTATIN)      Facility-Administered Medications Last Administration Doses Remaining   ondansetron (ZOFRAN) injection 4 mg None recorded 30        Discharge Medication List as of 6/29/2025  3:58 PM        CONTINUE these medications which have NOT CHANGED    Details   albuterol (ProAir HFA) 90 mcg/act inhaler Inhale 2 puffs every 4 (four) hours as  needed for wheezing, Starting Mon 6/23/2025, Normal      amitriptyline (ELAVIL) 50 mg tablet TAKE 1 TABLET AT BEDTIME, Starting Wed 2/12/2025, Normal      azelastine (ASTELIN) 0.1 % nasal spray 1 spray into each nostril 2 (two) times a day Use in each nostril as directed, Starting Fri 6/27/2025, Normal      Biotin 2500 MCG CAPS Take 1 capsule by mouth in the morning and 1 capsule before bedtime., Historical Med      calcium carbonate (OS-ANTHONY) 600 MG tablet Take 1,200 mg by mouth in the morning and 1,200 mg in the evening. Take with meals., Historical Med      Cholecalciferol (VITAMIN D-3 PO) Take 1 tablet by mouth in the morning., Historical Med      ciprofloxacin (CIPRO) 500 mg tablet Take 1 tablet (500 mg total) by mouth every 12 (twelve) hours for 7 days, Starting Mon 6/23/2025, Until Mon 6/30/2025, Normal      Cyanocobalamin (VITAMIN B-12 CR PO) Take 1 tablet by mouth in the morning., Historical Med      Diclofenac Sodium (VOLTAREN) 1 % Apply 2 g topically 4 (four) times a day, Starting Tue 6/17/2025, Normal      dicyclomine (BENTYL) 10 mg capsule TAKE 1 CAPSULE TWICE DAILY, Starting Tue 4/8/2025, Normal      diphenhydrAMINE (BENADRYL) 25 mg tablet Take 30 minutes prior to CT scan, Normal      docusate sodium (COLACE) 100 mg capsule Take 100 mg by mouth daily at bedtime, Historical Med      EPINEPHrine (EPIPEN) 0.3 mg/0.3 mL SOAJ INJECT 0.3 ML (0.3 MG TOTAL) INTO A MUSCLE AS NEEDED FOR ANAPHYLAXIS, Starting Fri 5/31/2024, Normal      Evening Primrose Oil 1000 MG CAPS Take 1 capsule (1,000 mg total) by mouth in the morning, Starting Tue 11/29/2022, No Print      famotidine (PEPCID) 20 mg tablet TAKE 1 TABLET TWICE DAILY, Starting Wed 2/12/2025, Normal      fexofenadine (ALLEGRA) 180 MG tablet Take 180 mg by mouth in the morning., Historical Med      folic acid (FOLVITE) 1 mg tablet Historical Med      gabapentin (NEURONTIN) 300 mg capsule Take 1 tablet in the morning, 1 tablet afternoon and 2 bedtime, Normal       hydroxychloroquine (PLAQUENIL) 200 mg tablet Take 200 mg by mouth daily with breakfast, Starting Tue 7/9/2024, Historical Med      losartan-hydrochlorothiazide (HYZAAR) 100-25 MG per tablet TAKE 1 TABLET EVERY DAY, Starting Wed 2/12/2025, Normal      methocarbamol (ROBAXIN) 750 mg tablet TAKE 1 TABLET EVERY 8 HOURS, Starting Wed 9/18/2024, Normal      methotrexate 2.5 MG tablet Take by mouth once a week, Starting Wed 11/27/2024, Historical Med      montelukast (SINGULAIR) 10 mg tablet TAKE 1 TABLET AT BEDTIME, Starting Sat 6/21/2025, Normal      Multiple Vitamins-Minerals (PRESERVISION AREDS 2 PO) Take by mouth in the morning, Starting Wed 3/30/2022, Historical Med      Omega-3 Fatty Acids (FISH OIL) 1,000 mg Take 1,000 mg by mouth in the morning and 1,000 mg in the evening and 1,000 mg before bedtime., Historical Med      omeprazole (PriLOSEC) 40 MG capsule Take 1 capsule (40 mg total) by mouth daily, Starting Fri 5/10/2024, Normal      ondansetron (ZOFRAN) 4 mg tablet Take 1 tablet (4 mg total) by mouth every 8 (eight) hours as needed for nausea or vomiting for up to 4 days, Starting Fri 6/27/2025, Until Tue 7/1/2025 at 2359, Normal      promethazine-dextromethorphan (PHENERGAN-DM) 6.25-15 mg/5 mL oral syrup Take 5 mL by mouth 4 (four) times a day as needed for cough, Starting Thu 6/26/2025, Normal      rosuvastatin (CRESTOR) 10 MG tablet TAKE 1 TABLET DAILY ON MONDAY, WEDNESDAY AND FRIDAY (ALTERNATE WITH SIMVASTATIN), Normal      simvastatin (ZOCOR) 10 mg tablet TAKE 1 TABLET ON TUESDAY, THURSDAY, SATURDAY AND SUNDAY (TO BE ALTERNATED WITH ROSUVASTATIN), Normal           No discharge procedures on file.  ED SEPSIS DOCUMENTATION   Time reflects when diagnosis was documented in both MDM as applicable and the Disposition within this note       Time User Action Codes Description Comment    6/29/2025  3:48 PM Shannon Kwok Add [R19.7] Diarrhea     6/29/2025  3:48 PM Shannon Kwok Add [R11.0] Nausea                       [1]   Past Medical History:  Diagnosis Date    Abnormal findings on diagnostic imaging of breast     Abnormal Pap smear of cervix     Allergic     Arthritis     BRCA1 negative     BRCA2 negative     Breast cyst     Chronic pain disorder     Closed fracture of proximal end of left fibula 06/04/2021    Extremity pain     Fibrocystic breast disease     Foot pain     GERD (gastroesophageal reflux disease)     Hyperlipidemia     Hypertension     Interstitial cystitis     Irritable bowel syndrome     Joint pain     Low back pain     Years    Lumbosacral disc disease     Osteoarthritis     Osteopenia     PONV (postoperative nausea and vomiting) 10/17/2023    Thoracic disc disorder     Years    Uncomplicated opioid dependence (HCC) 03/01/2022    Urinary tract infection    [2]   Past Surgical History:  Procedure Laterality Date    APPENDECTOMY      BACK SURGERY      lumbar    BREAST CYST ASPIRATION      BREAST CYST EXCISION      BREAST EXCISIONAL BIOPSY Left 1996    benign    BREAST LUMPECTOMY      CARPAL BOSS EXCISION      CARPAL TUNNEL RELEASE      CHOLECYSTECTOMY      COLONOSCOPY      DIAGNOSTIC LAPAROSCOPY      FOOT SURGERY      FRACTURE SURGERY      HAND SURGERY  5/2017    HYSTERECTOMY      age 32    HYSTEROSCOPY      IR CRYOABLATION  10/17/2023    JOINT REPLACEMENT Bilateral     tkr    KIDNEY SURGERY Left     KNEE ARTHROSCOPY Bilateral     LAMINECTOMY      LAPAROSCOPY      hynecologic with adhesions    MYOMECTOMY      OOPHORECTOMY Bilateral     age 32    ORTHOPEDIC SURGERY      AL CAR IMPLTJ NSTIM ELTRDS PLATE/PADDLE EDRL N/A 05/18/2017    Procedure: PLACEMENT OF THORACIC SPINAL CORD STIMULATOR WITH LEFT BUTTOCK IMPLANTABLE PULSE GENERATOR (IMPULSE MONITORING-MOTORS (TCEMEP), EMG, SSEP);  Surgeon: Julius Damon MD;  Location: [facility];  Service: Neurosurgery    SPINAL CORD STIMULATOR IMPLANT Left 09/29/2020    Procedure: LAMINECTOMY THORACIC , REMOVAL OF SCS LEADS AND GENERATOR;  Surgeon: Oswald HAIRSTON  MD Jose Eduardo;  Location:  MAIN OR;  Service: Neurosurgery    SPINAL STIMULATOR PLACEMENT  05/2017    TONSILLECTOMY AND ADENOIDECTOMY      onset: unknown    TOTAL KNEE ARTHROPLASTY Right    [3]   Family History  Problem Relation Name Age of Onset    Bone cancer Mother María Wheatley     Cancer Mother María Wheatley     Asthma Mother María Wheatley     Rheum arthritis Mother María Wheatley     Brain cancer Father Hunter Wheatley     Stroke Father Hunter Wheatley         stroke sydrome    Transient ischemic attack Father Hunter Wheatley     BRCA1 Negative Sister ERLA BIASI SISTER     BRCA2 Negative Sister ERLA BIASI SISTER     Depression Sister ERLA BIASI SISTER     Migraines Sister ERLA BIASI SISTER     Asthma Sister ERLA BIASI SISTER     Asthma Sister      Diabetes Sister Christineashley Olmedoitelli     Depression Sister Christineashley Levinelli     Bipolar disorder Sister Christineashley Levinelli     Suicide Attempts Sister Christine Chamberlain     No Known Problems Maternal Grandmother      No Known Problems Maternal Grandfather      Diabetes Paternal Grandmother Maritza Wheatley     No Known Problems Paternal Grandfather      Heart disease Brother Asael Dioni     Diabetes Brother Asael Dioni     Heart attack Brother Asael Dioni     Colon cancer Brother Asael Dioni 66    Cancer Brother Asael Dioni     Hypertension Brother Asael Odenjuan     No Known Problems Brother      Breast cancer Maternal Aunt Ly Merlyn 70    Breast cancer additional onset Maternal Aunt Ly Merlyn 72    Cancer Maternal Aunt Ly Merlyn     Bipolar disorder Sister Padma Wheatley    [4]   Social History  Tobacco Use    Smoking status: Never    Smokeless tobacco: Never   Vaping Use    Vaping status: Never Used   Substance Use Topics    Alcohol use: Yes     Alcohol/week: 1.0 standard drink of alcohol     Types: 1 Glasses of wine per week     Comment: Drink socially, not too often    Drug use: No        EBONI Nickerson  06/30/25 2007

## 2025-06-29 NOTE — DISCHARGE INSTRUCTIONS
Blood work today was normal.    Cipro may cause GI upset, nausea, vomiting, decreased appetite and abdominal discomfort  -continue to use Zofran as needed for nausea  -increase fluid intake and keep a bland diet for a few days to allow stomach/bowel to rest   -follow-up with PCP and return to the ED for any worsening symptoms

## 2025-07-01 LAB
ATRIAL RATE: 92 BPM
P AXIS: 74 DEGREES
PR INTERVAL: 146 MS
QRS AXIS: 53 DEGREES
QRSD INTERVAL: 84 MS
QT INTERVAL: 370 MS
QTC INTERVAL: 457 MS
T WAVE AXIS: 61 DEGREES
VENTRICULAR RATE: 92 BPM

## 2025-07-01 PROCEDURE — 93010 ELECTROCARDIOGRAM REPORT: CPT | Performed by: INTERNAL MEDICINE

## 2025-07-10 ENCOUNTER — OFFICE VISIT (OUTPATIENT)
Dept: FAMILY MEDICINE CLINIC | Facility: CLINIC | Age: 75
End: 2025-07-10
Payer: MEDICARE

## 2025-07-10 VITALS
RESPIRATION RATE: 18 BRPM | HEIGHT: 63 IN | DIASTOLIC BLOOD PRESSURE: 70 MMHG | SYSTOLIC BLOOD PRESSURE: 128 MMHG | OXYGEN SATURATION: 97 % | BODY MASS INDEX: 31.18 KG/M2 | TEMPERATURE: 97.4 F | HEART RATE: 77 BPM | WEIGHT: 176 LBS

## 2025-07-10 DIAGNOSIS — J40 BRONCHITIS: Primary | ICD-10-CM

## 2025-07-10 DIAGNOSIS — R19.7 DIARRHEA, UNSPECIFIED TYPE: ICD-10-CM

## 2025-07-10 PROCEDURE — 99214 OFFICE O/P EST MOD 30 MIN: CPT | Performed by: INTERNAL MEDICINE

## 2025-07-10 PROCEDURE — G2211 COMPLEX E/M VISIT ADD ON: HCPCS | Performed by: INTERNAL MEDICINE

## 2025-07-10 NOTE — ASSESSMENT & PLAN NOTE
Sxs are improving She uses cough syrup prn and inhaler at least BID   She is staying hydrated Cough is lessening and not as limiting

## 2025-07-10 NOTE — PROGRESS NOTES
Name: Ingrid Wheatley      : 1950      MRN: [id number]  Encounter Provider: Leslie Bermudez DO  Encounter Date: 7/10/2025   Encounter department: [facility]  :  Assessment & Plan  Bronchitis  Sxs are improving She uses cough syrup prn and inhaler at least BID   She is staying hydrated Cough is lessening and not as limiting        Diarrhea, unspecified type  Sxs resolved Likely due to recent illness and antibiotic   Diet as tolerated and stay hydrated        Rto in Fall      Depression Screening and Follow-up Plan: Patient was screened for depression during today's encounter. They screened negative with a PHQ-2 score of 0.      History of Present Illness   HPI  Pt was in ER with diarrhea She is feeling better and those sxs are resolved She has lingering cough but is much better than last visit She is using cough syrup as needed and uses inhaler at least bid she is sleeping better AND appetite is better No chest pain She is working normal schedule and bowels are normal No abdominal pain or pressure   Review of Systems   Constitutional:  Negative for chills and fever.   HENT: Negative.     Eyes:  Negative for visual disturbance.   Respiratory:  Positive for cough. Negative for shortness of breath.    Cardiovascular: Negative.    Gastrointestinal: Negative.  Negative for abdominal distention, abdominal pain and diarrhea.   Genitourinary: Negative.    Musculoskeletal:  Positive for arthralgias.   Neurological:  Negative for dizziness, light-headedness and headaches.   Psychiatric/Behavioral:  Negative for sleep disturbance. The patient is not nervous/anxious.      Past Medical History[1]  Past Surgical History[2]  Social History     Socioeconomic History   • Marital status: Single     Spouse name: Not on file   • Number of children: Not on file   • Years of education: Not on file   • Highest education level: Not on file   Occupational History   • Occupation: Retired/ working part-time     Tobacco Use   • Smoking status: Never   • Smokeless tobacco: Never   Vaping Use   • Vaping status: Never Used   Substance and Sexual Activity   • Alcohol use: Yes     Alcohol/week: 1.0 standard drink of alcohol     Types: 1 Glasses of wine per week     Comment: Drink socially, not too often   • Drug use: No   • Sexual activity: Not Currently     Partners: Male     Birth control/protection: None   Other Topics Concern   • Not on file   Social History Narrative    No caffeine use    Always uses sunscreen    Always uses a seatbelt     Social Drivers of Health     Financial Resource Strain: Low Risk  (1/10/2024)    Overall Financial Resource Strain (CARDIA)    • Difficulty of Paying Living Expenses: Not hard at all   Food Insecurity: No Food Insecurity (1/14/2025)    Nursing - Inadequate Food Risk Classification    • Worried About Running Out of Food in the Last Year: Never true    • Ran Out of Food in the Last Year: Never true    • Ran Out of Food in the Last Year: Not on file   Transportation Needs: No Transportation Needs (1/14/2025)    PRAPARE - Transportation    • Lack of Transportation (Medical): No    • Lack of Transportation (Non-Medical): No   Physical Activity: Not on file   Stress: Not on file   Social Connections: Not on file   Intimate Partner Violence: Unknown (9/4/2024)    Received from [facility]    Intimate Partner Violence    • Within the last year, have you been afraid of your partner, ex-partner or family member?: Not on file    • Within the last year, have you been humiliated or emotionally abused in other ways by your partner, ex-partner or family member?: Not on file    • Within the last year, have you been kicked, hit, slapped, or otherwise physically hurt by your partner, ex-partner or family member?: Not on file    • Within the last year, have you been raped or forced to have any kind of sexual activity by your partner, ex-partner or family member?: Not on file   Housing  "Stability: Low Risk  (1/14/2025)    Housing Stability Vital Sign    • Unable to Pay for Housing in the Last Year: No    • Number of Times Moved in the Last Year: 0    • Homeless in the Last Year: No     Allergies   Allergen Reactions   • Bee Venom Shortness Of Breath   • Chlorhexidine Rash   • Other Rash, Hives, Palpitations and Shortness Of Breath     Adhesive tape   • Egg Yolk - Food Allergy Hives   • Iodinated Contrast Media Hives and Other (See Comments)     Pt tolerated CT with contrast being allergy prepped on 2/21/2025   • Penicillins Hives   • Lidocaine Other (See Comments)     cream   • Penicillin G Hives   • Allevyn Adhesive [Wound Dressings] Hives and Rash   • Bactrim [Sulfamethoxazole-Trimethoprim] Rash   • Codeine Other (See Comments)     headaches   • Latex Rash and Other (See Comments)   • Medical Tape Blisters, Hives, Itching and Rash   • Oxybutynin Rash   • Pentosan Polysulfate Rash   • Povidone Iodine Rash       Objective   /70   Pulse 77   Temp (!) 97.4 °F (36.3 °C) (Temporal)   Resp 18   Ht 5' 3\" (1.6 m)   Wt 79.8 kg (176 lb)   LMP  (LMP Unknown)   SpO2 97%   BMI 31.18 kg/m²      Physical Exam  Vitals and nursing note reviewed.   Constitutional:       General: She is not in acute distress.     Appearance: Normal appearance. She is not ill-appearing, toxic-appearing or diaphoretic.   HENT:      Head: Normocephalic and atraumatic.      Right Ear: External ear normal.      Left Ear: External ear normal.      Nose: Nose normal.      Mouth/Throat:      Mouth: Mucous membranes are moist.     Eyes:      General: No scleral icterus.     Extraocular Movements: Extraocular movements intact.      Conjunctiva/sclera: Conjunctivae normal.      Pupils: Pupils are equal, round, and reactive to light.       Cardiovascular:      Rate and Rhythm: Normal rate and regular rhythm.      Pulses: Normal pulses.   Pulmonary:      Effort: Pulmonary effort is normal. No respiratory distress.      Breath " sounds: Normal breath sounds. No wheezing.   Abdominal:      General: There is no distension.      Palpations: Abdomen is soft.      Tenderness: There is no abdominal tenderness.     Musculoskeletal:      Cervical back: Normal range of motion and neck supple. No rigidity.      Right lower leg: No edema.      Left lower leg: No edema.   Lymphadenopathy:      Cervical: No cervical adenopathy.     Skin:     General: Skin is dry.      Coloration: Skin is not jaundiced.     Neurological:      General: No focal deficit present.      Mental Status: She is alert and oriented to person, place, and time. Mental status is at baseline.      Cranial Nerves: No cranial nerve deficit.     Psychiatric:         Mood and Affect: Mood normal.         Behavior: Behavior normal.         Thought Content: Thought content normal.         Judgment: Judgment normal.                [1]  Past Medical History:  Diagnosis Date   • Abnormal findings on diagnostic imaging of breast    • Abnormal Pap smear of cervix    • Allergic    • Arthritis    • BRCA1 negative    • BRCA2 negative    • Breast cyst    • Chronic pain disorder    • Closed fracture of proximal end of left fibula 06/04/2021   • Extremity pain    • Fibrocystic breast disease    • Foot pain    • GERD (gastroesophageal reflux disease)    • Hyperlipidemia    • Hypertension    • Interstitial cystitis    • Irritable bowel syndrome    • Joint pain    • Low back pain     Years   • Lumbosacral disc disease    • Osteoarthritis    • Osteopenia    • PONV (postoperative nausea and vomiting) 10/17/2023   • Thoracic disc disorder     Years   • Uncomplicated opioid dependence (HCC) 03/01/2022   • Urinary tract infection    [2]  Past Surgical History:  Procedure Laterality Date   • APPENDECTOMY     • BACK SURGERY      lumbar   • BREAST CYST ASPIRATION     • BREAST CYST EXCISION     • BREAST EXCISIONAL BIOPSY Left 1996    benign   • BREAST LUMPECTOMY     • CARPAL BOSS EXCISION     • CARPAL TUNNEL  RELEASE     • CHOLECYSTECTOMY     • COLONOSCOPY     • DIAGNOSTIC LAPAROSCOPY     • FOOT SURGERY     • FRACTURE SURGERY     • HAND SURGERY  5/2017   • HYSTERECTOMY      age 32   • HYSTEROSCOPY     • IR CRYOABLATION  10/17/2023   • JOINT REPLACEMENT Bilateral     tkr   • KIDNEY SURGERY Left    • KNEE ARTHROSCOPY Bilateral    • LAMINECTOMY     • LAPAROSCOPY      hynecologic with adhesions   • MYOMECTOMY     • OOPHORECTOMY Bilateral     age 32   • ORTHOPEDIC SURGERY     • OR CAR IMPLTJ NSTIM ELTRDS PLATE/PADDLE EDRL N/A 05/18/2017    Procedure: PLACEMENT OF THORACIC SPINAL CORD STIMULATOR WITH LEFT BUTTOCK IMPLANTABLE PULSE GENERATOR (IMPULSE MONITORING-MOTORS (TCEMEP), EMG, SSEP);  Surgeon: Julius Damon MD;  Location: [facility];  Service: Neurosurgery   • SPINAL CORD STIMULATOR IMPLANT Left 09/29/2020    Procedure: LAMINECTOMY THORACIC , REMOVAL OF SCS LEADS AND GENERATOR;  Surgeon: Oswald Whitt MD;  Location:  MAIN OR;  Service: Neurosurgery   • SPINAL STIMULATOR PLACEMENT  05/2017   • TONSILLECTOMY AND ADENOIDECTOMY      onset: unknown   • TOTAL KNEE ARTHROPLASTY Right

## 2025-07-13 DIAGNOSIS — K21.9 GASTROESOPHAGEAL REFLUX DISEASE: ICD-10-CM

## 2025-07-14 ENCOUNTER — TRANSCRIBE ORDERS (OUTPATIENT)
Dept: LAB | Facility: MEDICAL CENTER | Age: 75
End: 2025-07-14

## 2025-07-14 ENCOUNTER — APPOINTMENT (OUTPATIENT)
Dept: LAB | Facility: MEDICAL CENTER | Age: 75
End: 2025-07-14
Attending: INTERNAL MEDICINE
Payer: MEDICARE

## 2025-07-14 DIAGNOSIS — M06.09 RHEUMATOID ARTHRITIS OF MULTIPLE SITES WITHOUT RHEUMATOID FACTOR (HCC): Primary | ICD-10-CM

## 2025-07-14 DIAGNOSIS — M06.09 RHEUMATOID ARTHRITIS OF MULTIPLE SITES WITHOUT RHEUMATOID FACTOR (HCC): ICD-10-CM

## 2025-07-14 LAB
ALBUMIN SERPL BCG-MCNC: 4 G/DL (ref 3.5–5)
ALP SERPL-CCNC: 54 U/L (ref 34–104)
ALT SERPL W P-5'-P-CCNC: 13 U/L (ref 7–52)
ANION GAP SERPL CALCULATED.3IONS-SCNC: 5 MMOL/L (ref 4–13)
AST SERPL W P-5'-P-CCNC: 15 U/L (ref 13–39)
BASOPHILS # BLD AUTO: 0.07 THOUSANDS/ÂΜL (ref 0–0.1)
BASOPHILS NFR BLD AUTO: 1 % (ref 0–1)
BILIRUB SERPL-MCNC: 0.29 MG/DL (ref 0.2–1)
BUN SERPL-MCNC: 21 MG/DL (ref 5–25)
CALCIUM SERPL-MCNC: 9.3 MG/DL (ref 8.4–10.2)
CHLORIDE SERPL-SCNC: 107 MMOL/L (ref 96–108)
CO2 SERPL-SCNC: 29 MMOL/L (ref 21–32)
CREAT SERPL-MCNC: 0.65 MG/DL (ref 0.6–1.3)
EOSINOPHIL # BLD AUTO: 0.31 THOUSAND/ÂΜL (ref 0–0.61)
EOSINOPHIL NFR BLD AUTO: 4 % (ref 0–6)
ERYTHROCYTE [DISTWIDTH] IN BLOOD BY AUTOMATED COUNT: 13.9 % (ref 11.6–15.1)
GFR SERPL CREATININE-BSD FRML MDRD: 87 ML/MIN/1.73SQ M
GLUCOSE P FAST SERPL-MCNC: 110 MG/DL (ref 65–99)
HCT VFR BLD AUTO: 40.3 % (ref 34.8–46.1)
HGB BLD-MCNC: 13.4 G/DL (ref 11.5–15.4)
IMM GRANULOCYTES # BLD AUTO: 0.03 THOUSAND/UL (ref 0–0.2)
IMM GRANULOCYTES NFR BLD AUTO: 0 % (ref 0–2)
LYMPHOCYTES # BLD AUTO: 2.61 THOUSANDS/ÂΜL (ref 0.6–4.47)
LYMPHOCYTES NFR BLD AUTO: 29 % (ref 14–44)
MCH RBC QN AUTO: 31.1 PG (ref 26.8–34.3)
MCHC RBC AUTO-ENTMCNC: 33.3 G/DL (ref 31.4–37.4)
MCV RBC AUTO: 94 FL (ref 82–98)
MONOCYTES # BLD AUTO: 0.6 THOUSAND/ÂΜL (ref 0.17–1.22)
MONOCYTES NFR BLD AUTO: 7 % (ref 4–12)
NEUTROPHILS # BLD AUTO: 5.29 THOUSANDS/ÂΜL (ref 1.85–7.62)
NEUTS SEG NFR BLD AUTO: 59 % (ref 43–75)
NRBC BLD AUTO-RTO: 0 /100 WBCS
PLATELET # BLD AUTO: 276 THOUSANDS/UL (ref 149–390)
PMV BLD AUTO: 9.6 FL (ref 8.9–12.7)
POTASSIUM SERPL-SCNC: 4.2 MMOL/L (ref 3.5–5.3)
PROT SERPL-MCNC: 6.1 G/DL (ref 6.4–8.4)
RBC # BLD AUTO: 4.31 MILLION/UL (ref 3.81–5.12)
SODIUM SERPL-SCNC: 141 MMOL/L (ref 135–147)
WBC # BLD AUTO: 8.91 THOUSAND/UL (ref 4.31–10.16)

## 2025-07-14 PROCEDURE — 85025 COMPLETE CBC W/AUTO DIFF WBC: CPT

## 2025-07-14 PROCEDURE — 80053 COMPREHEN METABOLIC PANEL: CPT

## 2025-07-14 PROCEDURE — 36415 COLL VENOUS BLD VENIPUNCTURE: CPT

## 2025-07-15 RX ORDER — OMEPRAZOLE 40 MG/1
40 CAPSULE, DELAYED RELEASE ORAL DAILY
Qty: 90 CAPSULE | Refills: 1 | Status: SHIPPED | OUTPATIENT
Start: 2025-07-15

## (undated) DEVICE — STANDARD SURGICAL GOWN, L: Brand: CONVERTORS

## (undated) DEVICE — TUNNELING TOOL 20IN

## (undated) DEVICE — TOOL 14MH30 LEGEND 14CM 3MM: Brand: MIDAS REX ™

## (undated) DEVICE — GLOVE SRG LF STRL BGL SKNSNS 8 PF

## (undated) DEVICE — GLOVE SRG LF STRL BGL SKNSNS 7 PF

## (undated) DEVICE — DRAPE C-ARM X-RAY

## (undated) DEVICE — ANTIBACTERIAL VIOLET BRAIDED (POLYGLACTIN 910), SYNTHETIC ABSORBABLE SUTURE: Brand: COATED VICRYL

## (undated) DEVICE — DRAPE CAMERA/LASER

## (undated) DEVICE — LEAD KIT, 60CM LENGTH
Type: IMPLANTABLE DEVICE | Site: SPINE THORACIC | Status: NON-FUNCTIONAL
Brand: OCTRODE™

## (undated) DEVICE — TIBURON LAPAROTOMY DRAPE: Brand: CONVERTORS

## (undated) DEVICE — FLOSEAL HEMOSTATIC MATRIX, 10 ML: Brand: FLOSEAL

## (undated) DEVICE — PENCIL ELECTROSURG E-Z CLEAN -0035H

## (undated) DEVICE — BETHLEHEM UNIVERSAL SPINE, KIT: Brand: CARDINAL HEALTH

## (undated) DEVICE — SPONGE PVP SCRUB WING STERILE

## (undated) DEVICE — 3M™ TEGADERM™ TRANSPARENT FILM DRESSING FRAME STYLE, 1626W, 4 IN X 4-3/4 IN (10 CM X 12 CM), 50/CT 4CT/CASE: Brand: 3M™ TEGADERM™

## (undated) DEVICE — FLOSEAL MATRIX IS INDICATED IN SURGICAL PROCEDURES (OTHER THAN IN OPHTHALMIC) AS AN ADJUNCT TO HEMOSTASIS WHEN CONTROL OF BLEEDING BY LIGATURE OR CONVENTIONAL PROCEDURES IS INEFFECTIVE OR IMPRACTICAL.: Brand: FLOSEAL HEMOSTATIC MATRIX

## (undated) DEVICE — TELFA NON-ADHERENT ABSORBENT DRESSING: Brand: TELFA

## (undated) DEVICE — GLOVE INDICATOR PI UNDERGLOVE SZ 8 BLUE

## (undated) DEVICE — TIBURON SPLIT SHEET: Brand: CONVERTORS

## (undated) DEVICE — INTENDED FOR TISSUE SEPARATION, AND OTHER PROCEDURES THAT REQUIRE A SHARP SURGICAL BLADE TO PUNCTURE OR CUT.: Brand: BARD-PARKER ® CARBON RIB-BACK BLADES

## (undated) DEVICE — NEUROSTIM MULTILEAD TRIAL CABLE

## (undated) DEVICE — SUT MONOCRYL 4-0 PS-2 18 IN Y496G

## (undated) DEVICE — 3M™ IOBAN™ 2 ANTIMICROBIAL INCISE DRAPE 6650EZ: Brand: IOBAN™ 2

## (undated) DEVICE — ELECTRODE BLADE MOD E-Z CLEAN  2.75IN 7CM -0012AM

## (undated) DEVICE — PROXIMATE PLUS MD MULTI-DIRECTIONAL RELEASE SKIN STAPLERS CONTAINS 35 STAINLESS STEEL STAPLES APPROXIMATE CLOSED DIMENSIONS: 6.9MM X 3.9MM WIDE: Brand: PROXIMATE

## (undated) DEVICE — COVER BOW FRAME SKIN CARE KIT

## (undated) DEVICE — PREP SURGICAL PURPREP 26ML

## (undated) DEVICE — UNIVERSAL SPINE,KIT: Brand: CARDINAL HEALTH

## (undated) DEVICE — GLOVE SRG LF STRL BGL SKNSNS 6.5 PF

## (undated) DEVICE — GLOVE INDICATOR PI UNDERGLOVE SZ 7.5 BLUE

## (undated) DEVICE — PROGRAMMER PATIENT PROCLAIM

## (undated) DEVICE — GLOVE INDICATOR PI UNDERGLOVE SZ 6.5 BLUE

## (undated) DEVICE — SUT SILK 2-0 SH 30 IN K833H

## (undated) DEVICE — NEEDLE HYPO 22G X 1-1/2 IN

## (undated) DEVICE — ADHESIVE SKIN HIGH VISCOSITY EXOFIN 1ML

## (undated) DEVICE — LIGHT HANDLE COVER CAMERA DISP

## (undated) DEVICE — VIAL DECANTER

## (undated) DEVICE — INTENDED FOR TISSUE SEPARATION, AND OTHER PROCEDURES THAT REQUIRE A SHARP SURGICAL BLADE TO PUNCTURE OR CUT.: Brand: BARD-PARKER SAFETY BLADES SIZE 10, STERILE

## (undated) DEVICE — RX COUDÉ® EPIDURAL NEEDLE 14G TW X 4.0": Brand: EPIMED